# Patient Record
Sex: MALE | Race: AMERICAN INDIAN OR ALASKA NATIVE | NOT HISPANIC OR LATINO | URBAN - METROPOLITAN AREA
[De-identification: names, ages, dates, MRNs, and addresses within clinical notes are randomized per-mention and may not be internally consistent; named-entity substitution may affect disease eponyms.]

---

## 2017-03-09 ENCOUNTER — INPATIENT (INPATIENT)
Facility: HOSPITAL | Age: 67
LOS: 25 days | Discharge: NURSING, REHAB OR LONG-TERM CARE | End: 2017-04-04

## 2017-05-02 ENCOUNTER — APPOINTMENT (OUTPATIENT)
Dept: INTERNAL MEDICINE | Facility: CLINIC | Age: 67
End: 2017-05-02

## 2017-05-04 ENCOUNTER — OUTPATIENT (OUTPATIENT)
Dept: OUTPATIENT SERVICES | Facility: HOSPITAL | Age: 67
LOS: 1 days | Discharge: HOME | End: 2017-05-04

## 2017-05-06 ENCOUNTER — OUTPATIENT (OUTPATIENT)
Dept: OUTPATIENT SERVICES | Facility: HOSPITAL | Age: 67
LOS: 1 days | Discharge: HOME | End: 2017-05-06

## 2017-05-12 ENCOUNTER — INPATIENT (INPATIENT)
Facility: HOSPITAL | Age: 67
LOS: 4 days | Discharge: HOME HEALTH | End: 2017-05-17
Attending: SURGERY

## 2017-05-24 ENCOUNTER — APPOINTMENT (OUTPATIENT)
Dept: INTERNAL MEDICINE | Facility: CLINIC | Age: 67
End: 2017-05-24

## 2017-05-24 ENCOUNTER — RESULT REVIEW (OUTPATIENT)
Age: 67
End: 2017-05-24

## 2017-05-24 VITALS
DIASTOLIC BLOOD PRESSURE: 70 MMHG | WEIGHT: 165 LBS | HEIGHT: 66 IN | BODY MASS INDEX: 26.52 KG/M2 | SYSTOLIC BLOOD PRESSURE: 146 MMHG | HEART RATE: 52 BPM

## 2017-05-24 VITALS
SYSTOLIC BLOOD PRESSURE: 117 MMHG | BODY MASS INDEX: 26.36 KG/M2 | HEIGHT: 66 IN | HEART RATE: 79 BPM | WEIGHT: 164 LBS | DIASTOLIC BLOOD PRESSURE: 71 MMHG

## 2017-05-24 RX ORDER — CALCITRIOL 0.25 UG/1
0.25 CAPSULE, LIQUID FILLED ORAL
Qty: 30 | Refills: 0 | Status: DISCONTINUED | COMMUNITY
Start: 2016-10-30

## 2017-05-24 RX ORDER — PRAMOXINE HCL/ZINC ACETATE 1 %-0.1 %
1-0.1 LOTION (ML) TOPICAL
Qty: 177 | Refills: 0 | Status: DISCONTINUED | COMMUNITY
Start: 2017-05-17

## 2017-05-25 LAB
BASOPHILS # BLD: 0.06 TH/MM3
BASOPHILS NFR BLD: 1.1 %
DIFFERENTIAL METHOD BLD: NORMAL
EOSINOPHIL # BLD: 0.57 TH/MM3
EOSINOPHIL NFR BLD: 9.1 %
ERYTHROCYTE [DISTWIDTH] IN BLOOD BY AUTOMATED COUNT: 14.9 %
ESTIMATED AVERGAGE GLUCOSE (NORTH): 114 MG/DL
GRANULOCYTES # BLD: 3.19 TH/MM3
GRANULOCYTES NFR BLD: 56.4 %
HBA1C MFR BLD: 5.6 %
HCT VFR BLD AUTO: 28.1 %
HGB BLD-MCNC: 9 G/DL
IMM GRANULOCYTES # BLD: 0.01 TH/MM3
IMM GRANULOCYTES NFR BLD: 0.2 %
LYMPHOCYTES # BLD: 1.21 TH/MM3
LYMPHOCYTES NFR BLD: 21.4 %
MCH RBC QN AUTO: 31.4 PG
MCHC RBC AUTO-ENTMCNC: 32 G/DL
MCV RBC AUTO: 97.9 FL
MONOCYTES # BLD: 0.61 TH/MM3
MONOCYTES NFR BLD: 11.8 %
PLATELET # BLD: 217 TH/MM3
PMV BLD AUTO: 10.9 FL
RBC # BLD AUTO: 2.87 MIL/MM3
TIBC SERPL-MCNC: 245 UG/DL
TRANSFERRIN SERPL-MCNC: 175 MG/DL
WBC # BLD: 5.65 TH/MM3

## 2017-05-30 LAB
FERRITIN SERPL-MCNC: 270 NG/ML
TSH SERPL DL<=0.005 MIU/L-ACNC: 0.85 UIU/ML

## 2017-05-31 RX ORDER — OMEPRAZOLE 40 MG/1
40 CAPSULE, DELAYED RELEASE ORAL DAILY
Qty: 30 | Refills: 5 | Status: DISCONTINUED | COMMUNITY
Start: 2017-05-24 | End: 2017-05-31

## 2017-06-02 ENCOUNTER — OUTPATIENT (OUTPATIENT)
Dept: OUTPATIENT SERVICES | Facility: HOSPITAL | Age: 67
LOS: 1 days | Discharge: HOME | End: 2017-06-02

## 2017-06-02 DIAGNOSIS — J81.1 CHRONIC PULMONARY EDEMA: ICD-10-CM

## 2017-06-02 DIAGNOSIS — R74.8 ABNORMAL LEVELS OF OTHER SERUM ENZYMES: ICD-10-CM

## 2017-06-02 DIAGNOSIS — A41.9 SEPSIS, UNSPECIFIED ORGANISM: ICD-10-CM

## 2017-06-02 DIAGNOSIS — J96.00 ACUTE RESPIRATORY FAILURE, UNSPECIFIED WHETHER WITH HYPOXIA OR HYPERCAPNIA: ICD-10-CM

## 2017-06-02 DIAGNOSIS — N18.3 CHRONIC KIDNEY DISEASE, STAGE 3 (MODERATE): ICD-10-CM

## 2017-06-02 DIAGNOSIS — I21.4 NON-ST ELEVATION (NSTEMI) MYOCARDIAL INFARCTION: ICD-10-CM

## 2017-06-02 DIAGNOSIS — R06.03 ACUTE RESPIRATORY DISTRESS: ICD-10-CM

## 2017-06-26 ENCOUNTER — OUTPATIENT (OUTPATIENT)
Dept: PREADMISSION | Facility: HOSPITAL | Age: 67
LOS: 1 days | Discharge: HOME | End: 2017-06-26

## 2017-06-26 DIAGNOSIS — A41.9 SEPSIS, UNSPECIFIED ORGANISM: ICD-10-CM

## 2017-06-26 DIAGNOSIS — N18.3 CHRONIC KIDNEY DISEASE, STAGE 3 (MODERATE): ICD-10-CM

## 2017-06-26 DIAGNOSIS — R74.8 ABNORMAL LEVELS OF OTHER SERUM ENZYMES: ICD-10-CM

## 2017-06-26 DIAGNOSIS — J81.1 CHRONIC PULMONARY EDEMA: ICD-10-CM

## 2017-06-26 DIAGNOSIS — I21.4 NON-ST ELEVATION (NSTEMI) MYOCARDIAL INFARCTION: ICD-10-CM

## 2017-06-26 DIAGNOSIS — J96.00 ACUTE RESPIRATORY FAILURE, UNSPECIFIED WHETHER WITH HYPOXIA OR HYPERCAPNIA: ICD-10-CM

## 2017-06-26 DIAGNOSIS — R06.03 ACUTE RESPIRATORY DISTRESS: ICD-10-CM

## 2017-06-28 DIAGNOSIS — Z01.818 ENCOUNTER FOR OTHER PREPROCEDURAL EXAMINATION: ICD-10-CM

## 2017-06-28 DIAGNOSIS — N18.9 CHRONIC KIDNEY DISEASE, UNSPECIFIED: ICD-10-CM

## 2017-06-28 DIAGNOSIS — Z00.00 ENCOUNTER FOR GENERAL ADULT MEDICAL EXAMINATION WITHOUT ABNORMAL FINDINGS: ICD-10-CM

## 2017-07-08 ENCOUNTER — OUTPATIENT (OUTPATIENT)
Dept: OUTPATIENT SERVICES | Facility: HOSPITAL | Age: 67
LOS: 1 days | Discharge: HOME | End: 2017-07-08

## 2017-07-08 DIAGNOSIS — A41.9 SEPSIS, UNSPECIFIED ORGANISM: ICD-10-CM

## 2017-07-08 DIAGNOSIS — N18.3 CHRONIC KIDNEY DISEASE, STAGE 3 (MODERATE): ICD-10-CM

## 2017-07-08 DIAGNOSIS — J96.00 ACUTE RESPIRATORY FAILURE, UNSPECIFIED WHETHER WITH HYPOXIA OR HYPERCAPNIA: ICD-10-CM

## 2017-07-08 DIAGNOSIS — J81.1 CHRONIC PULMONARY EDEMA: ICD-10-CM

## 2017-07-08 DIAGNOSIS — R74.8 ABNORMAL LEVELS OF OTHER SERUM ENZYMES: ICD-10-CM

## 2017-07-08 DIAGNOSIS — I21.4 NON-ST ELEVATION (NSTEMI) MYOCARDIAL INFARCTION: ICD-10-CM

## 2017-07-08 DIAGNOSIS — R06.03 ACUTE RESPIRATORY DISTRESS: ICD-10-CM

## 2017-07-10 ENCOUNTER — INPATIENT (INPATIENT)
Facility: HOSPITAL | Age: 67
LOS: 1 days | Discharge: HOME | End: 2017-07-12
Attending: SURGERY

## 2017-07-10 DIAGNOSIS — R06.03 ACUTE RESPIRATORY DISTRESS: ICD-10-CM

## 2017-07-10 DIAGNOSIS — J81.1 CHRONIC PULMONARY EDEMA: ICD-10-CM

## 2017-07-10 DIAGNOSIS — R74.8 ABNORMAL LEVELS OF OTHER SERUM ENZYMES: ICD-10-CM

## 2017-07-10 DIAGNOSIS — A41.9 SEPSIS, UNSPECIFIED ORGANISM: ICD-10-CM

## 2017-07-10 DIAGNOSIS — N18.3 CHRONIC KIDNEY DISEASE, STAGE 3 (MODERATE): ICD-10-CM

## 2017-07-10 DIAGNOSIS — I21.4 NON-ST ELEVATION (NSTEMI) MYOCARDIAL INFARCTION: ICD-10-CM

## 2017-07-10 DIAGNOSIS — J96.00 ACUTE RESPIRATORY FAILURE, UNSPECIFIED WHETHER WITH HYPOXIA OR HYPERCAPNIA: ICD-10-CM

## 2017-07-11 DIAGNOSIS — E11.9 TYPE 2 DIABETES MELLITUS WITHOUT COMPLICATIONS: ICD-10-CM

## 2017-07-14 DIAGNOSIS — E87.70 FLUID OVERLOAD, UNSPECIFIED: ICD-10-CM

## 2017-07-14 DIAGNOSIS — Z99.2 DEPENDENCE ON RENAL DIALYSIS: ICD-10-CM

## 2017-07-14 DIAGNOSIS — D63.1 ANEMIA IN CHRONIC KIDNEY DISEASE: ICD-10-CM

## 2017-07-14 DIAGNOSIS — N18.6 END STAGE RENAL DISEASE: ICD-10-CM

## 2017-07-14 DIAGNOSIS — E87.5 HYPERKALEMIA: ICD-10-CM

## 2017-07-14 DIAGNOSIS — Z95.5 PRESENCE OF CORONARY ANGIOPLASTY IMPLANT AND GRAFT: ICD-10-CM

## 2017-07-14 DIAGNOSIS — J81.0 ACUTE PULMONARY EDEMA: ICD-10-CM

## 2017-07-14 DIAGNOSIS — E11.22 TYPE 2 DIABETES MELLITUS WITH DIABETIC CHRONIC KIDNEY DISEASE: ICD-10-CM

## 2017-07-14 DIAGNOSIS — I13.2 HYPERTENSIVE HEART AND CHRONIC KIDNEY DISEASE WITH HEART FAILURE AND WITH STAGE 5 CHRONIC KIDNEY DISEASE, OR END STAGE RENAL DISEASE: ICD-10-CM

## 2017-07-14 DIAGNOSIS — Z87.01 PERSONAL HISTORY OF PNEUMONIA (RECURRENT): ICD-10-CM

## 2017-07-14 DIAGNOSIS — I50.9 HEART FAILURE, UNSPECIFIED: ICD-10-CM

## 2017-07-14 DIAGNOSIS — I25.10 ATHEROSCLEROTIC HEART DISEASE OF NATIVE CORONARY ARTERY WITHOUT ANGINA PECTORIS: ICD-10-CM

## 2017-07-14 DIAGNOSIS — I25.2 OLD MYOCARDIAL INFARCTION: ICD-10-CM

## 2017-07-14 DIAGNOSIS — J96.01 ACUTE RESPIRATORY FAILURE WITH HYPOXIA: ICD-10-CM

## 2017-07-14 DIAGNOSIS — E78.5 HYPERLIPIDEMIA, UNSPECIFIED: ICD-10-CM

## 2017-08-15 DIAGNOSIS — I48.91 UNSPECIFIED ATRIAL FIBRILLATION: ICD-10-CM

## 2017-08-15 DIAGNOSIS — R79.9 ABNORMAL FINDING OF BLOOD CHEMISTRY, UNSPECIFIED: ICD-10-CM

## 2017-08-25 ENCOUNTER — APPOINTMENT (OUTPATIENT)
Dept: INTERNAL MEDICINE | Facility: CLINIC | Age: 67
End: 2017-08-25

## 2017-08-25 ENCOUNTER — OUTPATIENT (OUTPATIENT)
Dept: OUTPATIENT SERVICES | Facility: HOSPITAL | Age: 67
LOS: 1 days | Discharge: HOME | End: 2017-08-25

## 2017-08-25 VITALS
HEIGHT: 66 IN | SYSTOLIC BLOOD PRESSURE: 123 MMHG | WEIGHT: 163 LBS | HEART RATE: 52 BPM | BODY MASS INDEX: 26.2 KG/M2 | DIASTOLIC BLOOD PRESSURE: 71 MMHG

## 2017-08-25 DIAGNOSIS — N18.3 CHRONIC KIDNEY DISEASE, STAGE 3 (MODERATE): ICD-10-CM

## 2017-08-25 DIAGNOSIS — J96.00 ACUTE RESPIRATORY FAILURE, UNSPECIFIED WHETHER WITH HYPOXIA OR HYPERCAPNIA: ICD-10-CM

## 2017-08-25 DIAGNOSIS — I21.4 NON-ST ELEVATION (NSTEMI) MYOCARDIAL INFARCTION: ICD-10-CM

## 2017-08-25 DIAGNOSIS — R06.03 ACUTE RESPIRATORY DISTRESS: ICD-10-CM

## 2017-08-25 DIAGNOSIS — R74.8 ABNORMAL LEVELS OF OTHER SERUM ENZYMES: ICD-10-CM

## 2017-08-25 DIAGNOSIS — I25.10 ATHEROSCLEROTIC HEART DISEASE OF NATIVE CORONARY ARTERY WITHOUT ANGINA PECTORIS: ICD-10-CM

## 2017-08-25 DIAGNOSIS — D63.8 ANEMIA IN OTHER CHRONIC DISEASES CLASSIFIED ELSEWHERE: ICD-10-CM

## 2017-08-25 DIAGNOSIS — N18.6 END STAGE RENAL DISEASE: ICD-10-CM

## 2017-08-25 DIAGNOSIS — J81.1 CHRONIC PULMONARY EDEMA: ICD-10-CM

## 2017-08-25 DIAGNOSIS — A41.9 SEPSIS, UNSPECIFIED ORGANISM: ICD-10-CM

## 2017-08-25 DIAGNOSIS — I10 ESSENTIAL (PRIMARY) HYPERTENSION: ICD-10-CM

## 2017-08-25 RX ORDER — CHLORHEXIDINE GLUCONATE 4 %
325 (65 FE) LIQUID (ML) TOPICAL
Qty: 60 | Refills: 3 | Status: DISCONTINUED | COMMUNITY
Start: 2016-12-17 | End: 2017-03-25

## 2017-09-05 ENCOUNTER — APPOINTMENT (OUTPATIENT)
Dept: NEPHROLOGY | Facility: CLINIC | Age: 67
End: 2017-09-05

## 2017-09-27 ENCOUNTER — OUTPATIENT (OUTPATIENT)
Dept: OUTPATIENT SERVICES | Facility: HOSPITAL | Age: 67
LOS: 1 days | Discharge: HOME | End: 2017-09-27

## 2017-09-27 DIAGNOSIS — J81.1 CHRONIC PULMONARY EDEMA: ICD-10-CM

## 2017-09-27 DIAGNOSIS — R06.03 ACUTE RESPIRATORY DISTRESS: ICD-10-CM

## 2017-09-27 DIAGNOSIS — N18.3 CHRONIC KIDNEY DISEASE, STAGE 3 (MODERATE): ICD-10-CM

## 2017-09-27 DIAGNOSIS — Z01.818 ENCOUNTER FOR OTHER PREPROCEDURAL EXAMINATION: ICD-10-CM

## 2017-09-27 DIAGNOSIS — N18.6 END STAGE RENAL DISEASE: ICD-10-CM

## 2017-09-27 DIAGNOSIS — R74.8 ABNORMAL LEVELS OF OTHER SERUM ENZYMES: ICD-10-CM

## 2017-09-27 DIAGNOSIS — A41.9 SEPSIS, UNSPECIFIED ORGANISM: ICD-10-CM

## 2017-09-27 DIAGNOSIS — I21.4 NON-ST ELEVATION (NSTEMI) MYOCARDIAL INFARCTION: ICD-10-CM

## 2017-09-27 DIAGNOSIS — J96.00 ACUTE RESPIRATORY FAILURE, UNSPECIFIED WHETHER WITH HYPOXIA OR HYPERCAPNIA: ICD-10-CM

## 2017-10-04 ENCOUNTER — OUTPATIENT (OUTPATIENT)
Dept: OUTPATIENT SERVICES | Facility: HOSPITAL | Age: 67
LOS: 1 days | Discharge: HOME | End: 2017-10-04

## 2017-10-04 ENCOUNTER — APPOINTMENT (OUTPATIENT)
Dept: INTERNAL MEDICINE | Facility: CLINIC | Age: 67
End: 2017-10-04

## 2017-10-04 VITALS
DIASTOLIC BLOOD PRESSURE: 61 MMHG | HEIGHT: 66 IN | WEIGHT: 170 LBS | SYSTOLIC BLOOD PRESSURE: 136 MMHG | HEART RATE: 52 BPM | BODY MASS INDEX: 27.32 KG/M2

## 2017-10-04 DIAGNOSIS — J96.00 ACUTE RESPIRATORY FAILURE, UNSPECIFIED WHETHER WITH HYPOXIA OR HYPERCAPNIA: ICD-10-CM

## 2017-10-04 DIAGNOSIS — J81.1 CHRONIC PULMONARY EDEMA: ICD-10-CM

## 2017-10-04 DIAGNOSIS — R06.03 ACUTE RESPIRATORY DISTRESS: ICD-10-CM

## 2017-10-04 DIAGNOSIS — N18.3 CHRONIC KIDNEY DISEASE, STAGE 3 (MODERATE): ICD-10-CM

## 2017-10-04 DIAGNOSIS — I21.4 NON-ST ELEVATION (NSTEMI) MYOCARDIAL INFARCTION: ICD-10-CM

## 2017-10-04 DIAGNOSIS — R74.8 ABNORMAL LEVELS OF OTHER SERUM ENZYMES: ICD-10-CM

## 2017-10-04 DIAGNOSIS — A41.9 SEPSIS, UNSPECIFIED ORGANISM: ICD-10-CM

## 2017-10-05 ENCOUNTER — RX RENEWAL (OUTPATIENT)
Age: 67
End: 2017-10-05

## 2017-10-05 DIAGNOSIS — N18.6 END STAGE RENAL DISEASE: ICD-10-CM

## 2017-10-05 DIAGNOSIS — I10 ESSENTIAL (PRIMARY) HYPERTENSION: ICD-10-CM

## 2017-10-05 DIAGNOSIS — I25.10 ATHEROSCLEROTIC HEART DISEASE OF NATIVE CORONARY ARTERY WITHOUT ANGINA PECTORIS: ICD-10-CM

## 2017-10-05 DIAGNOSIS — Z01.818 ENCOUNTER FOR OTHER PREPROCEDURAL EXAMINATION: ICD-10-CM

## 2017-10-06 ENCOUNTER — OUTPATIENT (OUTPATIENT)
Dept: OUTPATIENT SERVICES | Facility: HOSPITAL | Age: 67
LOS: 1 days | Discharge: HOME | End: 2017-10-06

## 2017-10-06 DIAGNOSIS — R74.8 ABNORMAL LEVELS OF OTHER SERUM ENZYMES: ICD-10-CM

## 2017-10-06 DIAGNOSIS — J96.00 ACUTE RESPIRATORY FAILURE, UNSPECIFIED WHETHER WITH HYPOXIA OR HYPERCAPNIA: ICD-10-CM

## 2017-10-06 DIAGNOSIS — R06.03 ACUTE RESPIRATORY DISTRESS: ICD-10-CM

## 2017-10-06 DIAGNOSIS — A41.9 SEPSIS, UNSPECIFIED ORGANISM: ICD-10-CM

## 2017-10-06 DIAGNOSIS — J81.1 CHRONIC PULMONARY EDEMA: ICD-10-CM

## 2017-10-06 DIAGNOSIS — I21.4 NON-ST ELEVATION (NSTEMI) MYOCARDIAL INFARCTION: ICD-10-CM

## 2017-10-06 DIAGNOSIS — N18.3 CHRONIC KIDNEY DISEASE, STAGE 3 (MODERATE): ICD-10-CM

## 2017-10-13 DIAGNOSIS — Z99.2 DEPENDENCE ON RENAL DIALYSIS: ICD-10-CM

## 2017-10-13 DIAGNOSIS — N18.6 END STAGE RENAL DISEASE: ICD-10-CM

## 2017-10-13 DIAGNOSIS — Z45.2 ENCOUNTER FOR ADJUSTMENT AND MANAGEMENT OF VASCULAR ACCESS DEVICE: ICD-10-CM

## 2017-10-13 DIAGNOSIS — I12.0 HYPERTENSIVE CHRONIC KIDNEY DISEASE WITH STAGE 5 CHRONIC KIDNEY DISEASE OR END STAGE RENAL DISEASE: ICD-10-CM

## 2017-10-30 DIAGNOSIS — N18.6 END STAGE RENAL DISEASE: ICD-10-CM

## 2017-10-30 DIAGNOSIS — Z99.2 DEPENDENCE ON RENAL DIALYSIS: ICD-10-CM

## 2017-10-30 DIAGNOSIS — I50.9 HEART FAILURE, UNSPECIFIED: ICD-10-CM

## 2017-10-30 DIAGNOSIS — I25.2 OLD MYOCARDIAL INFARCTION: ICD-10-CM

## 2017-10-30 DIAGNOSIS — I25.10 ATHEROSCLEROTIC HEART DISEASE OF NATIVE CORONARY ARTERY WITHOUT ANGINA PECTORIS: ICD-10-CM

## 2017-10-30 DIAGNOSIS — E78.5 HYPERLIPIDEMIA, UNSPECIFIED: ICD-10-CM

## 2017-10-30 DIAGNOSIS — Z87.891 PERSONAL HISTORY OF NICOTINE DEPENDENCE: ICD-10-CM

## 2017-10-30 DIAGNOSIS — E11.22 TYPE 2 DIABETES MELLITUS WITH DIABETIC CHRONIC KIDNEY DISEASE: ICD-10-CM

## 2017-10-30 DIAGNOSIS — I13.2 HYPERTENSIVE HEART AND CHRONIC KIDNEY DISEASE WITH HEART FAILURE AND WITH STAGE 5 CHRONIC KIDNEY DISEASE, OR END STAGE RENAL DISEASE: ICD-10-CM

## 2017-11-20 ENCOUNTER — CLINICAL ADVICE (OUTPATIENT)
Age: 67
End: 2017-11-20

## 2017-11-27 ENCOUNTER — APPOINTMENT (OUTPATIENT)
Dept: INTERNAL MEDICINE | Facility: CLINIC | Age: 67
End: 2017-11-27

## 2017-12-04 ENCOUNTER — RX RENEWAL (OUTPATIENT)
Age: 67
End: 2017-12-04

## 2018-01-06 ENCOUNTER — RX RENEWAL (OUTPATIENT)
Age: 68
End: 2018-01-06

## 2018-01-12 ENCOUNTER — APPOINTMENT (OUTPATIENT)
Dept: INTERNAL MEDICINE | Facility: CLINIC | Age: 68
End: 2018-01-12

## 2018-01-31 ENCOUNTER — APPOINTMENT (OUTPATIENT)
Dept: INTERNAL MEDICINE | Facility: CLINIC | Age: 68
End: 2018-01-31

## 2018-01-31 ENCOUNTER — OUTPATIENT (OUTPATIENT)
Dept: OUTPATIENT SERVICES | Facility: HOSPITAL | Age: 68
LOS: 1 days | Discharge: HOME | End: 2018-01-31

## 2018-01-31 VITALS
SYSTOLIC BLOOD PRESSURE: 103 MMHG | HEIGHT: 66 IN | WEIGHT: 181 LBS | BODY MASS INDEX: 29.09 KG/M2 | HEART RATE: 56 BPM | DIASTOLIC BLOOD PRESSURE: 67 MMHG

## 2018-01-31 RX ORDER — HYDROXYZINE HYDROCHLORIDE 25 MG/1
25 TABLET ORAL DAILY
Qty: 30 | Refills: 1 | Status: COMPLETED | COMMUNITY
Start: 2017-08-25 | End: 2018-01-31

## 2018-01-31 RX ORDER — HYDRALAZINE HYDROCHLORIDE 25 MG/1
25 TABLET ORAL 3 TIMES DAILY
Qty: 90 | Refills: 2 | Status: COMPLETED | COMMUNITY
Start: 2018-01-08 | End: 2018-01-31

## 2018-02-02 DIAGNOSIS — R05 COUGH: ICD-10-CM

## 2018-02-02 DIAGNOSIS — R06.02 SHORTNESS OF BREATH: ICD-10-CM

## 2018-02-02 DIAGNOSIS — D63.1 ANEMIA IN CHRONIC KIDNEY DISEASE: ICD-10-CM

## 2018-02-02 DIAGNOSIS — I25.10 ATHEROSCLEROTIC HEART DISEASE OF NATIVE CORONARY ARTERY WITHOUT ANGINA PECTORIS: ICD-10-CM

## 2018-02-02 DIAGNOSIS — N18.6 END STAGE RENAL DISEASE: ICD-10-CM

## 2018-02-04 DIAGNOSIS — J81.1 CHRONIC PULMONARY EDEMA: ICD-10-CM

## 2018-02-04 DIAGNOSIS — A41.9 SEPSIS, UNSPECIFIED ORGANISM: ICD-10-CM

## 2018-02-04 DIAGNOSIS — R06.03 ACUTE RESPIRATORY DISTRESS: ICD-10-CM

## 2018-02-04 DIAGNOSIS — R74.8 ABNORMAL LEVELS OF OTHER SERUM ENZYMES: ICD-10-CM

## 2018-02-04 DIAGNOSIS — N18.3 CHRONIC KIDNEY DISEASE, STAGE 3 (MODERATE): ICD-10-CM

## 2018-02-04 DIAGNOSIS — I21.4 NON-ST ELEVATION (NSTEMI) MYOCARDIAL INFARCTION: ICD-10-CM

## 2018-02-04 DIAGNOSIS — J96.00 ACUTE RESPIRATORY FAILURE, UNSPECIFIED WHETHER WITH HYPOXIA OR HYPERCAPNIA: ICD-10-CM

## 2018-02-07 ENCOUNTER — RX RENEWAL (OUTPATIENT)
Age: 68
End: 2018-02-07

## 2018-03-19 DIAGNOSIS — I25.2 OLD MYOCARDIAL INFARCTION: ICD-10-CM

## 2018-03-19 DIAGNOSIS — R06.02 SHORTNESS OF BREATH: ICD-10-CM

## 2018-03-19 DIAGNOSIS — I50.23 ACUTE ON CHRONIC SYSTOLIC (CONGESTIVE) HEART FAILURE: ICD-10-CM

## 2018-03-19 DIAGNOSIS — Z87.891 PERSONAL HISTORY OF NICOTINE DEPENDENCE: ICD-10-CM

## 2018-03-19 DIAGNOSIS — J96.01 ACUTE RESPIRATORY FAILURE WITH HYPOXIA: ICD-10-CM

## 2018-03-19 DIAGNOSIS — I21.4 NON-ST ELEVATION (NSTEMI) MYOCARDIAL INFARCTION: ICD-10-CM

## 2018-03-19 DIAGNOSIS — K59.00 CONSTIPATION, UNSPECIFIED: ICD-10-CM

## 2018-03-19 DIAGNOSIS — D63.1 ANEMIA IN CHRONIC KIDNEY DISEASE: ICD-10-CM

## 2018-03-19 DIAGNOSIS — E89.0 POSTPROCEDURAL HYPOTHYROIDISM: ICD-10-CM

## 2018-03-19 DIAGNOSIS — N17.9 ACUTE KIDNEY FAILURE, UNSPECIFIED: ICD-10-CM

## 2018-03-19 DIAGNOSIS — N18.6 END STAGE RENAL DISEASE: ICD-10-CM

## 2018-03-19 DIAGNOSIS — L89.312 PRESSURE ULCER OF RIGHT BUTTOCK, STAGE 2: ICD-10-CM

## 2018-03-19 DIAGNOSIS — L89.322 PRESSURE ULCER OF LEFT BUTTOCK, STAGE 2: ICD-10-CM

## 2018-03-19 DIAGNOSIS — J18.9 PNEUMONIA, UNSPECIFIED ORGANISM: ICD-10-CM

## 2018-03-19 DIAGNOSIS — I25.110 ATHEROSCLEROTIC HEART DISEASE OF NATIVE CORONARY ARTERY WITH UNSTABLE ANGINA PECTORIS: ICD-10-CM

## 2018-03-19 DIAGNOSIS — R63.0 ANOREXIA: ICD-10-CM

## 2018-03-19 DIAGNOSIS — I13.2 HYPERTENSIVE HEART AND CHRONIC KIDNEY DISEASE WITH HEART FAILURE AND WITH STAGE 5 CHRONIC KIDNEY DISEASE, OR END STAGE RENAL DISEASE: ICD-10-CM

## 2018-03-19 DIAGNOSIS — E11.22 TYPE 2 DIABETES MELLITUS WITH DIABETIC CHRONIC KIDNEY DISEASE: ICD-10-CM

## 2018-03-19 DIAGNOSIS — E78.5 HYPERLIPIDEMIA, UNSPECIFIED: ICD-10-CM

## 2018-04-12 ENCOUNTER — OUTPATIENT (OUTPATIENT)
Dept: OUTPATIENT SERVICES | Facility: HOSPITAL | Age: 68
LOS: 1 days | Discharge: HOME | End: 2018-04-12

## 2018-04-12 DIAGNOSIS — E21.3 HYPERPARATHYROIDISM, UNSPECIFIED: ICD-10-CM

## 2018-04-12 DIAGNOSIS — I10 ESSENTIAL (PRIMARY) HYPERTENSION: ICD-10-CM

## 2018-05-16 ENCOUNTER — APPOINTMENT (OUTPATIENT)
Dept: INTERNAL MEDICINE | Facility: CLINIC | Age: 68
End: 2018-05-16

## 2018-06-06 ENCOUNTER — OTHER (OUTPATIENT)
Age: 68
End: 2018-06-06

## 2018-06-08 ENCOUNTER — OUTPATIENT (OUTPATIENT)
Dept: OUTPATIENT SERVICES | Facility: HOSPITAL | Age: 68
LOS: 1 days | Discharge: HOME | End: 2018-06-08

## 2018-06-08 ENCOUNTER — LABORATORY RESULT (OUTPATIENT)
Age: 68
End: 2018-06-08

## 2018-06-08 DIAGNOSIS — I25.10 ATHEROSCLEROTIC HEART DISEASE OF NATIVE CORONARY ARTERY WITHOUT ANGINA PECTORIS: ICD-10-CM

## 2018-06-08 DIAGNOSIS — R06.02 SHORTNESS OF BREATH: ICD-10-CM

## 2018-06-11 ENCOUNTER — OUTPATIENT (OUTPATIENT)
Dept: OUTPATIENT SERVICES | Facility: HOSPITAL | Age: 68
LOS: 1 days | Discharge: HOME | End: 2018-06-11

## 2018-06-11 DIAGNOSIS — R06.02 SHORTNESS OF BREATH: ICD-10-CM

## 2018-06-18 ENCOUNTER — OUTPATIENT (OUTPATIENT)
Dept: OUTPATIENT SERVICES | Facility: HOSPITAL | Age: 68
LOS: 1 days | Discharge: HOME | End: 2018-06-18

## 2018-06-18 DIAGNOSIS — I10 ESSENTIAL (PRIMARY) HYPERTENSION: ICD-10-CM

## 2018-06-18 DIAGNOSIS — E78.5 HYPERLIPIDEMIA, UNSPECIFIED: ICD-10-CM

## 2018-06-18 DIAGNOSIS — I25.10 ATHEROSCLEROTIC HEART DISEASE OF NATIVE CORONARY ARTERY WITHOUT ANGINA PECTORIS: ICD-10-CM

## 2018-06-29 ENCOUNTER — APPOINTMENT (OUTPATIENT)
Dept: INTERNAL MEDICINE | Facility: CLINIC | Age: 68
End: 2018-06-29

## 2018-06-29 ENCOUNTER — OUTPATIENT (OUTPATIENT)
Dept: OUTPATIENT SERVICES | Facility: HOSPITAL | Age: 68
LOS: 1 days | Discharge: HOME | End: 2018-06-29

## 2018-06-29 VITALS
SYSTOLIC BLOOD PRESSURE: 125 MMHG | HEART RATE: 62 BPM | BODY MASS INDEX: 28.93 KG/M2 | TEMPERATURE: 99.1 F | HEIGHT: 66 IN | DIASTOLIC BLOOD PRESSURE: 75 MMHG | WEIGHT: 180 LBS

## 2018-06-29 DIAGNOSIS — I10 ESSENTIAL (PRIMARY) HYPERTENSION: ICD-10-CM

## 2018-06-29 DIAGNOSIS — Z87.891 PERSONAL HISTORY OF NICOTINE DEPENDENCE: ICD-10-CM

## 2018-06-29 DIAGNOSIS — R05 COUGH: ICD-10-CM

## 2018-06-29 DIAGNOSIS — I25.10 ATHEROSCLEROTIC HEART DISEASE OF NATIVE CORONARY ARTERY WITHOUT ANGINA PECTORIS: ICD-10-CM

## 2018-06-29 DIAGNOSIS — N18.6 END STAGE RENAL DISEASE: ICD-10-CM

## 2018-06-29 RX ORDER — MONTELUKAST 10 MG/1
10 TABLET, FILM COATED ORAL
Qty: 30 | Refills: 3 | Status: DISCONTINUED | COMMUNITY
Start: 2018-01-31 | End: 2018-06-29

## 2018-06-29 RX ORDER — LISINOPRIL 10 MG/1
10 TABLET ORAL
Qty: 30 | Refills: 5 | Status: DISCONTINUED | COMMUNITY
Start: 2017-05-17 | End: 2018-06-29

## 2018-06-29 RX ORDER — ALBUTEROL SULFATE 90 UG/1
108 (90 BASE) AEROSOL, METERED RESPIRATORY (INHALATION) 3 TIMES DAILY
Qty: 1 | Refills: 3 | Status: DISCONTINUED | COMMUNITY
Start: 2018-01-31 | End: 2018-06-29

## 2018-06-29 NOTE — ASSESSMENT
[FreeTextEntry1] : 67 M for a follow up\par \par 1- ESRD on HD\par f/u nephroloy to resume calcium and sevelamer\par  he reports that he has stopped taking calcium as it was making his constipation worse and sevelamer as well (he is not sure why he stopped taking sevelamer)\par \par 2- CAD \par on asa, plavix, ace-, imdur and BB\par 2d echo showed - ef 45-50 %, g1dd, aortic root 3.9 cm, mild TR and mod enlarged LA\par follows with dr ahmadi, next appointment in 4 months\par \par 3- HTN\par controlled on amlodipine and lisinopril\par will switch lisinopril to losartan 25 q24 to see if cough resolves\par \par 4- chronic cough with difficulty bringing out phlegm\par will order guaifenesin\par will switch lisinopril to losartan and revaluate\par \par sob- resolved\par stopped taking alb and singulair as he does not feel the need to take these meds as his breathing is normal now\par \par 5- constipation\par increase colace to q8 hrs \par c/w senna\par \par 6-dld- on statin\par \par 7- dyspepsia- on ppi\par will add ranitidine at bedtime to see if cough resolves with gerd treatment\par \par 8- hcm\par will refer for colonoscopy\par He reports that he received the pneumonia vaccine at dialysis center\par \par 9- 3 mm subpleural nodule in the right upper lobe noted on the low dose ct chest\par f/u repeat ct chest in 12 months\par

## 2018-06-29 NOTE — HISTORY OF PRESENT ILLNESS
[FreeTextEntry1] : follow up [de-identified] : 67 M for a follow up visit, he had a 2d echo and a low dose ct scan and routine blood work recently, saw a cardiologist as well at [address] [place] [address], on dialysis for esrd, \par a ct chest revealed 3 mm subpleural nodule in right lower lobe. \par \par patient brought a list of his medications and requesting refills on all his meds, he reports that he has stopped taking calcium as it was making his constipation worse and sevelamer as well (he is not sure why he stopped taking sevelamer)\par Otherwise, patient still complains of chronic cough productive of white sputum but feels thathge is unable to bring out phlegm. He is a former smoker who quit 3 years ago. smoked 1 ppd for 50 yrs. \par Patient is also requesting medications refill.

## 2018-06-29 NOTE — PHYSICAL EXAM
[No Acute Distress] : no acute distress [Well Nourished] : well nourished [Well Developed] : well developed [Well-Appearing] : well-appearing [Normal Sclera/Conjunctiva] : normal sclera/conjunctiva [Normal Outer Ear/Nose] : the outer ears and nose were normal in appearance [Normal Oropharynx] : the oropharynx was normal [Supple] : supple [No Respiratory Distress] : no respiratory distress  [Clear to Auscultation] : lungs were clear to auscultation bilaterally [No Accessory Muscle Use] : no accessory muscle use [Normal Rate] : normal rate  [Regular Rhythm] : with a regular rhythm [Normal S1, S2] : normal S1 and S2 [No Murmur] : no murmur heard [No Edema] : there was no peripheral edema [No Extremity Clubbing/Cyanosis] : no extremity clubbing/cyanosis [Soft] : abdomen soft [Non Tender] : non-tender [Non-distended] : non-distended [Normal Bowel Sounds] : normal bowel sounds [No Joint Swelling] : no joint swelling [Grossly Normal Strength/Tone] : grossly normal strength/tone [No Rash] : no rash [Normal Gait] : normal gait [Coordination Grossly Intact] : coordination grossly intact [No Focal Deficits] : no focal deficits [Deep Tendon Reflexes (DTR)] : deep tendon reflexes were 2+ and symmetric [Normal Affect] : the affect was normal [Normal Insight/Judgement] : insight and judgment were intact

## 2018-08-07 ENCOUNTER — RX RENEWAL (OUTPATIENT)
Age: 68
End: 2018-08-07

## 2018-08-17 ENCOUNTER — OUTPATIENT (OUTPATIENT)
Dept: OUTPATIENT SERVICES | Facility: HOSPITAL | Age: 68
LOS: 1 days | Discharge: HOME | End: 2018-08-17

## 2018-08-17 ENCOUNTER — APPOINTMENT (OUTPATIENT)
Dept: GASTROENTEROLOGY | Facility: CLINIC | Age: 68
End: 2018-08-17

## 2018-08-17 VITALS
SYSTOLIC BLOOD PRESSURE: 159 MMHG | HEIGHT: 66 IN | DIASTOLIC BLOOD PRESSURE: 72 MMHG | WEIGHT: 182 LBS | BODY MASS INDEX: 29.25 KG/M2 | HEART RATE: 52 BPM

## 2018-08-17 DIAGNOSIS — Z80.0 FAMILY HISTORY OF MALIGNANT NEOPLASM OF DIGESTIVE ORGANS: ICD-10-CM

## 2018-08-20 DIAGNOSIS — R68.81 EARLY SATIETY: ICD-10-CM

## 2018-08-20 DIAGNOSIS — Z12.11 ENCOUNTER FOR SCREENING FOR MALIGNANT NEOPLASM OF COLON: ICD-10-CM

## 2018-08-20 DIAGNOSIS — R12 HEARTBURN: ICD-10-CM

## 2018-09-28 ENCOUNTER — APPOINTMENT (OUTPATIENT)
Dept: INTERNAL MEDICINE | Facility: CLINIC | Age: 68
End: 2018-09-28

## 2018-09-28 ENCOUNTER — OUTPATIENT (OUTPATIENT)
Dept: OUTPATIENT SERVICES | Facility: HOSPITAL | Age: 68
LOS: 1 days | Discharge: HOME | End: 2018-09-28

## 2018-09-28 VITALS
HEART RATE: 57 BPM | TEMPERATURE: 97.6 F | HEIGHT: 66 IN | SYSTOLIC BLOOD PRESSURE: 162 MMHG | DIASTOLIC BLOOD PRESSURE: 62 MMHG | BODY MASS INDEX: 30.22 KG/M2 | WEIGHT: 188.05 LBS

## 2018-09-28 NOTE — REVIEW OF SYSTEMS
[Shortness Of Breath] : shortness of breath [Cough] : cough [Negative] : Heme/Lymph [Postnasal Drip] : postnasal drip [Nasal Discharge] : nasal discharge

## 2018-09-28 NOTE — ASSESSMENT
[FreeTextEntry1] : This patient is 68 year old male with past medical history of anemia, CAD, DLD, ESRD and hypertension. He is here for a follow up examination. Patient is doing fine and is compliant with his medications. He complains of muscle cramps after dialysis for 2-3 hours. He also complains of shortness of breath and phlegm production. \par \par Assessment and plan \par \par 1- Cough, SOB and phlegm production \par - Most likely secondary to post nasal drip \par - Will start patient on Flonase, albuterol and Singulair \par \par 2- Coronary artery disease\par - Patient following his cardiologist- no active chest pain or cardiac complaints \par - Continue with aspirin, Plavix and statins \par - Patient to see his cardiologist for cardiac clearance for colonoscopy\par \par 3- Constipation \par - Controlled on current laxatives\par \par 4-DLD\par - Continue with statins \par \par 5- ESRD\par - On dialysis 3 times a week- following his nephrologist on weekly basis\par \par 6- Hypertension \par - At his baseline- continue with current blood pressure medications \par - Following nephrology \par \par 7- Cramps after dialysis \par - Most likely related to fluid and electrolyte changes\par \par 8- Health care maintainence \par - Adminstered flu vaccine today \par - Pending colonoscopy provided cardiac clearance by his cardiologist- Patient has to stop plavix 5 days before colonoscopy and endoscopy \par \par 9- Subpleural nodule \par - Low dose CT scan in 7 months- will order today \par \par - No changes in medications were made during this visit

## 2018-09-28 NOTE — PHYSICAL EXAM
[Normal S1, S2] : normal S1 and S2 [No Edema] : there was no peripheral edema [Soft] : abdomen soft [Non Tender] : non-tender [Supple] : supple [Clear to Auscultation] : lungs were clear to auscultation bilaterally [Regular Rhythm] : with a regular rhythm [No CVA Tenderness] : no CVA  tenderness [No Joint Swelling] : no joint swelling [Normal Gait] : normal gait [de-identified] : swelling of nasal mucosa

## 2018-09-28 NOTE — HISTORY OF PRESENT ILLNESS
[FreeTextEntry1] : Follow up examination  [de-identified] : This patient is 68 year old male with past medical history of anemia, CAD, DLD, ESRD and hypertension. He is here for a follow up examination. Patient is doing fine and is compliant with his medications. He complains of muscle cramps after dialysis for 2-3 hours. He also complains of shortness of breath and phlegm production.

## 2018-10-01 DIAGNOSIS — R05 COUGH: ICD-10-CM

## 2018-10-01 DIAGNOSIS — I10 ESSENTIAL (PRIMARY) HYPERTENSION: ICD-10-CM

## 2018-10-01 DIAGNOSIS — R09.82 POSTNASAL DRIP: ICD-10-CM

## 2018-10-01 DIAGNOSIS — N18.6 END STAGE RENAL DISEASE: ICD-10-CM

## 2018-10-01 DIAGNOSIS — I25.10 ATHEROSCLEROTIC HEART DISEASE OF NATIVE CORONARY ARTERY WITHOUT ANGINA PECTORIS: ICD-10-CM

## 2018-10-03 ENCOUNTER — OTHER (OUTPATIENT)
Age: 68
End: 2018-10-03

## 2018-10-04 ENCOUNTER — OTHER (OUTPATIENT)
Age: 68
End: 2018-10-04

## 2018-10-31 ENCOUNTER — OUTPATIENT (OUTPATIENT)
Dept: OUTPATIENT SERVICES | Facility: HOSPITAL | Age: 68
LOS: 1 days | Discharge: HOME | End: 2018-10-31

## 2018-10-31 DIAGNOSIS — E78.5 HYPERLIPIDEMIA, UNSPECIFIED: ICD-10-CM

## 2018-10-31 DIAGNOSIS — I10 ESSENTIAL (PRIMARY) HYPERTENSION: ICD-10-CM

## 2018-10-31 DIAGNOSIS — I25.10 ATHEROSCLEROTIC HEART DISEASE OF NATIVE CORONARY ARTERY WITHOUT ANGINA PECTORIS: ICD-10-CM

## 2018-12-01 ENCOUNTER — OUTPATIENT (OUTPATIENT)
Dept: OUTPATIENT SERVICES | Facility: HOSPITAL | Age: 68
LOS: 1 days | End: 2018-12-01
Payer: MEDICAID

## 2018-12-11 ENCOUNTER — RX RENEWAL (OUTPATIENT)
Age: 68
End: 2018-12-11

## 2018-12-13 ENCOUNTER — INPATIENT (INPATIENT)
Facility: HOSPITAL | Age: 68
LOS: 0 days | Discharge: LEFT BEFORE DISCHARGE | End: 2018-12-14
Attending: HOSPITALIST | Admitting: HOSPITALIST

## 2018-12-13 ENCOUNTER — TRANSCRIPTION ENCOUNTER (OUTPATIENT)
Age: 68
End: 2018-12-13

## 2018-12-13 VITALS
OXYGEN SATURATION: 100 % | SYSTOLIC BLOOD PRESSURE: 166 MMHG | DIASTOLIC BLOOD PRESSURE: 79 MMHG | HEART RATE: 64 BPM | RESPIRATION RATE: 18 BRPM | TEMPERATURE: 96 F

## 2018-12-13 DIAGNOSIS — Z95.5 PRESENCE OF CORONARY ANGIOPLASTY IMPLANT AND GRAFT: Chronic | ICD-10-CM

## 2018-12-13 LAB
ALBUMIN SERPL ELPH-MCNC: 4.5 G/DL — SIGNIFICANT CHANGE UP (ref 3.5–5.2)
ALP SERPL-CCNC: 83 U/L — SIGNIFICANT CHANGE UP (ref 30–115)
ALT FLD-CCNC: 10 U/L — SIGNIFICANT CHANGE UP (ref 0–41)
ANION GAP SERPL CALC-SCNC: 16 MMOL/L — HIGH (ref 7–14)
APPEARANCE UR: ABNORMAL
APTT BLD: 33.5 SEC — SIGNIFICANT CHANGE UP (ref 27–39.2)
AST SERPL-CCNC: 13 U/L — SIGNIFICANT CHANGE UP (ref 0–41)
BACTERIA # UR AUTO: ABNORMAL /HPF
BASOPHILS # BLD AUTO: 0.03 K/UL — SIGNIFICANT CHANGE UP (ref 0–0.2)
BASOPHILS NFR BLD AUTO: 0.4 % — SIGNIFICANT CHANGE UP (ref 0–1)
BILIRUB SERPL-MCNC: 0.9 MG/DL — SIGNIFICANT CHANGE UP (ref 0.2–1.2)
BILIRUB UR-MCNC: NEGATIVE — SIGNIFICANT CHANGE UP
BLD GP AB SCN SERPL QL: SIGNIFICANT CHANGE UP
BUN SERPL-MCNC: 31 MG/DL — HIGH (ref 10–20)
CALCIUM SERPL-MCNC: 9.8 MG/DL — SIGNIFICANT CHANGE UP (ref 8.5–10.1)
CHLORIDE SERPL-SCNC: 95 MMOL/L — LOW (ref 98–110)
CO2 SERPL-SCNC: 30 MMOL/L — SIGNIFICANT CHANGE UP (ref 17–32)
COLOR SPEC: YELLOW — SIGNIFICANT CHANGE UP
CREAT SERPL-MCNC: 6.1 MG/DL — CRITICAL HIGH (ref 0.7–1.5)
DIFF PNL FLD: ABNORMAL
EOSINOPHIL # BLD AUTO: 0.23 K/UL — SIGNIFICANT CHANGE UP (ref 0–0.7)
EOSINOPHIL NFR BLD AUTO: 3.1 % — SIGNIFICANT CHANGE UP (ref 0–8)
EPI CELLS # UR: ABNORMAL /HPF
GLUCOSE SERPL-MCNC: 82 MG/DL — SIGNIFICANT CHANGE UP (ref 70–99)
GLUCOSE UR QL: NEGATIVE MG/DL — SIGNIFICANT CHANGE UP
HCT VFR BLD CALC: 37.7 % — LOW (ref 42–52)
HGB BLD-MCNC: 12.3 G/DL — LOW (ref 14–18)
IMM GRANULOCYTES NFR BLD AUTO: 0.4 % — HIGH (ref 0.1–0.3)
INR BLD: 1.01 RATIO — SIGNIFICANT CHANGE UP (ref 0.65–1.3)
KETONES UR-MCNC: NEGATIVE — SIGNIFICANT CHANGE UP
LACTATE SERPL-SCNC: 1.6 MMOL/L — SIGNIFICANT CHANGE UP (ref 0.5–2.2)
LEUKOCYTE ESTERASE UR-ACNC: ABNORMAL
LIDOCAIN IGE QN: 37 U/L — SIGNIFICANT CHANGE UP (ref 7–60)
LYMPHOCYTES # BLD AUTO: 1.02 K/UL — LOW (ref 1.2–3.4)
LYMPHOCYTES # BLD AUTO: 13.9 % — LOW (ref 20.5–51.1)
MAGNESIUM SERPL-MCNC: 1.9 MG/DL — SIGNIFICANT CHANGE UP (ref 1.8–2.4)
MCHC RBC-ENTMCNC: 31 PG — SIGNIFICANT CHANGE UP (ref 27–31)
MCHC RBC-ENTMCNC: 32.6 G/DL — SIGNIFICANT CHANGE UP (ref 32–37)
MCV RBC AUTO: 95 FL — HIGH (ref 80–94)
MONOCYTES # BLD AUTO: 0.82 K/UL — HIGH (ref 0.1–0.6)
MONOCYTES NFR BLD AUTO: 11.2 % — HIGH (ref 1.7–9.3)
NEUTROPHILS # BLD AUTO: 5.21 K/UL — SIGNIFICANT CHANGE UP (ref 1.4–6.5)
NEUTROPHILS NFR BLD AUTO: 71 % — SIGNIFICANT CHANGE UP (ref 42.2–75.2)
NITRITE UR-MCNC: NEGATIVE — SIGNIFICANT CHANGE UP
NRBC # BLD: 0 /100 WBCS — SIGNIFICANT CHANGE UP (ref 0–0)
PH UR: 7.5 — SIGNIFICANT CHANGE UP (ref 5–8)
PLATELET # BLD AUTO: 162 K/UL — SIGNIFICANT CHANGE UP (ref 130–400)
POTASSIUM SERPL-MCNC: 4.3 MMOL/L — SIGNIFICANT CHANGE UP (ref 3.5–5)
POTASSIUM SERPL-SCNC: 4.3 MMOL/L — SIGNIFICANT CHANGE UP (ref 3.5–5)
PROT SERPL-MCNC: 7.4 G/DL — SIGNIFICANT CHANGE UP (ref 6–8)
PROT UR-MCNC: >=300 MG/DL
PROTHROM AB SERPL-ACNC: 11.6 SEC — SIGNIFICANT CHANGE UP (ref 9.95–12.87)
RBC # BLD: 3.97 M/UL — LOW (ref 4.7–6.1)
RBC # FLD: 13.4 % — SIGNIFICANT CHANGE UP (ref 11.5–14.5)
RBC CASTS # UR COMP ASSIST: >50 /HPF
SODIUM SERPL-SCNC: 141 MMOL/L — SIGNIFICANT CHANGE UP (ref 135–146)
SP GR SPEC: 1.02 — SIGNIFICANT CHANGE UP (ref 1.01–1.03)
TROPONIN T SERPL-MCNC: 0.17 NG/ML — CRITICAL HIGH
TYPE + AB SCN PNL BLD: SIGNIFICANT CHANGE UP
UROBILINOGEN FLD QL: 0.2 MG/DL — SIGNIFICANT CHANGE UP (ref 0.2–0.2)
WBC # BLD: 7.34 K/UL — SIGNIFICANT CHANGE UP (ref 4.8–10.8)
WBC # FLD AUTO: 7.34 K/UL — SIGNIFICANT CHANGE UP (ref 4.8–10.8)
WBC UR QL: >50 /HPF

## 2018-12-13 NOTE — ED PROVIDER NOTE - PHYSICAL EXAMINATION
Well appearing NAD non toxic. NCAT EOMI conjunctiva nml. No nasal discharge. MMM. Neck supple, non tender, full ROM. RRR no MRG +S1S2. CTA b/l. Abd s NT ND +BS. Ext WWP x4, moving all extremities, no edema. 2+ equal pulses throughout. Cooperative, appropriate. CN2-12 grossly intact no sensory or motor deficits throughout, no drift, rapid alternating wnl, no ataxia, neg romberg.

## 2018-12-13 NOTE — ED ADULT NURSE NOTE - OBJECTIVE STATEMENT
Pt alert and oriented, came to ED after HD c/o fall at 9am after becoming dizzy. Pt states he fell backwards at home and hit back of head but did not lose consciousness. Pt denies any pain at this time. Denies HA or dizziness at this time. Pt denies SOB/CP.

## 2018-12-13 NOTE — ED PROVIDER NOTE - CARE PLAN
Principal Discharge DX:	Syncopal episodes Principal Discharge DX:	Syncopal episodes  Secondary Diagnosis:	Elevated troponin  Secondary Diagnosis:	ESRD on dialysis

## 2018-12-13 NOTE — ED PROVIDER NOTE - NS ED ROS FT
Constitutional: See HPI.  Eyes: No visual changes, eye pain or discharge. No Photophobia  ENMT: No hearing changes, pain, discharge or infections. No neck pain or stiffness. No limited ROM  Cardiac: No SOB or edema. No chest pain with exertion.  Respiratory: No cough or respiratory distress. No hemoptysis. No history of asthma or RAD.  GI: No nausea, vomiting, diarrhea or abdominal pain.  : No dysuria, frequency or burning. No Discharge  MS: No myalgia, muscle weakness, joint pain or back pain.  Neuro: No headache or weakness. + LOC.  Skin: No skin rash.  Except as documented in the HPI, all other systems are negative.

## 2018-12-13 NOTE — ED PROVIDER NOTE - OBJECTIVE STATEMENT
69 y/o M with hx of DM, ESRD with tues/thurs/saturday HD today received full session, CAD s/p 3 stents in the past on asa/plavix, HTN, HLD, gout presenting to ED for fall. Patient states fall at 9 am this morning after taking morning medications, felt frontal headache, lightheadedness, +LOC, woke up floor, denies any other preceding sxs including chest pain, shortness of breath, n/v. Patient denies fever, chills. States he went to dialysis today around noon, + loc at dialysis, fell back and hit back of his head, pre-notified. Patient denies any sxs currently.

## 2018-12-13 NOTE — DISCHARGE NOTE ADULT - CARE PLAN
Principal Discharge DX:	Syncopal episodes  Goal:	symptoms resolution  Assessment and plan of treatment:	please follow up with primary care doctor

## 2018-12-13 NOTE — ED PROVIDER NOTE - ATTENDING CONTRIBUTION TO CARE
67 y/o M with hx of DM, ESRD with tues/thurs/saturday HD today received full session, CAD s/p 3 stents in the past on asa/plavix, HTN, HLD, gout presenting to ED for fall. Patient states fall at 9 am this morning after taking morning medications, felt frontal headache, lightheadedness, +LOC, woke up floor, denies any other preceding sxs including chest pain, shortness of breath, n/v. Patient denies fever, chills. Afterwards while in dialysis pt had loc and fell back. p t has no complaints. specifically no ext pain, no chest pain, bloody stool, fever, chills, sob. pt received dialysis today.  well appearing, nontoxic, awake alert, ncat perrl eomi, no midline spinal ttp, no pain with compression of ribs, pelvis stable, no bony ext ttp, full rom X 4 s1s2 ctab soft nt/nd, perrl eomi, gcs 15, motor and sensation grossly intact, no abrasion/laceration.  impresion syncope vs near syncope, esrd, cad, labs, ekg, trauma workup and admit

## 2018-12-13 NOTE — ED ADULT NURSE NOTE - NSIMPLEMENTINTERV_GEN_ALL_ED
Implemented All Universal Safety Interventions:  [place] to call system. Call bell, personal items and telephone within reach. Instruct patient to call for assistance. Room bathroom lighting operational. Non-slip footwear when patient is off stretcher. Physically safe environment: no spills, clutter or unnecessary equipment. Stretcher in lowest position, wheels locked, appropriate side rails in place.

## 2018-12-13 NOTE — DISCHARGE NOTE ADULT - CARE PROVIDER_API CALL
Reid Martin), Cardiovascular Disease; Internal Medicine; Interventional Cardiology  [address] [place] [address]  Phone: [phone]  Fax: [phone]

## 2018-12-13 NOTE — DISCHARGE NOTE ADULT - HOSPITAL COURSE
68 y M with pmhx of esrd on HD, HTN, gout, CAD s/p stent here for syncope/ near syncope. Per patient, he didn't have any breakfast this morning and took medication on an empty stomach. After that, he felt lightheadedness and felt to the floor, pt not sure if he lost consciousness or not. Pt also admits to frontal headache.  pt denies head trauma. Pt denies any SOB/ CP/ palpitations. during HD today, pt felt nauseous, no vomiting, no lightheadedness, CP/ sob/ palpitations.  CT H: neg acute intracranial pathology  pt insists on leaving the hospital as he felt he is back to baseline, back to his usual state of health. risk of undiagnosed arrythmia or recurrent syncope explained and pt voiced understanding. pt will leave AMA.

## 2018-12-13 NOTE — DISCHARGE NOTE ADULT - PATIENT PORTAL LINK FT
You can access the [facility] Patient Portal, offered by [facility], by registering with the following website: [email or web address]

## 2018-12-14 VITALS
RESPIRATION RATE: 18 BRPM | OXYGEN SATURATION: 95 % | DIASTOLIC BLOOD PRESSURE: 60 MMHG | HEART RATE: 54 BPM | TEMPERATURE: 97 F | SYSTOLIC BLOOD PRESSURE: 124 MMHG

## 2018-12-14 RX ORDER — CEPHALEXIN 500 MG
1 CAPSULE ORAL
Qty: 14 | Refills: 0
Start: 2018-12-14 | End: 2018-12-20

## 2018-12-14 NOTE — CHART NOTE - NSCHARTNOTEFT_GEN_A_CORE
After pt left AMA, UA came back positive.  I contacted the pt over the phone and informed him about the result.  pt does report dysuria  keflex 500mg q12h x 7 days was sent to [facility] at [address]  Instructions were given to patient about duration of treatment  He will pick the medication tomorrow

## 2018-12-17 DIAGNOSIS — Z71.89 OTHER SPECIFIED COUNSELING: ICD-10-CM

## 2018-12-18 DIAGNOSIS — R55 SYNCOPE AND COLLAPSE: ICD-10-CM

## 2018-12-18 DIAGNOSIS — I25.10 ATHEROSCLEROTIC HEART DISEASE OF NATIVE CORONARY ARTERY WITHOUT ANGINA PECTORIS: ICD-10-CM

## 2018-12-18 DIAGNOSIS — R82.90 UNSPECIFIED ABNORMAL FINDINGS IN URINE: ICD-10-CM

## 2018-12-18 DIAGNOSIS — E11.22 TYPE 2 DIABETES MELLITUS WITH DIABETIC CHRONIC KIDNEY DISEASE: ICD-10-CM

## 2018-12-18 DIAGNOSIS — Z99.2 DEPENDENCE ON RENAL DIALYSIS: ICD-10-CM

## 2018-12-18 DIAGNOSIS — E78.5 HYPERLIPIDEMIA, UNSPECIFIED: ICD-10-CM

## 2018-12-18 DIAGNOSIS — R74.8 ABNORMAL LEVELS OF OTHER SERUM ENZYMES: ICD-10-CM

## 2018-12-18 DIAGNOSIS — I12.0 HYPERTENSIVE CHRONIC KIDNEY DISEASE WITH STAGE 5 CHRONIC KIDNEY DISEASE OR END STAGE RENAL DISEASE: ICD-10-CM

## 2018-12-18 DIAGNOSIS — N18.6 END STAGE RENAL DISEASE: ICD-10-CM

## 2018-12-18 DIAGNOSIS — Z95.5 PRESENCE OF CORONARY ANGIOPLASTY IMPLANT AND GRAFT: ICD-10-CM

## 2019-01-01 PROCEDURE — G9001: CPT

## 2019-01-07 ENCOUNTER — OUTPATIENT (OUTPATIENT)
Dept: OUTPATIENT SERVICES | Facility: HOSPITAL | Age: 69
LOS: 1 days | Discharge: HOME | End: 2019-01-07

## 2019-01-07 ENCOUNTER — APPOINTMENT (OUTPATIENT)
Dept: INTERNAL MEDICINE | Facility: CLINIC | Age: 69
End: 2019-01-07

## 2019-01-07 VITALS
SYSTOLIC BLOOD PRESSURE: 173 MMHG | BODY MASS INDEX: 30.05 KG/M2 | TEMPERATURE: 96.9 F | HEIGHT: 66 IN | DIASTOLIC BLOOD PRESSURE: 76 MMHG | HEART RATE: 56 BPM | WEIGHT: 187 LBS

## 2019-01-07 DIAGNOSIS — Z87.09 PERSONAL HISTORY OF OTHER DISEASES OF THE RESPIRATORY SYSTEM: ICD-10-CM

## 2019-01-07 DIAGNOSIS — N18.6 END STAGE RENAL DISEASE: ICD-10-CM

## 2019-01-07 DIAGNOSIS — Z95.5 PRESENCE OF CORONARY ANGIOPLASTY IMPLANT AND GRAFT: Chronic | ICD-10-CM

## 2019-01-07 PROBLEM — D64.9 ANEMIA, UNSPECIFIED: Chronic | Status: ACTIVE | Noted: 2018-12-13

## 2019-01-07 PROBLEM — I10 ESSENTIAL (PRIMARY) HYPERTENSION: Chronic | Status: ACTIVE | Noted: 2018-12-13

## 2019-01-07 PROBLEM — E78.5 HYPERLIPIDEMIA, UNSPECIFIED: Chronic | Status: ACTIVE | Noted: 2018-12-13

## 2019-01-07 PROBLEM — I50.9 HEART FAILURE, UNSPECIFIED: Chronic | Status: ACTIVE | Noted: 2018-12-13

## 2019-01-07 NOTE — HISTORY OF PRESENT ILLNESS
[FreeTextEntry1] : Follow up examination  [de-identified] : 68 year old male with past medical history of anemia, CAD, DLD, ESRD and hypertension. He is here for a follow up examination. Patient is doing fine and is compliant with his medications. His only current complaint is dizziness sometime when he stands up. On Dec 13th he had a episode of syncope for which he went to the ER. Patient says that his heart rate slows down to the 40s and he thinks this might be contributing to the symptoms. His skin has also been very dry and he says he has runny eyes at times.

## 2019-01-07 NOTE — ASSESSMENT
[FreeTextEntry1] : This patient is 68 year old male with past medical history of anemia, CAD, DLD, ESRD and hypertension. He is here for a follow up examination. Patient complains of dry skin and intermittent dizziness.\par \par \par Dizziness likely 2/2 bradycardia \par -decrease metoprolol to 25mg \par -Patient advised to see cardiologist and he says he has an appointment next month \par \par Coronary artery disease\par - no active chest pain or cardiac complaints \par - Continue with aspirin, Plavix and statins \par - Patient to see his cardiologist for cardiac clearance for colonoscopy\par \par Dry skin and intermittent watery eyes. \par -Opthalmology and dermatology referral provided  \par \par Constipation \par - Controlled on current laxatives\par \par DLD\par - Continue with statins \par \par  ESRD\par - On dialysis 3 times a week- following his nephrologist on weekly basis\par \par Hypertension uncontrolled\par - pt had not collected his losartan because he thought it had been recalled. He was advised to collect it from pharmacy\par - Following nephrology \par \par Health care maintainence \par - flu vaccine upto date\par -Patient has not made the colonoscopy and endoscopy appointment yet but has a referral. \par \par Subpleural nodule \par - Low dose CT scan in April- since insurance will only approve one scan a year.  \par

## 2019-02-04 ENCOUNTER — RX RENEWAL (OUTPATIENT)
Age: 69
End: 2019-02-04

## 2019-03-06 ENCOUNTER — OUTPATIENT (OUTPATIENT)
Dept: OUTPATIENT SERVICES | Facility: HOSPITAL | Age: 69
LOS: 1 days | Discharge: HOME | End: 2019-03-06

## 2019-03-06 DIAGNOSIS — Z95.5 PRESENCE OF CORONARY ANGIOPLASTY IMPLANT AND GRAFT: Chronic | ICD-10-CM

## 2019-03-06 DIAGNOSIS — E78.5 HYPERLIPIDEMIA, UNSPECIFIED: ICD-10-CM

## 2019-03-06 DIAGNOSIS — I10 ESSENTIAL (PRIMARY) HYPERTENSION: ICD-10-CM

## 2019-03-06 DIAGNOSIS — I25.10 ATHEROSCLEROTIC HEART DISEASE OF NATIVE CORONARY ARTERY WITHOUT ANGINA PECTORIS: ICD-10-CM

## 2019-04-08 ENCOUNTER — LABORATORY RESULT (OUTPATIENT)
Age: 69
End: 2019-04-08

## 2019-04-08 ENCOUNTER — OUTPATIENT (OUTPATIENT)
Dept: OUTPATIENT SERVICES | Facility: HOSPITAL | Age: 69
LOS: 1 days | Discharge: HOME | End: 2019-04-08

## 2019-04-08 ENCOUNTER — APPOINTMENT (OUTPATIENT)
Dept: INTERNAL MEDICINE | Facility: CLINIC | Age: 69
End: 2019-04-08

## 2019-04-08 VITALS
BODY MASS INDEX: 30.12 KG/M2 | DIASTOLIC BLOOD PRESSURE: 76 MMHG | WEIGHT: 187.39 LBS | HEART RATE: 64 BPM | HEIGHT: 66 IN | SYSTOLIC BLOOD PRESSURE: 160 MMHG | TEMPERATURE: 97.1 F

## 2019-04-08 DIAGNOSIS — R25.2 CRAMP AND SPASM: ICD-10-CM

## 2019-04-08 DIAGNOSIS — H04.123 DRY EYE SYNDROME OF BILATERAL LACRIMAL GLANDS: ICD-10-CM

## 2019-04-08 DIAGNOSIS — Z95.5 PRESENCE OF CORONARY ANGIOPLASTY IMPLANT AND GRAFT: Chronic | ICD-10-CM

## 2019-04-08 RX ORDER — AMLODIPINE BESYLATE 5 MG/1
5 TABLET ORAL DAILY
Qty: 90 | Refills: 0 | Status: DISCONTINUED | COMMUNITY
Start: 2017-05-17 | End: 2019-04-08

## 2019-04-08 NOTE — ASSESSMENT
[FreeTextEntry1] : This patient is 68 year old male with past medical history of anemia, CAD, DLD, ESRD and hypertension, syncope. He is here for a follow up examination. Patient complains of dry skin and intermittent dizziness.\par \par \par Syncopal episode 5 months ago\par - currently following with cardio (Dr. Martin), he discontinued his amlodipine\par - s/p holter monitor, he will f/u w/ cardio for results \par \par Coronary artery disease\par - no active chest pain or cardiac complaints \par - Continue with aspirin, Plavix and statins \par - Patient to see his cardiologist for cardiac clearance for colonoscopy\par \par Dry skin and intermittent watery eyes. \par -Opthalmology and dermatology referral provided \par \par Constipation \par - Controlled on current laxatives\par \par DLD\par - Continue with statins \par \par  ESRD\par - On dialysis 3 times a week- following his nephrologist on weekly basis\par \par Hypertension uncontrolled\par - pt had not collected his losartan because he thought it had been recalled. He was advised to collect it from pharmacy\par - Following nephrology \par \par Health care maintenance \par - flu vaccine upto date\par -Patient has not made the colonoscopy and endoscopy appointment yet but has a referral. \par \par Subpleural nodule \par - Low dose CT scan in April- since insurance will only approve one scan a year.\par \par # GERD - on ranitidine 300, will increase Protonix 40 to bid (pt c/o heartburn)\par \par # Mucle cramping 2/2 ?ppi - Mg contraindicated. Start Diphenhydramine 25mg QHS. Will consider CCB if refractoey.

## 2019-04-08 NOTE — HISTORY OF PRESENT ILLNESS
[FreeTextEntry1] : routine f/u [de-identified] : 68 year old male with past medical history of anemia, CAD, DLD, ESRD and hypertension, and syncope (dec/2018). He is here for a follow up examination, he has similar complaints as last visit because he has not seen the specialists he was referred to yet. He c/o his skin is very dry and he says he has runny eyes at times, he was referred to derm & opthalmology but has not seen them yet due to difficulty scheduling around his dialysis days. He says his cardiologist has stopped his amlodipine and gave him a holter monitor, and has a f/u visit in a few days for f/u on his syncopal episode.

## 2019-04-11 LAB
ALBUMIN SERPL ELPH-MCNC: 4.3 G/DL
ALP BLD-CCNC: 83 U/L
ALT SERPL-CCNC: 8 U/L
ANION GAP SERPL CALC-SCNC: 22 MMOL/L
AST SERPL-CCNC: 9 U/L
BASOPHILS # BLD AUTO: 0.03 K/UL
BASOPHILS NFR BLD AUTO: 0.4 %
BILIRUB SERPL-MCNC: 0.5 MG/DL
BUN SERPL-MCNC: 61 MG/DL
CALCIUM SERPL-MCNC: 9 MG/DL
CHLORIDE SERPL-SCNC: 97 MMOL/L
CHOLEST SERPL-MCNC: 102 MG/DL
CHOLEST/HDLC SERPL: 4.1 RATIO
CO2 SERPL-SCNC: 21 MMOL/L
CREAT SERPL-MCNC: 8.7 MG/DL
EOSINOPHIL # BLD AUTO: 0.39 K/UL
EOSINOPHIL NFR BLD AUTO: 5.7 %
GLUCOSE SERPL-MCNC: 108 MG/DL
HCT VFR BLD CALC: 28.4 %
HDLC SERPL-MCNC: 25 MG/DL
HGB BLD-MCNC: 9.2 G/DL
IMM GRANULOCYTES NFR BLD AUTO: 0.4 %
LDLC SERPL CALC-MCNC: 52 MG/DL
LYMPHOCYTES # BLD AUTO: 1.5 K/UL
LYMPHOCYTES NFR BLD AUTO: 21.8 %
MAN DIFF?: NORMAL
MCHC RBC-ENTMCNC: 32.4 G/DL
MCHC RBC-ENTMCNC: 32.5 PG
MCV RBC AUTO: 100.4 FL
MONOCYTES # BLD AUTO: 0.79 K/UL
MONOCYTES NFR BLD AUTO: 11.5 %
NEUTROPHILS # BLD AUTO: 4.14 K/UL
NEUTROPHILS NFR BLD AUTO: 60.2 %
PLATELET # BLD AUTO: 192 K/UL
POTASSIUM SERPL-SCNC: 4.9 MMOL/L
PROT SERPL-MCNC: 6.9 G/DL
RBC # BLD: 2.83 M/UL
RBC # FLD: 13.5 %
SODIUM SERPL-SCNC: 140 MMOL/L
TRIGL SERPL-MCNC: 251 MG/DL
WBC # FLD AUTO: 6.88 K/UL

## 2019-04-21 ENCOUNTER — FORM ENCOUNTER (OUTPATIENT)
Age: 69
End: 2019-04-21

## 2019-04-22 ENCOUNTER — OUTPATIENT (OUTPATIENT)
Dept: OUTPATIENT SERVICES | Facility: HOSPITAL | Age: 69
LOS: 1 days | Discharge: HOME | End: 2019-04-22
Payer: MEDICAID

## 2019-04-22 DIAGNOSIS — Z95.5 PRESENCE OF CORONARY ANGIOPLASTY IMPLANT AND GRAFT: Chronic | ICD-10-CM

## 2019-04-22 DIAGNOSIS — R91.1 SOLITARY PULMONARY NODULE: ICD-10-CM

## 2019-04-22 PROCEDURE — 71250 CT THORAX DX C-: CPT | Mod: 26

## 2019-04-26 ENCOUNTER — OUTPATIENT (OUTPATIENT)
Dept: OUTPATIENT SERVICES | Facility: HOSPITAL | Age: 69
LOS: 1 days | Discharge: HOME | End: 2019-04-26

## 2019-04-26 DIAGNOSIS — Z95.5 PRESENCE OF CORONARY ANGIOPLASTY IMPLANT AND GRAFT: Chronic | ICD-10-CM

## 2019-05-08 ENCOUNTER — RX RENEWAL (OUTPATIENT)
Age: 69
End: 2019-05-08

## 2019-05-24 ENCOUNTER — OUTPATIENT (OUTPATIENT)
Dept: OUTPATIENT SERVICES | Facility: HOSPITAL | Age: 69
LOS: 1 days | Discharge: HOME | End: 2019-05-24

## 2019-05-24 ENCOUNTER — APPOINTMENT (OUTPATIENT)
Dept: GASTROENTEROLOGY | Facility: CLINIC | Age: 69
End: 2019-05-24

## 2019-05-24 VITALS
BODY MASS INDEX: 30.65 KG/M2 | HEIGHT: 66 IN | SYSTOLIC BLOOD PRESSURE: 123 MMHG | DIASTOLIC BLOOD PRESSURE: 68 MMHG | HEART RATE: 67 BPM | WEIGHT: 190.7 LBS

## 2019-05-24 DIAGNOSIS — Z95.5 PRESENCE OF CORONARY ANGIOPLASTY IMPLANT AND GRAFT: Chronic | ICD-10-CM

## 2019-05-24 NOTE — ASSESSMENT
[FreeTextEntry1] : 68 year old male with PMH of CAD ( s/p PCI with Stents X3 on 2017) , ESRD on HD (TTS) , HTN, DLD, Pulmonary nodule, is here for follow up.\par Patient was seen here before for GERD and intermittent diarrhea. He is on ASA and plavix and he was asked to see cardiology for clearance before proceeding with EGD and colonoscopy. \par Patient reports that he has been having worsening heartburn for the past year with occasional nausea and vomiting. He is on rantidine at bedtime and pantoprazole in am. He also reports occasional loose stool for few days every now and then , not related to food intake. patient never had EGD before. He reports having a colonoscopy about 16 years ago.\par - GERD with worsening symptoms\par - intermittent Diarrhea\par Plan:\par will schedule for EGD and colonoscopy after cardio clearance and holding the plavix for 5 days prior to procedure.\par will increase protonix to 40 mg BID\par stop rantidine\par check stool for C diff, stool electrolytes, stool culture.

## 2019-05-24 NOTE — PHYSICAL EXAM
[General Appearance - Alert] : alert [General Appearance - In No Acute Distress] : in no acute distress [General Appearance - Well Nourished] : well nourished [General Appearance - Well Developed] : well developed [Sclera] : the sclera and conjunctiva were normal [Extraocular Movements] : extraocular movements were intact [Hearing Threshold Finger Rub Not Ralls] : hearing was normal [Outer Ear] : the ears and nose were normal in appearance [Neck Appearance] : the appearance of the neck was normal [Exaggerated Use Of Accessory Muscles For Inspiration] : no accessory muscle use [Auscultation Breath Sounds / Voice Sounds] : lungs were clear to auscultation bilaterally [Respiration, Rhythm And Depth] : normal respiratory rhythm and effort [Apical Impulse] : the apical impulse was normal [Heart Rate And Rhythm] : heart rate was normal and rhythm regular [Heart Sounds Gallop] : no gallops [Heart Sounds] : normal S1 and S2 [Bowel Sounds] : normal bowel sounds [Abdomen Soft] : soft [Abdomen Tenderness] : non-tender [] : no hepato-splenomegaly

## 2019-05-24 NOTE — HISTORY OF PRESENT ILLNESS
[FreeTextEntry1] : 68 year old male with PMH of CAD ( s/p PCI with Stents X3 on 2017) , ESRD on HD (TTS) , HTN, DLD, Pulmonary nodule, is here for follow up.\par Patient was seen here before for GERD and intermittent diarrhea. He is on ASA and plavix and he was asked to see cardiology for clearance before proceeding with EGD and colonoscopy. \par Patient reports that he has been having worsening heartburn for the past year with occasional nausea and vomiting. He is on rantidine at bedtime and pantoprazole in am. He also reports occasional loose stool for few days every now and then , not related to food intake. patient never had EGD before. He reports having a colonoscopy about 16 years ago.\par \par He denies any hematochezia, but mentioned black stool likely secondary to Iron supplement for anemia. \par Her sister found to have CRC diagnosed at age 63.\par \par \par  \par

## 2019-06-29 NOTE — PHYSICAL EXAM
[No Acute Distress] : no acute distress [No Respiratory Distress] : no respiratory distress  [Clear to Auscultation] : lungs were clear to auscultation bilaterally [No Accessory Muscle Use] : no accessory muscle use [Normal Rate] : normal rate  [Regular Rhythm] : with a regular rhythm [Normal S1, S2] : normal S1 and S2 [No Edema] : there was no peripheral edema [Soft] : abdomen soft [Non Tender] : non-tender [Non-distended] : non-distended [Normal Posterior Cervical Nodes] : no posterior cervical lymphadenopathy [Normal Anterior Cervical Nodes] : no anterior cervical lymphadenopathy [No Focal Deficits] : no focal deficits [Deep Tendon Reflexes (DTR)] : deep tendon reflexes were 2+ and symmetric [Normal Affect] : the affect was normal 1

## 2019-07-01 ENCOUNTER — OUTPATIENT (OUTPATIENT)
Dept: OUTPATIENT SERVICES | Facility: HOSPITAL | Age: 69
LOS: 1 days | End: 2019-07-01

## 2019-07-01 DIAGNOSIS — Z95.5 PRESENCE OF CORONARY ANGIOPLASTY IMPLANT AND GRAFT: Chronic | ICD-10-CM

## 2019-07-03 ENCOUNTER — OUTPATIENT (OUTPATIENT)
Dept: OUTPATIENT SERVICES | Facility: HOSPITAL | Age: 69
LOS: 1 days | Discharge: HOME | End: 2019-07-03
Payer: MEDICAID

## 2019-07-03 DIAGNOSIS — Z95.5 PRESENCE OF CORONARY ANGIOPLASTY IMPLANT AND GRAFT: Chronic | ICD-10-CM

## 2019-07-03 PROCEDURE — 92012 INTRM OPH EXAM EST PATIENT: CPT

## 2019-07-08 DIAGNOSIS — E11.3493 TYPE 2 DIABETES MELLITUS WITH SEVERE NONPROLIFERATIVE DIABETIC RETINOPATHY WITHOUT MACULAR EDEMA, BILATERAL: ICD-10-CM

## 2019-07-08 DIAGNOSIS — H52.13 MYOPIA, BILATERAL: ICD-10-CM

## 2019-07-08 DIAGNOSIS — H52.223 REGULAR ASTIGMATISM, BILATERAL: ICD-10-CM

## 2019-07-13 ENCOUNTER — INPATIENT (INPATIENT)
Facility: HOSPITAL | Age: 69
LOS: 6 days | Discharge: HOME | End: 2019-07-20
Attending: INTERNAL MEDICINE | Admitting: INTERNAL MEDICINE
Payer: MEDICAID

## 2019-07-13 VITALS
WEIGHT: 181.88 LBS | RESPIRATION RATE: 18 BRPM | HEART RATE: 88 BPM | SYSTOLIC BLOOD PRESSURE: 117 MMHG | HEIGHT: 65 IN | TEMPERATURE: 96 F | OXYGEN SATURATION: 100 % | DIASTOLIC BLOOD PRESSURE: 62 MMHG

## 2019-07-13 DIAGNOSIS — Z95.5 PRESENCE OF CORONARY ANGIOPLASTY IMPLANT AND GRAFT: Chronic | ICD-10-CM

## 2019-07-13 LAB
ALBUMIN SERPL ELPH-MCNC: 5 G/DL — SIGNIFICANT CHANGE UP (ref 3.5–5.2)
ALP SERPL-CCNC: 182 U/L — HIGH (ref 30–115)
ALT FLD-CCNC: 90 U/L — HIGH (ref 0–41)
ANION GAP SERPL CALC-SCNC: 24 MMOL/L — HIGH (ref 7–14)
APTT BLD: 30 SEC — SIGNIFICANT CHANGE UP (ref 27–39.2)
AST SERPL-CCNC: 186 U/L — HIGH (ref 0–41)
BASE EXCESS BLDV CALC-SCNC: 1.5 MMOL/L — SIGNIFICANT CHANGE UP (ref -2–2)
BASE EXCESS BLDV CALC-SCNC: 5.2 MMOL/L — HIGH (ref -2–2)
BASOPHILS # BLD AUTO: 0.01 K/UL — SIGNIFICANT CHANGE UP (ref 0–0.2)
BASOPHILS NFR BLD AUTO: 0.2 % — SIGNIFICANT CHANGE UP (ref 0–1)
BILIRUB SERPL-MCNC: 1.7 MG/DL — HIGH (ref 0.2–1.2)
BUN SERPL-MCNC: 31 MG/DL — HIGH (ref 10–20)
CA-I SERPL-SCNC: 1.12 MMOL/L — SIGNIFICANT CHANGE UP (ref 1.12–1.3)
CA-I SERPL-SCNC: 1.18 MMOL/L — SIGNIFICANT CHANGE UP (ref 1.12–1.3)
CALCIUM SERPL-MCNC: 9.7 MG/DL — SIGNIFICANT CHANGE UP (ref 8.5–10.1)
CHLORIDE SERPL-SCNC: 93 MMOL/L — LOW (ref 98–110)
CO2 SERPL-SCNC: 24 MMOL/L — SIGNIFICANT CHANGE UP (ref 17–32)
CREAT SERPL-MCNC: 5.1 MG/DL — CRITICAL HIGH (ref 0.7–1.5)
EOSINOPHIL # BLD AUTO: 0.05 K/UL — SIGNIFICANT CHANGE UP (ref 0–0.7)
EOSINOPHIL NFR BLD AUTO: 0.8 % — SIGNIFICANT CHANGE UP (ref 0–8)
GAS PNL BLDV: 137 MMOL/L — SIGNIFICANT CHANGE UP (ref 136–145)
GAS PNL BLDV: 144 MMOL/L — SIGNIFICANT CHANGE UP (ref 136–145)
GAS PNL BLDV: SIGNIFICANT CHANGE UP
GAS PNL BLDV: SIGNIFICANT CHANGE UP
GLUCOSE SERPL-MCNC: 192 MG/DL — HIGH (ref 70–99)
HCO3 BLDV-SCNC: 29 MMOL/L — SIGNIFICANT CHANGE UP (ref 22–29)
HCO3 BLDV-SCNC: 30 MMOL/L — HIGH (ref 22–29)
HCT VFR BLD CALC: 36.6 % — LOW (ref 42–52)
HCT VFR BLDA CALC: 37.1 % — SIGNIFICANT CHANGE UP (ref 34–44)
HCT VFR BLDA CALC: 38.5 % — SIGNIFICANT CHANGE UP (ref 34–44)
HGB BLD CALC-MCNC: 12.1 G/DL — LOW (ref 14–18)
HGB BLD CALC-MCNC: 12.6 G/DL — LOW (ref 14–18)
HGB BLD-MCNC: 11.8 G/DL — LOW (ref 14–18)
HOROWITZ INDEX BLDV+IHG-RTO: 21 — SIGNIFICANT CHANGE UP
HOROWITZ INDEX BLDV+IHG-RTO: 21 — SIGNIFICANT CHANGE UP
IMM GRANULOCYTES NFR BLD AUTO: 0.2 % — SIGNIFICANT CHANGE UP (ref 0.1–0.3)
INR BLD: 0.99 RATIO — SIGNIFICANT CHANGE UP (ref 0.65–1.3)
LACTATE BLDV-MCNC: 4.4 MMOL/L — HIGH (ref 0.5–1.6)
LACTATE BLDV-MCNC: 7.7 MMOL/L — HIGH (ref 0.5–1.6)
LACTATE SERPL-SCNC: 7.4 MMOL/L — CRITICAL HIGH (ref 0.5–2.2)
LIDOCAIN IGE QN: 68 U/L — HIGH (ref 7–60)
LYMPHOCYTES # BLD AUTO: 0.58 K/UL — LOW (ref 1.2–3.4)
LYMPHOCYTES # BLD AUTO: 8.9 % — LOW (ref 20.5–51.1)
MAGNESIUM SERPL-MCNC: 1.6 MG/DL — LOW (ref 1.8–2.4)
MCHC RBC-ENTMCNC: 30.3 PG — SIGNIFICANT CHANGE UP (ref 27–31)
MCHC RBC-ENTMCNC: 32.2 G/DL — SIGNIFICANT CHANGE UP (ref 32–37)
MCV RBC AUTO: 94.1 FL — HIGH (ref 80–94)
MONOCYTES # BLD AUTO: 0.03 K/UL — LOW (ref 0.1–0.6)
MONOCYTES NFR BLD AUTO: 0.5 % — LOW (ref 1.7–9.3)
NEUTROPHILS # BLD AUTO: 5.82 K/UL — SIGNIFICANT CHANGE UP (ref 1.4–6.5)
NEUTROPHILS NFR BLD AUTO: 89.4 % — HIGH (ref 42.2–75.2)
NRBC # BLD: 0 /100 WBCS — SIGNIFICANT CHANGE UP (ref 0–0)
PCO2 BLDV: 43 MMHG — SIGNIFICANT CHANGE UP (ref 41–51)
PCO2 BLDV: 58 MMHG — HIGH (ref 41–51)
PH BLDV: 7.31 — SIGNIFICANT CHANGE UP (ref 7.26–7.43)
PH BLDV: 7.45 — HIGH (ref 7.26–7.43)
PLATELET # BLD AUTO: 184 K/UL — SIGNIFICANT CHANGE UP (ref 130–400)
PO2 BLDV: 38 MMHG — SIGNIFICANT CHANGE UP (ref 20–40)
PO2 BLDV: 9 MMHG — LOW (ref 20–40)
POTASSIUM BLDV-SCNC: 3.3 MMOL/L — SIGNIFICANT CHANGE UP (ref 3.3–5.6)
POTASSIUM BLDV-SCNC: 4.7 MMOL/L — SIGNIFICANT CHANGE UP (ref 3.3–5.6)
POTASSIUM SERPL-MCNC: 4.5 MMOL/L — SIGNIFICANT CHANGE UP (ref 3.5–5)
POTASSIUM SERPL-SCNC: 4.5 MMOL/L — SIGNIFICANT CHANGE UP (ref 3.5–5)
PROT SERPL-MCNC: 8.4 G/DL — HIGH (ref 6–8)
PROTHROM AB SERPL-ACNC: 11.4 SEC — SIGNIFICANT CHANGE UP (ref 9.95–12.87)
RBC # BLD: 3.89 M/UL — LOW (ref 4.7–6.1)
RBC # FLD: 12.9 % — SIGNIFICANT CHANGE UP (ref 11.5–14.5)
SAO2 % BLDV: 67 % — SIGNIFICANT CHANGE UP
SAO2 % BLDV: 7 % — SIGNIFICANT CHANGE UP
SODIUM SERPL-SCNC: 141 MMOL/L — SIGNIFICANT CHANGE UP (ref 135–146)
TROPONIN T SERPL-MCNC: 0.19 NG/ML — CRITICAL HIGH
WBC # BLD: 6.5 K/UL — SIGNIFICANT CHANGE UP (ref 4.8–10.8)
WBC # FLD AUTO: 6.5 K/UL — SIGNIFICANT CHANGE UP (ref 4.8–10.8)

## 2019-07-13 PROCEDURE — 99291 CRITICAL CARE FIRST HOUR: CPT

## 2019-07-13 PROCEDURE — 71045 X-RAY EXAM CHEST 1 VIEW: CPT | Mod: 26

## 2019-07-13 PROCEDURE — 74177 CT ABD & PELVIS W/CONTRAST: CPT | Mod: 26

## 2019-07-13 PROCEDURE — 76705 ECHO EXAM OF ABDOMEN: CPT | Mod: 26

## 2019-07-13 RX ORDER — SODIUM CHLORIDE 9 MG/ML
250 INJECTION INTRAMUSCULAR; INTRAVENOUS; SUBCUTANEOUS ONCE
Refills: 0 | Status: COMPLETED | OUTPATIENT
Start: 2019-07-13 | End: 2019-07-13

## 2019-07-13 RX ORDER — ONDANSETRON 8 MG/1
4 TABLET, FILM COATED ORAL ONCE
Refills: 0 | Status: COMPLETED | OUTPATIENT
Start: 2019-07-13 | End: 2019-07-13

## 2019-07-13 RX ORDER — ACETAMINOPHEN 500 MG
650 TABLET ORAL ONCE
Refills: 0 | Status: COMPLETED | OUTPATIENT
Start: 2019-07-13 | End: 2019-07-13

## 2019-07-13 RX ORDER — METRONIDAZOLE 500 MG
500 TABLET ORAL ONCE
Refills: 0 | Status: COMPLETED | OUTPATIENT
Start: 2019-07-13 | End: 2019-07-13

## 2019-07-13 RX ORDER — PIPERACILLIN AND TAZOBACTAM 4; .5 G/20ML; G/20ML
3.38 INJECTION, POWDER, LYOPHILIZED, FOR SOLUTION INTRAVENOUS ONCE
Refills: 0 | Status: COMPLETED | OUTPATIENT
Start: 2019-07-13 | End: 2019-07-13

## 2019-07-13 RX ADMIN — SODIUM CHLORIDE 250 MILLILITER(S): 9 INJECTION INTRAMUSCULAR; INTRAVENOUS; SUBCUTANEOUS at 23:37

## 2019-07-13 RX ADMIN — ONDANSETRON 4 MILLIGRAM(S): 8 TABLET, FILM COATED ORAL at 19:46

## 2019-07-13 RX ADMIN — SODIUM CHLORIDE 500 MILLILITER(S): 9 INJECTION INTRAMUSCULAR; INTRAVENOUS; SUBCUTANEOUS at 20:28

## 2019-07-13 RX ADMIN — ONDANSETRON 4 MILLIGRAM(S): 8 TABLET, FILM COATED ORAL at 20:45

## 2019-07-13 RX ADMIN — Medication 650 MILLIGRAM(S): at 22:37

## 2019-07-13 RX ADMIN — Medication 650 MILLIGRAM(S): at 20:29

## 2019-07-13 RX ADMIN — PIPERACILLIN AND TAZOBACTAM 200 GRAM(S): 4; .5 INJECTION, POWDER, LYOPHILIZED, FOR SOLUTION INTRAVENOUS at 22:36

## 2019-07-13 RX ADMIN — Medication 100 MILLIGRAM(S): at 21:14

## 2019-07-13 NOTE — ED PROVIDER NOTE - OBJECTIVE STATEMENT
Patient BIBA, C/o epigastric chest pain with nausea, Sudden onset right after finishing dialysis, + chills, No SOB. pain constant sharp nonradiating

## 2019-07-13 NOTE — ED PROVIDER NOTE - PROGRESS NOTE DETAILS
case discussed DR Gisella HARRELL, Wants patient on meropenum and flagyl, NPO< will take patient to OR Monday. Surgery FRANCO Ritter aware of consult. pt HD stable, looks better, intensivist consutled accepted to ICU lactate downtrending, fluid resuscitaiton limited due to ESRD

## 2019-07-13 NOTE — ED PROVIDER NOTE - CLINICAL SUMMARY MEDICAL DECISION MAKING FREE TEXT BOX
68y male PMH HTN. DM. CAD, ESRD on HD TTS with chest/epigastric pain with chills/n/v after finishing full course HD, no sob, no fever, no diarrhea, has h/o M<I in past but unable to relate current symptoms, on exam pale, clammy, vital signs appreciated, vomiting food, conj pink neck supple cor tachy, reg, lungs cta abd +bs, soft, slightly distended, +epi ttp no /gr, pulses equal neuro nonfocal, labs and studies reviewed, d/w GI and surgery, will admit monitored setting for abx, trending of labs, possible ERCP

## 2019-07-14 DIAGNOSIS — K80.50 CALCULUS OF BILE DUCT WITHOUT CHOLANGITIS OR CHOLECYSTITIS WITHOUT OBSTRUCTION: ICD-10-CM

## 2019-07-14 LAB
ALBUMIN SERPL ELPH-MCNC: 3.5 G/DL — SIGNIFICANT CHANGE UP (ref 3.5–5.2)
ALBUMIN SERPL ELPH-MCNC: 3.8 G/DL — SIGNIFICANT CHANGE UP (ref 3.5–5.2)
ALBUMIN SERPL ELPH-MCNC: 4.2 G/DL — SIGNIFICANT CHANGE UP (ref 3.5–5.2)
ALP SERPL-CCNC: 124 U/L — HIGH (ref 30–115)
ALP SERPL-CCNC: 152 U/L — HIGH (ref 30–115)
ALP SERPL-CCNC: 172 U/L — HIGH (ref 30–115)
ALT FLD-CCNC: 218 U/L — HIGH (ref 0–41)
ALT FLD-CCNC: 218 U/L — HIGH (ref 0–41)
ALT FLD-CCNC: 225 U/L — HIGH (ref 0–41)
ANION GAP SERPL CALC-SCNC: 20 MMOL/L — HIGH (ref 7–14)
ANION GAP SERPL CALC-SCNC: 21 MMOL/L — HIGH (ref 7–14)
ANION GAP SERPL CALC-SCNC: 21 MMOL/L — HIGH (ref 7–14)
AST SERPL-CCNC: 174 U/L — HIGH (ref 0–41)
AST SERPL-CCNC: 183 U/L — HIGH (ref 0–41)
AST SERPL-CCNC: 256 U/L — HIGH (ref 0–41)
BASOPHILS # BLD AUTO: 0.02 K/UL — SIGNIFICANT CHANGE UP (ref 0–0.2)
BASOPHILS # BLD AUTO: 0.02 K/UL — SIGNIFICANT CHANGE UP (ref 0–0.2)
BASOPHILS NFR BLD AUTO: 0.1 % — SIGNIFICANT CHANGE UP (ref 0–1)
BASOPHILS NFR BLD AUTO: 0.1 % — SIGNIFICANT CHANGE UP (ref 0–1)
BILIRUB SERPL-MCNC: 2.6 MG/DL — HIGH (ref 0.2–1.2)
BILIRUB SERPL-MCNC: 3.3 MG/DL — HIGH (ref 0.2–1.2)
BILIRUB SERPL-MCNC: 3.3 MG/DL — HIGH (ref 0.2–1.2)
BUN SERPL-MCNC: 41 MG/DL — HIGH (ref 10–20)
BUN SERPL-MCNC: 49 MG/DL — HIGH (ref 10–20)
BUN SERPL-MCNC: 52 MG/DL — HIGH (ref 10–20)
CALCIUM SERPL-MCNC: 8.1 MG/DL — LOW (ref 8.5–10.1)
CALCIUM SERPL-MCNC: 8.5 MG/DL — SIGNIFICANT CHANGE UP (ref 8.5–10.1)
CALCIUM SERPL-MCNC: 9 MG/DL — SIGNIFICANT CHANGE UP (ref 8.5–10.1)
CHLORIDE SERPL-SCNC: 93 MMOL/L — LOW (ref 98–110)
CHLORIDE SERPL-SCNC: 94 MMOL/L — LOW (ref 98–110)
CHLORIDE SERPL-SCNC: 96 MMOL/L — LOW (ref 98–110)
CO2 SERPL-SCNC: 23 MMOL/L — SIGNIFICANT CHANGE UP (ref 17–32)
CO2 SERPL-SCNC: 25 MMOL/L — SIGNIFICANT CHANGE UP (ref 17–32)
CO2 SERPL-SCNC: 26 MMOL/L — SIGNIFICANT CHANGE UP (ref 17–32)
CREAT SERPL-MCNC: 6.3 MG/DL — CRITICAL HIGH (ref 0.7–1.5)
CREAT SERPL-MCNC: 7.2 MG/DL — CRITICAL HIGH (ref 0.7–1.5)
CREAT SERPL-MCNC: 7.6 MG/DL — CRITICAL HIGH (ref 0.7–1.5)
EOSINOPHIL # BLD AUTO: 0.01 K/UL — SIGNIFICANT CHANGE UP (ref 0–0.7)
EOSINOPHIL # BLD AUTO: 0.04 K/UL — SIGNIFICANT CHANGE UP (ref 0–0.7)
EOSINOPHIL NFR BLD AUTO: 0 % — SIGNIFICANT CHANGE UP (ref 0–8)
EOSINOPHIL NFR BLD AUTO: 0.2 % — SIGNIFICANT CHANGE UP (ref 0–8)
GGT SERPL-CCNC: 603 U/L — HIGH (ref 1–40)
GLUCOSE BLDC GLUCOMTR-MCNC: 103 MG/DL — HIGH (ref 70–99)
GLUCOSE BLDC GLUCOMTR-MCNC: 133 MG/DL — HIGH (ref 70–99)
GLUCOSE BLDC GLUCOMTR-MCNC: 96 MG/DL — SIGNIFICANT CHANGE UP (ref 70–99)
GLUCOSE SERPL-MCNC: 103 MG/DL — HIGH (ref 70–99)
GLUCOSE SERPL-MCNC: 108 MG/DL — HIGH (ref 70–99)
GLUCOSE SERPL-MCNC: 157 MG/DL — HIGH (ref 70–99)
HCT VFR BLD CALC: 28.4 % — LOW (ref 42–52)
HCT VFR BLD CALC: 30.2 % — LOW (ref 42–52)
HCT VFR BLD CALC: 33.1 % — LOW (ref 42–52)
HGB BLD-MCNC: 10 G/DL — LOW (ref 14–18)
HGB BLD-MCNC: 10.7 G/DL — LOW (ref 14–18)
HGB BLD-MCNC: 9.3 G/DL — LOW (ref 14–18)
IMM GRANULOCYTES NFR BLD AUTO: 3.6 % — HIGH (ref 0.1–0.3)
IMM GRANULOCYTES NFR BLD AUTO: 6.1 % — HIGH (ref 0.1–0.3)
LACTATE SERPL-SCNC: 2.6 MMOL/L — HIGH (ref 0.5–2.2)
LACTATE SERPL-SCNC: 2.8 MMOL/L — HIGH (ref 0.5–2.2)
LACTATE SERPL-SCNC: 6.3 MMOL/L — CRITICAL HIGH (ref 0.5–2.2)
LYMPHOCYTES # BLD AUTO: 0.27 K/UL — LOW (ref 1.2–3.4)
LYMPHOCYTES # BLD AUTO: 0.51 K/UL — LOW (ref 1.2–3.4)
LYMPHOCYTES # BLD AUTO: 1.5 % — LOW (ref 20.5–51.1)
LYMPHOCYTES # BLD AUTO: 2.5 % — LOW (ref 20.5–51.1)
MAGNESIUM SERPL-MCNC: 1.5 MG/DL — LOW (ref 1.8–2.4)
MCHC RBC-ENTMCNC: 30.4 PG — SIGNIFICANT CHANGE UP (ref 27–31)
MCHC RBC-ENTMCNC: 30.7 PG — SIGNIFICANT CHANGE UP (ref 27–31)
MCHC RBC-ENTMCNC: 30.9 PG — SIGNIFICANT CHANGE UP (ref 27–31)
MCHC RBC-ENTMCNC: 32.3 G/DL — SIGNIFICANT CHANGE UP (ref 32–37)
MCHC RBC-ENTMCNC: 32.7 G/DL — SIGNIFICANT CHANGE UP (ref 32–37)
MCHC RBC-ENTMCNC: 33.1 G/DL — SIGNIFICANT CHANGE UP (ref 32–37)
MCV RBC AUTO: 93.2 FL — SIGNIFICANT CHANGE UP (ref 80–94)
MCV RBC AUTO: 93.7 FL — SIGNIFICANT CHANGE UP (ref 80–94)
MCV RBC AUTO: 94 FL — SIGNIFICANT CHANGE UP (ref 80–94)
MONOCYTES # BLD AUTO: 0.76 K/UL — HIGH (ref 0.1–0.6)
MONOCYTES # BLD AUTO: 0.85 K/UL — HIGH (ref 0.1–0.6)
MONOCYTES NFR BLD AUTO: 4.1 % — SIGNIFICANT CHANGE UP (ref 1.7–9.3)
MONOCYTES NFR BLD AUTO: 4.2 % — SIGNIFICANT CHANGE UP (ref 1.7–9.3)
NEUTROPHILS # BLD AUTO: 16.36 K/UL — HIGH (ref 1.4–6.5)
NEUTROPHILS # BLD AUTO: 18.02 K/UL — HIGH (ref 1.4–6.5)
NEUTROPHILS NFR BLD AUTO: 87.2 % — HIGH (ref 42.2–75.2)
NEUTROPHILS NFR BLD AUTO: 90.4 % — HIGH (ref 42.2–75.2)
NRBC # BLD: 0 /100 WBCS — SIGNIFICANT CHANGE UP (ref 0–0)
PLATELET # BLD AUTO: 104 K/UL — LOW (ref 130–400)
PLATELET # BLD AUTO: 134 K/UL — SIGNIFICANT CHANGE UP (ref 130–400)
PLATELET # BLD AUTO: 90 K/UL — LOW (ref 130–400)
POTASSIUM SERPL-MCNC: 4 MMOL/L — SIGNIFICANT CHANGE UP (ref 3.5–5)
POTASSIUM SERPL-MCNC: 4.3 MMOL/L — SIGNIFICANT CHANGE UP (ref 3.5–5)
POTASSIUM SERPL-MCNC: 4.6 MMOL/L — SIGNIFICANT CHANGE UP (ref 3.5–5)
POTASSIUM SERPL-SCNC: 4 MMOL/L — SIGNIFICANT CHANGE UP (ref 3.5–5)
POTASSIUM SERPL-SCNC: 4.3 MMOL/L — SIGNIFICANT CHANGE UP (ref 3.5–5)
POTASSIUM SERPL-SCNC: 4.6 MMOL/L — SIGNIFICANT CHANGE UP (ref 3.5–5)
PROT SERPL-MCNC: 6.2 G/DL — SIGNIFICANT CHANGE UP (ref 6–8)
PROT SERPL-MCNC: 6.3 G/DL — SIGNIFICANT CHANGE UP (ref 6–8)
PROT SERPL-MCNC: 7.1 G/DL — SIGNIFICANT CHANGE UP (ref 6–8)
RBC # BLD: 3.03 M/UL — LOW (ref 4.7–6.1)
RBC # BLD: 3.24 M/UL — LOW (ref 4.7–6.1)
RBC # BLD: 3.52 M/UL — LOW (ref 4.7–6.1)
RBC # FLD: 13.2 % — SIGNIFICANT CHANGE UP (ref 11.5–14.5)
RBC # FLD: 13.4 % — SIGNIFICANT CHANGE UP (ref 11.5–14.5)
RBC # FLD: 13.6 % — SIGNIFICANT CHANGE UP (ref 11.5–14.5)
SODIUM SERPL-SCNC: 138 MMOL/L — SIGNIFICANT CHANGE UP (ref 135–146)
SODIUM SERPL-SCNC: 140 MMOL/L — SIGNIFICANT CHANGE UP (ref 135–146)
SODIUM SERPL-SCNC: 141 MMOL/L — SIGNIFICANT CHANGE UP (ref 135–146)
TROPONIN T SERPL-MCNC: 0.16 NG/ML — CRITICAL HIGH
WBC # BLD: 14.63 K/UL — HIGH (ref 4.8–10.8)
WBC # BLD: 18.1 K/UL — HIGH (ref 4.8–10.8)
WBC # BLD: 20.67 K/UL — HIGH (ref 4.8–10.8)
WBC # FLD AUTO: 14.63 K/UL — HIGH (ref 4.8–10.8)
WBC # FLD AUTO: 18.1 K/UL — HIGH (ref 4.8–10.8)
WBC # FLD AUTO: 20.67 K/UL — HIGH (ref 4.8–10.8)

## 2019-07-14 PROCEDURE — 36556 INSERT NON-TUNNEL CV CATH: CPT

## 2019-07-14 PROCEDURE — 99232 SBSQ HOSP IP/OBS MODERATE 35: CPT

## 2019-07-14 PROCEDURE — 99223 1ST HOSP IP/OBS HIGH 75: CPT

## 2019-07-14 RX ORDER — SODIUM CHLORIDE 9 MG/ML
250 INJECTION INTRAMUSCULAR; INTRAVENOUS; SUBCUTANEOUS ONCE
Refills: 0 | Status: COMPLETED | OUTPATIENT
Start: 2019-07-14 | End: 2019-07-14

## 2019-07-14 RX ORDER — PIPERACILLIN AND TAZOBACTAM 4; .5 G/20ML; G/20ML
3.38 INJECTION, POWDER, LYOPHILIZED, FOR SOLUTION INTRAVENOUS ONCE
Refills: 0 | Status: COMPLETED | OUTPATIENT
Start: 2019-07-14 | End: 2019-07-14

## 2019-07-14 RX ORDER — ATORVASTATIN CALCIUM 80 MG/1
0 TABLET, FILM COATED ORAL
Qty: 30 | Refills: 0 | DISCHARGE

## 2019-07-14 RX ORDER — LOSARTAN POTASSIUM 100 MG/1
25 TABLET, FILM COATED ORAL DAILY
Refills: 0 | Status: DISCONTINUED | OUTPATIENT
Start: 2019-07-14 | End: 2019-07-14

## 2019-07-14 RX ORDER — HEPARIN SODIUM 5000 [USP'U]/ML
5000 INJECTION INTRAVENOUS; SUBCUTANEOUS EVERY 8 HOURS
Refills: 0 | Status: DISCONTINUED | OUTPATIENT
Start: 2019-07-14 | End: 2019-07-16

## 2019-07-14 RX ORDER — METOPROLOL TARTRATE 50 MG
25 TABLET ORAL
Refills: 0 | Status: DISCONTINUED | OUTPATIENT
Start: 2019-07-14 | End: 2019-07-14

## 2019-07-14 RX ORDER — SEVELAMER CARBONATE 2400 MG/1
1 POWDER, FOR SUSPENSION ORAL
Qty: 0 | Refills: 0 | DISCHARGE

## 2019-07-14 RX ORDER — CHLORHEXIDINE GLUCONATE 213 G/1000ML
1 SOLUTION TOPICAL EVERY 12 HOURS
Refills: 0 | Status: DISCONTINUED | OUTPATIENT
Start: 2019-07-14 | End: 2019-07-20

## 2019-07-14 RX ORDER — ACETAMINOPHEN 500 MG
650 TABLET ORAL EVERY 6 HOURS
Refills: 0 | Status: DISCONTINUED | OUTPATIENT
Start: 2019-07-14 | End: 2019-07-20

## 2019-07-14 RX ORDER — PIPERACILLIN AND TAZOBACTAM 4; .5 G/20ML; G/20ML
2.25 INJECTION, POWDER, LYOPHILIZED, FOR SOLUTION INTRAVENOUS EVERY 12 HOURS
Refills: 0 | Status: DISCONTINUED | OUTPATIENT
Start: 2019-07-14 | End: 2019-07-20

## 2019-07-14 RX ORDER — SODIUM CHLORIDE 9 MG/ML
500 INJECTION INTRAMUSCULAR; INTRAVENOUS; SUBCUTANEOUS ONCE
Refills: 0 | Status: COMPLETED | OUTPATIENT
Start: 2019-07-14 | End: 2019-07-14

## 2019-07-14 RX ORDER — METRONIDAZOLE 500 MG
500 TABLET ORAL EVERY 8 HOURS
Refills: 0 | Status: DISCONTINUED | OUTPATIENT
Start: 2019-07-14 | End: 2019-07-14

## 2019-07-14 RX ORDER — MORPHINE SULFATE 50 MG/1
2 CAPSULE, EXTENDED RELEASE ORAL EVERY 4 HOURS
Refills: 0 | Status: DISCONTINUED | OUTPATIENT
Start: 2019-07-14 | End: 2019-07-20

## 2019-07-14 RX ORDER — PANTOPRAZOLE SODIUM 20 MG/1
40 TABLET, DELAYED RELEASE ORAL DAILY
Refills: 0 | Status: DISCONTINUED | OUTPATIENT
Start: 2019-07-14 | End: 2019-07-19

## 2019-07-14 RX ORDER — FLUTICASONE PROPIONATE 50 MCG
1 SPRAY, SUSPENSION NASAL DAILY
Refills: 0 | Status: DISCONTINUED | OUTPATIENT
Start: 2019-07-14 | End: 2019-07-20

## 2019-07-14 RX ORDER — NOREPINEPHRINE BITARTRATE/D5W 8 MG/250ML
0.05 PLASTIC BAG, INJECTION (ML) INTRAVENOUS
Qty: 16 | Refills: 0 | Status: DISCONTINUED | OUTPATIENT
Start: 2019-07-14 | End: 2019-07-16

## 2019-07-14 RX ORDER — ASPIRIN/CALCIUM CARB/MAGNESIUM 324 MG
81 TABLET ORAL DAILY
Refills: 0 | Status: DISCONTINUED | OUTPATIENT
Start: 2019-07-14 | End: 2019-07-20

## 2019-07-14 RX ORDER — SENNA PLUS 8.6 MG/1
2 TABLET ORAL AT BEDTIME
Refills: 0 | Status: DISCONTINUED | OUTPATIENT
Start: 2019-07-14 | End: 2019-07-18

## 2019-07-14 RX ORDER — ATORVASTATIN CALCIUM 80 MG/1
40 TABLET, FILM COATED ORAL AT BEDTIME
Refills: 0 | Status: DISCONTINUED | OUTPATIENT
Start: 2019-07-14 | End: 2019-07-20

## 2019-07-14 RX ORDER — ISOSORBIDE MONONITRATE 60 MG/1
0 TABLET, EXTENDED RELEASE ORAL
Qty: 90 | Refills: 0 | DISCHARGE

## 2019-07-14 RX ORDER — CALCIUM ACETATE 667 MG
0 TABLET ORAL
Qty: 90 | Refills: 0 | DISCHARGE

## 2019-07-14 RX ORDER — CLOPIDOGREL BISULFATE 75 MG/1
75 TABLET, FILM COATED ORAL DAILY
Refills: 0 | Status: DISCONTINUED | OUTPATIENT
Start: 2019-07-14 | End: 2019-07-16

## 2019-07-14 RX ORDER — ASPIRIN/CALCIUM CARB/MAGNESIUM 324 MG
0 TABLET ORAL
Qty: 30 | Refills: 0 | DISCHARGE

## 2019-07-14 RX ORDER — MEROPENEM 1 G/30ML
500 INJECTION INTRAVENOUS EVERY 24 HOURS
Refills: 0 | Status: DISCONTINUED | OUTPATIENT
Start: 2019-07-14 | End: 2019-07-15

## 2019-07-14 RX ORDER — DOCUSATE SODIUM 100 MG
100 CAPSULE ORAL
Refills: 0 | Status: DISCONTINUED | OUTPATIENT
Start: 2019-07-14 | End: 2019-07-20

## 2019-07-14 RX ORDER — RANITIDINE HYDROCHLORIDE 150 MG/1
0 TABLET, FILM COATED ORAL
Qty: 30 | Refills: 0 | DISCHARGE

## 2019-07-14 RX ORDER — PANTOPRAZOLE SODIUM 20 MG/1
0 TABLET, DELAYED RELEASE ORAL
Qty: 60 | Refills: 0 | DISCHARGE

## 2019-07-14 RX ORDER — MAGNESIUM SULFATE 500 MG/ML
2 VIAL (ML) INJECTION
Refills: 0 | Status: COMPLETED | OUTPATIENT
Start: 2019-07-14 | End: 2019-07-14

## 2019-07-14 RX ORDER — DIPHENHYDRAMINE HCL 50 MG
50 CAPSULE ORAL EVERY 8 HOURS
Refills: 0 | Status: DISCONTINUED | OUTPATIENT
Start: 2019-07-14 | End: 2019-07-20

## 2019-07-14 RX ORDER — LORATADINE 10 MG/1
0 TABLET ORAL
Qty: 30 | Refills: 0 | DISCHARGE

## 2019-07-14 RX ORDER — CEFTRIAXONE 500 MG/1
2000 INJECTION, POWDER, FOR SOLUTION INTRAMUSCULAR; INTRAVENOUS EVERY 24 HOURS
Refills: 0 | Status: DISCONTINUED | OUTPATIENT
Start: 2019-07-14 | End: 2019-07-14

## 2019-07-14 RX ORDER — CLOPIDOGREL BISULFATE 75 MG/1
0 TABLET, FILM COATED ORAL
Qty: 90 | Refills: 0 | DISCHARGE

## 2019-07-14 RX ORDER — ISOSORBIDE MONONITRATE 60 MG/1
30 TABLET, EXTENDED RELEASE ORAL DAILY
Refills: 0 | Status: DISCONTINUED | OUTPATIENT
Start: 2019-07-14 | End: 2019-07-14

## 2019-07-14 RX ORDER — LOSARTAN POTASSIUM 100 MG/1
0 TABLET, FILM COATED ORAL
Qty: 90 | Refills: 0 | DISCHARGE

## 2019-07-14 RX ORDER — ONDANSETRON 8 MG/1
8 TABLET, FILM COATED ORAL
Refills: 0 | Status: DISCONTINUED | OUTPATIENT
Start: 2019-07-14 | End: 2019-07-20

## 2019-07-14 RX ADMIN — PANTOPRAZOLE SODIUM 40 MILLIGRAM(S): 20 TABLET, DELAYED RELEASE ORAL at 11:31

## 2019-07-14 RX ADMIN — HEPARIN SODIUM 5000 UNIT(S): 5000 INJECTION INTRAVENOUS; SUBCUTANEOUS at 14:16

## 2019-07-14 RX ADMIN — CHLORHEXIDINE GLUCONATE 1 APPLICATION(S): 213 SOLUTION TOPICAL at 11:27

## 2019-07-14 RX ADMIN — Medication 650 MILLIGRAM(S): at 11:36

## 2019-07-14 RX ADMIN — ONDANSETRON 8 MILLIGRAM(S): 8 TABLET, FILM COATED ORAL at 05:33

## 2019-07-14 RX ADMIN — Medication 3.87 MICROGRAM(S)/KG/MIN: at 20:12

## 2019-07-14 RX ADMIN — CEFTRIAXONE 100 MILLIGRAM(S): 500 INJECTION, POWDER, FOR SOLUTION INTRAMUSCULAR; INTRAVENOUS at 07:49

## 2019-07-14 RX ADMIN — Medication 1 SPRAY(S): at 11:31

## 2019-07-14 RX ADMIN — Medication 650 MILLIGRAM(S): at 17:14

## 2019-07-14 RX ADMIN — MORPHINE SULFATE 2 MILLIGRAM(S): 50 CAPSULE, EXTENDED RELEASE ORAL at 08:12

## 2019-07-14 RX ADMIN — ONDANSETRON 8 MILLIGRAM(S): 8 TABLET, FILM COATED ORAL at 09:57

## 2019-07-14 RX ADMIN — Medication 81 MILLIGRAM(S): at 11:31

## 2019-07-14 RX ADMIN — Medication 3.87 MICROGRAM(S)/KG/MIN: at 18:44

## 2019-07-14 RX ADMIN — Medication 100 MILLIGRAM(S): at 14:15

## 2019-07-14 RX ADMIN — Medication 100 MILLIGRAM(S): at 06:38

## 2019-07-14 RX ADMIN — HEPARIN SODIUM 5000 UNIT(S): 5000 INJECTION INTRAVENOUS; SUBCUTANEOUS at 05:32

## 2019-07-14 RX ADMIN — SODIUM CHLORIDE 250 MILLILITER(S): 9 INJECTION INTRAMUSCULAR; INTRAVENOUS; SUBCUTANEOUS at 16:35

## 2019-07-14 RX ADMIN — PIPERACILLIN AND TAZOBACTAM 200 GRAM(S): 4; .5 INJECTION, POWDER, LYOPHILIZED, FOR SOLUTION INTRAVENOUS at 17:25

## 2019-07-14 RX ADMIN — Medication 50 GRAM(S): at 17:17

## 2019-07-14 RX ADMIN — MEROPENEM 100 MILLIGRAM(S): 1 INJECTION INTRAVENOUS at 18:23

## 2019-07-14 RX ADMIN — SODIUM CHLORIDE 1000 MILLILITER(S): 9 INJECTION INTRAMUSCULAR; INTRAVENOUS; SUBCUTANEOUS at 18:42

## 2019-07-14 RX ADMIN — MORPHINE SULFATE 2 MILLIGRAM(S): 50 CAPSULE, EXTENDED RELEASE ORAL at 11:32

## 2019-07-14 RX ADMIN — CLOPIDOGREL BISULFATE 75 MILLIGRAM(S): 75 TABLET, FILM COATED ORAL at 11:31

## 2019-07-14 RX ADMIN — CHLORHEXIDINE GLUCONATE 1 APPLICATION(S): 213 SOLUTION TOPICAL at 18:07

## 2019-07-14 RX ADMIN — Medication 50 GRAM(S): at 20:15

## 2019-07-14 NOTE — PROCEDURE NOTE - NSSITEPREP_SKIN_A_CORE
chlorhexidine/povidone iodine (if allergic to chlorhexidine)/Adherence to aseptic technique: hand hygiene prior to donning barriers (gown, gloves), don cap and mask, sterile drape over patient

## 2019-07-14 NOTE — H&P ADULT - NSHPLABSRESULTS_GEN_ALL_CORE
11.8   6.50  )-----------( 184      ( 13 Jul 2019 19:40 )             36.6       07-13    141  |  93<L>  |  31<H>  ----------------------------<  192<H>  4.5   |  24  |  5.1<HH>    Ca    9.7      13 Jul 2019 19:40  Mg     1.6     07-13    TPro  8.4<H>  /  Alb  5.0  /  TBili  1.7<H>  /  DBili  x   /  AST  186<H>  /  ALT  90<H>  /  AlkPhos  182<H>  07-13        < from: CT Abdomen and Pelvis w/ IV Cont (07.13.19 @ 22:14) >    Distended gallbladder with mild wall thickening. 4 mm density in the   lower CBD, suspect choledocholith.     Other incidental findings as described.    < end of copied text >    < from: US Abdomen Limited (07.13.19 @ 21:22) >      Gallbladder sludge is seen, no calculi are delineated. Gallbladder wall   thickening 3.2 mm. Reportedly negative sonographic Carson's sign.    The common bile duct measures 7.6 mm, which is considered dilated.     Hepatic steatosis.    Right renal 4 cm cyst.    < end of copied text >

## 2019-07-14 NOTE — CHART NOTE - NSCHARTNOTEFT_GEN_A_CORE
Dr Dickerson obtained staff necessary to proceed with urgent ERCP.    Pt has a bed at [facility], transfer in progress.    ERCP will be Done at [facility].    Eulalio CORDON

## 2019-07-14 NOTE — H&P ADULT - ATTENDING COMMENTS
I have personally examined the patient and reviewed the labs and radiological results. IV Antibiotics as prescribed. G I Consult.

## 2019-07-14 NOTE — H&P ADULT - ASSESSMENT
67 yo M with above PMH is admitted to ICU for the choledocholithiasis     # Choledocholithiasis   - CBD dilation on US abdomen and possible stone on CT scan   - Consider MRCP/ ERCP  - GI consult   - Rocephin and Flagyl  - NPO for now  - Protonix IV    # Elevated Troponin  - PT is ESRD on HD  - No EKG changes or chest pain  - Trend    # HTN  - Hold losartan, Metoprolol and Imdur for now  - Resume tomorrow if BP is high    # ESRD on HD (T/ Th/ S)  - Nephrology consult     # CAD s/p 3 stents   - C/w home meds  - ASA and Plavix    # DM  - Check FS and start Insulin if FS >180     # DVT ppx  - Heparin

## 2019-07-14 NOTE — CHART NOTE - NSCHARTNOTEFT_GEN_A_CORE
Spoke with   Assistant Director of Nursing(North)  regarding nurses to assist with ERCP  No nursing staff available for ERCP.  Attending staff is available but unable to proceed with ERCP without trained ERCP nursing staff.    Contacted IR for consideration of PTD.  IR unable to assist due to lack of intrahepatic duct dilation .    Reviewed with Dr. Dickerson who is attempting to obtain appropriate staff.

## 2019-07-14 NOTE — PROGRESS NOTE ADULT - ASSESSMENT
67 yo M with above PMH is admitted to ICU for the choledocholithiasis     # Choledocholithiasis   - CBD dilation on US abdomen and possible stone on CT scan   - Consider MRCP/ ERCP  - GI consult   - Rocephin and Flagyl  - NPO for now  - Protonix IV    # Elevated Troponin  - PT is ESRD on HD  - No EKG changes or chest pain  - Trend    # HTN  - Hold losartan, Metoprolol and Imdur for now  - Resume tomorrow if BP is high    # ESRD on HD (T/ Th/ S)  - Nephrology consult     # CAD s/p 3 stents   - C/w home meds  - ASA and Plavix    # DM  - Check FS and start Insulin if FS >180     # DVT ppx  - Heparin    Pager Number: [phone]

## 2019-07-14 NOTE — CONSULT NOTE ADULT - ASSESSMENT
choledocholithiasis/ GB  SLUDGE  R/O CHOLECYSTITIS  GI CONSULT  NPO  IVF  PAIN MGMT  IV ABX  DVT/GI PROPHYLAXIS Impression:   Choledocholithiasis/ GB  SLUDGE / Abdominal pain

## 2019-07-14 NOTE — CONSULT NOTE ADULT - ASSESSMENT
1)  ESRD on HD via RUE AVF TTS at [facility], last HD yesterday    2)  Acute-onset epigastric abdominal pain, N/V, elevated bilirubin, alk phos, GGT, and rising AST/ALT.  CT scan w/ what appears to be a CBD stone w/ dilated GB.  Of concern is the rising WBC, high fever, and ongoing lactic acidosis, concerning for ascending cholangitis, possible gangrenous GB    Recommendations    1)  Next HD 7/16    2)  RUE precautions for AVF    3)  GI evaluation, ? needs ERCP vs surgery.

## 2019-07-14 NOTE — CONSULT NOTE ADULT - PROBLEM SELECTOR RECOMMENDATION 9
.  -GI CONSULT / ? MRCP  -NPO  -IVF  -PAIN MGMT  -IV ABX  -DVT/GI PROPHYLAXIS  * Case d/w Dr. Narvaez (who will follow as a consultant) and agrees with above plan.     Dr. Narvaez to follow.

## 2019-07-14 NOTE — PROGRESS NOTE ADULT - SUBJECTIVE AND OBJECTIVE BOX
Chief Complaint:  Patient is a 68y old  Male who presents with a chief complaint of Abdominal pain (2019 00:04)      Interval Events:     Allergies:  No Known Allergies      Home Medications:    Hospital Medications:  aspirin  chewable 81 milliGRAM(s) Oral daily  atorvastatin 40 milliGRAM(s) Oral at bedtime  cefTRIAXone   IVPB 2000 milliGRAM(s) IV Intermittent every 24 hours  chlorhexidine 4% Liquid 1 Application(s) Topical every 12 hours  clopidogrel Tablet 75 milliGRAM(s) Oral daily  diphenhydrAMINE 50 milliGRAM(s) Oral every 8 hours PRN  fluticasone propionate 50 MICROgram(s)/spray Nasal Spray 1 Spray(s) Both Nostrils daily  heparin  Injectable 5000 Unit(s) SubCutaneous every 8 hours  metroNIDAZOLE  IVPB 500 milliGRAM(s) IV Intermittent every 8 hours  morphine  - Injectable 2 milliGRAM(s) IV Push every 4 hours PRN  ondansetron Injectable 8 milliGRAM(s) IV Push four times a day PRN  pantoprazole  Injectable 40 milliGRAM(s) IV Push daily      PMHX/PSHX:  Anemia  Heart failure  HLD (hyperlipidemia)  HTN (hypertension)  ESRD on dialysis  H/O right coronary artery stent placement      Family history:      ROS:   As mentioned below      PHYSICAL EXAM:   Vital Signs:  Vital Signs Last 24 Hrs  T(C): 36.6 (2019 03:12), Max: 38.2 (2019 19:48)  T(F): 97.9 (2019 03:12), Max: 100.8 (2019 19:48)  HR: 91 (2019 06:00) (85 - 99)  BP: 124/51 (2019 06:00) (117/62 - 172/78)  BP(mean): 73 (2019 06:00) (73 - 112)  RR: 16 (2019 07:36) (16 - 26)  SpO2: 100% (2019 03:12) (99% - 100%)  Daily Height in cm: 165.1 (2019 01:04)    Daily Weight in k.5 (2019 01:04)    GENERAL:  NAD  HEENT:  NC/AT,  No Thyromegaly  CHEST:  CTA B/L  HEART:  S1, S2- No M, R, G  ABDOMEN:  Soft, NT, ND  EXTEREMITIES:  no cyanosis,clubbing or edema  SKIN:  NAD  NEURO:  Grossly Nr.    LABS:                        10.7   14.63 )-----------( 134      ( 2019 05:31 )             33.1         140  |  93<L>  |  41<H>  ----------------------------<  157<H>  4.3   |  26  |  6.3<HH>    Ca    9.0      2019 05:31  Mg     1.6         TPro  7.1  /  Alb  4.2  /  TBili  2.6<H>  /  DBili  x   /  AST  256<H>  /  ALT  225<H>  /  AlkPhos  172<H>      LIVER FUNCTIONS - ( 2019 05:31 )  Alb: 4.2 g/dL / Pro: 7.1 g/dL / ALK PHOS: 172 U/L / ALT: 225 U/L / AST: 256 U/L / GGT: 603 U/L       PT/INR - ( 2019 19:40 )   PT: 11.40 sec;   INR: 0.99 ratio         PTT - ( 2019 19:40 )  PTT:30.0 sec    Amylase Serum--      Lipase serum68       Ammonia--      Imaging:

## 2019-07-14 NOTE — H&P ADULT - NSHPPHYSICALEXAM_GEN_ALL_CORE
T(C): 38.2 (07-13-19 @ 19:48), Max: 38.2 (07-13-19 @ 19:48)  HR: 99 (07-13-19 @ 22:18) (88 - 99)  BP: 132/64 (07-13-19 @ 22:18) (117/62 - 132/64)  RR: 16 (07-13-19 @ 22:18) (16 - 18)  SpO2: 99% (07-13-19 @ 22:18) (99% - 100%)    PHYSICAL EXAM:  GENERAL: NAD, well-developed  HEAD:  Atraumatic, Normocephalic  NECK: Supple, No JVD  CHEST/LUNG: Clear to auscultation bilaterally; No wheeze  HEART: Regular rate and rhythm; No murmurs, rubs, or gallops  ABDOMEN: Epigastric tenderness and RUQ tenderness, Carson's sign present   EXTREMITIES:  2+ Peripheral Pulses, No clubbing, cyanosis, or edema  PSYCH: AAOx3  NEUROLOGY: non-focal  SKIN: No rashes or lesions

## 2019-07-14 NOTE — H&P ADULT - NSICDXPASTMEDICALHX_GEN_ALL_CORE_FT
PAST MEDICAL HISTORY:  Anemia     ESRD on dialysis T/ TH/ S    Heart failure     HLD (hyperlipidemia)     HTN (hypertension)

## 2019-07-14 NOTE — PROCEDURE NOTE - NSPROCDETAILS_GEN_ALL_CORE
sterile technique, catheter placed/lumen(s) aspirated and flushed/guidewire recovered/sterile dressing applied/ultrasound guidance

## 2019-07-15 ENCOUNTER — TRANSCRIPTION ENCOUNTER (OUTPATIENT)
Age: 69
End: 2019-07-15

## 2019-07-15 LAB
GLUCOSE BLDC GLUCOMTR-MCNC: 137 MG/DL — HIGH (ref 70–99)
GLUCOSE BLDC GLUCOMTR-MCNC: 191 MG/DL — HIGH (ref 70–99)
GLUCOSE BLDC GLUCOMTR-MCNC: 57 MG/DL — LOW (ref 70–99)
GLUCOSE BLDC GLUCOMTR-MCNC: 62 MG/DL — LOW (ref 70–99)
GLUCOSE BLDC GLUCOMTR-MCNC: 84 MG/DL — SIGNIFICANT CHANGE UP (ref 70–99)
GLUCOSE BLDC GLUCOMTR-MCNC: 85 MG/DL — SIGNIFICANT CHANGE UP (ref 70–99)
GLUCOSE BLDC GLUCOMTR-MCNC: 85 MG/DL — SIGNIFICANT CHANGE UP (ref 70–99)
HCV AB S/CO SERPL IA: 0.08 S/CO — SIGNIFICANT CHANGE UP (ref 0–0.99)
HCV AB SERPL-IMP: SIGNIFICANT CHANGE UP
TROPONIN T SERPL-MCNC: 0.21 NG/ML — CRITICAL HIGH

## 2019-07-15 PROCEDURE — 99233 SBSQ HOSP IP/OBS HIGH 50: CPT

## 2019-07-15 PROCEDURE — 43274 ERCP DUCT STENT PLACEMENT: CPT | Mod: XU

## 2019-07-15 PROCEDURE — 71045 X-RAY EXAM CHEST 1 VIEW: CPT | Mod: 26

## 2019-07-15 PROCEDURE — 74328 X-RAY BILE DUCT ENDOSCOPY: CPT | Mod: 26

## 2019-07-15 PROCEDURE — 43235 EGD DIAGNOSTIC BRUSH WASH: CPT | Mod: XU

## 2019-07-15 RX ORDER — DEXTROSE 50 % IN WATER 50 %
50 SYRINGE (ML) INTRAVENOUS ONCE
Refills: 0 | Status: COMPLETED | OUTPATIENT
Start: 2019-07-15 | End: 2019-07-15

## 2019-07-15 RX ORDER — INDOMETHACIN 50 MG
100 CAPSULE ORAL ONCE
Refills: 0 | Status: DISCONTINUED | OUTPATIENT
Start: 2019-07-15 | End: 2019-07-20

## 2019-07-15 RX ORDER — MAGNESIUM OXIDE 400 MG ORAL TABLET 241.3 MG
400 TABLET ORAL ONCE
Refills: 0 | Status: DISCONTINUED | OUTPATIENT
Start: 2019-07-15 | End: 2019-07-18

## 2019-07-15 RX ORDER — DEXTROSE 50 % IN WATER 50 %
25 SYRINGE (ML) INTRAVENOUS ONCE
Refills: 0 | Status: COMPLETED | OUTPATIENT
Start: 2019-07-15 | End: 2019-07-15

## 2019-07-15 RX ORDER — SODIUM CHLORIDE 9 MG/ML
250 INJECTION INTRAMUSCULAR; INTRAVENOUS; SUBCUTANEOUS ONCE
Refills: 0 | Status: COMPLETED | OUTPATIENT
Start: 2019-07-15 | End: 2019-07-15

## 2019-07-15 RX ADMIN — Medication 100 MILLIGRAM(S): at 18:31

## 2019-07-15 RX ADMIN — PIPERACILLIN AND TAZOBACTAM 200 GRAM(S): 4; .5 INJECTION, POWDER, LYOPHILIZED, FOR SOLUTION INTRAVENOUS at 18:30

## 2019-07-15 RX ADMIN — HEPARIN SODIUM 5000 UNIT(S): 5000 INJECTION INTRAVENOUS; SUBCUTANEOUS at 21:02

## 2019-07-15 RX ADMIN — PIPERACILLIN AND TAZOBACTAM 200 GRAM(S): 4; .5 INJECTION, POWDER, LYOPHILIZED, FOR SOLUTION INTRAVENOUS at 05:14

## 2019-07-15 RX ADMIN — Medication 81 MILLIGRAM(S): at 12:56

## 2019-07-15 RX ADMIN — SENNA PLUS 2 TABLET(S): 8.6 TABLET ORAL at 21:02

## 2019-07-15 RX ADMIN — SODIUM CHLORIDE 500 MILLILITER(S): 9 INJECTION INTRAMUSCULAR; INTRAVENOUS; SUBCUTANEOUS at 22:53

## 2019-07-15 RX ADMIN — MORPHINE SULFATE 2 MILLIGRAM(S): 50 CAPSULE, EXTENDED RELEASE ORAL at 21:51

## 2019-07-15 RX ADMIN — MORPHINE SULFATE 2 MILLIGRAM(S): 50 CAPSULE, EXTENDED RELEASE ORAL at 21:03

## 2019-07-15 RX ADMIN — ATORVASTATIN CALCIUM 40 MILLIGRAM(S): 80 TABLET, FILM COATED ORAL at 21:02

## 2019-07-15 RX ADMIN — Medication 50 MILLILITER(S): at 15:26

## 2019-07-15 RX ADMIN — PANTOPRAZOLE SODIUM 40 MILLIGRAM(S): 20 TABLET, DELAYED RELEASE ORAL at 12:56

## 2019-07-15 RX ADMIN — Medication 25 MILLILITER(S): at 06:03

## 2019-07-15 RX ADMIN — Medication 50 MILLILITER(S): at 11:54

## 2019-07-15 RX ADMIN — CHLORHEXIDINE GLUCONATE 1 APPLICATION(S): 213 SOLUTION TOPICAL at 05:14

## 2019-07-15 NOTE — PROGRESS NOTE ADULT - SUBJECTIVE AND OBJECTIVE BOX
Nephrology progress note    Patient is seen and examined, events over the last 24 h noted .  no new complaints except persistent RUQ pain and mild lower chest pain.  Transfer from [facility]  now off pressors.    Allergies:  No Known Allergies    Hospital Medications:   MEDICATIONS  (STANDING):  aspirin  chewable 81 milliGRAM(s) Oral daily  atorvastatin 40 milliGRAM(s) Oral at bedtime  chlorhexidine 4% Liquid 1 Application(s) Topical every 12 hours  clopidogrel Tablet 75 milliGRAM(s) Oral daily  docusate sodium 100 milliGRAM(s) Oral two times a day  fluticasone propionate 50 MICROgram(s)/spray Nasal Spray 1 Spray(s) Both Nostrils daily  heparin  Injectable 5000 Unit(s) SubCutaneous every 8 hours  norepinephrine Infusion 0.05 MICROgram(s)/kG/Min (3.867 mL/Hr) IV Continuous <Continuous>  pantoprazole  Injectable 40 milliGRAM(s) IV Push daily  piperacillin/tazobactam IVPB.. 2.25 Gram(s) IV Intermittent every 12 hours  senna 2 Tablet(s) Oral at bedtime        VITALS:  T(F): 97.5 (07-15-19 @ 12:00), Max: 101.1 (07-14-19 @ 13:03)  HR: 78 (07-15-19 @ 12:00)  BP: 110/53 (07-15-19 @ 12:00)  RR: 16 (07-15-19 @ 12:00)  SpO2: 98% (07-15-19 @ 12:00)      07-13 @ 07:01  -  07-14 @ 07:00  --------------------------------------------------------  IN: 100 mL / OUT: 0 mL / NET: 100 mL    07-14 @ 07:01  -  07-15 @ 07:00  --------------------------------------------------------  IN: 1316 mL / OUT: 0 mL / NET: 1316 mL      Height (cm): 165.1 (07-14 @ 23:00)  Weight (kg): 82.1 (07-14 @ 23:00)  BMI (kg/m2): 30.1 (07-14 @ 23:00)  BSA (m2): 1.9 (07-14 @ 23:00)    PHYSICAL EXAM:  Constitutional: NAD  Respiratory: CTAB, no wheezes, rales or rhonchi  Cardiovascular: S1, S2, RRR  Gastrointestinal: BS+, soft, NT/ND  Extremities: No peripheral edema  :  No elder.       LABS:  07-14    138  |  94<L>  |  52<H>  ----------------------------<  103<H>  4.6   |  23  |  7.6<HH>    Ca    8.1<L>      14 Jul 2019 21:35  Mg     1.5     07-14    TPro  6.2  /  Alb  3.5  /  TBili  3.3<H>  /  DBili      /  AST  174<H>  /  ALT  218<H>  /  AlkPhos  124<H>  07-14                          9.3    20.67 )-----------( 90       ( 14 Jul 2019 21:35 )             28.4       Urine Studies:      RADIOLOGY & ADDITIONAL STUDIES:  < from: Xray Chest 1 View-PORTABLE IMMEDIATE (07.15.19 @ 10:36) >  Impression:      No radiographic evidence of acute cardiopulmonary disease.    < end of copied text >    < from: CT Abdomen and Pelvis w/ IV Cont (07.13.19 @ 22:14) >  IMPRESSION:    Distended gallbladder with mild wall thickening. 4 mm density in the   lower CBD, suspect choledocholith.     Other incidental findings as described.    < end of copied text >

## 2019-07-15 NOTE — PROGRESS NOTE ADULT - SUBJECTIVE AND OBJECTIVE BOX
GENERAL SURGERY PROGRESS NOTE     MAMTA FERNANDO  68y  Male  Hospital day :1    OVERNIGHT EVENTS: Patient transferred from [facility] for choledocholithiasis, no longer on pressors. Patient to get ERCP today    T(F): 98.9 (07-15-19 @ 15:02), Max: 98.9 (07-15-19 @ 00:00)  HR: 114 (07-15-19 @ 17:03) (66 - 130)  BP: 100/59 (07-15-19 @ 17:03) (74/46 - 122/64)  RR: 16 (07-15-19 @ 17:03) (12 - 28)  SpO2: 99% (07-15-19 @ 17:03) (95% - 100%)    DIET/FLUIDS: magnesium oxide 400 milliGRAM(s) Oral once     GI proph:  pantoprazole  Injectable 40 milliGRAM(s) IV Push daily    AC/ proph: aspirin  chewable 81 milliGRAM(s) Oral daily  heparin  Injectable 5000 Unit(s) SubCutaneous every 8 hours    ABx: piperacillin/tazobactam IVPB. 2.25 Gram(s) IV Intermittent every 12 hours      PHYSICAL EXAM:  GENERAL: No acute distress   CHEST/LUNG: Clear to auscultation bilaterally  HEART: Regular rate and rhythm  ABDOMEN: Soft, tender to palpation in the epigastrium and RUQ    EXTREMITIES:  No clubbing, cyanosis, or edema  cho    LABS    POCT Blood Glucose.: 191 mg/dL (15 Jul 2019 15:46)  POCT Blood Glucose.: 62 mg/dL (15 Jul 2019 15:14)  POCT Blood Glucose.: 137 mg/dL (15 Jul 2019 12:29)  POCT Blood Glucose.: 57 mg/dL (15 Jul 2019 11:41)  POCT Blood Glucose.: 84 mg/dL (15 Jul 2019 05:44)  POCT Blood Glucose.: 96 mg/dL (14 Jul 2019 23:06)                          9.3    20.67 )-----------( 90       ( 14 Jul 2019 21:35 )             28.4       Auto Neutrophil %: 87.2 % (07-14-19 @ 21:35)  Auto Immature Granulocyte %: 6.1 % (07-14-19 @ 21:35)    07-14    138  |  94<L>  |  52<H>  ----------------------------<  103<H>  4.6   |  23  |  7.6<HH>      eGFR if Non African American: 7 mL/min/1.73M2 (07-14-19 @ 21:35)      LFTs:             6.2  | 3.3  | 174      ------------------[124     ( 14 Jul 2019 21:35 )  3.5  | x    | 218         Lactate, Blood: 2.6 mmol/L (07-14-19 @ 21:35)  Lactate, Blood: 2.8 mmol/L (07-14-19 @ 15:13)  Lactate, Blood: 6.3 mmol/L (07-14-19 @ 05:31)  Blood Gas Venous - Lactate: 4.4 mmoL/L (07-13-19 @ 23:33)  Blood Gas Venous - Lactate: 7.7 mmoL/L (07-13-19 @ 19:59)  Lactate, Blood: 7.4 mmol/L (07-13-19 @ 19:40)      Coags:     11.40  ----< 0.99    ( 13 Jul 2019 19:40 )     30.0        CARDIAC MARKERS ( 15 Jul 2019 11:00 )  x     / 0.21 ng/mL / x     / x     / x      CARDIAC MARKERS ( 14 Jul 2019 05:31 )  x     / 0.16 ng/mL / x     / x     / x      CARDIAC MARKERS ( 13 Jul 2019 19:40 )  x     / 0.19 ng/mL / x     / x     / x          RADIOLOGY & ADDITIONAL TESTS:    < from: CT Abdomen and Pelvis w/ IV Cont (07.13.19 @ 22:14) >  IMPRESSION:    Distended gallbladder with mild wall thickening. 4 mm density in the   lower CBD, suspect choledocholith.     Other incidental findings as described.    < from: US Abdomen Limited (07.13.19 @ 21:22) >  IMPRESSION:    Gallbladder sludge is seen, no calculi are delineated. Gallbladder wall   thickening 3.2 mm. Reportedly negative sonographic Carson's sign.    The common bile duct measures 7.6 mm, which is considered dilated.     Hepatic steatosis.    Right renal 4 cm cyst.    < end of copied text >

## 2019-07-15 NOTE — PROGRESS NOTE ADULT - ASSESSMENT
1)  ESRD on HD via RUE AVF TTS at [facility], last HD on 7/13/19. expect next HD tomorrow.    2)  Acute-onset epigastric abdominal pain, N/V, elevated bilirubin, alk phos, GGT, and rising AST/ALT.  CT scan w/ what appears to be a CBD stone w/ dilated GB.  Of concern is the rising WBC, high fever, and ongoing lactic acidosis, concerning for ascending cholangitis, possible gangrenous GB    Last labs from yesterday, labs from today pending.    Recommendations    1)  Next HD 7/16    2)  RUE precautions for AVF    3)  seen by GI, plan for ERCP today.    4) corrected Ca at goal, low Mg level noted, replenish orally. check Ph, iPTH    5) Hb noted, check iron stores, no need fo GAURANG at the moment.    6) BP stable, currently off pressors, keep MAP > 65.  will follow

## 2019-07-15 NOTE — PROGRESS NOTE ADULT - ASSESSMENT
67 yo M with above PMH is admitted to ICU for the choledocholithiasis     # Choledocholithiasis   - CBD dilation on US abdomen and possible stone on CT scan   - Consider MRCP/ ERCP  - GI consult   - Rocephin and Flagyl  - NPO for now  - Protonix IV    # Elevated Troponin  - PT is ESRD on HD  - No EKG changes or chest pain  - Trend    # HTN  - Hold losartan, Metoprolol and Imdur for now  - Resume tomorrow if BP is high    # ESRD on HD (T/ Th/ S)  - Nephrology consult     # CAD s/p 3 stents   - C/w home meds  - ASA and Plavix    # DM  - Check FS and start Insulin if FS >180     # DVT ppx  - Heparin 67 yo M with above PMH is admitted to ICU for the choledocholithiasis     # Choledocholithiasis   - CBD dilation on US abdomen and possible stone on CT scan   - High WBC, lactate and fevers concerning for ascending cholangitis  - Lactate 6.3 -> 2.8 -> 2.6  - GI consult : NPO for ERCP  - reassess need for central line post-ERCP  - D/C Meropenem, will c/w Zosyn  - Protonix IV    # Elevated Troponin  - PT is ESRD on HD ( Tu, Thu, Sat)  - Due for HD tommorow  - No EKG changes or chest pain  - Troponin .16-> .21  will continue to trend    # HTN  - Hold losartan, Metoprolol and Imdur for now  - Resume tomorrow if BP is high    # ESRD on HD (T/ Th/ S)  - Nephrology    - corrected Ca at goal, low Mg level noted, replenish orally. check Ph, iPTH  - Hb noted, check iron stores, no need for GAURANG at the moment.    # CAD s/p 3 stents   - C/w home meds  - ASA and Plavix    # DM  - Check FS and start Insulin if FS >180   - got 1 amp D50% today for symptomatic hypoglycemia    # DVT ppx  - Heparin 69 yo M with above PMH is admitted to ICU for the choledocholithiasis     # Choledocholithiasis   - CBD dilation on US abdomen and possible stone on CT scan   - High WBC, lactate and fevers concerning for ascending cholangitis  - Lactate 6.3 -> 2.8 -> 2.6  - GI consult : NPO for ERCP  - reassess need for central line post-ERCP  - D/C Meropenem, will c/w Zosyn  - Protonix IV    # Elevated Troponin  - PT is ESRD on HD ( Tu, Thu, Sat)  - Due for HD tommorow  - " Mid sternal chest pain" - palpation reproduces sx's on exam. No EKG changes. CXR 7/15 am was negative.  - Troponin .16-> .21  will continue to trend    # HTN  - Hold losartan, Metoprolol and Imdur for now  - Resume tomorrow if BP is high    # ESRD on HD (T/ Th/ S)  - Nephrology    - corrected Ca at goal, low Mg level noted, replenish orally. check Ph, iPTH  - Hb noted, check iron stores, no need for GAURANG at the moment.    # CAD s/p 3 stents   - C/w home meds  - ASA and Plavix    # DM  - Check FS and start Insulin if FS >180   - got 1 amp D50% today for symptomatic hypoglycemia    # DVT ppx  - Heparin

## 2019-07-15 NOTE — PROGRESS NOTE ADULT - SUBJECTIVE AND OBJECTIVE BOX
SUBJECTIVE:    Patient is a 68y old Male who presents with a chief complaint of Abdominal pain (15 Jul 2019 08:53)    Currently admitted to medicine with the primary diagnosis of Choledocholithiasis     Today is hospital day 1d. This morning he is resting comfortably in bed and reports no new issues or overnight events.     PAST MEDICAL & SURGICAL HISTORY  Anemia  Heart failure  HLD (hyperlipidemia)  HTN (hypertension)  ESRD on dialysis: T/ TH/ S  H/O right coronary artery stent placement: stent x3    SOCIAL HISTORY:  Negative for smoking/alcohol/drug use.     ALLERGIES:  No Known Allergies    MEDICATIONS:  STANDING MEDICATIONS  aspirin  chewable 81 milliGRAM(s) Oral daily  atorvastatin 40 milliGRAM(s) Oral at bedtime  chlorhexidine 4% Liquid 1 Application(s) Topical every 12 hours  clopidogrel Tablet 75 milliGRAM(s) Oral daily  docusate sodium 100 milliGRAM(s) Oral two times a day  fluticasone propionate 50 MICROgram(s)/spray Nasal Spray 1 Spray(s) Both Nostrils daily  heparin  Injectable 5000 Unit(s) SubCutaneous every 8 hours  norepinephrine Infusion 0.05 MICROgram(s)/kG/Min IV Continuous <Continuous>  pantoprazole  Injectable 40 milliGRAM(s) IV Push daily  piperacillin/tazobactam IVPB.. 2.25 Gram(s) IV Intermittent every 12 hours  senna 2 Tablet(s) Oral at bedtime    PRN MEDICATIONS  acetaminophen   Tablet .. 650 milliGRAM(s) Oral every 6 hours PRN  diphenhydrAMINE 50 milliGRAM(s) Oral every 8 hours PRN  morphine  - Injectable 2 milliGRAM(s) IV Push every 4 hours PRN  ondansetron Injectable 8 milliGRAM(s) IV Push four times a day PRN    VITALS:   T(F): 97  HR: 76  BP: 99/56  RR: 21  SpO2: 97%    LABS:                        9.3    20.67 )-----------( 90       ( 14 Jul 2019 21:35 )             28.4     07-14    138  |  94<L>  |  52<H>  ----------------------------<  103<H>  4.6   |  23  |  7.6<HH>    Ca    8.1<L>      14 Jul 2019 21:35  Mg     1.5     07-14    TPro  6.2  /  Alb  3.5  /  TBili  3.3<H>  /  DBili  x   /  AST  174<H>  /  ALT  218<H>  /  AlkPhos  124<H>  07-14    PT/INR - ( 13 Jul 2019 19:40 )   PT: 11.40 sec;   INR: 0.99 ratio         PTT - ( 13 Jul 2019 19:40 )  PTT:30.0 sec      Lactate, Blood: 2.6 mmol/L <H> (07-14-19 @ 21:35)  Lactate, Blood: 2.8 mmol/L <H> (07-14-19 @ 15:13)      CARDIAC MARKERS ( 14 Jul 2019 05:31 )  x     / 0.16 ng/mL / x     / x     / x      CARDIAC MARKERS ( 13 Jul 2019 19:40 )  x     / 0.19 ng/mL / x     / x     / x          RADIOLOGY:    PHYSICAL EXAM:  GEN: No acute distress  LUNGS: Clear to auscultation bilaterally   HEART: S1/S2 present. RRR.   ABD: Soft, non-tender, non-distended. Bowel sounds present  EXT: NC/NC/NE/2+PP/ZIMMER  NEURO: AAOX3 SUBJECTIVE:       Today is hospital day 2. This morning he complained of some new  mid-sternal " chest pain" that " feels like an anxiety I had in the past". EKG, CXR and repeat tropnin was ordered. Also received 1 amp of D50 for symptomatic hyperglycemia after rounds. NPO for ERCP today.    PAST MEDICAL & SURGICAL HISTORY  Anemia  Heart failure  HLD (hyperlipidemia)  HTN (hypertension)  ESRD on dialysis: T/ TH/ S  H/O right coronary artery stent placement: stent x3    SOCIAL HISTORY:  Negative for smoking/alcohol/drug use.     ALLERGIES:  No Known Allergies    MEDICATIONS:  STANDING MEDICATIONS  aspirin  chewable 81 milliGRAM(s) Oral daily  atorvastatin 40 milliGRAM(s) Oral at bedtime  chlorhexidine 4% Liquid 1 Application(s) Topical every 12 hours  clopidogrel Tablet 75 milliGRAM(s) Oral daily  docusate sodium 100 milliGRAM(s) Oral two times a day  fluticasone propionate 50 MICROgram(s)/spray Nasal Spray 1 Spray(s) Both Nostrils daily  heparin  Injectable 5000 Unit(s) SubCutaneous every 8 hours  norepinephrine Infusion 0.05 MICROgram(s)/kG/Min IV Continuous <Continuous>  pantoprazole  Injectable 40 milliGRAM(s) IV Push daily  piperacillin/tazobactam IVPB.. 2.25 Gram(s) IV Intermittent every 12 hours  senna 2 Tablet(s) Oral at bedtime    PRN MEDICATIONS  acetaminophen   Tablet .. 650 milliGRAM(s) Oral every 6 hours PRN  diphenhydrAMINE 50 milliGRAM(s) Oral every 8 hours PRN  morphine  - Injectable 2 milliGRAM(s) IV Push every 4 hours PRN  ondansetron Injectable 8 milliGRAM(s) IV Push four times a day PRN    VITALS:   T(F): 97  HR: 76  BP: 99/56  RR: 21  SpO2: 97%    LABS:                        9.3    20.67 )-----------( 90       ( 14 Jul 2019 21:35 )             28.4     07-14    138  |  94<L>  |  52<H>  ----------------------------<  103<H>  4.6   |  23  |  7.6<HH>    Ca    8.1<L>      14 Jul 2019 21:35  Mg     1.5     07-14    TPro  6.2  /  Alb  3.5  /  TBili  3.3<H>  /  DBili  x   /  AST  174<H>  /  ALT  218<H>  /  AlkPhos  124<H>  07-14    PT/INR - ( 13 Jul 2019 19:40 )   PT: 11.40 sec;   INR: 0.99 ratio         PTT - ( 13 Jul 2019 19:40 )  PTT:30.0 sec      Lactate, Blood: 2.6 mmol/L <H> (07-14-19 @ 21:35)  Lactate, Blood: 2.8 mmol/L <H> (07-14-19 @ 15:13)      CARDIAC MARKERS ( 14 Jul 2019 05:31 )  x     / 0.16 ng/mL / x     / x     / x      CARDIAC MARKERS ( 13 Jul 2019 19:40 )  x     / 0.19 ng/mL / x     / x     / x          RADIOLOGY:    < from: Xray Chest 1 View-PORTABLE IMMEDIATE (07.15.19 @ 10:36) >  Impression:      No radiographic evidence of acute cardiopulmonary disease.    < end of copied text >    < from: CT Abdomen and Pelvis w/ IV Cont (07.13.19 @ 22:14) >  Distended gallbladder with mild wall thickening. 4 mm density in the   lower CBD, suspect choledocholith.     Other incidental findings as described.    < end of copied text >      PHYSICAL EXAM:  GEN: Lying in bed  LUNGS: Clear to auscultation bilaterally, no wheezes appreciated at the base  HEART: S1/S2 present. RRR. No murmurs  CHEST: Mild sub-xiphoid/ epigastric tenderness that is reproducible with palpation  ABD: epigastric tenderness is noted. Bowel sounds present  EXT: No LE edema, 2+ pulses  NEURO: AAOX3 SUBJECTIVE:       Today is hospital day 2. This morning he complained of some new  mid-sternal " chest pain" that " feels like an anxiety I had in the past". EKG, CXR and repeat tropnin was ordered. Also received 1 amp of D50 for symptomatic hyperglycemia after rounds. NPO for ERCP today scheduled for 2:30 pm today.    PAST MEDICAL & SURGICAL HISTORY  Anemia  Heart failure  HLD (hyperlipidemia)  HTN (hypertension)  ESRD on dialysis: T/ TH/ S  H/O right coronary artery stent placement: stent x3    SOCIAL HISTORY:  Negative for smoking/alcohol/drug use.     ALLERGIES:  No Known Allergies    MEDICATIONS:  STANDING MEDICATIONS  aspirin  chewable 81 milliGRAM(s) Oral daily  atorvastatin 40 milliGRAM(s) Oral at bedtime  chlorhexidine 4% Liquid 1 Application(s) Topical every 12 hours  clopidogrel Tablet 75 milliGRAM(s) Oral daily  docusate sodium 100 milliGRAM(s) Oral two times a day  fluticasone propionate 50 MICROgram(s)/spray Nasal Spray 1 Spray(s) Both Nostrils daily  heparin  Injectable 5000 Unit(s) SubCutaneous every 8 hours  norepinephrine Infusion 0.05 MICROgram(s)/kG/Min IV Continuous <Continuous>  pantoprazole  Injectable 40 milliGRAM(s) IV Push daily  piperacillin/tazobactam IVPB.. 2.25 Gram(s) IV Intermittent every 12 hours  senna 2 Tablet(s) Oral at bedtime    PRN MEDICATIONS  acetaminophen   Tablet .. 650 milliGRAM(s) Oral every 6 hours PRN  diphenhydrAMINE 50 milliGRAM(s) Oral every 8 hours PRN  morphine  - Injectable 2 milliGRAM(s) IV Push every 4 hours PRN  ondansetron Injectable 8 milliGRAM(s) IV Push four times a day PRN    VITALS:   T(F): 97  HR: 76  BP: 99/56  RR: 21  SpO2: 97%    LABS:                        9.3    20.67 )-----------( 90       ( 14 Jul 2019 21:35 )             28.4     07-14    138  |  94<L>  |  52<H>  ----------------------------<  103<H>  4.6   |  23  |  7.6<HH>    Ca    8.1<L>      14 Jul 2019 21:35  Mg     1.5     07-14    TPro  6.2  /  Alb  3.5  /  TBili  3.3<H>  /  DBili  x   /  AST  174<H>  /  ALT  218<H>  /  AlkPhos  124<H>  07-14    PT/INR - ( 13 Jul 2019 19:40 )   PT: 11.40 sec;   INR: 0.99 ratio         PTT - ( 13 Jul 2019 19:40 )  PTT:30.0 sec      Lactate, Blood: 2.6 mmol/L <H> (07-14-19 @ 21:35)  Lactate, Blood: 2.8 mmol/L <H> (07-14-19 @ 15:13)      CARDIAC MARKERS ( 14 Jul 2019 05:31 )  x     / 0.16 ng/mL / x     / x     / x      CARDIAC MARKERS ( 13 Jul 2019 19:40 )  x     / 0.19 ng/mL / x     / x     / x          RADIOLOGY:    < from: Xray Chest 1 View-PORTABLE IMMEDIATE (07.15.19 @ 10:36) >  Impression:      No radiographic evidence of acute cardiopulmonary disease.    < end of copied text >    < from: CT Abdomen and Pelvis w/ IV Cont (07.13.19 @ 22:14) >  Distended gallbladder with mild wall thickening. 4 mm density in the   lower CBD, suspect choledocholith.     Other incidental findings as described.    < end of copied text >      PHYSICAL EXAM:  GEN: Lying in bed  LUNGS: Clear to auscultation bilaterally, no wheezes appreciated at the base  HEART: S1/S2 present. RRR. No murmurs  CHEST: Mild sub-xiphoid/ epigastric tenderness that is reproducible with palpation  ABD: epigastric tenderness is noted. Bowel sounds present  EXT: No LE edema, 2+ pulses  NEURO: AAOX3

## 2019-07-15 NOTE — PROGRESS NOTE ADULT - SUBJECTIVE AND OBJECTIVE BOX
Over Night Events:    no events     ROS:  See HPI    PHYSICAL EXAM    ICU Vital Signs Last 24 Hrs  T(C): 36.1 (15 Jul 2019 08:00), Max: 39.6 (14 Jul 2019 11:19)  T(F): 97 (15 Jul 2019 08:00), Max: 103.3 (14 Jul 2019 11:19)  HR: 70 (15 Jul 2019 08:00) (66 - 110)  BP: 105/57 (15 Jul 2019 08:00) (71/48 - 169/77)  BP(mean): 70 (15 Jul 2019 08:00) (55 - 111)  RR: 23 (15 Jul 2019 08:00) (18 - 28)  SpO2: 99% (15 Jul 2019 08:00) (97% - 100%)        07-14-19 @ 07:01  -  07-15-19 @ 07:00  --------------------------------------------------------  IN: 1316 mL / OUT: 0 mL / NET: 1316 mL        General: AAO x 3   HEENT:              PERRLA   Lymph Nodes: NO cervical LN   Lungs: Bilateral BS  Cardiovascular: Regular   Abdomen: Soft, Positive BS  Extremities: No clubbing   Skin: intact   Neurological: non focal       LABS:                          9.3    20.67 )-----------( 90       ( 14 Jul 2019 21:35 )             28.4                                               07-14    138  |  94<L>  |  52<H>  ----------------------------<  103<H>  4.6   |  23  |  7.6<HH>    Ca    8.1<L>      14 Jul 2019 21:35  Mg     1.5     07-14    TPro  6.2  /  Alb  3.5  /  TBili  3.3<H>  /  DBili  x   /  AST  174<H>  /  ALT  218<H>  /  AlkPhos  124<H>  07-14      PT/INR - ( 13 Jul 2019 19:40 )   PT: 11.40 sec;   INR: 0.99 ratio         PTT - ( 13 Jul 2019 19:40 )  PTT:30.0 sec                                           CARDIAC MARKERS ( 14 Jul 2019 05:31 )  x     / 0.16 ng/mL / x     / x     / x      CARDIAC MARKERS ( 13 Jul 2019 19:40 )  x     / 0.19 ng/mL / x     / x     / x                                                LIVER FUNCTIONS - ( 14 Jul 2019 21:35 )  Alb: 3.5 g/dL / Pro: 6.2 g/dL / ALK PHOS: 124 U/L / ALT: 218 U/L / AST: 174 U/L / GGT: x                                                MEDICATIONS  (STANDING):  aspirin  chewable 81 milliGRAM(s) Oral daily  atorvastatin 40 milliGRAM(s) Oral at bedtime  chlorhexidine 4% Liquid 1 Application(s) Topical every 12 hours  clopidogrel Tablet 75 milliGRAM(s) Oral daily  docusate sodium 100 milliGRAM(s) Oral two times a day  fluticasone propionate 50 MICROgram(s)/spray Nasal Spray 1 Spray(s) Both Nostrils daily  heparin  Injectable 5000 Unit(s) SubCutaneous every 8 hours  meropenem  IVPB 500 milliGRAM(s) IV Intermittent every 24 hours  norepinephrine Infusion 0.05 MICROgram(s)/kG/Min (3.867 mL/Hr) IV Continuous <Continuous>  pantoprazole  Injectable 40 milliGRAM(s) IV Push daily  piperacillin/tazobactam IVPB.. 2.25 Gram(s) IV Intermittent every 12 hours  senna 2 Tablet(s) Oral at bedtime    MEDICATIONS  (PRN):  acetaminophen   Tablet .. 650 milliGRAM(s) Oral every 6 hours PRN Temp greater or equal to 38C (100.4F), Mild Pain (1 - 3)  diphenhydrAMINE 50 milliGRAM(s) Oral every 8 hours PRN Rash and/or Itching  morphine  - Injectable 2 milliGRAM(s) IV Push every 4 hours PRN Moderate Pain (4 - 6)  ondansetron Injectable 8 milliGRAM(s) IV Push four times a day PRN Nausea and/or Vomiting      Xrays:       no new xray                                                                               ECHO

## 2019-07-15 NOTE — PROGRESS NOTE ADULT - SUBJECTIVE AND OBJECTIVE BOX
MAMTA FERNANDO  68y Male    CHIEF COMPLAINT:    Patient is a 68y old  Male who presents with a chief complaint of Abdominal pain (15 Jul 2019 12:44)      INTERVAL HPI/OVERNIGHT EVENTS:    Patient seen and examined.    ROS: All other systems are negative.    Vital Signs:    T(F): 98.9 (07-15-19 @ 15:02), Max: 98.9 (07-15-19 @ 00:00)  HR: 114 (07-15-19 @ 17:03) (66 - 130)  BP: 100/59 (07-15-19 @ 17:03) (74/46 - 122/64)  RR: 16 (07-15-19 @ 17:03) (12 - 28)  SpO2: 99% (07-15-19 @ 17:03) (95% - 100%)  I&O's Summary    2019 07:01  -  15 Jul 2019 07:00  --------------------------------------------------------  IN: 1316 mL / OUT: 0 mL / NET: 1316 mL      Daily Height in cm: 165.1 (15 Jul 2019 15:33)    Daily Weight in k.7 (15 Jul 2019 06:00)  CAPILLARY BLOOD GLUCOSE      POCT Blood Glucose.: 191 mg/dL (15 Jul 2019 15:46)  POCT Blood Glucose.: 62 mg/dL (15 Jul 2019 15:14)  POCT Blood Glucose.: 137 mg/dL (15 Jul 2019 12:29)  POCT Blood Glucose.: 57 mg/dL (15 Jul 2019 11:41)  POCT Blood Glucose.: 84 mg/dL (15 Jul 2019 05:44)  POCT Blood Glucose.: 96 mg/dL (2019 23:06)      PHYSICAL EXAM:    GENERAL:  NAD  SKIN: No rashes or lesions  HENT: Atrumatic. Normocephalic. PERRL. Moist membranes.  NECK: Supple, No JVD. No lymphadenopathy.  PULMONARY: CTA B/L. No wheezing. No rales  CVS: Normal S1, S2. Rate and Rythm are regular. No murmurs.  ABDOMEN/GI: Soft, Nontender, Nondistended; BS present  EXTREMITIES: Peripheral pulses intact. No edema B/L LE.  NEUROLOGIC:  No motor or sensory deficit.  PSYCH: Alert & oriented x 3    Consultant(s) Notes Reviewed:  [x ] YES  [ ] NO  Care Discussed with Consultants/Other Providers [ x] YES  [ ] NO    EKG reviewed  Telemetry reviewed    LABS:                        9.3    20.67 )-----------( 90       ( 2019 21:35 )             28.4         138  |  94<L>  |  52<H>  ----------------------------<  103<H>  4.6   |  23  |  7.6<HH>    Ca    8.1<L>      2019 21:35  Mg     1.5         TPro  6.2  /  Alb  3.5  /  TBili  3.3<H>  /  DBili  x   /  AST  174<H>  /  ALT  218<H>  /  AlkPhos  124<H>      PT/INR - ( 2019 19:40 )   PT: 11.40 sec;   INR: 0.99 ratio         PTT - ( 2019 19:40 )  PTT:30.0 sec    Trop 0.21, CKMB --, CK --, 07-15-19 @ 11:00  Trop 0.16, CKMB --, CK --, 19 @ 05:31  Trop 0.19, CKMB --, CK --, 19 @ 19:40        RADIOLOGY & ADDITIONAL TESTS:    < from: ERCP (07.15.19 @ 14:45) >  Impressions:    Successful ERCP with CBD stent placement.    Antral polyp.    Gastric body erosions.    2 cholidocholithiasis- not removed.     < end of copied text >    Imaging or report Personally Reviewed:  [ ] YES  [ ] NO    Medications:  Standing  aspirin  chewable 81 milliGRAM(s) Oral daily  atorvastatin 40 milliGRAM(s) Oral at bedtime  chlorhexidine 4% Liquid 1 Application(s) Topical every 12 hours  clopidogrel Tablet 75 milliGRAM(s) Oral daily  docusate sodium 100 milliGRAM(s) Oral two times a day  fluticasone propionate 50 MICROgram(s)/spray Nasal Spray 1 Spray(s) Both Nostrils daily  heparin  Injectable 5000 Unit(s) SubCutaneous every 8 hours  indomethacin Suppository 100 milliGRAM(s) Rectal once  magnesium oxide 400 milliGRAM(s) Oral once  norepinephrine Infusion 0.05 MICROgram(s)/kG/Min IV Continuous <Continuous>  pantoprazole  Injectable 40 milliGRAM(s) IV Push daily  piperacillin/tazobactam IVPB.. 2.25 Gram(s) IV Intermittent every 12 hours  senna 2 Tablet(s) Oral at bedtime    PRN Meds  acetaminophen   Tablet .. 650 milliGRAM(s) Oral every 6 hours PRN  diphenhydrAMINE 50 milliGRAM(s) Oral every 8 hours PRN  morphine  - Injectable 2 milliGRAM(s) IV Push every 4 hours PRN  ondansetron Injectable 8 milliGRAM(s) IV Push four times a day PRN      Case discussed with resident    Care discussed with pt/family MAMTA FERNANDO  68y Male    CHIEF COMPLAINT:    Patient is a 68y old  Male who presents with a chief complaint of Abdominal pain (15 Jul 2019 12:44)      INTERVAL HPI/OVERNIGHT EVENTS:    Patient seen and examined. S/P ERCP. No abdominal pain. No N/V. No fever. No cp    ROS: All other systems are negative.    Vital Signs:    T(F): 98.9 (07-15-19 @ 15:02), Max: 98.9 (07-15-19 @ 00:00)  HR: 114 (07-15-19 @ 17:03) (66 - 130)  BP: 100/59 (07-15-19 @ 17:03) (74/46 - 122/64)  RR: 16 (07-15-19 @ 17:03) (12 - 28)  SpO2: 99% (07-15-19 @ 17:03) (95% - 100%)  I&O's Summary    2019 07:01  -  15 Jul 2019 07:00  --------------------------------------------------------  IN: 1316 mL / OUT: 0 mL / NET: 1316 mL      Daily Height in cm: 165.1 (15 Jul 2019 15:33)    Daily Weight in k.7 (15 Jul 2019 06:00)  CAPILLARY BLOOD GLUCOSE      POCT Blood Glucose.: 191 mg/dL (15 Jul 2019 15:46)  POCT Blood Glucose.: 62 mg/dL (15 Jul 2019 15:14)  POCT Blood Glucose.: 137 mg/dL (15 Jul 2019 12:29)  POCT Blood Glucose.: 57 mg/dL (15 Jul 2019 11:41)  POCT Blood Glucose.: 84 mg/dL (15 Jul 2019 05:44)  POCT Blood Glucose.: 96 mg/dL (2019 23:06)      PHYSICAL EXAM:    GENERAL:  NAD  SKIN: No rashes or lesions  HENT: Atraumatic Normocephalic. PERRL. Moist membranes.  NECK: Supple, No JVD. No lymphadenopathy.  PULMONARY: CTA B/L. No wheezing. No rales  CVS: Normal S1, S2. Rate and Rhythm are regular. No murmurs.  ABDOMEN/GI: Soft, Nontender, Nondistended; BS present  EXTREMITIES: Peripheral pulses intact. No edema B/L LE.  NEUROLOGIC:  No motor or sensory deficit.  PSYCH: Alert & oriented x 3    Consultant(s) Notes Reviewed:  [x ] YES  [ ] NO  Care Discussed with Consultants/Other Providers [ x] YES  [ ] NO    EKG reviewed  Telemetry reviewed    LABS:                        9.3    20.67 )-----------( 90       ( 2019 21:35 )             28.4         138  |  94<L>  |  52<H>  ----------------------------<  103<H>  4.6   |  23  |  7.6<HH>    Ca    8.1<L>      2019 21:35  Mg     1.5         TPro  6.2  /  Alb  3.5  /  TBili  3.3<H>  /  DBili  x   /  AST  174<H>  /  ALT  218<H>  /  AlkPhos  124<H>      PT/INR - ( 2019 19:40 )   PT: 11.40 sec;   INR: 0.99 ratio         PTT - ( 2019 19:40 )  PTT:30.0 sec    Trop 0.21, CKMB --, CK --, 07-15-19 @ 11:00  Trop 0.16, CKMB --, CK --, 19 @ 05:31  Trop 0.19, CKMB --, CK --, 19 @ 19:40        RADIOLOGY & ADDITIONAL TESTS:    < from: ERCP (07.15.19 @ 14:45) >  Impressions:    Successful ERCP with CBD stent placement.    Antral polyp.    Gastric body erosions.    2 cholidocholithiasis- not removed.     < end of copied text >    Imaging or report Personally Reviewed:  [ ] YES  [ ] NO    Medications:  Standing  aspirin  chewable 81 milliGRAM(s) Oral daily  atorvastatin 40 milliGRAM(s) Oral at bedtime  chlorhexidine 4% Liquid 1 Application(s) Topical every 12 hours  clopidogrel Tablet 75 milliGRAM(s) Oral daily  docusate sodium 100 milliGRAM(s) Oral two times a day  fluticasone propionate 50 MICROgram(s)/spray Nasal Spray 1 Spray(s) Both Nostrils daily  heparin  Injectable 5000 Unit(s) SubCutaneous every 8 hours  indomethacin Suppository 100 milliGRAM(s) Rectal once  magnesium oxide 400 milliGRAM(s) Oral once  norepinephrine Infusion 0.05 MICROgram(s)/kG/Min IV Continuous <Continuous>  pantoprazole  Injectable 40 milliGRAM(s) IV Push daily  piperacillin/tazobactam IVPB.. 2.25 Gram(s) IV Intermittent every 12 hours  senna 2 Tablet(s) Oral at bedtime    PRN Meds  acetaminophen   Tablet .. 650 milliGRAM(s) Oral every 6 hours PRN  diphenhydrAMINE 50 milliGRAM(s) Oral every 8 hours PRN  morphine  - Injectable 2 milliGRAM(s) IV Push every 4 hours PRN  ondansetron Injectable 8 milliGRAM(s) IV Push four times a day PRN      Case discussed with resident    Care discussed with pt/family

## 2019-07-15 NOTE — PRE-ANESTHESIA EVALUATION ADULT - NSANTHOSAYNRD_GEN_A_CORE
No. PAMELA screening performed.  STOP BANG Legend: 0-2 = LOW Risk; 3-4 = INTERMEDIATE Risk; 5-8 = HIGH Risk

## 2019-07-15 NOTE — PROGRESS NOTE ADULT - ASSESSMENT
IMPRESSION:    Sepsis - ascending cholangitis  Choledocholithiasis   ESRD on HD   HFrEF   NSTEMI  Hx of CAD  HO DM2    PLAN:    CNS: avoid sedatives     HEENT: Oral care    PULMONARY:  HOB @ 45 degrees    CARDIOVASCULAR: Check EKG, Cardiac enzymes    GI: GI prophylaxis. NPO, PPI    RENAL:  Follow up lytes.  Correct as needed, Nephrology     INFECTIOUS DISEASE: Follow up cultures, DC Meropenem, Conty Zosyn     HEMATOLOGICAL:  DVT prophylaxis.    ENDOCRINE:  Follow up FS.  Insulin protocol if needed.    MUSCULOSKELETAL: Bed rest    Femoral TLC - post procedure DC   No Bailey  FUll code   MICU monitoring             69 yo M with above PMH is admitted to ICU for the choledocholithiasis     # Choledocholithiasis   - CBD dilation on US abdomen and possible stone on CT scan   - Consider MRCP/ ERCP  - GI consult   - Rocephin and Flagyl  - NPO for now  - Protonix IV    # Elevated Troponin  - PT is ESRD on HD  - No EKG changes or chest pain  - Trend    # HTN  - Hold losartan, Metoprolol and Imdur for now  - Resume tomorrow if BP is high    # ESRD on HD (T/ Th/ S)  - Nephrology consult     # CAD s/p 3 stents   - C/w home meds  - ASA and Plavix    # DM  - Check FS and start Insulin if FS >180     # DVT ppx  - Heparin

## 2019-07-15 NOTE — PROGRESS NOTE ADULT - ASSESSMENT
67 yo M with PMH of DM, ESRD on HD (T,Th, S), HTN, DLD, CAD s/p PCI  comes to the ED with the c/o acute onset of RUQ/ epigastric abdominal pain for one day associated with nausea and vomiting. PT states that the pain started today afternoon after the dialysis, colicky in nature, non radiating,  and constant. He had 3-4 episode of  vomiting.     1.	Choledocholithiasis / Cholangitis  2.	Sepsis due to # 1  3.	ESRD on HD  4.	CAD / HTN / DL  5.	Lactic acidosis         PLAN:    ·	S/P ERCP and CBD stent placement  ·	As per GI watch for pain, fever and hypotension. If the above symptoms are not witnessed in one hr start him on clear liquids and advance him to low fat diet in AM.  ·	Monitor LFT'S  ·	Cont IV Zosyn  ·	Pain control  ·	Cont ASA, Plavix and his other meds.

## 2019-07-15 NOTE — PROGRESS NOTE ADULT - ASSESSMENT
69 yo M with PMHx of ESRD on HD is admitted to ICU for the choledocholithiasis    Plan:   -Follow-up ERCP   -HSQ, ASA, Plavix   -Abx zosyn  -Surgery will continue to follow

## 2019-07-16 DIAGNOSIS — Z71.89 OTHER SPECIFIED COUNSELING: ICD-10-CM

## 2019-07-16 PROBLEM — N18.6 END STAGE RENAL DISEASE: Chronic | Status: ACTIVE | Noted: 2018-12-13

## 2019-07-16 LAB
ALBUMIN SERPL ELPH-MCNC: 3 G/DL — LOW (ref 3.5–5.2)
ALP SERPL-CCNC: 116 U/L — HIGH (ref 30–115)
ALT FLD-CCNC: 137 U/L — HIGH (ref 0–41)
ANION GAP SERPL CALC-SCNC: 19 MMOL/L — HIGH (ref 7–14)
AST SERPL-CCNC: 61 U/L — HIGH (ref 0–41)
BILIRUB DIRECT SERPL-MCNC: 1.9 MG/DL — HIGH (ref 0–0.2)
BILIRUB INDIRECT FLD-MCNC: 0.2 MG/DL — SIGNIFICANT CHANGE UP (ref 0.2–1.2)
BILIRUB SERPL-MCNC: 2.1 MG/DL — HIGH (ref 0.2–1.2)
BUN SERPL-MCNC: 69 MG/DL — CRITICAL HIGH (ref 10–20)
CALCIUM SERPL-MCNC: 7.3 MG/DL — LOW (ref 8.4–10.5)
CALCIUM SERPL-MCNC: 8.4 MG/DL — LOW (ref 8.5–10.1)
CHLORIDE SERPL-SCNC: 94 MMOL/L — LOW (ref 98–110)
CO2 SERPL-SCNC: 25 MMOL/L — SIGNIFICANT CHANGE UP (ref 17–32)
CREAT SERPL-MCNC: 9.5 MG/DL — CRITICAL HIGH (ref 0.7–1.5)
GLUCOSE BLDC GLUCOMTR-MCNC: 123 MG/DL — HIGH (ref 70–99)
GLUCOSE SERPL-MCNC: 91 MG/DL — SIGNIFICANT CHANGE UP (ref 70–99)
HCT VFR BLD CALC: 28.5 % — LOW (ref 42–52)
HGB BLD-MCNC: 9.2 G/DL — LOW (ref 14–18)
IRON SATN MFR SERPL: 71 % — HIGH (ref 15–50)
IRON SATN MFR SERPL: 95 UG/DL — SIGNIFICANT CHANGE UP (ref 35–150)
MAGNESIUM SERPL-MCNC: 2.6 MG/DL — HIGH (ref 1.8–2.4)
MCHC RBC-ENTMCNC: 30.7 PG — SIGNIFICANT CHANGE UP (ref 27–31)
MCHC RBC-ENTMCNC: 32.3 G/DL — SIGNIFICANT CHANGE UP (ref 32–37)
MCV RBC AUTO: 95 FL — HIGH (ref 80–94)
NRBC # BLD: 0 /100 WBCS — SIGNIFICANT CHANGE UP (ref 0–0)
PHOSPHATE SERPL-MCNC: 5.8 MG/DL — HIGH (ref 2.1–4.9)
PLATELET # BLD AUTO: 98 K/UL — LOW (ref 130–400)
POTASSIUM SERPL-MCNC: 4.1 MMOL/L — SIGNIFICANT CHANGE UP (ref 3.5–5)
POTASSIUM SERPL-SCNC: 4.1 MMOL/L — SIGNIFICANT CHANGE UP (ref 3.5–5)
PROT SERPL-MCNC: 5.7 G/DL — LOW (ref 6–8)
PTH-INTACT FLD-MCNC: 430 PG/ML — HIGH (ref 15–65)
RBC # BLD: 3 M/UL — LOW (ref 4.7–6.1)
RBC # FLD: 13.8 % — SIGNIFICANT CHANGE UP (ref 11.5–14.5)
SODIUM SERPL-SCNC: 138 MMOL/L — SIGNIFICANT CHANGE UP (ref 135–146)
TIBC SERPL-MCNC: 134 UG/DL — LOW (ref 220–430)
TRANSFERRIN SERPL-MCNC: 111 MG/DL — LOW (ref 200–360)
TROPONIN T SERPL-MCNC: 0.18 NG/ML — CRITICAL HIGH
UIBC SERPL-MCNC: 39 UG/DL — LOW (ref 110–370)
WBC # BLD: 17.11 K/UL — HIGH (ref 4.8–10.8)
WBC # FLD AUTO: 17.11 K/UL — HIGH (ref 4.8–10.8)

## 2019-07-16 PROCEDURE — 93010 ELECTROCARDIOGRAM REPORT: CPT

## 2019-07-16 PROCEDURE — 71045 X-RAY EXAM CHEST 1 VIEW: CPT | Mod: 26

## 2019-07-16 PROCEDURE — 99233 SBSQ HOSP IP/OBS HIGH 50: CPT

## 2019-07-16 RX ORDER — APIXABAN 2.5 MG/1
2.5 TABLET, FILM COATED ORAL
Refills: 0 | Status: DISCONTINUED | OUTPATIENT
Start: 2019-07-16 | End: 2019-07-18

## 2019-07-16 RX ADMIN — HEPARIN SODIUM 5000 UNIT(S): 5000 INJECTION INTRAVENOUS; SUBCUTANEOUS at 05:25

## 2019-07-16 RX ADMIN — PIPERACILLIN AND TAZOBACTAM 200 GRAM(S): 4; .5 INJECTION, POWDER, LYOPHILIZED, FOR SOLUTION INTRAVENOUS at 05:25

## 2019-07-16 RX ADMIN — Medication 100 MILLIGRAM(S): at 05:25

## 2019-07-16 RX ADMIN — CHLORHEXIDINE GLUCONATE 1 APPLICATION(S): 213 SOLUTION TOPICAL at 05:25

## 2019-07-16 RX ADMIN — Medication 100 MILLIGRAM(S): at 18:01

## 2019-07-16 RX ADMIN — APIXABAN 2.5 MILLIGRAM(S): 2.5 TABLET, FILM COATED ORAL at 18:01

## 2019-07-16 RX ADMIN — ATORVASTATIN CALCIUM 40 MILLIGRAM(S): 80 TABLET, FILM COATED ORAL at 21:52

## 2019-07-16 RX ADMIN — PIPERACILLIN AND TAZOBACTAM 200 GRAM(S): 4; .5 INJECTION, POWDER, LYOPHILIZED, FOR SOLUTION INTRAVENOUS at 18:02

## 2019-07-16 RX ADMIN — SENNA PLUS 2 TABLET(S): 8.6 TABLET ORAL at 21:52

## 2019-07-16 NOTE — PROGRESS NOTE ADULT - SUBJECTIVE AND OBJECTIVE BOX
Nephrology progress note    Patient is seen and examined, events over the last 24 h noted .    Allergies:  No Known Allergies    Hospital Medications:   MEDICATIONS  (STANDING):  aspirin  chewable 81 milliGRAM(s) Oral daily  atorvastatin 40 milliGRAM(s) Oral at bedtime  chlorhexidine 4% Liquid 1 Application(s) Topical every 12 hours  docusate sodium 100 milliGRAM(s) Oral two times a day  fluticasone propionate 50 MICROgram(s)/spray Nasal Spray 1 Spray(s) Both Nostrils daily  heparin  Injectable 5000 Unit(s) SubCutaneous every 8 hours  indomethacin Suppository 100 milliGRAM(s) Rectal once  magnesium oxide 400 milliGRAM(s) Oral once  pantoprazole  Injectable 40 milliGRAM(s) IV Push daily  piperacillin/tazobactam IVPB.. 2.25 Gram(s) IV Intermittent every 12 hours  senna 2 Tablet(s) Oral at bedtime        VITALS:  T(F): 96.1 (07-16-19 @ 07:58), Max: 98.9 (07-15-19 @ 15:02)  HR: 84 (07-16-19 @ 09:00)  BP: 96/59 (07-16-19 @ 09:00)  RR: 16 (07-16-19 @ 09:00)  SpO2: 97% (07-16-19 @ 09:00)  Wt(kg): --    07-14 @ 07:01  -  07-15 @ 07:00  --------------------------------------------------------  IN: 1316 mL / OUT: 0 mL / NET: 1316 mL    07-15 @ 07:01  -  07-16 @ 07:00  --------------------------------------------------------  IN: 590 mL / OUT: 0 mL / NET: 590 mL      Height (cm): 165.1 (07-15 @ 15:33)  Weight (kg): 82.5 (07-15 @ 15:33)  BMI (kg/m2): 30.3 (07-15 @ 15:33)  BSA (m2): 1.9 (07-15 @ 15:33)    PHYSICAL EXAM:  Constitutional: NAD  HEENT: anicteric sclera, oropharynx clear, MMM  Neck: No JVD  Respiratory: CTAB, no wheezes, rales or rhonchi  Cardiovascular: S1, S2, RRR  Gastrointestinal: BS+, soft, NT/ND  Extremities: No cyanosis or clubbing. No peripheral edema  :  No elder.   Skin: No rashes    LABS:  07-15    138  |  94<L>  |  69<HH>  ----------------------------<  91  4.1   |  25  |  9.5<HH>    Ca    8.4<L>      15 Jul 2019 23:30  Phos  5.8     07-16  Mg     2.6     07-16    TPro  5.7<L>  /  Alb  3.0<L>  /  TBili  2.1<H>  /  DBili  1.9<H>  /  AST  61<H>  /  ALT  137<H>  /  AlkPhos  116<H>  07-15                          9.2    17.11 )-----------( 98       ( 15 Jul 2019 23:30 )             28.5       Urine Studies:      RADIOLOGY & ADDITIONAL STUDIES: Nephrology progress note    Patient is seen and examined, events over the last 24 h noted .  Was hypotensive earlier this morning     Allergies:  No Known Allergies    Hospital Medications:   MEDICATIONS  (STANDING):  aspirin  chewable 81 milliGRAM(s) Oral daily  atorvastatin 40 milliGRAM(s) Oral at bedtime  docusate sodium 100 milliGRAM(s) Oral two times a day  fluticasone propionate 50 MICROgram(s)/spray Nasal Spray 1 Spray(s) Both Nostrils daily  heparin  Injectable 5000 Unit(s) SubCutaneous every 8 hours  indomethacin Suppository 100 milliGRAM(s) Rectal once  magnesium oxide 400 milliGRAM(s) Oral once  pantoprazole  Injectable 40 milliGRAM(s) IV Push daily  piperacillin/tazobactam IVPB.. 2.25 Gram(s) IV Intermittent every 12 hours  senna 2 Tablet(s) Oral at bedtime        VITALS:  T(F): 96.1 (07-16-19 @ 07:58), Max: 98.9 (07-15-19 @ 15:02)  HR: 84 (07-16-19 @ 09:00)  BP: 96/59 (07-16-19 @ 09:00)  RR: 16 (07-16-19 @ 09:00)  SpO2: 97% (07-16-19 @ 09:00)      07-14 @ 07:01  -  07-15 @ 07:00  --------------------------------------------------------  IN: 1316 mL / OUT: 0 mL / NET: 1316 mL    07-15 @ 07:01  -  07-16 @ 07:00  --------------------------------------------------------  IN: 590 mL / OUT: 0 mL / NET: 590 mL      Height (cm): 165.1 (07-15 @ 15:33)  Weight (kg): 82.5 (07-15 @ 15:33)  BMI (kg/m2): 30.3 (07-15 @ 15:33)  BSA (m2): 1.9 (07-15 @ 15:33)    PHYSICAL EXAM:  Constitutional: NAD  HEENT: anicteric sclera, oropharynx clear, MMM  Neck: No JVD  Respiratory: CTAB, no wheezes, rales or rhonchi  Cardiovascular: S1, S2, RRR  Gastrointestinal: BS+, soft, NT/ND  Extremities: No cyanosis or clubbing. No peripheral edema  :  No elder.   Skin: No rashes    LABS:  07-15    138  |  94<L>  |  69<HH>  ----------------------------<  91  4.1   |  25  |  9.5<HH>    Ca    8.4<L>      15 Jul 2019 23:30  Phos  5.8     07-16  Mg     2.6     07-16    TPro  5.7<L>  /  Alb  3.0<L>  /  TBili  2.1<H>  /  DBili  1.9<H>  /  AST  61<H>  /  ALT  137<H>  /  AlkPhos  116<H>  07-15                          9.2    17.11 )-----------( 98       ( 15 Jul 2019 23:30 )             28.5       Urine Studies:      RADIOLOGY & ADDITIONAL STUDIES:

## 2019-07-16 NOTE — PROGRESS NOTE ADULT - ASSESSMENT
IMPRESSION:    Sepsis - ascending cholangitis  Choledocholithiasis   ESRD on HD   HFrEF   NSTEMI  Hx of CAD  HO DM2  New onset afib    PLAN:    CNS: avoid sedatives     HEENT: Oral care    PULMONARY:  HOB @ 45 degrees    CARDIOVASCULAR: Check EKG, cardiology eval, Echo, CHADSVASC 2, will need clearance from gi for full ac, already on dapt, i = o    GI: GI prophylaxis. advance diet as tolerated if okay with gi     RENAL:  Follow up lytes.  Correct as needed, HD per renal - would aim for minimal -ve      INFECTIOUS DISEASE: Follow up cultures, Cont Zosyn     HEMATOLOGICAL:  DVT prophylaxis.    ENDOCRINE:  Follow up FS.  Insulin protocol if needed.    MUSCULOSKELETAL: Bed rest    peripherals   No Bailey  FUll code   MICU monitoring - possible downgrade in pm if BP better             67 yo M with above PMH is admitted to ICU for the choledocholithiasis     # Choledocholithiasis   - CBD dilation on US abdomen and possible stone on CT scan   - Consider MRCP/ ERCP  - GI consult   - Rocephin and Flagyl  - NPO for now  - Protonix IV    # Elevated Troponin  - PT is ESRD on HD  - No EKG changes or chest pain  - Trend    # HTN  - Hold losartan, Metoprolol and Imdur for now  - Resume tomorrow if BP is high    # ESRD on HD (T/ Th/ S)  - Nephrology consult     # CAD s/p 3 stents   - C/w home meds  - ASA and Plavix    # DM  - Check FS and start Insulin if FS >180     # DVT ppx  - Heparin

## 2019-07-16 NOTE — PROGRESS NOTE ADULT - ASSESSMENT
67 yo M with PMH of DM, ESRD on HD (T,Th, S), HTN, DLD, CAD s/p PCI  comes to the ED with the c/o acute onset of RUQ/ epigastric abdominal pain for one day associated with nausea and vomiting. PT states that the pain started today afternoon after the dialysis, colicky in nature, non radiating,  and constant. He had 3-4 episode of  vomiting.     1.	Choledocholithiasis / Cholangitis  2.	Sepsis due to # 1  3.	ESRD on HD  4.	CAD / HTN / DL  5.	Lactic acidosis  6.	NSTEMI type 2         PLAN:    ·	Was hypotensive earlier today. BP stable now  ·	S/P ERCP and CBD stent placement yesterday. LFT'S trending down  ·	Can advance to low fat diet today  ·	GI F/U.   ·	Monitor LFT'S  ·	Cont IV Zosyn  ·	Pain control  ·	Cont ASA, Plavix and his other meds.  ·	Elevated Troponin due to sepsis and ESRD

## 2019-07-16 NOTE — PROGRESS NOTE ADULT - ASSESSMENT
67 yo M with above PMH is admitted to ICU for the choledocholithiasis     # Choledocholithiasis   - CBD dilation on US abdomen and possible stone on CT scan   - High WBC, lactate and fevers concerning for ascending cholangitis  - Lactate 6.3 -> 2.8 -> 2.6  - GI consult : NPO for ERCP  - reassess need for central line post-ERCP  - D/C Meropenem, will c/w Zosyn  - Protonix IV    # Elevated Troponin  - PT is ESRD on HD ( Tu, Thu, Sat)  - Due for HD tommorow  - " Mid sternal chest pain" - palpation reproduces sx's on exam. No EKG changes. CXR 7/15 am was negative.  - Troponin .16-> .21  will continue to trend    # HTN  - Hold losartan, Metoprolol and Imdur for now  - Resume tomorrow if BP is high    # ESRD on HD (T/ Th/ S)  - Nephrology    - corrected Ca at goal, low Mg level noted, replenish orally. check Ph, iPTH  - Hb noted, check iron stores, no need for GAURANG at the moment.    # CAD s/p 3 stents   - C/w home meds  - ASA and Plavix    # DM  - Check FS and start Insulin if FS >180   - got 1 amp D50% today for symptomatic hypoglycemia    # DVT ppx  - Heparin 67 yo M with PMH of DM, ESRD on HD (T,Th, S), HTN, DLD, CAD s/p PCI  comes to the ED with the c/o acute onset of RUQ/ epigastric abdominal pain for one day associated with nausea and vomiting. PT states that the pain started today afternoon after the dialysis, colicky in nature, non radiating,  and constant. He had 3-4 episode of  vomiting    # Choledocholithiasis   - CBD dilation on US abdomen and possible stone on CT scan   - High WBC, lactate and fevers concerning for ascending cholangitis  - Lactate 6.3 -> 2.8 -> 2.6,  - monitor LFTs, Protonix IV  - GI: ERCP: w/CBD stent placement for decompression with good response  - Patient will need repeat ERCP in 6 weeks for stent removal and stone removal- at that time he will need to be off Plavix for one week  - Will need follow up on discharge with Dr Nowak [phone]  - no need for central line post-ERCP, removed overnight  - will c/w Zosyn, consider downgrade if pressures improve    # New onset - A -fib  - Cardio consult appreciated  - will renal dose anti-coagulation  - start on Eliquis 2.5 mg BID, c/w ASA  - follow up Transthoracic Echo    # Elevated Troponin  - PT is ESRD on HD ( Tu, Thu, Sat)  - Due for HD tommorow  - " Mid sternal chest pain" - resolved  - Troponin .21 -> .18  will continue to trend    # HTN  - Hold losartan, Metoprolol and Imdur for now  - low BP in the am, now stable  - consider resume tomorrow if BP is high    # ESRD on HD (T/ Th/ S)  - Nephrology  - for HD today  - corrected Ca at goal, follow Mg, Ph, iPTH, BMP  - Hb noted, check iron stores  - no need for GAURANG at the moment.    # CAD s/p 3 stents   - C/w home meds  - ASA     # DM  - Check FS and start Insulin if FS >180     - Diet  - if patient not febrile, hypotensive can advance low fat diet today    # DVT ppx  - Heparin

## 2019-07-16 NOTE — PROGRESS NOTE ADULT - SUBJECTIVE AND OBJECTIVE BOX
MAMTA FERANNDO  68y Male    CHIEF COMPLAINT:    Patient is a 68y old  Male who presents with a chief complaint of Abdominal pain (2019 09:47)      INTERVAL HPI/OVERNIGHT EVENTS:    Patient seen and examined. No abdominal pain. No N/V. No fever. Hypotensive.    ROS: All other systems are negative.    Vital Signs:    T(F): 96.1 (19 @ 07:58), Max: 98.9 (07-15-19 @ 15:02)  HR: 84 (19 @ 10:00) (78 - 130)  BP: 112/59 (19 @ 10:00) (81/51 - 112/59)  RR: 22 (19 @ 10:00) (12 - 35)  SpO2: 98% (19 @ 10:00) (95% - 100%)  I&O's Summary    15 Jul 2019 07:01  -  2019 07:00  --------------------------------------------------------  IN: 590 mL / OUT: 0 mL / NET: 590 mL      Daily Height in cm: 165.1 (15 Jul 2019 15:33)    Daily Weight in k.6 (2019 04:00)  CAPILLARY BLOOD GLUCOSE      POCT Blood Glucose.: 123 mg/dL (2019 07:59)  POCT Blood Glucose.: 85 mg/dL (15 Jul 2019 22:26)  POCT Blood Glucose.: 85 mg/dL (15 Jul 2019 18:05)  POCT Blood Glucose.: 191 mg/dL (15 Jul 2019 15:46)  POCT Blood Glucose.: 62 mg/dL (15 Jul 2019 15:14)  POCT Blood Glucose.: 137 mg/dL (15 Jul 2019 12:29)  POCT Blood Glucose.: 57 mg/dL (15 Jul 2019 11:41)      PHYSICAL EXAM:    GENERAL:  NAD  SKIN: No rashes or lesions  HENT: Atraumatic Normocephalic. PERRL. Moist membranes.  NECK: Supple, No JVD. No lymphadenopathy.  PULMONARY: CTA B/L. No wheezing. No rales  CVS: Normal S1, S2. Rate and Rhythm are regular. No murmurs.  ABDOMEN/GI: Soft, Nontender, Nondistended; BS present  EXTREMITIES: Peripheral pulses intact. No edema B/L LE.  NEUROLOGIC:  No motor or sensory deficit.  PSYCH: Alert & oriented x 3    Consultant(s) Notes Reviewed:  [x ] YES  [ ] NO  Care Discussed with Consultants/Other Providers [ x] YES  [ ] NO    EKG reviewed  Telemetry reviewed    LABS:                        9.2    17.11 )-----------( 98       ( 15 Jul 2019 23:30 )             28.5     07-15    138  |  94<L>  |  69<HH>  ----------------------------<  91  4.1   |  25  |  9.5<HH>    Ca    8.4<L>      15 Jul 2019 23:30  Phos  5.8     07-  Mg     2.6     07-16    TPro  5.7<L>  /  Alb  3.0<L>  /  TBili  2.1<H>  /  DBili  1.9<H>  /  AST  61<H>  /  ALT  137<H>  /  AlkPhos  116<H>  07-15        Trop 0.18, CKMB --, CK --, 07-15-19 @ 20:00  Trop 0.21, CKMB --, CK --, 07-15-19 @ 11:00  Trop 0.16, CKMB --, CK --, 19 @ 05:31  Trop 0.19, CKMB --, CK --, 19 @ 19:40        RADIOLOGY & ADDITIONAL TESTS:      Imaging or report Personally Reviewed:  [ ] YES  [ ] NO    Medications:  Standing  aspirin  chewable 81 milliGRAM(s) Oral daily  atorvastatin 40 milliGRAM(s) Oral at bedtime  chlorhexidine 4% Liquid 1 Application(s) Topical every 12 hours  docusate sodium 100 milliGRAM(s) Oral two times a day  fluticasone propionate 50 MICROgram(s)/spray Nasal Spray 1 Spray(s) Both Nostrils daily  heparin  Injectable 5000 Unit(s) SubCutaneous every 8 hours  indomethacin Suppository 100 milliGRAM(s) Rectal once  magnesium oxide 400 milliGRAM(s) Oral once  pantoprazole  Injectable 40 milliGRAM(s) IV Push daily  piperacillin/tazobactam IVPB.. 2.25 Gram(s) IV Intermittent every 12 hours  senna 2 Tablet(s) Oral at bedtime    PRN Meds  acetaminophen   Tablet .. 650 milliGRAM(s) Oral every 6 hours PRN  diphenhydrAMINE 50 milliGRAM(s) Oral every 8 hours PRN  morphine  - Injectable 2 milliGRAM(s) IV Push every 4 hours PRN  ondansetron Injectable 8 milliGRAM(s) IV Push four times a day PRN      Case discussed with resident    Care discussed with pt/family

## 2019-07-16 NOTE — PROGRESS NOTE ADULT - SUBJECTIVE AND OBJECTIVE BOX
SUBJECTIVE:    Patient is a 68y old Male who presents with a chief complaint of Abdominal pain (15 Jul 2019 17:31)    Currently admitted to medicine with the primary diagnosis of Choledocholithiasis     Today is hospital day 2d. This morning he is resting comfortably in bed and reports no new issues or overnight events.     PAST MEDICAL & SURGICAL HISTORY  Anemia  Heart failure  HLD (hyperlipidemia)  HTN (hypertension)  ESRD on dialysis: T/ TH/ S  H/O right coronary artery stent placement: stent x3    SOCIAL HISTORY:  Negative for smoking/alcohol/drug use.     ALLERGIES:  No Known Allergies    MEDICATIONS:  STANDING MEDICATIONS  aspirin  chewable 81 milliGRAM(s) Oral daily  atorvastatin 40 milliGRAM(s) Oral at bedtime  chlorhexidine 4% Liquid 1 Application(s) Topical every 12 hours  clopidogrel Tablet 75 milliGRAM(s) Oral daily  docusate sodium 100 milliGRAM(s) Oral two times a day  fluticasone propionate 50 MICROgram(s)/spray Nasal Spray 1 Spray(s) Both Nostrils daily  heparin  Injectable 5000 Unit(s) SubCutaneous every 8 hours  indomethacin Suppository 100 milliGRAM(s) Rectal once  magnesium oxide 400 milliGRAM(s) Oral once  norepinephrine Infusion 0.05 MICROgram(s)/kG/Min IV Continuous <Continuous>  pantoprazole  Injectable 40 milliGRAM(s) IV Push daily  piperacillin/tazobactam IVPB.. 2.25 Gram(s) IV Intermittent every 12 hours  senna 2 Tablet(s) Oral at bedtime    PRN MEDICATIONS  acetaminophen   Tablet .. 650 milliGRAM(s) Oral every 6 hours PRN  diphenhydrAMINE 50 milliGRAM(s) Oral every 8 hours PRN  morphine  - Injectable 2 milliGRAM(s) IV Push every 4 hours PRN  ondansetron Injectable 8 milliGRAM(s) IV Push four times a day PRN    VITALS:   T(F): 96  HR: 86  BP: 94/53  RR: 19  SpO2: 99%    LABS:                        9.2    17.11 )-----------( 98       ( 15 Jul 2019 23:30 )             28.5     07-15    138  |  94<L>  |  69<HH>  ----------------------------<  91  4.1   |  25  |  9.5<HH>    Ca    8.4<L>      15 Jul 2019 23:30  Mg     1.5     07-14    TPro  5.7<L>  /  Alb  3.0<L>  /  TBili  2.1<H>  /  DBili  1.9<H>  /  AST  61<H>  /  ALT  137<H>  /  AlkPhos  116<H>  07-15          Troponin T, Serum: 0.18 ng/mL <HH> (07-15-19 @ 20:00)  Troponin T, Serum: 0.21 ng/mL <HH> (07-15-19 @ 11:00)      CARDIAC MARKERS ( 15 Jul 2019 20:00 )  x     / 0.18 ng/mL / x     / x     / x      CARDIAC MARKERS ( 15 Jul 2019 11:00 )  x     / 0.21 ng/mL / x     / x     / x          RADIOLOGY:    PHYSICAL EXAM:  GEN: No acute distress  LUNGS: Clear to auscultation bilaterally   HEART: S1/S2 present. RRR.   ABD: Soft, non-tender, non-distended. Bowel sounds present  EXT: NC/NC/NE/2+PP/ZIMMER  NEURO: AAOX3 SUBJECTIVE:    Today is hospital day 2d. Chest and belly pain resolved, still has SOB but 98 % on room air. Found to be in new- onset A-fib this am. For HD today.    PAST MEDICAL & SURGICAL HISTORY  Anemia  Heart failure  HLD (hyperlipidemia)  HTN (hypertension)  ESRD on dialysis: T/ TH/ S  H/O right coronary artery stent placement: stent x3    SOCIAL HISTORY:  Negative for smoking/alcohol/drug use.     ALLERGIES:  No Known Allergies    MEDICATIONS:  STANDING MEDICATIONS  apixaban 2.5 milliGRAM(s) Oral two times a day  aspirin  chewable 81 milliGRAM(s) Oral daily  atorvastatin 40 milliGRAM(s) Oral at bedtime  chlorhexidine 4% Liquid 1 Application(s) Topical every 12 hours  docusate sodium 100 milliGRAM(s) Oral two times a day  fluticasone propionate 50 MICROgram(s)/spray Nasal Spray 1 Spray(s) Both Nostrils daily  indomethacin Suppository 100 milliGRAM(s) Rectal once  magnesium oxide 400 milliGRAM(s) Oral once  pantoprazole  Injectable 40 milliGRAM(s) IV Push daily  piperacillin/tazobactam IVPB.. 2.25 Gram(s) IV Intermittent every 12 hours  senna 2 Tablet(s) Oral at bedtime    PRN MEDICATIONS  acetaminophen   Tablet .. 650 milliGRAM(s) Oral every 6 hours PRN  diphenhydrAMINE 50 milliGRAM(s) Oral every 8 hours PRN  morphine  - Injectable 2 milliGRAM(s) IV Push every 4 hours PRN  ondansetron Injectable 8 milliGRAM(s) IV Push four times a day PRN    VITALS:   T(F): 96.8  HR: 66  BP: 155/67  RR: 18  SpO2: 97%    LABS:                        9.2    17.11 )-----------( 98       ( 15 Jul 2019 23:30 )             28.5     07-15    138  |  94<L>  |  69<HH>  ----------------------------<  91  4.1   |  25  |  9.5<HH>    Ca    8.4<L>      15 Jul 2019 23:30  Phos  5.8     07-16  Mg     2.6     07-16    TPro  5.7<L>  /  Alb  3.0<L>  /  TBili  2.1<H>  /  DBili  1.9<H>  /  AST  61<H>  /  ALT  137<H>  /  AlkPhos  116<H>  07-15          Troponin T, Serum: 0.18 ng/mL <HH> (07-15-19 @ 20:00)      CARDIAC MARKERS ( 15 Jul 2019 20:00 )  x     / 0.18 ng/mL / x     / x     / x      CARDIAC MARKERS ( 15 Jul 2019 11:00 )  x     / 0.21 ng/mL / x     / x     / x        < from: Xray Chest 1 View- PORTABLE-Urgent (07.16.19 @ 08:14) >  Impression:    No radiographic evidence of acute cardiopulmonary disease.      < end of copied text >      PHYSICAL EXAM:  GEN: No acute distress, lying in bed  LUNGS: Clear to auscultation bilaterally, no wheezes, rales or rhonchi  HEART: Irregularly irregular, not tachycardic  ABD: Soft, non-tender, non-distended. Bowel sounds present  EXT: No LE edema  NEURO: AAOX3    EKG: < from: 12 Lead ECG (07.16.19 @ 07:35) >  Diagnosis Line Atrial fibrillation  Abnormal ECG

## 2019-07-16 NOTE — PROGRESS NOTE ADULT - SUBJECTIVE AND OBJECTIVE BOX
Patient is a 68y old  Male who presents with a chief complaint of Abdominal pain (16 Jul 2019 06:28)    Over Night Events:    s/p ERCP with stent placement   Afebrile   Pain improved     ROS:  See HPI    PHYSICAL EXAM    ICU Vital Signs Last 24 Hrs  T(C): 35.6 (16 Jul 2019 07:58), Max: 37.2 (15 Jul 2019 15:02)  T(F): 96.1 (16 Jul 2019 07:58), Max: 98.9 (15 Jul 2019 15:02)  HR: 84 (16 Jul 2019 09:00) (76 - 130)  BP: 96/59 (16 Jul 2019 09:00) (81/51 - 122/64)  BP(mean): 73 (16 Jul 2019 09:00) (60 - 95)  RR: 16 (16 Jul 2019 09:00) (12 - 35)  SpO2: 97% (16 Jul 2019 09:00) (95% - 100%)      General: AAo x 3   HEENT: GERTRUDE             Lymphatic system: No cervical LN   Lungs: Bilateral BS, reduced basal   Cardiovascular: Regular   Gastrointestinal: Soft, Positive BS  Extremities: No clubbing.  Moves extremities.  Full Range of motion   Skin: Warm, intact  Neurological: No motor or sensory deficit       07-15-19 @ 07:01  -  07-16-19 @ 07:00  --------------------------------------------------------  IN:    IV PiggyBack: 350 mL    Oral Fluid: 240 mL  Total IN: 590 mL    OUT:  Total OUT: 0 mL    Total NET: 590 mL    LABS:                            9.2    17.11 )-----------( 98       ( 15 Jul 2019 23:30 )             28.5                                               07-15    138  |  94<L>  |  69<HH>  ----------------------------<  91  4.1   |  25  |  9.5<HH>    Ca    8.4<L>      15 Jul 2019 23:30  Phos  5.8     07-16  Mg     2.6     07-16    TPro  5.7<L>  /  Alb  3.0<L>  /  TBili  2.1<H>  /  DBili  1.9<H>  /  AST  61<H>  /  ALT  137<H>  /  AlkPhos  116<H>  07-15                                                 CARDIAC MARKERS ( 15 Jul 2019 20:00 )  x     / 0.18 ng/mL / x     / x     / x      CARDIAC MARKERS ( 15 Jul 2019 11:00 )  x     / 0.21 ng/mL / x     / x     / x                                                LIVER FUNCTIONS - ( 15 Jul 2019 23:30 )  Alb: 3.0 g/dL / Pro: 5.7 g/dL / ALK PHOS: 116 U/L / ALT: 137 U/L / AST: 61 U/L / GGT: x                                              MEDICATIONS  (STANDING):  aspirin  chewable 81 milliGRAM(s) Oral daily  atorvastatin 40 milliGRAM(s) Oral at bedtime  chlorhexidine 4% Liquid 1 Application(s) Topical every 12 hours  docusate sodium 100 milliGRAM(s) Oral two times a day  fluticasone propionate 50 MICROgram(s)/spray Nasal Spray 1 Spray(s) Both Nostrils daily  heparin  Injectable 5000 Unit(s) SubCutaneous every 8 hours  indomethacin Suppository 100 milliGRAM(s) Rectal once  magnesium oxide 400 milliGRAM(s) Oral once  norepinephrine Infusion 0.05 MICROgram(s)/kG/Min (3.867 mL/Hr) IV Continuous <Continuous>  pantoprazole  Injectable 40 milliGRAM(s) IV Push daily  piperacillin/tazobactam IVPB.. 2.25 Gram(s) IV Intermittent every 12 hours  senna 2 Tablet(s) Oral at bedtime    MEDICATIONS  (PRN):  acetaminophen   Tablet .. 650 milliGRAM(s) Oral every 6 hours PRN Temp greater or equal to 38C (100.4F), Mild Pain (1 - 3)  diphenhydrAMINE 50 milliGRAM(s) Oral every 8 hours PRN Rash and/or Itching  morphine  - Injectable 2 milliGRAM(s) IV Push every 4 hours PRN Moderate Pain (4 - 6)  ondansetron Injectable 8 milliGRAM(s) IV Push four times a day PRN Nausea and/or Vomiting      Xrays:    Increased vascular markings                                                                                  ECHO

## 2019-07-16 NOTE — PROGRESS NOTE ADULT - ASSESSMENT
1)  ESRD on HD for HD today 3h opti 160 2k UF 1l as tolerated     2)  Acute-onset epigastric abdominal pain, N/V, elevated bilirubin, alk phos, GGT, and rising AST/ALT.  CT scan w/ what appears to be a CBD stone w/ dilated GB.  Of concern is the rising WBC, high fever, and ongoing lactic acidosis, concerning for ascending cholangitis, possible gangrenous GB  sp ERCP yesterday     3 ) corrected Ca at goal, low Mg level noted, replenish orally. check Ph, iPTH    4) Hb noted, check iron stores, no need fo GAURANG at the moment.    5) BP stable, currently off pressors, keep MAP > 65.  will follow

## 2019-07-16 NOTE — CONSULT NOTE ADULT - ATTENDING COMMENTS
pt w/o cp,sob. pt w/ afib new in setting of sepsis, abdominal process. advise monitor, anticoagulation.
above noted abdomen soft tender RUQ RT arm fistula patent labs noted pt fully examined by me and agree with above

## 2019-07-16 NOTE — PROGRESS NOTE ADULT - SUBJECTIVE AND OBJECTIVE BOX
DIAGNOSIS:   HOSPITAL DAY #:    STATUS POST:    POST OPERATIVE DAY #:     Vital Signs Last 24 Hrs  T(C): 37.4 (16 Jul 2019 20:01), Max: 37.4 (16 Jul 2019 20:01)  T(F): 99.3 (16 Jul 2019 20:01), Max: 99.3 (16 Jul 2019 20:01)  HR: 95 (16 Jul 2019 22:00) (66 - 104)  BP: 93/65 (16 Jul 2019 22:00) (82/59 - 155/67)  BP(mean): 77 (16 Jul 2019 22:00) (64 - 85)  RR: 20 (16 Jul 2019 22:00) (16 - 34)  SpO2: 99% (16 Jul 2019 22:00) (97% - 100%)    SUBJECTIVE: Pt seen    Pain: YES  [ ]   NO [ ]   Nausea: [ ] YES [ ] NO           Vomiting: [ ] YES [ ] NO  Flatus: [ ] YES [ ] NO             Bowel Movement: [ ] YES [ ] NO     Void: [ ]YES [ ]No      DARSHANA DRAINAGE: SIGNIFICANT [ ]   NOT SIGNIFICANT [ ]   NOT APPLICABLE [ ]  YES [ ] NO    General Appearance: Appears well, NAD  Neck: Supple  Chest: Equal expansion bilaterally, equal breath sounds  CV: Pulse regular presently  Abdomen: Soft [x ] YES [ ]NO  DISTENDED [ ] YES [x ] NO TENDERNESS [ ]YES [x ]NO  INCISIONS: HEALING WELL [ ] YES  [ ] NO ERYTHEMA [ ] YES [ ] NO DRAINAGE [ ] YES  [ ] NO  Extremities: Grossly symmetric, CALF TENDERNESS [ ] YES  [ ] NO  RT arm fistula patent    LABS:                        9.2    17.11 )-----------( 98       ( 15 Jul 2019 23:30 )             28.5     07-15    138  |  94<L>  |  69<HH>  ----------------------------<  91  4.1   |  25  |  9.5<HH>    Ca    8.4<L>      15 Jul 2019 23:30  Phos  5.8     07-16  Mg     2.6     07-16    TPro  5.7<L>  /  Alb  3.0<L>  /  TBili  2.1<H>  /  DBili  1.9<H>  /  AST  61<H>  /  ALT  137<H>  /  AlkPhos  116<H>  07-15            ASSESSMENT:     GOOD POST OP COURSE [ ]  YES  [ ] NO  CONDITION IMPROVING  []  YES  [ ]  NO          PLAN: discussed case with medical staff    CONTINUE PRESENT MANAGEMENT  [ ] YES  [ ] NO

## 2019-07-16 NOTE — PROGRESS NOTE ADULT - ASSESSMENT
Patient 69 yo M with PMH of DM, ESRD on HD (T,Th, S), HTN, DLD, CAD s/p PCI  comes to the ED with the c/o acute onset of RUQ,  epigastric pain. Patient s/p ERCP on 7/15 with stent placement. Patient found to have two filling defects but sphincterotomy and stone extraction could not be done as on Plavix.  Patient doing well today appears comfortable with no complaints . Patient 67 yo M with PMH of DM, ESRD on HD (T,Th, S), HTN, DLD, CAD s/p PCI  comes to the ED with the c/o acute onset of RUQ,  epigastric pain. Patient s/p ERCP on 7/15 with stent placement. Patient found to have two filling defects but sphincterotomy and stone extraction could not be done as on Plavix.  Patient doing well today appears comfortable with no complaints . Patient on ASA currently , was recently on Plavix.    Cholangitis/ Choledocholithiasis   - ERCP with CBD stent placement for decompression with good response  - Patient will need repeat ERCP in 6 weeks for stent removal and stone removal- at that time he will need to be off Plavix for one week  - Will need follow up on discharge with Dr Nowak [phone]  - We will continue to follow while admitted

## 2019-07-16 NOTE — PROGRESS NOTE ADULT - SUBJECTIVE AND OBJECTIVE BOX
Patient 67 yo M with PMH of DM, ESRD on HD (T,Th, S), HTN, DLD, CAD s/p PCI  comes to the ED with the c/o acute onset of RUQ,  epigastric pain. Patient s/p ERCP on 7/15 with stent placement. Patient found to have two filling defects but sphincterotomy and stone extraction could not be done as on Plavix.  Patient doing well today appears comfortable with no complaints .    Vital Signs:  T(F): 96.8  HR: 84   BP: 93/55   RR: 18  SpO2: 97%  Physical Exam  Gen: NAD  HEENT: NC/AT  Cardio: S1/S2  Resp: CTA B/L  Abdomen: Soft, ND/NT  Extremities: FROM x 4                            9.2    17.11 )-----------( 98       ( 15 Jul 2019 23:30 )             28.5   07-15    138  |  94<L>  |  69<HH>  ----------------------------<  91  4.1   |  25  |  9.5<HH>    Ca    8.4<L>      15 Jul 2019 23:30  Phos  5.8     07-16  Mg     2.6     07-16    TPro  5.7<L>  /  Alb  3.0<L>  /  TBili  2.1<H>  /  DBili  1.9<H>  /  AST  61<H>  /  ALT  137<H>  /  AlkPhos  116<H>  07-15        CT Abdomen and Pelvis w/ IV Cont       IMPRESSION:    Distended gallbladder with mild wall thickening. 4 mm density in the   lower CBD, suspect choledocholith.     Other incidental findings as described.

## 2019-07-17 LAB
ALBUMIN SERPL ELPH-MCNC: 3.3 G/DL — LOW (ref 3.5–5.2)
ALP SERPL-CCNC: 167 U/L — HIGH (ref 30–115)
ALT FLD-CCNC: 99 U/L — HIGH (ref 0–41)
ANION GAP SERPL CALC-SCNC: 18 MMOL/L — HIGH (ref 7–14)
AST SERPL-CCNC: 38 U/L — SIGNIFICANT CHANGE UP (ref 0–41)
BASOPHILS # BLD AUTO: 0.04 K/UL — SIGNIFICANT CHANGE UP (ref 0–0.2)
BASOPHILS NFR BLD AUTO: 0.4 % — SIGNIFICANT CHANGE UP (ref 0–1)
BILIRUB DIRECT SERPL-MCNC: 1.2 MG/DL — HIGH (ref 0–0.2)
BILIRUB INDIRECT FLD-MCNC: 0.5 MG/DL — SIGNIFICANT CHANGE UP (ref 0.2–1.2)
BILIRUB SERPL-MCNC: 1.7 MG/DL — HIGH (ref 0.2–1.2)
BUN SERPL-MCNC: 43 MG/DL — HIGH (ref 10–20)
CALCIUM SERPL-MCNC: 8.9 MG/DL — SIGNIFICANT CHANGE UP (ref 8.5–10.1)
CHLORIDE SERPL-SCNC: 96 MMOL/L — LOW (ref 98–110)
CO2 SERPL-SCNC: 27 MMOL/L — SIGNIFICANT CHANGE UP (ref 17–32)
CREAT SERPL-MCNC: 7.3 MG/DL — CRITICAL HIGH (ref 0.7–1.5)
EOSINOPHIL # BLD AUTO: 0.3 K/UL — SIGNIFICANT CHANGE UP (ref 0–0.7)
EOSINOPHIL NFR BLD AUTO: 2.7 % — SIGNIFICANT CHANGE UP (ref 0–8)
FERRITIN SERPL-MCNC: 2507 NG/ML — HIGH (ref 30–400)
GLUCOSE BLDC GLUCOMTR-MCNC: 129 MG/DL — HIGH (ref 70–99)
GLUCOSE SERPL-MCNC: 103 MG/DL — HIGH (ref 70–99)
HCT VFR BLD CALC: 31.1 % — LOW (ref 42–52)
HGB BLD-MCNC: 10.2 G/DL — LOW (ref 14–18)
IMM GRANULOCYTES NFR BLD AUTO: 0.5 % — HIGH (ref 0.1–0.3)
LYMPHOCYTES # BLD AUTO: 0.43 K/UL — LOW (ref 1.2–3.4)
LYMPHOCYTES # BLD AUTO: 3.8 % — LOW (ref 20.5–51.1)
MAGNESIUM SERPL-MCNC: 2.3 MG/DL — SIGNIFICANT CHANGE UP (ref 1.8–2.4)
MCHC RBC-ENTMCNC: 30.8 PG — SIGNIFICANT CHANGE UP (ref 27–31)
MCHC RBC-ENTMCNC: 32.8 G/DL — SIGNIFICANT CHANGE UP (ref 32–37)
MCV RBC AUTO: 94 FL — SIGNIFICANT CHANGE UP (ref 80–94)
MONOCYTES # BLD AUTO: 0.68 K/UL — HIGH (ref 0.1–0.6)
MONOCYTES NFR BLD AUTO: 6 % — SIGNIFICANT CHANGE UP (ref 1.7–9.3)
NEUTROPHILS # BLD AUTO: 9.74 K/UL — HIGH (ref 1.4–6.5)
NEUTROPHILS NFR BLD AUTO: 86.6 % — HIGH (ref 42.2–75.2)
NRBC # BLD: 0 /100 WBCS — SIGNIFICANT CHANGE UP (ref 0–0)
PHOSPHATE SERPL-MCNC: 4 MG/DL — SIGNIFICANT CHANGE UP (ref 2.1–4.9)
PLATELET # BLD AUTO: 108 K/UL — LOW (ref 130–400)
POTASSIUM SERPL-MCNC: 4.2 MMOL/L — SIGNIFICANT CHANGE UP (ref 3.5–5)
POTASSIUM SERPL-SCNC: 4.2 MMOL/L — SIGNIFICANT CHANGE UP (ref 3.5–5)
PROT SERPL-MCNC: 6.3 G/DL — SIGNIFICANT CHANGE UP (ref 6–8)
RBC # BLD: 3.31 M/UL — LOW (ref 4.7–6.1)
RBC # FLD: 13.9 % — SIGNIFICANT CHANGE UP (ref 11.5–14.5)
SODIUM SERPL-SCNC: 141 MMOL/L — SIGNIFICANT CHANGE UP (ref 135–146)
WBC # BLD: 11.25 K/UL — HIGH (ref 4.8–10.8)
WBC # FLD AUTO: 11.25 K/UL — HIGH (ref 4.8–10.8)

## 2019-07-17 PROCEDURE — 93010 ELECTROCARDIOGRAM REPORT: CPT

## 2019-07-17 PROCEDURE — 71045 X-RAY EXAM CHEST 1 VIEW: CPT | Mod: 26

## 2019-07-17 PROCEDURE — 99233 SBSQ HOSP IP/OBS HIGH 50: CPT

## 2019-07-17 RX ADMIN — SENNA PLUS 2 TABLET(S): 8.6 TABLET ORAL at 21:02

## 2019-07-17 RX ADMIN — CHLORHEXIDINE GLUCONATE 1 APPLICATION(S): 213 SOLUTION TOPICAL at 05:32

## 2019-07-17 RX ADMIN — APIXABAN 2.5 MILLIGRAM(S): 2.5 TABLET, FILM COATED ORAL at 17:43

## 2019-07-17 RX ADMIN — Medication 1 SPRAY(S): at 11:35

## 2019-07-17 RX ADMIN — ATORVASTATIN CALCIUM 40 MILLIGRAM(S): 80 TABLET, FILM COATED ORAL at 21:01

## 2019-07-17 RX ADMIN — APIXABAN 2.5 MILLIGRAM(S): 2.5 TABLET, FILM COATED ORAL at 05:32

## 2019-07-17 RX ADMIN — Medication 81 MILLIGRAM(S): at 11:30

## 2019-07-17 RX ADMIN — Medication 100 MILLIGRAM(S): at 17:43

## 2019-07-17 RX ADMIN — CHLORHEXIDINE GLUCONATE 1 APPLICATION(S): 213 SOLUTION TOPICAL at 17:43

## 2019-07-17 RX ADMIN — PANTOPRAZOLE SODIUM 40 MILLIGRAM(S): 20 TABLET, DELAYED RELEASE ORAL at 11:29

## 2019-07-17 RX ADMIN — PIPERACILLIN AND TAZOBACTAM 200 GRAM(S): 4; .5 INJECTION, POWDER, LYOPHILIZED, FOR SOLUTION INTRAVENOUS at 05:31

## 2019-07-17 RX ADMIN — Medication 100 MILLIGRAM(S): at 05:32

## 2019-07-17 RX ADMIN — PIPERACILLIN AND TAZOBACTAM 200 GRAM(S): 4; .5 INJECTION, POWDER, LYOPHILIZED, FOR SOLUTION INTRAVENOUS at 17:43

## 2019-07-17 NOTE — PROGRESS NOTE ADULT - SUBJECTIVE AND OBJECTIVE BOX
Nephrology progress note    Patient is seen and examined, events over the last 24 h noted .    Allergies:  No Known Allergies    Hospital Medications:   MEDICATIONS  (STANDING):  apixaban 2.5 milliGRAM(s) Oral two times a day  aspirin  chewable 81 milliGRAM(s) Oral daily  atorvastatin 40 milliGRAM(s) Oral at bedtime  chlorhexidine 4% Liquid 1 Application(s) Topical every 12 hours  docusate sodium 100 milliGRAM(s) Oral two times a day  fluticasone propionate 50 MICROgram(s)/spray Nasal Spray 1 Spray(s) Both Nostrils daily  indomethacin Suppository 100 milliGRAM(s) Rectal once  magnesium oxide 400 milliGRAM(s) Oral once  pantoprazole  Injectable 40 milliGRAM(s) IV Push daily  piperacillin/tazobactam IVPB.. 2.25 Gram(s) IV Intermittent every 12 hours  senna 2 Tablet(s) Oral at bedtime        VITALS:  T(F): 98.7 (07-17-19 @ 08:00), Max: 99.3 (07-16-19 @ 20:01)  HR: 86 (07-17-19 @ 08:00)  BP: 142/83 (07-17-19 @ 08:00)  RR: 18 (07-17-19 @ 08:00)  SpO2: 96% (07-17-19 @ 08:00)  Wt(kg): --    07-15 @ 07:01  -  07-16 @ 07:00  --------------------------------------------------------  IN: 590 mL / OUT: 0 mL / NET: 590 mL    07-16 @ 07:01  -  07-17 @ 07:00  --------------------------------------------------------  IN: 580 mL / OUT: 1000 mL / NET: -420 mL          PHYSICAL EXAM:  Constitutional: NAD  HEENT: anicteric sclera, oropharynx clear, MMM  Neck: No JVD  Respiratory: CTAB, no wheezes, rales or rhonchi  Cardiovascular: S1, S2, RRR  Gastrointestinal: BS+, soft, NT/ND  Extremities: No cyanosis or clubbing. No peripheral edema  :  No elder.   Skin: No rashes    LABS:  07-17    141  |  96<L>  |  43<H>  ----------------------------<  103<H>  4.2   |  27  |  7.3<HH>    Ca    8.9      17 Jul 2019 04:46  Phos  4.0     07-17  Mg     2.3     07-17    TPro  6.3  /  Alb  3.3<L>  /  TBili  1.7<H>  /  DBili  1.2<H>  /  AST  38  /  ALT  99<H>  /  AlkPhos  167<H>  07-17                          10.2   11.25 )-----------( 108      ( 17 Jul 2019 04:46 )             31.1       Urine Studies:      RADIOLOGY & ADDITIONAL STUDIES: Nephrology progress note    Patient is seen and examined, events over the last 24 h noted .  denied any complaints  sp HD yesterday     Allergies:  No Known Allergies    Hospital Medications:   MEDICATIONS  (STANDING):  apixaban 2.5 milliGRAM(s) Oral two times a day  aspirin  chewable 81 milliGRAM(s) Oral daily  atorvastatin 40 milliGRAM(s) Oral at bedtime  docusate sodium 100 milliGRAM(s) Oral two times a day  fluticasone propionate 50 MICROgram(s)/spray Nasal Spray 1 Spray(s) Both Nostrils daily  indomethacin Suppository 100 milliGRAM(s) Rectal once  magnesium oxide 400 milliGRAM(s) Oral once  pantoprazole  Injectable 40 milliGRAM(s) IV Push daily  piperacillin/tazobactam IVPB.. 2.25 Gram(s) IV Intermittent every 12 hours  senna 2 Tablet(s) Oral at bedtime        VITALS:  T(F): 98.7 (07-17-19 @ 08:00), Max: 99.3 (07-16-19 @ 20:01)  HR: 86 (07-17-19 @ 08:00)  BP: 142/83 (07-17-19 @ 08:00)  RR: 18 (07-17-19 @ 08:00)  SpO2: 96% (07-17-19 @ 08:00)      07-15 @ 07:01  -  07-16 @ 07:00  --------------------------------------------------------  IN: 590 mL / OUT: 0 mL / NET: 590 mL    07-16 @ 07:01  -  07-17 @ 07:00  --------------------------------------------------------  IN: 580 mL / OUT: 1000 mL / NET: -420 mL          PHYSICAL EXAM:  Constitutional: NAD  HEENT: anicteric sclera, oropharynx clear, MMM  Neck: No JVD  Respiratory: CTAB, no wheezes, rales or rhonchi  Cardiovascular: S1, S2, RRR  Gastrointestinal: BS+, soft, NT/ND  Extremities: No cyanosis or clubbing. No peripheral edema  :  No elder.   Skin: No rashes    LABS:  07-17    141  |  96<L>  |  43<H>  ----------------------------<  103<H>  4.2   |  27  |  7.3<HH>    Ca    8.9      17 Jul 2019 04:46  Phos  4.0     07-17  Mg     2.3     07-17    TPro  6.3  /  Alb  3.3<L>  /  TBili  1.7<H>  /  DBili  1.2<H>  /  AST  38  /  ALT  99<H>  /  AlkPhos  167<H>  07-17                          10.2   11.25 )-----------( 108      ( 17 Jul 2019 04:46 )             31.1       Urine Studies:      RADIOLOGY & ADDITIONAL STUDIES:

## 2019-07-17 NOTE — PROGRESS NOTE ADULT - ASSESSMENT
IMPRESSION:    Sepsis - ascending cholangitis  Choledocholithiasis   ESRD on HD   HFrEF   NSTEMI  Hx of CAD  HO DM2  New onset afib    PLAN:    CNS: avoid sedatives     HEENT: Oral care    PULMONARY:  HOB @ 45 degrees    CARDIOVASCULAR: Check EKG, f/u on echo, Eliquis per cardio, Cont ASA, Check with cardio reg dapt     GI: GI prophylaxis. low fat diet    RENAL:  Follow up lytes.  Correct as needed, HD per renal     INFECTIOUS DISEASE: Follow up cultures, Cont Zosyn --> change to Unasyn     HEMATOLOGICAL:  DVT prophylaxis.    ENDOCRINE:  Follow up FS.  Insulin protocol if needed.    MUSCULOSKELETAL: Bed rest    peripherals   No Bailey  FUll code   Downgrade to tele             67 yo M with above PMH is admitted to ICU for the choledocholithiasis     # Choledocholithiasis   - CBD dilation on US abdomen and possible stone on CT scan   - Consider MRCP/ ERCP  - GI consult   - Rocephin and Flagyl  - NPO for now  - Protonix IV    # Elevated Troponin  - PT is ESRD on HD  - No EKG changes or chest pain  - Trend    # HTN  - Hold losartan, Metoprolol and Imdur for now  - Resume tomorrow if BP is high    # ESRD on HD (T/ Th/ S)  - Nephrology consult     # CAD s/p 3 stents   - C/w home meds  - ASA and Plavix    # DM  - Check FS and start Insulin if FS >180     # DVT ppx  - Heparin

## 2019-07-17 NOTE — PROGRESS NOTE ADULT - ASSESSMENT
1)  ESRD on HD sp HD yesterday no need for HD today     2)  Acute-onset epigastric abdominal pain, N/V, elevated bilirubin, alk phos, GGT, and rising AST/ALT.  sp CT scan w/ what appears to be a CBD stone w/ dilated GB.  Of concern is the rising WBC, high fever, and ongoing lactic acidosis, concerning for ascending cholangitis, possible gangrenous GB  sp ERCP 48 h ago / on Zosyn       3) Hb noted, check iron stores, no need fo GAURANG at the moment.    4) BP stable, currently off pressors, keep MAP > 65./ A fib with RVR appreciate cards note   will follow

## 2019-07-17 NOTE — PROGRESS NOTE ADULT - SUBJECTIVE AND OBJECTIVE BOX
Progress Note: Surgery  Patient: MAMTA FERNANDO , 68y (1950)Male   MRN: [id number]  Location: [address]  Visit: 07-14-19 Inpatient  Date: 07-17-19 @ 10:23  Hospital Day:   Post-op Day:     Procedure/Diagnosis: choledocolithiasis  Events over 24h: No acute event overnight. No new complaint. Pt is hemodynamically stable. On dash diet, tolerating well. Denies nausea, vomiting, and/or abdominal pain.     Vitals: T(F): 98.7 (07-17-19 @ 08:00), Max: 99.3 (07-16-19 @ 20:01)  HR: 86 (07-17-19 @ 08:00)  BP: 142/83 (07-17-19 @ 08:00) (92/64 - 168/86)  RR: 18 (07-17-19 @ 08:00)  SpO2: 96% (07-17-19 @ 08:00)      Diet: Diet, DASH/TLC:   Sodium & Cholesterol Restricted  Low Fat (LOWFAT) (07-16-19 @ 09:31)    IV Fluid: magnesium oxide 400 milliGRAM(s) Oral once      In:   07-16-19 @ 07:01  -  07-17-19 @ 07:00  --------------------------------------------------------  IN: 580 mL      Out:   07-16-19 @ 07:01  -  07-17-19 @ 07:00  --------------------------------------------------------  OUT:    Other: 1000 mL  Total OUT: 1000 mL        Net:   07-16-19 @ 07:01  -  07-17-19 @ 07:00  --------------------------------------------------------  NET: -420 mL        Physical Examination:  General: AAO x 3  HEENT: GERTRUDE            Lymphatic system: No cervical LN  Lungs: Bilateral BS Cardiovascular: Regular  Gastrointestinal: Soft, Positive BS Extremities: No clubbing.  Moves extremities.  Full Range of motion  Skin: Warm, intact Neurological: No motor or sensory deficit 	      Medications: [Standing]  apixaban 2.5 milliGRAM(s) Oral two times a day  aspirin  chewable 81 milliGRAM(s) Oral daily  atorvastatin 40 milliGRAM(s) Oral at bedtime  chlorhexidine 4% Liquid 1 Application(s) Topical every 12 hours  docusate sodium 100 milliGRAM(s) Oral two times a day  fluticasone propionate 50 MICROgram(s)/spray Nasal Spray 1 Spray(s) Both Nostrils daily  indomethacin Suppository 100 milliGRAM(s) Rectal once  magnesium oxide 400 milliGRAM(s) Oral once  pantoprazole  Injectable 40 milliGRAM(s) IV Push daily  piperacillin/tazobactam IVPB.. 2.25 Gram(s) IV Intermittent every 12 hours  senna 2 Tablet(s) Oral at bedtime    Medications:[PRN]  acetaminophen   Tablet .. 650 milliGRAM(s) Oral every 6 hours PRN  diphenhydrAMINE 50 milliGRAM(s) Oral every 8 hours PRN  morphine  - Injectable 2 milliGRAM(s) IV Push every 4 hours PRN  ondansetron Injectable 8 milliGRAM(s) IV Push four times a day PRN    Labs:                        10.2   11.25 )-----------( 108      ( 17 Jul 2019 04:46 )             31.1   07-17    141  |  96<L>  |  43<H>  ----------------------------<  103<H>  4.2   |  27  |  7.3<HH>    Ca    8.9      17 Jul 2019 04:46  Phos  4.0     07-17  Mg     2.3     07-17    TPro  6.3  /  Alb  3.3<L>  /  TBili  1.7<H>  /  DBili  1.2<H>  /  AST  38  /  ALT  99<H>  /  AlkPhos  167<H>  07-17  LIVER FUNCTIONS - ( 17 Jul 2019 07:56 )  Alb: 3.3 g/dL / Pro: 6.3 g/dL / ALK PHOS: 167 U/L / ALT: 99 U/L / AST: 38 U/L / GGT: x         CARDIAC MARKERS ( 15 Jul 2019 20:00 )  x     / 0.18 ng/mL / x     / x     / x      CARDIAC MARKERS ( 15 Jul 2019 11:00 )  x     / 0.21 ng/mL / x     / x     / x          Micro/Urine:    Imaging:  None/24h

## 2019-07-17 NOTE — PROGRESS NOTE ADULT - SUBJECTIVE AND OBJECTIVE BOX
SUBJ:No chest pain or shortness of breath      MEDICATIONS  (STANDING):  apixaban 2.5 milliGRAM(s) Oral two times a day  aspirin  chewable 81 milliGRAM(s) Oral daily  atorvastatin 40 milliGRAM(s) Oral at bedtime  chlorhexidine 4% Liquid 1 Application(s) Topical every 12 hours  docusate sodium 100 milliGRAM(s) Oral two times a day  fluticasone propionate 50 MICROgram(s)/spray Nasal Spray 1 Spray(s) Both Nostrils daily  indomethacin Suppository 100 milliGRAM(s) Rectal once  magnesium oxide 400 milliGRAM(s) Oral once  pantoprazole  Injectable 40 milliGRAM(s) IV Push daily  piperacillin/tazobactam IVPB.. 2.25 Gram(s) IV Intermittent every 12 hours  senna 2 Tablet(s) Oral at bedtime    MEDICATIONS  (PRN):  acetaminophen   Tablet .. 650 milliGRAM(s) Oral every 6 hours PRN Temp greater or equal to 38C (100.4F), Mild Pain (1 - 3)  diphenhydrAMINE 50 milliGRAM(s) Oral every 8 hours PRN Rash and/or Itching  morphine  - Injectable 2 milliGRAM(s) IV Push every 4 hours PRN Moderate Pain (4 - 6)  ondansetron Injectable 8 milliGRAM(s) IV Push four times a day PRN Nausea and/or Vomiting            Vital Signs Last 24 Hrs  T(C): 37.1 (17 Jul 2019 08:00), Max: 37.4 (16 Jul 2019 20:01)  T(F): 98.7 (17 Jul 2019 08:00), Max: 99.3 (16 Jul 2019 20:01)  HR: 86 (17 Jul 2019 08:00) (66 - 104)  BP: 142/83 (17 Jul 2019 08:00) (92/64 - 168/86)  BP(mean): 118 (17 Jul 2019 08:00) (70 - 128)  RR: 18 (17 Jul 2019 08:00) (17 - 34)  SpO2: 96% (17 Jul 2019 08:00) (96% - 99%)      ECG:NML    TTE:    LABS:                        10.2   11.25 )-----------( 108      ( 17 Jul 2019 04:46 )             31.1     07-17    141  |  96<L>  |  43<H>  ----------------------------<  103<H>  4.2   |  27  |  7.3<HH>    Ca    8.9      17 Jul 2019 04:46  Phos  4.0     07-17  Mg     2.3     07-17    TPro  6.3  /  Alb  3.3<L>  /  TBili  1.7<H>  /  DBili  1.2<H>  /  AST  38  /  ALT  99<H>  /  AlkPhos  167<H>  07-17    CARDIAC MARKERS ( 15 Jul 2019 20:00 )  x     / 0.18 ng/mL / x     / x     / x      CARDIAC MARKERS ( 15 Jul 2019 11:00 )  x     / 0.21 ng/mL / x     / x     / x              I&O's Summary    16 Jul 2019 07:01  -  17 Jul 2019 07:00  --------------------------------------------------------  IN: 580 mL / OUT: 1000 mL / NET: -420 mL      BNP

## 2019-07-17 NOTE — PROGRESS NOTE ADULT - ASSESSMENT
69 yo M with PMH of DM, ESRD on HD (T,Th, S), HTN, DLD, CAD s/p PCI  comes to the ED with the c/o acute onset of RUQ/ epigastric abdominal pain for one day associated with nausea and vomiting. PT states that the pain started today afternoon after the dialysis, colicky in nature, non radiating,  and constant. He had 3-4 episode of  vomiting.     1.	Choledocholithiasis / Cholangitis  2.	Sepsis due to # 1  3.	ESRD on HD  4.	CAD / HTN / DL  5.	Lactic acidosis  6.	NSTEMI type 2         PLAN:    ·	Was hypotensive earlier today. BP stable now  ·	S/P ERCP and CBD stent placement yesterday. LFT'S trending down  ·	Can advance to low fat diet today  ·	GI F/U.   ·	Monitor LFT'S  ·	Cont IV Zosyn  ·	Pain control  ·	Cont ASA, Plavix and his other meds.  ·	Elevated Troponin due to sepsis and ESRD 69 yo M with PMH of DM, ESRD on HD (T,Th, S), HTN, DLD, CAD s/p PCI  comes to the ED with the c/o acute onset of RUQ/ epigastric abdominal pain for one day associated with nausea and vomiting. PT states that the pain started today afternoon after the dialysis, colicky in nature, non radiating,  and constant. He had 3-4 episode of  vomiting.     1.	Choledocholithiasis / Cholangitis  2.	Sepsis due to # 1  3.	ESRD on HD  4.	CAD / HTN / DL  5.	Lactic acidosis  6.	NSTEMI type 2  7.	A. Fib         PLAN:    ·	Cardiology F/U noted. Started on Eliquis. Will change the dose to 5 mg po q 12h  ·	S/P ERCP and CBD stent placement for decompression. ALP going up again. Repeat LFT'S in AM  ·	Cont low fat diet  ·	Monitor LFT'S  ·	Cont IV Zosyn. ID F/U  ·	Pain control  ·	Cont ASA. Plavix was stopped by the cardiology  ·	Elevated Troponin due to sepsis and ESRD  ·	Monitor FS. On no meds.

## 2019-07-17 NOTE — CHART NOTE - NSCHARTNOTEFT_GEN_A_CORE
67 yo M with PMH of DM, ESRD on HD (T,Th, S), HTN, DLD, CAD s/p PCI  comes to the ED with the c/o acute onset of RUQ/ epigastric abdominal pain for one day associated with nausea and vomiting. PT states that the pain started today afternoon after the dialysis, colicky in nature, non radiating,  and constant. He had 3-4 episode of  vomiting. He denied black stool, diarrhea, constipations, back pain, chest pain, palpitations, chest pain or shortness of breath. He has h/o GERD and takes protonix BID.     CT scan showed distended gall bladder, US negative for the whitley's sign. CBD was dilated. Pt now s/p ERCP, also with new-onset Afib renally dosed on Eliquis.    Physical Exam:  GEN: No acute distress, lying in bed  LUNGS: Clear to auscultation bilaterally, no wheezes, rales or rhonchi  HEART: Irregularly irregular, not tachycardic  ABD: Soft, non-tender, non-distended. Bowel sounds present  EXT: No LE edema, 2+ radial and DP pulses bilaterally  NEURO: AAOX3    # Choledocholithiasis   - CBD dilation on US abdomen and stone on CT scan   - High WBC, lactate and fevers concerning for ascending cholangitis  - GI: ERCP: w/CBD stent placement for decompression with good response  - Patient will need repeat ERCP in 6 weeks for stent removal and stone removal- at that time he will need to be off Plavix for one week  - Will need follow up on discharge with Dr Nowak [phone]  - monitor LFTs  - no need for central line post-ERCP, removed in CCU  - will c/w Zosyn, ID follow up as per Dr. Lozano    # New onset - A -fib  - Send to telemetry  - will renal dose anti-coagulation  - start on Eliquis 2.5 mg BID, c/w ASA, but Hold Plavix as per Dr. Ceron  - follow up Transthoracic Echo    # HTN  - Hold losartan, Metoprolol and Imdur for now  - low BP in the am, now stable  - consider resuming bp meds if pressure are high    # ESRD on HD (T/ Th/ S)  - Nephrology  -  next session scheduled for tomorrow  - corrected Ca at goal, follow Mg, Ph, iPTH, BMP  - Hb noted, check iron stores  - no need for GAURANG at the moment.    # Elevated Troponin  - PT is ESRD on HD ( Tu, Thu, Sat)  - will continue to trend    # CAD s/p 3 stents   - C/w home meds  - ASA, hold Plavix    # DM  - Check FS and start Insulin if FS >180     - Diet  - on low fat diet today    # DVT ppx  - Heparin

## 2019-07-17 NOTE — PROGRESS NOTE ADULT - SUBJECTIVE AND OBJECTIVE BOX
Patient is a 68y old  Male who presents with a chief complaint of Abdominal pain (17 Jul 2019 09:08)    Over Night Events:    no events   Afebrile     ROS:  See HPI    PHYSICAL EXAM    ICU Vital Signs Last 24 Hrs  T(C): 37.1 (17 Jul 2019 08:00), Max: 37.4 (16 Jul 2019 20:01)  T(F): 98.7 (17 Jul 2019 08:00), Max: 99.3 (16 Jul 2019 20:01)  HR: 86 (17 Jul 2019 08:00) (66 - 104)  BP: 142/83 (17 Jul 2019 08:00) (92/64 - 168/86)  BP(mean): 118 (17 Jul 2019 08:00) (70 - 128)  ABP: --  ABP(mean): --  RR: 18 (17 Jul 2019 08:00) (17 - 34)  SpO2: 96% (17 Jul 2019 08:00) (96% - 99%)      General: AAO x 3   HEENT: GERTRUDE             Lymphatic system: No cervical LN   Lungs: Bilateral BS  Cardiovascular: Regular   Gastrointestinal: Soft, Positive BS  Extremities: No clubbing.  Moves extremities.  Full Range of motion   Skin: Warm, intact  Neurological: No motor or sensory deficit       07-16-19 @ 07:01  -  07-17-19 @ 07:00  --------------------------------------------------------  IN:    IV PiggyBack: 100 mL    Oral Fluid: 480 mL  Total IN: 580 mL    OUT:    Other: 1000 mL  Total OUT: 1000 mL    Total NET: -420 mL          LABS:                            10.2   11.25 )-----------( 108      ( 17 Jul 2019 04:46 )             31.1                                               07-17    141  |  96<L>  |  43<H>  ----------------------------<  103<H>  4.2   |  27  |  7.3<HH>    Ca    8.9      17 Jul 2019 04:46  Phos  4.0     07-17  Mg     2.3     07-17    TPro  6.3  /  Alb  3.3<L>  /  TBili  1.7<H>  /  DBili  1.2<H>  /  AST  38  /  ALT  99<H>  /  AlkPhos  167<H>  07-17    CARDIAC MARKERS ( 15 Jul 2019 20:00 )  x     / 0.18 ng/mL / x     / x     / x      CARDIAC MARKERS ( 15 Jul 2019 11:00 )  x     / 0.21 ng/mL / x     / x     / x                                                LIVER FUNCTIONS - ( 17 Jul 2019 07:56 )  Alb: 3.3 g/dL / Pro: 6.3 g/dL / ALK PHOS: 167 U/L / ALT: 99 U/L / AST: 38 U/L / GGT: x                                              Culture - Blood (collected 14 Jul 2019 21:23)  Source: .Blood None  Preliminary Report (16 Jul 2019 18:01):    No growth to date.                                                 MEDICATIONS  (STANDING):  apixaban 2.5 milliGRAM(s) Oral two times a day  aspirin  chewable 81 milliGRAM(s) Oral daily  atorvastatin 40 milliGRAM(s) Oral at bedtime  chlorhexidine 4% Liquid 1 Application(s) Topical every 12 hours  docusate sodium 100 milliGRAM(s) Oral two times a day  fluticasone propionate 50 MICROgram(s)/spray Nasal Spray 1 Spray(s) Both Nostrils daily  indomethacin Suppository 100 milliGRAM(s) Rectal once  magnesium oxide 400 milliGRAM(s) Oral once  pantoprazole  Injectable 40 milliGRAM(s) IV Push daily  piperacillin/tazobactam IVPB.. 2.25 Gram(s) IV Intermittent every 12 hours  senna 2 Tablet(s) Oral at bedtime    MEDICATIONS  (PRN):  acetaminophen   Tablet .. 650 milliGRAM(s) Oral every 6 hours PRN Temp greater or equal to 38C (100.4F), Mild Pain (1 - 3)  diphenhydrAMINE 50 milliGRAM(s) Oral every 8 hours PRN Rash and/or Itching  morphine  - Injectable 2 milliGRAM(s) IV Push every 4 hours PRN Moderate Pain (4 - 6)  ondansetron Injectable 8 milliGRAM(s) IV Push four times a day PRN Nausea and/or Vomiting      Xrays:                                                                                     ECHO

## 2019-07-17 NOTE — PROGRESS NOTE ADULT - ASSESSMENT
Assessment:  68y Male patient admitted for choledocolithiasis , with the above physical exam, labs, and imaging findings.    Plan:  -ERCP done by GI, tolerated well.  -will follow  -Pain control as needed  -Hemodynamic monitoring as per routine  -GI and DVT prophylaxis  -Check and replete CBC and BMP q daily  -Strict input and output monitoring  -Continue current management    Date/Time: 07-17-19 @ 10:23

## 2019-07-17 NOTE — PROGRESS NOTE ADULT - SUBJECTIVE AND OBJECTIVE BOX
MAMTA FERNANDO  68y Male    CHIEF COMPLAINT:    Patient is a 68y old  Male who presents with a chief complaint of Abdominal pain (2019 23:08)      INTERVAL HPI/OVERNIGHT EVENTS:    Patient seen and examined.    ROS: All other systems are negative.    Vital Signs:    T(F): 98.6 (19 @ 06:00), Max: 99.3 (19 @ 20:01)  HR: 90 (19 @ 06:00) (66 - 104)  BP: 120/73 (19 @ 06:00) (86/56 - 168/86)  RR: 23 (19 @ 06:00) (16 - 34)  SpO2: 98% (19 @ 06:00) (97% - 99%)  I&O's Summary    2019 07:01  -  2019 07:00  --------------------------------------------------------  IN: 580 mL / OUT: 1000 mL / NET: -420 mL      Daily     Daily Weight in k.6 (2019 06:00)  CAPILLARY BLOOD GLUCOSE      POCT Blood Glucose.: 123 mg/dL (2019 07:59)      PHYSICAL EXAM:    GENERAL:  NAD  SKIN: No rashes or lesions  HENT: Atrumatic. Normocephalic. PERRL. Moist membranes.  NECK: Supple, No JVD. No lymphadenopathy.  PULMONARY: CTA B/L. No wheezing. No rales  CVS: Normal S1, S2. Rate and Rythm are regular. No murmurs.  ABDOMEN/GI: Soft, Nontender, Nondistended; BS present  EXTREMITIES: Peripheral pulses intact. No edema B/L LE.  NEUROLOGIC:  No motor or sensory deficit.  PSYCH: Alert & oriented x 3    Consultant(s) Notes Reviewed:  [x ] YES  [ ] NO  Care Discussed with Consultants/Other Providers [ x] YES  [ ] NO    EKG reviewed  Telemetry reviewed    LABS:                        10.2   11.25 )-----------( 108      ( 2019 04:46 )             31.1     07-    141  |  96<L>  |  43<H>  ----------------------------<  103<H>  4.2   |  27  |  7.3<HH>    Ca    8.9      2019 04:46  Phos  4.0       Mg     2.3         TPro  5.7<L>  /  Alb  3.0<L>  /  TBili  2.1<H>  /  DBili  1.9<H>  /  AST  61<H>  /  ALT  137<H>  /  AlkPhos  116<H>  07-15        Trop 0.18, CKMB --, CK --, 07-15-19 @ 20:00  Trop 0.21, CKMB --, CK --, 07-15-19 @ 11:00      Culture - Blood (collected 2019 21:23)  Source: .Blood None  Preliminary Report (2019 18:01):    No growth to date.        RADIOLOGY & ADDITIONAL TESTS:      Imaging or report Personally Reviewed:  [ ] YES  [ ] NO    Medications:  Standing  apixaban 2.5 milliGRAM(s) Oral two times a day  aspirin  chewable 81 milliGRAM(s) Oral daily  atorvastatin 40 milliGRAM(s) Oral at bedtime  chlorhexidine 4% Liquid 1 Application(s) Topical every 12 hours  docusate sodium 100 milliGRAM(s) Oral two times a day  fluticasone propionate 50 MICROgram(s)/spray Nasal Spray 1 Spray(s) Both Nostrils daily  indomethacin Suppository 100 milliGRAM(s) Rectal once  magnesium oxide 400 milliGRAM(s) Oral once  pantoprazole  Injectable 40 milliGRAM(s) IV Push daily  piperacillin/tazobactam IVPB.. 2.25 Gram(s) IV Intermittent every 12 hours  senna 2 Tablet(s) Oral at bedtime    PRN Meds  acetaminophen   Tablet .. 650 milliGRAM(s) Oral every 6 hours PRN  diphenhydrAMINE 50 milliGRAM(s) Oral every 8 hours PRN  morphine  - Injectable 2 milliGRAM(s) IV Push every 4 hours PRN  ondansetron Injectable 8 milliGRAM(s) IV Push four times a day PRN      Case discussed with resident    Care discussed with pt/family MAMTA FERNANDO  68y Male    CHIEF COMPLAINT:    Patient is a 68y old  Male who presents with a chief complaint of Abdominal pain (2019 23:08)      INTERVAL HPI/OVERNIGHT EVENTS:    Patient seen and examined. C/O upper abdominal discomfort after eating. No N/V. No fever. No palpitations.    ROS: All other systems are negative.    Vital Signs:    T(F): 98.6 (19 @ 06:00), Max: 99.3 (19 @ 20:01)  HR: 90 (19 @ 06:00) (66 - 104)  BP: 120/73 (19 @ 06:00) (86/56 - 168/86)  RR: 23 (19 @ 06:00) (16 - 34)  SpO2: 98% (19 @ 06:00) (97% - 99%)  I&O's Summary    2019 07:01  -  2019 07:00  --------------------------------------------------------  IN: 580 mL / OUT: 1000 mL / NET: -420 mL      Daily     Daily Weight in k.6 (2019 06:00)  CAPILLARY BLOOD GLUCOSE      POCT Blood Glucose.: 123 mg/dL (2019 07:59)      PHYSICAL EXAM:    GENERAL:  NAD  SKIN: No rashes or lesions  HENT: Atraumatic Normocephalic. PERRL. Moist membranes.  NECK: Supple, No JVD. No lymphadenopathy.  PULMONARY: CTA B/L. No wheezing. No rales  CVS: Normal S1, S2. Rate and Rhythm are irregular. No murmurs.  ABDOMEN/GI: Soft, Nontender, Nondistended; BS present  EXTREMITIES: Peripheral pulses intact. No edema B/L LE.  NEUROLOGIC:  No motor or sensory deficit.  PSYCH: Alert & oriented x 3    Consultant(s) Notes Reviewed:  [x ] YES  [ ] NO  Care Discussed with Consultants/Other Providers [ x] YES  [ ] NO    EKG reviewed  Telemetry reviewed    LABS:                        10.2   11.25 )-----------( 108      ( 2019 04:46 )             31.1         141  |  96<L>  |  43<H>  ----------------------------<  103<H>  4.2   |  27  |  7.3<HH>    Ca    8.9      2019 04:46  Phos  4.0       Mg     2.3         TPro  5.7<L>  /  Alb  3.0<L>  /  TBili  2.1<H>  /  DBili  1.9<H>  /  AST  61<H>  /  ALT  137<H>  /  AlkPhos  116<H>  07-15  T sagar 1.7     AST 38  ALT 99  19 @ 07:56  T sagar 2.1     AST 61    07-15-19 @ 23:30  T sagar 3.3         19 @ 21:35  T sagar 3.3         19 @ 15:13  T sagar 2.6         19 @ 05:31        Trop 0.18, CKMB --, CK --, 07-15-19 @ 20:00  Trop 0.21, CKMB --, CK --, 07-15-19 @ 11:00      Culture - Blood (collected 2019 21:23)  Source: .Blood None  Preliminary Report (2019 18:01):    No growth to date.        RADIOLOGY & ADDITIONAL TESTS:      Imaging or report Personally Reviewed:  [ ] YES  [ ] NO    Medications:  Standing  apixaban 2.5 milliGRAM(s) Oral two times a day  aspirin  chewable 81 milliGRAM(s) Oral daily  atorvastatin 40 milliGRAM(s) Oral at bedtime  chlorhexidine 4% Liquid 1 Application(s) Topical every 12 hours  docusate sodium 100 milliGRAM(s) Oral two times a day  fluticasone propionate 50 MICROgram(s)/spray Nasal Spray 1 Spray(s) Both Nostrils daily  indomethacin Suppository 100 milliGRAM(s) Rectal once  magnesium oxide 400 milliGRAM(s) Oral once  pantoprazole  Injectable 40 milliGRAM(s) IV Push daily  piperacillin/tazobactam IVPB.. 2.25 Gram(s) IV Intermittent every 12 hours  senna 2 Tablet(s) Oral at bedtime    PRN Meds  acetaminophen   Tablet .. 650 milliGRAM(s) Oral every 6 hours PRN  diphenhydrAMINE 50 milliGRAM(s) Oral every 8 hours PRN  morphine  - Injectable 2 milliGRAM(s) IV Push every 4 hours PRN  ondansetron Injectable 8 milliGRAM(s) IV Push four times a day PRN      Case discussed with resident    Care discussed with pt/family

## 2019-07-18 LAB
ALBUMIN SERPL ELPH-MCNC: 3.2 G/DL — LOW (ref 3.5–5.2)
ALP SERPL-CCNC: 191 U/L — HIGH (ref 30–115)
ALT FLD-CCNC: 87 U/L — HIGH (ref 0–41)
ANION GAP SERPL CALC-SCNC: 21 MMOL/L — HIGH (ref 7–14)
AST SERPL-CCNC: 72 U/L — HIGH (ref 0–41)
BASOPHILS # BLD AUTO: 0.05 K/UL — SIGNIFICANT CHANGE UP (ref 0–0.2)
BASOPHILS NFR BLD AUTO: 0.7 % — SIGNIFICANT CHANGE UP (ref 0–1)
BILIRUB SERPL-MCNC: 1.2 MG/DL — SIGNIFICANT CHANGE UP (ref 0.2–1.2)
BUN SERPL-MCNC: 51 MG/DL — HIGH (ref 10–20)
CALCIUM SERPL-MCNC: 8.9 MG/DL — SIGNIFICANT CHANGE UP (ref 8.5–10.1)
CHLORIDE SERPL-SCNC: 95 MMOL/L — LOW (ref 98–110)
CO2 SERPL-SCNC: 22 MMOL/L — SIGNIFICANT CHANGE UP (ref 17–32)
CREAT SERPL-MCNC: 8.7 MG/DL — CRITICAL HIGH (ref 0.7–1.5)
EOSINOPHIL # BLD AUTO: 0.41 K/UL — SIGNIFICANT CHANGE UP (ref 0–0.7)
EOSINOPHIL NFR BLD AUTO: 5.8 % — SIGNIFICANT CHANGE UP (ref 0–8)
GLUCOSE BLDC GLUCOMTR-MCNC: 142 MG/DL — HIGH (ref 70–99)
GLUCOSE BLDC GLUCOMTR-MCNC: 157 MG/DL — HIGH (ref 70–99)
GLUCOSE BLDC GLUCOMTR-MCNC: 97 MG/DL — SIGNIFICANT CHANGE UP (ref 70–99)
GLUCOSE SERPL-MCNC: 109 MG/DL — HIGH (ref 70–99)
HCT VFR BLD CALC: 31.4 % — LOW (ref 42–52)
HGB BLD-MCNC: 10 G/DL — LOW (ref 14–18)
IMM GRANULOCYTES NFR BLD AUTO: 1.4 % — HIGH (ref 0.1–0.3)
LYMPHOCYTES # BLD AUTO: 0.6 K/UL — LOW (ref 1.2–3.4)
LYMPHOCYTES # BLD AUTO: 8.5 % — LOW (ref 20.5–51.1)
MAGNESIUM SERPL-MCNC: 2.5 MG/DL — HIGH (ref 1.8–2.4)
MCHC RBC-ENTMCNC: 30.2 PG — SIGNIFICANT CHANGE UP (ref 27–31)
MCHC RBC-ENTMCNC: 31.8 G/DL — LOW (ref 32–37)
MCV RBC AUTO: 94.9 FL — HIGH (ref 80–94)
MONOCYTES # BLD AUTO: 1.09 K/UL — HIGH (ref 0.1–0.6)
MONOCYTES NFR BLD AUTO: 15.5 % — HIGH (ref 1.7–9.3)
NEUTROPHILS # BLD AUTO: 4.78 K/UL — SIGNIFICANT CHANGE UP (ref 1.4–6.5)
NEUTROPHILS NFR BLD AUTO: 68.1 % — SIGNIFICANT CHANGE UP (ref 42.2–75.2)
NRBC # BLD: 0 /100 WBCS — SIGNIFICANT CHANGE UP (ref 0–0)
PHOSPHATE SERPL-MCNC: 4.8 MG/DL — SIGNIFICANT CHANGE UP (ref 2.1–4.9)
PLATELET # BLD AUTO: 115 K/UL — LOW (ref 130–400)
POTASSIUM SERPL-MCNC: 4.2 MMOL/L — SIGNIFICANT CHANGE UP (ref 3.5–5)
POTASSIUM SERPL-SCNC: 4.2 MMOL/L — SIGNIFICANT CHANGE UP (ref 3.5–5)
PROT SERPL-MCNC: 6.5 G/DL — SIGNIFICANT CHANGE UP (ref 6–8)
RBC # BLD: 3.31 M/UL — LOW (ref 4.7–6.1)
RBC # FLD: 13.9 % — SIGNIFICANT CHANGE UP (ref 11.5–14.5)
SODIUM SERPL-SCNC: 138 MMOL/L — SIGNIFICANT CHANGE UP (ref 135–146)
TROPONIN T SERPL-MCNC: 0.29 NG/ML — CRITICAL HIGH
TROPONIN T SERPL-MCNC: 0.31 NG/ML — CRITICAL HIGH
WBC # BLD: 7.03 K/UL — SIGNIFICANT CHANGE UP (ref 4.8–10.8)
WBC # FLD AUTO: 7.03 K/UL — SIGNIFICANT CHANGE UP (ref 4.8–10.8)

## 2019-07-18 PROCEDURE — 71045 X-RAY EXAM CHEST 1 VIEW: CPT | Mod: 26

## 2019-07-18 PROCEDURE — 99233 SBSQ HOSP IP/OBS HIGH 50: CPT

## 2019-07-18 RX ORDER — METOPROLOL TARTRATE 50 MG
25 TABLET ORAL
Refills: 0 | Status: DISCONTINUED | OUTPATIENT
Start: 2019-07-18 | End: 2019-07-20

## 2019-07-18 RX ORDER — APIXABAN 2.5 MG/1
5 TABLET, FILM COATED ORAL
Refills: 0 | Status: DISCONTINUED | OUTPATIENT
Start: 2019-07-18 | End: 2019-07-20

## 2019-07-18 RX ADMIN — Medication 1 SPRAY(S): at 12:46

## 2019-07-18 RX ADMIN — Medication 25 MILLIGRAM(S): at 18:17

## 2019-07-18 RX ADMIN — PIPERACILLIN AND TAZOBACTAM 200 GRAM(S): 4; .5 INJECTION, POWDER, LYOPHILIZED, FOR SOLUTION INTRAVENOUS at 05:25

## 2019-07-18 RX ADMIN — PIPERACILLIN AND TAZOBACTAM 200 GRAM(S): 4; .5 INJECTION, POWDER, LYOPHILIZED, FOR SOLUTION INTRAVENOUS at 18:05

## 2019-07-18 RX ADMIN — APIXABAN 5 MILLIGRAM(S): 2.5 TABLET, FILM COATED ORAL at 18:03

## 2019-07-18 RX ADMIN — ATORVASTATIN CALCIUM 40 MILLIGRAM(S): 80 TABLET, FILM COATED ORAL at 21:47

## 2019-07-18 RX ADMIN — PANTOPRAZOLE SODIUM 40 MILLIGRAM(S): 20 TABLET, DELAYED RELEASE ORAL at 12:46

## 2019-07-18 RX ADMIN — APIXABAN 2.5 MILLIGRAM(S): 2.5 TABLET, FILM COATED ORAL at 05:25

## 2019-07-18 RX ADMIN — Medication 81 MILLIGRAM(S): at 12:45

## 2019-07-18 NOTE — PROGRESS NOTE ADULT - SUBJECTIVE AND OBJECTIVE BOX
SUBJ:No chest pain or shortness of breath      MEDICATIONS  (STANDING):  apixaban 5 milliGRAM(s) Oral two times a day  aspirin  chewable 81 milliGRAM(s) Oral daily  atorvastatin 40 milliGRAM(s) Oral at bedtime  chlorhexidine 4% Liquid 1 Application(s) Topical every 12 hours  docusate sodium 100 milliGRAM(s) Oral two times a day  fluticasone propionate 50 MICROgram(s)/spray Nasal Spray 1 Spray(s) Both Nostrils daily  indomethacin Suppository 100 milliGRAM(s) Rectal once  pantoprazole  Injectable 40 milliGRAM(s) IV Push daily  piperacillin/tazobactam IVPB.. 2.25 Gram(s) IV Intermittent every 12 hours  senna 2 Tablet(s) Oral at bedtime    MEDICATIONS  (PRN):  acetaminophen   Tablet .. 650 milliGRAM(s) Oral every 6 hours PRN Temp greater or equal to 38C (100.4F), Mild Pain (1 - 3)  diphenhydrAMINE 50 milliGRAM(s) Oral every 8 hours PRN Rash and/or Itching  morphine  - Injectable 2 milliGRAM(s) IV Push every 4 hours PRN Moderate Pain (4 - 6)  ondansetron Injectable 8 milliGRAM(s) IV Push four times a day PRN Nausea and/or Vomiting            Vital Signs Last 24 Hrs  T(C): 36.7 (18 Jul 2019 05:28), Max: 37.1 (17 Jul 2019 20:00)  T(F): 98 (18 Jul 2019 05:28), Max: 98.8 (17 Jul 2019 20:00)  HR: 85 (18 Jul 2019 13:15) (85 - 100)  BP: 160/95 (18 Jul 2019 13:15) (108/60 - 178/86)  BP(mean): 91 (17 Jul 2019 20:00) (74 - 105)  RR: 16 (18 Jul 2019 13:15) (16 - 22)  SpO2: 98% (17 Jul 2019 20:00) (98% - 98%)      ECG:NML    TTE:    LABS:                        10.0   7.03  )-----------( 115      ( 18 Jul 2019 06:38 )             31.4     07-18    138  |  95<L>  |  51<H>  ----------------------------<  109<H>  4.2   |  22  |  8.7<HH>    Ca    8.9      18 Jul 2019 06:38  Phos  4.8     07-18  Mg     2.5     07-18    TPro  6.5  /  Alb  3.2<L>  /  TBili  1.2  /  DBili  x   /  AST  72<H>  /  ALT  87<H>  /  AlkPhos  191<H>  07-18    CARDIAC MARKERS ( 18 Jul 2019 06:38 )  x     / 0.31 ng/mL / x     / x     / x      CARDIAC MARKERS ( 18 Jul 2019 00:56 )  x     / 0.29 ng/mL / x     / x     / x              I&O's Summary    17 Jul 2019 07:01  -  18 Jul 2019 07:00  --------------------------------------------------------  IN: 440 mL / OUT: 0 mL / NET: 440 mL    18 Jul 2019 07:01  -  18 Jul 2019 13:47  --------------------------------------------------------  IN: 440 mL / OUT: 0 mL / NET: 440 mL      BNP

## 2019-07-18 NOTE — PROGRESS NOTE ADULT - ASSESSMENT
67 yo M with PMH of DM, ESRD on HD (T,Th, S), HTN, DLD, CAD s/p PCI  comes to the ED with the c/o acute onset of RUQ/ epigastric abdominal pain for one day associated with nausea and vomiting. found to have choledocholithiasis s/p ERCP w CBD stent placement with elevating ALP, clinically stable.       # Choledocholithiasis   - CBD dilation on US abdomen and stone on CT scan   - GI: ERCP: w/CBD stent placement for decompression with good response  - Patient will need repeat ERCP in 6 weeks for stent removal and stone removal- at that time he will need to be off Plavix for one week  - Will need follow up on discharge with Dr oNwak [phone]  - no need for central line post-ERCP, removed in CCU  - will c/w Zosyn, started on 7/15/19 ID follow up as per Dr. Lozano    # New onset - A -fib, no events on tele  - d/c Eliquis 2.5 mg BID, start eliquis 5mg BID, c/w ASA, but Hold Plavix as per Dr. Ceron  - follow up Transthoracic Echo    # HTN   - Hold losartan, Metoprolol and Imdur for now  - consider resuming bp meds if pressure are high    # ESRD on HD (T/ Th/ S)  - Nephrology  -HD today  - corrected Ca at goal, follow Mg, Ph, iPTH, BMP  - Hb noted, f/u iron stores  - no need for GAURANG at the moment.    # Elevated Troponin  - PT is ESRD on HD ( Tu, Thu, Sat)  - will continue to trend    # CAD s/p 3 stents   - C/w home meds  - ASA, hold Plavix    # DM  - Check FS and start Insulin if FS >180     - Diet  - on low fat diet today    # DVT ppx  - Heparin 67 yo M with PMH of DM, ESRD on HD (T,Th, S), HTN, DLD, CAD s/p PCI  comes to the ED with the c/o acute onset of RUQ/ epigastric abdominal pain for one day associated with nausea and vomiting. found to have choledocholithiasis s/p ERCP w CBD stent placement with elevating ALP, clinically stable.       # Choledocholithiasis s/p ERCP and CBD stent placement   - CBD dilation on US abdomen and stone on CT scan   - GI: ERCP: w/CBD stent placement for decompression with good response  - Patient will need repeat ERCP in 6 weeks for stent removal and stone removal- at that time he will need to be off Plavix for one week  - Will need follow up on discharge with Dr Nowak [phone]  - no need for central line post-ERCP, removed in CCU  - will c/w Zosyn, started on 7/15/19 ID follow up as per Dr. Lozano  -elevated ALP, uptrending, will consult GI     # New onset - A -fib, no events on tele  - d/c Eliquis 2.5 mg BID, start eliquis 5mg BID, c/w ASA, but Hold Plavix as per Dr. Ceron  - follow up Transthoracic Echo    # HTN   - Hold losartan, Metoprolol and Imdur for now  - consider resuming bp meds if pressure are high    # ESRD on HD (T/ Th/ S)  - Nephrology  -HD today  - cont to monitor Mg, Ph  - Hb noted, serum iron WNL, TIBC low, ferritin elevated, transferrin elevated, will obtain vit B12   - no need for GAURANG at the moment     # Elevated Troponin  - PT is ESRD on HD ( Tu, Thu, Sat)  - will continue to trend    # CAD s/p 3 stents   - C/w home meds  - ASA, hold Plavix    # DM  - Check FS and start Insulin if FS >180     - Diet  - on low fat diet today    # DVT ppx  - Heparin

## 2019-07-18 NOTE — PROGRESS NOTE ADULT - SUBJECTIVE AND OBJECTIVE BOX
Nephrology progress note    Patient was seen and examined, events over the last 24 h noted   For HD today.    Allergies:  No Known Allergies    Hospital Medications:   MEDICATIONS  (STANDING):  apixaban 5 milliGRAM(s) Oral two times a day  aspirin  chewable 81 milliGRAM(s) Oral daily  atorvastatin 40 milliGRAM(s) Oral at bedtime  chlorhexidine 4% Liquid 1 Application(s) Topical every 12 hours  docusate sodium 100 milliGRAM(s) Oral two times a day  fluticasone propionate 50 MICROgram(s)/spray Nasal Spray 1 Spray(s) Both Nostrils daily  indomethacin Suppository 100 milliGRAM(s) Rectal once  pantoprazole  Injectable 40 milliGRAM(s) IV Push daily  piperacillin/tazobactam IVPB.. 2.25 Gram(s) IV Intermittent every 12 hours  senna 2 Tablet(s) Oral at bedtime        VITALS:  T(F): 98 (07-18-19 @ 05:28), Max: 98.8 (07-17-19 @ 20:00)  HR: 100 (07-18-19 @ 05:28)  BP: 178/86 (07-18-19 @ 05:28)  RR: 18 (07-18-19 @ 05:28)  SpO2: 98% (07-17-19 @ 20:00)  Wt(kg): --    07-16 @ 07:01  -  07-17 @ 07:00  --------------------------------------------------------  IN: 580 mL / OUT: 1000 mL / NET: -420 mL    07-17 @ 07:01  -  07-18 @ 07:00  --------------------------------------------------------  IN: 440 mL / OUT: 0 mL / NET: 440 mL    07-18 @ 07:01  -  07-18 @ 12:57  --------------------------------------------------------  IN: 440 mL / OUT: 0 mL / NET: 440 mL          PHYSICAL EXAM:  Constitutional: NAD  HEENT: anicteric sclera, oropharynx clear, MMM  Neck: No JVD  Respiratory: CTAB, no wheezes, rales or rhonchi  Cardiovascular: S1, S2, RRR  Gastrointestinal: BS+, soft, NT/ND  Extremities: No cyanosis or clubbing. No peripheral edema  :  No elder.   Skin: No rashes    LABS:  07-18    138  |  95<L>  |  51<H>  ----------------------------<  109<H>  4.2   |  22  |  8.7<HH>    Ca    8.9      18 Jul 2019 06:38  Phos  4.8     07-18  Mg     2.5     07-18    TPro  6.5  /  Alb  3.2<L>  /  TBili  1.2  /  DBili      /  AST  72<H>  /  ALT  87<H>  /  AlkPhos  191<H>  07-18                          10.0   7.03  )-----------( 115      ( 18 Jul 2019 06:38 )             31.4       Urine Studies:      RADIOLOGY & ADDITIONAL STUDIES:

## 2019-07-18 NOTE — PROGRESS NOTE ADULT - ASSESSMENT
1)  ESRD on HD for HD today 3 hrs 1-2 L UF    2)  Acute-onset epigastric abdominal pain, N/V, elevated bilirubin, alk phos, GGT, and rising AST/ALT.  sp CT scan w/ what appears to be a CBD stone w/ dilated GB.  s/p ERCP: w/CBD stent placement for decompression with good response  on Zosyn leukocytosis resolved       3) Hb noted, check iron stores, no need fo GAURANG at the moment.    4) BP improved  off pressors,  may need start resuming outpt regimen cautiously can start w/ B blocker

## 2019-07-18 NOTE — PROGRESS NOTE ADULT - SUBJECTIVE AND OBJECTIVE BOX
MAMTA FERNANDO  68y Male    CHIEF COMPLAINT:    Patient is a 68y old  Male who presents with a chief complaint of Abdominal pain (2019 13:59)      INTERVAL HPI/OVERNIGHT EVENTS:    Patient seen and examined. Having watery diarrhea. No abdominal pain. No N/V. No fever    ROS: All other systems are negative.    Vital Signs:    T(F): 98 (19 @ 05:28), Max: 98.8 (19 @ 20:00)  HR: 80 (19 @ 16:32) (80 - 100)  BP: 151/88 (19 @ 16:32) (117/73 - 178/86)  RR: 16 (19 @ 16:32) (16 - 22)  SpO2: 98% (19 @ 20:00) (98% - 98%)  I&O's Summary    2019 07:  -  2019 07:00  --------------------------------------------------------  IN: 440 mL / OUT: 0 mL / NET: 440 mL    2019 07:01  -  2019 16:46  --------------------------------------------------------  IN: 440 mL / OUT: 2000 mL / NET: -1560 mL      Daily     Daily Weight in k (2019 05:28)  CAPILLARY BLOOD GLUCOSE      POCT Blood Glucose.: 142 mg/dL (2019 11:43)  POCT Blood Glucose.: 97 mg/dL (2019 08:12)  POCT Blood Glucose.: 129 mg/dL (2019 20:15)      PHYSICAL EXAM:    GENERAL:  NAD  SKIN: No rashes or lesions  HENT: Atraumatic Normocephalic. PERRL. Moist membranes.  NECK: Supple, No JVD. No lymphadenopathy.  PULMONARY: CTA B/L. No wheezing. No rales  CVS: Normal S1, S2. Rate and Rhythm are IRIR. No murmurs.  ABDOMEN/GI: Soft, Nontender, Nondistended; BS present  EXTREMITIES: Peripheral pulses intact. No edema B/L LE.  NEUROLOGIC:  No motor or sensory deficit.  PSYCH: Alert & oriented x 3    Consultant(s) Notes Reviewed:  [x ] YES  [ ] NO  Care Discussed with Consultants/Other Providers [ x] YES  [ ] NO    EKG reviewed  Telemetry reviewed    LABS:                        10.0   7.03  )-----------( 115      ( 2019 06:38 )             31.4     -18    138  |  95<L>  |  51<H>  ----------------------------<  109<H>  4.2   |  22  |  8.7<HH>    Ca    8.9      2019 06:38  Phos  4.8       Mg     2.5         TPro  6.5  /  Alb  3.2<L>  /  TBili  1.2  /  DBili  x   /  AST  72<H>  /  ALT  87<H>  /  AlkPhos  191<H>  18      T sagar 1.2     AST 72  ALT 87  1819 @ 06:38  T sagar 1.7     AST 38  ALT 99  17-19 @ 07:56  T sagar 2.1     AST 61    07-15-19 @ 23:30  T sagar 3.3         19 @ 21:35  T sagar 3.3         19 @ 15:13    Trop 0.31, CKMB --, CK --, 19 @ 06:38  Trop 0.29, CKMB --, CK --, 19 @ 00:56  Trop 0.18, CKMB --, CK --, 07-15-19 @ 20:00        RADIOLOGY & ADDITIONAL TESTS:      Imaging or report Personally Reviewed:  [ ] YES  [ ] NO    Medications:  Standing  apixaban 5 milliGRAM(s) Oral two times a day  aspirin  chewable 81 milliGRAM(s) Oral daily  atorvastatin 40 milliGRAM(s) Oral at bedtime  chlorhexidine 4% Liquid 1 Application(s) Topical every 12 hours  docusate sodium 100 milliGRAM(s) Oral two times a day  fluticasone propionate 50 MICROgram(s)/spray Nasal Spray 1 Spray(s) Both Nostrils daily  indomethacin Suppository 100 milliGRAM(s) Rectal once  pantoprazole  Injectable 40 milliGRAM(s) IV Push daily  piperacillin/tazobactam IVPB.. 2.25 Gram(s) IV Intermittent every 12 hours  senna 2 Tablet(s) Oral at bedtime    PRN Meds  acetaminophen   Tablet .. 650 milliGRAM(s) Oral every 6 hours PRN  diphenhydrAMINE 50 milliGRAM(s) Oral every 8 hours PRN  morphine  - Injectable 2 milliGRAM(s) IV Push every 4 hours PRN  ondansetron Injectable 8 milliGRAM(s) IV Push four times a day PRN      Case discussed with resident    Care discussed with pt/family

## 2019-07-18 NOTE — PROGRESS NOTE ADULT - SUBJECTIVE AND OBJECTIVE BOX
SUBJECTIVE:    Patient is a 68y old Male who presents with a chief complaint of Abdominal pain (18 Jul 2019 13:47)    Currently admitted to medicine with the primary diagnosis of Choledocholithiasis     Today is hospital day 4d. This morning he is resting comfortably in bed and reports no new issues or overnight events.     INTERVAL EVENTS:     PAST MEDICAL & SURGICAL HISTORY  Anemia  Heart failure  HLD (hyperlipidemia)  HTN (hypertension)  ESRD on dialysis: T/ TH/ S  H/O right coronary artery stent placement: stent x3      ALLERGIES:  No Known Allergies    MEDICATIONS:  STANDING MEDICATIONS  apixaban 5 milliGRAM(s) Oral two times a day  aspirin  chewable 81 milliGRAM(s) Oral daily  atorvastatin 40 milliGRAM(s) Oral at bedtime  chlorhexidine 4% Liquid 1 Application(s) Topical every 12 hours  docusate sodium 100 milliGRAM(s) Oral two times a day  fluticasone propionate 50 MICROgram(s)/spray Nasal Spray 1 Spray(s) Both Nostrils daily  indomethacin Suppository 100 milliGRAM(s) Rectal once  pantoprazole  Injectable 40 milliGRAM(s) IV Push daily  piperacillin/tazobactam IVPB.. 2.25 Gram(s) IV Intermittent every 12 hours  senna 2 Tablet(s) Oral at bedtime    PRN MEDICATIONS  acetaminophen   Tablet .. 650 milliGRAM(s) Oral every 6 hours PRN  diphenhydrAMINE 50 milliGRAM(s) Oral every 8 hours PRN  morphine  - Injectable 2 milliGRAM(s) IV Push every 4 hours PRN  ondansetron Injectable 8 milliGRAM(s) IV Push four times a day PRN    VITALS:   T(F): 98  HR: 85  BP: 160/95  RR: 16  SpO2: 98%    LABS:                        10.0   7.03  )-----------( 115      ( 18 Jul 2019 06:38 )             31.4     07-18    138  |  95<L>  |  51<H>  ----------------------------<  109<H>  4.2   |  22  |  8.7<HH>    Ca    8.9      18 Jul 2019 06:38  Phos  4.8     07-18  Mg     2.5     07-18    TPro  6.5  /  Alb  3.2<L>  /  TBili  1.2  /  DBili  x   /  AST  72<H>  /  ALT  87<H>  /  AlkPhos  191<H>  07-18          Troponin T, Serum: 0.31 ng/mL <HH> (07-18-19 @ 06:38)  Troponin T, Serum: 0.29 ng/mL <HH> (07-18-19 @ 00:56)      CARDIAC MARKERS ( 18 Jul 2019 06:38 )  x     / 0.31 ng/mL / x     / x     / x      CARDIAC MARKERS ( 18 Jul 2019 00:56 )  x     / 0.29 ng/mL / x     / x     / x          RADIOLOGY:    PHYSICAL EXAM:  GEN: No acute distress  PULM/CHEST: Clear to auscultation bilaterally, no rales, rhonchi or wheezes   CVS: irregular rate and rhythm, S1-S2, no murmurs  ABD: Soft, non-tender, non-distended, +BS  EXT: No edema  NEURO: AAOx3    Bailey Catheter: SUBJECTIVE:    Patient is a 68y old Male who presents with a chief complaint of Abdominal pain (18 Jul 2019 13:47)    Currently admitted to medicine with the primary diagnosis of Choledocholithiasis     Today is hospital day 4d. He reports having had 2BMs this morning, feels better today. Denies CP, abd pain, n/v      INTERVAL EVENTS: stepdown     PAST MEDICAL & SURGICAL HISTORY  Anemia  Heart failure  HLD (hyperlipidemia)  HTN (hypertension)  ESRD on dialysis: T/ TH/ S  H/O right coronary artery stent placement: stent x3      ALLERGIES:  No Known Allergies    MEDICATIONS:  STANDING MEDICATIONS  apixaban 5 milliGRAM(s) Oral two times a day  aspirin  chewable 81 milliGRAM(s) Oral daily  atorvastatin 40 milliGRAM(s) Oral at bedtime  chlorhexidine 4% Liquid 1 Application(s) Topical every 12 hours  docusate sodium 100 milliGRAM(s) Oral two times a day  fluticasone propionate 50 MICROgram(s)/spray Nasal Spray 1 Spray(s) Both Nostrils daily  indomethacin Suppository 100 milliGRAM(s) Rectal once  pantoprazole  Injectable 40 milliGRAM(s) IV Push daily  piperacillin/tazobactam IVPB.. 2.25 Gram(s) IV Intermittent every 12 hours  senna 2 Tablet(s) Oral at bedtime    PRN MEDICATIONS  acetaminophen   Tablet .. 650 milliGRAM(s) Oral every 6 hours PRN  diphenhydrAMINE 50 milliGRAM(s) Oral every 8 hours PRN  morphine  - Injectable 2 milliGRAM(s) IV Push every 4 hours PRN  ondansetron Injectable 8 milliGRAM(s) IV Push four times a day PRN    VITALS:   T(F): 98  HR: 85  BP: 160/95  RR: 16  SpO2: 98%    LABS:                        10.0   7.03  )-----------( 115      ( 18 Jul 2019 06:38 )             31.4     07-18    138  |  95<L>  |  51<H>  ----------------------------<  109<H>  4.2   |  22  |  8.7<HH>    Ca    8.9      18 Jul 2019 06:38  Phos  4.8     07-18  Mg     2.5     07-18    TPro  6.5  /  Alb  3.2<L>  /  TBili  1.2  /  DBili  x   /  AST  72<H>  /  ALT  87<H>  /  AlkPhos  191<H>  07-18    Troponin T, Serum: 0.31 ng/mL <HH> (07-18-19 @ 06:38)  Troponin T, Serum: 0.29 ng/mL <HH> (07-18-19 @ 00:56)      CARDIAC MARKERS ( 18 Jul 2019 06:38 )  x     / 0.31 ng/mL / x     / x     / x      CARDIAC MARKERS ( 18 Jul 2019 00:56 )  x     / 0.29 ng/mL / x     / x     / x          RADIOLOGY:    PHYSICAL EXAM:  GEN: No acute distress  PULM/CHEST: Clear to auscultation bilaterally, no rales, rhonchi or wheezes   CVS: irregular rate and rhythm, S1-S2, no murmurs  ABD: obese, soft, non-tender, non-distended, +BS  EXT: No edema  NEURO: AAOx3

## 2019-07-18 NOTE — PROGRESS NOTE ADULT - ASSESSMENT
69 yo M with PMH of DM, ESRD on HD (T,Th, S), HTN, DLD, CAD s/p PCI  comes to the ED with the c/o acute onset of RUQ/ epigastric abdominal pain for one day associated with nausea and vomiting. PT states that the pain started today afternoon after the dialysis, colicky in nature, non radiating,  and constant. He had 3-4 episode of  vomiting.     1.	Choledocholithiasis / Cholangitis  2.	Sepsis due to # 1  3.	ESRD on HD  4.	CAD / HTN / DL  5.	Lactic acidosis  6.	NSTEMI type 2  7.	A. Fib         PLAN:    ·	Having diarrhea. D/C Senna  ·	ALP trending up. Please recall GI  ·	Monitor LFT'S  ·	S/P ERCP and CBD stent placement for decompression.   ·	Cont low fat diet  ·	Cont IV Zosyn.   ·	Pain control  ·	Cont ASA. Plavix was stopped by the cardiology  ·	Elevated Troponin due to sepsis and ESRD  ·	Monitor FS. On no meds.

## 2019-07-18 NOTE — CONSULT NOTE ADULT - ASSESSMENT

## 2019-07-18 NOTE — CONSULT NOTE ADULT - SUBJECTIVE AND OBJECTIVE BOX
[facility]  INITIAL CONSULT NOTE  --------------------------------------------------------------------------------  HPI:  67 yo  male h/o ASHD/CHF, dyslipidemia, HTN, ESRD on dialysis at [facility] (last HD 7/13).  Came to ER c/o epigastric abdominal pain that began after dialysis yesterday.  Had fever to 38 degrees in ER, hemodynamically stable.  CT A/P showed thickened GB wall, dilated CBD and what appears to be a distal CBD stone.  Initial labwork revealed a serum lactic acid of 7, nl WBC.  Today, lactic acid still 6.3, WBC rising, and had temp > 103 earlier today.  Pain resolved w/ medications.        PAST HISTORY  --------------------------------------------------------------------------------  PAST MEDICAL & SURGICAL HISTORY:  Anemia  Heart failure  HLD (hyperlipidemia)  HTN (hypertension)  ESRD on dialysis: T/ TH/ S  H/O right coronary artery stent placement: stent x3    FAMILY HISTORY:    PAST SOCIAL HISTORY:    ALLERGIES & MEDICATIONS  --------------------------------------------------------------------------------  Allergies    No Known Allergies    Intolerances      Standing Inpatient Medications  aspirin  chewable 81 milliGRAM(s) Oral daily  atorvastatin 40 milliGRAM(s) Oral at bedtime  cefTRIAXone   IVPB 2000 milliGRAM(s) IV Intermittent every 24 hours  chlorhexidine 4% Liquid 1 Application(s) Topical every 12 hours  clopidogrel Tablet 75 milliGRAM(s) Oral daily  docusate sodium 100 milliGRAM(s) Oral two times a day  fluticasone propionate 50 MICROgram(s)/spray Nasal Spray 1 Spray(s) Both Nostrils daily  heparin  Injectable 5000 Unit(s) SubCutaneous every 8 hours  metroNIDAZOLE  IVPB 500 milliGRAM(s) IV Intermittent every 8 hours  pantoprazole  Injectable 40 milliGRAM(s) IV Push daily  senna 2 Tablet(s) Oral at bedtime    PRN Inpatient Medications  acetaminophen   Tablet .. 650 milliGRAM(s) Oral every 6 hours PRN  diphenhydrAMINE 50 milliGRAM(s) Oral every 8 hours PRN  morphine  - Injectable 2 milliGRAM(s) IV Push every 4 hours PRN  ondansetron Injectable 8 milliGRAM(s) IV Push four times a day PRN      REVIEW OF SYSTEMS  --------------------------------------------------------------------------------  Gen: No weight changes, fatigue, fevers/chills, weakness  Skin: No rashes  Head/Eyes/Ears/Mouth: No headache; Normal hearing; Normal vision w/o blurriness; No sinus pain/discomfort, sore throat  Respiratory: No dyspnea, cough, wheezing, hemoptysis  CV: No chest pain, PND, orthopnea  GI: No abdominal pain, diarrhea, constipation, nausea, vomiting, melena, hematochezia  : No increased frequency, dysuria, hematuria, nocturia  MSK: No joint pain/swelling; no back pain; no edema  Neuro: No dizziness/lightheadedness, weakness, seizures, numbness, tingling  Heme: No easy bruising or bleeding  Endo: No heat/cold intolerance  Psych: No significant nervousness, anxiety, stress, depression    All other systems were reviewed and are negative, except as noted.    VITALS/PHYSICAL EXAM  --------------------------------------------------------------------------------  T(C): 38.4 (07-14-19 @ 13:03), Max: 39.6 (07-14-19 @ 11:19)  HR: 92 (07-14-19 @ 12:00) (85 - 110)  BP: 105/55 (07-14-19 @ 12:00) (105/55 - 172/78)  RR: 22 (07-14-19 @ 12:00) (16 - 26)  SpO2: 99% (07-14-19 @ 12:00) (99% - 100%)  Wt(kg): --  Height (cm): 165.1 (07-14-19 @ 01:04)  Weight (kg): 82.5 (07-13-19 @ 19:13)  BMI (kg/m2): 30.3 (07-14-19 @ 01:04)  BSA (m2): 1.9 (07-14-19 @ 01:04)      07-13-19 @ 07:01  -  07-14-19 @ 07:00  --------------------------------------------------------  IN: 100 mL / OUT: 0 mL / NET: 100 mL    07-14-19 @ 07:01  -  07-14-19 @ 13:21  --------------------------------------------------------  IN: 200 mL / OUT: 0 mL / NET: 200 mL      Physical Exam:  	Gen: NAD, appears fatigued  	HEENT: PERRL, supple neck, clear oropharynx  	Pulm: CTA B/L  	CV: RRR, S1S2; no rub  	Back: No spinal or CVA tenderness; no sacral edema  	Abd: +BS, soft, nontender/nondistended, no mass  	: No suprapubic tenderness  	UE: Warm, FROM, no clubbing, intact strength; no edema: RUE AVF  	LE: Warm, FROM, no clubbing, intact strength; no edema  	Neuro: No focal deficits, awake and alert  	Psych: Normal affect and mood  	Skin: Warm, without rashes  	Vascular access:    LABS/STUDIES  --------------------------------------------------------------------------------              10.7   14.63 >-----------<  134      [07-14-19 @ 05:31]              33.1     140  |  93  |  41  ----------------------------<  157      [07-14-19 @ 05:31]  4.3   |  26  |  6.3        Ca     9.0     [07-14-19 @ 05:31]      Mg     1.6     [07-13-19 @ 19:40]    TPro  7.1  /  Alb  4.2  /  TBili  2.6  /  DBili  x   /  AST  256  /  ALT  225  /  AlkPhos  172  [07-14-19 @ 05:31]    PT/INR: PT 11.40, INR 0.99       [07-13-19 @ 19:40]  PTT: 30.0       [07-13-19 @ 19:40]    Troponin 0.16      [07-14-19 @ 05:31]    Creatinine Trend:  SCr 6.3 [07-14 @ 05:31]  SCr 5.1 [07-13 @ 19:40]    Urinalysis - [12-13-18 @ 21:20]      Color Yellow / Appearance Turbid / SG 1.020 / pH 7.5      Gluc Negative / Ketone Negative  / Bili Negative / Urobili 0.2       Blood Moderate / Protein >=300 / Leuk Est Large / Nitrite Negative      RBC >50 / WBC >50 / Hyaline  / Gran  / Sq Epi  / Non Sq Epi Few / Bacteria Many
Gastroenterology Consult    68y yo Male with choledocholithiasis and cholangitis    HPI:  67 yo M with PMH of DM, ESRD on HD (T,Th, S), HTN, DLD, CAD s/p PCI  comes to the ED with the c/o acute onset of RUQ/ epigastric abdominal pain for one day associated with nausea and vomiting. PT states that the pain started today afternoon after the dialysis, colicky in nature, non radiating,  and constant. He had 3-4 episode of  vomiting.     He denies black stool, diarrhea, constipations, back pain, chest pain, palpitations, chest pain or shortness of breath. He has h/o GERD and takes protonix BID.     In the ED patient had chills and Tmax 100.8. Hemodynamically stable. CT scan showed distended gall bladder, US negative for the whitley's sign. CBD was dilated. His lactate was 7 on admission. (14 Jul 2019 00:04)      PAST MEDICAL & SURGICAL HISTORY:  Anemia  Heart failure  HLD (hyperlipidemia)  HTN (hypertension)  ESRD on dialysis: T/ TH/ S  H/O right coronary artery stent placement: stent x3      Allergies; No Known Allergies      Medications: acetaminophen   Tablet .. 650 milliGRAM(s) Oral every 6 hours PRN  aspirin  chewable 81 milliGRAM(s) Oral daily  atorvastatin 40 milliGRAM(s) Oral at bedtime  chlorhexidine 4% Liquid 1 Application(s) Topical every 12 hours  clopidogrel Tablet 75 milliGRAM(s) Oral daily  diphenhydrAMINE 50 milliGRAM(s) Oral every 8 hours PRN  docusate sodium 100 milliGRAM(s) Oral two times a day  fluticasone propionate 50 MICROgram(s)/spray Nasal Spray 1 Spray(s) Both Nostrils daily  heparin  Injectable 5000 Unit(s) SubCutaneous every 8 hours  magnesium sulfate  IVPB 2 Gram(s) IV Intermittent every 2 hours  morphine  - Injectable 2 milliGRAM(s) IV Push every 4 hours PRN  ondansetron Injectable 8 milliGRAM(s) IV Push four times a day PRN  pantoprazole  Injectable 40 milliGRAM(s) IV Push daily  piperacillin/tazobactam IVPB.. 2.25 Gram(s) IV Intermittent every 12 hours  senna 2 Tablet(s) Oral at bedtime      Review of Systems: noncontributory, other than in inital H & P    Social History:    Physical Examination:  T(C): 36.4 (07-14-19 @ 16:59), Max: 39.6 (07-14-19 @ 11:19)  HR: 77 (07-14-19 @ 17:22) (77 - 110)  BP: 79/49 (07-14-19 @ 17:22) (71/48 - 172/78)  RR: 24 (07-14-19 @ 17:22) (16 - 26)  SpO2: 100% (07-14-19 @ 17:22) (99% - 100%)      GENERAL:  Appears stated age, well-groomed, well-nourished, no distress  HEENT:  NC/AT,  conjunctivae clear and pink, no thyromegaly, nodules, adenopathy, no JVD, sclera -anicteric  CHEST:  Full & symmetric excursion, no increased effort, breath sounds clear  HEART:  Regular rhythm, S1, S2, no murmur/rub/S3/S4, no abdominal bruit, no edema  ABDOMEN:  Soft, epigastric tenderness, non-distended, normoactive bowel sounds,  no masses ,no hepato-splenomegaly, no signs of chronic liver disease  EXTREMITIES:  no cyanosis,clubbing or edema  SKIN:  No rash/erythema/ecchymoses/petechiae/wounds/abscess/warm/dry  NEURO:  Alert, oriented, no asterixis, no tremor, no encephalopathy               CT Abdomen and Pelvis w/ IV Cont:   EXAM:  CT ABDOMEN AND PELVIS IC            PROCEDURE DATE:  07/13/2019            INTERPRETATION:  CLINICAL STATEMENT: Abdominal pain and vomiting.      TECHNIQUE: Contiguous axial CT images were obtained from the lower chest   to the pubic symphysis following administration of 100cc Optiray 320   intravenous contrast.  Oral contrast was not administered.  Reformatted   images in the coronal and sagittal planes were acquired.    COMPARISON CT: 12/13/2018.    OTHER STUDIES USED FOR CORRELATION: None.       FINDINGS:    LOWER CHEST: Unremarkable.    HEPATOBILIARY: Mild gallbladder wall thickening. Distended gallbladder. 4   mm density in the lower CBD (series 4, image 137), question   choledocholith. Otherwise unremarkable.    SPLEEN: Unremarkable.    PANCREAS: Unremarkable.    ADRENAL GLANDS: Unremarkable.    KIDNEYS: Symmetric bilateral renal enhancement. Bilateral renal cysts and  additional subcentimeter hypodense lesions, too small to characterize.    ABDOMINOPELVIC NODES: Unremarkable.    PELVIC ORGANS: Mildly enlarged prostate gland.    PERITONEUM/MESENTERY/BOWEL: Scattered colonic diverticulosis. No bowel   obstruction.    BONES/SOFT TISSUES: No acute osseous abnormalities. Degenerative changes   of  the thoracolumbar spine and bilateral hips noted.    OTHER: Extensive atherosclerotic vascular calcifications of the aorta and  its major branches. Small bilateral fat-containing inguinal hernias.        IMPRESSION:    Distended gallbladder with mild wall thickening. 4 mm density in the   lower CBD, suspect choledocholith.     Other incidental findings as described.                  SAHIL PIERRE M.D., ATTENDING RADIOLOGIST  This document has been electronically signed. Jul 13 2019 10:32PM             (07-13-19 @ 22:14)      Impression: 68y yo Male with cholangitis/choledocholithiasis.  WBC increased from 6 to 18, bilirubin elevations worsening. At 3 pM , pt's blood pressure dropped to 80/60      Recommendation:  Stabilize hemodynamics  Change antibiotics to Merrem and Flagyl  Transfer to [facility] as soon as stable.  ADN  at [facility] contacted to obtain nursing staff to assist with ERCP urgently  Hold Heparin
HPI:  67 yo M with PMH of DM, ESRD on HD (T,Th, S), HTN, DLD, CAD s/p PCI  comes to the ED with the c/o acute onset of RUQ/ epigastric abdominal pain for one day associated with nausea and vomiting. PT states that the pain started today afternoon after the dialysis, colicky in nature, non radiating,  and constant. He had 3-4 episode of  vomiting.     He denies black stool, diarrhea, constipations, back pain, chest pain, palpitations, chest pain or shortness of breath. He has h/o GERD and takes protonix BID.     In the ED patient had chills and Tmax 100.8. Hemodynamically stable. CT scan showed distended gall bladder, US negative for the whitley's sign. CBD was dilated. His lactate was 7 on admission. (14 Jul 2019 00:04)      PAST MEDICAL & SURGICAL HISTORY:  Anemia  Heart failure  HLD (hyperlipidemia)  HTN (hypertension)  ESRD on dialysis: T/ TH/ S  H/O right coronary artery stent placement: stent x3      Hospital Course: s/p ERCP / stent placement    TODAY'S SUBJECTIVE & REVIEW OF SYMPTOMS:     Constitutional WNL   Cardio WNL   Resp WNL   GI WNL  Heme WNL  Endo WNL  Skin WNL  MSK Weakness  Neuro WNL  Cognitive WNL  Psych WNL      MEDICATIONS  (STANDING):  apixaban 5 milliGRAM(s) Oral two times a day  aspirin  chewable 81 milliGRAM(s) Oral daily  atorvastatin 40 milliGRAM(s) Oral at bedtime  chlorhexidine 4% Liquid 1 Application(s) Topical every 12 hours  docusate sodium 100 milliGRAM(s) Oral two times a day  fluticasone propionate 50 MICROgram(s)/spray Nasal Spray 1 Spray(s) Both Nostrils daily  indomethacin Suppository 100 milliGRAM(s) Rectal once  pantoprazole  Injectable 40 milliGRAM(s) IV Push daily  piperacillin/tazobactam IVPB.. 2.25 Gram(s) IV Intermittent every 12 hours    MEDICATIONS  (PRN):  acetaminophen   Tablet .. 650 milliGRAM(s) Oral every 6 hours PRN Temp greater or equal to 38C (100.4F), Mild Pain (1 - 3)  diphenhydrAMINE 50 milliGRAM(s) Oral every 8 hours PRN Rash and/or Itching  morphine  - Injectable 2 milliGRAM(s) IV Push every 4 hours PRN Moderate Pain (4 - 6)  ondansetron Injectable 8 milliGRAM(s) IV Push four times a day PRN Nausea and/or Vomiting      FAMILY HISTORY:      Allergies    No Known Allergies    Intolerances        SOCIAL HISTORY:    [  ] Etoh  [  ] Smoking  [  ] Substance abuse     Home Environment:  [  ] Home Alone  [x  ] Lives with Family  [  ] Home Health Aid    Dwelling:  [  ] Apartment  [x  ] Private House  [  ] Adult Home  [  ] Skilled Nursing Facility      [  ] Short Term  [  ] Long Term  [x  ] Stairs       Elevator [  ]    FUNCTIONAL STATUS PTA: (Check all that apply)  Ambulation: [  x ]Independent    [  ] Dependent     [  ] Non-Ambulatory  Assistive Device: [  ] SA Cane  [  ]  Q Cane  [ x ] Walker  [  ]  Wheelchair  ADL : [x  ] Independent  [  ]  Dependent       Vital Signs Last 24 Hrs  T(C): 36.7 (18 Jul 2019 05:28), Max: 37.1 (17 Jul 2019 20:00)  T(F): 98 (18 Jul 2019 05:28), Max: 98.8 (17 Jul 2019 20:00)  HR: 80 (18 Jul 2019 16:32) (80 - 100)  BP: 151/88 (18 Jul 2019 16:32) (117/73 - 178/86)  BP(mean): 91 (17 Jul 2019 20:00) (91 - 91)  RR: 16 (18 Jul 2019 16:32) (16 - 22)  SpO2: 98% (17 Jul 2019 20:00) (98% - 98%)      PHYSICAL EXAM: Alert & Oriented X3  GENERAL: NAD, well-groomed, well-developed  HEAD:  Atraumatic, Normocephalic  CHEST/LUNG: Clear   HEART: S1S2+  ABDOMEN: Soft, Nontender  EXTREMITIES:  no calf tenderness    NERVOUS SYSTEM:  Cranial Nerves 2-12 intact [  ] Abnormal  [  ]  ROM: WFL all extremities [ x ]  Abnormal [  ]  Motor Strength: WFL all extremities  [x  ]  Abnormal [  ]  Sensation: intact to light touch [  ] Abnormal [  ]  Reflexes: Symmetric [  ]  Abnormal [  ]    FUNCTIONAL STATUS:  Bed Mobility: Independent [  ]  Supervision [  ]  Needs Assistance [x  ]  N/A [  ]  Transfers: Independent [  ]  Supervision [  ]  Needs Assistance [x  ]  N/A [  ]   Ambulation: Independent [  ]  Supervision [  ]  Needs Assistance [ x ]  N/A [  ]  ADL: Independent [  ] Requires Assistance [  ] N/A [  ]      LABS:                        10.0   7.03  )-----------( 115      ( 18 Jul 2019 06:38 )             31.4     07-18    138  |  95<L>  |  51<H>  ----------------------------<  109<H>  4.2   |  22  |  8.7<HH>    Ca    8.9      18 Jul 2019 06:38  Phos  4.8     07-18  Mg     2.5     07-18    TPro  6.5  /  Alb  3.2<L>  /  TBili  1.2  /  DBili  x   /  AST  72<H>  /  ALT  87<H>  /  AlkPhos  191<H>  07-18          RADIOLOGY & ADDITIONAL STUDIES:    Assesment:
· Chief Complaint: The patient is a 68y Male complaining of epigastric pain.	  · HPI Objective Statement: Patient BIBA, C/o epigastric chest pain with nausea, Sudden onset right after finishing dialysis, + chills, No SOB. pain constant sharp nonradiating	    PAST MEDICAL/SURGICAL/FAMILY/SOCIAL HISTORY:    Past Medical History:  Anemia    ESRD on dialysis    Heart failure    HLD (hyperlipidemia)    HTN (hypertension).     Past Surgical History:  H/O right coronary artery stent placement  stent x3.     Tobacco Usage:  · Tobacco Usage	Former smoker	    ALLERGIES AND HOME MEDICATIONS:   Allergies:        Allergies:  	No Known Allergies:     Home Medications:   * Patient Currently Takes Medications as of 14-Dec-2018 01:17 documented in Structured Notes  · 	cephalexin 500 mg oral tablet: 1 tab(s) orally 2 times a day     REVIEW OF SYSTEMS:    Review of Systems:  · CONSTITUTIONAL: no fever and no chills.	  · EYES: no discharge, no irritation, no pain, no redness, and no visual changes.	  · ENMT: Ears: no ear pain and no hearing problems.Nose: no nasal congestion and no nasal drainage.Mouth/Throat: no dysphagia, no hoarseness and no throat pain.Neck: no lumps, no pain, no stiffness and no swollen glands.	  · CARDIOVASCULAR: - - -	  · Cardiovascular [+]: CHEST PAIN	  · RESPIRATORY: no chest pain, no cough, and no shortness of breath.	  · GASTROINTESTINAL: - - -	  · Gastrointestinal [+]: ABDOMINAL PAIN, NAUSEA, VOMITING	  · GENITOURINARY: no dysuria, no frequency, and no hematuria.	  · MUSCULOSKELETAL: no back pain, no gout, no musculoskeletal pain, no neck pain, and no weakness.	  · NEURO: no loss of consciousness, no gait abnormality, no headache, no sensory deficits, and no weakness.	  · PSYCHIATRIC: no known mental health issues.	    PHYSICAL EXAM:   · CONSTITUTIONAL: - - -	  · Appearance: ILL APPEARING	  · Distress: MILD	  · Mentation: awake, alert	  · ENMT: Airway patent, Nasal mucosa clear. Mouth with normal mucosa. Throat has no vesicles, no oropharyngeal exudates and uvula is midline.	  · EYES: Clear bilaterally, pupils equal, round and reactive to light.	  · CARDIAC: Normal rate, regular rhythm.  Heart sounds S1, S2.  No murmurs, rubs or gallops.	  · RESPIRATORY: Breath sounds clear and equal bilaterally.	  · GASTROINTESTINAL: - - -	  · Abdominal Exam: + bs soft epigastric tenderness,+murphys	  · MUSCULOSKELETAL: Spine appears normal, range of motion is not limited, no muscle or joint tenderness	  · NEUROLOGICAL: Alert and oriented, no focal deficits, no motor or sensory deficits.	    CURRENT ORDERS/:   · 	ondansetron Injectable, [Known as ZOFRAN Injectable]  	4 milliGRAM(s), IV Push, once, Stop After 1 Doses  	Administration Instructions: Max IV Push dose 8 milliGRAM(s)  	IF IV PUSH, ADMINISTER OVER 2-5 MIN  	Provider's Contact #: [phone], 13-Jul-2019, Active, 13-Jul-2019, Standard  · 	Cardiac Monitor Bedside,   Time/Priority:  STAT, 13-Jul-2019, Active, Standard    RESULTS:   · EKG Date/Time: 13-Jul-2019 19:20	  · Rate: 90	  · Interpretation: normal sinus rhythm	  · Prior EKG Status: the EKG is unchanged from prior EKG	  · EKG Date/Time: 13-Jul-2019 19:42	  · Rate: 100	  · Interpretation: normal sinus rhythm	  · Prior EKG Status: the EKG is unchanged from prior EKG	                        11.8   6.50  )-----------( 184      ( 13 Jul 2019 19:40 )             36.6   07-13    141  |  93<L>  |  31<H>  ----------------------------<  192<H>  4.5   |  24  |  5.1<HH>    Ca    9.7      13 Jul 2019 19:40  Mg     1.6     07-13    TPro  8.4<H>  /  Alb  5.0  /  TBili  1.7<H>  /  DBili  x   /  AST  186<H>  /  ALT  90<H>  /  AlkPhos  182<H>  07-13  EXAM:  CT ABDOMEN AND PELVIS IC            PROCEDURE DATE:  07/13/2019            INTERPRETATION:  CLINICAL STATEMENT: Abdominal pain and vomiting.      TECHNIQUE: Contiguous axial CT images were obtained from the lower chest   to the pubic symphysis following administration of 100cc Optiray 320   intravenous contrast.  Oral contrast was not administered.  Reformatted   images in the coronal and sagittal planes were acquired.    COMPARISON CT: 12/13/2018.    OTHER STUDIES USED FOR CORRELATION: None.       FINDINGS:    LOWER CHEST: Unremarkable.    HEPATOBILIARY: Mild gallbladder wall thickening. Distended gallbladder. 4   mm density in the lower CBD (series 4, image 137), question   choledocholith. Otherwise unremarkable.    SPLEEN: Unremarkable.    PANCREAS: Unremarkable.    ADRENAL GLANDS: Unremarkable.    KIDNEYS: Symmetric bilateral renal enhancement. Bilateral renal cysts and  additional subcentimeter hypodense lesions, too small to characterize.    ABDOMINOPELVIC NODES: Unremarkable.    PELVIC ORGANS: Mildly enlarged prostate gland.    PERITONEUM/MESENTERY/BOWEL: Scattered colonic diverticulosis. No bowel   obstruction.    BONES/SOFT TISSUES: No acute osseous abnormalities. Degenerative changes   of  the thoracolumbar spine and bilateral hips noted.    OTHER: Extensive atherosclerotic vascular calcifications of the aorta and  its major branches. Small bilateral fat-containing inguinal hernias.        IMPRESSION:    Distended gallbladder with mild wall thickening. 4 mm density in the   lower CBD, suspect choledocholith.     Other incidental findings as described.                  SAHIL PIERRE M.D., ATTENDING RADIOLOGIST  This document has been electronically signed. Jul 13 2019 10:32PM  EXAM:  US ABDOMEN LIMITED            PROCEDURE DATE:  07/13/2019            INTERPRETATION:  CLINICAL HISTORY: Gallstone..    COMPARISON: CT abdomen pelvis 12/13/2018.    PROCEDURE: Limited portable ultrasound of the right upper quadrant was   performed.    FINDINGS:    LIVER: Increased echogenicity, measuring 14.0 cm in length.    GALLBLADDER/BILIARY TREE:  Gallbladder sludge, no calculi. Gallbladder   wall thickening 3.2 mm. No pericholecystic fluid is seen. Reportedly   negative sonographic Carson's sign. No intrahepatic biliary ductal   dilatation. The common bile duct measures 7.6 mm, which is dilated.     PANCREAS: Pancreas is obscured by bowel gas.    KIDNEY:  Right kidney measures 10.8 cm in length. Right renal cysts   measure up to4.0 cm. No hydronephrosis, calculi or solid mass.    AORTA/IVC:  Visualized proximal portions unremarkable.    ASCITES:  None.    IMPRESSION:    Gallbladder sludge is seen, no calculi are delineated. Gallbladder wall   thickening 3.2 mm. Reportedly negative sonographic Carson's sign.    The common bile duct measures 7.6 mm, which is considered dilated.     Hepatic steatosis.    Right renal 4 cm cyst.                  SAHIL PIERRE M.D., ATTENDING RADIOLOGIST  This document has been electronically signed. Jul 13 2019  9:27PM
Patient is a 68y old  Male who presents with a chief complaint of Abdominal pain (16 Jul 2019 11:26)      HPI:  69 yo M with PMH of DM, ESRD on HD (T,Th, S), HTN, DLD, CAD s/p PCI  comes to the ED with the c/o acute onset of RUQ/ epigastric abdominal pain for one day associated with nausea and vomiting. PT states that the pain started today afternoon after the dialysis, colicky in nature, non radiating,  and constant. He had 3-4 episode of  vomiting.     He denies black stool, diarrhea, constipations, back pain, chest pain, palpitations, chest pain or shortness of breath. He has h/o GERD and takes protonix BID.     In the ED patient had chills and Tmax 100.8. Hemodynamically stable. CT scan showed distended gall bladder, US negative for the whitley's sign. CBD was dilated. His lactate was 7 on admission. (14 Jul 2019 00:04)      PAST MEDICAL & SURGICAL HISTORY:  Anemia  Heart failure  HLD (hyperlipidemia)  HTN (hypertension)  ESRD on dialysis: T/ TH/ S  H/O right coronary artery stent placement: stent x3                      PREVIOUS DIAGNOSTIC TESTING:      ECHO  FINDINGS:    STRESS  FINDINGS:    CATHETERIZATION  FINDINGS:    MEDICATIONS  (STANDING):  apixaban 2.5 milliGRAM(s) Oral two times a day  aspirin  chewable 81 milliGRAM(s) Oral daily  atorvastatin 40 milliGRAM(s) Oral at bedtime  chlorhexidine 4% Liquid 1 Application(s) Topical every 12 hours  docusate sodium 100 milliGRAM(s) Oral two times a day  fluticasone propionate 50 MICROgram(s)/spray Nasal Spray 1 Spray(s) Both Nostrils daily  indomethacin Suppository 100 milliGRAM(s) Rectal once  magnesium oxide 400 milliGRAM(s) Oral once  pantoprazole  Injectable 40 milliGRAM(s) IV Push daily  piperacillin/tazobactam IVPB.. 2.25 Gram(s) IV Intermittent every 12 hours  senna 2 Tablet(s) Oral at bedtime    MEDICATIONS  (PRN):  acetaminophen   Tablet .. 650 milliGRAM(s) Oral every 6 hours PRN Temp greater or equal to 38C (100.4F), Mild Pain (1 - 3)  diphenhydrAMINE 50 milliGRAM(s) Oral every 8 hours PRN Rash and/or Itching  morphine  - Injectable 2 milliGRAM(s) IV Push every 4 hours PRN Moderate Pain (4 - 6)  ondansetron Injectable 8 milliGRAM(s) IV Push four times a day PRN Nausea and/or Vomiting      FAMILY HISTORY:      SOCIAL HISTORY:    CIGARETTES:    ALCOHOL:        Vital Signs Last 24 Hrs  T(C): 36 (16 Jul 2019 12:00), Max: 37.2 (15 Jul 2019 15:33)  T(F): 96.8 (16 Jul 2019 12:00), Max: 97.8 (15 Jul 2019 17:50)  HR: 66 (16 Jul 2019 13:00) (66 - 130)  BP: 155/67 (16 Jul 2019 13:00) (81/51 - 155/67)  BP(mean): 70 (16 Jul 2019 12:00) (61 - 81)  RR: 18 (16 Jul 2019 13:00) (12 - 35)  SpO2: 97% (16 Jul 2019 12:00) (96% - 100%)            INTERPRETATION OF TELEMETRY:    ECG:    I&O's Detail    15 Jul 2019 07:01  -  16 Jul 2019 07:00  --------------------------------------------------------  IN:    IV PiggyBack: 350 mL    Oral Fluid: 240 mL  Total IN: 590 mL    OUT:  Total OUT: 0 mL    Total NET: 590 mL          LABS:                        9.2    17.11 )-----------( 98       ( 15 Jul 2019 23:30 )             28.5     07-15    138  |  94<L>  |  69<HH>  ----------------------------<  91  4.1   |  25  |  9.5<HH>    Ca    8.4<L>      15 Jul 2019 23:30  Phos  5.8     07-16  Mg     2.6     07-16    TPro  5.7<L>  /  Alb  3.0<L>  /  TBili  2.1<H>  /  DBili  1.9<H>  /  AST  61<H>  /  ALT  137<H>  /  AlkPhos  116<H>  07-15    CARDIAC MARKERS ( 15 Jul 2019 20:00 )  x     / 0.18 ng/mL / x     / x     / x      CARDIAC MARKERS ( 15 Jul 2019 11:00 )  x     / 0.21 ng/mL / x     / x     / x              I&O's Summary    15 Jul 2019 07:01  -  16 Jul 2019 07:00  --------------------------------------------------------  IN: 590 mL / OUT: 0 mL / NET: 590 mL      BNP  RADIOLOGY & ADDITIONAL STUDIES:

## 2019-07-19 ENCOUNTER — TRANSCRIPTION ENCOUNTER (OUTPATIENT)
Age: 69
End: 2019-07-19

## 2019-07-19 LAB
ALBUMIN SERPL ELPH-MCNC: 3.2 G/DL — LOW (ref 3.5–5.2)
ALP SERPL-CCNC: 220 U/L — HIGH (ref 30–115)
ALT FLD-CCNC: 79 U/L — HIGH (ref 0–41)
ANION GAP SERPL CALC-SCNC: 16 MMOL/L — HIGH (ref 7–14)
AST SERPL-CCNC: 88 U/L — HIGH (ref 0–41)
BASOPHILS # BLD AUTO: 0.03 K/UL — SIGNIFICANT CHANGE UP (ref 0–0.2)
BASOPHILS NFR BLD AUTO: 0.5 % — SIGNIFICANT CHANGE UP (ref 0–1)
BILIRUB SERPL-MCNC: 0.9 MG/DL — SIGNIFICANT CHANGE UP (ref 0.2–1.2)
BUN SERPL-MCNC: 32 MG/DL — HIGH (ref 10–20)
CALCIUM SERPL-MCNC: 8.5 MG/DL — SIGNIFICANT CHANGE UP (ref 8.5–10.1)
CHLORIDE SERPL-SCNC: 96 MMOL/L — LOW (ref 98–110)
CO2 SERPL-SCNC: 27 MMOL/L — SIGNIFICANT CHANGE UP (ref 17–32)
CREAT SERPL-MCNC: 6.9 MG/DL — CRITICAL HIGH (ref 0.7–1.5)
EOSINOPHIL # BLD AUTO: 0.26 K/UL — SIGNIFICANT CHANGE UP (ref 0–0.7)
EOSINOPHIL NFR BLD AUTO: 4.7 % — SIGNIFICANT CHANGE UP (ref 0–8)
GLUCOSE BLDC GLUCOMTR-MCNC: 124 MG/DL — HIGH (ref 70–99)
GLUCOSE BLDC GLUCOMTR-MCNC: 168 MG/DL — HIGH (ref 70–99)
GLUCOSE BLDC GLUCOMTR-MCNC: 199 MG/DL — HIGH (ref 70–99)
GLUCOSE BLDC GLUCOMTR-MCNC: 201 MG/DL — HIGH (ref 70–99)
GLUCOSE SERPL-MCNC: 134 MG/DL — HIGH (ref 70–99)
HCT VFR BLD CALC: 30.2 % — LOW (ref 42–52)
HGB BLD-MCNC: 9.7 G/DL — LOW (ref 14–18)
IMM GRANULOCYTES NFR BLD AUTO: 1.8 % — HIGH (ref 0.1–0.3)
LYMPHOCYTES # BLD AUTO: 0.89 K/UL — LOW (ref 1.2–3.4)
LYMPHOCYTES # BLD AUTO: 16.1 % — LOW (ref 20.5–51.1)
MAGNESIUM SERPL-MCNC: 2.3 MG/DL — SIGNIFICANT CHANGE UP (ref 1.8–2.4)
MCHC RBC-ENTMCNC: 30 PG — SIGNIFICANT CHANGE UP (ref 27–31)
MCHC RBC-ENTMCNC: 32.1 G/DL — SIGNIFICANT CHANGE UP (ref 32–37)
MCV RBC AUTO: 93.5 FL — SIGNIFICANT CHANGE UP (ref 80–94)
MONOCYTES # BLD AUTO: 1.19 K/UL — HIGH (ref 0.1–0.6)
MONOCYTES NFR BLD AUTO: 21.6 % — HIGH (ref 1.7–9.3)
NEUTROPHILS # BLD AUTO: 3.05 K/UL — SIGNIFICANT CHANGE UP (ref 1.4–6.5)
NEUTROPHILS NFR BLD AUTO: 55.3 % — SIGNIFICANT CHANGE UP (ref 42.2–75.2)
NRBC # BLD: 0 /100 WBCS — SIGNIFICANT CHANGE UP (ref 0–0)
PHOSPHATE SERPL-MCNC: 4.3 MG/DL — SIGNIFICANT CHANGE UP (ref 2.1–4.9)
PLATELET # BLD AUTO: 118 K/UL — LOW (ref 130–400)
POTASSIUM SERPL-MCNC: 3.8 MMOL/L — SIGNIFICANT CHANGE UP (ref 3.5–5)
POTASSIUM SERPL-SCNC: 3.8 MMOL/L — SIGNIFICANT CHANGE UP (ref 3.5–5)
PROT SERPL-MCNC: 6.4 G/DL — SIGNIFICANT CHANGE UP (ref 6–8)
RBC # BLD: 3.23 M/UL — LOW (ref 4.7–6.1)
RBC # FLD: 13.9 % — SIGNIFICANT CHANGE UP (ref 11.5–14.5)
SODIUM SERPL-SCNC: 139 MMOL/L — SIGNIFICANT CHANGE UP (ref 135–146)
TROPONIN T SERPL-MCNC: 0.3 NG/ML — CRITICAL HIGH
VIT B12 SERPL-MCNC: >2000 PG/ML — HIGH (ref 232–1245)
WBC # BLD: 5.52 K/UL — SIGNIFICANT CHANGE UP (ref 4.8–10.8)
WBC # FLD AUTO: 5.52 K/UL — SIGNIFICANT CHANGE UP (ref 4.8–10.8)

## 2019-07-19 PROCEDURE — 99232 SBSQ HOSP IP/OBS MODERATE 35: CPT

## 2019-07-19 PROCEDURE — 99233 SBSQ HOSP IP/OBS HIGH 50: CPT

## 2019-07-19 RX ORDER — ISOSORBIDE MONONITRATE 60 MG/1
30 TABLET, EXTENDED RELEASE ORAL DAILY
Refills: 0 | Status: DISCONTINUED | OUTPATIENT
Start: 2019-07-19 | End: 2019-07-20

## 2019-07-19 RX ORDER — PANTOPRAZOLE SODIUM 20 MG/1
40 TABLET, DELAYED RELEASE ORAL
Refills: 0 | Status: DISCONTINUED | OUTPATIENT
Start: 2019-07-19 | End: 2019-07-20

## 2019-07-19 RX ORDER — MOXIFLOXACIN HYDROCHLORIDE TABLETS, 400 MG 400 MG/1
1 TABLET, FILM COATED ORAL
Qty: 7 | Refills: 0
Start: 2019-07-19 | End: 2019-07-25

## 2019-07-19 RX ORDER — ACETAMINOPHEN 500 MG
2 TABLET ORAL
Qty: 0 | Refills: 0 | DISCHARGE
Start: 2019-07-19

## 2019-07-19 RX ORDER — CLOPIDOGREL BISULFATE 75 MG/1
1 TABLET, FILM COATED ORAL
Qty: 0 | Refills: 0 | DISCHARGE

## 2019-07-19 RX ORDER — APIXABAN 2.5 MG/1
1 TABLET, FILM COATED ORAL
Qty: 0 | Refills: 0 | DISCHARGE
Start: 2019-07-19

## 2019-07-19 RX ADMIN — ATORVASTATIN CALCIUM 40 MILLIGRAM(S): 80 TABLET, FILM COATED ORAL at 22:09

## 2019-07-19 RX ADMIN — APIXABAN 5 MILLIGRAM(S): 2.5 TABLET, FILM COATED ORAL at 18:30

## 2019-07-19 RX ADMIN — Medication 25 MILLIGRAM(S): at 05:26

## 2019-07-19 RX ADMIN — Medication 25 MILLIGRAM(S): at 18:30

## 2019-07-19 RX ADMIN — PIPERACILLIN AND TAZOBACTAM 200 GRAM(S): 4; .5 INJECTION, POWDER, LYOPHILIZED, FOR SOLUTION INTRAVENOUS at 18:30

## 2019-07-19 RX ADMIN — Medication 81 MILLIGRAM(S): at 12:28

## 2019-07-19 RX ADMIN — PIPERACILLIN AND TAZOBACTAM 200 GRAM(S): 4; .5 INJECTION, POWDER, LYOPHILIZED, FOR SOLUTION INTRAVENOUS at 05:25

## 2019-07-19 RX ADMIN — Medication 1 SPRAY(S): at 12:28

## 2019-07-19 RX ADMIN — APIXABAN 5 MILLIGRAM(S): 2.5 TABLET, FILM COATED ORAL at 05:26

## 2019-07-19 RX ADMIN — PANTOPRAZOLE SODIUM 40 MILLIGRAM(S): 20 TABLET, DELAYED RELEASE ORAL at 14:47

## 2019-07-19 RX ADMIN — ISOSORBIDE MONONITRATE 30 MILLIGRAM(S): 60 TABLET, EXTENDED RELEASE ORAL at 14:47

## 2019-07-19 NOTE — DISCHARGE NOTE PROVIDER - NSDCFUSCHEDAPPT_GEN_ALL_CORE_FT
MAMTA FERNANDO ; 07/29/2019 ; [facility] PreAdmits MAMTA FERNANDO ; 07/29/2019 ; [facility] PreAdmits MAMTA FERNANDO ; 07/29/2019 ; [facility] PreAdmits MAMTA FERNANDO ; 07/29/2019 ; [facility] PreAdmits MAMTA FERNANDO ; 07/29/2019 ; [facility] PreAdmits MAMTA FERNANDO ; 07/29/2019 ; [facility] PreAdmits

## 2019-07-19 NOTE — PROGRESS NOTE ADULT - SUBJECTIVE AND OBJECTIVE BOX
Patient 67 yo M with PMH of DM, ESRD on HD (T,Th, S), HTN, DLD, CAD s/p PCI  comes to the ED with the c/o acute onset of RUQ,  epigastric pain. Patient s/p ERCP on 7/15 with stent placement. Patient found to have two filling defects but sphincterotomy and stone extraction could not be done as on Plavix.  Patient doing well today appears comfortable but notes occasional RUQ pain. He is tolerating diet. No overnight events.     Vital Signs:  T(F): 98  HR: 83  BP: 162/82   RR: 18  SpO2: 98%  Physical Exam  Gen: NAD  HEENT: NC/AT  Cardio: S1/S2  Resp: CTA B/L  Abdomen: Soft, ND/NT  Extremities: FROM x 4                            9.7    5.52  )-----------( 118      ( 19 Jul 2019 05:58 )             30.2   07-19    139  |  96<L>  |  32<H>  ----------------------------<  134<H>  3.8   |  27  |  6.9<HH>    Ca    8.5      19 Jul 2019 05:58  Phos  4.3     07-19  Mg     2.3     07-19    TPro  6.4  /  Alb  3.2<L>  /  TBili  0.9  /  DBili  x   /  AST  88<H>  /  ALT  79<H>  /  AlkPhos  220<H>  07-19

## 2019-07-19 NOTE — DISCHARGE NOTE PROVIDER - HOSPITAL COURSE
69 yo M with PMH of DM, ESRD on HD (T,Th, S), HTN, DLD, CAD s/p PCI  comes to the ED with the c/o acute onset of RUQ/ epigastric abdominal pain for one day associated with nausea and vomiting. found to have choledocholithiasis s/p ERCP w CBD stent placement with elevating ALP, but with improving WBC and T bili. Patient will need repeat ERCP in 6 weeks for stent removal and stone removal- at that time he will need to be off Plavix for one week and will need follow up on discharge with Dr Nowak [phone].  Pt will will c/w Zosyn (last day 7/25/19) and is clinically stable for discharge. 67 yo M with PMH of DM, ESRD on HD (T,Th, S), HTN, DLD, CAD s/p PCI  comes to the ED with the c/o acute onset of RUQ/ epigastric abdominal pain for one day associated with nausea and vomiting. found to have choledocholithiasis s/p ERCP w CBD stent placement with elevating ALP, but with improving WBC and T bili. Patient will need repeat ERCP in 6 weeks for stent removal and stone removal- at that time he will need to be off Plavix for one week and will need follow up on discharge with Dr Nowak [phone].  Pt will will c/w Zosyn (last day 7/25/19) and is clinically stable for discharge. All other medical problems were managed as per pt's home meds. 69 yo M with PMH of DM, ESRD on HD (T,Th, S), HTN, DLD, CAD s/p PCI  comes to the ED with the c/o acute onset of RUQ/ epigastric abdominal pain for one day associated with nausea and vomiting. found to have choledocholithiasis s/p ERCP w CBD stent placement with elevating ALP, but with improving WBC and T bili. Patient will need repeat ERCP in 6 weeks for stent removal and stone removal/ will follow up with Dr Nowak [phone].  Pt will will c/w Zosyn (last day 7/25/19) and is clinically stable for discharge. All other medical problems were managed as per pt's home meds.

## 2019-07-19 NOTE — DISCHARGE NOTE PROVIDER - CARE PROVIDERS DIRECT ADDRESSES
[email or web address],DirectAddress_Unknown,[email or web address]

## 2019-07-19 NOTE — PROGRESS NOTE ADULT - ATTENDING COMMENTS
Agree with above, will need outpatient ERCP for stent removal and sphincterotomy
I was Physically Present for the key portions of the evaluation   I agree with the above History  , Physical examination Assessment and plan   I have Reviewed , Modified or appended where appropriate.  Please check A and P as above   1- ESRD on HD for HD in AM   2- Ascending cholangitis awaiting ERCP today  off pressors  will follow in AM
above noted abdomen soft no distension tender RUQ
above noted abdomen soft no distension/ tenderness labs noted
Agree with above
follow on meds.
pt w/o cp. follow on meds for rate contro and anticoag.
pt w/ afib w/ mod vr. follow hr on meds w/ anticoagulation.

## 2019-07-19 NOTE — DISCHARGE NOTE PROVIDER - PROVIDER TOKENS
PROVIDER:[TOKEN:[[id number]],FOLLOWUP:[1-3 days]] PROVIDER:[TOKEN:[[id number]],FOLLOWUP:[1-3 days]],PROVIDER:[TOKEN:[[id number]],FOLLOWUP:[1 week]],PROVIDER:[TOKEN:[[id number]],FOLLOWUP:[1-3 days]],PROVIDER:[TOKEN:[[id number]],FOLLOWUP:[1 week]]

## 2019-07-19 NOTE — DISCHARGE NOTE PROVIDER - CARE PROVIDER_API CALL
Luis Carlos Nowak (MD)  Gastroenterology  [address] [place] [address]  Phone: [phone]  Fax: [phone]  Follow Up Time: 1-3 days Luis Carlos Nowak)  Gastroenterology  [address] [place] [address]  Phone: [phone]  Fax: [phone]  Follow Up Time: 1-3 days    Kim Narvaez)  Surgery  [address] [place] [address]  Phone: [phone]  Fax: [phone]  Follow Up Time: 1 week    Wilber Guaman ()  Medicine  Physicians  [address] [place] [address]  Phone: [phone]  Fax: [phone]  Follow Up Time: 1-3 days    Reid Martin)  Cardiovascular Disease; Internal Medicine; Interventional Cardiology  [address] [place] [address]  Phone: [phone]  Fax: [phone]  Follow Up Time: 1 week

## 2019-07-19 NOTE — PROGRESS NOTE ADULT - SUBJECTIVE AND OBJECTIVE BOX
SUBJECTIVE:    Patient is a 68y old Male who presents with a chief complaint of Abdominal pain (18 Jul 2019 13:47)    Currently admitted to medicine with the primary diagnosis of Choledocholithiasis      Denies CP, abd pain. Feels some pressure in his abd, passing gas. Tolerating diet, no nausea, or vomiting.     INTERVAL EVENTS: stepdown     PAST MEDICAL & SURGICAL HISTORY  Anemia  Heart failure  HLD (hyperlipidemia)  HTN (hypertension)  ESRD on dialysis: T/ TH/ S  H/O right coronary artery stent placement: stent x3      ALLERGIES:  No Known Allergies    MEDICATIONS:  STANDING MEDICATIONS  apixaban 5 milliGRAM(s) Oral two times a day  aspirin  chewable 81 milliGRAM(s) Oral daily  atorvastatin 40 milliGRAM(s) Oral at bedtime  chlorhexidine 4% Liquid 1 Application(s) Topical every 12 hours  docusate sodium 100 milliGRAM(s) Oral two times a day  fluticasone propionate 50 MICROgram(s)/spray Nasal Spray 1 Spray(s) Both Nostrils daily  indomethacin Suppository 100 milliGRAM(s) Rectal once  pantoprazole  Injectable 40 milliGRAM(s) IV Push daily  piperacillin/tazobactam IVPB.. 2.25 Gram(s) IV Intermittent every 12 hours  senna 2 Tablet(s) Oral at bedtime    PRN MEDICATIONS  acetaminophen   Tablet .. 650 milliGRAM(s) Oral every 6 hours PRN  diphenhydrAMINE 50 milliGRAM(s) Oral every 8 hours PRN  morphine  - Injectable 2 milliGRAM(s) IV Push every 4 hours PRN  ondansetron Injectable 8 milliGRAM(s) IV Push four times a day PRN    VITALS:   T(F): 98  HR: 85  BP: 160/95  RR: 16  SpO2: 98%    LABS:                        10.0   7.03  )-----------( 115      ( 18 Jul 2019 06:38 )             31.4     07-18    138  |  95<L>  |  51<H>  ----------------------------<  109<H>  4.2   |  22  |  8.7<HH>    Ca    8.9      18 Jul 2019 06:38  Phos  4.8     07-18  Mg     2.5     07-18    TPro  6.5  /  Alb  3.2<L>  /  TBili  1.2  /  DBili  x   /  AST  72<H>  /  ALT  87<H>  /  AlkPhos  191<H>  07-18    Troponin T, Serum: 0.31 ng/mL <HH> (07-18-19 @ 06:38)  Troponin T, Serum: 0.29 ng/mL <HH> (07-18-19 @ 00:56)      CARDIAC MARKERS ( 18 Jul 2019 06:38 )  x     / 0.31 ng/mL / x     / x     / x      CARDIAC MARKERS ( 18 Jul 2019 00:56 )  x     / 0.29 ng/mL / x     / x     / x          RADIOLOGY:    PHYSICAL EXAM:  GEN: No acute distress  PULM/CHEST: Clear to auscultation bilaterally, no rales, rhonchi or wheezes   CVS: irregular rate and rhythm, S1-S2, no murmurs  ABD: obese, soft, non-tender, non-distended, +BS  EXT: No edema  NEURO: AAOx3

## 2019-07-19 NOTE — PROGRESS NOTE ADULT - SUBJECTIVE AND OBJECTIVE BOX
SUBJ:No chest pain or shortness of breath      MEDICATIONS  (STANDING):  apixaban 5 milliGRAM(s) Oral two times a day  aspirin  chewable 81 milliGRAM(s) Oral daily  atorvastatin 40 milliGRAM(s) Oral at bedtime  chlorhexidine 4% Liquid 1 Application(s) Topical every 12 hours  docusate sodium 100 milliGRAM(s) Oral two times a day  fluticasone propionate 50 MICROgram(s)/spray Nasal Spray 1 Spray(s) Both Nostrils daily  indomethacin Suppository 100 milliGRAM(s) Rectal once  metoprolol tartrate 25 milliGRAM(s) Oral two times a day  pantoprazole  Injectable 40 milliGRAM(s) IV Push daily  piperacillin/tazobactam IVPB.. 2.25 Gram(s) IV Intermittent every 12 hours    MEDICATIONS  (PRN):  acetaminophen   Tablet .. 650 milliGRAM(s) Oral every 6 hours PRN Temp greater or equal to 38C (100.4F), Mild Pain (1 - 3)  diphenhydrAMINE 50 milliGRAM(s) Oral every 8 hours PRN Rash and/or Itching  morphine  - Injectable 2 milliGRAM(s) IV Push every 4 hours PRN Moderate Pain (4 - 6)  ondansetron Injectable 8 milliGRAM(s) IV Push four times a day PRN Nausea and/or Vomiting            Vital Signs Last 24 Hrs  T(C): 36.7 (19 Jul 2019 05:22), Max: 36.7 (19 Jul 2019 05:22)  T(F): 98 (19 Jul 2019 05:22), Max: 98 (19 Jul 2019 05:22)  HR: 83 (19 Jul 2019 05:22) (80 - 99)  BP: 162/82 (19 Jul 2019 05:22) (133/68 - 174/79)  BP(mean): --  RR: 18 (19 Jul 2019 05:22) (16 - 18)  SpO2: 98% (18 Jul 2019 20:17) (98% - 98%)      ECG:NML    TTE:    LABS:                        9.7    5.52  )-----------( 118      ( 19 Jul 2019 05:58 )             30.2     07-19    139  |  96<L>  |  32<H>  ----------------------------<  134<H>  3.8   |  27  |  6.9<HH>    Ca    8.5      19 Jul 2019 05:58  Phos  4.3     07-19  Mg     2.3     07-19    TPro  6.4  /  Alb  3.2<L>  /  TBili  0.9  /  DBili  x   /  AST  88<H>  /  ALT  79<H>  /  AlkPhos  220<H>  07-19    CARDIAC MARKERS ( 19 Jul 2019 05:58 )  x     / 0.30 ng/mL / x     / x     / x      CARDIAC MARKERS ( 18 Jul 2019 06:38 )  x     / 0.31 ng/mL / x     / x     / x      CARDIAC MARKERS ( 18 Jul 2019 00:56 )  x     / 0.29 ng/mL / x     / x     / x              I&O's Summary    18 Jul 2019 07:01  -  19 Jul 2019 07:00  --------------------------------------------------------  IN: 540 mL / OUT: 2000 mL / NET: -1460 mL    19 Jul 2019 07:01  -  19 Jul 2019 09:41  --------------------------------------------------------  IN: 0 mL / OUT: 200 mL / NET: -200 mL      BNP

## 2019-07-19 NOTE — PROGRESS NOTE ADULT - ASSESSMENT
69 yo M with PMH of DM, ESRD on HD (T,Th, S), HTN, DLD, CAD s/p PCI  comes to the ED with the c/o acute onset of RUQ/ epigastric abdominal pain for one day associated with nausea and vomiting. PT states that the pain started today afternoon after the dialysis, colicky in nature, non radiating,  and constant. He had 3-4 episode of  vomiting.     1.	Choledocholithiasis / Cholangitis  2.	Sepsis due to # 1  3.	ESRD on HD  4.	CAD / HTN / DL  5.	Lactic acidosis  6.	NSTEMI type 2  7.	A. Fib         PLAN:    ·	Lactulose & Senna stopped. Diarrhea resolvint  ·	ALP trending up, waiting for GI F/U  ·	Monitor LFT'S  ·	S/P ERCP and CBD stent placement for decompression.   ·	Cont low fat diet  ·	Cont IV Zosyn.   ·	Pain control  ·	Cont ASA. Plavix was stopped by the cardiology  ·	Elevated Troponin due to sepsis and ESRD  ·	Monitor FS. On no meds.   ·	If cleared by GI will D/C him home today  ·	On D/C Avelox 400 mg po daily for one week    * Med rec reviewed. Plan of care D/W the pt. F/U with GI Dr. Zheng in six weeks. Time spent 40 minutes. 69 yo M with PMH of DM, ESRD on HD (T,Th, S), HTN, DLD, CAD s/p PCI  comes to the ED with the c/o acute onset of RUQ/ epigastric abdominal pain for one day associated with nausea and vomiting. PT states that the pain started today afternoon after the dialysis, colicky in nature, non radiating,  and constant. He had 3-4 episode of  vomiting.     1.	Choledocholithiasis / Cholangitis  2.	Sepsis due to # 1  3.	ESRD on HD  4.	CAD / HTN / DL  5.	Lactic acidosis  6.	NSTEMI type 2  7.	A. Fib         PLAN:    ·	Lactulose & Senna stopped. Diarrhea resolvint  ·	ALP trending up, waiting for GI F/U  ·	Monitor LFT'S  ·	S/P ERCP and CBD stent placement for decompression.   ·	Cont low fat diet  ·	Cont ASA. Plavix was stopped by the cardiology  ·	Elevated Troponin due to sepsis and ESRD  ·	Monitor FS. On no meds.   ·	If cleared by GI will D/C him home today  ·	On D/C Avelox 400 mg po daily for one week    * Med rec reviewed. Plan of care D/W the pt. F/U with GI Dr. Zheng in six weeks. Time spent 40 minutes.

## 2019-07-19 NOTE — PROGRESS NOTE ADULT - ASSESSMENT
Patient 67 yo M with PMH of DM, ESRD on HD (T,Th, S), HTN, DLD, CAD s/p PCI  comes to the ED with the c/o acute onset of RUQ,  epigastric pain. Patient s/p ERCP on 7/15 with stent placement. Patient found to have two filling defects but sphincterotomy and stone extraction could not be done as on Plavix.  Patient doing well today appears comfortable but notes occasional RUQ pain. He is tolerating diet. Primary team concerned as Alk Phos rising. Clinically patient appears stable with improvement in WBC, and T sagar.     Cholangitis/ Choledocholithiasis   - ERCP with CBD stent placement for decompression with good response  - Patient will need repeat ERCP in 6 weeks for stent removal and stone removal- at that time he will need to be off Plavix for one week  - Will need follow up on discharge with Dr Nowak [phone]  - We will continue to follow while admitted

## 2019-07-19 NOTE — DISCHARGE NOTE PROVIDER - NSDCCPCAREPLAN_GEN_ALL_CORE_FT
PRINCIPAL DISCHARGE DIAGNOSIS  Diagnosis: Choledocholithiasis  Assessment and Plan of Treatment: You had a stent placed. Please continue your antibiotics until 7/25/19 and make an appointment the GI doctor about planning when to stop Plavix before your next ERCP PRINCIPAL DISCHARGE DIAGNOSIS  Diagnosis: Choledocholithiasis  Assessment and Plan of Treatment: You had a stent placed. Please continue your antibiotics until 7/25/19 and make an appointment the GI doctor about planning when to stop Plavix before your next ERCP (6 weeks from initial ERCP) PRINCIPAL DISCHARGE DIAGNOSIS  Diagnosis: Choledocholithiasis  Assessment and Plan of Treatment: You had a stent placed. Please continue your antibiotics until 7/25/19 and make an appointment with your surgeon, GI, cardiologist, as well as your primary care doctor.

## 2019-07-19 NOTE — PROGRESS NOTE ADULT - SUBJECTIVE AND OBJECTIVE BOX
Nephrology progress note    Patient was seen and examined, events over the last 24 h noted .  HD yesterday     Allergies:  No Known Allergies    Hospital Medications:   MEDICATIONS  (STANDING):  apixaban 5 milliGRAM(s) Oral two times a day  aspirin  chewable 81 milliGRAM(s) Oral daily  atorvastatin 40 milliGRAM(s) Oral at bedtime  chlorhexidine 4% Liquid 1 Application(s) Topical every 12 hours  docusate sodium 100 milliGRAM(s) Oral two times a day  fluticasone propionate 50 MICROgram(s)/spray Nasal Spray 1 Spray(s) Both Nostrils daily  indomethacin Suppository 100 milliGRAM(s) Rectal once  metoprolol tartrate 25 milliGRAM(s) Oral two times a day  pantoprazole  Injectable 40 milliGRAM(s) IV Push daily  piperacillin/tazobactam IVPB.. 2.25 Gram(s) IV Intermittent every 12 hours        VITALS:  T(F): 98 (07-19-19 @ 05:22), Max: 98 (07-19-19 @ 05:22)  HR: 83 (07-19-19 @ 05:22)  BP: 162/82 (07-19-19 @ 05:22)  RR: 18 (07-19-19 @ 05:22)  SpO2: 98% (07-18-19 @ 20:17)  Wt(kg): --    07-17 @ 07:01  -  07-18 @ 07:00  --------------------------------------------------------  IN: 440 mL / OUT: 0 mL / NET: 440 mL    07-18 @ 07:01  -  07-19 @ 07:00  --------------------------------------------------------  IN: 540 mL / OUT: 2000 mL / NET: -1460 mL    07-19 @ 07:01  -  07-19 @ 11:40  --------------------------------------------------------  IN: 560 mL / OUT: 200 mL / NET: 360 mL          PHYSICAL EXAM:  Constitutional: NAD  HEENT: anicteric sclera, oropharynx clear, MMM  Neck: No JVD  Respiratory: CTAB, no wheezes, rales or rhonchi  Cardiovascular: S1, S2, RRR  Gastrointestinal: BS+, soft, NT/ND  Extremities: No cyanosis or clubbing. No peripheral edema  :  No elder.   Skin: No rashes    LABS:  07-19    139  |  96<L>  |  32<H>  ----------------------------<  134<H>  3.8   |  27  |  6.9<HH>    Ca    8.5      19 Jul 2019 05:58  Phos  4.3     07-19  Mg     2.3     07-19    TPro  6.4  /  Alb  3.2<L>  /  TBili  0.9  /  DBili      /  AST  88<H>  /  ALT  79<H>  /  AlkPhos  220<H>  07-19                          9.7    5.52  )-----------( 118      ( 19 Jul 2019 05:58 )             30.2       Urine Studies:      RADIOLOGY & ADDITIONAL STUDIES:

## 2019-07-19 NOTE — PROGRESS NOTE ADULT - ASSESSMENT
1)  ESRD on HD for HD  tomorrow    2)  Acute-onset epigastric abdominal pain, N/V, elevated bilirubin, alk phos, GGT, and rising AST/ALT.  sp CT scan w/ what appears to be a CBD stone w/ dilated GB.  s/p ERCP: w/CBD stent placement for decompression with good response  on Zosyn leukocytosis resolved       3) Hb noted, check iron stores, no need fo GAURANG at the moment.    4) BP improved  off pressors,   back on Beta blocker, BP stillup resume imdur

## 2019-07-19 NOTE — PROGRESS NOTE ADULT - SUBJECTIVE AND OBJECTIVE BOX
DIAGNOSIS:   HOSPITAL DAY #:    STATUS POST:    POST OPERATIVE DAY #:     Vital Signs Last 24 Hrs  T(C): 36.1 (19 Jul 2019 14:23), Max: 36.7 (19 Jul 2019 05:22)  T(F): 97 (19 Jul 2019 14:23), Max: 98 (19 Jul 2019 05:22)  HR: 80 (19 Jul 2019 14:23) (80 - 99)  BP: 116/64 (19 Jul 2019 14:23) (116/64 - 174/79)  BP(mean): --  RR: 18 (19 Jul 2019 14:23) (16 - 18)  SpO2: 98% (18 Jul 2019 20:17) (98% - 98%)    SUBJECTIVE: Pt seen    Pain: YES  [ ]   NO [ ]   Nausea: [ ] YES [ ] NO           Vomiting: [ ] YES [ ] NO  Flatus: [ ] YES [ ] NO             Bowel Movement: [ ] YES [ ] NO     Void: [ ]YES [ ]No      DARSHANA DRAINAGE: SIGNIFICANT [ ]   NOT SIGNIFICANT [ ]   NOT APPLICABLE [ ]  YES [ ] NO    General Appearance: Appears well, NAD  Neck: Supple  Chest: Equal expansion bilaterally, equal breath sounds  CV: Pulse regular presently  Abdomen: Soft [x ] YES [ ]NO  DISTENDED [ ] YES [x ] NO TENDERNESS [ ]YES [x ]NO  INCISIONS: HEALING WELL [ ] YES  [ ] NO ERYTHEMA [ ] YES [ ] NO DRAINAGE [ ] YES  [ ] NO  Extremities: Grossly symmetric, CALF TENDERNESS [ ] YES  [ ] NO  RT fistula patent    LABS:                        9.7    5.52  )-----------( 118      ( 19 Jul 2019 05:58 )             30.2     07-19    139  |  96<L>  |  32<H>  ----------------------------<  134<H>  3.8   |  27  |  6.9<HH>    Ca    8.5      19 Jul 2019 05:58  Phos  4.3     07-19  Mg     2.3     07-19    TPro  6.4  /  Alb  3.2<L>  /  TBili  0.9  /  DBili  x   /  AST  88<H>  /  ALT  79<H>  /  AlkPhos  220<H>  07-19            ASSESSMENT:     GOOD POST OP COURSE [ ]  YES  [ ] NO  CONDITION IMPROVING  []  YES  [ ]  NO          PLAN:    CONTINUE PRESENT MANAGEMENT  [ ] YES  [ ] NO

## 2019-07-19 NOTE — DISCHARGE NOTE PROVIDER - NSDCFUADDAPPT_GEN_ALL_CORE_FT
- Patient will need repeat ERCP in 6 weeks for stent removal and stone removal- at that time he will need to be off Plavix for one week  - Will need follow up on discharge with Dr Nowak [phone]  - We will continue to follow while admitted

## 2019-07-19 NOTE — PROGRESS NOTE ADULT - ASSESSMENT
69 yo M with PMH of DM, ESRD on HD (T,Th, S), HTN, DLD, CAD s/p PCI  comes to the ED with the c/o acute onset of RUQ/ epigastric abdominal pain for one day associated with nausea and vomiting. found to have choledocholithiasis s/p ERCP w CBD stent placement with elevating ALP, clinically stable.       # Choledocholithiasis s/p ERCP and CBD stent placement with elevating ALP   - CBD dilation on US abdomen and stone on CT scan   - GI: ERCP: w/CBD stent placement for decompression with good response  - Patient will need repeat ERCP in 6 weeks for stent removal and stone removal- at that time he will need to be off Plavix for one week  - Will need follow up on discharge with Dr Nowak [phone]  - will c/w Zosyn, started on 7/15/19, will switch to PO moxifloxacin tmrw 400mg x7 days pr Dr. Lozano (7/20-7/26))  -elevated ALP, uptrending, will re-consult GI     # New onset - A -fib, no events on tele  - d/c Eliquis 2.5 mg BID, start eliquis 5mg BID, c/w ASA, but Hold Plavix as per Dr. Ceron  - follow up Transthoracic Echo results (6/08/18 echo EF of 45-50%)    # HTN   - Hold losartan, Metoprolol   -resume Imdur per CARDs  - consider resuming bp meds if pressure are high    # ESRD on HD (T/ Th/ S)  - Nephrology  -HD today  - cont to monitor Mg, Ph  - Hb noted, serum iron WNL, TIBC low, ferritin elevated, transferrin elevated  - no need for GAURANG at the moment     # Elevated Troponin  - PT is ESRD on HD ( Tu, Thu, Sat)  - will continue to trend    # CAD s/p 3 stents   - C/w home meds  - ASA, hold Plavix per cardiology (why), waiting for call back (if pt can resume)     # DM  - Check FS and start Insulin if FS >180     - Diet  - on low fat diet today    # DVT ppx  - Heparin 69 yo M with PMH of DM, ESRD on HD (T,Th, S), HTN, DLD, CAD s/p PCI  comes to the ED with the c/o acute onset of RUQ/ epigastric abdominal pain for one day associated with nausea and vomiting. found to have choledocholithiasis s/p ERCP w CBD stent placement with elevating ALP, clinically stable.       # Choledocholithiasis s/p ERCP and CBD stent placement with elevating ALP   - CBD dilation on US abdomen and stone on CT scan   - GI: ERCP: w/CBD stent placement for decompression with good response  - Patient will need repeat ERCP in 6 weeks for stent removal and stone removal- at that time he will need to be off Plavix for one week  - Will need follow up on discharge with Dr Nowak [phone]  - will c/w Zosyn, started on 7/15/19, will switch to PO moxifloxacin tmrw 400mg x7 days pr Dr. Lozano (7/20-7/26))  -elevated ALP, uptrending, will re-consult GI     # New onset - A -fib, no events on tele  - d/c Eliquis 2.5 mg BID, start eliquis 5mg BID, c/w ASA, but Hold Plavix as per Dr. Ceron  - follow up Transthoracic Echo results (6/08/18 echo EF of 45-50%)    # HTN   - Hold losartan, Metoprolol   -resume Imdur per CARDs  - consider resuming bp meds if pressure are high    # ESRD on HD (T/ Th/ S)  - Nephrology  -HD today  - cont to monitor Mg, Ph  - Hb noted, serum iron WNL, TIBC low, ferritin elevated, transferrin elevated  - no need for GAURANG at the moment     # Elevated Troponin  - PT is ESRD on HD ( Tu, Thu, Sat)  - will continue to trend    # CAD s/p 3 stents   - C/w home meds  - ASA, hold Plavix per cardiology (stent >1 yr)     # DM  - Check FS and start Insulin if FS >180     - Diet  - on low fat diet today    # DVT ppx  - Heparin 69 yo M with PMH of DM, ESRD on HD (T,Th, S), HTN, DLD, CAD s/p PCI  comes to the ED with the c/o acute onset of RUQ/ epigastric abdominal pain for one day associated with nausea and vomiting. found to have choledocholithiasis s/p ERCP w CBD stent placement with elevating ALP, clinically stable.       # Choledocholithiasis s/p ERCP and CBD stent placement with elevating ALP   - CBD dilation on US abdomen and stone on CT scan   - GI: ERCP: w/CBD stent placement for decompression with good response  - Patient will need repeat ERCP in 6 weeks for stent removal and stone removal- at that time he will need to be off Plavix for one week  - Will need follow up on discharge with Dr Nowak [phone]  - will c/w Zosyn, started on 7/15/19, will switch to PO moxifloxacin 400mg x7 days on discharge pr Dr. Lozano (7/20-7/26))  -elevated ALP, uptrending, awaiting GI clearence (awaiting attending)  - If cleared by GI, pt can be discharged (medrec all done)    # New onset - A -fib, no events on tele  - d/c Eliquis 2.5 mg BID, start eliquis 5mg BID, c/w ASA, but Hold Plavix as per Dr. Ceron  - follow up Transthoracic Echo results (6/08/18 echo EF of 45-50%)    # HTN   - Hold losartan, Metoprolol   -resume Imdur per CARDs  - consider resuming bp meds if pressure are high    # ESRD on HD (T/ Th/ S)  - Nephrology  -HD today  - cont to monitor Mg, Ph  - Hb noted, serum iron WNL, TIBC low, ferritin elevated, transferrin elevated  - no need for GAURANG at the moment     # Elevated Troponin  - PT is ESRD on HD ( Tu, Thu, Sat)  - will continue to trend    # CAD s/p 3 stents   - C/w home meds  - ASA, hold Plavix per cardiology (stent >1 yr)     # DM  - Check FS and start Insulin if FS >180     -Diet- DASH/CC  -DVT ppx- Heparin  -Dispo: pending GI clearance

## 2019-07-19 NOTE — PROGRESS NOTE ADULT - SUBJECTIVE AND OBJECTIVE BOX
MAMTA FERNANDO  68y Male    CHIEF COMPLAINT:    Patient is a 68y old  Male who presents with a chief complaint of Abdominal pain (2019 12:23)      INTERVAL HPI/OVERNIGHT EVENTS:    Patient seen and examined. No abdominal pain. No N/V. No fever. ALP trending up. Had two loose BM today but getting better    ROS: All other systems are negative.    Vital Signs:    T(F): 97 (19 @ 14:23), Max: 98 (19 @ 05:22)  HR: 80 (19 @ 14:23) (80 - 99)  BP: 116/64 (19 @ 14:23) (116/64 - 174/79)  RR: 18 (19 @ 14:23) (16 - 18)  SpO2: 98% (19 @ 20:17) (98% - 98%)  I&O's Summary    2019 07:  -  2019 07:00  --------------------------------------------------------  IN: 540 mL / OUT: 2000 mL / NET: -1460 mL    2019 07:01  -  2019 15:01  --------------------------------------------------------  IN: 1000 mL / OUT: 300 mL / NET: 700 mL      Daily     Daily Weight in k (2019 05:22)  CAPILLARY BLOOD GLUCOSE      POCT Blood Glucose.: 168 mg/dL (2019 11:22)  POCT Blood Glucose.: 124 mg/dL (2019 08:17)  POCT Blood Glucose.: 157 mg/dL (2019 21:57)      PHYSICAL EXAM:    GENERAL:  NAD  SKIN: No rashes or lesions  HENT: Atraumatic Normocephalic. PERRL. Moist membranes.  NECK: Supple, No JVD. No lymphadenopathy.  PULMONARY: CTA B/L. No wheezing. No rales  CVS: Normal S1, S2. Rate and Rhythm are regular. No murmurs.  ABDOMEN/GI: Soft, Nontender, Nondistended; BS present  EXTREMITIES: Peripheral pulses intact. No edema B/L LE.  NEUROLOGIC:  No motor or sensory deficit.  PSYCH: Alert & oriented x 3    Consultant(s) Notes Reviewed:  [x ] YES  [ ] NO  Care Discussed with Consultants/Other Providers [ x] YES  [ ] NO    EKG reviewed  Telemetry reviewed    LABS:                        9.7    5.52  )-----------( 118      ( 2019 05:58 )             30.2         139  |  96<L>  |  32<H>  ----------------------------<  134<H>  3.8   |  27  |  6.9<HH>    Ca    8.5      2019 05:58  Phos  4.3       Mg     2.3         TPro  6.4  /  Alb  3.2<L>  /  TBili  0.9  /  DBili  x   /  AST  88<H>  /  ALT  79<H>  /  AlkPhos  220<H>      T sagar 0.9     AST 88  ALT 79  19 @ 05:58  T sagar 1.2     AST 72  ALT 87  19 @ 06:38  T sagar 1.7     AST 38  ALT 99  17 @ 07:56  T sagar 2.1     AST 61    07-15-19 @ 23:30  T sagar 3.3         19 @ 21:35      Trop 0.30, CKMB --, CK --, 19 @ 05:58  Trop 0.31, CKMB --, CK --, 19 @ 06:38  Trop 0.29, CKMB --, CK --, 19 @ 00:56        RADIOLOGY & ADDITIONAL TESTS:      Imaging or report Personally Reviewed:  [ ] YES  [ ] NO    Medications:  Standing  apixaban 5 milliGRAM(s) Oral two times a day  aspirin  chewable 81 milliGRAM(s) Oral daily  atorvastatin 40 milliGRAM(s) Oral at bedtime  chlorhexidine 4% Liquid 1 Application(s) Topical every 12 hours  docusate sodium 100 milliGRAM(s) Oral two times a day  fluticasone propionate 50 MICROgram(s)/spray Nasal Spray 1 Spray(s) Both Nostrils daily  indomethacin Suppository 100 milliGRAM(s) Rectal once  isosorbide   mononitrate ER Tablet (IMDUR) 30 milliGRAM(s) Oral daily  metoprolol tartrate 25 milliGRAM(s) Oral two times a day  pantoprazole    Tablet 40 milliGRAM(s) Oral before breakfast  piperacillin/tazobactam IVPB.. 2.25 Gram(s) IV Intermittent every 12 hours    PRN Meds  acetaminophen   Tablet .. 650 milliGRAM(s) Oral every 6 hours PRN  diphenhydrAMINE 50 milliGRAM(s) Oral every 8 hours PRN  morphine  - Injectable 2 milliGRAM(s) IV Push every 4 hours PRN  ondansetron Injectable 8 milliGRAM(s) IV Push four times a day PRN      Case discussed with resident    Care discussed with pt/family

## 2019-07-20 ENCOUNTER — TRANSCRIPTION ENCOUNTER (OUTPATIENT)
Age: 69
End: 2019-07-20

## 2019-07-20 VITALS — SYSTOLIC BLOOD PRESSURE: 141 MMHG | DIASTOLIC BLOOD PRESSURE: 70 MMHG | HEART RATE: 74 BPM

## 2019-07-20 LAB
ANION GAP SERPL CALC-SCNC: 21 MMOL/L — HIGH (ref 7–14)
BASOPHILS # BLD AUTO: 0.04 K/UL — SIGNIFICANT CHANGE UP (ref 0–0.2)
BASOPHILS NFR BLD AUTO: 0.6 % — SIGNIFICANT CHANGE UP (ref 0–1)
BUN SERPL-MCNC: 42 MG/DL — HIGH (ref 10–20)
CALCIUM SERPL-MCNC: 8.5 MG/DL — SIGNIFICANT CHANGE UP (ref 8.5–10.1)
CHLORIDE SERPL-SCNC: 94 MMOL/L — LOW (ref 98–110)
CO2 SERPL-SCNC: 23 MMOL/L — SIGNIFICANT CHANGE UP (ref 17–32)
CREAT SERPL-MCNC: 8.4 MG/DL — CRITICAL HIGH (ref 0.7–1.5)
CULTURE RESULTS: SIGNIFICANT CHANGE UP
EOSINOPHIL # BLD AUTO: 0.34 K/UL — SIGNIFICANT CHANGE UP (ref 0–0.7)
EOSINOPHIL NFR BLD AUTO: 4.9 % — SIGNIFICANT CHANGE UP (ref 0–8)
GLUCOSE BLDC GLUCOMTR-MCNC: 130 MG/DL — HIGH (ref 70–99)
GLUCOSE BLDC GLUCOMTR-MCNC: 190 MG/DL — HIGH (ref 70–99)
GLUCOSE BLDC GLUCOMTR-MCNC: 194 MG/DL — HIGH (ref 70–99)
GLUCOSE SERPL-MCNC: 131 MG/DL — HIGH (ref 70–99)
HCT VFR BLD CALC: 29.5 % — LOW (ref 42–52)
HGB BLD-MCNC: 9.6 G/DL — LOW (ref 14–18)
IMM GRANULOCYTES NFR BLD AUTO: 1.6 % — HIGH (ref 0.1–0.3)
LYMPHOCYTES # BLD AUTO: 1.43 K/UL — SIGNIFICANT CHANGE UP (ref 1.2–3.4)
LYMPHOCYTES # BLD AUTO: 20.5 % — SIGNIFICANT CHANGE UP (ref 20.5–51.1)
MAGNESIUM SERPL-MCNC: 2.3 MG/DL — SIGNIFICANT CHANGE UP (ref 1.8–2.4)
MCHC RBC-ENTMCNC: 30.3 PG — SIGNIFICANT CHANGE UP (ref 27–31)
MCHC RBC-ENTMCNC: 32.5 G/DL — SIGNIFICANT CHANGE UP (ref 32–37)
MCV RBC AUTO: 93.1 FL — SIGNIFICANT CHANGE UP (ref 80–94)
MONOCYTES # BLD AUTO: 0.93 K/UL — HIGH (ref 0.1–0.6)
MONOCYTES NFR BLD AUTO: 13.3 % — HIGH (ref 1.7–9.3)
NEUTROPHILS # BLD AUTO: 4.13 K/UL — SIGNIFICANT CHANGE UP (ref 1.4–6.5)
NEUTROPHILS NFR BLD AUTO: 59.1 % — SIGNIFICANT CHANGE UP (ref 42.2–75.2)
NRBC # BLD: 0 /100 WBCS — SIGNIFICANT CHANGE UP (ref 0–0)
PHOSPHATE SERPL-MCNC: 5.8 MG/DL — HIGH (ref 2.1–4.9)
PLATELET # BLD AUTO: 141 K/UL — SIGNIFICANT CHANGE UP (ref 130–400)
POTASSIUM SERPL-MCNC: 4 MMOL/L — SIGNIFICANT CHANGE UP (ref 3.5–5)
POTASSIUM SERPL-SCNC: 4 MMOL/L — SIGNIFICANT CHANGE UP (ref 3.5–5)
RBC # BLD: 3.17 M/UL — LOW (ref 4.7–6.1)
RBC # FLD: 13.6 % — SIGNIFICANT CHANGE UP (ref 11.5–14.5)
SODIUM SERPL-SCNC: 138 MMOL/L — SIGNIFICANT CHANGE UP (ref 135–146)
SPECIMEN SOURCE: SIGNIFICANT CHANGE UP
WBC # BLD: 6.98 K/UL — SIGNIFICANT CHANGE UP (ref 4.8–10.8)
WBC # FLD AUTO: 6.98 K/UL — SIGNIFICANT CHANGE UP (ref 4.8–10.8)

## 2019-07-20 PROCEDURE — 93306 TTE W/DOPPLER COMPLETE: CPT | Mod: 26

## 2019-07-20 PROCEDURE — 99239 HOSP IP/OBS DSCHRG MGMT >30: CPT

## 2019-07-20 RX ORDER — APIXABAN 2.5 MG/1
1 TABLET, FILM COATED ORAL
Qty: 60 | Refills: 0
Start: 2019-07-20 | End: 2019-08-18

## 2019-07-20 RX ORDER — APIXABAN 2.5 MG/1
0.5 TABLET, FILM COATED ORAL
Qty: 60 | Refills: 0 | DISCHARGE
Start: 2019-07-20 | End: 2019-08-18

## 2019-07-20 RX ORDER — MOXIFLOXACIN HYDROCHLORIDE TABLETS, 400 MG 400 MG/1
1 TABLET, FILM COATED ORAL
Qty: 7 | Refills: 0
Start: 2019-07-20 | End: 2019-07-26

## 2019-07-20 RX ADMIN — PANTOPRAZOLE SODIUM 40 MILLIGRAM(S): 20 TABLET, DELAYED RELEASE ORAL at 07:34

## 2019-07-20 RX ADMIN — Medication 1 SPRAY(S): at 12:13

## 2019-07-20 RX ADMIN — PIPERACILLIN AND TAZOBACTAM 200 GRAM(S): 4; .5 INJECTION, POWDER, LYOPHILIZED, FOR SOLUTION INTRAVENOUS at 05:49

## 2019-07-20 RX ADMIN — ISOSORBIDE MONONITRATE 30 MILLIGRAM(S): 60 TABLET, EXTENDED RELEASE ORAL at 12:13

## 2019-07-20 RX ADMIN — Medication 100 MILLIGRAM(S): at 18:55

## 2019-07-20 RX ADMIN — APIXABAN 5 MILLIGRAM(S): 2.5 TABLET, FILM COATED ORAL at 18:55

## 2019-07-20 RX ADMIN — CHLORHEXIDINE GLUCONATE 1 APPLICATION(S): 213 SOLUTION TOPICAL at 05:49

## 2019-07-20 RX ADMIN — Medication 100 MILLIGRAM(S): at 05:49

## 2019-07-20 RX ADMIN — APIXABAN 5 MILLIGRAM(S): 2.5 TABLET, FILM COATED ORAL at 05:49

## 2019-07-20 RX ADMIN — Medication 25 MILLIGRAM(S): at 05:49

## 2019-07-20 RX ADMIN — PIPERACILLIN AND TAZOBACTAM 200 GRAM(S): 4; .5 INJECTION, POWDER, LYOPHILIZED, FOR SOLUTION INTRAVENOUS at 18:57

## 2019-07-20 RX ADMIN — Medication 81 MILLIGRAM(S): at 12:13

## 2019-07-20 RX ADMIN — Medication 25 MILLIGRAM(S): at 18:55

## 2019-07-20 NOTE — PROGRESS NOTE ADULT - SUBJECTIVE AND OBJECTIVE BOX
MAMTA FERNANDO  68y Male    CHIEF COMPLAINT:    Patient is a 68y old  Male who presents with a chief complaint of Abdominal pain (2019 15:49)      INTERVAL HPI/OVERNIGHT EVENTS:    Patient seen and examined. No abdominal pain. No fever. No sob. Tolerating low fat diet.    ROS: All other systems are negative.    Vital Signs:    T(F): 97.1 (19 @ 21:21), Max: 97.1 (19 @ 21:21)  HR: 76 (19 @ 05:27) (76 - 86)  BP: 157/78 (19 @ 05:27) (106/57 - 157/78)  RR: 18 (19 @ 05:27) (18 - 18)  SpO2: 100% (19 @ 18:26) (100% - 100%)  I&O's Summary    2019 07:01  -  2019 07:00  --------------------------------------------------------  IN: 1000 mL / OUT: 300 mL / NET: 700 mL      Daily     Daily Weight in k (2019 05:27)  CAPILLARY BLOOD GLUCOSE      POCT Blood Glucose.: 130 mg/dL (2019 07:46)  POCT Blood Glucose.: 201 mg/dL (2019 21:37)  POCT Blood Glucose.: 199 mg/dL (2019 16:41)  POCT Blood Glucose.: 168 mg/dL (2019 11:22)      PHYSICAL EXAM:    GENERAL:  NAD  SKIN: No rashes or lesions  HENT: Atraumatic Normocephalic. PERRL. Moist membranes.  NECK: Supple, No JVD. No lymphadenopathy.  PULMONARY: CTA B/L. No wheezing. No rales  CVS: Normal S1, S2. Rate and Rhythm are regular. No murmurs.  ABDOMEN/GI: Soft, Nontender, Nondistended; BS present  EXTREMITIES: Peripheral pulses intact. No edema B/L LE.  NEUROLOGIC:  No motor or sensory deficit.  PSYCH: Alert & oriented x 3    Consultant(s) Notes Reviewed:  [x ] YES  [ ] NO  Care Discussed with Consultants/Other Providers [ x] YES  [ ] NO    EKG reviewed  Telemetry reviewed    LABS:                        9.6    6.98  )-----------( 141      ( 2019 07:35 )             29.5         138  |  94<L>  |  42<H>  ----------------------------<  131<H>  4.0   |  23  |  8.4<HH>    Ca    8.5      2019 07:35  Phos  5.8       Mg     2.3         TPro  6.4  /  Alb  3.2<L>  /  TBili  0.9  /  DBili  x   /  AST  88<H>  /  ALT  79<H>  /  AlkPhos  220<H>          Trop 0.30, CKMB --, CK --, 19 @ 05:58  Trop 0.31, CKMB --, CK --, 19 @ 06:38  Trop 0.29, CKMB --, CK --, 19 @ 00:56        RADIOLOGY & ADDITIONAL TESTS:      Imaging or report Personally Reviewed:  [ ] YES  [ ] NO    Medications:  Standing  apixaban 5 milliGRAM(s) Oral two times a day  aspirin  chewable 81 milliGRAM(s) Oral daily  atorvastatin 40 milliGRAM(s) Oral at bedtime  chlorhexidine 4% Liquid 1 Application(s) Topical every 12 hours  docusate sodium 100 milliGRAM(s) Oral two times a day  fluticasone propionate 50 MICROgram(s)/spray Nasal Spray 1 Spray(s) Both Nostrils daily  indomethacin Suppository 100 milliGRAM(s) Rectal once  isosorbide   mononitrate ER Tablet (IMDUR) 30 milliGRAM(s) Oral daily  metoprolol tartrate 25 milliGRAM(s) Oral two times a day  pantoprazole    Tablet 40 milliGRAM(s) Oral before breakfast  piperacillin/tazobactam IVPB.. 2.25 Gram(s) IV Intermittent every 12 hours    PRN Meds  acetaminophen   Tablet .. 650 milliGRAM(s) Oral every 6 hours PRN  diphenhydrAMINE 50 milliGRAM(s) Oral every 8 hours PRN  morphine  - Injectable 2 milliGRAM(s) IV Push every 4 hours PRN  ondansetron Injectable 8 milliGRAM(s) IV Push four times a day PRN      Case discussed with resident    Care discussed with pt/family

## 2019-07-20 NOTE — PROGRESS NOTE ADULT - ASSESSMENT
1)  ESRD - hd TODAY  2 K bath  UF 1 L as carol    2) Choledocholithiasis s/p ERCP: w/CBD stent placement for decompression with good response  on Zosyn leukocytosis resolved   Alk phos rising 161 -220  GI f/u    3) Hb noted, check iron stores, no need fo GAURANG at the moment.    4) BP improv,   back on Beta blocker ,resume Losartan 50 mg qd and Imdur

## 2019-07-20 NOTE — DISCHARGE NOTE NURSING/CASE MANAGEMENT/SOCIAL WORK - NSDCFUADDAPPT_GEN_ALL_CORE_FT
- Patient will need repeat ERCP in 6 weeks for stent removal and stone removal- at that time he will need to be off Plavix for one week  - Will need follow up on discharge with Dr Nowak [phone]  - We will continue to follow while admitted

## 2019-07-20 NOTE — PROGRESS NOTE ADULT - SUBJECTIVE AND OBJECTIVE BOX
Nephrology progress note    Patient is seen and examined, events over the last 24 h noted .  c/o abdominalpain in sitting position Fort HD today No SOB  Allergies:  No Known Allergies    Hospital Medications:   MEDICATIONS  (STANDING):  apixaban 5 milliGRAM(s) Oral two times a day  aspirin  chewable 81 milliGRAM(s) Oral daily  atorvastatin 40 milliGRAM(s) Oral at bedtime  chlorhexidine 4% Liquid 1 Application(s) Topical every 12 hours  docusate sodium 100 milliGRAM(s) Oral two times a day  fluticasone propionate 50 MICROgram(s)/spray Nasal Spray 1 Spray(s) Both Nostrils daily  indomethacin Suppository 100 milliGRAM(s) Rectal once  isosorbide   mononitrate ER Tablet (IMDUR) 30 milliGRAM(s) Oral daily  metoprolol tartrate 25 milliGRAM(s) Oral two times a day  pantoprazole    Tablet 40 milliGRAM(s) Oral before breakfast  piperacillin/tazobactam IVPB.. 2.25 Gram(s) IV Intermittent every 12 hours        VITALS:  T(F): 97.1 (07-19-19 @ 21:21), Max: 97.1 (07-19-19 @ 21:21)  HR: 76 (07-20-19 @ 05:27)  BP: 157/78 (07-20-19 @ 05:27)  RR: 18 (07-20-19 @ 05:27)  SpO2: 100% (07-19-19 @ 18:26)  Wt(kg): --    07-18 @ 07:01  -  07-19 @ 07:00  --------------------------------------------------------  IN: 540 mL / OUT: 2000 mL / NET: -1460 mL    07-19 @ 07:01  -  07-20 @ 07:00  --------------------------------------------------------  IN: 1000 mL / OUT: 300 mL / NET: 700 mL          PHYSICAL EXAM:  Constitutional: NAD  HEENT: anicteric sclera, oropharynx clear, MMM  Neck: No JVD  Respiratory: CTAB, no wheezes, rales or rhonchi  Cardiovascular: S1, S2, RRR  Gastrointestinal: BS+, soft, NT/ND  Extremities:  No peripheral edema  Neurological: A/O x 3  : No CVA tenderness. No elder.   Skin: No rashes  Vascular Access: AVF    LABS:  07-20    138  |  94<L>  |  42<H>  ----------------------------<  131<H>  4.0   |  23  |  8.4<HH>    Ca    8.5      20 Jul 2019 07:35  Phos  5.8     07-20  Mg     2.3     07-20    TPro  6.4  /  Alb  3.2<L>  /  TBili  0.9  /  DBili      /  AST  88<H>  /  ALT  79<H>  /  AlkPhos  220<H>  07-19                          9.6    6.98  )-----------( 141      ( 20 Jul 2019 07:35 )             29.5       Urine Studies:      RADIOLOGY & ADDITIONAL STUDIES:  < from: Xray Chest 1 View- PORTABLE-Routine (07.18.19 @ 06:52) >    No radiographic evidence of acute cardiopulmonary disease.    < end of copied text >

## 2019-07-20 NOTE — PROGRESS NOTE ADULT - PROVIDER SPECIALTY LIST ADULT
CCU
CCU
Cardiology
Critical Care
Gastroenterology
Gastroenterology
Hospitalist
Internal Medicine
Nephrology
Surgery
Nephrology
Nephrology

## 2019-07-20 NOTE — PROGRESS NOTE ADULT - ASSESSMENT
67 yo M with PMH of DM, ESRD on HD (T,Th, S), HTN, DLD, CAD s/p PCI  comes to the ED with the c/o acute onset of RUQ/ epigastric abdominal pain for one day associated with nausea and vomiting. PT states that the pain started today afternoon after the dialysis, colicky in nature, non radiating,  and constant. He had 3-4 episode of  vomiting.     1.	Choledocholithiasis / Cholangitis  2.	Sepsis due to # 1  3.	ESRD on HD  4.	CAD / HTN / DL  5.	Lactic acidosis  6.	NSTEMI type 2  7.	A. Fib         PLAN:      ·	GI F/U by Dr. Berrios noted. Recommended out pt F/U with Dr. Zheng. No further intervention as an in pt.  ·	S/P ERCP and CBD stent placement for decompression.   ·	Cont low fat diet  ·	Cont ASA. Plavix was stopped by the cardiology  ·	Elevated Troponin due to sepsis and ESRD  ·	Avelox 400 mg po daily for one week    * Med rec reviewed. Plan of care D/W the pt. F/U with GI Dr. Zheng in six weeks. Time spent 40 minutes.

## 2019-07-20 NOTE — PROGRESS NOTE ADULT - REASON FOR ADMISSION
Abdominal pain

## 2019-07-20 NOTE — DISCHARGE NOTE NURSING/CASE MANAGEMENT/SOCIAL WORK - NSDCDPATPORTLINK_GEN_ALL_CORE
You can access the [facility] Portal, offered by [facility], by registering with the following website: [email or web address]

## 2019-07-24 RX ORDER — AMOXICILLIN AND CLAVULANATE POTASSIUM 500; 125 MG/1; MG/1
500-125 TABLET, FILM COATED ORAL
Qty: 6 | Refills: 0 | Status: DISCONTINUED | COMMUNITY
Start: 2019-07-24 | End: 2019-07-24

## 2019-07-24 RX ORDER — AMOXICILLIN AND CLAVULANATE POTASSIUM 875; 125 MG/1; MG/1
875-125 TABLET, COATED ORAL
Qty: 20 | Refills: 0 | Status: DISCONTINUED | COMMUNITY
Start: 2019-07-24 | End: 2019-07-24

## 2019-07-26 ENCOUNTER — APPOINTMENT (OUTPATIENT)
Dept: INTERNAL MEDICINE | Facility: CLINIC | Age: 69
End: 2019-07-26

## 2019-07-26 ENCOUNTER — OUTPATIENT (OUTPATIENT)
Dept: OUTPATIENT SERVICES | Facility: HOSPITAL | Age: 69
LOS: 1 days | Discharge: HOME | End: 2019-07-26

## 2019-07-26 VITALS
DIASTOLIC BLOOD PRESSURE: 71 MMHG | SYSTOLIC BLOOD PRESSURE: 134 MMHG | BODY MASS INDEX: 13.82 KG/M2 | TEMPERATURE: 97.6 F | HEART RATE: 71 BPM | HEIGHT: 66 IN | WEIGHT: 86 LBS

## 2019-07-26 DIAGNOSIS — Z95.5 PRESENCE OF CORONARY ANGIOPLASTY IMPLANT AND GRAFT: Chronic | ICD-10-CM

## 2019-07-26 NOTE — PHYSICAL EXAM
[No Acute Distress] : no acute distress [Well Nourished] : well nourished [PERRL] : pupils equal round and reactive to light [Normal Sclera/Conjunctiva] : normal sclera/conjunctiva [Normal Outer Ear/Nose] : the outer ears and nose were normal in appearance [No Respiratory Distress] : no respiratory distress  [No JVD] : no jugular venous distention [Clear to Auscultation] : lungs were clear to auscultation bilaterally [Regular Rhythm] : with a regular rhythm [Normal S1, S2] : normal S1 and S2

## 2019-07-27 DIAGNOSIS — Z99.2 DEPENDENCE ON RENAL DIALYSIS: ICD-10-CM

## 2019-07-27 DIAGNOSIS — K21.9 GASTRO-ESOPHAGEAL REFLUX DISEASE WITHOUT ESOPHAGITIS: ICD-10-CM

## 2019-07-27 DIAGNOSIS — I25.10 ATHEROSCLEROTIC HEART DISEASE OF NATIVE CORONARY ARTERY WITHOUT ANGINA PECTORIS: ICD-10-CM

## 2019-07-27 DIAGNOSIS — K80.50 CALCULUS OF BILE DUCT WITHOUT CHOLANGITIS OR CHOLECYSTITIS WITHOUT OBSTRUCTION: ICD-10-CM

## 2019-07-27 DIAGNOSIS — N18.6 END STAGE RENAL DISEASE: ICD-10-CM

## 2019-07-27 DIAGNOSIS — R74.8 ABNORMAL LEVELS OF OTHER SERUM ENZYMES: ICD-10-CM

## 2019-07-27 DIAGNOSIS — I13.2 HYPERTENSIVE HEART AND CHRONIC KIDNEY DISEASE WITH HEART FAILURE AND WITH STAGE 5 CHRONIC KIDNEY DISEASE, OR END STAGE RENAL DISEASE: ICD-10-CM

## 2019-07-27 DIAGNOSIS — I50.20 UNSPECIFIED SYSTOLIC (CONGESTIVE) HEART FAILURE: ICD-10-CM

## 2019-07-27 DIAGNOSIS — K80.30 CALCULUS OF BILE DUCT WITH CHOLANGITIS, UNSPECIFIED, WITHOUT OBSTRUCTION: ICD-10-CM

## 2019-07-27 DIAGNOSIS — E78.5 HYPERLIPIDEMIA, UNSPECIFIED: ICD-10-CM

## 2019-07-27 DIAGNOSIS — A41.9 SEPSIS, UNSPECIFIED ORGANISM: ICD-10-CM

## 2019-07-27 DIAGNOSIS — E87.2 ACIDOSIS: ICD-10-CM

## 2019-07-27 DIAGNOSIS — Z79.82 LONG TERM (CURRENT) USE OF ASPIRIN: ICD-10-CM

## 2019-07-27 DIAGNOSIS — Z00.00 ENCOUNTER FOR GENERAL ADULT MEDICAL EXAMINATION WITHOUT ABNORMAL FINDINGS: ICD-10-CM

## 2019-07-27 DIAGNOSIS — K31.7 POLYP OF STOMACH AND DUODENUM: ICD-10-CM

## 2019-07-27 DIAGNOSIS — K59.00 CONSTIPATION, UNSPECIFIED: ICD-10-CM

## 2019-07-27 DIAGNOSIS — I48.91 UNSPECIFIED ATRIAL FIBRILLATION: ICD-10-CM

## 2019-07-27 DIAGNOSIS — Z87.891 PERSONAL HISTORY OF NICOTINE DEPENDENCE: ICD-10-CM

## 2019-07-27 DIAGNOSIS — K25.9 GASTRIC ULCER, UNSPECIFIED AS ACUTE OR CHRONIC, WITHOUT HEMORRHAGE OR PERFORATION: ICD-10-CM

## 2019-07-27 DIAGNOSIS — R19.7 DIARRHEA, UNSPECIFIED: ICD-10-CM

## 2019-07-27 DIAGNOSIS — E83.42 HYPOMAGNESEMIA: ICD-10-CM

## 2019-07-27 DIAGNOSIS — R26.89 OTHER ABNORMALITIES OF GAIT AND MOBILITY: ICD-10-CM

## 2019-07-27 DIAGNOSIS — Z95.5 PRESENCE OF CORONARY ANGIOPLASTY IMPLANT AND GRAFT: ICD-10-CM

## 2019-07-27 DIAGNOSIS — I21.A1 MYOCARDIAL INFARCTION TYPE 2: ICD-10-CM

## 2019-07-27 DIAGNOSIS — Z79.02 LONG TERM (CURRENT) USE OF ANTITHROMBOTICS/ANTIPLATELETS: ICD-10-CM

## 2019-07-27 DIAGNOSIS — E11.22 TYPE 2 DIABETES MELLITUS WITH DIABETIC CHRONIC KIDNEY DISEASE: ICD-10-CM

## 2019-08-06 NOTE — HISTORY OF PRESENT ILLNESS
[de-identified] : 67 yo M with PMH of Anemia, ESRD on HD, CAD, DLD, Afib was recently in the hospital for the choledocholithiasis comes to the clinic for the followFollow up.He was seen by Guest radiologist and had an ERCP procedure and told him to come to the clinic for an ERCP again in six weeks.He has difficulty making an appointment with the G.I. doctor\par \par It was also found to have a new onset Atrial fibrillation. He was started on eliquis Five Milligram B.i.d. But he was taking once a day. He was bleeding from his Fistula site. THe dose was decreased to 2.5 mg BID.

## 2019-08-06 NOTE — ASSESSMENT
[FreeTextEntry1] : This patient is 68 year old male with past medical history of anemia, CAD, DLD, ESRD and hypertension, syncope. He is here for a follow up examination. Patient complains of dry skin and intermittent dizziness.\par Choledocholithiasis \par - S/p ERCP \par - Continue Antibiotics\par - GI follow-up for repeat ERCP \par \par New onset A.FIB\par - Eliquis dose  decreased to 2.5 mg BID \par - Cardiology follow-up\par  \par Coronary artery disease\par - no active chest pain or cardiac complaints \par - Continue with aspirin, statins\par - Plavix was discontinued \par \par Constipation \par - Controlled on current laxatives\par \par DLD\par - Continue with statins \par \par  ESRD\par - On dialysis 3 times a week- following his nephrologist on weekly basis\par \par Hypertension \par - Controlled\par - Continue current medications \par \par Subpleural nodule \par - Stable \par \par # GERD - on ranitidine 300, will increase Protonix 40 to bid (pt c/o heartburn)\par - GI Follow up \par \par RTC in one month \par \par

## 2019-08-12 ENCOUNTER — MEDICATION RENEWAL (OUTPATIENT)
Age: 69
End: 2019-08-12

## 2019-08-13 ENCOUNTER — MEDICATION RENEWAL (OUTPATIENT)
Age: 69
End: 2019-08-13

## 2019-08-23 ENCOUNTER — OUTPATIENT (OUTPATIENT)
Dept: OUTPATIENT SERVICES | Facility: HOSPITAL | Age: 69
LOS: 1 days | Discharge: HOME | End: 2019-08-23

## 2019-08-23 ENCOUNTER — APPOINTMENT (OUTPATIENT)
Dept: GASTROENTEROLOGY | Facility: CLINIC | Age: 69
End: 2019-08-23
Payer: MEDICAID

## 2019-08-23 VITALS — SYSTOLIC BLOOD PRESSURE: 166 MMHG | HEART RATE: 76 BPM | DIASTOLIC BLOOD PRESSURE: 73 MMHG

## 2019-08-23 DIAGNOSIS — Z95.5 PRESENCE OF CORONARY ANGIOPLASTY IMPLANT AND GRAFT: Chronic | ICD-10-CM

## 2019-08-23 PROCEDURE — 99214 OFFICE O/P EST MOD 30 MIN: CPT

## 2019-08-23 NOTE — ASSESSMENT
[FreeTextEntry1] : 69 year old male with PMH of CAD ( s/p PCI with Stents X3 on 2017) , ESRD on HD (TTS) , HTN, DLD, Pulmonary nodule, admitted for cholangitis/choledocholithiasis SP ERCP with stent was followed for diarrhea, GERD and pre transplant clearance.\par \par 1)Chronic diarrhea\par 2)Pre transplant clearance\par 3)Choledocholithiasis s/p CBD stent \par 4)Normocytic anemia- chronic disease cannot rule HANY\par 5)CAD on DAPT cleared for holding for 5 days\par \par Plan:\par - EGD and colonoscopy after 5 days off plavix\par - ERCP 5 days off plavix, for CBD stones extraction\par - Preferably in 2 separate days \par Risks and benefits discussed with patient.\par

## 2019-08-23 NOTE — PHYSICAL EXAM
[General Appearance - Alert] : alert [General Appearance - In No Acute Distress] : in no acute distress [Outer Ear] : the ears and nose were normal in appearance [Neck Appearance] : the appearance of the neck was normal [] : no respiratory distress [Apical Impulse] : the apical impulse was normal [Bowel Sounds] : normal bowel sounds [Abdomen Soft] : soft [No CVA Tenderness] : no ~M costovertebral angle tenderness [Skin Color & Pigmentation] : normal skin color and pigmentation [Abnormal Walk] : normal gait [Oriented To Time, Place, And Person] : oriented to person, place, and time

## 2019-08-23 NOTE — REASON FOR VISIT
[Follow-Up: _____] : a [unfilled] follow-up visit [FreeTextEntry1] : POST ERCP with CBD stent/gastric polyp, Hx of chronic diarrhea, clearance pre transplant

## 2019-08-23 NOTE — HISTORY OF PRESENT ILLNESS
[Diarrhea] : improved diarrhea [Heartburn] : resolved heartburn [Constipation] : denies constipation [de-identified] : 68 year old male with PMH of CAD ( s/p PCI with Stents X3 on 2017) , ESRD on HD (TTS) , HTN, DLD, pulmonary nodule, is here for follow up after an ERCP where he had a CBD stent placed. He is scheduled to have the stent and stone removed next week.  He was scheduled for a EGD/colonoscopy for end of July but was unable to go for the procedure because he started experiencing gallbladder issues. \par \par He also requests GI clearance for his kidney transplant. \par \par He says his GERD has gotten better but his diarrhea has persisted. He "cannot count" how many times he has to go and complains of fecal incontinence.\par \par He denies any hematochezia, but mentioned black stool likely secondary to Iron supplement for anemia. \par \par Her sister found to have CRC diagnosed at age 63.\par \par Intreval hx:\par cleared by Dr. ahmadi for plavix DC x minimal required days.\par Admitted for choledocholithiasis SP stenting no sphincterotomy given plavix\par Continues to have diarrhea\par \par \par

## 2019-08-30 ENCOUNTER — APPOINTMENT (OUTPATIENT)
Dept: INTERNAL MEDICINE | Facility: CLINIC | Age: 69
End: 2019-08-30

## 2019-08-30 ENCOUNTER — OUTPATIENT (OUTPATIENT)
Dept: OUTPATIENT SERVICES | Facility: HOSPITAL | Age: 69
LOS: 1 days | Discharge: HOME | End: 2019-08-30

## 2019-08-30 VITALS
HEART RATE: 76 BPM | TEMPERATURE: 97.9 F | WEIGHT: 182.98 LBS | SYSTOLIC BLOOD PRESSURE: 169 MMHG | HEIGHT: 66 IN | DIASTOLIC BLOOD PRESSURE: 74 MMHG | BODY MASS INDEX: 29.41 KG/M2

## 2019-08-30 DIAGNOSIS — Z95.5 PRESENCE OF CORONARY ANGIOPLASTY IMPLANT AND GRAFT: Chronic | ICD-10-CM

## 2019-08-30 RX ORDER — CLOPIDOGREL BISULFATE 75 MG/1
75 TABLET, FILM COATED ORAL
Qty: 90 | Refills: 2 | Status: DISCONTINUED | COMMUNITY
Start: 2016-08-31 | End: 2019-08-30

## 2019-08-30 RX ORDER — PANTOPRAZOLE 40 MG/1
40 TABLET, DELAYED RELEASE ORAL TWICE DAILY
Qty: 60 | Refills: 3 | Status: DISCONTINUED | COMMUNITY
Start: 2019-05-24 | End: 2019-08-30

## 2019-08-30 NOTE — HISTORY OF PRESENT ILLNESS
[de-identified] : 69 yo M with PMH of anemia, ESRD on HD, CAD, DLD afib on eliquis 2.5 mg comes to the clinic for the follow up. He denies any complaints. He is scheduled for ERCP for the stent and stone removal. \par \par He is still beedikg from the fistula site.

## 2019-08-30 NOTE — PHYSICAL EXAM
[No Acute Distress] : no acute distress [Well Nourished] : well nourished [Normal Sclera/Conjunctiva] : normal sclera/conjunctiva [PERRL] : pupils equal round and reactive to light [Normal Outer Ear/Nose] : the outer ears and nose were normal in appearance [Normal Oropharynx] : the oropharynx was normal [No JVD] : no jugular venous distention [No Lymphadenopathy] : no lymphadenopathy [Supple] : supple [No Respiratory Distress] : no respiratory distress  [No Accessory Muscle Use] : no accessory muscle use [Normal Rate] : normal rate  [Regular Rhythm] : with a regular rhythm [Normal S1, S2] : normal S1 and S2 [Soft] : abdomen soft [Non Tender] : non-tender [Non-distended] : non-distended [No HSM] : no HSM [No Rash] : no rash [Normal Gait] : normal gait [Normal Insight/Judgement] : insight and judgment were intact

## 2019-08-30 NOTE — END OF VISIT
[FreeTextEntry3] : GI note seen. Recommendation includes stopping plavix. However, pt no longer on plavix for several weeks due to prior bleeding episode. Pt is still currently on Eliquis 2.5 MG BID. In setting of ESRD, would likely recommend holding Eliquis for 48 hours pre-procedure.. pt is on lower dose of eliquis already so 48-72 hours should be reasonable but will defer to cardiology and GI for official decision   [] : Resident

## 2019-08-30 NOTE — ASSESSMENT
[FreeTextEntry1] : This patient is 68 year old male with past medical history of anemia, CAD, DLD, ESRD and hypertension, syncope. He is here for a follow up examination. Patient complains of dry skin and intermittent dizziness.\par \par # Choledocholithiasis \par - Repeat ERCP in Sep 12 \par - Patient needs cardiology clearance before the procedure and for holding anticoagulation. He has Cardiologist Dr. Martin appointment on Sep11. patient is advised to see him early. If he does not get the appoitment early then he will call [facility]. \par - Surgery follow up for the cholecystectomy \par \par # A.FIB\par - Eliquis 2.5 mg BID \par - Cardiology follow-up\par  \par # Coronary artery disease\par - Continue with aspirin, statins\par - Plavix was discontinued \par \par # DLD\par - Continue with statins \par \par # ESRD\par - On dialysis 3 times a week\par - Follow up with the nephrologist \par \par # Hypertension \par - Controlled\par - Continue current medications \par \par # Subpleural nodule \par - Stable \par \par # GERD - on ranitidine 300, will increase Protonix 40 to bid (pt c/o heartburn)\par - GI Follow up \par \par # RTC inone month \par

## 2019-09-01 ENCOUNTER — OUTPATIENT (OUTPATIENT)
Dept: OUTPATIENT SERVICES | Facility: HOSPITAL | Age: 69
LOS: 1 days | End: 2019-09-01

## 2019-09-01 DIAGNOSIS — Z95.5 PRESENCE OF CORONARY ANGIOPLASTY IMPLANT AND GRAFT: Chronic | ICD-10-CM

## 2019-09-10 DIAGNOSIS — I25.10 ATHEROSCLEROTIC HEART DISEASE OF NATIVE CORONARY ARTERY WITHOUT ANGINA PECTORIS: ICD-10-CM

## 2019-09-10 DIAGNOSIS — R19.7 DIARRHEA, UNSPECIFIED: ICD-10-CM

## 2019-09-10 DIAGNOSIS — I10 ESSENTIAL (PRIMARY) HYPERTENSION: ICD-10-CM

## 2019-09-10 DIAGNOSIS — K21.9 GASTRO-ESOPHAGEAL REFLUX DISEASE WITHOUT ESOPHAGITIS: ICD-10-CM

## 2019-09-10 DIAGNOSIS — K80.50 CALCULUS OF BILE DUCT WITHOUT CHOLANGITIS OR CHOLECYSTITIS WITHOUT OBSTRUCTION: ICD-10-CM

## 2019-09-10 DIAGNOSIS — I48.91 UNSPECIFIED ATRIAL FIBRILLATION: ICD-10-CM

## 2019-09-10 DIAGNOSIS — N18.6 END STAGE RENAL DISEASE: ICD-10-CM

## 2019-09-18 ENCOUNTER — OUTPATIENT (OUTPATIENT)
Dept: OUTPATIENT SERVICES | Facility: HOSPITAL | Age: 69
LOS: 1 days | Discharge: HOME | End: 2019-09-18
Payer: MEDICAID

## 2019-09-18 VITALS
HEART RATE: 77 BPM | DIASTOLIC BLOOD PRESSURE: 79 MMHG | OXYGEN SATURATION: 96 % | SYSTOLIC BLOOD PRESSURE: 168 MMHG | TEMPERATURE: 97 F | HEIGHT: 65 IN | RESPIRATION RATE: 18 BRPM | WEIGHT: 180.78 LBS

## 2019-09-18 DIAGNOSIS — Z98.890 OTHER SPECIFIED POSTPROCEDURAL STATES: Chronic | ICD-10-CM

## 2019-09-18 DIAGNOSIS — Z95.5 PRESENCE OF CORONARY ANGIOPLASTY IMPLANT AND GRAFT: Chronic | ICD-10-CM

## 2019-09-18 DIAGNOSIS — K80.20 CALCULUS OF GALLBLADDER WITHOUT CHOLECYSTITIS WITHOUT OBSTRUCTION: ICD-10-CM

## 2019-09-18 DIAGNOSIS — Z98.49 CATARACT EXTRACTION STATUS, UNSPECIFIED EYE: Chronic | ICD-10-CM

## 2019-09-18 DIAGNOSIS — Z01.818 ENCOUNTER FOR OTHER PREPROCEDURAL EXAMINATION: ICD-10-CM

## 2019-09-18 LAB
ALBUMIN SERPL ELPH-MCNC: 4.4 G/DL — SIGNIFICANT CHANGE UP (ref 3.5–5.2)
ALP SERPL-CCNC: 121 U/L — HIGH (ref 30–115)
ALT FLD-CCNC: 13 U/L — SIGNIFICANT CHANGE UP (ref 0–41)
ANION GAP SERPL CALC-SCNC: 17 MMOL/L — HIGH (ref 7–14)
APTT BLD: 35 SEC — SIGNIFICANT CHANGE UP (ref 27–39.2)
AST SERPL-CCNC: 20 U/L — SIGNIFICANT CHANGE UP (ref 0–41)
BASOPHILS # BLD AUTO: 0.04 K/UL — SIGNIFICANT CHANGE UP (ref 0–0.2)
BASOPHILS NFR BLD AUTO: 0.8 % — SIGNIFICANT CHANGE UP (ref 0–1)
BILIRUB SERPL-MCNC: 0.5 MG/DL — SIGNIFICANT CHANGE UP (ref 0.2–1.2)
BUN SERPL-MCNC: 41 MG/DL — HIGH (ref 10–20)
CALCIUM SERPL-MCNC: 9.8 MG/DL — SIGNIFICANT CHANGE UP (ref 8.5–10.1)
CHLORIDE SERPL-SCNC: 99 MMOL/L — SIGNIFICANT CHANGE UP (ref 98–110)
CO2 SERPL-SCNC: 25 MMOL/L — SIGNIFICANT CHANGE UP (ref 17–32)
CREAT SERPL-MCNC: 6.3 MG/DL — CRITICAL HIGH (ref 0.7–1.5)
EOSINOPHIL # BLD AUTO: 0.21 K/UL — SIGNIFICANT CHANGE UP (ref 0–0.7)
EOSINOPHIL NFR BLD AUTO: 4.1 % — SIGNIFICANT CHANGE UP (ref 0–8)
ESTIMATED AVERAGE GLUCOSE: 114 MG/DL — SIGNIFICANT CHANGE UP (ref 68–114)
GLUCOSE SERPL-MCNC: 107 MG/DL — HIGH (ref 70–99)
HBA1C BLD-MCNC: 5.6 % — SIGNIFICANT CHANGE UP (ref 4–5.6)
HCT VFR BLD CALC: 30.6 % — LOW (ref 42–52)
HGB BLD-MCNC: 9.6 G/DL — LOW (ref 14–18)
IMM GRANULOCYTES NFR BLD AUTO: 0.2 % — SIGNIFICANT CHANGE UP (ref 0.1–0.3)
INR BLD: 0.92 RATIO — SIGNIFICANT CHANGE UP (ref 0.65–1.3)
LYMPHOCYTES # BLD AUTO: 1.16 K/UL — LOW (ref 1.2–3.4)
LYMPHOCYTES # BLD AUTO: 22.6 % — SIGNIFICANT CHANGE UP (ref 20.5–51.1)
MCHC RBC-ENTMCNC: 29.4 PG — SIGNIFICANT CHANGE UP (ref 27–31)
MCHC RBC-ENTMCNC: 31.4 G/DL — LOW (ref 32–37)
MCV RBC AUTO: 93.9 FL — SIGNIFICANT CHANGE UP (ref 80–94)
MONOCYTES # BLD AUTO: 0.64 K/UL — HIGH (ref 0.1–0.6)
MONOCYTES NFR BLD AUTO: 12.5 % — HIGH (ref 1.7–9.3)
NEUTROPHILS # BLD AUTO: 3.08 K/UL — SIGNIFICANT CHANGE UP (ref 1.4–6.5)
NEUTROPHILS NFR BLD AUTO: 59.8 % — SIGNIFICANT CHANGE UP (ref 42.2–75.2)
NRBC # BLD: 0 /100 WBCS — SIGNIFICANT CHANGE UP (ref 0–0)
PLATELET # BLD AUTO: 201 K/UL — SIGNIFICANT CHANGE UP (ref 130–400)
POTASSIUM SERPL-MCNC: 4.8 MMOL/L — SIGNIFICANT CHANGE UP (ref 3.5–5)
POTASSIUM SERPL-SCNC: 4.8 MMOL/L — SIGNIFICANT CHANGE UP (ref 3.5–5)
PROT SERPL-MCNC: 7.3 G/DL — SIGNIFICANT CHANGE UP (ref 6–8)
PROTHROM AB SERPL-ACNC: 10.6 SEC — SIGNIFICANT CHANGE UP (ref 9.95–12.87)
RBC # BLD: 3.26 M/UL — LOW (ref 4.7–6.1)
RBC # FLD: 13.7 % — SIGNIFICANT CHANGE UP (ref 11.5–14.5)
SODIUM SERPL-SCNC: 141 MMOL/L — SIGNIFICANT CHANGE UP (ref 135–146)
WBC # BLD: 5.14 K/UL — SIGNIFICANT CHANGE UP (ref 4.8–10.8)
WBC # FLD AUTO: 5.14 K/UL — SIGNIFICANT CHANGE UP (ref 4.8–10.8)

## 2019-09-18 PROCEDURE — 93010 ELECTROCARDIOGRAM REPORT: CPT

## 2019-09-18 NOTE — H&P PST ADULT - NSICDXPASTSURGICALHX_GEN_ALL_CORE_FT
PAST SURGICAL HISTORY:  H/O right coronary artery stent placement stent x3    S/P arteriovenous (AV) fistula creation     S/P cataract surgery

## 2019-09-18 NOTE — H&P PST ADULT - NSICDXPASTMEDICALHX_GEN_ALL_CORE_FT
PAST MEDICAL HISTORY:  Afib     Anemia     ESRD on dialysis T/ TH/ S    Heart failure     HLD (hyperlipidemia)     HTN (hypertension)

## 2019-09-20 ENCOUNTER — RESULT REVIEW (OUTPATIENT)
Age: 69
End: 2019-09-20

## 2019-09-20 ENCOUNTER — INPATIENT (INPATIENT)
Facility: HOSPITAL | Age: 69
LOS: 1 days | Discharge: NURSING, REHAB OR LONG-TERM CARE | End: 2019-09-22
Attending: SURGERY | Admitting: SURGERY
Payer: MEDICAID

## 2019-09-20 VITALS
SYSTOLIC BLOOD PRESSURE: 175 MMHG | WEIGHT: 180.78 LBS | TEMPERATURE: 99 F | RESPIRATION RATE: 18 BRPM | HEART RATE: 80 BPM | HEIGHT: 65 IN | DIASTOLIC BLOOD PRESSURE: 80 MMHG

## 2019-09-20 DIAGNOSIS — Z98.890 OTHER SPECIFIED POSTPROCEDURAL STATES: Chronic | ICD-10-CM

## 2019-09-20 DIAGNOSIS — Z98.49 CATARACT EXTRACTION STATUS, UNSPECIFIED EYE: Chronic | ICD-10-CM

## 2019-09-20 DIAGNOSIS — Z95.5 PRESENCE OF CORONARY ANGIOPLASTY IMPLANT AND GRAFT: Chronic | ICD-10-CM

## 2019-09-20 LAB
ANION GAP SERPL CALC-SCNC: 14 MMOL/L — SIGNIFICANT CHANGE UP (ref 7–14)
APTT BLD: 35.5 SEC — SIGNIFICANT CHANGE UP (ref 27–39.2)
BASOPHILS # BLD AUTO: 0.03 K/UL — SIGNIFICANT CHANGE UP (ref 0–0.2)
BASOPHILS NFR BLD AUTO: 0.6 % — SIGNIFICANT CHANGE UP (ref 0–1)
BLD GP AB SCN SERPL QL: SIGNIFICANT CHANGE UP
BUN SERPL-MCNC: 41 MG/DL — HIGH (ref 10–20)
CALCIUM SERPL-MCNC: 9.2 MG/DL — SIGNIFICANT CHANGE UP (ref 8.5–10.1)
CHLORIDE SERPL-SCNC: 105 MMOL/L — SIGNIFICANT CHANGE UP (ref 98–110)
CK MB CFR SERPL CALC: 3.8 NG/ML — SIGNIFICANT CHANGE UP (ref 0.6–6.3)
CK SERPL-CCNC: 90 U/L — SIGNIFICANT CHANGE UP (ref 0–225)
CO2 SERPL-SCNC: 23 MMOL/L — SIGNIFICANT CHANGE UP (ref 17–32)
CREAT SERPL-MCNC: 6.2 MG/DL — CRITICAL HIGH (ref 0.7–1.5)
EOSINOPHIL # BLD AUTO: 0.15 K/UL — SIGNIFICANT CHANGE UP (ref 0–0.7)
EOSINOPHIL NFR BLD AUTO: 3.1 % — SIGNIFICANT CHANGE UP (ref 0–8)
GLUCOSE SERPL-MCNC: 107 MG/DL — HIGH (ref 70–99)
HCT VFR BLD CALC: 28.6 % — LOW (ref 42–52)
HGB BLD-MCNC: 9.1 G/DL — LOW (ref 14–18)
IMM GRANULOCYTES NFR BLD AUTO: 0.4 % — HIGH (ref 0.1–0.3)
INR BLD: 1 RATIO — SIGNIFICANT CHANGE UP (ref 0.65–1.3)
LYMPHOCYTES # BLD AUTO: 0.96 K/UL — LOW (ref 1.2–3.4)
LYMPHOCYTES # BLD AUTO: 19.8 % — LOW (ref 20.5–51.1)
MAGNESIUM SERPL-MCNC: 1.9 MG/DL — SIGNIFICANT CHANGE UP (ref 1.8–2.4)
MCHC RBC-ENTMCNC: 30 PG — SIGNIFICANT CHANGE UP (ref 27–31)
MCHC RBC-ENTMCNC: 31.8 G/DL — LOW (ref 32–37)
MCV RBC AUTO: 94.4 FL — HIGH (ref 80–94)
MONOCYTES # BLD AUTO: 0.25 K/UL — SIGNIFICANT CHANGE UP (ref 0.1–0.6)
MONOCYTES NFR BLD AUTO: 5.1 % — SIGNIFICANT CHANGE UP (ref 1.7–9.3)
NEUTROPHILS # BLD AUTO: 3.45 K/UL — SIGNIFICANT CHANGE UP (ref 1.4–6.5)
NEUTROPHILS NFR BLD AUTO: 71 % — SIGNIFICANT CHANGE UP (ref 42.2–75.2)
NRBC # BLD: 0 /100 WBCS — SIGNIFICANT CHANGE UP (ref 0–0)
PHOSPHATE SERPL-MCNC: 4.4 MG/DL — SIGNIFICANT CHANGE UP (ref 2.1–4.9)
PLATELET # BLD AUTO: 156 K/UL — SIGNIFICANT CHANGE UP (ref 130–400)
POTASSIUM SERPL-MCNC: 4.1 MMOL/L — SIGNIFICANT CHANGE UP (ref 3.5–5)
POTASSIUM SERPL-SCNC: 4.1 MMOL/L — SIGNIFICANT CHANGE UP (ref 3.5–5)
PROTHROM AB SERPL-ACNC: 11.5 SEC — SIGNIFICANT CHANGE UP (ref 9.95–12.87)
RBC # BLD: 3.03 M/UL — LOW (ref 4.7–6.1)
RBC # FLD: 13.9 % — SIGNIFICANT CHANGE UP (ref 11.5–14.5)
SODIUM SERPL-SCNC: 142 MMOL/L — SIGNIFICANT CHANGE UP (ref 135–146)
TROPONIN T SERPL-MCNC: 0.09 NG/ML — CRITICAL HIGH
WBC # BLD: 4.86 K/UL — SIGNIFICANT CHANGE UP (ref 4.8–10.8)
WBC # FLD AUTO: 4.86 K/UL — SIGNIFICANT CHANGE UP (ref 4.8–10.8)

## 2019-09-20 PROCEDURE — 71045 X-RAY EXAM CHEST 1 VIEW: CPT | Mod: 26

## 2019-09-20 PROCEDURE — 88304 TISSUE EXAM BY PATHOLOGIST: CPT | Mod: 26

## 2019-09-20 PROCEDURE — 93010 ELECTROCARDIOGRAM REPORT: CPT

## 2019-09-20 RX ORDER — OXYCODONE HYDROCHLORIDE 5 MG/1
5 TABLET ORAL EVERY 6 HOURS
Refills: 0 | Status: DISCONTINUED | OUTPATIENT
Start: 2019-09-20 | End: 2019-09-22

## 2019-09-20 RX ORDER — ACETAMINOPHEN 500 MG
650 TABLET ORAL EVERY 6 HOURS
Refills: 0 | Status: DISCONTINUED | OUTPATIENT
Start: 2019-09-20 | End: 2019-09-22

## 2019-09-20 RX ORDER — CHLORHEXIDINE GLUCONATE 213 G/1000ML
1 SOLUTION TOPICAL
Refills: 0 | Status: DISCONTINUED | OUTPATIENT
Start: 2019-09-20 | End: 2019-09-22

## 2019-09-20 RX ORDER — HYDROMORPHONE HYDROCHLORIDE 2 MG/ML
0.5 INJECTION INTRAMUSCULAR; INTRAVENOUS; SUBCUTANEOUS
Refills: 0 | Status: DISCONTINUED | OUTPATIENT
Start: 2019-09-20 | End: 2019-09-22

## 2019-09-20 RX ORDER — LORATADINE 10 MG/1
10 TABLET ORAL DAILY
Refills: 0 | Status: DISCONTINUED | OUTPATIENT
Start: 2019-09-20 | End: 2019-09-22

## 2019-09-20 RX ORDER — ALBUTEROL 90 UG/1
2 AEROSOL, METERED ORAL EVERY 6 HOURS
Refills: 0 | Status: DISCONTINUED | OUTPATIENT
Start: 2019-09-20 | End: 2019-09-22

## 2019-09-20 RX ORDER — ONDANSETRON 8 MG/1
4 TABLET, FILM COATED ORAL ONCE
Refills: 0 | Status: DISCONTINUED | OUTPATIENT
Start: 2019-09-20 | End: 2019-09-20

## 2019-09-20 RX ORDER — HEPARIN SODIUM 5000 [USP'U]/ML
5000 INJECTION INTRAVENOUS; SUBCUTANEOUS EVERY 8 HOURS
Refills: 0 | Status: DISCONTINUED | OUTPATIENT
Start: 2019-09-20 | End: 2019-09-22

## 2019-09-20 RX ORDER — ISOSORBIDE MONONITRATE 60 MG/1
30 TABLET, EXTENDED RELEASE ORAL DAILY
Refills: 0 | Status: DISCONTINUED | OUTPATIENT
Start: 2019-09-20 | End: 2019-09-22

## 2019-09-20 RX ORDER — FAMOTIDINE 10 MG/ML
20 INJECTION INTRAVENOUS EVERY 24 HOURS
Refills: 0 | Status: DISCONTINUED | OUTPATIENT
Start: 2019-09-20 | End: 2019-09-22

## 2019-09-20 RX ORDER — LOSARTAN POTASSIUM 100 MG/1
25 TABLET, FILM COATED ORAL DAILY
Refills: 0 | Status: DISCONTINUED | OUTPATIENT
Start: 2019-09-20 | End: 2019-09-22

## 2019-09-20 RX ORDER — HYDROMORPHONE HYDROCHLORIDE 2 MG/ML
0.25 INJECTION INTRAMUSCULAR; INTRAVENOUS; SUBCUTANEOUS
Refills: 0 | Status: DISCONTINUED | OUTPATIENT
Start: 2019-09-20 | End: 2019-09-22

## 2019-09-20 RX ORDER — FLUTICASONE PROPIONATE 50 MCG
1 SPRAY, SUSPENSION NASAL DAILY
Refills: 0 | Status: DISCONTINUED | OUTPATIENT
Start: 2019-09-20 | End: 2019-09-22

## 2019-09-20 RX ORDER — DIPHENHYDRAMINE HCL 50 MG
25 CAPSULE ORAL AT BEDTIME
Refills: 0 | Status: DISCONTINUED | OUTPATIENT
Start: 2019-09-20 | End: 2019-09-22

## 2019-09-20 RX ORDER — METOPROLOL TARTRATE 50 MG
25 TABLET ORAL
Refills: 0 | Status: DISCONTINUED | OUTPATIENT
Start: 2019-09-20 | End: 2019-09-22

## 2019-09-20 RX ORDER — OXYCODONE HYDROCHLORIDE 5 MG/1
10 TABLET ORAL EVERY 6 HOURS
Refills: 0 | Status: DISCONTINUED | OUTPATIENT
Start: 2019-09-20 | End: 2019-09-22

## 2019-09-20 RX ORDER — PANTOPRAZOLE SODIUM 20 MG/1
1 TABLET, DELAYED RELEASE ORAL
Qty: 0 | Refills: 0 | DISCHARGE

## 2019-09-20 RX ORDER — SODIUM CHLORIDE 9 MG/ML
1000 INJECTION INTRAMUSCULAR; INTRAVENOUS; SUBCUTANEOUS
Refills: 0 | Status: DISCONTINUED | OUTPATIENT
Start: 2019-09-20 | End: 2019-09-20

## 2019-09-20 RX ORDER — DOCUSATE SODIUM 100 MG
200 CAPSULE ORAL AT BEDTIME
Refills: 0 | Status: DISCONTINUED | OUTPATIENT
Start: 2019-09-20 | End: 2019-09-22

## 2019-09-20 RX ORDER — ATORVASTATIN CALCIUM 80 MG/1
40 TABLET, FILM COATED ORAL AT BEDTIME
Refills: 0 | Status: DISCONTINUED | OUTPATIENT
Start: 2019-09-20 | End: 2019-09-22

## 2019-09-20 RX ADMIN — Medication 25 MILLIGRAM(S): at 18:47

## 2019-09-20 RX ADMIN — SODIUM CHLORIDE 50 MILLILITER(S): 9 INJECTION INTRAMUSCULAR; INTRAVENOUS; SUBCUTANEOUS at 15:34

## 2019-09-20 RX ADMIN — Medication 200 MILLIGRAM(S): at 23:22

## 2019-09-20 RX ADMIN — HEPARIN SODIUM 5000 UNIT(S): 5000 INJECTION INTRAVENOUS; SUBCUTANEOUS at 23:23

## 2019-09-20 RX ADMIN — Medication 650 MILLIGRAM(S): at 18:47

## 2019-09-20 RX ADMIN — ATORVASTATIN CALCIUM 40 MILLIGRAM(S): 80 TABLET, FILM COATED ORAL at 23:22

## 2019-09-20 NOTE — ASU PATIENT PROFILE, ADULT - PSH
H/O right coronary artery stent placement  stent x3  S/P arteriovenous (AV) fistula creation    S/P cataract surgery

## 2019-09-20 NOTE — ASU PREOP CHECKLIST - HIBICLENS SHOWER 1 DATE
Agree with above.   No further inpatient cardiac workup needed at this time.     Richard Pineda MD 20-Sep-2019

## 2019-09-20 NOTE — CHART NOTE - NSCHARTNOTEFT_GEN_A_CORE
PACU ANESTHESIA ADMISSION NOTE      Procedure: Laparoscopic cholecystectomy    Post op diagnosis:  Cholelithiasis with cholangitis      ____  Intubated  TV:______       Rate: ______      FiO2: ______    _x___  Patent Airway    _x___  Full return of protective reflexes    _x___  Full recovery from anesthesia / back to baseline status    Vitals:  T(C): 37.1 (  HR: 121  BP: 1149/87  RR: 18 (  SpO2: --99     Mental Status:  _x___ Awake   _____ Alert   _____ Drowsy   _____ Sedated    Nausea/Vomiting:  _x___  NO       ______Yes,   See Post - Op Orders         Pain Scale (0-10):  __0___    Treatment: _x___ None    ____ See Post - Op/PCA Orders    Post - Operative Fluids:   __x__ Oral   ____ See Post - Op Orders    Plan: Discharge:   ____Home       _____Floor     ____x _Critical Care    _____  Other:_________________    Comments:  No anesthesia issues or complications noted.  Discharge when criteria met.

## 2019-09-20 NOTE — CHART NOTE - NSCHARTNOTEFT_GEN_A_CORE
Post Operative Check    Patient is post op from a Laparoscopic cholecystectomy and is doing well post operatively. Patient has complaints of moderate pain in RUQ but is otherwise feeling well. He is tolerating clears without nausea or vomiting. Patient has not yet ambulated or urinated.    Vitals  T(C): 36.2 (09-20-19 @ 20:49), Max: 37.1 (09-20-19 @ 10:55)  HR: 88 (09-20-19 @ 20:49) (80 - 112)  BP: 159/73 (09-20-19 @ 20:49) (134/82 - 175/80)  RR: 20 (09-20-19 @ 20:49) (18 - 21)  SpO2: 100% (09-20-19 @ 17:00) (100% - 100%)    09-20 @ 07:01  -  09-20 @ 21:43  --------------------------------------------------------  IN:    Oral Fluid: 120 mL    sodium chloride 0.9%.: 50 mL  Total IN: 170 mL    OUT:    Bulb: 425 mL  Total OUT: 425 mL    Total NET: -255 mL    Physical Exam  General: NAD AAOx3   Cards: RRR S1S2  Resp: CTAB  Abdomen: soft, tender in RUQ, mild distension, dressings are clean, dry and intact, DARSHANA drain in RUQ with 30cc serosanguinous output  Ext: NTBL    Labs:                        9.1    4.86  )-----------( 156      ( 20 Sep 2019 16:00 )             28.6       Auto Immature Granulocyte %: 0.4 % (09-20-19 @ 16:00)  Auto Neutrophil %: 71.0 % (09-20-19 @ 16:00)    09-20    142  |  105  |  41<H>  ----------------------------<  107<H>  4.1   |  23  |  6.2<HH>    Calcium, Total Serum: 9.2 mg/dL (09-20-19 @ 16:00)    Coags:     11.50  ----< 1.00    ( 20 Sep 2019 16:00 )     35.5      CARDIAC MARKERS ( 20 Sep 2019 16:00 )  x     / 0.09 ng/mL / 90 U/L / x     / 3.8 ng/mL    Assessment:  Patient is a 69y old Male s/p laparoscopic cholecystectomy    Plan:  -pain control  -follow urine output

## 2019-09-20 NOTE — BRIEF OPERATIVE NOTE - OPERATION/FINDINGS
Edematous gallbladder with dense adhesions, critical view of safety achieved prior to clipping cystic duct

## 2019-09-20 NOTE — ASU PATIENT PROFILE, ADULT - PMH
Afib    Anemia    ESRD on dialysis  T/ TH/ S  Heart failure    HLD (hyperlipidemia)    HTN (hypertension)

## 2019-09-20 NOTE — CONSULT NOTE ADULT - SUBJECTIVE AND OBJECTIVE BOX
SICU Consultation Note  ======================================================================================================  MAMTA FERNANDO  [id number]    70y/o Male, w/ PMH of ESRD on HD (T, Th, Sat // last HD yesterday 9/19), CAD s/p PCI with 3 stents (on ASA, Plavix) in 2016, DM, HTN, HLD, admitted to the hospital in July 2019 for cholangitis. During hospitalization, patient was septic, developed new onset Afib with RVR, and underwent ERCP with stent placement for decompression. Patient was found to have two filling defects but sphincterotomy and stone extraction were not performed since patient was on Plavix, with recommendations to return for ERCP in 6 weeks. Patient presented on 9/20/2019 for elective laparoscopic cholecystectomy. Procedure was without complications and tolerated well by the patient.  SICU consulted for hemodynamic monitoring in setting of multiple medical comorbidities.      Procedure: laparoscopic cholecystectomy   OR time: 1 hour     EBL: 30cc         IV Fluids:  3L NS       Blood Products: none           UOP: no elder     Procedure Findings- edematous gallbladder with dense adhesions        PAST MEDICAL & SURGICAL HISTORY:  Afib  Anemia  Heart failure  HLD (hyperlipidemia)  HTN (hypertension)  ESRD on dialysis: T/ TH/ S  S/P cataract surgery  S/P arteriovenous (AV) fistula creation  H/O right coronary artery stent placement: stent x3      Home Meds:   Home Medications:  aspirin 81 mg oral tablet, chewable: 1 tab(s) orally once a day (20 Sep 2019 10:54)  atorvastatin 40 mg oral tablet: 1 tab(s) orally once a day (20 Sep 2019 10:54)  diphenhydrAMINE 25 mg oral capsule: 1 tab(s) orally once a day (at bedtime) (20 Sep 2019 10:54)  docusate potassium 100 mg oral capsule: 3 tab(s) orally once a day (at bedtime) (20 Sep 2019 10:54)  Flonase 50 mcg/inh nasal spray: 1 spray(s) nasal once a day (20 Sep 2019 10:54)  isosorbide mononitrate 30 mg oral tablet, extended release: 1 tab(s) orally once a day (in the morning) (20 Sep 2019 10:54)  loratadine 10 mg oral tablet: 1 tab(s) orally once a day (20 Sep 2019 10:54)  losartan 25 mg oral tablet: 1 tab(s) orally once a day (20 Sep 2019 10:54)  metoprolol tartrate 25 mg oral tablet: 1 tab(s) orally 2 times a day (20 Sep 2019 10:54)  ProAir HFA 90 mcg/inh inhalation aerosol: 2 puff(s) inhaled 4 times a day, As Needed (20 Sep 2019 10:54)  raNITIdine 150 mg oral capsule: 1 tab(s) orally once a day (20 Sep 2019 10:54)      Allergies    No Known Allergies    Intolerances          Advanced Directives: Full Code      CURRENT MEDICATIONS:   acetaminophen   Tablet .. 650 milliGRAM(s) Oral every 6 hours  ALBUTerol    90 MICROgram(s) HFA Inhaler 2 Puff(s) Inhalation every 6 hours PRN  atorvastatin 40 milliGRAM(s) Oral at bedtime  chlorhexidine 4% Liquid 1 Application(s) Topical <User Schedule>  diphenhydrAMINE 25 milliGRAM(s) Oral at bedtime PRN  docusate sodium 200 milliGRAM(s) Oral at bedtime  famotidine Injectable 20 milliGRAM(s) IV Push every 24 hours  fluticasone propionate (50 MICROgram(s)/actuation) Nasal Spray - Peds 1 Spray(s) Alternating Nostrils daily  heparin  Injectable 5000 Unit(s) SubCutaneous every 8 hours  HYDROmorphone  Injectable 0.5 milliGRAM(s) IV Push every 10 minutes PRN  HYDROmorphone  Injectable 0.25 milliGRAM(s) IV Push every 10 minutes PRN  isosorbide   mononitrate ER Tablet (IMDUR) 30 milliGRAM(s) Oral daily  loratadine 10 milliGRAM(s) Oral daily  losartan 25 milliGRAM(s) Oral daily  metoprolol tartrate 25 milliGRAM(s) Oral two times a day  ondansetron Injectable 4 milliGRAM(s) IV Push once PRN  oxyCODONE    IR 5 milliGRAM(s) Oral every 6 hours PRN  oxyCODONE    IR 10 milliGRAM(s) Oral every 6 hours PRN  sodium chloride 0.9%. 1000 milliLiter(s) (50 mL/Hr) IV Continuous <Continuous>            VITAL SIGNS, INS/OUTS (Last 24hours):    ICU Vital Signs Last 24 Hrs  T(C): 36.4 (20 Sep 2019 16:05), Max: 37.1 (20 Sep 2019 10:55)  T(F): 97.6 (20 Sep 2019 16:05), Max: 98.7 (20 Sep 2019 10:55)  HR: 98 (20 Sep 2019 17:00) (80 - 112)  BP: 154/73 (20 Sep 2019 17:00) (134/82 - 175/80)  BP(mean): 113 (20 Sep 2019 17:00) (104 - 113)  ABP: --  ABP(mean): --  RR: 19 (20 Sep 2019 17:00) (18 - 21)  SpO2: 100% (20 Sep 2019 17:00) (100% - 100%)    I&O's Summary    20 Sep 2019 07:01  -  20 Sep 2019 17:17  --------------------------------------------------------  IN: 50 mL / OUT: 255 mL / NET: -205 mL          Height (cm): 165.1 (09-20-19)  Weight (kg): 82 (09-20-19)  BMI (kg/m2): 30.1 (09-20-19)  BSA (m2): 1.89 (09-20-19)      Physical Exam:  A&O x3, no focal deficits    RESPIRATORY:  Lungs clear to auscultation b/l, Normal expansion/effort    CARDIOVASCULAR:  Tachycardic to 102, resolved to < 96. Afib  No peripheral edema    GASTROINTESTINAL:  Abdomen soft, non-tender, non-distended.  4 abdominal incisions dressed w/ guaze c/d/i.  R DARSHANA drain in place, draining serosanguinous fluid.    MUSCULOSKELETAL:  Extremities warm, pink, well-perfused.  Palpable peripheral pulses      DERM:  No skin breakdown     :   Exam: No elder      Tubes/Lines/Drains   ----------------------------------------------------------------------------------------------------------  [x] Peripheral IV        LABS  --------------------------------------------------------------------------------------  LFTs      Cardiac Markers  CARDIAC MARKERS ( 20 Sep 2019 16:00 )  x     / x     / 90 U/L / x     / 3.8 ng/mL        Coagulation  PT/INR - ( 20 Sep 2019 16:00 )   PT: 11.50 sec;   INR: 1.00 ratio         PTT - ( 20 Sep 2019 16:00 )  PTT:35.5 sec    Arterial Blood Gas      Blood Sugar  CAPILLARY BLOOD GLUCOSE          Urinalysis      Cultures            RADIOLOGY:    CXR pending    EKG: Afib with RVR    ECHO 7/2019: EF 55-60%, mild MR

## 2019-09-21 LAB
ALBUMIN SERPL ELPH-MCNC: 3.9 G/DL — SIGNIFICANT CHANGE UP (ref 3.5–5.2)
ALP SERPL-CCNC: 88 U/L — SIGNIFICANT CHANGE UP (ref 30–115)
ALT FLD-CCNC: 59 U/L — HIGH (ref 0–41)
ANION GAP SERPL CALC-SCNC: 12 MMOL/L — SIGNIFICANT CHANGE UP (ref 7–14)
ANION GAP SERPL CALC-SCNC: 16 MMOL/L — HIGH (ref 7–14)
AST SERPL-CCNC: 90 U/L — HIGH (ref 0–41)
BASOPHILS # BLD AUTO: 0.01 K/UL — SIGNIFICANT CHANGE UP (ref 0–0.2)
BASOPHILS NFR BLD AUTO: 0.1 % — SIGNIFICANT CHANGE UP (ref 0–1)
BILIRUB DIRECT SERPL-MCNC: 0.2 MG/DL — SIGNIFICANT CHANGE UP (ref 0–0.2)
BILIRUB INDIRECT FLD-MCNC: 0.4 MG/DL — SIGNIFICANT CHANGE UP (ref 0.2–1.2)
BILIRUB SERPL-MCNC: 0.6 MG/DL — SIGNIFICANT CHANGE UP (ref 0.2–1.2)
BUN SERPL-MCNC: 32 MG/DL — HIGH (ref 10–20)
BUN SERPL-MCNC: 47 MG/DL — HIGH (ref 10–20)
CALCIUM SERPL-MCNC: 9.1 MG/DL — SIGNIFICANT CHANGE UP (ref 8.5–10.1)
CALCIUM SERPL-MCNC: 9.6 MG/DL — SIGNIFICANT CHANGE UP (ref 8.5–10.1)
CHLORIDE SERPL-SCNC: 101 MMOL/L — SIGNIFICANT CHANGE UP (ref 98–110)
CHLORIDE SERPL-SCNC: 98 MMOL/L — SIGNIFICANT CHANGE UP (ref 98–110)
CK MB CFR SERPL CALC: 3.6 NG/ML — SIGNIFICANT CHANGE UP (ref 0.6–6.3)
CK MB CFR SERPL CALC: 3.9 NG/ML — SIGNIFICANT CHANGE UP (ref 0.6–6.3)
CK SERPL-CCNC: 103 U/L — SIGNIFICANT CHANGE UP (ref 0–225)
CO2 SERPL-SCNC: 23 MMOL/L — SIGNIFICANT CHANGE UP (ref 17–32)
CO2 SERPL-SCNC: 30 MMOL/L — SIGNIFICANT CHANGE UP (ref 17–32)
CREAT SERPL-MCNC: 5.9 MG/DL — CRITICAL HIGH (ref 0.7–1.5)
CREAT SERPL-MCNC: 7.3 MG/DL — CRITICAL HIGH (ref 0.7–1.5)
EOSINOPHIL # BLD AUTO: 0 K/UL — SIGNIFICANT CHANGE UP (ref 0–0.7)
EOSINOPHIL NFR BLD AUTO: 0 % — SIGNIFICANT CHANGE UP (ref 0–8)
GLUCOSE SERPL-MCNC: 141 MG/DL — HIGH (ref 70–99)
GLUCOSE SERPL-MCNC: 164 MG/DL — HIGH (ref 70–99)
HCT VFR BLD CALC: 27.4 % — LOW (ref 42–52)
HCT VFR BLD CALC: 29.8 % — LOW (ref 42–52)
HGB BLD-MCNC: 8.6 G/DL — LOW (ref 14–18)
HGB BLD-MCNC: 9.2 G/DL — LOW (ref 14–18)
IMM GRANULOCYTES NFR BLD AUTO: 0.3 % — SIGNIFICANT CHANGE UP (ref 0.1–0.3)
LYMPHOCYTES # BLD AUTO: 0.41 K/UL — LOW (ref 1.2–3.4)
LYMPHOCYTES # BLD AUTO: 5.8 % — LOW (ref 20.5–51.1)
MAGNESIUM SERPL-MCNC: 1.9 MG/DL — SIGNIFICANT CHANGE UP (ref 1.8–2.4)
MAGNESIUM SERPL-MCNC: 1.9 MG/DL — SIGNIFICANT CHANGE UP (ref 1.8–2.4)
MCHC RBC-ENTMCNC: 29.2 PG — SIGNIFICANT CHANGE UP (ref 27–31)
MCHC RBC-ENTMCNC: 29.7 PG — SIGNIFICANT CHANGE UP (ref 27–31)
MCHC RBC-ENTMCNC: 30.9 G/DL — LOW (ref 32–37)
MCHC RBC-ENTMCNC: 31.4 G/DL — LOW (ref 32–37)
MCV RBC AUTO: 94.5 FL — HIGH (ref 80–94)
MCV RBC AUTO: 94.6 FL — HIGH (ref 80–94)
MONOCYTES # BLD AUTO: 0.37 K/UL — SIGNIFICANT CHANGE UP (ref 0.1–0.6)
MONOCYTES NFR BLD AUTO: 5.2 % — SIGNIFICANT CHANGE UP (ref 1.7–9.3)
NEUTROPHILS # BLD AUTO: 6.31 K/UL — SIGNIFICANT CHANGE UP (ref 1.4–6.5)
NEUTROPHILS NFR BLD AUTO: 88.6 % — HIGH (ref 42.2–75.2)
NRBC # BLD: 0 /100 WBCS — SIGNIFICANT CHANGE UP (ref 0–0)
NRBC # BLD: 0 /100 WBCS — SIGNIFICANT CHANGE UP (ref 0–0)
PHOSPHATE SERPL-MCNC: 3.9 MG/DL — SIGNIFICANT CHANGE UP (ref 2.1–4.9)
PHOSPHATE SERPL-MCNC: 5.3 MG/DL — HIGH (ref 2.1–4.9)
PLATELET # BLD AUTO: 167 K/UL — SIGNIFICANT CHANGE UP (ref 130–400)
PLATELET # BLD AUTO: 178 K/UL — SIGNIFICANT CHANGE UP (ref 130–400)
POTASSIUM SERPL-MCNC: 4.2 MMOL/L — SIGNIFICANT CHANGE UP (ref 3.5–5)
POTASSIUM SERPL-MCNC: 5.5 MMOL/L — HIGH (ref 3.5–5)
POTASSIUM SERPL-SCNC: 4.2 MMOL/L — SIGNIFICANT CHANGE UP (ref 3.5–5)
POTASSIUM SERPL-SCNC: 5.5 MMOL/L — HIGH (ref 3.5–5)
PROT SERPL-MCNC: 6.6 G/DL — SIGNIFICANT CHANGE UP (ref 6–8)
RBC # BLD: 2.9 M/UL — LOW (ref 4.7–6.1)
RBC # BLD: 3.15 M/UL — LOW (ref 4.7–6.1)
RBC # FLD: 13.8 % — SIGNIFICANT CHANGE UP (ref 11.5–14.5)
RBC # FLD: 14 % — SIGNIFICANT CHANGE UP (ref 11.5–14.5)
SODIUM SERPL-SCNC: 140 MMOL/L — SIGNIFICANT CHANGE UP (ref 135–146)
SODIUM SERPL-SCNC: 140 MMOL/L — SIGNIFICANT CHANGE UP (ref 135–146)
TROPONIN T SERPL-MCNC: 0.08 NG/ML — CRITICAL HIGH
TROPONIN T SERPL-MCNC: 0.09 NG/ML — CRITICAL HIGH
TROPONIN T SERPL-MCNC: 0.1 NG/ML — CRITICAL HIGH
WBC # BLD: 5.48 K/UL — SIGNIFICANT CHANGE UP (ref 4.8–10.8)
WBC # BLD: 7.12 K/UL — SIGNIFICANT CHANGE UP (ref 4.8–10.8)
WBC # FLD AUTO: 5.48 K/UL — SIGNIFICANT CHANGE UP (ref 4.8–10.8)
WBC # FLD AUTO: 7.12 K/UL — SIGNIFICANT CHANGE UP (ref 4.8–10.8)

## 2019-09-21 PROCEDURE — 71045 X-RAY EXAM CHEST 1 VIEW: CPT | Mod: 26

## 2019-09-21 RX ORDER — ACETAMINOPHEN 500 MG
650 TABLET ORAL ONCE
Refills: 0 | Status: COMPLETED | OUTPATIENT
Start: 2019-09-21 | End: 2019-09-21

## 2019-09-21 RX ORDER — INFLUENZA VIRUS VACCINE 15; 15; 15; 15 UG/.5ML; UG/.5ML; UG/.5ML; UG/.5ML
0.5 SUSPENSION INTRAMUSCULAR ONCE
Refills: 0 | Status: DISCONTINUED | OUTPATIENT
Start: 2019-09-21 | End: 2019-09-22

## 2019-09-21 RX ADMIN — Medication 25 MILLIGRAM(S): at 18:13

## 2019-09-21 RX ADMIN — FAMOTIDINE 20 MILLIGRAM(S): 10 INJECTION INTRAVENOUS at 06:12

## 2019-09-21 RX ADMIN — Medication 650 MILLIGRAM(S): at 18:13

## 2019-09-21 RX ADMIN — Medication 650 MILLIGRAM(S): at 06:11

## 2019-09-21 RX ADMIN — Medication 200 MILLIGRAM(S): at 21:59

## 2019-09-21 RX ADMIN — LORATADINE 10 MILLIGRAM(S): 10 TABLET ORAL at 11:13

## 2019-09-21 RX ADMIN — Medication 650 MILLIGRAM(S): at 13:24

## 2019-09-21 RX ADMIN — LOSARTAN POTASSIUM 25 MILLIGRAM(S): 100 TABLET, FILM COATED ORAL at 06:11

## 2019-09-21 RX ADMIN — ATORVASTATIN CALCIUM 40 MILLIGRAM(S): 80 TABLET, FILM COATED ORAL at 21:59

## 2019-09-21 RX ADMIN — CHLORHEXIDINE GLUCONATE 1 APPLICATION(S): 213 SOLUTION TOPICAL at 06:11

## 2019-09-21 RX ADMIN — Medication 650 MILLIGRAM(S): at 23:29

## 2019-09-21 RX ADMIN — ISOSORBIDE MONONITRATE 30 MILLIGRAM(S): 60 TABLET, EXTENDED RELEASE ORAL at 11:13

## 2019-09-21 RX ADMIN — Medication 1 SPRAY(S): at 11:14

## 2019-09-21 RX ADMIN — Medication 650 MILLIGRAM(S): at 01:21

## 2019-09-21 RX ADMIN — Medication 25 MILLIGRAM(S): at 06:12

## 2019-09-21 RX ADMIN — Medication 650 MILLIGRAM(S): at 11:15

## 2019-09-21 RX ADMIN — Medication 650 MILLIGRAM(S): at 02:00

## 2019-09-21 RX ADMIN — Medication 650 MILLIGRAM(S): at 06:22

## 2019-09-21 RX ADMIN — HEPARIN SODIUM 5000 UNIT(S): 5000 INJECTION INTRAVENOUS; SUBCUTANEOUS at 21:59

## 2019-09-21 NOTE — CONSULT NOTE ADULT - ATTENDING COMMENTS
no need for icu at this point.   Telemetry for now
pt s/p surgery w/o cp,sob. resume anticoag when ok w/ surgery.

## 2019-09-21 NOTE — PROGRESS NOTE ADULT - ASSESSMENT
Assessment:  Patient is a 69y old Male s/p laparoscopic cholecystectomy    Plan:  -pain control  -follow urine output

## 2019-09-21 NOTE — CONSULT NOTE ADULT - SUBJECTIVE AND OBJECTIVE BOX
PAST HISTORY  --------------------------------------------------------------------------------  PAST MEDICAL & SURGICAL HISTORY:  Afib  Anemia  Heart failure  HLD (hyperlipidemia)  HTN (hypertension)  ESRD on dialysis: T/ TH/ S  S/P cataract surgery  S/P arteriovenous (AV) fistula creation  H/O right coronary artery stent placement: stent x3        ALLERGIES & MEDICATIONS  --------------------------------------------------------------------------------  Allergies    No Known Allergies        Standing Inpatient Medications  acetaminophen   Tablet .. 650 milliGRAM(s) Oral every 6 hours  atorvastatin 40 milliGRAM(s) Oral at bedtime  chlorhexidine 4% Liquid 1 Application(s) Topical <User Schedule>  docusate sodium 200 milliGRAM(s) Oral at bedtime  famotidine Injectable 20 milliGRAM(s) IV Push every 24 hours  fluticasone propionate (50 MICROgram(s)/actuation) Nasal Spray - Peds 1 Spray(s) Alternating Nostrils daily  heparin  Injectable 5000 Unit(s) SubCutaneous every 8 hours  influenza   Vaccine 0.5 milliLiter(s) IntraMuscular once  isosorbide   mononitrate ER Tablet (IMDUR) 30 milliGRAM(s) Oral daily  loratadine 10 milliGRAM(s) Oral daily  losartan 25 milliGRAM(s) Oral daily  metoprolol tartrate 25 milliGRAM(s) Oral two times a day    PRN Inpatient Medications  ALBUTerol    90 MICROgram(s) HFA Inhaler 2 Puff(s) Inhalation every 6 hours PRN  diphenhydrAMINE 25 milliGRAM(s) Oral at bedtime PRN  HYDROmorphone  Injectable 0.5 milliGRAM(s) IV Push every 10 minutes PRN  HYDROmorphone  Injectable 0.25 milliGRAM(s) IV Push every 10 minutes PRN  oxyCODONE    IR 5 milliGRAM(s) Oral every 6 hours PRN  oxyCODONE    IR 10 milliGRAM(s) Oral every 6 hours PRN          VITALS/PHYSICAL EXAM  --------------------------------------------------------------------------------  T(C): 35.8 (09-21-19 @ 05:39), Max: 37.1 (09-20-19 @ 10:55)  HR: 69 (09-21-19 @ 05:39) (69 - 112)  BP: 159/74 (09-21-19 @ 05:39) (134/82 - 175/80)  RR: 20 (09-20-19 @ 20:49) (18 - 21)  SpO2: 100% (09-20-19 @ 17:00) (100% - 100%)  Wt(kg): --  Height (cm): 167.6 (09-20-19 @ 20:49)  Weight (kg): 84.5 (09-20-19 @ 20:49)  BMI (kg/m2): 30.1 (09-20-19 @ 20:49)  BSA (m2): 1.94 (09-20-19 @ 20:49)      09-20-19 @ 07:01  -  09-21-19 @ 07:00  --------------------------------------------------------  IN: 290 mL / OUT: 525 mL / NET: -235 mL      Physical Exam:  	Gen: NAD  	Pulm: CTA B/L  	CV:  S1S2; no rub  	Abd: +distended, DARSHANA drain   	LE: no edema  	Vascular access: av fistula     LABS/STUDIES  --------------------------------------------------------------------------------              9.2    7.12  >-----------<  167      [09-21-19 @ 00:20]              29.8     140  |  101  |  47  ----------------------------<  164      [09-21-19 @ 00:20]  5.5   |  23  |  7.3        Ca     9.6     [09-21-19 @ 00:20]      Mg     1.9     [09-21-19 @ 00:20]      Phos  5.3     [09-21-19 @ 00:20]      PT/INR: PT 11.50, INR 1.00       [09-20-19 @ 16:00]  PTT: 35.5       [09-20-19 @ 16:00]    Troponin 0.08      [09-21-19 @ 06:28]        [09-21-19 @ 06:28]    Creatinine Trend:  SCr 7.3 [09-21 @ 00:20]  SCr 6.2 [09-20 @ 16:00]  SCr 6.3 [09-18 @ 07:12]    Urinalysis - [12-13-18 @ 21:20]      Color Yellow / Appearance Turbid / SG 1.020 / pH 7.5      Gluc Negative / Ketone Negative  / Bili Negative / Urobili 0.2       Blood Moderate / Protein >=300 / Leuk Est Large / Nitrite Negative      RBC >50 / WBC >50 / Hyaline  / Gran  / Sq Epi  / Non Sq Epi Few / Bacteria Many      Iron 95, TIBC 134, %sat 71      [07-16-19 @ 06:20]  Ferritin 2507      [07-16-19 @ 06:20]  PTH -- (Ca 7.3)      [07-16-19 @ 05:40]   430  HbA1c 5.6      [09-18-19 @ 07:12]    HCV 0.08, Nonreact      [07-14-19 @ 05:31] 69 y old male, pmh as below, s/p Laparoscopic cholecystectomy  called for HD \patient on dialysis at [facility]   when seen denies any complaints, no CP, no SOB       PAST HISTORY  --------------------------------------------------------------------------------  PAST MEDICAL & SURGICAL HISTORY:  Afib  Anemia  Heart failure  HLD (hyperlipidemia)  HTN (hypertension)  ESRD on dialysis: T/ TH/ S  S/P cataract surgery  S/P arteriovenous (AV) fistula creation  H/O right coronary artery stent placement: stent x3        ALLERGIES & MEDICATIONS  --------------------------------------------------------------------------------  Allergies    No Known Allergies        Standing Inpatient Medications  acetaminophen   Tablet .. 650 milliGRAM(s) Oral every 6 hours  atorvastatin 40 milliGRAM(s) Oral at bedtime  chlorhexidine 4% Liquid 1 Application(s) Topical <User Schedule>  docusate sodium 200 milliGRAM(s) Oral at bedtime  famotidine Injectable 20 milliGRAM(s) IV Push every 24 hours  fluticasone propionate (50 MICROgram(s)/actuation) Nasal Spray - Peds 1 Spray(s) Alternating Nostrils daily  heparin  Injectable 5000 Unit(s) SubCutaneous every 8 hours  influenza   Vaccine 0.5 milliLiter(s) IntraMuscular once  isosorbide   mononitrate ER Tablet (IMDUR) 30 milliGRAM(s) Oral daily  loratadine 10 milliGRAM(s) Oral daily  losartan 25 milliGRAM(s) Oral daily  metoprolol tartrate 25 milliGRAM(s) Oral two times a day    PRN Inpatient Medications  ALBUTerol    90 MICROgram(s) HFA Inhaler 2 Puff(s) Inhalation every 6 hours PRN  diphenhydrAMINE 25 milliGRAM(s) Oral at bedtime PRN  HYDROmorphone  Injectable 0.5 milliGRAM(s) IV Push every 10 minutes PRN  HYDROmorphone  Injectable 0.25 milliGRAM(s) IV Push every 10 minutes PRN  oxyCODONE    IR 5 milliGRAM(s) Oral every 6 hours PRN  oxyCODONE    IR 10 milliGRAM(s) Oral every 6 hours PRN          VITALS/PHYSICAL EXAM  --------------------------------------------------------------------------------  T(C): 35.8 (09-21-19 @ 05:39), Max: 37.1 (09-20-19 @ 10:55)  HR: 69 (09-21-19 @ 05:39) (69 - 112)  BP: 159/74 (09-21-19 @ 05:39) (134/82 - 175/80)  RR: 20 (09-20-19 @ 20:49) (18 - 21)  SpO2: 100% (09-20-19 @ 17:00) (100% - 100%)  Wt(kg): --  Height (cm): 167.6 (09-20-19 @ 20:49)  Weight (kg): 84.5 (09-20-19 @ 20:49)  BMI (kg/m2): 30.1 (09-20-19 @ 20:49)  BSA (m2): 1.94 (09-20-19 @ 20:49)      09-20-19 @ 07:01  -  09-21-19 @ 07:00  --------------------------------------------------------  IN: 290 mL / OUT: 525 mL / NET: -235 mL      Physical Exam:  	Gen: NAD  	Pulm: CTA B/L  	CV:  S1S2; no rub  	Abd: +distended, DARSHANA drain   	LE: no edema  	Vascular access: av fistula     LABS/STUDIES  --------------------------------------------------------------------------------              9.2    7.12  >-----------<  167      [09-21-19 @ 00:20]              29.8     140  |  101  |  47  ----------------------------<  164      [09-21-19 @ 00:20]  5.5   |  23  |  7.3        Ca     9.6     [09-21-19 @ 00:20]      Mg     1.9     [09-21-19 @ 00:20]      Phos  5.3     [09-21-19 @ 00:20]      PT/INR: PT 11.50, INR 1.00       [09-20-19 @ 16:00]  PTT: 35.5       [09-20-19 @ 16:00]    Troponin 0.08      [09-21-19 @ 06:28]        [09-21-19 @ 06:28]    Creatinine Trend:  SCr 7.3 [09-21 @ 00:20]  SCr 6.2 [09-20 @ 16:00]  SCr 6.3 [09-18 @ 07:12]    Urinalysis - [12-13-18 @ 21:20]      Color Yellow / Appearance Turbid / SG 1.020 / pH 7.5      Gluc Negative / Ketone Negative  / Bili Negative / Urobili 0.2       Blood Moderate / Protein >=300 / Leuk Est Large / Nitrite Negative      RBC >50 / WBC >50 / Hyaline  / Gran  / Sq Epi  / Non Sq Epi Few / Bacteria Many      Iron 95, TIBC 134, %sat 71      [07-16-19 @ 06:20]  Ferritin 2507      [07-16-19 @ 06:20]  PTH -- (Ca 7.3)      [07-16-19 @ 05:40]   430  HbA1c 5.6      [09-18-19 @ 07:12]    HCV 0.08, Nonreact      [07-14-19 @ 05:31]

## 2019-09-21 NOTE — PROGRESS NOTE ADULT - SUBJECTIVE AND OBJECTIVE BOX
GENERAL SURGERY PROGRESS NOTE     MAMTA FERNANDO  69y  Male  Hospital day: 2d  POD: 1d  Procedure: Laparoscopic cholecystectomy    OVERNIGHT EVENTS: no acute events overnight    T(F): 97.1 (09-20-19 @ 20:49), Max: 98.7 (09-20-19 @ 10:55)  HR: 88 (09-20-19 @ 20:49) (80 - 112)  BP: 159/73 (09-20-19 @ 20:49) (134/82 - 175/80)  RR: 20 (09-20-19 @ 20:49) (18 - 21)  SpO2: 100% (09-20-19 @ 17:00) (100% - 100%)    GI proph:  famotidine Injectable 20 milliGRAM(s) IV Push every 24 hours    AC/ proph: heparin  Injectable 5000 Unit(s) SubCutaneous every 8 hours    PHYSICAL EXAM:  GENERAL: NAD, well-appearing  CHEST/LUNG: Clear to auscultation bilaterally  HEART: Regular rate and rhythm  ABDOMEN: soft, tender in RUQ, mild distension, dressings are clean, dry and intact, DARSHANA drain in RUQ with 30cc serosanguinous output  EXTREMITIES:  No clubbing, cyanosis, or edema    Labs:             9.2    7.12  )-----------( 167      ( 21 Sep 2019 00:20 )             29.8       Auto Neutrophil %: 88.6 % (09-21-19 @ 00:20)  Auto Immature Granulocyte %: 0.3 % (09-21-19 @ 00:20)  Auto Immature Granulocyte %: 0.4 % (09-20-19 @ 16:00)  Auto Neutrophil %: 71.0 % (09-20-19 @ 16:00)    09-21    140  |  101  |  47<H>  ----------------------------<  164<H>  5.5<H>   |  23  |  7.3<HH>    Calcium, Total Serum: 9.6 mg/dL (09-21-19 @ 00:20)    Coags:     11.50  ----< 1.00    ( 20 Sep 2019 16:00 )     35.5      CARDIAC MARKERS ( 21 Sep 2019 00:20 )  x     / 0.09 ng/mL / x     / x     / 3.9 ng/mL  CARDIAC MARKERS ( 20 Sep 2019 16:00 )  x     / 0.09 ng/mL / 90 U/L / x     / 3.8 ng/mL

## 2019-09-21 NOTE — CONSULT NOTE ADULT - ASSESSMENT
Assessment & Plan    70y/o Male, w/ extensive PMH significant for ESRD (HD on T/Th/Sat), CAD s/p PCI, DM, HTN, HLD recently admitted for cholangitis s/p ERCP with stent, now s/p elective laparoscopic cholecystectomy.     NEURO:   Pain - controlled with Tyl, Oxy    RESP:   On NC, satting well. Wean to RA  CXR pending      CARDS:   Maintain normotensive, MAP > 65.  Hx of Afib w/ RVR, HTN - on home Metoprolol, Losartan Isosorbide mononitrate - continue to monitor HR  Hx of HLD - on home Atorvastatin  Hx of CAD - holding ASA, Plavix, Eliquis  EKG: Afib w/ RVR    GI/NUTR:   Diet: Carb Consistent / Renal  NS @ 50  PPI  Colace, Senna    /RENAL:   Hx of ESRD on HD - Creat 6.2, last hemodialysis yesterday Th 9/19  ? anuric  Monitor and replete elytes prn     HEME/ONC:   Hgb 9.1, stable.   DVT ppx - HSQ      ID:   Afebrile, no leukocytosis WBC 5      ENDO:  Hx of DM - Hgb A1C 5.6 -Glu 107 mg/dL (09-20 @ 16:00) - monitor FS, ISS      LINES/DRAINS: PIV    DISPO: Not candidate for ICU at this time, as per Dr. Garcia. Recommending telemetry.
# esrd : hd today, standard bath, uf 2 liters as tolerated  # s/p Laparoscopic cholecystectomy, followed by surgery  # h/h noted, will resume outpatient dose of GAURANG  #ph noted, reangel 1 /1/1  # follow BP after HD  # will follow

## 2019-09-21 NOTE — CONSULT NOTE ADULT - SUBJECTIVE AND OBJECTIVE BOX
Patient is a 69y old  Male who presents with a chief complaint of     HPI:      PAST MEDICAL & SURGICAL HISTORY:  Afib  Anemia  Heart failure  HLD (hyperlipidemia)  HTN (hypertension)  ESRD on dialysis: T/ TH/ S  S/P cataract surgery  S/P arteriovenous (AV) fistula creation  H/O right coronary artery stent placement: stent x3                      PREVIOUS DIAGNOSTIC TESTING:      ECHO  FINDINGS:    STRESS  FINDINGS:    CATHETERIZATION  FINDINGS:    MEDICATIONS  (STANDING):  acetaminophen   Tablet .. 650 milliGRAM(s) Oral every 6 hours  atorvastatin 40 milliGRAM(s) Oral at bedtime  chlorhexidine 4% Liquid 1 Application(s) Topical <User Schedule>  docusate sodium 200 milliGRAM(s) Oral at bedtime  famotidine Injectable 20 milliGRAM(s) IV Push every 24 hours  fluticasone propionate (50 MICROgram(s)/actuation) Nasal Spray - Peds 1 Spray(s) Alternating Nostrils daily  heparin  Injectable 5000 Unit(s) SubCutaneous every 8 hours  influenza   Vaccine 0.5 milliLiter(s) IntraMuscular once  isosorbide   mononitrate ER Tablet (IMDUR) 30 milliGRAM(s) Oral daily  loratadine 10 milliGRAM(s) Oral daily  losartan 25 milliGRAM(s) Oral daily  metoprolol tartrate 25 milliGRAM(s) Oral two times a day    MEDICATIONS  (PRN):  ALBUTerol    90 MICROgram(s) HFA Inhaler 2 Puff(s) Inhalation every 6 hours PRN Shortness of Breath and/or Wheezing  diphenhydrAMINE 25 milliGRAM(s) Oral at bedtime PRN Insomnia  HYDROmorphone  Injectable 0.5 milliGRAM(s) IV Push every 10 minutes PRN Severe Pain (7 - 10)  HYDROmorphone  Injectable 0.25 milliGRAM(s) IV Push every 10 minutes PRN Moderate Pain (4 - 6)  oxyCODONE    IR 5 milliGRAM(s) Oral every 6 hours PRN Moderate Pain (4 - 6)  oxyCODONE    IR 10 milliGRAM(s) Oral every 6 hours PRN Severe Pain (7 - 10)      FAMILY HISTORY:      SOCIAL HISTORY:    CIGARETTES:    ALCOHOL:        Vital Signs Last 24 Hrs  T(C): 35.8 (21 Sep 2019 05:39), Max: 36.4 (20 Sep 2019 16:05)  T(F): 96.5 (21 Sep 2019 05:39), Max: 97.6 (20 Sep 2019 16:05)  HR: 70 (21 Sep 2019 12:00) (69 - 110)  BP: 140/76 (21 Sep 2019 12:00) (134/82 - 162/89)  BP(mean): 113 (20 Sep 2019 17:00) (104 - 113)  RR: 18 (21 Sep 2019 12:00) (18 - 21)  SpO2: 100% (20 Sep 2019 17:00) (100% - 100%)            INTERPRETATION OF TELEMETRY:    ECG:    I&O's Detail    20 Sep 2019 07:01  -  21 Sep 2019 07:00  --------------------------------------------------------  IN:    Oral Fluid: 240 mL    sodium chloride 0.9%: 50 mL  Total IN: 290 mL    OUT:    Bulb: 525 mL  Total OUT: 525 mL    Total NET: -235 mL      21 Sep 2019 07:01  -  21 Sep 2019 14:54  --------------------------------------------------------  IN:  Total IN: 0 mL    OUT:    Bulb: 30 mL  Total OUT: 30 mL    Total NET: -30 mL          LABS:                        9.2    7.12  )-----------( 167      ( 21 Sep 2019 00:20 )             29.8     09-21    140  |  101  |  47<H>  ----------------------------<  164<H>  5.5<H>   |  23  |  7.3<HH>    Ca    9.6      21 Sep 2019 00:20  Phos  5.3     09-21  Mg     1.9     09-21      CARDIAC MARKERS ( 21 Sep 2019 06:28 )  x     / 0.08 ng/mL / 103 U/L / x     / 3.6 ng/mL  CARDIAC MARKERS ( 21 Sep 2019 00:20 )  x     / 0.09 ng/mL / x     / x     / 3.9 ng/mL  CARDIAC MARKERS ( 20 Sep 2019 16:00 )  x     / 0.09 ng/mL / 90 U/L / x     / 3.8 ng/mL      PT/INR - ( 20 Sep 2019 16:00 )   PT: 11.50 sec;   INR: 1.00 ratio         PTT - ( 20 Sep 2019 16:00 )  PTT:35.5 sec    I&O's Summary    20 Sep 2019 07:01  -  21 Sep 2019 07:00  --------------------------------------------------------  IN: 290 mL / OUT: 525 mL / NET: -235 mL    21 Sep 2019 07:01  -  21 Sep 2019 14:54  --------------------------------------------------------  IN: 0 mL / OUT: 30 mL / NET: -30 mL      BNP  RADIOLOGY & ADDITIONAL STUDIES:

## 2019-09-22 ENCOUNTER — TRANSCRIPTION ENCOUNTER (OUTPATIENT)
Age: 69
End: 2019-09-22

## 2019-09-22 VITALS
RESPIRATION RATE: 17 BRPM | TEMPERATURE: 97 F | DIASTOLIC BLOOD PRESSURE: 56 MMHG | HEART RATE: 104 BPM | SYSTOLIC BLOOD PRESSURE: 122 MMHG

## 2019-09-22 PROCEDURE — 99222 1ST HOSP IP/OBS MODERATE 55: CPT

## 2019-09-22 RX ORDER — HYDROCORTISONE 1 %
1 OINTMENT (GRAM) TOPICAL
Refills: 0 | Status: DISCONTINUED | OUTPATIENT
Start: 2019-09-22 | End: 2019-09-22

## 2019-09-22 RX ADMIN — Medication 650 MILLIGRAM(S): at 06:41

## 2019-09-22 RX ADMIN — Medication 650 MILLIGRAM(S): at 06:20

## 2019-09-22 RX ADMIN — Medication 25 MILLIGRAM(S): at 06:20

## 2019-09-22 RX ADMIN — ISOSORBIDE MONONITRATE 30 MILLIGRAM(S): 60 TABLET, EXTENDED RELEASE ORAL at 12:17

## 2019-09-22 RX ADMIN — HEPARIN SODIUM 5000 UNIT(S): 5000 INJECTION INTRAVENOUS; SUBCUTANEOUS at 06:20

## 2019-09-22 RX ADMIN — Medication 1 SPRAY(S): at 12:18

## 2019-09-22 RX ADMIN — LOSARTAN POTASSIUM 25 MILLIGRAM(S): 100 TABLET, FILM COATED ORAL at 06:20

## 2019-09-22 RX ADMIN — LORATADINE 10 MILLIGRAM(S): 10 TABLET ORAL at 12:17

## 2019-09-22 RX ADMIN — FAMOTIDINE 20 MILLIGRAM(S): 10 INJECTION INTRAVENOUS at 06:20

## 2019-09-22 RX ADMIN — Medication 650 MILLIGRAM(S): at 00:26

## 2019-09-22 RX ADMIN — Medication 650 MILLIGRAM(S): at 12:17

## 2019-09-22 NOTE — DISCHARGE NOTE PROVIDER - NSDCFUSCHEDAPPT_GEN_ALL_CORE_FT
MAMTA FERNANDO ; 10/07/2019 ; NPP Internal Med [address] MAMTA Slater ; 10/15/2019 ; NPP Gastro Doc Off [id number] [facility]

## 2019-09-22 NOTE — DISCHARGE NOTE PROVIDER - NSDCCPCAREPLAN_GEN_ALL_CORE_FT
PRINCIPAL DISCHARGE DIAGNOSIS  Diagnosis: Cholelithiasis with cholangitis  Assessment and Plan of Treatment:

## 2019-09-22 NOTE — PROGRESS NOTE ADULT - ASSESSMENT
Regular diet  Evelia kraft follow up outpt home with evelia drain VNS   Follow up social work   pt rehab  daily labs   dispo planning

## 2019-09-22 NOTE — DISCHARGE NOTE PROVIDER - HOSPITAL COURSE
70y/o Male, w/ PMH of ESRD on HD (T, Th, Sat // last HD yesterday 9/19), CAD s/p PCI with 3 stents (on ASA, Plavix) in 2016, DM, HTN, HLD, admitted to the hospital in July 2019 for cholangitis. During hospitalization, patient was septic, developed new onset Afib with RVR, and underwent ERCP with stent placement for decompression. Patient was found to have two filling defects but sphincterotomy and stone extraction were not performed since patient was on Plavix, with recommendations to return for ERCP in 6 weeks. Patient presented on 9/20/2019 for elective laparoscopic cholecystectomy. Procedure was without complications and tolerated well by the patient. Patient had one DARSHANA drain postoperatively, was removed on 9/22. Discharge to home on 9/22/2019

## 2019-09-22 NOTE — DISCHARGE NOTE PROVIDER - CARE PROVIDER_API CALL
Kim Narvaez)  Surgery  [address] [place] [address]  Phone: [phone]  Fax: [phone]  Follow Up Time: 2 weeks

## 2019-09-22 NOTE — PROGRESS NOTE ADULT - SUBJECTIVE AND OBJECTIVE BOX
MAMTA FERNANDO  69y Male   [id number]    Hospital Day: 3  Post Operative Day:2  Procedure:s/p cholecystectomy     Patient is a 69y old  Male who presents with a chief complaint of   PAST MEDICAL & SURGICAL HISTORY:  Afib  Anemia  Heart failure  HLD (hyperlipidemia)  HTN (hypertension)  ESRD on dialysis: / / S  S/P cataract surgery  S/P arteriovenous (AV) fistula creation  H/O right coronary artery stent placement: stent x3      Events of the Last 24h:none  Vital Signs Last 24 Hrs  T(C): 36.7 (21 Sep 2019 21:09), Max: 36.7 (21 Sep 2019 21:09)  T(F): 98.1 (21 Sep 2019 21:09), Max: 98.1 (21 Sep 2019 21:09)  HR: 86 (21 Sep 2019 21:09) (69 - 88)  BP: 126/65 (21 Sep 2019 21:09) (105/64 - 159/74)  BP(mean): --  RR: 18 (21 Sep 2019 21:09) (18 - 18)  SpO2: 97% (21 Sep 2019 20:02) (97% - 97%)        Diet, Consistent Carbohydrate Renal w/Evening Snack (19 @ 14:54)      I&O's Summary    20 Sep 2019 07:  -  21 Sep 2019 07:00  --------------------------------------------------------  IN: 290 mL / OUT: 525 mL / NET: -235 mL    21 Sep 2019 07:  -  22 Sep 2019 00:50  --------------------------------------------------------  IN: 0 mL / OUT: 1030 mL / NET: -1030 mL     I&O's Detail    20 Sep 2019 07:  -  21 Sep 2019 07:00  --------------------------------------------------------  IN:    Oral Fluid: 240 mL    sodium chloride 0.9%: 50 mL  Total IN: 290 mL    OUT:    Bulb: 525 mL  Total OUT: 525 mL    Total NET: -235 mL      21 Sep 2019 07:01  -  22 Sep 2019 00:50  --------------------------------------------------------  IN:  Total IN: 0 mL    OUT:    Bulb: 30 mL    Other: 1000 mL  Total OUT: 1030 mL    Total NET: -1030 mL          MEDICATIONS  (STANDING):  acetaminophen   Tablet .. 650 milliGRAM(s) Oral every 6 hours  atorvastatin 40 milliGRAM(s) Oral at bedtime  chlorhexidine 4% Liquid 1 Application(s) Topical <User Schedule>  docusate sodium 200 milliGRAM(s) Oral at bedtime  famotidine Injectable 20 milliGRAM(s) IV Push every 24 hours  fluticasone propionate (50 MICROgram(s)/actuation) Nasal Spray - Peds 1 Spray(s) Alternating Nostrils daily  heparin  Injectable 5000 Unit(s) SubCutaneous every 8 hours  influenza   Vaccine 0.5 milliLiter(s) IntraMuscular once  isosorbide   mononitrate ER Tablet (IMDUR) 30 milliGRAM(s) Oral daily  loratadine 10 milliGRAM(s) Oral daily  losartan 25 milliGRAM(s) Oral daily  metoprolol tartrate 25 milliGRAM(s) Oral two times a day    MEDICATIONS  (PRN):  ALBUTerol    90 MICROgram(s) HFA Inhaler 2 Puff(s) Inhalation every 6 hours PRN Shortness of Breath and/or Wheezing  diphenhydrAMINE 25 milliGRAM(s) Oral at bedtime PRN Insomnia  HYDROmorphone  Injectable 0.5 milliGRAM(s) IV Push every 10 minutes PRN Severe Pain (7 - 10)  HYDROmorphone  Injectable 0.25 milliGRAM(s) IV Push every 10 minutes PRN Moderate Pain (4 - 6)  oxyCODONE    IR 5 milliGRAM(s) Oral every 6 hours PRN Moderate Pain (4 - 6)  oxyCODONE    IR 10 milliGRAM(s) Oral every 6 hours PRN Severe Pain (7 - 10)      PHYSICAL EXAM:    GENERAL: NAD    HEENT: NCAT    CHEST/LUNGS: CTAB    HEART: RRR,  No murmurs, rubs, or gallops    ABDOMEN: SNTND +BS RUQ evelia drain with sero sang outpt    EXTREMITIES:  FROM, No clubbing, cyanosis, or edema, palpable pulse    NEURO: No focal neurological deficits    SKIN: No rashes or lesions    INCISION/WOUNDS:                          8.6    5.48  )-----------( 178      ( 21 Sep 2019 20:52 )             27.4        CBC Full  -  ( 21 Sep 2019 20:52 )  WBC Count : 5.48 K/uL  RBC Count : 2.90 M/uL  Hemoglobin : 8.6 g/dL  Hematocrit : 27.4 %  Platelet Count - Automated : 178 K/uL  Mean Cell Volume : 94.5 fL  Mean Cell Hemoglobin : 29.7 pg  Mean Cell Hemoglobin Concentration : 31.4 g/dL  Auto Neutrophil # : x  Auto Lymphocyte # : x  Auto Monocyte # : x  Auto Eosinophil # : x  Auto Basophil # : x  Auto Neutrophil % : x  Auto Lymphocyte % : x  Auto Monocyte % : x  Auto Eosinophil % : x  Auto Basophil % : x               140   |  98    |  32                 Ca: 9.1    BMP:   ----------------------------< 141    M.9   (19 @ 20:52)             4.2    |  30    | 5.9                Ph: 3.9      LFT:     TPro: 6.6 / Alb: 3.9 / TBili: 0.6 / DBili: 0.2 / AST: 90 / ALT: 59 / AlkPhos: 88   (19 @ 20:52)    LIVER FUNCTIONS - ( 21 Sep 2019 20:52 )  Alb: 3.9 g/dL / Pro: 6.6 g/dL / ALK PHOS: 88 U/L / ALT: 59 U/L / AST: 90 U/L / GGT: x           PT/INR - ( 20 Sep 2019 16:00 )   PT: 11.50 sec;   INR: 1.00 ratio         PTT - ( 20 Sep 2019 16:00 )  PTT:35.5 sec  CARDIAC MARKERS ( 21 Sep 2019 17:06 )  x     / 0.10 ng/mL / x     / x     / x      CARDIAC MARKERS ( 21 Sep 2019 06:28 )  x     / 0.08 ng/mL / 103 U/L / x     / 3.6 ng/mL  CARDIAC MARKERS ( 21 Sep 2019 00:20 )  x     / 0.09 ng/mL / x     / x     / 3.9 ng/mL  CARDIAC MARKERS ( 20 Sep 2019 16:00 )  x     / 0.09 ng/mL / 90 U/L / x     / 3.8 ng/mL

## 2019-09-22 NOTE — DISCHARGE NOTE NURSING/CASE MANAGEMENT/SOCIAL WORK - PATIENT PORTAL LINK FT
You can access the FollowMyHealth Patient Portal offered by [facility] by registering at the following website: [email or web address]. By joining [employer]’s FollowMyHealth portal, you will also be able to view your health information using other applications (apps) compatible with our system.

## 2019-09-23 DIAGNOSIS — Z71.89 OTHER SPECIFIED COUNSELING: ICD-10-CM

## 2019-09-23 LAB
HBV CORE AB SER-ACNC: REACTIVE
HBV CORE IGM SER-ACNC: SIGNIFICANT CHANGE UP
HBV SURFACE AB SER-ACNC: SIGNIFICANT CHANGE UP
HBV SURFACE AG SER-ACNC: SIGNIFICANT CHANGE UP
HCV AB S/CO SERPL IA: 0.13 S/CO — SIGNIFICANT CHANGE UP (ref 0–0.99)
HCV AB SERPL-IMP: SIGNIFICANT CHANGE UP

## 2019-09-24 LAB
SURGICAL PATHOLOGY STUDY: SIGNIFICANT CHANGE UP
SURGICAL PATHOLOGY STUDY: SIGNIFICANT CHANGE UP

## 2019-09-27 ENCOUNTER — CHART COPY (OUTPATIENT)
Age: 69
End: 2019-09-27

## 2019-09-30 DIAGNOSIS — I48.91 UNSPECIFIED ATRIAL FIBRILLATION: ICD-10-CM

## 2019-09-30 DIAGNOSIS — Z95.5 PRESENCE OF CORONARY ANGIOPLASTY IMPLANT AND GRAFT: ICD-10-CM

## 2019-09-30 DIAGNOSIS — Z99.2 DEPENDENCE ON RENAL DIALYSIS: ICD-10-CM

## 2019-09-30 DIAGNOSIS — I50.9 HEART FAILURE, UNSPECIFIED: ICD-10-CM

## 2019-09-30 DIAGNOSIS — E11.22 TYPE 2 DIABETES MELLITUS WITH DIABETIC CHRONIC KIDNEY DISEASE: ICD-10-CM

## 2019-09-30 DIAGNOSIS — N18.6 END STAGE RENAL DISEASE: ICD-10-CM

## 2019-09-30 DIAGNOSIS — I13.2 HYPERTENSIVE HEART AND CHRONIC KIDNEY DISEASE WITH HEART FAILURE AND WITH STAGE 5 CHRONIC KIDNEY DISEASE, OR END STAGE RENAL DISEASE: ICD-10-CM

## 2019-09-30 DIAGNOSIS — K83.09 OTHER CHOLANGITIS: ICD-10-CM

## 2019-09-30 DIAGNOSIS — I25.10 ATHEROSCLEROTIC HEART DISEASE OF NATIVE CORONARY ARTERY WITHOUT ANGINA PECTORIS: ICD-10-CM

## 2019-09-30 DIAGNOSIS — E78.5 HYPERLIPIDEMIA, UNSPECIFIED: ICD-10-CM

## 2019-09-30 DIAGNOSIS — Z79.01 LONG TERM (CURRENT) USE OF ANTICOAGULANTS: ICD-10-CM

## 2019-09-30 DIAGNOSIS — K80.10 CALCULUS OF GALLBLADDER WITH CHRONIC CHOLECYSTITIS WITHOUT OBSTRUCTION: ICD-10-CM

## 2019-10-01 ENCOUNTER — OUTPATIENT (OUTPATIENT)
Dept: OUTPATIENT SERVICES | Facility: HOSPITAL | Age: 69
LOS: 1 days | End: 2019-10-01

## 2019-10-01 DIAGNOSIS — Z98.890 OTHER SPECIFIED POSTPROCEDURAL STATES: Chronic | ICD-10-CM

## 2019-10-01 DIAGNOSIS — Z98.49 CATARACT EXTRACTION STATUS, UNSPECIFIED EYE: Chronic | ICD-10-CM

## 2019-10-01 DIAGNOSIS — Z95.5 PRESENCE OF CORONARY ANGIOPLASTY IMPLANT AND GRAFT: Chronic | ICD-10-CM

## 2019-10-07 ENCOUNTER — APPOINTMENT (OUTPATIENT)
Dept: INTERNAL MEDICINE | Facility: CLINIC | Age: 69
End: 2019-10-07
Payer: MEDICAID

## 2019-10-07 ENCOUNTER — OUTPATIENT (OUTPATIENT)
Dept: OUTPATIENT SERVICES | Facility: HOSPITAL | Age: 69
LOS: 1 days | Discharge: HOME | End: 2019-10-07

## 2019-10-07 VITALS
DIASTOLIC BLOOD PRESSURE: 77 MMHG | WEIGHT: 178.57 LBS | TEMPERATURE: 96.5 F | SYSTOLIC BLOOD PRESSURE: 161 MMHG | HEART RATE: 75 BPM | HEIGHT: 66 IN | BODY MASS INDEX: 28.7 KG/M2

## 2019-10-07 DIAGNOSIS — Z98.890 OTHER SPECIFIED POSTPROCEDURAL STATES: Chronic | ICD-10-CM

## 2019-10-07 DIAGNOSIS — Z95.5 PRESENCE OF CORONARY ANGIOPLASTY IMPLANT AND GRAFT: Chronic | ICD-10-CM

## 2019-10-07 DIAGNOSIS — Z98.49 CATARACT EXTRACTION STATUS, UNSPECIFIED EYE: Chronic | ICD-10-CM

## 2019-10-07 PROCEDURE — 99213 OFFICE O/P EST LOW 20 MIN: CPT | Mod: GC

## 2019-10-07 RX ORDER — AMOXICILLIN AND CLAVULANATE POTASSIUM 500; 125 MG/1; MG/1
500-125 TABLET, FILM COATED ORAL
Qty: 14 | Refills: 0 | Status: DISCONTINUED | COMMUNITY
Start: 2019-07-24 | End: 2019-10-07

## 2019-10-07 RX ORDER — RANITIDINE 300 MG/1
300 TABLET ORAL
Qty: 90 | Refills: 2 | Status: DISCONTINUED | COMMUNITY
Start: 2018-06-29 | End: 2019-10-07

## 2019-10-07 RX ORDER — DOCUSATE SODIUM 100 MG/1
100 CAPSULE, LIQUID FILLED ORAL
Qty: 90 | Refills: 0 | Status: DISCONTINUED | COMMUNITY
Start: 2017-08-25 | End: 2019-10-07

## 2019-10-07 RX ORDER — SENNOSIDES 8.6 MG
8.6 TABLET ORAL
Qty: 90 | Refills: 0 | Status: DISCONTINUED | COMMUNITY
Start: 2017-05-17 | End: 2019-10-07

## 2019-10-07 RX ORDER — POLYETHYLENE GLYCOL 3350 AND ELECTROLYTES WITH LEMON FLAVOR 236; 22.74; 6.74; 5.86; 2.97 G/4L; G/4L; G/4L; G/4L; G/4L
236 POWDER, FOR SOLUTION ORAL
Qty: 1 | Refills: 0 | Status: DISCONTINUED | COMMUNITY
Start: 2019-05-24 | End: 2019-10-07

## 2019-10-07 RX ORDER — GUAIFENESIN 100 MG/5ML
200 SOLUTION ORAL
Qty: 1 | Refills: 0 | Status: DISCONTINUED | COMMUNITY
Start: 2018-06-29 | End: 2019-10-07

## 2019-10-07 RX ORDER — POLYETHYLENE GLYCOL 3350 AND ELECTROLYTES WITH LEMON FLAVOR 236; 22.74; 6.74; 5.86; 2.97 G/4L; G/4L; G/4L; G/4L; G/4L
236 POWDER, FOR SOLUTION ORAL
Qty: 1 | Refills: 0 | Status: DISCONTINUED | COMMUNITY
Start: 2019-08-23 | End: 2019-10-07

## 2019-10-07 NOTE — PHYSICAL EXAM
[No Acute Distress] : no acute distress [Well Nourished] : well nourished [PERRL] : pupils equal round and reactive to light [Normal Sclera/Conjunctiva] : normal sclera/conjunctiva [Normal Outer Ear/Nose] : the outer ears and nose were normal in appearance [Normal Oropharynx] : the oropharynx was normal [No JVD] : no jugular venous distention [No Lymphadenopathy] : no lymphadenopathy [Supple] : supple [No Respiratory Distress] : no respiratory distress  [No Accessory Muscle Use] : no accessory muscle use [Normal Rate] : normal rate  [Regular Rhythm] : with a regular rhythm [Normal S1, S2] : normal S1 and S2 [Soft] : abdomen soft [No HSM] : no HSM [Non Tender] : non-tender [Non-distended] : non-distended [No Rash] : no rash [Normal Gait] : normal gait [Normal Insight/Judgement] : insight and judgment were intact

## 2019-10-08 NOTE — HISTORY OF PRESENT ILLNESS
[de-identified] : 67 yo M with PMH of anemia, ESRD on HD, CAD, DLD afib on eliquis 2.5 mg comes to the clinic for the follow up.\par pt had laparoscopic cholecystectomy 9/20/2019.\par pt tolerated procedure well, post operative course was uneventful.\par pt had post operative follow up with Dr. Simpson and stables were removed.\par He denies any complaints. \par He is scheduled for ERCP for the stent and stone removal next week. \par \par

## 2019-10-08 NOTE — ASSESSMENT
[FreeTextEntry1] : This patient is 68 year old male with past medical history of anemia, CAD, DLD, ESRD and hypertension, syncope. He is here for a follow up examination. Patient complains of dry skin and intermittent dizziness.\par \par # Choledocholithiasis:\par - pt had laparoscopic cholecystectomy 9/20/2019.\par - pt had post operative follow up with Dr. Simpson and stables were removed.\par - He is scheduled for ERCP for the stent and stone removal next week. 10/14/2019\par \par # A.FIB\par - Eliquis 2.5 mg BID \par - Cardiology follow-up\par  \par # Coronary artery disease\par - Continue with aspirin, statins\par - Plavix was discontinued \par \par # DLD - Continue with statins \par \par # ESRD\par - On dialysis 3 times a week\par - Follow up with the nephrologist \par \par # Hypertension \par - Controlled\par - Continue current medications \par \par # Subpleural nodule  - Stable \par \par # GERD - on ranitidine 300, will increase Protonix 40 to bid (pt c/o heartburn)\par - GI Follow up for colonoscopy after ERCP procedure \par \par # RTC \par - pt had flu shot  9/2019\par - follow up GI fro screening colonoscopy\par

## 2019-10-14 ENCOUNTER — TRANSCRIPTION ENCOUNTER (OUTPATIENT)
Age: 69
End: 2019-10-14

## 2019-10-14 ENCOUNTER — OUTPATIENT (OUTPATIENT)
Dept: OUTPATIENT SERVICES | Facility: HOSPITAL | Age: 69
LOS: 1 days | Discharge: HOME | End: 2019-10-14
Payer: MEDICAID

## 2019-10-14 VITALS
WEIGHT: 178.57 LBS | TEMPERATURE: 99 F | SYSTOLIC BLOOD PRESSURE: 203 MMHG | DIASTOLIC BLOOD PRESSURE: 85 MMHG | HEART RATE: 79 BPM | RESPIRATION RATE: 16 BRPM | HEIGHT: 66 IN

## 2019-10-14 VITALS
HEART RATE: 72 BPM | DIASTOLIC BLOOD PRESSURE: 82 MMHG | OXYGEN SATURATION: 98 % | RESPIRATION RATE: 16 BRPM | SYSTOLIC BLOOD PRESSURE: 168 MMHG

## 2019-10-14 DIAGNOSIS — Z95.5 PRESENCE OF CORONARY ANGIOPLASTY IMPLANT AND GRAFT: Chronic | ICD-10-CM

## 2019-10-14 DIAGNOSIS — Z98.49 CATARACT EXTRACTION STATUS, UNSPECIFIED EYE: Chronic | ICD-10-CM

## 2019-10-14 DIAGNOSIS — Z98.890 OTHER SPECIFIED POSTPROCEDURAL STATES: Chronic | ICD-10-CM

## 2019-10-14 LAB
ALBUMIN SERPL ELPH-MCNC: 4.2 G/DL — SIGNIFICANT CHANGE UP (ref 3.5–5.2)
ALP SERPL-CCNC: 98 U/L — SIGNIFICANT CHANGE UP (ref 30–115)
ALT FLD-CCNC: 8 U/L — SIGNIFICANT CHANGE UP (ref 0–41)
ANION GAP SERPL CALC-SCNC: 14 MMOL/L — SIGNIFICANT CHANGE UP (ref 7–14)
APTT BLD: 33.9 SEC — SIGNIFICANT CHANGE UP (ref 27–39.2)
AST SERPL-CCNC: 17 U/L — SIGNIFICANT CHANGE UP (ref 0–41)
BILIRUB SERPL-MCNC: 0.5 MG/DL — SIGNIFICANT CHANGE UP (ref 0.2–1.2)
BUN SERPL-MCNC: 37 MG/DL — HIGH (ref 10–20)
CALCIUM SERPL-MCNC: 10.1 MG/DL — SIGNIFICANT CHANGE UP (ref 8.5–10.1)
CHLORIDE SERPL-SCNC: 100 MMOL/L — SIGNIFICANT CHANGE UP (ref 98–110)
CO2 SERPL-SCNC: 26 MMOL/L — SIGNIFICANT CHANGE UP (ref 17–32)
CREAT SERPL-MCNC: 7.2 MG/DL — CRITICAL HIGH (ref 0.7–1.5)
GLUCOSE SERPL-MCNC: 90 MG/DL — SIGNIFICANT CHANGE UP (ref 70–99)
HCT VFR BLD CALC: 28.2 % — LOW (ref 42–52)
HGB BLD-MCNC: 8.7 G/DL — LOW (ref 14–18)
INR BLD: 0.99 RATIO — SIGNIFICANT CHANGE UP (ref 0.65–1.3)
MAGNESIUM SERPL-MCNC: 2 MG/DL — SIGNIFICANT CHANGE UP (ref 1.8–2.4)
MCHC RBC-ENTMCNC: 28.8 PG — SIGNIFICANT CHANGE UP (ref 27–31)
MCHC RBC-ENTMCNC: 30.9 G/DL — LOW (ref 32–37)
MCV RBC AUTO: 93.4 FL — SIGNIFICANT CHANGE UP (ref 80–94)
NRBC # BLD: 0 /100 WBCS — SIGNIFICANT CHANGE UP (ref 0–0)
PLATELET # BLD AUTO: 202 K/UL — SIGNIFICANT CHANGE UP (ref 130–400)
POTASSIUM SERPL-MCNC: 4.8 MMOL/L — SIGNIFICANT CHANGE UP (ref 3.5–5)
POTASSIUM SERPL-SCNC: 4.8 MMOL/L — SIGNIFICANT CHANGE UP (ref 3.5–5)
PROT SERPL-MCNC: 7 G/DL — SIGNIFICANT CHANGE UP (ref 6–8)
PROTHROM AB SERPL-ACNC: 11.4 SEC — SIGNIFICANT CHANGE UP (ref 9.95–12.87)
RBC # BLD: 3.02 M/UL — LOW (ref 4.7–6.1)
RBC # FLD: 13.8 % — SIGNIFICANT CHANGE UP (ref 11.5–14.5)
SODIUM SERPL-SCNC: 140 MMOL/L — SIGNIFICANT CHANGE UP (ref 135–146)
WBC # BLD: 5.31 K/UL — SIGNIFICANT CHANGE UP (ref 4.8–10.8)
WBC # FLD AUTO: 5.31 K/UL — SIGNIFICANT CHANGE UP (ref 4.8–10.8)

## 2019-10-14 PROCEDURE — 43262 ENDO CHOLANGIOPANCREATOGRAPH: CPT | Mod: XU

## 2019-10-14 PROCEDURE — 74328 X-RAY BILE DUCT ENDOSCOPY: CPT | Mod: 26

## 2019-10-14 PROCEDURE — 43264 ERCP REMOVE DUCT CALCULI: CPT | Mod: XU

## 2019-10-14 PROCEDURE — 43275 ERCP REMOVE FORGN BODY DUCT: CPT | Mod: XU

## 2019-10-14 RX ORDER — RANITIDINE HYDROCHLORIDE 150 MG/1
1 TABLET, FILM COATED ORAL
Qty: 0 | Refills: 0 | DISCHARGE

## 2019-10-14 RX ORDER — DIPHENHYDRAMINE HCL 50 MG
1 CAPSULE ORAL
Qty: 0 | Refills: 0 | DISCHARGE

## 2019-10-14 RX ORDER — FLUTICASONE PROPIONATE 50 MCG
1 SPRAY, SUSPENSION NASAL
Qty: 0 | Refills: 0 | DISCHARGE

## 2019-10-14 RX ORDER — ALBUTEROL 90 UG/1
2 AEROSOL, METERED ORAL
Qty: 0 | Refills: 0 | DISCHARGE

## 2019-10-14 RX ORDER — INDOMETHACIN 50 MG
50 CAPSULE ORAL
Refills: 0 | Status: DISCONTINUED | OUTPATIENT
Start: 2019-10-14 | End: 2019-11-04

## 2019-10-14 RX ORDER — DOCUSATE SODIUM 100 MG
3 CAPSULE ORAL
Qty: 0 | Refills: 0 | DISCHARGE

## 2019-10-14 RX ORDER — LORATADINE 10 MG/1
1 TABLET ORAL
Qty: 0 | Refills: 0 | DISCHARGE

## 2019-10-14 RX ADMIN — Medication 50 MILLIGRAM(S): at 18:09

## 2019-10-14 NOTE — CHART NOTE - NSCHARTNOTEFT_GEN_A_CORE
PACU ANESTHESIA ADMISSION NOTE      Procedure: ERCP  Post op diagnosis: Common bile duct stones       ____  Intubated  TV:______       Rate: ______      FiO2: ______    _x___  Patent Airway    _x___  Full return of protective reflexes    _x___  Full recovery from anesthesia / back to baseline status    Vitals:  T(F): 36.5  HR: 77  BP: 138/65  RR: 18  SpO2: 100%    Mental Status:  _x___ Awake   _____ Alert   _____ Drowsy   _____ Sedated    Nausea/Vomiting:  _x___  NO       ______Yes,   See Post - Op Orders         Pain Scale (0-10):  __0___    Treatment: _x___ None    ____ See Post - Op/PCA Orders    Post - Operative Fluids:   __x__ Oral   ____ See Post - Op Orders    Plan: Discharge:   _x___Home       _____Floor     _____Critical Care    _____  Other:_________________    Comments:  No anesthesia issues or complications noted.  Discharge when criteria met.

## 2019-10-14 NOTE — ASU DISCHARGE PLAN (ADULT/PEDIATRIC) - CALL YOUR DOCTOR IF YOU HAVE ANY OF THE FOLLOWING:
Nausea and vomiting that does not stop/Bleeding that does not stop/Pain not relieved by Medications/Inability to tolerate liquids or foods

## 2019-10-15 ENCOUNTER — INPATIENT (INPATIENT)
Facility: HOSPITAL | Age: 69
LOS: 3 days | Discharge: NURSING, REHAB OR LONG-TERM CARE | End: 2019-10-19
Attending: INTERNAL MEDICINE | Admitting: INTERNAL MEDICINE
Payer: MEDICAID

## 2019-10-15 ENCOUNTER — APPOINTMENT (OUTPATIENT)
Dept: GASTROENTEROLOGY | Facility: CLINIC | Age: 69
End: 2019-10-15

## 2019-10-15 VITALS
OXYGEN SATURATION: 100 % | RESPIRATION RATE: 18 BRPM | HEART RATE: 95 BPM | DIASTOLIC BLOOD PRESSURE: 88 MMHG | SYSTOLIC BLOOD PRESSURE: 189 MMHG | TEMPERATURE: 98 F

## 2019-10-15 DIAGNOSIS — Z98.49 CATARACT EXTRACTION STATUS, UNSPECIFIED EYE: Chronic | ICD-10-CM

## 2019-10-15 DIAGNOSIS — Z98.890 OTHER SPECIFIED POSTPROCEDURAL STATES: Chronic | ICD-10-CM

## 2019-10-15 DIAGNOSIS — Z95.5 PRESENCE OF CORONARY ANGIOPLASTY IMPLANT AND GRAFT: Chronic | ICD-10-CM

## 2019-10-15 LAB
ALBUMIN SERPL ELPH-MCNC: 4 G/DL — SIGNIFICANT CHANGE UP (ref 3.5–5.2)
ALP SERPL-CCNC: 232 U/L — HIGH (ref 30–115)
ALT FLD-CCNC: 157 U/L — HIGH (ref 0–41)
ANION GAP SERPL CALC-SCNC: 17 MMOL/L — HIGH (ref 7–14)
APTT BLD: 31.7 SEC — SIGNIFICANT CHANGE UP (ref 27–39.2)
AST SERPL-CCNC: 404 U/L — HIGH (ref 0–41)
BASE EXCESS BLDV CALC-SCNC: 6.7 MMOL/L — HIGH (ref -2–2)
BASOPHILS # BLD AUTO: 0.01 K/UL — SIGNIFICANT CHANGE UP (ref 0–0.2)
BASOPHILS NFR BLD AUTO: 0.2 % — SIGNIFICANT CHANGE UP (ref 0–1)
BILIRUB DIRECT SERPL-MCNC: 2.1 MG/DL — HIGH (ref 0–0.2)
BILIRUB INDIRECT FLD-MCNC: 0.7 MG/DL — SIGNIFICANT CHANGE UP (ref 0.2–1.2)
BILIRUB SERPL-MCNC: 2.8 MG/DL — HIGH (ref 0.2–1.2)
BUN SERPL-MCNC: 28 MG/DL — HIGH (ref 10–20)
CA-I SERPL-SCNC: 1.16 MMOL/L — SIGNIFICANT CHANGE UP (ref 1.12–1.3)
CALCIUM SERPL-MCNC: 9.3 MG/DL — SIGNIFICANT CHANGE UP (ref 8.5–10.1)
CHLORIDE SERPL-SCNC: 95 MMOL/L — LOW (ref 98–110)
CO2 SERPL-SCNC: 27 MMOL/L — SIGNIFICANT CHANGE UP (ref 17–32)
CREAT SERPL-MCNC: 6.1 MG/DL — CRITICAL HIGH (ref 0.7–1.5)
EOSINOPHIL # BLD AUTO: 0.02 K/UL — SIGNIFICANT CHANGE UP (ref 0–0.7)
EOSINOPHIL NFR BLD AUTO: 0.3 % — SIGNIFICANT CHANGE UP (ref 0–8)
GAS PNL BLDV: 138 MMOL/L — SIGNIFICANT CHANGE UP (ref 136–145)
GAS PNL BLDV: SIGNIFICANT CHANGE UP
GLUCOSE SERPL-MCNC: 154 MG/DL — HIGH (ref 70–99)
HCO3 BLDV-SCNC: 30 MMOL/L — HIGH (ref 22–29)
HCT VFR BLD CALC: 27.5 % — LOW (ref 42–52)
HCT VFR BLDA CALC: 28.8 % — LOW (ref 34–44)
HGB BLD CALC-MCNC: 9.4 G/DL — LOW (ref 14–18)
HGB BLD-MCNC: 9 G/DL — LOW (ref 14–18)
IMM GRANULOCYTES NFR BLD AUTO: 0.6 % — HIGH (ref 0.1–0.3)
LACTATE BLDV-MCNC: 1.9 MMOL/L — HIGH (ref 0.5–1.6)
LACTATE SERPL-SCNC: 2.1 MMOL/L — SIGNIFICANT CHANGE UP (ref 0.5–2.2)
LIDOCAIN IGE QN: 472 U/L — HIGH (ref 7–60)
LYMPHOCYTES # BLD AUTO: 0.19 K/UL — LOW (ref 1.2–3.4)
LYMPHOCYTES # BLD AUTO: 3 % — LOW (ref 20.5–51.1)
MCHC RBC-ENTMCNC: 29.4 PG — SIGNIFICANT CHANGE UP (ref 27–31)
MCHC RBC-ENTMCNC: 32.7 G/DL — SIGNIFICANT CHANGE UP (ref 32–37)
MCV RBC AUTO: 89.9 FL — SIGNIFICANT CHANGE UP (ref 80–94)
MONOCYTES # BLD AUTO: 0.44 K/UL — SIGNIFICANT CHANGE UP (ref 0.1–0.6)
MONOCYTES NFR BLD AUTO: 6.8 % — SIGNIFICANT CHANGE UP (ref 1.7–9.3)
NEUTROPHILS # BLD AUTO: 5.73 K/UL — SIGNIFICANT CHANGE UP (ref 1.4–6.5)
NEUTROPHILS NFR BLD AUTO: 89.1 % — HIGH (ref 42.2–75.2)
NRBC # BLD: 0 /100 WBCS — SIGNIFICANT CHANGE UP (ref 0–0)
PCO2 BLDV: 38 MMHG — LOW (ref 41–51)
PH BLDV: 7.51 — HIGH (ref 7.26–7.43)
PLATELET # BLD AUTO: 152 K/UL — SIGNIFICANT CHANGE UP (ref 130–400)
PO2 BLDV: 46 MMHG — HIGH (ref 20–40)
POTASSIUM BLDV-SCNC: 3.3 MMOL/L — SIGNIFICANT CHANGE UP (ref 3.3–5.6)
POTASSIUM SERPL-MCNC: 3.5 MMOL/L — SIGNIFICANT CHANGE UP (ref 3.5–5)
POTASSIUM SERPL-SCNC: 3.5 MMOL/L — SIGNIFICANT CHANGE UP (ref 3.5–5)
PROT SERPL-MCNC: 6.9 G/DL — SIGNIFICANT CHANGE UP (ref 6–8)
RBC # BLD: 3.06 M/UL — LOW (ref 4.7–6.1)
RBC # FLD: 13.6 % — SIGNIFICANT CHANGE UP (ref 11.5–14.5)
SAO2 % BLDV: 82 % — SIGNIFICANT CHANGE UP
SODIUM SERPL-SCNC: 139 MMOL/L — SIGNIFICANT CHANGE UP (ref 135–146)
TROPONIN T SERPL-MCNC: 0.08 NG/ML — CRITICAL HIGH
WBC # BLD: 6.43 K/UL — SIGNIFICANT CHANGE UP (ref 4.8–10.8)
WBC # FLD AUTO: 6.43 K/UL — SIGNIFICANT CHANGE UP (ref 4.8–10.8)

## 2019-10-15 PROCEDURE — 74177 CT ABD & PELVIS W/CONTRAST: CPT | Mod: 26

## 2019-10-15 PROCEDURE — 76705 ECHO EXAM OF ABDOMEN: CPT | Mod: 26

## 2019-10-15 PROCEDURE — 93010 ELECTROCARDIOGRAM REPORT: CPT

## 2019-10-15 PROCEDURE — 99291 CRITICAL CARE FIRST HOUR: CPT

## 2019-10-15 PROCEDURE — 71045 X-RAY EXAM CHEST 1 VIEW: CPT | Mod: 26

## 2019-10-15 RX ORDER — METRONIDAZOLE 500 MG
500 TABLET ORAL ONCE
Refills: 0 | Status: COMPLETED | OUTPATIENT
Start: 2019-10-15 | End: 2019-10-15

## 2019-10-15 RX ORDER — SODIUM CHLORIDE 9 MG/ML
500 INJECTION, SOLUTION INTRAVENOUS
Refills: 0 | Status: DISCONTINUED | OUTPATIENT
Start: 2019-10-15 | End: 2019-10-16

## 2019-10-15 RX ORDER — ATORVASTATIN CALCIUM 80 MG/1
40 TABLET, FILM COATED ORAL AT BEDTIME
Refills: 0 | Status: DISCONTINUED | OUTPATIENT
Start: 2019-10-15 | End: 2019-10-19

## 2019-10-15 RX ORDER — ACETAMINOPHEN 500 MG
650 TABLET ORAL ONCE
Refills: 0 | Status: COMPLETED | OUTPATIENT
Start: 2019-10-15 | End: 2019-10-15

## 2019-10-15 RX ORDER — HEPARIN SODIUM 5000 [USP'U]/ML
5000 INJECTION INTRAVENOUS; SUBCUTANEOUS EVERY 8 HOURS
Refills: 0 | Status: DISCONTINUED | OUTPATIENT
Start: 2019-10-15 | End: 2019-10-17

## 2019-10-15 RX ORDER — HYDROMORPHONE HYDROCHLORIDE 2 MG/ML
0.5 INJECTION INTRAMUSCULAR; INTRAVENOUS; SUBCUTANEOUS EVERY 4 HOURS
Refills: 0 | Status: DISCONTINUED | OUTPATIENT
Start: 2019-10-15 | End: 2019-10-19

## 2019-10-15 RX ORDER — SODIUM CHLORIDE 9 MG/ML
1000 INJECTION, SOLUTION INTRAVENOUS
Refills: 0 | Status: DISCONTINUED | OUTPATIENT
Start: 2019-10-15 | End: 2019-10-16

## 2019-10-15 RX ORDER — METOPROLOL TARTRATE 50 MG
25 TABLET ORAL
Refills: 0 | Status: DISCONTINUED | OUTPATIENT
Start: 2019-10-15 | End: 2019-10-19

## 2019-10-15 RX ORDER — CEFEPIME 1 G/1
1000 INJECTION, POWDER, FOR SOLUTION INTRAMUSCULAR; INTRAVENOUS ONCE
Refills: 0 | Status: COMPLETED | OUTPATIENT
Start: 2019-10-15 | End: 2019-10-15

## 2019-10-15 RX ORDER — ISOSORBIDE MONONITRATE 60 MG/1
30 TABLET, EXTENDED RELEASE ORAL DAILY
Refills: 0 | Status: DISCONTINUED | OUTPATIENT
Start: 2019-10-15 | End: 2019-10-19

## 2019-10-15 RX ORDER — LOSARTAN POTASSIUM 100 MG/1
25 TABLET, FILM COATED ORAL DAILY
Refills: 0 | Status: DISCONTINUED | OUTPATIENT
Start: 2019-10-15 | End: 2019-10-19

## 2019-10-15 RX ORDER — MORPHINE SULFATE 50 MG/1
4 CAPSULE, EXTENDED RELEASE ORAL ONCE
Refills: 0 | Status: DISCONTINUED | OUTPATIENT
Start: 2019-10-15 | End: 2019-10-15

## 2019-10-15 RX ORDER — MEROPENEM 1 G/30ML
500 INJECTION INTRAVENOUS ONCE
Refills: 0 | Status: COMPLETED | OUTPATIENT
Start: 2019-10-15 | End: 2019-10-15

## 2019-10-15 RX ORDER — ASPIRIN/CALCIUM CARB/MAGNESIUM 324 MG
81 TABLET ORAL DAILY
Refills: 0 | Status: DISCONTINUED | OUTPATIENT
Start: 2019-10-15 | End: 2019-10-19

## 2019-10-15 RX ADMIN — CEFEPIME 100 MILLIGRAM(S): 1 INJECTION, POWDER, FOR SOLUTION INTRAMUSCULAR; INTRAVENOUS at 18:18

## 2019-10-15 RX ADMIN — MORPHINE SULFATE 4 MILLIGRAM(S): 50 CAPSULE, EXTENDED RELEASE ORAL at 16:11

## 2019-10-15 RX ADMIN — SODIUM CHLORIDE 75 MILLILITER(S): 9 INJECTION, SOLUTION INTRAVENOUS at 22:24

## 2019-10-15 RX ADMIN — MEROPENEM 100 MILLIGRAM(S): 1 INJECTION INTRAVENOUS at 21:34

## 2019-10-15 RX ADMIN — Medication 650 MILLIGRAM(S): at 16:11

## 2019-10-15 RX ADMIN — Medication 100 MILLIGRAM(S): at 16:51

## 2019-10-15 RX ADMIN — SODIUM CHLORIDE 500 MILLILITER(S): 9 INJECTION, SOLUTION INTRAVENOUS at 18:20

## 2019-10-15 RX ADMIN — SODIUM CHLORIDE 500 MILLILITER(S): 9 INJECTION, SOLUTION INTRAVENOUS at 16:15

## 2019-10-15 NOTE — H&P ADULT - ASSESSMENT
-- check bcx, ucx  -- trend cmp (bun) / uo  -- npo  -- ivf: LR @ 75  -- iv abx: meropenem  -- pain control:  iv morphine prn  -- gi eval 68 y/o m PMH HTN, HLD, afib on eliquis, ESRD on HD (Tuesday/Thursday/Sat) presents for eval of sudden, sharp, non-radiating RUQ abd pain with associated diaphoresis and nausea and vomiting while getting dialysis today. Pt underwent cholecystectomy this past September and ERCP yesterday by Dr. Nowak.    # Acute RUQ pain with nausea and vomiting:  -- admit to MICU  -- possible post ERCP pancreatitis vs cholangitis  -- hemodynamically stable  -- check bcx, ucx  -- trend cmp (bun) / uo  -- npo  -- ivf: LR @ 75  -- iv abx: meropenem  -- pain control:  iv dilaudid prn  -- gi eval    # ESRD:  - s/p HD today  - f/u with renal  - f.u iP and MG  - check fluid status frequently    # Afib:  - rate controlled  - c/w metoprolol  - hold eliquis    # DVT ppx: HSQ  Full code  NPO  ambulate as tolerated

## 2019-10-15 NOTE — ED PROVIDER NOTE - NS ED ROS FT
Eyes:  No visual changes, eye pain or discharge.  ENMT:  No hearing changes, pain, discharge or infections. No neck pain or stiffness.  Cardiac:  No chest pain, SOB or edema. No chest pain with exertion.  Respiratory:  No cough or respiratory distress. No hemoptysis. No history of asthma or RAD.  GI:  + abd pain + nausea, No vomiting, diarrhea   :  No dysuria, frequency or burning.  MS:  No myalgia, muscle weakness, joint pain or back pain.  Neuro:  No headache or weakness.  No LOC.  Skin:  No skin rash.   Endocrine: + DM No history of thyroid disease  Except as documented in the HPI,  all other systems are negative.

## 2019-10-15 NOTE — CONSULT NOTE ADULT - ASSESSMENT
ASSESSMENT: 68y/o male with a pmhx of Afib, HTN, HLD, ESRD on HD s/p laparoscopic cholecystectomy 9/20/19 (dr Narvaez) presents for eval of fever, RUQ abd pain, nausea and vomiting while getting dialysis today. yesterday patient had ERCP with removal of plastic stent and sphincterotomy. Labs remarkable for elevated LFTS as above and lipase 472. CTAP abd US abdomen show dilated CBD, possible cholangitis    PLAN:   No acute surgical intervention  follow up GI   will continue to follow    Discussed with Dr Willis ASSESSMENT: 68y/o male with a pmhx of Afib, HTN, HLD, ESRD on HD s/p laparoscopic cholecystectomy 9/20/19 (dr Narvaez) presents for eval of fever, RUQ abd pain, nausea and vomiting while getting dialysis today. yesterday patient had ERCP with removal of plastic stent and sphincterotomy. Labs remarkable for elevated LFTS as above and lipase 472. CTAP abd US abdomen show dilated CBD, possible cholangitis    PLAN:   No acute surgical intervention  follow up GI   will continue to follow    Discussed with Dr Willis    Senior Resident Note  70yo male 1 month out from [facility] for cholangitis ercp with stones - stent now after repeat ercp with sphincterotomy and stent removal  here with abdominal pain fever n/v  elevatated bili 2.8/2.1 alp 232 lipase 472 dil cbd to 1.75cm with inflammation (post procedural?) no inflammation of pancreas   cholangeitis vs post ercp pancreatitis  no surgical intervention   f/u gi  icu eval  Pt seen and examined  Above note has been reviewed and edited  Plan d/w patient and Dr Brice Willis

## 2019-10-15 NOTE — ED PROVIDER NOTE - PHYSICAL EXAMINATION
VITAL SIGNS: I have reviewed nursing notes and confirm.  CONSTITUTIONAL: Well-developed; well-nourished; in moderate distress due to pain  SKIN: skin exam is warm and dry, no acute rash.    HEAD: Normocephalic; atraumatic.  EYES:  conjunctiva and sclera clear.  ENT: No nasal discharge; airway clear.  NECK: Supple; non tender.  CARD: S1, S2 normal; no murmurs, gallops, or rubs. Regular rate and rhythm.   RESP: No wheezes, rales or rhonchi.  ABD: TTP RUQ, Normal bowel sounds; soft; non-distended  EXT: Normal ROM.  No clubbing, cyanosis or edema.   LYMPH: No acute cervical adenopathy.  NEURO: Alert, oriented, grossly unremarkable  PSYCH: Cooperative, appropriate.

## 2019-10-15 NOTE — CONSULT NOTE ADULT - SUBJECTIVE AND OBJECTIVE BOX
HPI: 70y/o male with a pmhx of Afib, HTN, HLD, ESRD on HD (Tuesday/Thursday/Sat) s/p laparoscopic cholecystectomy 9/20/19 (dr Narvaez) presents for eval of fever, RUQ abd pain that comes and goes with associated diaphoresis, nausea and vomitting while getting dialysis today. yesterday patient had ERCP with removal of plastic stent and sphincterotomy.     PAST MEDICAL & SURGICAL HISTORY:  Afib  Anemia  Heart failure  HLD (hyperlipidemia)  HTN (hypertension)  ESRD on dialysis: T/ TH/ S  S/P cataract surgery  S/P arteriovenous (AV) fistula creation  H/O right coronary artery stent placement: stent x3    ALLERGIES: No Known Allergies    HOME MEDICATIONS:   aspirin 81 mg oral tablet, chewable: 1 tab(s) orally once a day (14 Oct 2019 15:02)  atorvastatin 40 mg oral tablet: 1 tab(s) orally once a day (14 Oct 2019 15:02)  isosorbide mononitrate 30 mg oral tablet, extended release: 1 tab(s) orally once a day (in the morning) (14 Oct 2019 15:02)  losartan 25 mg oral tablet: 1 tab(s) orally once a day (14 Oct 2019 15:02)  metoprolol tartrate 25 mg oral tablet: 1 tab(s) orally 2 times a day (14 Oct 2019 15:02)    CURRENT MEDICATIONS  MEDICATIONS (STANDING): aspirin  chewable 81 milliGRAM(s) Oral daily  atorvastatin 40 milliGRAM(s) Oral at bedtime  heparin  Injectable 5000 Unit(s) SubCutaneous every 8 hours  isosorbide   mononitrate ER Tablet (IMDUR) 30 milliGRAM(s) Oral daily  lactated ringers. 1000 milliLiter(s) IV Continuous <Continuous>  lactated ringers. 500 milliLiter(s) IV Continuous <Continuous>  lactated ringers. 500 milliLiter(s) IV Continuous <Continuous>  losartan 25 milliGRAM(s) Oral daily  metoprolol tartrate 25 milliGRAM(s) Oral two times a day    MEDICATIONS (PRN):HYDROmorphone  Injectable 0.5 milliGRAM(s) IV Push every 4 hours PRN Severe Pain (7 - 10)    --------------------------------------------------------------------------------------------    Vitals:   T(C): 37.1 (10-15-19 @ 22:00), Max: 39.2 (10-15-19 @ 15:53)  HR: 72 (10-15-19 @ 22:00) (72 - 110)  BP: 161/72 (10-15-19 @ 22:00) (134/64 - 189/88)  RR: 18 (10-15-19 @ 22:00) (18 - 18)  SpO2: 99% (10-15-19 @ 22:00) (96% - 100%)    10-15 @ 07:01  -  10-15 @ 23:03  --------------------------------------------------------  IN:    lactated ringers.: 75 mL  Total IN: 75 mL    OUT:  Total OUT: 0 mL    Total NET: 75 mL    Height (cm): 167.64 (10-14 @ 15:37)  Weight (kg): 81 (10-14 @ 15:37)  BMI (kg/m2): 28.8 (10-14 @ 15:37)  BSA (m2): 1.91 (10-14 @ 15:37)    PHYSICAL EXAM:  GENERAL: NAD,   CHEST/LUNG: Clear to auscultation bilaterally;   HEART: S1 and S2 noted  ABDOMEN: Soft, Nontender, Nondistended;   PSYCH: AAOx3  --------------------------------------------------------------------------------------------    LABS  CBC (10-15 @ 15:48)                              9.0<L>                         6.43    )----------------(  152        89.1<H>% Neutrophils, 3.0<L>% Lymphocytes, ANC: 5.73                                27.5<L>  CBC (10-14 @ 16:05)                              8.7<L>                         5.31    )----------------(  202        --    % Neutrophils, --    % Lymphocytes, ANC: --                                  28.2<L>    BMP (10-15 @ 15:48)             139     |  95<L>   |  28<H> 		Ca++ --      Ca 9.3                ---------------------------------( 154<H>		Mg --                 3.5     |  27      |  6.1<HH>			Ph --      BMP (10-14 @ 16:05)             140     |  100     |  37<H> 		Ca++ --      Ca 10.1               ---------------------------------( 90    		Mg 2.0                4.8     |  26      |  7.2<HH>			Ph --        LFTs (10-15 @ 15:48)      TPro 6.9 / Alb 4.0 / TBili 2.8<H> / DBili 2.1<H> / <H> / <H> / AlkPhos 232<H>  LFTs (10-14 @ 16:05)      TPro 7.0 / Alb 4.2 / TBili 0.5 / DBili -- / AST 17 / ALT 8 / AlkPhos 98    Coags (10-15 @ 15:48)  aPTT 31.7 / INR -- / PT --  Coags (10-14 @ 16:05)  aPTT 33.9 / INR 0.99 / PT 11.40      ABG (10-15 @ 15:48)      /  /  /  /  / %     Lactate:  2.1    VBG (10-15 @ 16:11)     7.51<H> / 38<L> / 46<H> / 30<H> / 6.7<H> / 82%     Lactate: 1.9<H>    IMAGING:  < from: CT Abdomen and Pelvis w/ IV Cont (10.15.19 @ 17:29) >  IMPRESSION:  1.  Status post cholecystectomy. Mild hyperenhancement of the CBD wall   which may represent cholangitis in the appropriate clinical setting.   2.  Mild apparent mural thickening of the rectum which may be secondary  to underdistention or represent proctitis.     < end of copied text >  .  < from: US Abdomen Limited (10.15.19 @ 17:56) >  IMPRESSION:    Status post cholecystectomy. Dilated CBD which can be seen   postcholecystectomy.    < end of copied text >

## 2019-10-15 NOTE — CONSULT NOTE ADULT - SUBJECTIVE AND OBJECTIVE BOX
Gastroenterology Consultation:    Patient is a 69y old  Male who presents with a chief complaint of acute abdominal pain with vomiting (15 Oct 2019 20:46)      Admitted on: 10-15-19  HPI:  68 y/o m PMH HTN, HLD, afib on Eliquis ESRD on HD (Tuesday/Thursday/Sat), history of cholangitis in July with stent insertion for drainage without sphincterotomy because patient was on Plavix at that time , yesterday patient had another ERCP with removal of plastic stent and sphincterotomy. Patient presenting now with fever and chills starting this afternoon. Patient mentioned intermittent RUQ pain , that remains for few minutes and resolves spontaneously , now is off pain , patient denies any epigastric pain , has nausea and had vomiting three times before presentation, denies chest pain, SOB, palpitations, diarrhea, melena or hematochezia.   In ED patient is hemodynamically stable , denies any abdominal pain or nausea now Labs showed elevated lipase and LFTs, and CT showed post cholecystectomy changes. was given IV atbx and morphine for pain.        PAST MEDICAL & SURGICAL HISTORY:  Afib  Anemia  Heart failure  HLD (hyperlipidemia)  HTN (hypertension)  ESRD on dialysis: T/ TH/ S  S/P cataract surgery  S/P arteriovenous (AV) fistula creation  H/O right coronary artery stent placement: stent x3      FAMILY HISTORY: no history of liver cancer      Social History:  Tobacco: negative   Alcohol: negative  Drugs: negative     Home Medications:  aspirin 81 mg oral tablet, chewable: 1 tab(s) orally once a day (14 Oct 2019 15:02)  atorvastatin 40 mg oral tablet: 1 tab(s) orally once a day (14 Oct 2019 15:02)  isosorbide mononitrate 30 mg oral tablet, extended release: 1 tab(s) orally once a day (in the morning) (14 Oct 2019 15:02)  losartan 25 mg oral tablet: 1 tab(s) orally once a day (14 Oct 2019 15:02)  metoprolol tartrate 25 mg oral tablet: 1 tab(s) orally 2 times a day (14 Oct 2019 15:02)    MEDICATIONS  (STANDING):  aspirin  chewable 81 milliGRAM(s) Oral daily  atorvastatin 40 milliGRAM(s) Oral at bedtime  heparin  Injectable 5000 Unit(s) SubCutaneous every 8 hours  isosorbide   mononitrate ER Tablet (IMDUR) 30 milliGRAM(s) Oral daily  lactated ringers. 1000 milliLiter(s) (75 mL/Hr) IV Continuous <Continuous>  lactated ringers. 500 milliLiter(s) (500 mL/Hr) IV Continuous <Continuous>  lactated ringers. 500 milliLiter(s) (500 mL/Hr) IV Continuous <Continuous>  losartan 25 milliGRAM(s) Oral daily  metoprolol tartrate 25 milliGRAM(s) Oral two times a day    MEDICATIONS  (PRN):  HYDROmorphone  Injectable 0.5 milliGRAM(s) IV Push every 4 hours PRN Severe Pain (7 - 10)      Allergies  No Known Allergies      Review of Systems:   Constitutional:  + Fever, + Chills  ENT/Mouth:  No Hearing Changes,  No Difficulty Swallowing  Eyes:  No Eye Pain, No Vision Changes  Cardiovascular:  No Chest Pain, No Palpitations  Respiratory:  No Cough, No Dyspnea  Gastrointestinal:  As described in HPI  Musculoskeletal:  No Joint Swelling, No Back Pain  Skin:  No Skin Lesions, No Jaundice  Neuro:  No Syncope, No Dizziness  Heme/Lymph:  No Bruising, No Bleeding.          Physical Examination:  T(C): 37.1 (10-15-19 @ 18:17), Max: 39.2 (10-15-19 @ 15:53)  HR: 92 (10-15-19 @ 18:17) (92 - 110)  BP: 134/64 (10-15-19 @ 18:17) (134/64 - 189/88)  RR: 18 (10-15-19 @ 18:17) (18 - 18)  SpO2: 96% (10-15-19 @ 18:17) (96% - 100%)        Constitutional: No acute distress.  Eyes:. Conjunctivae are clear, Sclera is non-icteric.  Ears Nose and Throat: The external ears are normal appearing,  Oral mucosa is pink and moist.  Respiratory:  No signs of respiratory distress. Lung sounds are clear bilaterally.  Cardiovascular:  S1 S2, Regular rate and rhythm.  GI: Abdomen is soft, symmetric, and non-tender without distention. There are no visible lesions or scars. Bowel sounds are present and normoactive in all four quadrants. No masses, hepatomegaly, or splenomegaly are noted.   Neuro: No Tremor, No involuntary movements  Skin: No rashes, No Jaundice.          Data: (reviewed by attending)                        9.0    6.43  )-----------( 152      ( 15 Oct 2019 15:48 )             27.5     Hgb Trend:  9.0  10-15-19 @ 15:48  8.7  10-14-19 @ 16:05      10-15    139  |  95<L>  |  28<H>  ----------------------------<  154<H>  3.5   |  27  |  6.1<HH>    Ca    9.3      15 Oct 2019 15:48  Mg     2.0     10-14    TPro  6.9  /  Alb  4.0  /  TBili  2.8<H>  /  DBili  2.1<H>  /  AST  404<H>  /  ALT  157<H>  /  AlkPhos  232<H>  10-15    Liver panel trend:  TBili 2.8   /      /      /   AlkP 232   /   Tptn 6.9   /   Alb 4.0    /   DBili 2.1      10-15  TBili 0.5   /   AST 17   /   ALT 8   /   AlkP 98   /   Tptn 7.0   /   Alb 4.2    /   DBili --      10-14      PT/INR - ( 14 Oct 2019 16:05 )   PT: 11.40 sec;   INR: 0.99 ratio         PTT - ( 15 Oct 2019 15:48 )  PTT:31.7 sec        Radiology:(reviewed by attending)  CT Abdomen and Pelvis w/ IV Cont:   EXAM:  CT ABDOMEN AND PELVIS IC            PROCEDURE DATE:  10/15/2019            INTERPRETATION:  CLINICAL HISTORY / REASON FOR EXAM: Abdominal pain.    TECHNIQUE: Contiguous axial CT images were obtained from the lower chest   to the pubic symphysis following administration of 100 mL Optiray 320   intravenous contrast. Oral contrast was administered. Reformatted images   in the coronal and sagittal planes were acquired.    COMPARISON CT: 7/13/2019.        FINDINGS:    LOWER CHEST: Cardiomegaly. Coronary artery atherosclerotic   calcifications..    HEPATOBILIARY: Status post cholecystectomy. CBD dilated to 1.1 cm which   can be seen post-cholecystectomy. Mild hyperenhancement of the CBD wall   which may represent cholangitis in the appropriate clinical setting.     SPLEEN: Unremarkable.    PANCREAS: Unremarkable.    ADRENAL GLANDS: Indeterminate 7 mm right adrenal nodule, unchanged from   prior CT. Unremarkable left adrenal gland.    KIDNEYS: Atrophic kidneys. Bilateral renal cysts in addition to   subcentimeter hypodensity too small to further characterize.    ABDOMINOPELVIC NODES: Unremarkable.    PELVIC ORGANS: Prostatomegaly    PERITONEUM/MESENTERY/BOWEL: Mild apparent mural thickening of the rectum   which may be secondary to underdistention or represent proctitis. No   bowel obstruction, ascites or intraperitoneal free air. Colonic   diverticulosis. Normal caliber appendix.    BONES/SOFT TISSUES: Degenerative changes of the spine.    OTHER: Diffuse atherosclerotic vascular calcifications.      IMPRESSION:  1.  Status post cholecystectomy. Mild hyperenhancement of the CBD wall   which may represent cholangitis in the appropriate clinical setting.   2.  Mild apparent mural thickening of the rectum which may be secondary  to underdistention or represent proctitis.                 BERTO MADRIGAL M.D., RESIDENT RADIOLOGIST  This document has been electronically signed.  SANJU DASH M.D., ATTENDING RADIOLOGIST  This document has been electronically signed. Oct [id number]  7:15PM             (10-15-19 @ 17:29)    US Abdomen Limited:   EXAM:  US ABDOMEN LIMITED            PROCEDURE DATE:  10/15/2019            INTERPRETATION:  CLINICAL HISTORY: Right upper quadrant pain.    COMPARISON: Abdominal ultrasound 7/13/2019.    PROCEDURE: Ultrasound of the right upper quadrant was performed.    FINDINGS:    LIVER:  Normal in contour and echogenicity measuring 14.7 cm in length.     GALLBLADDER/BILIARY TREE: Status post post cholecystectomy. Dilated CBD   measuring measuring up to 1.8 cm sonographically which can be seen post   cholecystectomy.     PANCREAS:  Visualized head and body are unremarkable. Remainder obscured   by overlying bowel gas.    KIDNEY:  Right kidney measures 11.8 cm in length. No hydronephrosis.   Cysts measuring up to 4.3 cm.    ASCITES:  None.    IMPRESSION:    Status post cholecystectomy. Dilated CBD which can be seen   postcholecystectomy.              CRUZ VERNON M.D., RESIDENT RADIOLOGIST  This document has been electronically signed.  SANJU DASH M.D., ATTENDING RADIOLOGIST  This documenthas been electronically signed. Oct 15 2019  7:55PM             (10-15-19 @ 17:56)    < from: ERCP (10.14.19 @ 14:45) >  Limited views of the esophagus, stomach and duodenum were unremarkable. The  major papilla was noted in the second portion of duodenum. The previously placed  CBD stent was noted in place and draining bile.  radiograph was taken. The  major papilla was cannulated, using wire-guided cannulation, using a Clevercut  sphincterotome preloaded with a 0.035 guidewire, the wire was seen the CBD.  Cholangiogram was performed and showed no filling defect in the CBD, the CBD was  dilated to 12mm. Sphincterotomy was performed. The duct was swept multiple times  and sludge and stone debris were noted. Occlusion cholangiogram was afterwards  performed and showed no filling defects. Patient also received Indocin  suppository per rectum, 100mg prior to ERCP. The PD was not injected in this  exam. The scope was then removed and procedure terminated.     < end of copied text >

## 2019-10-15 NOTE — ED ADULT TRIAGE NOTE - CHIEF COMPLAINT QUOTE
pt arrives from HD after episode of at HD where pt was diaphoretic and had chest discomfort. Pt currently reports Ruq pain. Unable to complete HD. EKG done in triage.

## 2019-10-15 NOTE — H&P ADULT - NSHPREVIEWOFSYSTEMS_GEN_ALL_CORE
REVIEW OF SYSTEMS    CONSTITUTIONAL: No weakness, fevers or chills  EYES/ENT: No visual changes;  No vertigo or throat pain   NECK: No pain or stiffness. No cervical midline tenderness.  RESPIRATORY: No difficulty breathing, cough, wheezing.  CARDIOVASCULAR: No chest pain or palpitations  GASTROINTESTINAL: RUQ pain with nausea and vomiting.  GENITOURINARY: No dysuria, frequency or hematuria.  NEUROLOGICAL: No headache. No numbness or weakness.

## 2019-10-15 NOTE — CONSULT NOTE ADULT - ASSESSMENT
68 y/o m PMH HTN, HLD, afib on Eliquis ESRD on HD (Tuesday/Thursday/Sat), history of cholangitis in July with stent insertion for drainage without sphincterotomy because patient was on Plavix at that time , yesterday patient had another ERCP with removal of plastic stent and sphincterotomy. Patient presenting now with fever and chills starting this afternoon , nausea , vomiting and intermittent RUQ pain.    s/p ERCP yesterday with sphincterotomy and plastic stent removal   transaminitis / high lipase/ dilated CBD    mild acute cholangitis :   patient is hemodynamically stable   no end organ damage  no encephalopathy   REC:   IV hydration  Meropenem IV stat and Q8 hrs   keep NPO  hold Eliquis , last dose this am  ICU evaluation   ERCP in am   please recall GI if any encephalopathy or hemodynamic instability 70 y/o m PMH HTN, HLD, afib on Eliquis ESRD on HD (Tuesday/Thursday/Sat), history of cholangitis in July with stent insertion for drainage without sphincterotomy because patient was on Plavix at that time , yesterday patient had another ERCP with removal of plastic stent and sphincterotomy. Patient presenting now with fever and chills starting this afternoon , nausea , vomiting and intermittent RUQ pain.    s/p ERCP yesterday with sphincterotomy and plastic stent removal   transaminitis / high lipase/ dilated CBD    mild acute cholangitis :   patient is hemodynamically stable   no end organ damage  no encephalopathy     REC:   IV hydration  Meropenem or zosyn    keep NPO  ICU evaluation   Consider ERCP in AM   please recall GI if any encephalopathy or hemodynamic instability

## 2019-10-15 NOTE — H&P ADULT - HISTORY OF PRESENT ILLNESS
70 y/o m PMH HTN, HLD, afib on eliquis, ESRD on HD (Tuesday/Thursday/Sat) presents for eval of Sudden, sharp, non-radiating RUQ abd pain with associated diaphoresis and nausea and vomiting while getting dialysis today. Pt underwent cholecystectomy this past September and ERCP yesterday by Dr. Nowak.  Patient reports being NPO since 3 days now, and denies chest pain, SOB, palpitations, diarrhea.  In ED found to have HTN, EKG showed afib with controlled rate without signs of infarction, trop elevated at chronic level, had a fever and elevated lipase and LFTs, and CT showed post cholecystectomy changes. was given IV atbx and morphine for pain.  Upon evaluation, patient was not in pain, reported resolution of n/v.

## 2019-10-15 NOTE — ED ADULT NURSE NOTE - OBJECTIVE STATEMENT
Pt a&ox3, pt came to ED from HD, during Hd pt had episode of diaphoresis and RUQ abd pain. As per pt has an ERCP done yesterday with stent removal. Pt states he had an episode during HD of RUQ pain, pt did not complete HS treatment. Pt states he felt very cold, pts rectal temp 102.6F upon arrival, sinus tachycardic at this time. Pt states some nausea and x1 episode of vomiting. Denies CP/SOB, palpitations, urinary symptoms, diarrhea, back pain.

## 2019-10-15 NOTE — ED PROVIDER NOTE - CARE PLAN
Principal Discharge DX:	Cholangitis  Secondary Diagnosis:	Pancreatitis  Secondary Diagnosis:	Abdominal pain

## 2019-10-15 NOTE — PATIENT PROFILE ADULT - FLU SEASON?
Outpatient Physical Therapy Ortho Treatment Note  SADIA Bey     Patient Name: Triny Birmingham  : 1948  MRN: [id number]  Today's Date: 2019      Visit Date: 2019    Visit Dx:    ICD-10-CM ICD-9-CM   1. Status post total right knee replacement Z96.651 V43.65       Patient Active Problem List   Diagnosis   • Abnormal ECG   • Type 2 diabetes mellitus (CMS/[facility])   • Breathlessness on exertion   • Hyperlipidemia   • Essential hypertension   • Cigarette nicotine dependence with nicotine-induced disorder   • CHF (congestive heart failure), NYHA class III (CMS/[facility])   • GERD (gastroesophageal reflux disease)   • Allergic rhinitis   • Acute renal failure (CMS/[facility])   • COPD (chronic obstructive pulmonary disease) (CMS/[facility])   • Chronic kidney disease, stage III (moderate) (CMS/[facility])   • YONY (obstructive sleep apnea)   • Deep venous thrombosis (DVT) of peroneal vein (CMS/[facility])   • Cardiomyopathy (CMS/[facility])   • Gout due to renal impairment   • Lupus anticoagulant positive   • Laryngopharyngeal reflux   • Anxiety   • Chronic bilateral low back pain without sciatica   • Cyst of right eyelid   • Morbidly obese (CMS/[facility])   • Gout   • Myalgia   • TIA (transient ischemic attack)   • Hypertension        Past Medical History:   Diagnosis Date   • AICD (automatic cardioverter/defibrillator) present    • Arthritis    • Chest pain     h/o, Dr Rowe follows, cleared for surgery   • CHF (congestive heart failure) (CMS/[facility])     last echo 2019   • Chronic kidney disease, stage III (moderate) (CMS/[facility]) 2017   • Colon polyp    • COPD (chronic obstructive pulmonary disease) (CMS/[facility])    • Deep venous thrombosis (DVT) of peroneal vein (CMS/[facility]) 2017    left leg   • Diabetes mellitus (CMS/[facility])     Type 2   • DJD (degenerative joint disease) of knee     right, sched TKA   • Fatigue    • GERD (gastroesophageal reflux disease)    • Gout due to renal impairment 2017   • Health maintenance alteration 2017   •  Hyperlipidemia    • Hypertension    • YONY (obstructive sleep apnea) 09/07/2017    use CPAP w/4L O2 at night   • Seasonal allergies    • SOB (shortness of breath) on exertion    • TIA (transient ischemic attack) 6/27/2019   • Tobacco use         Past Surgical History:   Procedure Laterality Date   • APPENDECTOMY     • BACK SURGERY      fusion   • BLADDER SURGERY      bladder tuck   • CARDIAC CATHETERIZATION N/A 5/24/2016    Procedure: Coronary angiography;  Surgeon: Tutu Spain MD;  [facility]AS[facility];  Service:    • CARDIAC CATHETERIZATION N/A 5/24/2016    Procedure: Peripheral angiography;  Surgeon: Tutu Spain MD;  [facility];  Service:    • CARDIAC CATHETERIZATION N/A 5/24/2016    Procedure: Left Heart Cath;  Surgeon: Tutu Spain MD;  [facility];  Service:    • CARDIAC CATHETERIZATION N/A 5/24/2016    Procedure: Left ventriculography;  Surgeon: Tutu Spain MD;  [facility];  Service:    • CARDIAC ELECTROPHYSIOLOGY PROCEDURE N/A 9/1/2017    Procedure: ICD DC new   medtronic;  Surgeon: Hema Dumont MD;  [facility];  Service:    • COLONOSCOPY N/A 5/16/2016    Procedure: COLONOSCOPY;  Surgeon: Margi Lamar MD;  Location: [facility] OR;  Service:    • ENDOSCOPY N/A 5/16/2016    Procedure: ESOPHAGOGASTRODUODENOSCOPY;  Surgeon: Margi Lamar MD;  Location: [facility] OR;  Service:    • NECK SURGERY      fusion   • TOTAL KNEE ARTHROPLASTY Right 8/13/2019    Procedure: RIGHT TOTAL KNEE ARTHROPLASTY;  Surgeon: Carlos Omer MD;  Location: [facility] OR;  Service: Orthopedics   • TUBAL ABDOMINAL LIGATION         PT Ortho     Row Name 09/13/19 1000       Right Lower Ext    Rt Knee Extension/Flexion AROM  2 degrees extension after stretching.   -LN       Gait/Stairs Assessment/Training    [place]h Level (Gait)  contact guard;stand by assist;verbal  cues;nonverbal cues (demo/gesture)  -LN    Assistive Device (Gait)  cane, straight  -LN    Distance in Feet (Gait)  50 feet in clinic  -LN    Deviations/Abnormal Patterns (Gait)  right sided deviations;antalgic;stride length decreased;gait speed decreased  -LN      User Key  (r) = Recorded By, (t) = Taken By, (c) = Cosigned By    Initials Name Provider Type    Juliane Pina, PT Physical Therapist                      PT Assessment/Plan     Row Name 09/13/19 1000          PT Assessment    Assessment Comments  Patient with overall decreased right knee pain since being off therapy for 1 week and taking a steroid. She tolerated exercises well. She ambulated short distance well with SPC with cueing for proper sequencing.   -LN        PT Plan    PT Frequency  2x/week  -LN     PT Plan Comments  Pt is to continue her HEP daily. Will slowly add additional strengthening exercises as pt tolerates. Patient is to look into getting a SPC to use at home.   -LN       User Key  (r) = Recorded By, (t) = Taken By, (c) = Cosigned By    Initials Name Provider Type    Juliane Pina, PT Physical Therapist          Modalities     Row Name 09/13/19 1000             Ice    Ice Applied  Yes  -LN      Location  right knee- anterior and posterior  -LN      Rx Minutes  10 mins  -LN      Ice S/P Rx  Yes  -LN        User Key  (r) = Recorded By, (t) = Taken By, (c) = Cosigned By    Initials Name Provider Type    Juliane Pina, PT Physical Therapist        OP Exercises     Row Name 09/13/19 1000             Precautions    Existing Precautions/Restrictions  no known precautions/restrictions  -LN         Subjective Comments    Subjective Comments  Patient reports that her knee is still sore but better than it was last week. Patient reports that she is walking a little without the walker in her home but hasn't gotten a cane yet.   -LN         Subjective Pain    Able to rate subjective pain?  yes  -LN       "Pre-Treatment Pain Level  4 4-5/10  -LN         Exercise 1    Exercise Name 1  Heel Slides  -LN      Time 1  8 min  -LN         Exercise 2    Exercise Name 2  Wall Slides  -LN      Time 2  8 min  -LN         Exercise 3    Exercise Name 3  Manual Flex  -LN      Reps 3  10  -LN      Time 3  10 secs  -LN         Exercise 4    Exercise Name 4  HS Stretch  -LN      Cueing 4  Verbal;Tactile  -LN      Reps 4  10  -LN      Time 4  10 sec hold each  -LN         Exercise 5    Exercise Name 5  Heel Prop  -LN      Time 5  5 min  -LN         Exercise 6    Exercise Name 6  Manual EXT  -LN      Reps 6  10  -LN      Time 6  10 sec hold each  -LN         Exercise 7    Exercise Name 7  SLR  -LN      Reps 7  25  -LN         Exercise 8    Exercise Name 8  LAQ  -LN      Reps 8  25  -LN         Exercise 9    Exercise Name 9  TKE  -LN      Cueing 9  Verbal;Tactile;Demo  -LN         Exercise 10    Exercise Name 10  Heel Raises  -LN      Cueing 10  Verbal;Tactile  -LN      Reps 10  20  -LN         Exercise 11    Exercise Name 11  4\" Fwd Step Ups  -LN      Cueing 11  Verbal;Tactile  -LN         Exercise 12    Exercise Name 12  bike  -LN      Reps 12  -- seat 1  -LN      Time 12  6 minutes  -LN         Exercise 13    Exercise Name 13  Hip ABD  -LN      Reps 13  25  -LN         Exercise 14    Exercise Name 14  SAQ  -LN      Reps 14  25  -LN        User Key  (r) = Recorded By, (t) = Taken By, (c) = Cosigned By    Initials Name Provider Type    Juliane Pina, PT Physical Therapist                           Therapy Education  Education Details: Patient to continue with HEP as tolerated and use CP PRN.  Instructed patient in gait with SPC and she is to look into getting a SPC and to use in home; alyce she continue to use FWW when she goes out in community for now.  Instructed patient in how to properly adjust the SPC for her height.   Given: HEP, Symptoms/condition management, Mobility training, Edema management  Program: " Reinforced  How Provided: Verbal, Demonstration  Provided to: Patient  Level of Understanding: Teach back education performed, Verbalized, Demonstrated              Time Calculation:   Start Time: 1000  Stop Time: 1115  Time Calculation (min): 75 min  Therapy Charges for Today     Code Description Service Date Service Provider Modifiers Qty    [id number] HC PT THER PROC EA 15 MIN 9/13/2019 Juliane Cunningham, PT GP 2                    Juliane Cunningham, PT  9/13/2019      Yes...

## 2019-10-15 NOTE — H&P ADULT - NSHPPHYSICALEXAM_GEN_ALL_CORE
PHYSICAL EXAM:    T(C): 37.1 (10-15-19 @ 18:17), Max: 39.2 (10-15-19 @ 15:53)  HR: 92 (10-15-19 @ 18:17) (92 - 110)  BP: 134/64 (10-15-19 @ 18:17) (134/64 - 189/88)  RR: 18 (10-15-19 @ 18:17) (18 - 18)  SpO2: 96% (10-15-19 @ 18:17) (96% - 100%)    Constitutional:  HEENT: Neck supple, No oral lesions, no throat redness or swelling  Respiratory: Breath sounds  CTA b/l, no accessory muscle use  Cardiovascular: regular rate and rhythm, normal S1 S2, no murmurs  Gastrointestinal: soft non-tender no hepatosplenomegaly, normal BS  Genitourinary: no lesions, no discharge  Extremities: positive peripheral pulses no extremity edema  Neurological: AAO4 no focal motor deficit  Skin: no lesions or wounds  Musculoskeletal: no joint swelling or tenderness

## 2019-10-15 NOTE — ED PROVIDER NOTE - PROGRESS NOTE DETAILS
discussed case with GI, requests call back with labs once resulted attempted calling GI with lab results multiple times, but have not answered GI notified, will see pt discussed case with ICU Fellow Dr. Branch, accepted to ICU, Under Dr. Cat, ICU resident made aware, NPO after midnight, and hold eliquis for procedure in AM

## 2019-10-15 NOTE — ED PROVIDER NOTE - CLINICAL SUMMARY MEDICAL DECISION MAKING FREE TEXT BOX
Pt with h/o afib, ESRD on HD, recent cholecystectomy and yesterday had biliary stent removal, now with RUQ pain, fever, chills.  wbc 6, lactate 1.9, elevated LFT's, lipase 472.  CT abd c/w cholangitis.  Pt given IVF (cautiously given ESRD and only got half normal HD today), IV abx, BP ok in ER.  pt admitted to ICU for continued care.

## 2019-10-15 NOTE — ED ADULT NURSE NOTE - NSIMPLEMENTINTERV_GEN_ALL_ED
Implemented All Fall with Harm Risk Interventions:  [place] to call system. Call bell, personal items and telephone within reach. Instruct patient to call for assistance. Room bathroom lighting operational. Non-slip footwear when patient is off stretcher. Physically safe environment: no spills, clutter or unnecessary equipment. Stretcher in lowest position, wheels locked, appropriate side rails in place. Provide visual cue, wrist band, yellow gown, etc. Monitor gait and stability. Monitor for mental status changes and reorient to person, place, and time. Review medications for side effects contributing to fall risk. Reinforce activity limits and safety measures with patient and family. Provide visual clues: red socks.

## 2019-10-15 NOTE — ED PROVIDER NOTE - ATTENDING CONTRIBUTION TO CARE
70 y/o male with h/o afib on eliquis, ESRD on HD t/th/sat, htn, hld, in ER with c/o RUQ pain, generalized weakness, nausea, chills.  Pt states he was at HD today and starting getting chills, subjective temp, RUQ discomfort with N and diaphoresis.  +V x 1.  Pt had biliary stent placed by Dr. Nowak 7/2019, on 9/20/2019 had cholecystectomy by Brice, and yesterday Dr. Nowak removed the biliary stent.  No cp/sob.  no ha/loc.    PE - nad, nc/at, eomi, perrl, op - clear, cta b/l, no w/r/r, rrr, abd- soft, + mild RUQ/epigastric tenderness, no lower abd tenderness, no guarding/rebound, from x 4, RUE - avf with + thrill, A&O x 3, no focal neuro deficits.  -ivf, iv abx, labs, abd ct, surg/GI consult.

## 2019-10-15 NOTE — ED PROVIDER NOTE - OBJECTIVE STATEMENT
Pt is a 68y/o male with a pmhx of HTN, HLD, ESRD on HD (Tuesday/Thursday/Sat) presents for eval of Sudden, sharp, non-radiating RUQ abd pain with associated diaphoresis and nausea while getting dialysis today. Pt underwent cholecystectomy this past Seeptember and ERCP yesterday by Dr. Xiong. Pr denies CP, SOB, Hematuria, weakness, numbness, v/d.

## 2019-10-16 DIAGNOSIS — E78.5 HYPERLIPIDEMIA, UNSPECIFIED: ICD-10-CM

## 2019-10-16 DIAGNOSIS — I12.0 HYPERTENSIVE CHRONIC KIDNEY DISEASE WITH STAGE 5 CHRONIC KIDNEY DISEASE OR END STAGE RENAL DISEASE: ICD-10-CM

## 2019-10-16 DIAGNOSIS — I48.91 UNSPECIFIED ATRIAL FIBRILLATION: ICD-10-CM

## 2019-10-16 DIAGNOSIS — K83.09 OTHER CHOLANGITIS: ICD-10-CM

## 2019-10-16 DIAGNOSIS — I50.9 HEART FAILURE, UNSPECIFIED: ICD-10-CM

## 2019-10-16 DIAGNOSIS — N18.6 END STAGE RENAL DISEASE: ICD-10-CM

## 2019-10-16 DIAGNOSIS — D64.9 ANEMIA, UNSPECIFIED: ICD-10-CM

## 2019-10-16 LAB
ALBUMIN SERPL ELPH-MCNC: 3.5 G/DL — SIGNIFICANT CHANGE UP (ref 3.5–5.2)
ALP SERPL-CCNC: 205 U/L — HIGH (ref 30–115)
ALT FLD-CCNC: 121 U/L — HIGH (ref 0–41)
ANION GAP SERPL CALC-SCNC: 16 MMOL/L — HIGH (ref 7–14)
AST SERPL-CCNC: 180 U/L — HIGH (ref 0–41)
BILIRUB DIRECT SERPL-MCNC: 2.4 MG/DL — HIGH (ref 0–0.2)
BILIRUB INDIRECT FLD-MCNC: 0.5 MG/DL — SIGNIFICANT CHANGE UP (ref 0.2–1.2)
BILIRUB SERPL-MCNC: 2.9 MG/DL — HIGH (ref 0.2–1.2)
BUN SERPL-MCNC: 31 MG/DL — HIGH (ref 10–20)
CALCIUM SERPL-MCNC: 8.6 MG/DL — SIGNIFICANT CHANGE UP (ref 8.5–10.1)
CHLORIDE SERPL-SCNC: 96 MMOL/L — LOW (ref 98–110)
CO2 SERPL-SCNC: 25 MMOL/L — SIGNIFICANT CHANGE UP (ref 17–32)
CREAT SERPL-MCNC: 6.7 MG/DL — CRITICAL HIGH (ref 0.7–1.5)
GLUCOSE SERPL-MCNC: 100 MG/DL — HIGH (ref 70–99)
HCT VFR BLD CALC: 26.5 % — LOW (ref 42–52)
HGB BLD-MCNC: 8.3 G/DL — LOW (ref 14–18)
MAGNESIUM SERPL-MCNC: 1.7 MG/DL — LOW (ref 1.8–2.4)
MCHC RBC-ENTMCNC: 28.7 PG — SIGNIFICANT CHANGE UP (ref 27–31)
MCHC RBC-ENTMCNC: 31.3 G/DL — LOW (ref 32–37)
MCV RBC AUTO: 91.7 FL — SIGNIFICANT CHANGE UP (ref 80–94)
NRBC # BLD: 0 /100 WBCS — SIGNIFICANT CHANGE UP (ref 0–0)
PHOSPHATE SERPL-MCNC: 4.1 MG/DL — SIGNIFICANT CHANGE UP (ref 2.1–4.9)
PLATELET # BLD AUTO: 125 K/UL — LOW (ref 130–400)
POTASSIUM SERPL-MCNC: 4.3 MMOL/L — SIGNIFICANT CHANGE UP (ref 3.5–5)
POTASSIUM SERPL-SCNC: 4.3 MMOL/L — SIGNIFICANT CHANGE UP (ref 3.5–5)
PROT SERPL-MCNC: 5.8 G/DL — LOW (ref 6–8)
RBC # BLD: 2.89 M/UL — LOW (ref 4.7–6.1)
RBC # FLD: 13.9 % — SIGNIFICANT CHANGE UP (ref 11.5–14.5)
SODIUM SERPL-SCNC: 137 MMOL/L — SIGNIFICANT CHANGE UP (ref 135–146)
TROPONIN T SERPL-MCNC: 0.09 NG/ML — CRITICAL HIGH
WBC # BLD: 5.19 K/UL — SIGNIFICANT CHANGE UP (ref 4.8–10.8)
WBC # FLD AUTO: 5.19 K/UL — SIGNIFICANT CHANGE UP (ref 4.8–10.8)

## 2019-10-16 PROCEDURE — 93010 ELECTROCARDIOGRAM REPORT: CPT

## 2019-10-16 RX ORDER — SODIUM CHLORIDE 0.65 %
1 AEROSOL, SPRAY (ML) NASAL THREE TIMES A DAY
Refills: 0 | Status: DISCONTINUED | OUTPATIENT
Start: 2019-10-16 | End: 2019-10-19

## 2019-10-16 RX ORDER — ONDANSETRON 8 MG/1
4 TABLET, FILM COATED ORAL ONCE
Refills: 0 | Status: COMPLETED | OUTPATIENT
Start: 2019-10-16 | End: 2019-10-16

## 2019-10-16 RX ORDER — IBUPROFEN 200 MG
400 TABLET ORAL ONCE
Refills: 0 | Status: COMPLETED | OUTPATIENT
Start: 2019-10-16 | End: 2019-10-16

## 2019-10-16 RX ORDER — CHLORHEXIDINE GLUCONATE 213 G/1000ML
1 SOLUTION TOPICAL
Refills: 0 | Status: DISCONTINUED | OUTPATIENT
Start: 2019-10-16 | End: 2019-10-19

## 2019-10-16 RX ORDER — MEROPENEM 1 G/30ML
500 INJECTION INTRAVENOUS EVERY 24 HOURS
Refills: 0 | Status: DISCONTINUED | OUTPATIENT
Start: 2019-10-16 | End: 2019-10-19

## 2019-10-16 RX ORDER — MAGNESIUM SULFATE 500 MG/ML
2 VIAL (ML) INJECTION ONCE
Refills: 0 | Status: COMPLETED | OUTPATIENT
Start: 2019-10-16 | End: 2019-10-16

## 2019-10-16 RX ADMIN — ATORVASTATIN CALCIUM 40 MILLIGRAM(S): 80 TABLET, FILM COATED ORAL at 21:48

## 2019-10-16 RX ADMIN — CHLORHEXIDINE GLUCONATE 1 APPLICATION(S): 213 SOLUTION TOPICAL at 06:39

## 2019-10-16 RX ADMIN — Medication 25 MILLIGRAM(S): at 17:14

## 2019-10-16 RX ADMIN — Medication 25 MILLIGRAM(S): at 06:42

## 2019-10-16 RX ADMIN — MEROPENEM 100 MILLIGRAM(S): 1 INJECTION INTRAVENOUS at 20:17

## 2019-10-16 RX ADMIN — ONDANSETRON 4 MILLIGRAM(S): 8 TABLET, FILM COATED ORAL at 01:03

## 2019-10-16 RX ADMIN — LOSARTAN POTASSIUM 25 MILLIGRAM(S): 100 TABLET, FILM COATED ORAL at 06:42

## 2019-10-16 RX ADMIN — Medication 400 MILLIGRAM(S): at 17:50

## 2019-10-16 RX ADMIN — HEPARIN SODIUM 5000 UNIT(S): 5000 INJECTION INTRAVENOUS; SUBCUTANEOUS at 21:47

## 2019-10-16 RX ADMIN — Medication 50 GRAM(S): at 06:43

## 2019-10-16 RX ADMIN — ATORVASTATIN CALCIUM 40 MILLIGRAM(S): 80 TABLET, FILM COATED ORAL at 00:01

## 2019-10-16 NOTE — PROGRESS NOTE ADULT - SUBJECTIVE AND OBJECTIVE BOX
GENERAL SURGERY PROGRESS NOTE     MAMTA FERNANDO  69y  Male  Hospital day :1d    OVERNIGHT EVENTS: no acute overnight events. Pain has resolved upon examination. No Nausea or vomiting.    T(F): 99.1 (10-16-19 @ 04:00), Max: 102.6 (10-15-19 @ 15:53)  HR: 76 (10-16-19 @ 07:00) (72 - 110)  BP: 155/80 (10-16-19 @ 07:00) (134/64 - 189/88)  RR: 12 (10-16-19 @ 07:00) (12 - 19)  SpO2: 100% (10-16-19 @ 07:00) (88% - 100%)    DIET/FLUIDS: lactated ringers. 1000 milliLiter(s) IV Continuous <Continuous>  lactated ringers. 500 milliLiter(s) IV Continuous <Continuous>  lactated ringers. 500 milliLiter(s) IV Continuous <Continuous>     GI proph:    AC/ proph: aspirin  chewable 81 milliGRAM(s) Oral daily  heparin  Injectable 5000 Unit(s) SubCutaneous every 8 hours    ABx: meropenem  IVPB 500 milliGRAM(s) IV Intermittent every 24 hours      PHYSICAL EXAM:  GENERAL: NAD, well-appearing  CHEST/LUNG: Clear to auscultation bilaterally  HEART: Regular rate and rhythm  ABDOMEN: Soft, Nontender, Nondistended;   EXTREMITIES:  No clubbing, cyanosis, or edema    LABS                         8.3    5.19  )-----------( 125      ( 16 Oct 2019 05:16 )             26.5       Auto Neutrophil %: 89.1 % (10-15-19 @ 15:48)  Auto Immature Granulocyte %: 0.6 % (10-15-19 @ 15:48)    10-16    137  |  96<L>  |  31<H>  ----------------------------<  100<H>  4.3   |  25  |  6.7<HH>      Calcium, Total Serum: 8.6 mg/dL (10-16-19 @ 05:16)      LFTs:             6.9  | 2.8  | 404      ------------------[232     ( 15 Oct 2019 15:48 )  4.0  | 2.1  | 157         Lipase:472    Amylase:x         Blood Gas Venous - Lactate: 1.9 mmoL/L (10-15-19 @ 16:11)  Lactate, Blood: 2.1 mmol/L (10-15-19 @ 15:48)      Coags:     x      ----< x       ( 15 Oct 2019 15:48 )     31.7        CARDIAC MARKERS ( 16 Oct 2019 05:16 )  x     / 0.09 ng/mL / x     / x     / x      CARDIAC MARKERS ( 15 Oct 2019 16:31 )  x     / 0.08 ng/mL / x     / x     / x        RADIOLOGY & ADDITIONAL TESTS:  US Abdomen Limited (10.15.19 @ 17:56) >  IMPRESSION:    Status post cholecystectomy. Dilated CBD which can be seen   postcholecystectomy.

## 2019-10-16 NOTE — CHART NOTE - NSCHARTNOTEFT_GEN_A_CORE
MICU Transfer Note    Transfer from: ICU  Transfer to:  (x) Medicine    (  ) Telemetry    (  ) RCU    (  ) Palliative    (  ) Stroke Unit    (  ) _______________  Accepting physican:    MEDICATIONS:  STANDING MEDICATIONS  aspirin  chewable 81 milliGRAM(s) Oral daily  atorvastatin 40 milliGRAM(s) Oral at bedtime  chlorhexidine 4% Liquid 1 Application(s) Topical <User Schedule>  heparin  Injectable 5000 Unit(s) SubCutaneous every 8 hours  isosorbide   mononitrate ER Tablet (IMDUR) 30 milliGRAM(s) Oral daily  losartan 25 milliGRAM(s) Oral daily  meropenem  IVPB 500 milliGRAM(s) IV Intermittent every 24 hours  metoprolol tartrate 25 milliGRAM(s) Oral two times a day    PRN MEDICATIONS  HYDROmorphone  Injectable 0.5 milliGRAM(s) IV Push every 4 hours PRN  sodium chloride 0.65% Nasal 1 Spray(s) Both Nostrils three times a day PRN      VITAL SIGNS: Last 24 Hours  T(C): 36.7 (16 Oct 2019 12:00), Max: 39.2 (15 Oct 2019 15:53)  T(F): 98 (16 Oct 2019 12:00), Max: 102.6 (15 Oct 2019 15:53)  HR: 80 (16 Oct 2019 14:00) (70 - 110)  BP: 151/73 (16 Oct 2019 14:00) (134/64 - 189/88)  BP(mean): 99 (16 Oct 2019 14:00) (89 - 116)  RR: 11 (16 Oct 2019 14:00) (11 - 21)  SpO2: 95% (16 Oct 2019 14:00) (88% - 100%)    LABS:                        8.3    5.19  )-----------( 125      ( 16 Oct 2019 05:16 )             26.5     10-16    137  |  96<L>  |  31<H>  ----------------------------<  100<H>  4.3   |  25  |  6.7<HH>    Ca    8.6      16 Oct 2019 05:16  Phos  4.1     10-16  Mg     1.7     10-16    TPro  5.8<L>  /  Alb  3.5  /  TBili  2.9<H>  /  DBili  2.4<H>  /  AST  180<H>  /  ALT  121<H>  /  AlkPhos  205<H>  10-16    PT/INR - ( 14 Oct 2019 16:05 )   PT: 11.40 sec;   INR: 0.99 ratio       PTT - ( 15 Oct 2019 15:48 )  PTT:31.7 sec    CARDIAC MARKERS ( 16 Oct 2019 05:16 )  x     / 0.09 ng/mL / x     / x     / x      CARDIAC MARKERS ( 15 Oct 2019 16:31 )  x     / 0.08 ng/mL / x     / x     / x          RADIOLOGY:  IMPRESSION:    Status post cholecystectomy. Dilated CBD which can be seen   postcholecystectomy.    < end of copied text >    < from: CT Abdomen and Pelvis w/ IV Cont (10.15.19 @ 17:29) >    IMPRESSION:  1.  Status post cholecystectomy. Mild hyperenhancement of the CBD wall   which may represent cholangitis in the appropriate clinical setting.   2.  Mild apparent mural thickening of the rectum which may be secondary  to underdistention or represent proctitis.     < end of copied text >    ICU COURSE:  70 y/o m PMH HTN, HLD, afib on Eliquis ESRD on HD (Tuesday/Thursday/Sat), history of cholangitis in July with stent insertion for drainage without sphincterotomy because patient was on Plavix at that time, Pt then had elective lap elba on 9/20. Then 10/14 patient had another ERCP with removal of plastic stent and sphincterotomy. Patient then presented fever, RUQ abd pain, nausea and vomiting while getting dialysis.     In ED patient was hemodynamically stable.  denied any abdominal pain or nausea, but Labs showed elevated lipase and LFTs, and CT showed post cholecystectomy changes. was given IV atbx and morphine for pain.    In ICU, Pt still remains hemodynamically stable and asymptomatic. LFT's and Alkphos decreasing.   GI is planning on repeat ERCP tomorrow 10/17.     ASSESSMENT & PLAN:   # Acute RUQ pain with nausea and vomiting present on admission likely secondary to post ERCP pancreatitis vs cholangitis  -- hemodynamically stable, no fever, no leucocytosis  -- 10/16: LFT's downtrending, Alkphos decreasing, T maria ines 2.9, D maria ines 2.4  -- iv abx: meropenem  -- as per attending d/c'd IVF  -- pain control:  iv dilaudid prn  -- ERCP with GI tomorrow    # ESRD:  - s/p HD 10/15  - f/u with renal - consulted.     # Afib:  - rate controlled  - c/w metoprolol  - As per GI - hold Eliquis until after procedure    # DVT ppx: HSQ  Full code  NPO  ambulate as tolerated        For Follow-Up:  - ERCP on 10/17 with GI  - CBC, CMP, Hepatic panel MICU Transfer Note    Transfer from: ICU  Transfer to:  (x) Medicine    (  ) Telemetry    (  ) RCU    (  ) Palliative    (  ) Stroke Unit    (  ) _______________  Accepting physican:    MEDICATIONS:  STANDING MEDICATIONS  aspirin  chewable 81 milliGRAM(s) Oral daily  atorvastatin 40 milliGRAM(s) Oral at bedtime  chlorhexidine 4% Liquid 1 Application(s) Topical <User Schedule>  heparin  Injectable 5000 Unit(s) SubCutaneous every 8 hours  isosorbide   mononitrate ER Tablet (IMDUR) 30 milliGRAM(s) Oral daily  losartan 25 milliGRAM(s) Oral daily  meropenem  IVPB 500 milliGRAM(s) IV Intermittent every 24 hours  metoprolol tartrate 25 milliGRAM(s) Oral two times a day    PRN MEDICATIONS  HYDROmorphone  Injectable 0.5 milliGRAM(s) IV Push every 4 hours PRN  sodium chloride 0.65% Nasal 1 Spray(s) Both Nostrils three times a day PRN      VITAL SIGNS: Last 24 Hours  T(C): 36.7 (16 Oct 2019 12:00), Max: 39.2 (15 Oct 2019 15:53)  T(F): 98 (16 Oct 2019 12:00), Max: 102.6 (15 Oct 2019 15:53)  HR: 80 (16 Oct 2019 14:00) (70 - 110)  BP: 151/73 (16 Oct 2019 14:00) (134/64 - 189/88)  BP(mean): 99 (16 Oct 2019 14:00) (89 - 116)  RR: 11 (16 Oct 2019 14:00) (11 - 21)  SpO2: 95% (16 Oct 2019 14:00) (88% - 100%)    LABS:                        8.3    5.19  )-----------( 125      ( 16 Oct 2019 05:16 )             26.5     10-16    137  |  96<L>  |  31<H>  ----------------------------<  100<H>  4.3   |  25  |  6.7<HH>    Ca    8.6      16 Oct 2019 05:16  Phos  4.1     10-16  Mg     1.7     10-16    TPro  5.8<L>  /  Alb  3.5  /  TBili  2.9<H>  /  DBili  2.4<H>  /  AST  180<H>  /  ALT  121<H>  /  AlkPhos  205<H>  10-16    PT/INR - ( 14 Oct 2019 16:05 )   PT: 11.40 sec;   INR: 0.99 ratio       PTT - ( 15 Oct 2019 15:48 )  PTT:31.7 sec    CARDIAC MARKERS ( 16 Oct 2019 05:16 )  x     / 0.09 ng/mL / x     / x     / x      CARDIAC MARKERS ( 15 Oct 2019 16:31 )  x     / 0.08 ng/mL / x     / x     / x          RADIOLOGY:  IMPRESSION:    Status post cholecystectomy. Dilated CBD which can be seen   postcholecystectomy.    < end of copied text >    < from: CT Abdomen and Pelvis w/ IV Cont (10.15.19 @ 17:29) >    IMPRESSION:  1.  Status post cholecystectomy. Mild hyperenhancement of the CBD wall   which may represent cholangitis in the appropriate clinical setting.   2.  Mild apparent mural thickening of the rectum which may be secondary  to underdistention or represent proctitis.     < end of copied text >    ICU COURSE:  68 y/o m PMH HTN, HLD, afib on Eliquis ESRD on HD (Tuesday/Thursday/Sat), history of cholangitis in July with stent insertion for drainage without sphincterotomy because patient was on Plavix at that time, Pt then had elective lap elba on 9/20. Then 10/14 patient had another ERCP with removal of plastic stent and sphincterotomy. Patient then presented fever, RUQ abd pain, nausea and vomiting while getting dialysis.     In ED patient was hemodynamically stable.  denied any abdominal pain or nausea, but Labs showed elevated lipase and LFTs, and CT showed post cholecystectomy changes. was given IV atbx and morphine for pain.    In ICU, Pt still remains hemodynamically stable and asymptomatic. LFT's and Alkphos decreasing.   GI is planning on repeat ERCP tomorrow 10/17.     ASSESSMENT & PLAN:   # Acute RUQ pain with nausea and vomiting present on admission likely secondary to post ERCP pancreatitis vs cholangitis  -- hemodynamically stable, no fever, no leucocytosis  -- 10/16: LFT's downtrending, Alkphos decreasing, T maria ines 2.9, D maria ines 2.4  -- iv abx: meropenem  -- as per attending d/c'd IVF  -- pain control:  iv dilaudid prn  -- ERCP with GI tomorrow    # ESRD:  - s/p HD 10/15  - f/u with renal - consulted.     # Afib:  - rate controlled  - c/w metoprolol  - As per GI - hold Eliquis until after procedure    # DVT ppx: HSQ  Full code  CLD - NPO after midnight tonight.   ambulate as tolerated        For Follow-Up:  - ERCP on 10/17 with GI  - CBC, CMP, Hepatic panel

## 2019-10-16 NOTE — PROGRESS NOTE ADULT - ASSESSMENT
68 y/o m PMH HTN, HLD, afib on eliquis, ESRD on HD (Tuesday/Thursday/Sat) presents for eval of sudden, sharp, non-radiating RUQ abd pain with associated diaphoresis and nausea and vomiting while getting dialysis 10/15. Pt underwent cholecystectomy this past September and ERCP 10/14 by Dr. Nowak.    # Acute RUQ pain with nausea and vomiting present on admission likely secondary to post ERCP pancreatitis vs cholangitis  -- hemodynamically stable, no fever, no leucocytosis  -- 10/16: LFT's downtrending, Alkphos decreasing, T maria ines 2.9, D maria ines 2.4  -- iv abx: meropenem  -- as per attending d/c'd IVF  -- pain control:  iv dilaudid prn  -- GI to callback regarding ERCP plan today    # ESRD:  - s/p HD 10/15  - f/u with renal - consulted.     # Afib:  - rate controlled  - c/w metoprolol  - holding eliquis for Possible ERCP today    # DVT ppx: HSQ  Full code  NPO  ambulate as tolerated 68 y/o m PMH HTN, HLD, afib on Eliquis ESRD on HD (Tuesday/Thursday/Sat), history of cholangitis in July with stent insertion for drainage without sphincterotomy because patient was on Plavix at that time, Pt then had elective lap elba on 9/20. Then 10/14 patient had another ERCP with removal of plastic stent and sphincterotomy. Patient then presented fever, RUQ abd pain, nausea and vomiting while getting dialysis.     # Acute RUQ pain with nausea and vomiting present on admission likely secondary to post ERCP pancreatitis vs cholangitis  -- hemodynamically stable, no fever, no leucocytosis  -- 10/16: LFT's downtrending, Alkphos decreasing, T maria ines 2.9, D maria ines 2.4  -- iv abx: meropenem  -- as per attending d/c'd IVF  -- pain control:  iv dilaudid prn  -- GI to callback regarding ERCP plan today    # ESRD:  - s/p HD 10/15  - f/u with renal - consulted.     # Afib:  - rate controlled  - c/w metoprolol  - holding eliquis for Possible ERCP today    # DVT ppx: HSQ  Full code  NPO  ambulate as tolerated

## 2019-10-16 NOTE — CONSULT NOTE ADULT - ASSESSMENT
IMPRESSION:    Post ERCP pancreatitis / Cholangitis  HO ESRD on HD     PLAN:    CNS: Pain control     HEENT: Oral care    PULMONARY:  HOB @ 45 degrees    CARDIOVASCULAR: I=O.  DC IVF    GI: GI prophylaxis.  NPO for.  Awaiting GI recommendations.     RENAL:  Follow up lytes.  Correct as needed.  FU with renal     INFECTIOUS DISEASE: Follow up cultures.  Continue Meropenem     HEMATOLOGICAL:  DVT prophylaxis.    ENDOCRINE:  Follow up FS.  Insulin protocol if needed.    MUSCULOSKELETAL:  OOB to chair     Disposition based on GI.

## 2019-10-16 NOTE — PROGRESS NOTE ADULT - ASSESSMENT
ASSESSMENT: 68y/o male with a pmhx of Afib, HTN, HLD, ESRD on HD s/p laparoscopic cholecystectomy 9/20/19 (dr Narvaez) presents for eval of fever, RUQ abd pain, nausea and vomiting while getting dialysis today. yesterday patient had ERCP with removal of plastic stent and sphincterotomy. Labs remarkable for elevated LFTS as above and lipase 472. CTAP abd US abdomen show dilated CBD, possible cholangitis.    PLAN:   - No acute surgical intervention  - follow up GI   - will continue to follow

## 2019-10-16 NOTE — PROGRESS NOTE ADULT - SUBJECTIVE AND OBJECTIVE BOX
Hospital Day:  1d    Subjective:    Patient is a 69y old  Male who presents with a chief complaint of acute abdominal pain with vomiting (15 Oct 2019 23:03)      Past Medical Hx:   Afib  Anemia  Heart failure  HLD (hyperlipidemia)  HTN (hypertension)  ESRD on dialysis    Past Sx:  S/P cataract surgery  S/P arteriovenous (AV) fistula creation  H/O right coronary artery stent placement    Allergies:  No Known Allergies    Current Meds:   Standng Meds:  aspirin  chewable 81 milliGRAM(s) Oral daily  atorvastatin 40 milliGRAM(s) Oral at bedtime  chlorhexidine 4% Liquid 1 Application(s) Topical <User Schedule>  heparin  Injectable 5000 Unit(s) SubCutaneous every 8 hours  isosorbide   mononitrate ER Tablet (IMDUR) 30 milliGRAM(s) Oral daily  lactated ringers. 1000 milliLiter(s) (75 mL/Hr) IV Continuous <Continuous>  lactated ringers. 500 milliLiter(s) (500 mL/Hr) IV Continuous <Continuous>  lactated ringers. 500 milliLiter(s) (500 mL/Hr) IV Continuous <Continuous>  losartan 25 milliGRAM(s) Oral daily  meropenem  IVPB 500 milliGRAM(s) IV Intermittent every 24 hours  metoprolol tartrate 25 milliGRAM(s) Oral two times a day    PRN Meds:  HYDROmorphone  Injectable 0.5 milliGRAM(s) IV Push every 4 hours PRN Severe Pain (7 - 10)  sodium chloride 0.65% Nasal 1 Spray(s) Both Nostrils three times a day PRN Nasal Congestion    HOME MEDICATIONS:  aspirin 81 mg oral tablet, chewable: 1 tab(s) orally once a day  atorvastatin 40 mg oral tablet: 1 tab(s) orally once a day  isosorbide mononitrate 30 mg oral tablet, extended release: 1 tab(s) orally once a day (in the morning)  losartan 25 mg oral tablet: 1 tab(s) orally once a day  metoprolol tartrate 25 mg oral tablet: 1 tab(s) orally 2 times a day      Vital Signs:   T(F): 99.1 (10-16-19 @ 04:00), Max: 102.6 (10-15-19 @ 15:53)  HR: 76 (10-16-19 @ 07:00) (72 - 110)  BP: 155/80 (10-16-19 @ 07:00) (134/64 - 189/88)  RR: 12 (10-16-19 @ 07:00) (12 - 19)  SpO2: 100% (10-16-19 @ 07:00) (88% - 100%)      10-15-19 @ 07:01  -  10-16-19 @ 07:00  --------------------------------------------------------  IN: 375 mL / OUT: 0 mL / NET: 375 mL        Physical Exam:   GENERAL: NAD  HEENT: NCAT  CHEST/LUNG: CTAB  HEART: Regular rate and rhythm; s1 s2 appreciated, No murmurs, rubs, or gallops  ABDOMEN: Soft, Nontender, Nondistended; Bowel sounds present  EXTREMITIES: No LE edema b/l  NERVOUS SYSTEM:  Alert & Oriented X3        Labs:                         8.3    5.19  )-----------( 125      ( 16 Oct 2019 05:16 )             26.5     Neutophil% 89.1, Lymphocyte% 3.0, Monocyte% 6.8, Bands% 0.6 10-15-19 @ 15:48    16 Oct 2019 05:16    137    |  96     |  31     ----------------------------<  100    4.3     |  25     |  6.7      Ca    8.6        16 Oct 2019 05:16  Phos  4.1       16 Oct 2019 05:16  Mg     1.7       16 Oct 2019 05:16    TPro  6.9    /  Alb  4.0    /  TBili  2.8    /  DBili  2.1    /  AST  404    /  ALT  157    /  AlkPhos  232    15 Oct 2019 15:48       pTT    31.7             ----< -- INR  (10-15-19 @ 15:48)    --        PT    Amylase --, Lipase 472, 10-15-19 @ 15:48    Hemoglobin A1C, Whole Blood: 5.6 % (09-18-19 @ 07:12)      Troponin 0.09, CKMB --, CK -- 10-16-19 @ 05:16  Troponin 0.08, CKMB --, CK -- 10-15-19 @ 16:31                Radiology:       Assessment and Plan:     DVT ppx:    GI ppx:     Code Status:     DISPO: Hospital Day:  1d    Subjective: Patient is a 69y old  Male who presents with a chief complaint of acute abdominal pain with vomiting (15 Oct 2019 23:03)    Pt seen and evaluated at bedside. no over the night acute events reported.   Denies Abdominal pain, nausea, vomiting or any other complaints.   Resting in bed comfortably.  GI to call back regarding their plan about repeat ERCP      Past Medical Hx:   Afib  Anemia  Heart failure  HLD (hyperlipidemia)  HTN (hypertension)  ESRD on dialysis    Past Sx:  S/P cataract surgery  S/P arteriovenous (AV) fistula creation  H/O right coronary artery stent placement    Allergies:  No Known Allergies    Current Meds:   Standng Meds:  aspirin  chewable 81 milliGRAM(s) Oral daily  atorvastatin 40 milliGRAM(s) Oral at bedtime  chlorhexidine 4% Liquid 1 Application(s) Topical <User Schedule>  heparin  Injectable 5000 Unit(s) SubCutaneous every 8 hours  isosorbide   mononitrate ER Tablet (IMDUR) 30 milliGRAM(s) Oral daily  lactated ringers. 1000 milliLiter(s) (75 mL/Hr) IV Continuous <Continuous>  lactated ringers. 500 milliLiter(s) (500 mL/Hr) IV Continuous <Continuous>  lactated ringers. 500 milliLiter(s) (500 mL/Hr) IV Continuous <Continuous>  losartan 25 milliGRAM(s) Oral daily  meropenem  IVPB 500 milliGRAM(s) IV Intermittent every 24 hours  metoprolol tartrate 25 milliGRAM(s) Oral two times a day    PRN Meds:  HYDROmorphone  Injectable 0.5 milliGRAM(s) IV Push every 4 hours PRN Severe Pain (7 - 10)  sodium chloride 0.65% Nasal 1 Spray(s) Both Nostrils three times a day PRN Nasal Congestion    HOME MEDICATIONS:  aspirin 81 mg oral tablet, chewable: 1 tab(s) orally once a day  atorvastatin 40 mg oral tablet: 1 tab(s) orally once a day  isosorbide mononitrate 30 mg oral tablet, extended release: 1 tab(s) orally once a day (in the morning)  losartan 25 mg oral tablet: 1 tab(s) orally once a day  metoprolol tartrate 25 mg oral tablet: 1 tab(s) orally 2 times a day      Vital Signs:   T(F): 99.1 (10-16-19 @ 04:00), Max: 102.6 (10-15-19 @ 15:53)  HR: 76 (10-16-19 @ 07:00) (72 - 110)  BP: 155/80 (10-16-19 @ 07:00) (134/64 - 189/88)  RR: 12 (10-16-19 @ 07:00) (12 - 19)  SpO2: 100% (10-16-19 @ 07:00) (88% - 100%)      10-15-19 @ 07:01  -  10-16-19 @ 07:00  --------------------------------------------------------  IN: 375 mL / OUT: 0 mL / NET: 375 mL        Physical Exam:   GENERAL: NAD, resting in bed comfortably  HEENT: NCAT	  CHEST/LUNG: CTAB, no wheezing or labored breathing.   HEART: Regular rate and rhythm; s1 s2 appreciated, No murmurs, rubs, or gallops  ABDOMEN: Soft, Nontender, Nondistended; Bowel sounds present  EXTREMITIES: No LE edema b/l  NERVOUS SYSTEM:  Alert & Oriented X3        Labs:                         8.3    5.19  )-----------( 125      ( 16 Oct 2019 05:16 )             26.5     Neutophil% 89.1, Lymphocyte% 3.0, Monocyte% 6.8, Bands% 0.6 10-15-19 @ 15:48    16 Oct 2019 05:16    137    |  96     |  31     ----------------------------<  100    4.3     |  25     |  6.7      Ca    8.6        16 Oct 2019 05:16  Phos  4.1       16 Oct 2019 05:16  Mg     1.7       16 Oct 2019 05:16    TPro  6.9    /  Alb  4.0    /  TBili  2.8    /  DBili  2.1    /  AST  404    /  ALT  157    /  AlkPhos  232    15 Oct 2019 15:48       pTT    31.7             ----< -- INR  (10-15-19 @ 15:48)    --        PT    Amylase --, Lipase 472, 10-15-19 @ 15:48    Hemoglobin A1C, Whole Blood: 5.6 % (09-18-19 @ 07:12)    Troponin 0.09, CKMB --, CK -- 10-16-19 @ 05:16  Troponin 0.08, CKMB --, CK -- 10-15-19 @ 16:31    Radiology:   < from: US Abdomen Limited (10.15.19 @ 17:56) >  IMPRESSION:    Status post cholecystectomy. Dilated CBD which can be seen   postcholecystectomy.    < end of copied text >    < from: CT Abdomen and Pelvis w/ IV Cont (10.15.19 @ 17:29) >  IMPRESSION:  1.  Status post cholecystectomy. Mild hyperenhancement of the CBD wall   which may represent cholangitis in the appropriate clinical setting.   2.  Mild apparent mural thickening of the rectum which may be secondary  to underdistention or represent proctitis.     < end of copied text >

## 2019-10-16 NOTE — CONSULT NOTE ADULT - SUBJECTIVE AND OBJECTIVE BOX
Patient is a 69y old  Male who presents with a chief complaint of acute abdominal pain with vomiting (16 Oct 2019 07:40)      HPI:  68 y/o m PMH HTN, HLD, afib on eliquis, ESRD on HD (Tuesday/Thursday/Sat) presents for eval of Sudden, sharp, non-radiating RUQ abd pain with associated diaphoresis and nausea and vomiting while getting dialysis today. Pt underwent cholecystectomy this past September and ERCP yesterday by Dr. Nowak.  Patient reports being NPO since 3 days now, and denies chest pain, SOB, palpitations, diarrhea.  In ED found to have HTN, EKG showed afib with controlled rate without signs of infarction, trop elevated at chronic level, had a fever and elevated lipase and LFTs, and CT showed post cholecystectomy changes. was given IV atbx and morphine for pain.  Upon evaluation, patient was not in pain, reported resolution of n/v. (15 Oct 2019 20:46)      PAST MEDICAL & SURGICAL HISTORY:  Afib  Anemia  Heart failure  HLD (hyperlipidemia)  HTN (hypertension)  ESRD on dialysis: T/ TH/ S  S/P cataract surgery  S/P arteriovenous (AV) fistula creation  H/O right coronary artery stent placement: stent x3      SOCIAL HX:   Smoking     Former                     ETOH                            Other    FAMILY HISTORY:  :  No known cardiovacular family hisotry     ROS:  See HPI     Allergies    No Known Allergies    Intolerances          PHYSICAL EXAM    ICU Vital Signs Last 24 Hrs  T(C): 37 (16 Oct 2019 08:00), Max: 39.2 (15 Oct 2019 15:53)  T(F): 98.6 (16 Oct 2019 08:00), Max: 102.6 (15 Oct 2019 15:53)  HR: 72 (16 Oct 2019 08:00) (72 - 110)  BP: 143/67 (16 Oct 2019 08:00) (134/64 - 189/88)  BP(mean): 89 (16 Oct 2019 08:00) (89 - 116)  ABP: --  ABP(mean): --  RR: 17 (16 Oct 2019 08:00) (12 - 19)  SpO2: 100% (16 Oct 2019 08:00) (88% - 100%)      General: In NAD   HEENT:  GERTRUDE              Lymphatic system: No cervical LN   Lungs: Bilateral BS  Cardiovascular: Irregular  Gastrointestinal: Soft, Positive BS  Musculoskeletal: No clubbing.  Moves all extremities.  Full range of motion   Skin: Warm.  Intact  Neurological: No motor deficit       10-15-19 @ 07:01  -  10-16-19 @ 07:00  --------------------------------------------------------  IN:    IV PiggyBack: 100 mL    lactated ringers.: 750 mL  Total IN: 850 mL    OUT:  Total OUT: 0 mL    Total NET: 850 mL      10-16-19 @ 07:01  -  10-16-19 @ 08:11  --------------------------------------------------------  IN:    lactated ringers.: 75 mL  Total IN: 75 mL    OUT:  Total OUT: 0 mL    Total NET: 75 mL          LABS:                          8.3    5.19  )-----------( 125      ( 16 Oct 2019 05:16 )             26.5                                               10-16    137  |  96<L>  |  31<H>  ----------------------------<  100<H>  4.3   |  25  |  6.7<HH>    Ca    8.6      16 Oct 2019 05:16  Phos  4.1     10-16  Mg     1.7     10-16    TPro  6.9  /  Alb  4.0  /  TBili  2.8<H>  /  DBili  2.1<H>  /  AST  404<H>  /  ALT  157<H>  /  AlkPhos  232<H>  10-15      PT/INR - ( 14 Oct 2019 16:05 )   PT: 11.40 sec;   INR: 0.99 ratio         PTT - ( 15 Oct 2019 15:48 )  PTT:31.7 sec                                           CARDIAC MARKERS ( 16 Oct 2019 05:16 )  x     / 0.09 ng/mL / x     / x     / x      CARDIAC MARKERS ( 15 Oct 2019 16:31 )  x     / 0.08 ng/mL / x     / x     / x                                                LIVER FUNCTIONS - ( 15 Oct 2019 15:48 )  Alb: 4.0 g/dL / Pro: 6.9 g/dL / ALK PHOS: 232 U/L / ALT: 157 U/L / AST: 404 U/L / GGT: x                                                                                                                                       X-Rays       No infiltrates                                                                             ECHO    MEDICATIONS  (STANDING):  aspirin  chewable 81 milliGRAM(s) Oral daily  atorvastatin 40 milliGRAM(s) Oral at bedtime  chlorhexidine 4% Liquid 1 Application(s) Topical <User Schedule>  heparin  Injectable 5000 Unit(s) SubCutaneous every 8 hours  isosorbide   mononitrate ER Tablet (IMDUR) 30 milliGRAM(s) Oral daily  lactated ringers. 1000 milliLiter(s) (75 mL/Hr) IV Continuous <Continuous>  lactated ringers. 500 milliLiter(s) (500 mL/Hr) IV Continuous <Continuous>  lactated ringers. 500 milliLiter(s) (500 mL/Hr) IV Continuous <Continuous>  losartan 25 milliGRAM(s) Oral daily  meropenem  IVPB 500 milliGRAM(s) IV Intermittent every 24 hours  metoprolol tartrate 25 milliGRAM(s) Oral two times a day    MEDICATIONS  (PRN):  HYDROmorphone  Injectable 0.5 milliGRAM(s) IV Push every 4 hours PRN Severe Pain (7 - 10)  sodium chloride 0.65% Nasal 1 Spray(s) Both Nostrils three times a day PRN Nasal Congestion

## 2019-10-17 DIAGNOSIS — Z71.89 OTHER SPECIFIED COUNSELING: ICD-10-CM

## 2019-10-17 LAB
ALBUMIN SERPL ELPH-MCNC: 3.3 G/DL — LOW (ref 3.5–5.2)
ALP SERPL-CCNC: 198 U/L — HIGH (ref 30–115)
ALT FLD-CCNC: 78 U/L — HIGH (ref 0–41)
ANION GAP SERPL CALC-SCNC: 16 MMOL/L — HIGH (ref 7–14)
AST SERPL-CCNC: 86 U/L — HIGH (ref 0–41)
BILIRUB SERPL-MCNC: 1.7 MG/DL — HIGH (ref 0.2–1.2)
BUN SERPL-MCNC: 33 MG/DL — HIGH (ref 10–20)
CALCIUM SERPL-MCNC: 8.6 MG/DL — SIGNIFICANT CHANGE UP (ref 8.5–10.1)
CHLORIDE SERPL-SCNC: 98 MMOL/L — SIGNIFICANT CHANGE UP (ref 98–110)
CO2 SERPL-SCNC: 24 MMOL/L — SIGNIFICANT CHANGE UP (ref 17–32)
CREAT SERPL-MCNC: 6.3 MG/DL — CRITICAL HIGH (ref 0.7–1.5)
GLUCOSE SERPL-MCNC: 100 MG/DL — HIGH (ref 70–99)
HCT VFR BLD CALC: 25.6 % — LOW (ref 42–52)
HGB BLD-MCNC: 8 G/DL — LOW (ref 14–18)
MCHC RBC-ENTMCNC: 29 PG — SIGNIFICANT CHANGE UP (ref 27–31)
MCHC RBC-ENTMCNC: 31.3 G/DL — LOW (ref 32–37)
MCV RBC AUTO: 92.8 FL — SIGNIFICANT CHANGE UP (ref 80–94)
NRBC # BLD: 0 /100 WBCS — SIGNIFICANT CHANGE UP (ref 0–0)
PLATELET # BLD AUTO: 142 K/UL — SIGNIFICANT CHANGE UP (ref 130–400)
POTASSIUM SERPL-MCNC: 3.5 MMOL/L — SIGNIFICANT CHANGE UP (ref 3.5–5)
POTASSIUM SERPL-SCNC: 3.5 MMOL/L — SIGNIFICANT CHANGE UP (ref 3.5–5)
PROT SERPL-MCNC: 5.8 G/DL — LOW (ref 6–8)
RBC # BLD: 2.76 M/UL — LOW (ref 4.7–6.1)
RBC # FLD: 14.1 % — SIGNIFICANT CHANGE UP (ref 11.5–14.5)
SODIUM SERPL-SCNC: 138 MMOL/L — SIGNIFICANT CHANGE UP (ref 135–146)
WBC # BLD: 5.81 K/UL — SIGNIFICANT CHANGE UP (ref 4.8–10.8)
WBC # FLD AUTO: 5.81 K/UL — SIGNIFICANT CHANGE UP (ref 4.8–10.8)

## 2019-10-17 PROCEDURE — 99233 SBSQ HOSP IP/OBS HIGH 50: CPT

## 2019-10-17 PROCEDURE — 43260 ERCP W/SPECIMEN COLLECTION: CPT

## 2019-10-17 RX ORDER — ACETAMINOPHEN 500 MG
650 TABLET ORAL ONCE
Refills: 0 | Status: COMPLETED | OUTPATIENT
Start: 2019-10-17 | End: 2019-10-17

## 2019-10-17 RX ADMIN — LOSARTAN POTASSIUM 25 MILLIGRAM(S): 100 TABLET, FILM COATED ORAL at 05:14

## 2019-10-17 RX ADMIN — Medication 81 MILLIGRAM(S): at 12:58

## 2019-10-17 RX ADMIN — ATORVASTATIN CALCIUM 40 MILLIGRAM(S): 80 TABLET, FILM COATED ORAL at 21:14

## 2019-10-17 RX ADMIN — HEPARIN SODIUM 5000 UNIT(S): 5000 INJECTION INTRAVENOUS; SUBCUTANEOUS at 05:15

## 2019-10-17 RX ADMIN — ISOSORBIDE MONONITRATE 30 MILLIGRAM(S): 60 TABLET, EXTENDED RELEASE ORAL at 12:58

## 2019-10-17 RX ADMIN — MEROPENEM 100 MILLIGRAM(S): 1 INJECTION INTRAVENOUS at 20:05

## 2019-10-17 RX ADMIN — Medication 25 MILLIGRAM(S): at 05:14

## 2019-10-17 RX ADMIN — Medication 25 MILLIGRAM(S): at 18:28

## 2019-10-17 NOTE — PROCEDURE NOTE - GENERAL PROCEDURE DETAILS
ERCP performed. Previous sphincterotomy appeared open. Bile duct was swept and minimal debris was removed. Occlusion cholangiogram was performed and there were no filling defects.

## 2019-10-17 NOTE — PROGRESS NOTE ADULT - SUBJECTIVE AND OBJECTIVE BOX
SUBJECTIVE:  Patient states having less abdominal pain, only chills remain.  Has been NPO today in preparation for ERCP procedure, however pt telling me that he doesn't want to undergo an ERCP    *********************** VITALS ******************************************  Vital Signs Last 24 Hrs  T(C): 36.9 (17 Oct 2019 15:10), Max: 37.3 (16 Oct 2019 16:00)  T(F): 98.4 (17 Oct 2019 15:10), Max: 99.2 (16 Oct 2019 16:00)  HR: 78 (17 Oct 2019 15:10) (72 - 93)  BP: 110/67 (17 Oct 2019 15:10) (110/67 - 182/82)  BP(mean): 109 (16 Oct 2019 16:00) (109 - 109)  RR: 18 (17 Oct 2019 15:10) (11 - 20)  SpO2: 96% (16 Oct 2019 16:00) (96% - 96%)    ******************************** PHYSICAL EXAM:**************************************************  GENERAL:  NAD, obese    PSYCH: no agitation     HEENT:  Sclerae anicteric, EOMI     NERVOUS SYSTEM:  Alert & Oriented X3     PULMONARY:  breathing comfortably    CARDIOVASCULAR:  RRR     ABDOMEN: Soft, NT, ND,     EXTREMITIES:  warm        LABS:                        8.0    5.81  )-----------( 142      ( 17 Oct 2019 08:58 )             25.6       10-17    138  |  98  |  33<H>  ----------------------------<  100<H>  3.5   |  24  |  6.3<HH>    Ca    8.6      17 Oct 2019 08:58  Phos  4.1     10-16  Mg     1.7     10-16    TPro  5.8<L>  /  Alb  3.3<L>  /  TBili  1.7<H>  /  DBili  x   /  AST  86<H>  /  ALT  78<H>  /  AlkPhos  198<H>  10-17      LIVER FUNCTIONS - ( 17 Oct 2019 08:58 )  Alb: 3.3 g/dL / Pro: 5.8 g/dL / ALK PHOS: 198 U/L / ALT: 78 U/L / AST: 86 U/L / GGT: x

## 2019-10-17 NOTE — CHART NOTE - NSCHARTNOTEFT_GEN_A_CORE
PACU ANESTHESIA ADMISSION NOTE      Procedure:   Post op diagnosis:      ____  Intubated  TV:______       Rate: ______      FiO2: ______    _x___  Patent Airway    _x___  Full return of protective reflexes    _x___  Full recovery from anesthesia / back to baseline status    Vitals:  T(C): 36.9 (10-17-19 @ 16:35), Max: 37.3 (10-16-19 @ 18:11)  HR: 78 (10-17-19 @ 17:40) (72 - 93)  BP: 106/60 (10-17-19 @ 17:40) (104/58 - 182/82)  RR: 20 (10-17-19 @ 17:40) (18 - 20)  SpO2: 98% (10-17-19 @ 17:40) (98% - 100%)    Mental Status:  _x___ Awake   _____ Alert   _____ Drowsy   _____ Sedated    Nausea/Vomiting:  _x___  NO       ______Yes,   See Post - Op Orders         Pain Scale (0-10):  __0___    Treatment: _x___ None    ____ See Post - Op/PCA Orders    Post - Operative Fluids:   __x__ Oral   ____ See Post - Op Orders    Plan: Discharge:   x___Floor     _____Critical Care    _____  Other:_________________    Comments:  No anesthesia issues or complications noted.  Discharge when criteria met.

## 2019-10-17 NOTE — PROGRESS NOTE ADULT - ASSESSMENT
ASSESSMENT: 70y/o male with a pmhx of Afib, HTN, HLD, ESRD on HD s/p laparoscopic cholecystectomy 9/20/19 (dr Narvaez) presents for eval of fever, RUQ abd pain, nausea and vomiting while getting dialysis today. yesterday patient had ERCP with removal of plastic stent and sphincterotomy. Labs remarkable for elevated LFTS as above and lipase 472. CTAP abd US abdomen show dilated CBD, possible cholangitis.    PLAN:   - No acute surgical intervention  - follow up GI   - will continue to follow

## 2019-10-17 NOTE — CONSULT NOTE ADULT - SUBJECTIVE AND OBJECTIVE BOX
NEPHROLOGY CONSULTATION NOTE  Patient is a 69y Male whom presented to the hospital with abdominal pain and lethargy hypotension.  Episode occurred after HD on Tuesday when patient began to complain of abdominal pain diffuse associated with lethargy , and low BP and one episode of vomiting .  To note that patient is sp cholecystectomy in September and sp ERCP one day prior to presentation . On arrival to ED patient was found with A fib .  Seen today denied any abdominal pain no chest pain no cough no sputum production no dysuria no hematuria no fever no chills no diarrhea no constipation no other complaints .    PAST MEDICAL & SURGICAL HISTORY:  Afib  Anemia  Heart failure  HLD (hyperlipidemia)  HTN (hypertension)  ESRD on dialysis: T/ TH/ S  S/P cataract surgery  S/P arteriovenous (AV) fistula creation  H/O right coronary artery stent placement: stent x3    Allergies:  No Known Allergies    Home Medications Reviewed  Hospital Medications:   MEDICATIONS  (STANDING):  aspirin  chewable 81 milliGRAM(s) Oral daily  atorvastatin 40 milliGRAM(s) Oral at bedtime  isosorbide   mononitrate ER Tablet (IMDUR) 30 milliGRAM(s) Oral daily  losartan 25 milliGRAM(s) Oral daily  meropenem  IVPB 500 milliGRAM(s) IV Intermittent every 24 hours  metoprolol tartrate 25 milliGRAM(s) Oral two times a day      SOCIAL HISTORY:  Denies ETOH,Smoking,   FAMILY HISTORY:        REVIEW OF SYSTEMS:  All other review of systems is negative unless indicated above.    VITALS:  T(F): 98 (10-17-19 @ 05:12), Max: 99.2 (10-16-19 @ 16:00)  HR: 80 (10-17-19 @ 08:35)  BP: 147/86 (10-17-19 @ 08:35)  RR: 18 (10-17-19 @ 08:35)  SpO2: 96% (10-16-19 @ 16:00)    10-16 @ 07:01  -  10-17 @ 07:00  --------------------------------------------------------  IN: 225 mL / OUT: 0 mL / NET: 225 mL      Height (cm): 167.6 (10-16 @ 18:11)  Weight (kg): 86 (10-17 @ 05:12)  BMI (kg/m2): 30.6 (10-17 @ 05:12)  BSA (m2): 1.95 (10-17 @ 05:12)      I&O's Detail    16 Oct 2019 07:01  -  17 Oct 2019 07:00  --------------------------------------------------------  IN:    lactated ringers.: 225 mL  Total IN: 225 mL    OUT:  Total OUT: 0 mL    Total NET: 225 mL            PHYSICAL EXAM:  Constitutional: NAD  HEENT: subicteric sclera, oropharynx clear, MMM  Neck: No JVD  Respiratory: CTAB, no wheezes, rales or rhonchi  Cardiovascular: irregular S1S2   Gastrointestinal: BS+, soft, NT/ND  Extremities: No cyanosis or clubbing. No peripheral edema  Psychiatric: Normal mood, normal affect  : No CVA tenderness. No elder.   Skin: No rashes  Vascular Access:    LABS:  10-16    137  |  96<L>  |  31<H>  ----------------------------<  100<H>  4.3   |  25  |  6.7<HH>    Ca    8.6      16 Oct 2019 05:16  Phos  4.1     10-16  Mg     1.7     10-16    TPro  5.8<L>  /  Alb  3.5  /  TBili  2.9<H>  /  DBili  2.4<H>  /  AST  180<H>  /  ALT  121<H>  /  AlkPhos  205<H>  10-16  Lipase, Serum: 472 U/L (10.15.19 @ 15:48)        Creatinine Trend: 6.7 <--, 6.1 <--, 7.2 <--, 5.9 <--, 7.3 <--, 6.2 <--, 6.3 <--                        8.3    5.19  )-----------( 125      ( 16 Oct 2019 05:16 )             26.5     Urine Studies:      RADIOLOGY & ADDITIONAL STUDIES:  < from: CT Abdomen and Pelvis w/ IV Cont (10.15.19 @ 17:29) >    IMPRESSION:  1.  Status post cholecystectomy. Mild hyperenhancement of the CBD wall   which may represent cholangitis in the appropriate clinical setting.   2.  Mild apparent mural thickening of the rectum which may be secondary  to underdistention or represent proctitis.     < end of copied text >

## 2019-10-17 NOTE — PROGRESS NOTE ADULT - ASSESSMENT
69M w PMH HTN, HLD, afib on Eliquis , ESRD on HD (Tuesday/Thursday/Sat), history of cholangitis in July treated with stent without sphincterotomy, in interim  had elective lap elba on 9/20, Then 10/14 patient had another ERCP for removal of plastic stent w sphincterotomy. Patient then presented 10/15 w fever, RUQ abd pain, nausea and vomiting while getting dialysis.  CT showed dilated CBD, possible cholangitis, lipase was 472, patient was admitted to ICU and given empiric ABX w Meropenem, IVF.       # Post ERCP cholangitis w possible pancreatitis n  on meropenem  GI planning for repeat ERCP today if pt doesn't refuse  f/u GI plan of care  diet as per GI team    # ESRD  - s/p HD 10/17  renal following    # Afib:  - rate controlled  - c/w metoprolol  - As per GI - holding Eliquis until after procedure          #Progress Note Handoff    Pending (specify):  Consults__GI follow up_______, Tests________, Test Results_______, Other_________    Family discussion: pt aao3/3    Disposition: Home___/SNF___/Other________/Unknown at this time___x_____

## 2019-10-17 NOTE — PROCEDURE NOTE - ADDITIONAL PROCEDURE DETAILS
Recommendations:  - please give patient clear liquids today  - can start on low fat regular diet tomorrow  - cont abx

## 2019-10-17 NOTE — PROGRESS NOTE ADULT - SUBJECTIVE AND OBJECTIVE BOX
MAMTA FERNANDO 69y Male  MRN#: [id number]       SUBJECTIVE  Patient is a 69y old Male who presents with a chief complaint of acute abdominal pain with vomiting (17 Oct 2019 04:29)  Currently admitted to medicine with the primary diagnosis of Cholangitis    patient still having abdominal pain      OBJECTIVE  PAST MEDICAL & SURGICAL HISTORY  Afib  Anemia  Heart failure  HLD (hyperlipidemia)  HTN (hypertension)  ESRD on dialysis: T/ TH/ S  S/P cataract surgery  S/P arteriovenous (AV) fistula creation  H/O right coronary artery stent placement: stent x3    ALLERGIES:  No Known Allergies    MEDICATIONS:  STANDING MEDICATIONS  aspirin  chewable 81 milliGRAM(s) Oral daily  atorvastatin 40 milliGRAM(s) Oral at bedtime  chlorhexidine 4% Liquid 1 Application(s) Topical <User Schedule>  heparin  Injectable 5000 Unit(s) SubCutaneous every 8 hours  isosorbide   mononitrate ER Tablet (IMDUR) 30 milliGRAM(s) Oral daily  losartan 25 milliGRAM(s) Oral daily  meropenem  IVPB 500 milliGRAM(s) IV Intermittent every 24 hours  metoprolol tartrate 25 milliGRAM(s) Oral two times a day    PRN MEDICATIONS  HYDROmorphone  Injectable 0.5 milliGRAM(s) IV Push every 4 hours PRN  sodium chloride 0.65% Nasal 1 Spray(s) Both Nostrils three times a day PRN      VITAL SIGNS: Last 24 Hours  T(C): 36.7 (17 Oct 2019 05:12), Max: 37.3 (16 Oct 2019 16:00)  T(F): 98 (17 Oct 2019 05:12), Max: 99.2 (16 Oct 2019 16:00)  HR: 72 (17 Oct 2019 05:12) (70 - 93)  BP: 158/72 (17 Oct 2019 05:12) (135/73 - 182/82)  BP(mean): 109 (16 Oct 2019 16:00) (89 - 113)  RR: 18 (17 Oct 2019 05:12) (11 - 21)  SpO2: 96% (16 Oct 2019 16:00) (92% - 100%)    LABS:                        8.3    5.19  )-----------( 125      ( 16 Oct 2019 05:16 )             26.5     10-16    137  |  96<L>  |  31<H>  ----------------------------<  100<H>  4.3   |  25  |  6.7<HH>    Ca    8.6      16 Oct 2019 05:16  Phos  4.1     10-16  Mg     1.7     10-16    TPro  5.8<L>  /  Alb  3.5  /  TBili  2.9<H>  /  DBili  2.4<H>  /  AST  180<H>  /  ALT  121<H>  /  AlkPhos  205<H>  10-16    PTT - ( 15 Oct 2019 15:48 )  PTT:31.7 sec          Culture - Blood (collected 15 Oct 2019 16:05)  Source: .Blood Blood  Preliminary Report (17 Oct 2019 01:01):    No growth to date.      CARDIAC MARKERS ( 16 Oct 2019 05:16 )  x     / 0.09 ng/mL / x     / x     / x      CARDIAC MARKERS ( 15 Oct 2019 16:31 )  x     / 0.08 ng/mL / x     / x     / x          RADIOLOGY:      Physical Exam:  General: In NAD   HEENT:  GERTRUDE              Lymphatic system: No cervical LN   Lungs: Bilateral BS  Cardiovascular: Irregular  Gastrointestinal: Soft, Positive BS  Musculoskeletal: No clubbing.  Moves all extremities.  Full range of motion   Skin: Warm.  Intact  Neurological: No motor deficit             ASSESSMENT & PLAN  70 y/o m PMH HTN, HLD, afib on Eliquis ESRD on HD (Tuesday/Thursday/Sat), history of cholangitis in July with stent insertion for drainage without sphincterotomy because patient was on Plavix at that time, Pt then had elective lap elba on 9/20. Then 10/14 patient had another ERCP with removal of plastic stent and sphincterotomy. Patient then presented fever, RUQ abd pain, nausea and vomiting while getting dialysis.     In ED patient was hemodynamically stable.  denied any abdominal pain or nausea, but Labs showed elevated lipase and LFTs, and CT showed post cholecystectomy changes. was given IV atbx and morphine for pain.    In ICU, Pt still remains hemodynamically stable and asymptomatic. LFT's and Alkphos decreasing.   GI is planning on repeat ERCP tomorrow 10/17.       # Acute RUQ pain with nausea and vomiting present on admission likely secondary to post ERCP pancreatitis vs cholangitis  -- hemodynamically stable, no fever, no leucocytosis  -- 10/16: LFT's downtrending, Alkphos decreasing, T maria ines 2.9, D maria ines 2.4  -- iv abx: meropenem  -- as per attending d/c'd IVF  -- pain control:  iv dilaudid prn  -- ERCP with GI today    # ESRD:  - s/p HD 10/15  - f/u with renal - consulted.     # Afib:  - rate controlled  - c/w metoprolol  - As per GI - hold Eliquis until after procedure    # DVT ppx: off for procedure  Full code  CLD - NPO after midnight   ambulate as tolerated        For Follow-Up:  - ERCP on 10/17 with GI  - CBC, CMP, Hepatic panel.

## 2019-10-17 NOTE — PROGRESS NOTE ADULT - SUBJECTIVE AND OBJECTIVE BOX
GENERAL SURGERY PROGRESS NOTE     MAMTA FERNANDO  69y  Male  Hospital day :2d    OVERNIGHT EVENTS: no acute events overnight    T(F): 98.3 (10-16-19 @ 21:11), Max: 99.2 (10-16-19 @ 16:00)  HR: 77 (10-16-19 @ 21:11) (70 - 93)  BP: 158/69 (10-16-19 @ 21:11) (135/73 - 182/82)    RR: 18 (10-16-19 @ 21:11) (11 - 21)  SpO2: 96% (10-16-19 @ 16:00) (92% - 100%)     GI proph:    AC/ proph: aspirin  chewable 81 milliGRAM(s) Oral daily  heparin  Injectable 5000 Unit(s) SubCutaneous every 8 hours    ABx: meropenem  IVPB 500 milliGRAM(s) IV Intermittent every 24 hours      PHYSICAL EXAM:  GENERAL: NAD, well-appearing  CHEST/LUNG: Clear to auscultation bilaterally  HEART: Regular rate and rhythm  ABDOMEN: Soft, Nontender, Nondistended;   EXTREMITIES:  No clubbing, cyanosis, or edema      LABS  Labs:  CAPILLARY BLOOD GLUCOSE                              8.3    5.19  )-----------( 125      ( 16 Oct 2019 05:16 )             26.5         10-16    137  |  96<L>  |  31<H>  ----------------------------<  100<H>  4.3   |  25  |  6.7<HH>      Calcium, Total Serum: 8.6 mg/dL (10-16-19 @ 05:16)      LFTs:             5.8  | 2.9  | 180      ------------------[205     ( 16 Oct 2019 09:58 )  3.5  | 2.4  | 121         Lipase:x      Amylase:x         Blood Gas Venous - Lactate: 1.9 mmoL/L (10-15-19 @ 16:11)  Lactate, Blood: 2.1 mmol/L (10-15-19 @ 15:48)      Coags:     x      ----< x       ( 15 Oct 2019 15:48 )     31.7        CARDIAC MARKERS ( 16 Oct 2019 05:16 )  x     / 0.09 ng/mL / x     / x     / x      CARDIAC MARKERS ( 15 Oct 2019 16:31 )  x     / 0.08 ng/mL / x     / x     / x          Culture - Blood (collected 15 Oct 2019 16:05)  Source: .Blood Blood  Preliminary Report (17 Oct 2019 01:01):    No growth to date.

## 2019-10-17 NOTE — CONSULT NOTE ADULT - ASSESSMENT
68 yo man with PMH of A fib , ESRD on HD , CHF , HTN , DLD , CAd sp cholecystectomy presenting with abdominal pain , elevated LFTs ? cholangitis and elevated lipase   ·	ESRD on HD for HD today 3h opti 160 2k UF 1.5 l as toelrated  ·	IP noted within target   ·	avoid excessive IVF  ·	will check OP GAURANG dose   ·	transfuse if Hb <7  ·	Cholangitis for ERCP today ? on merrem now / GI on case   ·	A fib off NOAC for procedure today

## 2019-10-18 LAB
ALBUMIN SERPL ELPH-MCNC: 3.5 G/DL — SIGNIFICANT CHANGE UP (ref 3.5–5.2)
ALP SERPL-CCNC: 194 U/L — HIGH (ref 30–115)
ALT FLD-CCNC: 57 U/L — HIGH (ref 0–41)
ANION GAP SERPL CALC-SCNC: 15 MMOL/L — HIGH (ref 7–14)
AST SERPL-CCNC: 56 U/L — HIGH (ref 0–41)
BILIRUB DIRECT SERPL-MCNC: 0.7 MG/DL — HIGH (ref 0–0.2)
BILIRUB INDIRECT FLD-MCNC: 0.4 MG/DL — SIGNIFICANT CHANGE UP (ref 0.2–1.2)
BILIRUB SERPL-MCNC: 1.1 MG/DL — SIGNIFICANT CHANGE UP (ref 0.2–1.2)
BUN SERPL-MCNC: 30 MG/DL — HIGH (ref 10–20)
CALCIUM SERPL-MCNC: 8.7 MG/DL — SIGNIFICANT CHANGE UP (ref 8.5–10.1)
CHLORIDE SERPL-SCNC: 99 MMOL/L — SIGNIFICANT CHANGE UP (ref 98–110)
CO2 SERPL-SCNC: 26 MMOL/L — SIGNIFICANT CHANGE UP (ref 17–32)
CREAT SERPL-MCNC: 6.3 MG/DL — CRITICAL HIGH (ref 0.7–1.5)
GLUCOSE SERPL-MCNC: 105 MG/DL — HIGH (ref 70–99)
HCT VFR BLD CALC: 26.3 % — LOW (ref 42–52)
HCV RNA FLD QL NAA+PROBE: SIGNIFICANT CHANGE UP
HCV RNA SPEC QL PROBE+SIG AMP: SIGNIFICANT CHANGE UP
HGB BLD-MCNC: 8.2 G/DL — LOW (ref 14–18)
MCHC RBC-ENTMCNC: 28.7 PG — SIGNIFICANT CHANGE UP (ref 27–31)
MCHC RBC-ENTMCNC: 31.2 G/DL — LOW (ref 32–37)
MCV RBC AUTO: 92 FL — SIGNIFICANT CHANGE UP (ref 80–94)
NRBC # BLD: 0 /100 WBCS — SIGNIFICANT CHANGE UP (ref 0–0)
PLATELET # BLD AUTO: 153 K/UL — SIGNIFICANT CHANGE UP (ref 130–400)
POTASSIUM SERPL-MCNC: 4.7 MMOL/L — SIGNIFICANT CHANGE UP (ref 3.5–5)
POTASSIUM SERPL-SCNC: 4.7 MMOL/L — SIGNIFICANT CHANGE UP (ref 3.5–5)
PROT SERPL-MCNC: 6.2 G/DL — SIGNIFICANT CHANGE UP (ref 6–8)
RBC # BLD: 2.86 M/UL — LOW (ref 4.7–6.1)
RBC # FLD: 13.6 % — SIGNIFICANT CHANGE UP (ref 11.5–14.5)
SODIUM SERPL-SCNC: 140 MMOL/L — SIGNIFICANT CHANGE UP (ref 135–146)
WBC # BLD: 2.86 K/UL — LOW (ref 4.8–10.8)
WBC # FLD AUTO: 2.86 K/UL — LOW (ref 4.8–10.8)

## 2019-10-18 PROCEDURE — 99233 SBSQ HOSP IP/OBS HIGH 50: CPT

## 2019-10-18 PROCEDURE — 99232 SBSQ HOSP IP/OBS MODERATE 35: CPT

## 2019-10-18 RX ORDER — AMLODIPINE BESYLATE 2.5 MG/1
5 TABLET ORAL DAILY
Refills: 0 | Status: DISCONTINUED | OUTPATIENT
Start: 2019-10-18 | End: 2019-10-19

## 2019-10-18 RX ORDER — APIXABAN 2.5 MG/1
5 TABLET, FILM COATED ORAL
Refills: 0 | Status: DISCONTINUED | OUTPATIENT
Start: 2019-10-18 | End: 2019-10-19

## 2019-10-18 RX ADMIN — MEROPENEM 100 MILLIGRAM(S): 1 INJECTION INTRAVENOUS at 21:10

## 2019-10-18 RX ADMIN — Medication 1 DROP(S): at 21:12

## 2019-10-18 RX ADMIN — ATORVASTATIN CALCIUM 40 MILLIGRAM(S): 80 TABLET, FILM COATED ORAL at 21:11

## 2019-10-18 RX ADMIN — APIXABAN 5 MILLIGRAM(S): 2.5 TABLET, FILM COATED ORAL at 18:13

## 2019-10-18 RX ADMIN — CHLORHEXIDINE GLUCONATE 1 APPLICATION(S): 213 SOLUTION TOPICAL at 05:57

## 2019-10-18 RX ADMIN — LOSARTAN POTASSIUM 25 MILLIGRAM(S): 100 TABLET, FILM COATED ORAL at 05:57

## 2019-10-18 RX ADMIN — ISOSORBIDE MONONITRATE 30 MILLIGRAM(S): 60 TABLET, EXTENDED RELEASE ORAL at 11:28

## 2019-10-18 RX ADMIN — Medication 81 MILLIGRAM(S): at 11:28

## 2019-10-18 RX ADMIN — AMLODIPINE BESYLATE 5 MILLIGRAM(S): 2.5 TABLET ORAL at 11:28

## 2019-10-18 RX ADMIN — Medication 25 MILLIGRAM(S): at 18:13

## 2019-10-18 RX ADMIN — Medication 25 MILLIGRAM(S): at 05:57

## 2019-10-18 NOTE — CONSULT NOTE ADULT - REASON FOR ADMISSION
acute abdominal pain with vomiting

## 2019-10-18 NOTE — CONSULT NOTE ADULT - ATTENDING COMMENTS
70 y/o M with ESRD, had cholangitis in July with stent placement but no sphincterotomy at the time, had repeat ERCP 2 days ago for sphincterotomy and stent removal with removal of some sludge and debris, now presenting with fevers, mild abd pain and elevated LFTs.     Concern for possible cholangitis. May have had blood clot that is now causing obstruction at the bile duct. Possible retained stone, though unlikely given normal occlusion cholangiogram.     Cont abx for now. Will likely need repeat ERCP.
above noted abdomen soft no tenderness pt fully examined by me and agree with above
I have personally examined the patient and reviewed the documentation above.  Corrections and edits were made wherever needed.

## 2019-10-18 NOTE — CONSULT NOTE ADULT - ASSESSMENT
70 y/o m PMH HTN, HLD, afib on eliquis, ESRD on HD (Tuesday/Thursday/Sat) presented to the ED on 10/15 with  sudden, sharp, non-radiating RUQ abd pain with associated diaphoresis and nausea and vomiting while getting dialysis, recent ERCP on 10/14 for plastic stent removal and sphincterotomy 70 y/o m PMH HTN, HLD, afib on eliquis, ESRD on HD (Tuesday/Thursday/Sat) presented to the ED on 10/15 with  sudden, sharp, non-radiating RUQ abd pain with associated diaphoresis and nausea and vomiting while getting dialysis, recent ERCP on 10/14 for plastic stent removal and sphincterotomy, admitted for post ERCP cholangitis, s/p ERCP yesterday without any acute findings.    IMPRESSION  Post ERCP Acute Cholangitis  No sepsis on admission  s/p repeat ERCP 10/17  LFT improving    - Continue meropenem in-patient  - Can d/c with Cefepime 1g post dialysis for one week  - Flagyl 500 q12 for one week duration 70 y/o m PMH HTN, HLD, afib on eliquis, ESRD on HD (Tuesday/Thursday/Sat) presented to the ED on 10/15 with  sudden, sharp, non-radiating RUQ abd pain with associated diaphoresis and nausea and vomiting while getting dialysis, recent ERCP on 10/14 for plastic stent removal and sphincterotomy, admitted for post ERCP cholangitis, s/p ERCP yesterday without any acute findings.    IMPRESSION  Post ERCP Acute Cholangitis  No sepsis on admission  s/p repeat ERCP 10/17  LFT improving    - Continue meropenem in-patient  - Can d/c with Cefepime 1g post dialysis for 3 dosis  - Flagyl 500 q12 for one week duration ( for duration with cefepime )  recall prn please

## 2019-10-18 NOTE — PROGRESS NOTE ADULT - ASSESSMENT
68 yo man with PMH of A fib , ESRD on HD , CHF , HTN , DLD , CAd sp cholecystectomy presenting with abdominal pain , elevated LFTs ? cholangitis and elevated lipase   ·	ESRD on HD for HD in AM / orders in   ·	IP noted within target   ·	will check OP GAURANG dose / will resume next week / received GAURANG on tuesday   ·	transfuse if Hb <7  ·	Cholangitis sp ERCP  on merrem now  ID on case ( has low WBC )/ GI on case   ·	A fib off NOAC / resume UFH IV follow PTT

## 2019-10-18 NOTE — PHARMACOTHERAPY INTERVENTION NOTE - COMMENTS
eliquis 5mg q12h    HEMO PT.  AGE 69YRS WT=86kg.  I s/w dr raza [id number] and verified with lexicom, micromedex and an aticle in circulation--ok to give standard dose in ESRD sinc eliquis is less dependant on renal excretion

## 2019-10-18 NOTE — PROGRESS NOTE ADULT - SUBJECTIVE AND OBJECTIVE BOX
MAMTA FERNANDO 69y Male  MRN#: [id number]       SUBJECTIVE  Patient is a 69y old Male who presents with a chief complaint of acute abdominal pain with vomiting (17 Oct 2019 15:19)  Currently admitted to medicine with the primary diagnosis of Cholangitis    doing well today. did ERCP yesterday    OBJECTIVE  PAST MEDICAL & SURGICAL HISTORY  Afib  Anemia  Heart failure  HLD (hyperlipidemia)  HTN (hypertension)  ESRD on dialysis: T/ TH/ S  S/P cataract surgery  S/P arteriovenous (AV) fistula creation  H/O right coronary artery stent placement: stent x3    ALLERGIES:  No Known Allergies    MEDICATIONS:  STANDING MEDICATIONS  aspirin  chewable 81 milliGRAM(s) Oral daily  atorvastatin 40 milliGRAM(s) Oral at bedtime  chlorhexidine 4% Liquid 1 Application(s) Topical <User Schedule>  isosorbide   mononitrate ER Tablet (IMDUR) 30 milliGRAM(s) Oral daily  losartan 25 milliGRAM(s) Oral daily  meropenem  IVPB 500 milliGRAM(s) IV Intermittent every 24 hours  metoprolol tartrate 25 milliGRAM(s) Oral two times a day    PRN MEDICATIONS  HYDROmorphone  Injectable 0.5 milliGRAM(s) IV Push every 4 hours PRN  sodium chloride 0.65% Nasal 1 Spray(s) Both Nostrils three times a day PRN      VITAL SIGNS: Last 24 Hours  T(C): 36.1 (18 Oct 2019 05:14), Max: 36.9 (17 Oct 2019 15:10)  T(F): 96.9 (18 Oct 2019 05:14), Max: 98.4 (17 Oct 2019 15:10)  HR: 89 (18 Oct 2019 05:14) (70 - 89)  BP: 189/81 (18 Oct 2019 05:14) (104/55 - 189/81)  BP(mean): 58 (17 Oct 2019 17:50) (58 - 58)  RR: 18 (18 Oct 2019 05:14) (18 - 20)  SpO2: 97% (17 Oct 2019 18:26) (97% - 100%)    LABS:                        8.0    5.81  )-----------( 142      ( 17 Oct 2019 08:58 )             25.6     10-17    138  |  98  |  33<H>  ----------------------------<  100<H>  3.5   |  24  |  6.3<HH>    Ca    8.6      17 Oct 2019 08:58    TPro  5.8<L>  /  Alb  3.3<L>  /  TBili  1.7<H>  /  DBili  x   /  AST  86<H>  /  ALT  78<H>  /  AlkPhos  198<H>  10-17              Culture - Blood (collected 16 Oct 2019 05:16)  Source: .Blood None  Preliminary Report (17 Oct 2019 14:00):    No growth to date.    Culture - Blood (collected 15 Oct 2019 16:05)  Source: .Blood Blood  Preliminary Report (17 Oct 2019 01:01):    No growth to date.          RADIOLOGY:          Physical Exam:  General: In NAD   HEENT:  GERTRUDE              Lymphatic system: No cervical LN   Lungs: Bilateral BS  Cardiovascular: Irregular  Gastrointestinal: Soft, Positive BS  Musculoskeletal: No clubbing.  Moves all extremities.  Full range of motion   Skin: Warm.  Intact  Neurological: No motor deficit             ASSESSMENT & PLAN  68 y/o m PMH HTN, HLD, afib on Eliquis ESRD on HD (Tuesday/Thursday/Sat), history of cholangitis in July with stent insertion for drainage without sphincterotomy because patient was on Plavix at that time, Pt then had elective lap elba on 9/20. Then 10/14 patient had another ERCP with removal of plastic stent and sphincterotomy. Patient then presented fever, RUQ abd pain, nausea and vomiting while getting dialysis.     In ED patient was hemodynamically stable.  denied any abdominal pain or nausea, but Labs showed elevated lipase and LFTs, and CT showed post cholecystectomy changes. was given IV atbx and morphine for pain.    In ICU, Pt still remains hemodynamically stable and asymptomatic. LFT's and Alkphos decreasing.   GI is planning on repeat ERCP tomorrow 10/17.       # Acute RUQ pain with nausea and vomiting present on admission likely secondary to post ERCP pancreatitis vs cholangitis  -- hemodynamically stable, no fever, no leucocytosis  -- LFT's downtrending  -- iv abx: meropenem D3  -- off fluids  -- pain control:  iv dilaudid prn  -- ERCP with GI done --> Previous sphincterotomy appeared open. Bile duct was swept and minimal debris was removed. Occlusion cholangiogram was performed and there were no filling defects.  - can resume diet (low fat) today    # ESRD:  - s/p HD 10/17  - f/u with renal - consulted.     # Afib:  - rate controlled  - c/w metoprolol  - still off NOACs. consider resuming it tonight    #HTN  - on losartan and metoprolol  - will add amlodipine 5 mg    # DVT ppx: will restart eliquis  Full code  CLD - NPO after midnight   ambulate as tolerated

## 2019-10-18 NOTE — PROGRESS NOTE ADULT - SUBJECTIVE AND OBJECTIVE BOX
Nephrology progress note    THIS IS AN INCOMPLETE NOTE . FULL NOTE TO FOLLOW SHORTLY    Patient is seen and examined, events over the last 24 h noted .    Allergies:  No Known Allergies    Hospital Medications:   MEDICATIONS  (STANDING):  amLODIPine   Tablet 5 milliGRAM(s) Oral daily  aspirin  chewable 81 milliGRAM(s) Oral daily  atorvastatin 40 milliGRAM(s) Oral at bedtime  chlorhexidine 4% Liquid 1 Application(s) Topical <User Schedule>  isosorbide   mononitrate ER Tablet (IMDUR) 30 milliGRAM(s) Oral daily  losartan 25 milliGRAM(s) Oral daily  meropenem  IVPB 500 milliGRAM(s) IV Intermittent every 24 hours  metoprolol tartrate 25 milliGRAM(s) Oral two times a day        VITALS:  T(F): 97.6 (10-18-19 @ 12:21), Max: 98.4 (10-17-19 @ 15:10)  HR: 74 (10-18-19 @ 12:21)  BP: 153/71 (10-18-19 @ 12:21)  RR: 18 (10-18-19 @ 12:21)  SpO2: 97% (10-17-19 @ 18:26)  Wt(kg): --    10-16 @ 07:01  -  10-17 @ 07:00  --------------------------------------------------------  IN: 225 mL / OUT: 0 mL / NET: 225 mL    10-17 @ 07:01  -  10-18 @ 07:00  --------------------------------------------------------  IN: 0 mL / OUT: 1500 mL / NET: -1500 mL      Height (cm): 167.64 (10-17 @ 16:35)  Weight (kg): 86 (10-17 @ 16:35)  BMI (kg/m2): 30.6 (10-17 @ 16:35)  BSA (m2): 1.96 (10-17 @ 16:35)    PHYSICAL EXAM:  Constitutional: NAD  HEENT: anicteric sclera, oropharynx clear, MMM  Neck: No JVD  Respiratory: CTAB, no wheezes, rales or rhonchi  Cardiovascular: S1, S2, RRR  Gastrointestinal: BS+, soft, NT/ND  Extremities: No cyanosis or clubbing. No peripheral edema  :  No elder.   Skin: No rashes    LABS:  10-18    140  |  99  |  30<H>  ----------------------------<  105<H>  4.7   |  26  |  6.3<HH>    Ca    8.7      18 Oct 2019 07:13    TPro  6.2  /  Alb  3.5  /  TBili  1.1  /  DBili  0.7<H>  /  AST  56<H>  /  ALT  57<H>  /  AlkPhos  194<H>  10-18                          8.2    2.86  )-----------( 153      ( 18 Oct 2019 07:13 )             26.3       Urine Studies:      RADIOLOGY & ADDITIONAL STUDIES: Nephrology progress note  Patient is seen and examined, events over the last 24 h noted .  denied any abdominal pain  sp ERCP yesterday     Allergies:  No Known Allergies    Hospital Medications:   MEDICATIONS  (STANDING):  amLODIPine   Tablet 5 milliGRAM(s) Oral daily  aspirin  chewable 81 milliGRAM(s) Oral daily  atorvastatin 40 milliGRAM(s) Oral at bedtime  isosorbide   mononitrate ER Tablet (IMDUR) 30 milliGRAM(s) Oral daily  losartan 25 milliGRAM(s) Oral daily  meropenem  IVPB 500 milliGRAM(s) IV Intermittent every 24 hours  metoprolol tartrate 25 milliGRAM(s) Oral two times a day        VITALS:  T(F): 97.6 (10-18-19 @ 12:21), Max: 98.4 (10-17-19 @ 15:10)  HR: 74 (10-18-19 @ 12:21)  BP: 153/71 (10-18-19 @ 12:21)  RR: 18 (10-18-19 @ 12:21)  SpO2: 97% (10-17-19 @ 18:26)      10-16 @ 07:01  -  10-17 @ 07:00  --------------------------------------------------------  IN: 225 mL / OUT: 0 mL / NET: 225 mL    10-17 @ 07:01  -  10-18 @ 07:00  --------------------------------------------------------  IN: 0 mL / OUT: 1500 mL / NET: -1500 mL      Height (cm): 167.64 (10-17 @ 16:35)  Weight (kg): 86 (10-17 @ 16:35)  BMI (kg/m2): 30.6 (10-17 @ 16:35)  BSA (m2): 1.96 (10-17 @ 16:35)    PHYSICAL EXAM:  Constitutional: NAD  HEENT: anicteric sclera, oropharynx clear, MMM  Neck: No JVD  Respiratory: CTAB, no wheezes, rales or rhonchi  Cardiovascular: S1, S2, RRR  Gastrointestinal: BS+, soft, NT/ND  Extremities: No cyanosis or clubbing. No peripheral edema  :  No elder.   Skin: No rashes    LABS:  10-18    140  |  99  |  30<H>  ----------------------------<  105<H>  4.7   |  26  |  6.3<HH>    Ca    8.7      18 Oct 2019 07:13    TPro  6.2  /  Alb  3.5  /  TBili  1.1  /  DBili  0.7<H>  /  AST  56<H>  /  ALT  57<H>  /  AlkPhos  194<H>  10-18                          8.2    2.86  )-----------( 153      ( 18 Oct 2019 07:13 )             26.3       Urine Studies:      RADIOLOGY & ADDITIONAL STUDIES:

## 2019-10-18 NOTE — CONSULT NOTE ADULT - SUBJECTIVE AND OBJECTIVE BOX
MAMTA FERNANDO  69y, Male  Allergy: No Known Allergies      CHIEF COMPLAINT: acute abdominal pain with vomiting (18 Oct 2019 07:14)      HPI:  68 y/o m PMH HTN, HLD, afib on eliquis, ESRD on HD (Tuesday/Thursday/Sat) presented to the ED on 10/15 with  sudden, sharp, non-radiating RUQ abd pain with associated diaphoresis and nausea and vomiting while getting dialysis today.  Patient has recent history of cholangitis in July with stent insertion for drainage without sphincterotomy because patient was on Plavix at that time; 10/14 patient had another ERCP with removal of plastic stent and sphincterotomy. Patient presented to ED  with fever and chills starting this afternoon , nausea , vomiting and intermittent RUQ pain.    In ED found to have HTN, EKG showed afib with controlled rate without signs of infarction, trop elevated at chronic level, had a fever and elevated lipase and LFTs, and CT showed post cholecystectomy changes. was given IV atbx and morphine for pain. Patient was upgraded to MICU for concern for post ERCP cholangitis. Patient not septic on admission, elevated bp, no leukocytosis. Patient underwent ERCP on 10/17:  Previous sphincterotomy appeared open. Bile duct was swept and minimal debris was removed. Occlusion cholangiogram was performed and there were no filling defects. Currently patient is on meropenem with resolving symptoms     FAMILY HISTORY:    PAST MEDICAL & SURGICAL HISTORY:  Afib  Anemia  Heart failure  HLD (hyperlipidemia)  HTN (hypertension)  ESRD on dialysis: T/ TH/ S  S/P cataract surgery  S/P arteriovenous (AV) fistula creation  H/O right coronary artery stent placement: stent x3      SOCIAL HISTORY    Substance Use (  ) never used  (  ) IVDU (  ) Other:  Tobacco Usage:  (   ) never smoked   (   ) former smoker   (   ) current smoker   Alcohol Usage: (   ) social  (   ) daily use (   ) denies  Sexual History:       ROS  General: Denies rigors, nightsweats  HEENT: Denies headache, rhinorrhea, sore throat, eye pain  CV: Denies CP, palpitations  PULM: Denies SOB, wheezing  GI: Denies hematemesis, hematochezia, melena  : Denies discharge, hematuria  MSK: Denies arthralgias, myalgias  SKIN: Denies rash, lesions  NEURO: Denies paresthesias, weakness  PSYCH: Denies depression, anxiety    VITALS:  T(F): 96.9, Max: 98.4 (10-17-19 @ 15:10)  HR: 89  BP: 189/81  RR: 18Vital Signs Last 24 Hrs  T(C): 36.1 (18 Oct 2019 05:14), Max: 36.9 (17 Oct 2019 15:10)  T(F): 96.9 (18 Oct 2019 05:14), Max: 98.4 (17 Oct 2019 15:10)  HR: 89 (18 Oct 2019 05:14) (70 - 89)  BP: 189/81 (18 Oct 2019 05:14) (104/55 - 189/81)  BP(mean): 58 (17 Oct 2019 17:50) (58 - 58)  RR: 18 (18 Oct 2019 05:14) (18 - 20)  SpO2: 97% (17 Oct 2019 18:26) (97% - 100%)    PHYSICAL EXAM:  Gen: NAD, resting in bed  HEENT: Normocephalic, atraumatic  Neck: supple, no lymphadenopathy  CV: Regular rate & regular rhythm  Lungs: decreased BS at bases  Abdomen: Soft, BS present  Ext: Warm, well perfused  Neuro: non focal, awake  Skin: no rash, no erythema    TESTS & MEASUREMENTS:                        8.2    2.86  )-----------( 153      ( 18 Oct 2019 07:13 )             26.3     10-18    140  |  99  |  30<H>  ----------------------------<  105<H>  4.7   |  26  |  6.3<HH>    Ca    8.7      18 Oct 2019 07:13    TPro  6.2  /  Alb  3.5  /  TBili  1.1  /  DBili  0.7<H>  /  AST  56<H>  /  ALT  57<H>  /  AlkPhos  194<H>  10-18    eGFR if Non African American: 8 mL/min/1.73M2 (10-18-19 @ 07:13)  eGFR if African American: 10 mL/min/1.73M2 (10-18-19 @ 07:13)    LIVER FUNCTIONS - ( 18 Oct 2019 09:00 )  Alb: 3.5 g/dL / Pro: 6.2 g/dL / ALK PHOS: 194 U/L / ALT: 57 U/L / AST: 56 U/L / GGT: x               Culture - Blood (collected 10-16-19 @ 05:16)  Source: .Blood None  Preliminary Report (10-17-19 @ 14:00):    No growth to date.    Culture - Blood (collected 10-15-19 @ 16:05)  Source: .Blood Blood  Preliminary Report (10-17-19 @ 01:01):    No growth to date.        Blood Gas Venous - Lactate: 1.9 mmoL/L (10-15-19 @ 16:11)  Lactate, Blood: 2.1 mmol/L (10-15-19 @ 15:48)      INFECTIOUS DISEASES TESTING  Hepatitis B Surface Antigen: Nonreact (09-21-19 @ 20:52)      RADIOLOGY & ADDITIONAL TESTS:  I have personally reviewed the last Chest xray  CXR  Xray Chest 1 View- PORTABLE-Urgent:   EXAM:  XR CHEST PORTABLE URGENT 1V            PROCEDURE DATE:  10/15/2019            INTERPRETATION:  Clinical History / Reason for exam: Abdominal pain    Comparison : Chest radiograph 9/21/2019.    Technique/Positioning: Single frontal view.    Findings:    Support devices: None.    Cardiac/mediastinum/hilum: Cardiomegaly    Lung parenchyma/Pleura: Within normal limits.    Skeleton/soft tissues: Right subclavian vascular stent    Impression:      No radiographic evidence of acute cardiopulmonary disease.                      ROBERT MARIE M.D., ATTENDING RADIOLOGIST  This document has been electronically signed. Oct 16 2019 10:07AM             (10-15-19 @ 16:37)      CT  CT Abdomen and Pelvis w/ IV Cont:   EXAM:  CT ABDOMEN AND PELVIS IC            PROCEDURE DATE:  10/15/2019            INTERPRETATION:  CLINICAL HISTORY / REASON FOR EXAM: Abdominal pain.    TECHNIQUE: Contiguous axial CT images were obtained from the lower chest   to the pubic symphysis following administration of 100 mL Optiray 320   intravenous contrast. Oral contrast was administered. Reformatted images   in the coronal and sagittal planes were acquired.    COMPARISON CT: 7/13/2019.        FINDINGS:    LOWER CHEST: Cardiomegaly. Coronary artery atherosclerotic   calcifications..    HEPATOBILIARY: Status post cholecystectomy. CBD dilated to 1.1 cm which   can be seen post-cholecystectomy. Mild hyperenhancement of the CBD wall   which may represent cholangitis in the appropriate clinical setting.     SPLEEN: Unremarkable.    PANCREAS: Unremarkable.    ADRENAL GLANDS: Indeterminate 7 mm right adrenal nodule, unchanged from   prior CT. Unremarkable left adrenal gland.    KIDNEYS: Atrophic kidneys. Bilateral renal cysts in addition to   subcentimeter hypodensity too small to further characterize.    ABDOMINOPELVIC NODES: Unremarkable.    PELVIC ORGANS: Prostatomegaly    PERITONEUM/MESENTERY/BOWEL: Mild apparent mural thickening of the rectum   which may be secondary to underdistention or represent proctitis. No   bowel obstruction, ascites or intraperitoneal free air. Colonic   diverticulosis. Normal caliber appendix.    BONES/SOFT TISSUES: Degenerative changes of the spine.    OTHER: Diffuse atherosclerotic vascular calcifications.      IMPRESSION:  1.  Status post cholecystectomy. Mild hyperenhancement of the CBD wall   which may represent cholangitis in the appropriate clinical setting.   2.  Mild apparent mural thickening of the rectum which may be secondary  to underdistention or represent proctitis.                 BERTO MADRIGAL M.D., RESIDENT RADIOLOGIST  This document has been electronically signed.  SANJU DASH M.D., ATTENDING RADIOLOGIST  This document has been electronically signed. Oct [id number]  7:15PM             (10-15-19 @ 17:29)      CARDIOLOGY TESTING  12 Lead ECG:   Ventricular Rate 88 BPM    Atrial Rate 101 BPM    QRS Duration 86 ms    Q-T Interval 392 ms    QTC Calculation(Bezet) 474 ms    R Axis 92 degrees    T Axis 82 degrees    Diagnosis Line Atrial fibrillation  Rightward axis  Possible Anterior infarct , age undetermined  Abnormal ECG    Confirmed by John Gamboa ([id number]) on 10/16/2019 9:52:32 AM (10-16-19 @ 04:41)  12 Lead ECG:   Ventricular Rate 104 BPM    Atrial Rate 220 BPM    QRS Duration 84 ms    Q-T Interval 358 ms    QTC Calculation(Bezet) 470 ms    R Axis 113 degrees    T Axis 34 degrees    Diagnosis Line Atrial fibrillation  Right axis deviation  Cannot rule out Anterior infarct , age undetermined  Abnormal ECG    Confirmed by PETERSON ERNST MD ([id number]) on 10/15/2019 7:09:19 PM (10-15-19 @ 15:08)      MEDICATIONS  amLODIPine   Tablet 5  aspirin  chewable 81  atorvastatin 40  chlorhexidine 4% Liquid 1  isosorbide   mononitrate ER Tablet (IMDUR) 30  losartan 25  meropenem  IVPB 500  metoprolol tartrate 25      ANTIBIOTICS:  meropenem  IVPB 500 milliGRAM(s) IV Intermittent every 24 hours MAMTA FERNANDO  69y, Male  Allergy: No Known Allergies      CHIEF COMPLAINT: acute abdominal pain with vomiting (18 Oct 2019 07:14)      HPI:  70 y/o m PMH HTN, HLD, afib on eliquis, ESRD on HD (Tuesday/Thursday/Sat) presented to the ED on 10/15 with  sudden, sharp, non-radiating RUQ abd pain with associated diaphoresis and nausea and vomiting while getting dialysis today.  Patient has recent history of cholangitis in July with stent insertion for drainage without sphincterotomy because patient was on Plavix at that time; 10/14 patient had another ERCP with removal of plastic stent and sphincterotomy. Patient presented to ED  with fever and chills starting this afternoon , nausea , vomiting and intermittent RUQ pain.    In ED found to have HTN, EKG showed afib with controlled rate without signs of infarction, trop elevated at chronic level, had a fever and elevated lipase and LFTs, and CT showed post cholecystectomy changes. was given IV atbx and morphine for pain. Patient was upgraded to MICU for concern for post ERCP cholangitis; u/s showed CBD dilation and some sludge. Patient not septic on admission, elevated bp, no leukocytosis. Patient underwent ERCP on 10/17:  Previous sphincterotomy appeared open. Bile duct was swept and minimal debris was removed. Occlusion cholangiogram was performed and there were no filling defects. Currently patient is on meropenem with resolving symptoms     FAMILY HISTORY:    PAST MEDICAL & SURGICAL HISTORY:  Afib  Anemia  Heart failure  HLD (hyperlipidemia)  HTN (hypertension)  ESRD on dialysis: T/ TH/ S  S/P cataract surgery  S/P arteriovenous (AV) fistula creation  H/O right coronary artery stent placement: stent x3      SOCIAL HISTORY    Substance Use (  ) never used  (  ) IVDU (  ) Other:  Tobacco Usage:  (   ) never smoked   (   ) former smoker   (   ) current smoker   Alcohol Usage: (   ) social  (   ) daily use (   ) denies  Sexual History:       ROS  General: Denies rigors, nightsweats  HEENT: Denies headache, rhinorrhea, sore throat, eye pain  CV: Denies CP, palpitations  PULM: Denies SOB, wheezing  GI: Denies hematemesis, hematochezia, melena  : Denies discharge, hematuria  MSK: Denies arthralgias, myalgias  SKIN: Denies rash, lesions  NEURO: Denies paresthesias, weakness  PSYCH: Denies depression, anxiety    VITALS:  T(F): 96.9, Max: 98.4 (10-17-19 @ 15:10)  HR: 89  BP: 189/81  RR: 18Vital Signs Last 24 Hrs  T(C): 36.1 (18 Oct 2019 05:14), Max: 36.9 (17 Oct 2019 15:10)  T(F): 96.9 (18 Oct 2019 05:14), Max: 98.4 (17 Oct 2019 15:10)  HR: 89 (18 Oct 2019 05:14) (70 - 89)  BP: 189/81 (18 Oct 2019 05:14) (104/55 - 189/81)  BP(mean): 58 (17 Oct 2019 17:50) (58 - 58)  RR: 18 (18 Oct 2019 05:14) (18 - 20)  SpO2: 97% (17 Oct 2019 18:26) (97% - 100%)    PHYSICAL EXAM:  Gen: NAD, resting in bed  HEENT: Normocephalic, atraumatic  Neck: supple, no lymphadenopathy  CV: Regular rate & regular rhythm  Lungs: decreased BS at bases  Abdomen: Soft, BS present  Ext: Warm, well perfused  Neuro: non focal, awake  Skin: no rash, no erythema    TESTS & MEASUREMENTS:                        8.2    2.86  )-----------( 153      ( 18 Oct 2019 07:13 )             26.3     10-18    140  |  99  |  30<H>  ----------------------------<  105<H>  4.7   |  26  |  6.3<HH>    Ca    8.7      18 Oct 2019 07:13    TPro  6.2  /  Alb  3.5  /  TBili  1.1  /  DBili  0.7<H>  /  AST  56<H>  /  ALT  57<H>  /  AlkPhos  194<H>  10-18    eGFR if Non African American: 8 mL/min/1.73M2 (10-18-19 @ 07:13)  eGFR if African American: 10 mL/min/1.73M2 (10-18-19 @ 07:13)    LIVER FUNCTIONS - ( 18 Oct 2019 09:00 )  Alb: 3.5 g/dL / Pro: 6.2 g/dL / ALK PHOS: 194 U/L / ALT: 57 U/L / AST: 56 U/L / GGT: x               Culture - Blood (collected 10-16-19 @ 05:16)  Source: .Blood None  Preliminary Report (10-17-19 @ 14:00):    No growth to date.    Culture - Blood (collected 10-15-19 @ 16:05)  Source: .Blood Blood  Preliminary Report (10-17-19 @ 01:01):    No growth to date.        Blood Gas Venous - Lactate: 1.9 mmoL/L (10-15-19 @ 16:11)  Lactate, Blood: 2.1 mmol/L (10-15-19 @ 15:48)      INFECTIOUS DISEASES TESTING  Hepatitis B Surface Antigen: Nonreact (09-21-19 @ 20:52)      RADIOLOGY & ADDITIONAL TESTS:  I have personally reviewed the last Chest xray  CXR  Xray Chest 1 View- PORTABLE-Urgent:   EXAM:  XR CHEST PORTABLE URGENT 1V            PROCEDURE DATE:  10/15/2019     Impression:      No radiographic evidence of acute cardiopulmonary disease.    CT  CT Abdomen and Pelvis w/ IV Cont:   EXAM:  CT ABDOMEN AND PELVIS IC            PROCEDURE DATE:  10/15/2019            INTERPRETATION:  CLINICAL HISTORY / REASON FOR EXAM: Abdominal pain.    TECHNIQUE: Contiguous axial CT images were obtained from the lower chest   to the pubic symphysis following administration of 100 mL Optiray 320   intravenous contrast. Oral contrast was administered. Reformatted images   in the coronal and sagittal planes were acquired.    COMPARISON CT: 7/13/2019.    IMPRESSION:  1.  Status post cholecystectomy. Mild hyperenhancement of the CBD wall   which may represent cholangitis in the appropriate clinical setting.   2.  Mild apparent mural thickening of the rectum which may be secondary  to underdistention or represent proctitis.     < from: US Abdomen Limited (10.15.19 @ 17:56) >    Status post cholecystectomy. Dilated CBD which can be seen   postcholecystectomy.    < end of copied text >        CARDIOLOGY TESTING  12 Lead ECG:   Ventricular Rate 88 BPM    Atrial Rate 101 BPM    QRS Duration 86 ms    Q-T Interval 392 ms    QTC Calculation(Bezet) 474 ms    R Axis 92 degrees    T Axis 82 degrees    Diagnosis Line Atrial fibrillation  Rightward axis  Possible Anterior infarct , age undetermined  Abnormal ECG    Confirmed by John Gamboa ([id number]) on 10/16/2019 9:52:32 AM (10-16-19 @ 04:41)  12 Lead ECG:   Ventricular Rate 104 BPM    Atrial Rate 220 BPM    QRS Duration 84 ms    Q-T Interval 358 ms    QTC Calculation(Bezet) 470 ms    R Axis 113 degrees    T Axis 34 degrees    Diagnosis Line Atrial fibrillation  Right axis deviation  Cannot rule out Anterior infarct , age undetermined  Abnormal ECG    Confirmed by PETERSON ERNST MD ([id number]) on 10/15/2019 7:09:19 PM (10-15-19 @ 15:08)      MEDICATIONS  amLODIPine   Tablet 5  aspirin  chewable 81  atorvastatin 40  chlorhexidine 4% Liquid 1  isosorbide   mononitrate ER Tablet (IMDUR) 30  losartan 25  meropenem  IVPB 500  metoprolol tartrate 25      ANTIBIOTICS:  meropenem  IVPB 500 milliGRAM(s) IV Intermittent every 24 hours MAMTA FERNANDO  69y, Male  Allergy: No Known Allergies      CHIEF COMPLAINT: acute abdominal pain with vomiting (18 Oct 2019 07:14)      HPI:  68 y/o m PMH HTN, HLD, afib on eliquis, ESRD on HD (Tuesday/Thursday/Sat) presented to the ED on 10/15 with  sudden, sharp, non-radiating RUQ abd pain with associated diaphoresis and nausea and vomiting while getting dialysis today.  Patient has recent history of cholangitis in July with stent insertion for drainage without sphincterotomy because patient was on Plavix at that time; 10/14 patient had another ERCP with removal of plastic stent and sphincterotomy. Patient presented to ED  with fever and chills  , nausea , vomiting and intermittent RUQ pain onset day of presentation.    In ED found to have HTN, EKG showed afib with controlled rate without signs of infarction, trop elevated at chronic level, had a fever and elevated lipase and LFTs, and CT showed post cholecystectomy changes. was given IV atbx and morphine for pain. Patient was upgraded to MICU for concern for post ERCP cholangitis; u/s showed CBD dilation and some sludge. Patient not septic on admission, elevated bp, no leukocytosis. Patient underwent ERCP on 10/17:  Previous sphincterotomy appeared open. Bile duct was swept and minimal debris was removed. Occlusion cholangiogram was performed and there were no filling defects. Currently patient is on meropenem with resolving symptoms.    FAMILY HISTORY:    PAST MEDICAL & SURGICAL HISTORY:  Afib  Anemia  Heart failure  HLD (hyperlipidemia)  HTN (hypertension)  ESRD on dialysis: T/ TH/ S  S/P cataract surgery  S/P arteriovenous (AV) fistula creation  H/O right coronary artery stent placement: stent x3          ROS  General: no fevers, chills  HEENT: Denies headache, rhinorrhea, sore throat, eye pain  CV: Denies CP, palpitations  PULM: Denies SOB, wheezing  GI: Denies hematemesis, hematochezia, melena, + mild abdominal discomfort  : Denies discharge, hematuria  MSK: Denies arthralgias, myalgias  SKIN: Denies rash, lesions  NEURO: Denies paresthesias, weakness      VITALS:  T(F): 96.9, Max: 98.4 (10-17-19 @ 15:10)  HR: 89  BP: 189/81  RR: 18Vital Signs Last 24 Hrs  T(C): 36.1 (18 Oct 2019 05:14), Max: 36.9 (17 Oct 2019 15:10)  T(F): 96.9 (18 Oct 2019 05:14), Max: 98.4 (17 Oct 2019 15:10)  HR: 89 (18 Oct 2019 05:14) (70 - 89)  BP: 189/81 (18 Oct 2019 05:14) (104/55 - 189/81)  BP(mean): 58 (17 Oct 2019 17:50) (58 - 58)  RR: 18 (18 Oct 2019 05:14) (18 - 20)  SpO2: 97% (17 Oct 2019 18:26) (97% - 100%)    PHYSICAL EXAM:  Gen: NAD, resting in bed  HEENT: Normocephalic, atraumatic  Neck: supple, no lymphadenopathy  CV: Regular rate & regular rhythm  Lungs: decreased BS at bases  Abdomen: Soft, non distended, BS present, no tenderness to palpation  Ext: Warm, well perfused  Neuro: non focal, awake      TESTS & MEASUREMENTS:                        8.2    2.86  )-----------( 153      ( 18 Oct 2019 07:13 )             26.3     10-18    140  |  99  |  30<H>  ----------------------------<  105<H>  4.7   |  26  |  6.3<HH>    Ca    8.7      18 Oct 2019 07:13    TPro  6.2  /  Alb  3.5  /  TBili  1.1  /  DBili  0.7<H>  /  AST  56<H>  /  ALT  57<H>  /  AlkPhos  194<H>  10-18    eGFR if Non African American: 8 mL/min/1.73M2 (10-18-19 @ 07:13)  eGFR if African American: 10 mL/min/1.73M2 (10-18-19 @ 07:13)    LIVER FUNCTIONS - ( 18 Oct 2019 09:00 )  Alb: 3.5 g/dL / Pro: 6.2 g/dL / ALK PHOS: 194 U/L / ALT: 57 U/L / AST: 56 U/L / GGT: x               Culture - Blood (collected 10-16-19 @ 05:16)  Source: .Blood None  Preliminary Report (10-17-19 @ 14:00):    No growth to date.    Culture - Blood (collected 10-15-19 @ 16:05)  Source: .Blood Blood  Preliminary Report (10-17-19 @ 01:01):    No growth to date.        Blood Gas Venous - Lactate: 1.9 mmoL/L (10-15-19 @ 16:11)  Lactate, Blood: 2.1 mmol/L (10-15-19 @ 15:48)      INFECTIOUS DISEASES TESTING  Hepatitis B Surface Antigen: Nonreact (09-21-19 @ 20:52)      RADIOLOGY & ADDITIONAL TESTS:  I have personally reviewed the last Chest xray  CXR  Xray Chest 1 View- PORTABLE-Urgent:   EXAM:  XR CHEST PORTABLE URGENT 1V            PROCEDURE DATE:  10/15/2019     Impression:      No radiographic evidence of acute cardiopulmonary disease.    CT  CT Abdomen and Pelvis w/ IV Cont:   EXAM:  CT ABDOMEN AND PELVIS IC        PROCEDURE DATE:  10/15/2019        IMPRESSION:  1.  Status post cholecystectomy. Mild hyperenhancement of the CBD wall   which may represent cholangitis in the appropriate clinical setting.   2.  Mild apparent mural thickening of the rectum which may be secondary  to underdistention or represent proctitis.     < from: US Abdomen Limited (10.15.19 @ 17:56) >    Status post cholecystectomy. Dilated CBD which can be seen   postcholecystectomy.    < end of copied text >        CARDIOLOGY TESTING  12 Lead ECG:   Ventricular Rate 88 BPM    Atrial Rate 101 BPM    QRS Duration 86 ms    Q-T Interval 392 ms    QTC Calculation(Bezet) 474 ms    R Axis 92 degrees    T Axis 82 degrees    Diagnosis Line Atrial fibrillation  Rightward axis  Possible Anterior infarct , age undetermined  Abnormal ECG    Confirmed by John Gamboa ([id number]) on 10/16/2019 9:52:32 AM (10-16-19 @ 04:41)  12 Lead ECG:   Ventricular Rate 104 BPM    Atrial Rate 220 BPM    QRS Duration 84 ms    Q-T Interval 358 ms    QTC Calculation(Bezet) 470 ms    R Axis 113 degrees    T Axis 34 degrees    Diagnosis Line Atrial fibrillation  Right axis deviation  Cannot rule out Anterior infarct , age undetermined  Abnormal ECG    Confirmed by PETERSON ERNST MD ([id number]) on 10/15/2019 7:09:19 PM (10-15-19 @ 15:08)      MEDICATIONS  amLODIPine   Tablet 5  aspirin  chewable 81  atorvastatin 40  chlorhexidine 4% Liquid 1  isosorbide   mononitrate ER Tablet (IMDUR) 30  losartan 25  meropenem  IVPB 500  metoprolol tartrate 25      ANTIBIOTICS:  meropenem  IVPB 500 milliGRAM(s) IV Intermittent every 24 hours

## 2019-10-18 NOTE — PROGRESS NOTE ADULT - ASSESSMENT
69M w PMH HTN, HLD, afib on Eliquis , ESRD on HD (Tuesday/Thursday/Sat), history of cholangitis in July treated with stent without sphincterotomy, in interim  had elective lap elba on 9/20, Then 10/14 patient had another ERCP for removal of plastic stent w sphincterotomy. Patient then presented 10/15 w fever, RUQ abd pain, nausea and vomiting while getting dialysis.  CT showed dilated CBD, possible cholangitis, lipase was 472, patient was admitted to ICU and given empiric ABX w Meropenem, IVF.  Since transferred to floor and doing well.     # Post ERCP cholangitis, Possible pancreatitis  s/p repeat ERCP on 10/17 and residual debris was swept from duct  GI recommends resume anticoag, adv diet, and f/u with [facility] outpatient       # ESRD  - s/p HD 10/17  renal following    # Afib:  - rate controlled  - c/w metoprolol  resume Eliquis           #Progress Note Handoff    Pending (specify):  monitor H/H and for ongoing dark stools next 24hrs    Family discussion: pt aao3/3    Disposition: Home_x__/SNF___/Other________/Unknown at this time________  tomorrow 69M w PMH HTN, HLD, afib on Eliquis , ESRD on HD (Tuesday/Thursday/Sat), history of cholangitis in July treated with stent without sphincterotomy, in interim  had elective lap elba on 9/20, Then 10/14 patient had another ERCP for removal of plastic stent w sphincterotomy. Patient then presented 10/15 w fever, RUQ abd pain, nausea and vomiting while getting dialysis.  CT showed dilated CBD, possible cholangitis, lipase was 472, patient was admitted to ICU and given empiric ABX w Meropenem, IVF.  Since transferred to floor and doing well.     # Post ERCP cholangitis, Possible pancreatitis  s/p repeat ERCP on 10/17 and residual debris was swept from duct  GI recommends resume anticoag, adv diet, and f/u with [facility] outpatient   f/u ID consult regarding dispo ABX recommendations    # ESRD  - s/p HD 10/17  renal following    # Afib:  - rate controlled  - c/w metoprolol  resume Eliquis     # uncontrolled HTN  on Imdur, losartan, MTP  amlodipine added for better control      #Progress Note Handoff    Pending (specify):  monitor H/H and for ongoing dark stools next 24hrs    Family discussion: pt aao3/3    Disposition: Home_x__/SNF___/Other________/Unknown at this time________  tomorrow

## 2019-10-18 NOTE — PROGRESS NOTE ADULT - SUBJECTIVE AND OBJECTIVE BOX
Gastroenterology progress note:     Patient is a 69y old  Male who presents with a chief complaint of acute abdominal pain with vomiting (18 Oct 2019 09:34)       Admitted on: 10-15-19    We are following the patient for: cholangitis     Interval History:  Pt had ERCP done yesterday. Sphincterotomy appeared open. Duct was swept multiple times with minimal debris removed.   Pt reports he feels very well today. Had some diarrhea with yellow and dark stool mixed together. Tolerating diet well.     Patient's medical problems are improving/stable/not improving/unstable/   Prior records reviewed (Y/N):  History obtained from someone other than patient (Y/N):      PAST MEDICAL & SURGICAL HISTORY:  Afib  Anemia  Heart failure  HLD (hyperlipidemia)  HTN (hypertension)  ESRD on dialysis: T/ TH/ S  S/P cataract surgery  S/P arteriovenous (AV) fistula creation  H/O right coronary artery stent placement: stent x3      MEDICATIONS  (STANDING):  amLODIPine   Tablet 5 milliGRAM(s) Oral daily  aspirin  chewable 81 milliGRAM(s) Oral daily  atorvastatin 40 milliGRAM(s) Oral at bedtime  chlorhexidine 4% Liquid 1 Application(s) Topical <User Schedule>  isosorbide   mononitrate ER Tablet (IMDUR) 30 milliGRAM(s) Oral daily  losartan 25 milliGRAM(s) Oral daily  meropenem  IVPB 500 milliGRAM(s) IV Intermittent every 24 hours  metoprolol tartrate 25 milliGRAM(s) Oral two times a day    MEDICATIONS  (PRN):  HYDROmorphone  Injectable 0.5 milliGRAM(s) IV Push every 4 hours PRN Severe Pain (7 - 10)  sodium chloride 0.65% Nasal 1 Spray(s) Both Nostrils three times a day PRN Nasal Congestion      Allergies  No Known Allergies      Review of Systems:   Cardiovascular:  No Chest Pain, No Palpitations  Respiratory:  No Cough, No Dyspnea  Gastrointestinal:  As described in HPI    Physical Examination:  T(C): 36.1 (10-18-19 @ 05:14), Max: 36.9 (10-17-19 @ 15:10)  HR: 89 (10-18-19 @ 05:14) (70 - 89)  BP: 189/81 (10-18-19 @ 05:14) (104/55 - 189/81)  RR: 18 (10-18-19 @ 05:14) (18 - 20)  SpO2: 97% (10-17-19 @ 18:26) (97% - 100%)  Weight (kg): 86 (10-17-19 @ 16:35)    10-17-19 @ 07:01  -  10-18-19 @ 07:00  --------------------------------------------------------  IN: 0 mL / OUT: 1500 mL / NET: -1500 mL      Constitutional: No acute distress.  Respiratory:  No signs of respiratory distress. Lung sounds are clear bilaterally.  Cardiovascular:  S1 S2, Regular rate and rhythm.  Abdominal: Abdomen is soft, symmetric, and non-tender without distention. There are no visible lesions or scars. Bowel sounds are present and normoactive in all four quadrants. No masses, hepatomegaly, or splenomegaly are noted.   Skin: No rashes, No Jaundice.        Data: (reviewed by attending)                        8.2    2.86  )-----------( 153      ( 18 Oct 2019 07:13 )             26.3     Hgb trend:  8.2  10-18-19 @ 07:13  8.0  10-17-19 @ 08:58  8.3  10-16-19 @ 05:16  9.0  10-15-19 @ 15:48        10-18    140  |  99  |  30<H>  ----------------------------<  105<H>  4.7   |  26  |  6.3<HH>    Ca    8.7      18 Oct 2019 07:13    TPro  6.2  /  Alb  3.5  /  TBili  1.1  /  DBili  0.7<H>  /  AST  56<H>  /  ALT  57<H>  /  AlkPhos  194<H>  10-18    Liver panel trend:  TBili 1.1   /   AST 56   /   ALT 57   /   AlkP 194   /   Tptn 6.2   /   Alb 3.5    /   DBili 0.7      10-18  TBili 1.7   /   AST 86   /   ALT 78   /   AlkP 198   /   Tptn 5.8   /   Alb 3.3    /   DBili --      10-17  TBili 2.9   /      /      /   AlkP 205   /   Tptn 5.8   /   Alb 3.5    /   DBili 2.4      10-16  TBili 2.8   /      /      /   AlkP 232   /   Tptn 6.9   /   Alb 4.0    /   DBili 2.1      10-15  TBili 0.5   /   AST 17   /   ALT 8   /   AlkP 98   /   Tptn 7.0   /   Alb 4.2    /   DBili --      10-14          Culture - Blood (collected 16 Oct 2019 05:16)  Source: .Blood None  Preliminary Report (17 Oct 2019 14:00):    No growth to date.    Culture - Blood (collected 15 Oct 2019 16:05)  Source: .Blood Blood  Preliminary Report (17 Oct 2019 01:01):    No growth to date.         Radiology: (reviewed by attending)

## 2019-10-18 NOTE — PROGRESS NOTE ADULT - SUBJECTIVE AND OBJECTIVE BOX
SUBJECTIVE:  Patient without abdominal pain, tolerating diet.   Underwent ERCP yest and minimal debris was swept from his duct.    Patient did c/o some loose black stools since procedure yesterday.     *********************** VITALS ******************************************  Vital Signs Last 24 Hrs  T(C): 36.4 (18 Oct 2019 12:21), Max: 36.9 (17 Oct 2019 15:10)  T(F): 97.6 (18 Oct 2019 12:21), Max: 98.4 (17 Oct 2019 15:10)  HR: 74 (18 Oct 2019 12:21) (70 - 89)  BP: 153/71 (18 Oct 2019 12:21) (104/55 - 189/81)  BP(mean): 58 (17 Oct 2019 17:50) (58 - 58)  RR: 18 (18 Oct 2019 12:21) (18 - 20)  SpO2: 97% (17 Oct 2019 18:26) (97% - 100%)    ******************************** PHYSICAL EXAM:**************************************************  GENERAL:  NAD, obese    PSYCH: no agitation     HEENT:  Sclerae anicteric, EOMI     NERVOUS SYSTEM:  Alert & Oriented X3     PULMONARY:  breathing comfortably    CARDIOVASCULAR:  RRR     ABDOMEN: Soft, NT, ND,     EXTREMITIES:  warm        LABS:                                     8.2    2.86  )-----------( 153      ( 18 Oct 2019 07:13 )             26.3     10-18    140  |  99  |  30<H>  ----------------------------<  105<H>  4.7   |  26  |  6.3<HH>    Ca    8.7      18 Oct 2019 07:13    TPro  6.2  /  Alb  3.5  /  TBili  1.1  /  DBili  0.7<H>  /  AST  56<H>  /  ALT  57<H>  /  AlkPhos  194<H>  10-18      Culture - Blood (collected 16 Oct 2019 05:16)  Source: .Blood None  Preliminary Report (17 Oct 2019 14:00):    No growth to date.    Culture - Blood (collected 15 Oct 2019 16:05)  Source: .Blood Blood  Preliminary Report (17 Oct 2019 01:01):    No growth to date.

## 2019-10-18 NOTE — PROGRESS NOTE ADULT - SUBJECTIVE AND OBJECTIVE BOX
DIAGNOSIS:   HOSPITAL DAY #:    STATUS POST:    POST OPERATIVE DAY #:     Vital Signs Last 24 Hrs  T(C): 35.8 (18 Oct 2019 21:00), Max: 36.9 (18 Oct 2019 01:00)  T(F): 96.4 (18 Oct 2019 21:00), Max: 98.4 (18 Oct 2019 01:00)  HR: 70 (18 Oct 2019 21:00) (70 - 89)  BP: 152/- (18 Oct 2019 21:00) (152/- - 189/81)  BP(mean): --  RR: 18 (18 Oct 2019 21:00) (18 - 18)  SpO2: --    SUBJECTIVE: Pt seen    Pain: YES  [ ]   NO [ ]   Nausea: [ ] YES [ ] NO           Vomiting: [ ] YES [ ] NO  Flatus: [ ] YES [ ] NO             Bowel Movement: [ ] YES [ ] NO     Void: [ ]YES [ ]No      DARSHANA DRAINAGE: SIGNIFICANT [ ]   NOT SIGNIFICANT [ ]   NOT APPLICABLE [ ]  YES [ ] NO    General Appearance: Appears well, NAD  Neck: Supple no infection  Chest: Equal expansion bilaterally, equal breath sounds  CV: Pulse regular presently  Abdomen: Soft [x ] YES [ ]NO  DISTENDED [ ] YES [x ] NO TENDERNESS [ ]YES [ ]NO  INCISIONS: HEALING WELL [ ] YES  [ ] NO ERYTHEMA [ ] YES [ ] NO DRAINAGE [ ] YES  [ ] NO  Extremities: Grossly symmetric, CALF TENDERNESS [ ] YES  [ ] NO      LABS:                        8.2    2.86  )-----------( 153      ( 18 Oct 2019 07:13 )             26.3     10-18    140  |  99  |  30<H>  ----------------------------<  105<H>  4.7   |  26  |  6.3<HH>    Ca    8.7      18 Oct 2019 07:13    TPro  6.2  /  Alb  3.5  /  TBili  1.1  /  DBili  0.7<H>  /  AST  56<H>  /  ALT  57<H>  /  AlkPhos  194<H>  10-18            ASSESSMENT:     GOOD POST OP COURSE [ ]  YES  [ ] NO  CONDITION IMPROVING  []  YES  [ ]  NO          PLAN: will discuss case with GI    CONTINUE PRESENT MANAGEMENT  [ ] YES  [ ] NO

## 2019-10-19 ENCOUNTER — TRANSCRIPTION ENCOUNTER (OUTPATIENT)
Age: 69
End: 2019-10-19

## 2019-10-19 VITALS
SYSTOLIC BLOOD PRESSURE: 156 MMHG | HEART RATE: 77 BPM | TEMPERATURE: 96 F | RESPIRATION RATE: 18 BRPM | DIASTOLIC BLOOD PRESSURE: 74 MMHG

## 2019-10-19 DIAGNOSIS — N18.6 END STAGE RENAL DISEASE: ICD-10-CM

## 2019-10-19 DIAGNOSIS — I10 ESSENTIAL (PRIMARY) HYPERTENSION: ICD-10-CM

## 2019-10-19 DIAGNOSIS — K21.9 GASTRO-ESOPHAGEAL REFLUX DISEASE WITHOUT ESOPHAGITIS: ICD-10-CM

## 2019-10-19 DIAGNOSIS — Z00.00 ENCOUNTER FOR GENERAL ADULT MEDICAL EXAMINATION WITHOUT ABNORMAL FINDINGS: ICD-10-CM

## 2019-10-19 LAB
ANION GAP SERPL CALC-SCNC: 19 MMOL/L — HIGH (ref 7–14)
BUN SERPL-MCNC: 41 MG/DL — HIGH (ref 10–20)
CALCIUM SERPL-MCNC: 9 MG/DL — SIGNIFICANT CHANGE UP (ref 8.5–10.1)
CHLORIDE SERPL-SCNC: 97 MMOL/L — LOW (ref 98–110)
CO2 SERPL-SCNC: 24 MMOL/L — SIGNIFICANT CHANGE UP (ref 17–32)
CREAT SERPL-MCNC: 7.8 MG/DL — CRITICAL HIGH (ref 0.7–1.5)
GLUCOSE SERPL-MCNC: 115 MG/DL — HIGH (ref 70–99)
HCT VFR BLD CALC: 28.4 % — LOW (ref 42–52)
HGB BLD-MCNC: 9 G/DL — LOW (ref 14–18)
MCHC RBC-ENTMCNC: 29.2 PG — SIGNIFICANT CHANGE UP (ref 27–31)
MCHC RBC-ENTMCNC: 31.7 G/DL — LOW (ref 32–37)
MCV RBC AUTO: 92.2 FL — SIGNIFICANT CHANGE UP (ref 80–94)
NRBC # BLD: 0 /100 WBCS — SIGNIFICANT CHANGE UP (ref 0–0)
PLATELET # BLD AUTO: 202 K/UL — SIGNIFICANT CHANGE UP (ref 130–400)
POTASSIUM SERPL-MCNC: 4 MMOL/L — SIGNIFICANT CHANGE UP (ref 3.5–5)
POTASSIUM SERPL-SCNC: 4 MMOL/L — SIGNIFICANT CHANGE UP (ref 3.5–5)
RBC # BLD: 3.08 M/UL — LOW (ref 4.7–6.1)
RBC # FLD: 13.9 % — SIGNIFICANT CHANGE UP (ref 11.5–14.5)
SODIUM SERPL-SCNC: 140 MMOL/L — SIGNIFICANT CHANGE UP (ref 135–146)
WBC # BLD: 5.46 K/UL — SIGNIFICANT CHANGE UP (ref 4.8–10.8)
WBC # FLD AUTO: 5.46 K/UL — SIGNIFICANT CHANGE UP (ref 4.8–10.8)

## 2019-10-19 PROCEDURE — 99238 HOSP IP/OBS DSCHRG MGMT 30/<: CPT

## 2019-10-19 RX ORDER — METRONIDAZOLE 500 MG
1 TABLET ORAL
Qty: 14 | Refills: 0
Start: 2019-10-19 | End: 2019-10-25

## 2019-10-19 RX ORDER — CEFEPIME 1 G/1
1 INJECTION, POWDER, FOR SOLUTION INTRAMUSCULAR; INTRAVENOUS
Qty: 3 | Refills: 0
Start: 2019-10-19 | End: 2019-10-21

## 2019-10-19 RX ORDER — AMLODIPINE BESYLATE 2.5 MG/1
1 TABLET ORAL
Qty: 30 | Refills: 0
Start: 2019-10-19 | End: 2019-11-17

## 2019-10-19 RX ADMIN — Medication 81 MILLIGRAM(S): at 12:08

## 2019-10-19 RX ADMIN — AMLODIPINE BESYLATE 5 MILLIGRAM(S): 2.5 TABLET ORAL at 05:35

## 2019-10-19 RX ADMIN — LOSARTAN POTASSIUM 25 MILLIGRAM(S): 100 TABLET, FILM COATED ORAL at 05:35

## 2019-10-19 RX ADMIN — APIXABAN 5 MILLIGRAM(S): 2.5 TABLET, FILM COATED ORAL at 05:35

## 2019-10-19 RX ADMIN — Medication 1 DROP(S): at 13:27

## 2019-10-19 RX ADMIN — Medication 1 DROP(S): at 05:35

## 2019-10-19 RX ADMIN — Medication 25 MILLIGRAM(S): at 05:35

## 2019-10-19 RX ADMIN — ISOSORBIDE MONONITRATE 30 MILLIGRAM(S): 60 TABLET, EXTENDED RELEASE ORAL at 12:08

## 2019-10-19 NOTE — PROGRESS NOTE ADULT - PROVIDER SPECIALTY LIST ADULT
Hospitalist
Internal Medicine
Internal Medicine
MICU
Nephrology
Nephrology
Surgery
Internal Medicine
Gastroenterology

## 2019-10-19 NOTE — DISCHARGE NOTE PROVIDER - HOSPITAL COURSE
68 y/o m PMH HTN, HLD, afib on Eliquis ESRD on HD (Tuesday/Thursday/Sat), history of cholangitis in July with stent insertion for drainage without sphincterotomy because patient was on Plavix at that time, Pt then had elective lap elba on 9/20. Then 10/14 patient had another ERCP with removal of plastic stent and sphincterotomy. Patient then presented fever, RUQ abd pain, nausea and vomiting while getting dialysis.         In ED patient was hemodynamically stable.  denied any abdominal pain or nausea, but Labs showed elevated lipase and LFTs, and CT showed post cholecystectomy changes. was given IV atbx and morphine for pain.        In ICU, Pt still remains hemodynamically stable and asymptomatic. LFT's and Alkphos decreasing.     GI is planning on repeat ERCP            # Acute RUQ pain with nausea and vomiting present on admission likely secondary to post ERCP pancreatitis vs cholangitis    -- hemodynamically stable, no fever, no leucocytosis    -- LFT's downtrending    -- iv abx: meropenem D4. ID on board --> d/c on cefepime for 1 week and flagyl    -- off fluids    -- pain control:  iv dilaudid prn    -- ERCP with GI done --> Previous sphincterotomy appeared open. Bile duct was swept and minimal debris was removed. Occlusion cholangiogram was performed and there were no filling defects.    - can resume diet         # ESRD:    - s/p HD 10/17    - f/u with renal - consulted.         # Afib:    - rate controlled    - c/w metoprolol    - resumed eliquis        #HTN    - on losartan and metoprolol    - added amlodipine 5 mg. will increase it to 10 per nephrology        # DVT ppx: eliquis    Full code    ambulate as tolerated      Discharge today

## 2019-10-19 NOTE — DISCHARGE NOTE PROVIDER - CARE PROVIDER_API CALL
Wilber Guaman (DO)  Medicine  Physicians  [address] [place] [id number]  Phone: [phone]  Fax: [phone]  Follow Up Time: 1 week    Holly Estes)  Gastroenterology; Internal Medicine  [address] [place] [address]  Phone: [phone]  Fax: [phone]  Follow Up Time: 1 week

## 2019-10-19 NOTE — DISCHARGE NOTE NURSING/CASE MANAGEMENT/SOCIAL WORK - PATIENT PORTAL LINK FT
You can access the FollowMyHealth Patient Portal offered by [facility] by registering at the following website: [email or web address]. By joining [employer]’s FollowMyHealth portal, you will also be able to view your health information using other applications (apps) compatible with our system.

## 2019-10-19 NOTE — PROGRESS NOTE ADULT - SUBJECTIVE AND OBJECTIVE BOX
MAMTA FERNANDO 69y Male  MRN#: [id number]         SUBJECTIVE  Patient is a 69y old Male who presents with a chief complaint of acute abdominal pain with vomiting (19 Oct 2019 07:05)  Currently admitted to medicine with the primary diagnosis of Cholangitis    doing well today with no pain. has dialysis today        OBJECTIVE  PAST MEDICAL & SURGICAL HISTORY  Afib  Anemia  Heart failure  HLD (hyperlipidemia)  HTN (hypertension)  ESRD on dialysis: T/ TH/ S  S/P cataract surgery  S/P arteriovenous (AV) fistula creation  H/O right coronary artery stent placement: stent x3    ALLERGIES:  No Known Allergies    MEDICATIONS:  STANDING MEDICATIONS  amLODIPine   Tablet 5 milliGRAM(s) Oral daily  apixaban 5 milliGRAM(s) Oral two times a day  artificial  tears Solution 1 Drop(s) Both EYES three times a day  aspirin  chewable 81 milliGRAM(s) Oral daily  atorvastatin 40 milliGRAM(s) Oral at bedtime  chlorhexidine 4% Liquid 1 Application(s) Topical <User Schedule>  isosorbide   mononitrate ER Tablet (IMDUR) 30 milliGRAM(s) Oral daily  losartan 25 milliGRAM(s) Oral daily  meropenem  IVPB 500 milliGRAM(s) IV Intermittent every 24 hours  metoprolol tartrate 25 milliGRAM(s) Oral two times a day    PRN MEDICATIONS  HYDROmorphone  Injectable 0.5 milliGRAM(s) IV Push every 4 hours PRN  sodium chloride 0.65% Nasal 1 Spray(s) Both Nostrils three times a day PRN      VITAL SIGNS: Last 24 Hours  T(C): 35.8 (19 Oct 2019 05:29), Max: 36.4 (18 Oct 2019 12:21)  T(F): 96.5 (19 Oct 2019 05:29), Max: 97.6 (18 Oct 2019 12:21)  HR: 76 (19 Oct 2019 07:50) (70 - 76)  BP: 184/85 (19 Oct 2019 07:50) (152/- - 184/85)  BP(mean): --  RR: 18 (19 Oct 2019 07:50) (18 - 18)  SpO2: --    LABS:                        8.2    2.86  )-----------( 153      ( 18 Oct 2019 07:13 )             26.3     10-18    140  |  99  |  30<H>  ----------------------------<  105<H>  4.7   |  26  |  6.3<HH>    Ca    8.7      18 Oct 2019 07:13    TPro  6.2  /  Alb  3.5  /  TBili  1.1  /  DBili  0.7<H>  /  AST  56<H>  /  ALT  57<H>  /  AlkPhos  194<H>  10-18                  RADIOLOGY:          Physical Exam:  General: In NAD   HEENT:  GERTRUDE              Lymphatic system: No cervical LN   Lungs: Bilateral BS  Cardiovascular: Irregular  Gastrointestinal: Soft, Positive BS  Musculoskeletal: No clubbing.  Moves all extremities.  Full range of motion   Skin: Warm.  Intact  Neurological: No motor deficit             ASSESSMENT & PLAN  68 y/o m PMH HTN, HLD, afib on Eliquis ESRD on HD (Tuesday/Thursday/Sat), history of cholangitis in July with stent insertion for drainage without sphincterotomy because patient was on Plavix at that time, Pt then had elective lap elba on 9/20. Then 10/14 patient had another ERCP with removal of plastic stent and sphincterotomy. Patient then presented fever, RUQ abd pain, nausea and vomiting while getting dialysis.     In ED patient was hemodynamically stable.  denied any abdominal pain or nausea, but Labs showed elevated lipase and LFTs, and CT showed post cholecystectomy changes. was given IV atbx and morphine for pain.    In ICU, Pt still remains hemodynamically stable and asymptomatic. LFT's and Alkphos decreasing.   GI is planning on repeat ERCP tomorrow 10/17.       # Acute RUQ pain with nausea and vomiting present on admission likely secondary to post ERCP pancreatitis vs cholangitis  -- hemodynamically stable, no fever, no leucocytosis  -- LFT's downtrending  -- iv abx: meropenem D4. ID on board --> d/c on cefepime for 1 week and flagyl  -- off fluids  -- pain control:  iv dilaudid prn  -- ERCP with GI done --> Previous sphincterotomy appeared open. Bile duct was swept and minimal debris was removed. Occlusion cholangiogram was performed and there were no filling defects.  - can resume diet     # ESRD:  - s/p HD 10/17  - f/u with renal - consulted.     # Afib:  - rate controlled  - c/w metoprolol  - resumed eliquis    #HTN  - on losartan and metoprolol  - added amlodipine 5 mg. will increase it to 10 per nephrology    # DVT ppx: eliquis  Full code  ambulate as tolerated    Discharge today

## 2019-10-19 NOTE — DISCHARGE NOTE PROVIDER - NSDCCPCAREPLAN_GEN_ALL_CORE_FT
PRINCIPAL DISCHARGE DIAGNOSIS  Diagnosis: Cholangitis  Assessment and Plan of Treatment: you had an infection of your bile duct of the liver. continue your antibiotics as prescribed and follow up with gastroenterology      SECONDARY DISCHARGE DIAGNOSES  Diagnosis: End stage renal disease  Assessment and Plan of Treatment: continue hemodialysis

## 2019-10-19 NOTE — PROGRESS NOTE ADULT - ASSESSMENT
68 yo man with PMH of A fib , ESRD on HD , CHF , HTN , DLD , CAd sp cholecystectomy presenting with abdominal pain , elevated LFTs ? cholangitis and elevated lipase   # ESRD : hd today, standard bath, uf 2 liters as tolerated , av fistula   # if BP remains elevated after HD , increase amlodipine to 10   #Cholangitis sp ERCP  on merrem now  ID on case   # neutropenia , due to ? , check repeat today   # last ph level at goal , no binders   # h/h noted  , no venofer elevated sat and ferritin, start darbo 20 with hd weekly  # will follow

## 2019-10-19 NOTE — PROGRESS NOTE ADULT - REASON FOR ADMISSION
acute abdominal pain with vomiting

## 2019-10-19 NOTE — PROGRESS NOTE ADULT - SUBJECTIVE AND OBJECTIVE BOX
SUBJECTIVE:  Patient without major complaints, ready for DC        *********************** VITALS ******************************************  Vital Signs Last 24 Hrs  T(C): 35.8 (19 Oct 2019 05:29), Max: 36.4 (18 Oct 2019 12:21)  T(F): 96.5 (19 Oct 2019 05:29), Max: 97.6 (18 Oct 2019 12:21)  HR: 76 (19 Oct 2019 07:50) (70 - 76)  BP: 184/85 (19 Oct 2019 07:50) (152/- - 184/85)  BP(mean): --  RR: 18 (19 Oct 2019 07:50) (18 - 18)  SpO2: --    ******************************** PHYSICAL EXAM:**************************************************  GENERAL:  NAD, obese    PSYCH: no agitation     HEENT:  Sclerae anicteric, EOMI     NERVOUS SYSTEM:  Alert & Oriented X3     PULMONARY:  breathing comfortably    CARDIOVASCULAR:  RRR     ABDOMEN: Soft, NT, ND,     EXTREMITIES:  warm

## 2019-10-19 NOTE — PROGRESS NOTE ADULT - ASSESSMENT
69M w PMH HTN, HLD, afib on Eliquis , ESRD on HD (Tuesday/Thursday/Sat), history of cholangitis in July treated with stent without sphincterotomy, in interim  had elective lap elba on 9/20, Then 10/14 patient had another ERCP for removal of plastic stent w sphincterotomy. Patient then presented 10/15 w fever, RUQ abd pain, nausea and vomiting while getting dialysis.  CT showed dilated CBD, possible cholangitis, lipase was 472, patient was admitted to ICU and given empiric ABX w Meropenem, IVF.  Since transferred to floor and doing well.     # Post ERCP cholangitis, Possible pancreatitis  s/p repeat ERCP on 10/17 and residual debris was swept from duct  GI recommends resume anticoag, adv diet, and f/u with [facility] outpatient   Patient to remain on Cefepime post HD for 1 week and PO flagyl for 1 week as per ID  DC home today    # ESRD  - s/p HD 10/17  renal following    # Afib:  - rate controlled  - c/w metoprolol  resume Eliquis     # uncontrolled HTN  on Imdur, losartan, MTP  amlodipine added for better control, continue at home      #Progress Note Handoff    Pending (specify):  none    Family discussion: pt aao3/3    Disposition: Home_x__/SNF___/Other________/Unknown at this time________  today

## 2019-10-19 NOTE — DISCHARGE NOTE PROVIDER - PROVIDER TOKENS
PROVIDER:[TOKEN:[[id number]],FOLLOWUP:[1 week]],PROVIDER:[TOKEN:[[id number]],FOLLOWUP:[1 week]]

## 2019-10-19 NOTE — DISCHARGE NOTE PROVIDER - NSDCFUSCHEDAPPT_GEN_ALL_CORE_FT
MAMTA FERNANDO ; 10/24/2019 ; [facility]  MAMTA FERNANDO ; 01/06/2020 ; [facility] [address] MAMTA FERNANDO ; 10/24/2019 ; [facility]  MAMTA FERNANDO ; 01/06/2020 ; [facility] [address]

## 2019-10-19 NOTE — PROGRESS NOTE ADULT - SUBJECTIVE AND OBJECTIVE BOX
seen and examined  no distress  lying comfortable       Standing Inpatient Medications  amLODIPine   Tablet 5 milliGRAM(s) Oral daily  apixaban 5 milliGRAM(s) Oral two times a day  artificial  tears Solution 1 Drop(s) Both EYES three times a day  aspirin  chewable 81 milliGRAM(s) Oral daily  atorvastatin 40 milliGRAM(s) Oral at bedtime  chlorhexidine 4% Liquid 1 Application(s) Topical <User Schedule>  isosorbide   mononitrate ER Tablet (IMDUR) 30 milliGRAM(s) Oral daily  losartan 25 milliGRAM(s) Oral daily  meropenem  IVPB 500 milliGRAM(s) IV Intermittent every 24 hours  metoprolol tartrate 25 milliGRAM(s) Oral two times a day    PRN Inpatient Medications  HYDROmorphone  Injectable 0.5 milliGRAM(s) IV Push every 4 hours PRN  sodium chloride 0.65% Nasal 1 Spray(s) Both Nostrils three times a day PRN      VITALS/PHYSICAL EXAM  --------------------------------------------------------------------------------  T(C): 35.8 (10-19-19 @ 05:29), Max: 36.4 (10-18-19 @ 12:21)  HR: 75 (10-19-19 @ 05:29) (70 - 75)  BP: 165/88 (10-19-19 @ 05:29) (152/- - 165/88)  RR: 18 (10-19-19 @ 05:29) (18 - 18)  SpO2: --  Wt(kg): --  Height (cm): 167.64 (10-17-19 @ 16:35)  Weight (kg): 86 (10-17-19 @ 16:35)  BMI (kg/m2): 30.6 (10-17-19 @ 16:35)  BSA (m2): 1.96 (10-17-19 @ 16:35)      10-18-19 @ 07:01  -  10-19-19 @ 07:00  --------------------------------------------------------  IN: 360 mL / OUT: 0 mL / NET: 360 mL      Physical Exam:  	Gen: NAD  	Pulm: decrease BS B/L  	CV:  S1S2; no rub  	Abd: +distended  	LE:  no edema  	Vascular access: av fistula     LABS/STUDIES  --------------------------------------------------------------------------------              8.2    2.86  >-----------<  153      [10-18-19 @ 07:13]              26.3     140  |  99  |  30  ----------------------------<  105      [10-18-19 @ 07:13]  4.7   |  26  |  6.3        Ca     8.7     [10-18-19 @ 07:13]    TPro  6.2  /  Alb  3.5  /  TBili  1.1  /  DBili  0.7  /  AST  56  /  ALT  57  /  AlkPhos  194  [10-18-19 @ 09:00]          Creatinine Trend:  SCr 6.3 [10-18 @ 07:13]  SCr 6.3 [10-17 @ 08:58]  SCr 6.7 [10-16 @ 05:16]  SCr 6.1 [10-15 @ 15:48]  SCr 7.2 [10-14 @ 16:05]        Iron 95, TIBC 134, %sat 71      [07-16-19 @ 06:20]  Ferritin 2507      [07-16-19 @ 06:20]  PTH -- (Ca 7.3)      [07-16-19 @ 05:40]   430  HbA1c 5.6      [09-18-19 @ 07:12]    HBsAb Nonreact      [09-21-19 @ 20:52]  HBsAg Nonreact      [09-21-19 @ 20:52]  HBcAb Reactive      [09-21-19 @ 20:52]  HCV 0.13, Nonreact      [09-21-19 @ 20:52]

## 2019-10-21 LAB
CULTURE RESULTS: SIGNIFICANT CHANGE UP
CULTURE RESULTS: SIGNIFICANT CHANGE UP
SPECIMEN SOURCE: SIGNIFICANT CHANGE UP
SPECIMEN SOURCE: SIGNIFICANT CHANGE UP

## 2019-10-23 ENCOUNTER — OTHER (OUTPATIENT)
Age: 69
End: 2019-10-23

## 2019-10-23 DIAGNOSIS — D70.9 NEUTROPENIA, UNSPECIFIED: ICD-10-CM

## 2019-10-23 DIAGNOSIS — Z90.49 ACQUIRED ABSENCE OF OTHER SPECIFIED PARTS OF DIGESTIVE TRACT: ICD-10-CM

## 2019-10-23 DIAGNOSIS — N18.6 END STAGE RENAL DISEASE: ICD-10-CM

## 2019-10-23 DIAGNOSIS — I25.10 ATHEROSCLEROTIC HEART DISEASE OF NATIVE CORONARY ARTERY WITHOUT ANGINA PECTORIS: ICD-10-CM

## 2019-10-23 DIAGNOSIS — R10.11 RIGHT UPPER QUADRANT PAIN: ICD-10-CM

## 2019-10-23 DIAGNOSIS — K91.89 OTHER POSTPROCEDURAL COMPLICATIONS AND DISORDERS OF DIGESTIVE SYSTEM: ICD-10-CM

## 2019-10-23 DIAGNOSIS — E78.5 HYPERLIPIDEMIA, UNSPECIFIED: ICD-10-CM

## 2019-10-23 DIAGNOSIS — I50.9 HEART FAILURE, UNSPECIFIED: ICD-10-CM

## 2019-10-23 DIAGNOSIS — K83.09 OTHER CHOLANGITIS: ICD-10-CM

## 2019-10-23 DIAGNOSIS — Z79.82 LONG TERM (CURRENT) USE OF ASPIRIN: ICD-10-CM

## 2019-10-23 DIAGNOSIS — Z99.2 DEPENDENCE ON RENAL DIALYSIS: ICD-10-CM

## 2019-10-23 DIAGNOSIS — K85.90 ACUTE PANCREATITIS WITHOUT NECROSIS OR INFECTION, UNSPECIFIED: ICD-10-CM

## 2019-10-23 DIAGNOSIS — I48.91 UNSPECIFIED ATRIAL FIBRILLATION: ICD-10-CM
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
## 2019-10-23 DIAGNOSIS — I13.0 HYPERTENSIVE HEART AND CHRONIC KIDNEY DISEASE WITH HEART FAILURE AND STAGE 1 THROUGH STAGE 4 CHRONIC KIDNEY DISEASE, OR UNSPECIFIED CHRONIC KIDNEY DISEASE: ICD-10-CM

## 2019-10-23 DIAGNOSIS — Z95.5 PRESENCE OF CORONARY ANGIOPLASTY IMPLANT AND GRAFT: ICD-10-CM

## 2019-10-23 LAB — HCV RNA SERPL QL NAA+PROBE: SIGNIFICANT CHANGE UP

## 2019-10-24 ENCOUNTER — INPATIENT (INPATIENT)
Facility: HOSPITAL | Age: 69
LOS: 12 days | Discharge: HOME | End: 2019-11-06
Attending: INTERNAL MEDICINE | Admitting: INTERNAL MEDICINE
Payer: MEDICAID

## 2019-10-24 VITALS
SYSTOLIC BLOOD PRESSURE: 132 MMHG | OXYGEN SATURATION: 98 % | TEMPERATURE: 98 F | HEART RATE: 107 BPM | RESPIRATION RATE: 18 BRPM | DIASTOLIC BLOOD PRESSURE: 56 MMHG

## 2019-10-24 DIAGNOSIS — I48.91 UNSPECIFIED ATRIAL FIBRILLATION: ICD-10-CM

## 2019-10-24 DIAGNOSIS — D62 ACUTE POSTHEMORRHAGIC ANEMIA: ICD-10-CM

## 2019-10-24 DIAGNOSIS — Z28.21 IMMUNIZATION NOT CARRIED OUT BECAUSE OF PATIENT REFUSAL: ICD-10-CM

## 2019-10-24 DIAGNOSIS — K22.70 BARRETT'S ESOPHAGUS WITHOUT DYSPLASIA: ICD-10-CM

## 2019-10-24 DIAGNOSIS — Z79.82 LONG TERM (CURRENT) USE OF ASPIRIN: ICD-10-CM

## 2019-10-24 DIAGNOSIS — K20.8 OTHER ESOPHAGITIS: ICD-10-CM

## 2019-10-24 DIAGNOSIS — D64.9 ANEMIA, UNSPECIFIED: ICD-10-CM

## 2019-10-24 DIAGNOSIS — D12.2 BENIGN NEOPLASM OF ASCENDING COLON: ICD-10-CM

## 2019-10-24 DIAGNOSIS — I13.2 HYPERTENSIVE HEART AND CHRONIC KIDNEY DISEASE WITH HEART FAILURE AND WITH STAGE 5 CHRONIC KIDNEY DISEASE, OR END STAGE RENAL DISEASE: ICD-10-CM

## 2019-10-24 DIAGNOSIS — R26.89 OTHER ABNORMALITIES OF GAIT AND MOBILITY: ICD-10-CM

## 2019-10-24 DIAGNOSIS — K63.89 OTHER SPECIFIED DISEASES OF INTESTINE: ICD-10-CM

## 2019-10-24 DIAGNOSIS — K29.70 GASTRITIS, UNSPECIFIED, WITHOUT BLEEDING: ICD-10-CM

## 2019-10-24 DIAGNOSIS — Z98.49 CATARACT EXTRACTION STATUS, UNSPECIFIED EYE: Chronic | ICD-10-CM

## 2019-10-24 DIAGNOSIS — N18.6 END STAGE RENAL DISEASE: ICD-10-CM

## 2019-10-24 DIAGNOSIS — Z95.5 PRESENCE OF CORONARY ANGIOPLASTY IMPLANT AND GRAFT: Chronic | ICD-10-CM

## 2019-10-24 DIAGNOSIS — Z99.2 DEPENDENCE ON RENAL DIALYSIS: ICD-10-CM

## 2019-10-24 DIAGNOSIS — I95.1 ORTHOSTATIC HYPOTENSION: ICD-10-CM

## 2019-10-24 DIAGNOSIS — K92.2 GASTROINTESTINAL HEMORRHAGE, UNSPECIFIED: ICD-10-CM

## 2019-10-24 DIAGNOSIS — Z90.49 ACQUIRED ABSENCE OF OTHER SPECIFIED PARTS OF DIGESTIVE TRACT: ICD-10-CM

## 2019-10-24 DIAGNOSIS — E78.5 HYPERLIPIDEMIA, UNSPECIFIED: ICD-10-CM

## 2019-10-24 DIAGNOSIS — K57.90 DIVERTICULOSIS OF INTESTINE, PART UNSPECIFIED, WITHOUT PERFORATION OR ABSCESS WITHOUT BLEEDING: ICD-10-CM

## 2019-10-24 DIAGNOSIS — M48.061 SPINAL STENOSIS, LUMBAR REGION WITHOUT NEUROGENIC CLAUDICATION: ICD-10-CM

## 2019-10-24 DIAGNOSIS — Z95.5 PRESENCE OF CORONARY ANGIOPLASTY IMPLANT AND GRAFT: ICD-10-CM

## 2019-10-24 DIAGNOSIS — I25.10 ATHEROSCLEROTIC HEART DISEASE OF NATIVE CORONARY ARTERY WITHOUT ANGINA PECTORIS: ICD-10-CM

## 2019-10-24 DIAGNOSIS — Z98.890 OTHER SPECIFIED POSTPROCEDURAL STATES: Chronic | ICD-10-CM

## 2019-10-24 DIAGNOSIS — Z87.891 PERSONAL HISTORY OF NICOTINE DEPENDENCE: ICD-10-CM

## 2019-10-24 DIAGNOSIS — R42 DIZZINESS AND GIDDINESS: ICD-10-CM

## 2019-10-24 DIAGNOSIS — I50.9 HEART FAILURE, UNSPECIFIED: ICD-10-CM

## 2019-10-24 DIAGNOSIS — Z79.01 LONG TERM (CURRENT) USE OF ANTICOAGULANTS: ICD-10-CM

## 2019-10-24 LAB
ALBUMIN SERPL ELPH-MCNC: 3.6 G/DL — SIGNIFICANT CHANGE UP (ref 3.5–5.2)
ALP SERPL-CCNC: 114 U/L — SIGNIFICANT CHANGE UP (ref 30–115)
ALT FLD-CCNC: 13 U/L — SIGNIFICANT CHANGE UP (ref 0–41)
ANION GAP SERPL CALC-SCNC: 18 MMOL/L — HIGH (ref 7–14)
ANISOCYTOSIS BLD QL: SLIGHT — SIGNIFICANT CHANGE UP
APTT BLD: 30.6 SEC — SIGNIFICANT CHANGE UP (ref 27–39.2)
AST SERPL-CCNC: 25 U/L — SIGNIFICANT CHANGE UP (ref 0–41)
BASOPHILS # BLD AUTO: 0.06 K/UL — SIGNIFICANT CHANGE UP (ref 0–0.2)
BASOPHILS NFR BLD AUTO: 0.8 % — SIGNIFICANT CHANGE UP (ref 0–1)
BILIRUB DIRECT SERPL-MCNC: 0.3 MG/DL — HIGH (ref 0–0.2)
BILIRUB INDIRECT FLD-MCNC: 0.3 MG/DL — SIGNIFICANT CHANGE UP (ref 0.2–1.2)
BILIRUB SERPL-MCNC: 0.6 MG/DL — SIGNIFICANT CHANGE UP (ref 0.2–1.2)
BLD GP AB SCN SERPL QL: SIGNIFICANT CHANGE UP
BUN SERPL-MCNC: 37 MG/DL — HIGH (ref 10–20)
CALCIUM SERPL-MCNC: 8.6 MG/DL — SIGNIFICANT CHANGE UP (ref 8.5–10.1)
CHLORIDE SERPL-SCNC: 96 MMOL/L — LOW (ref 98–110)
CO2 SERPL-SCNC: 25 MMOL/L — SIGNIFICANT CHANGE UP (ref 17–32)
CREAT SERPL-MCNC: 4.3 MG/DL — CRITICAL HIGH (ref 0.7–1.5)
EOSINOPHIL # BLD AUTO: 0 K/UL — SIGNIFICANT CHANGE UP (ref 0–0.7)
EOSINOPHIL NFR BLD AUTO: 0 % — SIGNIFICANT CHANGE UP (ref 0–8)
GIANT PLATELETS BLD QL SMEAR: PRESENT — SIGNIFICANT CHANGE UP
GLUCOSE SERPL-MCNC: 89 MG/DL — SIGNIFICANT CHANGE UP (ref 70–99)
HCT VFR BLD CALC: 14.8 % — LOW (ref 42–52)
HGB BLD-MCNC: 4.9 G/DL — CRITICAL LOW (ref 14–18)
HYPOCHROMIA BLD QL: SIGNIFICANT CHANGE UP
INR BLD: 0.99 RATIO — SIGNIFICANT CHANGE UP (ref 0.65–1.3)
LYMPHOCYTES # BLD AUTO: 1.27 K/UL — SIGNIFICANT CHANGE UP (ref 1.2–3.4)
LYMPHOCYTES # BLD AUTO: 18.1 % — LOW (ref 20.5–51.1)
MACROCYTES BLD QL: SLIGHT — SIGNIFICANT CHANGE UP
MANUAL SMEAR VERIFICATION: SIGNIFICANT CHANGE UP
MCHC RBC-ENTMCNC: 30.2 PG — SIGNIFICANT CHANGE UP (ref 27–31)
MCHC RBC-ENTMCNC: 33.1 G/DL — SIGNIFICANT CHANGE UP (ref 32–37)
MCV RBC AUTO: 91.4 FL — SIGNIFICANT CHANGE UP (ref 80–94)
MONOCYTES # BLD AUTO: 0.3 K/UL — SIGNIFICANT CHANGE UP (ref 0.1–0.6)
MONOCYTES NFR BLD AUTO: 4.3 % — SIGNIFICANT CHANGE UP (ref 1.7–9.3)
NEUTROPHILS # BLD AUTO: 5.33 K/UL — SIGNIFICANT CHANGE UP (ref 1.4–6.5)
NEUTROPHILS NFR BLD AUTO: 75.9 % — HIGH (ref 42.2–75.2)
NRBC # BLD: 1 /100 — HIGH (ref 0–0)
NRBC # BLD: SIGNIFICANT CHANGE UP /100 WBCS (ref 0–0)
OVALOCYTES BLD QL SMEAR: SLIGHT — SIGNIFICANT CHANGE UP
PLAT MORPH BLD: ABNORMAL
PLATELET # BLD AUTO: 228 K/UL — SIGNIFICANT CHANGE UP (ref 130–400)
POIKILOCYTOSIS BLD QL AUTO: SLIGHT — SIGNIFICANT CHANGE UP
POLYCHROMASIA BLD QL SMEAR: SLIGHT — SIGNIFICANT CHANGE UP
POTASSIUM SERPL-MCNC: 3.6 MMOL/L — SIGNIFICANT CHANGE UP (ref 3.5–5)
POTASSIUM SERPL-SCNC: 3.6 MMOL/L — SIGNIFICANT CHANGE UP (ref 3.5–5)
PROT SERPL-MCNC: 5.9 G/DL — LOW (ref 6–8)
PROTHROM AB SERPL-ACNC: 11.4 SEC — SIGNIFICANT CHANGE UP (ref 9.95–12.87)
RBC # BLD: 1.62 M/UL — LOW (ref 4.7–6.1)
RBC # FLD: 15 % — HIGH (ref 11.5–14.5)
RBC BLD AUTO: ABNORMAL
SODIUM SERPL-SCNC: 139 MMOL/L — SIGNIFICANT CHANGE UP (ref 135–146)
VARIANT LYMPHS # BLD: 0.9 % — SIGNIFICANT CHANGE UP (ref 0–5)
WBC # BLD: 7.02 K/UL — SIGNIFICANT CHANGE UP (ref 4.8–10.8)
WBC # FLD AUTO: 7.02 K/UL — SIGNIFICANT CHANGE UP (ref 4.8–10.8)

## 2019-10-24 PROCEDURE — 71045 X-RAY EXAM CHEST 1 VIEW: CPT | Mod: 26

## 2019-10-24 PROCEDURE — 74176 CT ABD & PELVIS W/O CONTRAST: CPT | Mod: 26

## 2019-10-24 PROCEDURE — 93010 ELECTROCARDIOGRAM REPORT: CPT

## 2019-10-24 PROCEDURE — 99285 EMERGENCY DEPT VISIT HI MDM: CPT

## 2019-10-24 RX ORDER — LOSARTAN POTASSIUM 100 MG/1
25 TABLET, FILM COATED ORAL DAILY
Refills: 0 | Status: DISCONTINUED | OUTPATIENT
Start: 2019-10-24 | End: 2019-11-06

## 2019-10-24 RX ORDER — ISOSORBIDE MONONITRATE 60 MG/1
30 TABLET, EXTENDED RELEASE ORAL DAILY
Refills: 0 | Status: DISCONTINUED | OUTPATIENT
Start: 2019-10-24 | End: 2019-11-06

## 2019-10-24 RX ORDER — ATORVASTATIN CALCIUM 80 MG/1
40 TABLET, FILM COATED ORAL DAILY
Refills: 0 | Status: DISCONTINUED | OUTPATIENT
Start: 2019-10-24 | End: 2019-10-25

## 2019-10-24 RX ORDER — AMLODIPINE BESYLATE 2.5 MG/1
10 TABLET ORAL DAILY
Refills: 0 | Status: DISCONTINUED | OUTPATIENT
Start: 2019-10-24 | End: 2019-10-29

## 2019-10-24 RX ORDER — PANTOPRAZOLE SODIUM 20 MG/1
40 TABLET, DELAYED RELEASE ORAL ONCE
Refills: 0 | Status: COMPLETED | OUTPATIENT
Start: 2019-10-24 | End: 2019-10-24

## 2019-10-24 RX ORDER — METOPROLOL TARTRATE 50 MG
25 TABLET ORAL
Refills: 0 | Status: DISCONTINUED | OUTPATIENT
Start: 2019-10-24 | End: 2019-11-06

## 2019-10-24 RX ORDER — PANTOPRAZOLE SODIUM 20 MG/1
40 TABLET, DELAYED RELEASE ORAL
Refills: 0 | Status: DISCONTINUED | OUTPATIENT
Start: 2019-10-24 | End: 2019-10-29

## 2019-10-24 RX ADMIN — PANTOPRAZOLE SODIUM 40 MILLIGRAM(S): 20 TABLET, DELAYED RELEASE ORAL at 20:45

## 2019-10-24 NOTE — ED PROVIDER NOTE - PHYSICAL EXAMINATION
VITAL SIGNS: I have reviewed nursing notes and confirm.  CONSTITUTIONAL: chronically ill appearing, pale, otherwise in no acute distress.  SKIN: Skin exam is warm and dry, no acute rash.  HEAD: Normocephalic; atraumatic.  EYES: PERRL, EOM intact; pale conjunctiva/ anicteric sclera clear.  ENT: No nasal discharge; airway clear.   NECK: Supple; non tender.  CARD:+ S1, S2   RESP: No wheezes, rales or rhonchi.  ABD: Normal bowel sounds; soft; non-distended; non-tender;  RECTAL: liquid brown stool. no gross blood.  EXT: Normal ROM. No cyanosis or edema.  LYMPH: No acute adenopathy.  NEURO: Alert. Grossly unremarkable. No focal deficits.  PSYCH: Cooperative, appropriate.

## 2019-10-24 NOTE — ED PROVIDER NOTE - NS ED ROS FT
CONSTITUTIONAL: Negatve   SKIN: Negatve   HEAD: Negatve   EYES: Negatve   ENT: Negatve   NECK: Negatve   CARD:Negatve   RESP:Negatve   ABD: see hpi  EXT: Negatve  LYMPH: Negatve   NEURO: Negatve   PSYCH: Negatve

## 2019-10-24 NOTE — ED PROVIDER NOTE - CARE PLAN
Principal Discharge DX:	GI bleed  Secondary Diagnosis:	Symptomatic anemia Principal Discharge DX:	GI bleed  Secondary Diagnosis:	Symptomatic anemia  Secondary Diagnosis:	ESRD on dialysis  Secondary Diagnosis:	Anemia

## 2019-10-24 NOTE — H&P ADULT - HISTORY OF PRESENT ILLNESS
68 yo m, of cad, stents, afib, ESRD on HD last dialysis today, cholangitis, CCY in Sept, sts since tuesday has been having liquid/soft dark stools.  was scheduled for colonoscopy today but has felt too weak to do it. he called hi GI doctor and (he cant recall the name of who he spoke to) told him to come to the ER. he denies gross blood but sts stool has been black/dark. not on pepto. self stopped his eliquis on tuesday.  he gets cefipime during dialysis since being dc'd 5 days ago for cholangitis. he denies fever. he has mild abdominal discomfort. no vomiting. no cp or sob. 68 y/o M w/ PMHx of CAD w/ stents, Afib, ESRD on HD, cholangitis, recent cholecystectomy presents for weakness and dark stool. Patient has had 3 episodes of dark stool over the last week and was to be scheduled for an outpatient colonoscopy with Dr. Nowak. However patient was feeling very weak and contacted GI office and was told to come to ED. On presentation was found to have Hgb of 4.9. Currently on interview receiving transfusion and reports he is feeling better. Denied current chest pain, SOB, abdominal pain, hematemesis. Last dark stool was prior to presentation to the ED.

## 2019-10-24 NOTE — H&P ADULT - NSHPPHYSICALEXAM_GEN_ALL_CORE
PHYSICAL EXAM:  GENERAL: NAD, well-developed  HEAD:  Atraumatic, Normocephalic  CHEST/LUNG: Clear to auscultation bilaterally; No wheeze, ronchi or rales  HEART: Regular rate and rhythm; No murmurs, rubs, or gallops  ABDOMEN: No tenderness to palpation of abdomen  EXTREMITIES:  no peripheral edema  PSYCH: AAOx3  NEUROLOGY: non-focal  SKIN: No rashes or lesions

## 2019-10-24 NOTE — H&P ADULT - ASSESSMENT
#Acute on chronic anemia  - presents with Hgb 4.9  - clear liquids for now, NPO after midnight for procedure in AM with GI  - IV PPI BID  - 2 18 gauge IVs  - active type and screen  - transfuse to     #Afib on eliquis  - hold eliquis    HTN, HLD, afib on eliquis, ESRD on HD (Tuesday/Thursday/Sat) #Acute on chronic anemia  - presents with Hgb 4.9  - clear liquids for now, NPO after midnight for procedure in AM with GI  - IV PPI BID  - 2 18 gauge IVs  - active type and screen  - transfuse PRBC  - f/u repeat Hgb at 11:30    #Afib on eliquis  - hold eliquis    #HTN  - c/w home medications    #HLD  - c/w home medications    #ESRD  - nephro consult    #Diet: NPO for now  #Ambulate as tolerated  #DVTppx: hold for now - GI bleed

## 2019-10-24 NOTE — ED PROVIDER NOTE - CLINICAL SUMMARY MEDICAL DECISION MAKING FREE TEXT BOX
pt with symptomatic anemia, likely 2/2 gi bleed, stable. labs done, imaging ordered, gi consulted. prbc transfusing, admitting to hospital.

## 2019-10-24 NOTE — H&P ADULT - ATTENDING COMMENTS
68 y/o M w/ PMHx of CAD w/ stents, Afib, ESRD on HD, cholangitis, recent cholecystectomy presents for weakness and dark stool. Patient has had 3 episodes of dark stool over the last week and was to be scheduled for an outpatient colonoscopy with Dr. Nowak. However patient was feeling very weak and contacted GI office and was told to come to ED. On presentation was found to have Hgb of 4.9. Currently on interview receiving transfusion and reports he is feeling better. Denied current chest pain, SOB, abdominal pain, hematemesis. Last dark stool was prior to presentation to the ED.    REVIEW OF SYSTEMS: see cc/HPI  CONSTITUTIONAL: No weakness, fevers or chills  EYES/ENT: No visual changes;  No vertigo or throat pain   NECK: No pain or stiffness  RESPIRATORY: No cough, wheezing, hemoptysis; No shortness of breath  CARDIOVASCULAR: No chest pain or palpitations  GASTROINTESTINAL: (+) abdominal  pain. No nausea, vomiting, or hematemesis; loose/ dark stools/ diarrhea, NO constipation. No melena or hematochezia.  GENITOURINARY: No dysuria, frequency or hematuria  NEUROLOGICAL: No numbness or weakness  SKIN: No itching, rashes 70 y/o M w/ PMHx of CAD w/ stents, Afib, ESRD on HD, cholangitis, recent cholecystectomy presents for weakness and dark stool. Patient has had 3 episodes of dark stool over the last week and was to be scheduled for an outpatient colonoscopy with Dr. Nowak. However patient was feeling very weak and contacted GI office and was told to come to ED. On presentation was found to have Hgb of 4.9. Currently on interview receiving transfusion and reports he is feeling better. Denied current chest pain, SOB, abdominal pain, hematemesis. Last dark stool was prior to presentation to the ED.    REVIEW OF SYSTEMS: see cc/HPI  CONSTITUTIONAL: No weakness, fevers or chills  EYES/ENT: No visual changes;  No vertigo or throat pain   NECK: No pain or stiffness  RESPIRATORY: No cough, wheezing, hemoptysis; No shortness of breath  CARDIOVASCULAR: No chest pain or palpitations  GASTROINTESTINAL: (+) abdominal  pain. No nausea, vomiting, or hematemesis; loose/ dark stools/ diarrhea, NO constipation. (+) melena or hematochezia.  GENITOURINARY: No dysuria, frequency or hematuria  NEUROLOGICAL: No numbness or weakness  SKIN: No itching, rashes    Physical Exam:  General: WN/WD NAD, pale looking  Neurology: A&Ox3, nonfocal, follows commands  Eyes: PERRLA/ EOMI  ENT/Neck: Neck supple, trachea midline, No JVD  Respiratory: CTA B/L, No wheezing, rales, rhonchi  CV: Normal rate regular rhythm, S1S2, no murmurs, rubs or gallops  Abdominal: Soft, NT, ND +BS,   Extremities: No edema, + peripheral pulses  Skin: No Rashes, Hematoma, Ecchymosis  Incisions:   Tubes:  A/P  Severe Anemia 2/2 acute blood loss - UGI Bleed r/o stent associated bleed - hemodynamically stable for the floor  -NPO after MN   -GI eval   -transfuse and serial CBC     A. Fib on Eliquis   -hold Eliquis    HTN / ESRD / HD  -Nephrology eval     Dyslipidemia   -statin once on full diet    DVT prophylaxis - SCD to LE while in bed

## 2019-10-24 NOTE — ED ADULT TRIAGE NOTE - CHIEF COMPLAINT QUOTE
c/o 3 episodes of dark tarry stool in the past 2 days, stopped Eliquis yesterday. one episode of bilious vomitus today. c/o mild abdominal discomfort.

## 2019-10-24 NOTE — H&P ADULT - NSHPLABSRESULTS_GEN_ALL_CORE
4.9    7.02  )-----------( 228      ( 24 Oct 2019 18:48 )             14.8       10-24    139  |  96<L>  |  37<H>  ----------------------------<  89  3.6   |  25  |  4.3<HH>    Ca    8.6      24 Oct 2019 18:48    TPro  5.9<L>  /  Alb  3.6  /  TBili  0.6  /  DBili  0.3<H>  /  AST  25  /  ALT  13  /  AlkPhos  114  10-24                  PT/INR - ( 24 Oct 2019 18:48 )   PT: 11.40 sec;   INR: 0.99 ratio         PTT - ( 24 Oct 2019 18:48 )  PTT:30.6 sec    Lactate Trend            CAPILLARY BLOOD GLUCOSE            Culture Results:   No growth at 5 days. (10-16 @ 05:16)  Culture Results:   No growth at 5 days. (10-15 @ 16:05)

## 2019-10-24 NOTE — ED ADULT NURSE NOTE - OBJECTIVE STATEMENT
pt presents to ED c/o dark/black stools x 3 days, denies blood. Pt was scheduled for colonoscopy today but GI doctor told pt to come to ED. Pt denies fevers or chills, pt states he stopped eliquis.

## 2019-10-24 NOTE — ED PROVIDER NOTE - OBJECTIVE STATEMENT
70 yo m, of cad, stents, ESRD on HD last dialysis today, cholangitis, CCY in Sept, sts since tuesday has been having liquid/soft dark stools.  was scheduled for colonoscopy today but has felt too weak to do it. he called hi GI doctor and (he cant recall the name of who he spoke to) told him to come to the ER. he denies gross blood but sts stool has been black/dark. not on pepto. self stopped his eliquis which he says is for his stents.  he gets cefipime during dialysis since being dc'd 5 days ago for cholangitis. he denies fever. he has mild abdominal discomfort. no vomiting. no cp or sob. 70 yo m, of cad, stents, afib, ESRD on HD last dialysis today, cholangitis, CCY in Sept, sts since tuesday has been having liquid/soft dark stools.  was scheduled for colonoscopy today but has felt too weak to do it. he called hi GI doctor and (he cant recall the name of who he spoke to) told him to come to the ER. he denies gross blood but sts stool has been black/dark. not on pepto. self stopped his eliquis on tuesday.  he gets cefipime during dialysis since being dc'd 5 days ago for cholangitis. he denies fever. he has mild abdominal discomfort. no vomiting. no cp or sob.

## 2019-10-24 NOTE — ED ADULT NURSE NOTE - NSIMPLEMENTINTERV_GEN_ALL_ED
Implemented All Fall Risk Interventions:  [place] to call system. Call bell, personal items and telephone within reach. Instruct patient to call for assistance. Room bathroom lighting operational. Non-slip footwear when patient is off stretcher. Physically safe environment: no spills, clutter or unnecessary equipment. Stretcher in lowest position, wheels locked, appropriate side rails in place. Provide visual cue, wrist band, yellow gown, etc. Monitor gait and stability. Monitor for mental status changes and reorient to person, place, and time. Review medications for side effects contributing to fall risk. Reinforce activity limits and safety measures with patient and family.

## 2019-10-25 ENCOUNTER — RESULT REVIEW (OUTPATIENT)
Age: 69
End: 2019-10-25

## 2019-10-25 ENCOUNTER — TRANSCRIPTION ENCOUNTER (OUTPATIENT)
Age: 69
End: 2019-10-25

## 2019-10-25 LAB
ALBUMIN SERPL ELPH-MCNC: 3.3 G/DL — LOW (ref 3.5–5.2)
ALP SERPL-CCNC: 105 U/L — SIGNIFICANT CHANGE UP (ref 30–115)
ALT FLD-CCNC: 10 U/L — SIGNIFICANT CHANGE UP (ref 0–41)
ANION GAP SERPL CALC-SCNC: 15 MMOL/L — HIGH (ref 7–14)
AST SERPL-CCNC: 26 U/L — SIGNIFICANT CHANGE UP (ref 0–41)
BASOPHILS # BLD AUTO: 0.03 K/UL — SIGNIFICANT CHANGE UP (ref 0–0.2)
BASOPHILS # BLD AUTO: 0.03 K/UL — SIGNIFICANT CHANGE UP (ref 0–0.2)
BASOPHILS NFR BLD AUTO: 0.5 % — SIGNIFICANT CHANGE UP (ref 0–1)
BASOPHILS NFR BLD AUTO: 0.6 % — SIGNIFICANT CHANGE UP (ref 0–1)
BILIRUB SERPL-MCNC: 0.8 MG/DL — SIGNIFICANT CHANGE UP (ref 0.2–1.2)
BUN SERPL-MCNC: 41 MG/DL — HIGH (ref 10–20)
CALCIUM SERPL-MCNC: 8.7 MG/DL — SIGNIFICANT CHANGE UP (ref 8.5–10.1)
CHLORIDE SERPL-SCNC: 99 MMOL/L — SIGNIFICANT CHANGE UP (ref 98–110)
CO2 SERPL-SCNC: 25 MMOL/L — SIGNIFICANT CHANGE UP (ref 17–32)
CREAT SERPL-MCNC: 5.6 MG/DL — CRITICAL HIGH (ref 0.7–1.5)
EOSINOPHIL # BLD AUTO: 0.1 K/UL — SIGNIFICANT CHANGE UP (ref 0–0.7)
EOSINOPHIL # BLD AUTO: 0.17 K/UL — SIGNIFICANT CHANGE UP (ref 0–0.7)
EOSINOPHIL NFR BLD AUTO: 1.9 % — SIGNIFICANT CHANGE UP (ref 0–8)
EOSINOPHIL NFR BLD AUTO: 2.8 % — SIGNIFICANT CHANGE UP (ref 0–8)
GLUCOSE SERPL-MCNC: 85 MG/DL — SIGNIFICANT CHANGE UP (ref 70–99)
HCT VFR BLD CALC: 21.9 % — LOW (ref 42–52)
HCT VFR BLD CALC: 25.3 % — LOW (ref 42–52)
HGB BLD-MCNC: 7.2 G/DL — LOW (ref 14–18)
HGB BLD-MCNC: 8.2 G/DL — LOW (ref 14–18)
IMM GRANULOCYTES NFR BLD AUTO: 0.5 % — HIGH (ref 0.1–0.3)
IMM GRANULOCYTES NFR BLD AUTO: 0.6 % — HIGH (ref 0.1–0.3)
LYMPHOCYTES # BLD AUTO: 1.26 K/UL — SIGNIFICANT CHANGE UP (ref 1.2–3.4)
LYMPHOCYTES # BLD AUTO: 1.59 K/UL — SIGNIFICANT CHANGE UP (ref 1.2–3.4)
LYMPHOCYTES # BLD AUTO: 24 % — SIGNIFICANT CHANGE UP (ref 20.5–51.1)
LYMPHOCYTES # BLD AUTO: 26.1 % — SIGNIFICANT CHANGE UP (ref 20.5–51.1)
MAGNESIUM SERPL-MCNC: 1.9 MG/DL — SIGNIFICANT CHANGE UP (ref 1.8–2.4)
MCHC RBC-ENTMCNC: 29.9 PG — SIGNIFICANT CHANGE UP (ref 27–31)
MCHC RBC-ENTMCNC: 30.3 PG — SIGNIFICANT CHANGE UP (ref 27–31)
MCHC RBC-ENTMCNC: 32.4 G/DL — SIGNIFICANT CHANGE UP (ref 32–37)
MCHC RBC-ENTMCNC: 32.9 G/DL — SIGNIFICANT CHANGE UP (ref 32–37)
MCV RBC AUTO: 92 FL — SIGNIFICANT CHANGE UP (ref 80–94)
MCV RBC AUTO: 92.3 FL — SIGNIFICANT CHANGE UP (ref 80–94)
MONOCYTES # BLD AUTO: 0.61 K/UL — HIGH (ref 0.1–0.6)
MONOCYTES # BLD AUTO: 0.79 K/UL — HIGH (ref 0.1–0.6)
MONOCYTES NFR BLD AUTO: 11.6 % — HIGH (ref 1.7–9.3)
MONOCYTES NFR BLD AUTO: 13 % — HIGH (ref 1.7–9.3)
NEUTROPHILS # BLD AUTO: 3.23 K/UL — SIGNIFICANT CHANGE UP (ref 1.4–6.5)
NEUTROPHILS # BLD AUTO: 3.49 K/UL — SIGNIFICANT CHANGE UP (ref 1.4–6.5)
NEUTROPHILS NFR BLD AUTO: 57.1 % — SIGNIFICANT CHANGE UP (ref 42.2–75.2)
NEUTROPHILS NFR BLD AUTO: 61.3 % — SIGNIFICANT CHANGE UP (ref 42.2–75.2)
NRBC # BLD: 0 /100 WBCS — SIGNIFICANT CHANGE UP (ref 0–0)
NRBC # BLD: 0 /100 WBCS — SIGNIFICANT CHANGE UP (ref 0–0)
PLATELET # BLD AUTO: 188 K/UL — SIGNIFICANT CHANGE UP (ref 130–400)
PLATELET # BLD AUTO: 189 K/UL — SIGNIFICANT CHANGE UP (ref 130–400)
POTASSIUM SERPL-MCNC: 4 MMOL/L — SIGNIFICANT CHANGE UP (ref 3.5–5)
POTASSIUM SERPL-SCNC: 4 MMOL/L — SIGNIFICANT CHANGE UP (ref 3.5–5)
PROT SERPL-MCNC: 5.4 G/DL — LOW (ref 6–8)
RBC # BLD: 2.38 M/UL — LOW (ref 4.7–6.1)
RBC # BLD: 2.74 M/UL — LOW (ref 4.7–6.1)
RBC # FLD: 13.8 % — SIGNIFICANT CHANGE UP (ref 11.5–14.5)
RBC # FLD: 14.1 % — SIGNIFICANT CHANGE UP (ref 11.5–14.5)
SODIUM SERPL-SCNC: 139 MMOL/L — SIGNIFICANT CHANGE UP (ref 135–146)
WBC # BLD: 5.26 K/UL — SIGNIFICANT CHANGE UP (ref 4.8–10.8)
WBC # BLD: 6.1 K/UL — SIGNIFICANT CHANGE UP (ref 4.8–10.8)
WBC # FLD AUTO: 5.26 K/UL — SIGNIFICANT CHANGE UP (ref 4.8–10.8)
WBC # FLD AUTO: 6.1 K/UL — SIGNIFICANT CHANGE UP (ref 4.8–10.8)

## 2019-10-25 PROCEDURE — 43239 EGD BIOPSY SINGLE/MULTIPLE: CPT

## 2019-10-25 PROCEDURE — 88312 SPECIAL STAINS GROUP 1: CPT | Mod: 26

## 2019-10-25 PROCEDURE — 88305 TISSUE EXAM BY PATHOLOGIST: CPT | Mod: 26

## 2019-10-25 PROCEDURE — 99223 1ST HOSP IP/OBS HIGH 75: CPT

## 2019-10-25 RX ADMIN — PANTOPRAZOLE SODIUM 40 MILLIGRAM(S): 20 TABLET, DELAYED RELEASE ORAL at 05:51

## 2019-10-25 RX ADMIN — AMLODIPINE BESYLATE 10 MILLIGRAM(S): 2.5 TABLET ORAL at 05:50

## 2019-10-25 RX ADMIN — LOSARTAN POTASSIUM 25 MILLIGRAM(S): 100 TABLET, FILM COATED ORAL at 05:50

## 2019-10-25 RX ADMIN — PANTOPRAZOLE SODIUM 40 MILLIGRAM(S): 20 TABLET, DELAYED RELEASE ORAL at 17:11

## 2019-10-25 RX ADMIN — ISOSORBIDE MONONITRATE 30 MILLIGRAM(S): 60 TABLET, EXTENDED RELEASE ORAL at 14:29

## 2019-10-25 RX ADMIN — Medication 25 MILLIGRAM(S): at 17:10

## 2019-10-25 RX ADMIN — Medication 25 MILLIGRAM(S): at 05:50

## 2019-10-25 NOTE — PROGRESS NOTE ADULT - ASSESSMENT
#Acute on chronic anemia  - presents with Hgb 4.9  - clear liquids for now, NPO after midnight for procedure in AM with GI  - IV PPI BID  - 2 18 gauge IVs  - active type and screen  - transfuse PRBC  - f/u repeat Hgb at 11:30    #Afib on eliquis  - hold eliquis    #HTN  - c/w home medications    #HLD  - c/w home medications    #ESRD  - nephro consult    #Diet: NPO for now  #Ambulate as tolerated  #DVTppx: hold for now - GI bleed 68 y/o M w/ PMHx of CAD w/ stents, Afib, ESRD on HD, cholangitis, recent cholecystectomy presents for weakness and dark stool.     #Acute on chronic anemia  - S/p 2 units in ED  - presents with Hgb 7.2 <- 4.9  - Vitals wnl  - clear liquids for now, NPO after midnight for GI procedure  - IV PPI BID  - 2 18 gauge IVs  - active T&S  - transfuse PRBC <7  - F/u GI c/s    #Afib on eliquis  - hold eliquis given bleed    #HTN  - c/w home medications    #HLD  - c/w home medications    #ESRD  - nephro consult    #Diet: NPO for now  #Ambulate as tolerated  #DVTppx: hold for now - GI bleed 68 y/o M w/ PMHx of CAD w/ stents, Afib, ESRD on HD TRS, cholangitis, recent cholecystectomy presents for weakness and dark stool.     #Acute on chronic anemia  - S/p 2 units in ED  - presents with Hgb 7.2 <- 4.9  - Asymptomatic, Vitals wnl  - Will receive 1 more unit today, 2 units on hold for endoscopy  - F/u CBC at 16:00  - clear liquids for now, NPO after midnight for GI procedure  - IV PPI BID  - 2 18 gauge IVs  - active T&S  - GI c/s: endoscopy today    #Afib on eliquis  - hold eliquis given bleed    #HTN  - c/w home medications    #HLD  - c/w home medications    #ESRD with HD TRS  - Cr 5.6 <- 4.3  - Off IVF  - Nephro c/s    #Diet: NPO for now  #Ambulate as tolerated  #DVTppx: hold for now - GI bleed

## 2019-10-25 NOTE — PROGRESS NOTE ADULT - SUBJECTIVE AND OBJECTIVE BOX
Hospital Day:  1d    Subjective:    Patient is a 69y old  Male who presents with a chief complaint of Anemia (24 Oct 2019 22:39)      Past Medical Hx:   Afib  Anemia  Heart failure  HLD (hyperlipidemia)  HTN (hypertension)  ESRD on dialysis    Past Sx:  S/P cataract surgery  S/P arteriovenous (AV) fistula creation  H/O right coronary artery stent placement    Allergies:  No Known Allergies    Current Meds:   Standng Meds:  amLODIPine   Tablet 10 milliGRAM(s) Oral daily  atorvastatin Oral Tab/Cap - Peds 40 milliGRAM(s) Oral daily  isosorbide   mononitrate ER Tablet (IMDUR) 30 milliGRAM(s) Oral daily  losartan 25 milliGRAM(s) Oral daily  metoprolol tartrate 25 milliGRAM(s) Oral two times a day  pantoprazole  Injectable 40 milliGRAM(s) IV Push two times a day    PRN Meds:    HOME MEDICATIONS:  aspirin 81 mg oral tablet, chewable: 1 tab(s) orally once a day  atorvastatin 40 mg oral tablet: 1 tab(s) orally once a day  isosorbide mononitrate 30 mg oral tablet, extended release: 1 tab(s) orally once a day (in the morning)  losartan 25 mg oral tablet: 1 tab(s) orally once a day  metoprolol tartrate 25 mg oral tablet: 1 tab(s) orally 2 times a day      Vital Signs:   T(F): 97.1 (10-25-19 @ 03:33), Max: 97.8 (10-24-19 @ 17:52)  HR: 101 (10-25-19 @ 03:33) (77 - 107)  BP: 130/63 (10-25-19 @ 03:33) (117/68 - 133/62)  RR: 18 (10-25-19 @ 03:33) (18 - 18)  SpO2: 99% (10-24-19 @ 23:52) (98% - 99%)        Physical Exam:   GENERAL: NAD  HEENT: NCAT  CHEST/LUNG: CTAB  HEART: Regular rate and rhythm; s1 s2 appreciated, No murmurs, rubs, or gallops  ABDOMEN: Soft, Nontender, Nondistended; Bowel sounds present  EXTREMITIES: No LE edema b/l  NERVOUS SYSTEM:  Alert & Oriented X3        Labs:                         4.9    7.02  )-----------( 228      ( 24 Oct 2019 18:48 )             14.8     Neutophil% 75.9, Lymphocyte% 18.1, Monocyte% 4.3, Bands% -- 10-24-19 @ 18:48    24 Oct 2019 18:48    139    |  96     |  37     ----------------------------<  89     3.6     |  25     |  4.3      Ca    8.6        24 Oct 2019 18:48    TPro  5.9    /  Alb  3.6    /  TBili  0.6    /  DBili  0.3    /  AST  25     /  ALT  13     /  AlkPhos  114    24 Oct 2019 18:48       pTT    30.6             ----< 0.99 INR  (10-24-19 @ 18:48)    11.40        PT      Hemoglobin A1C, Whole Blood: 5.6 % (09-18-19 @ 07:12) Hospital Day:  1d    Subjective:    Patient is a 69y old  Male who presents with a chief complaint of Anemia. No events overnight and in no distress this am. Reports feeling better after receiving two transfusions in the ED, reports additional dark bm this am, which is "improving". Denies any physical complaints.      Past Medical Hx:   Afib  Anemia  Heart failure  HLD (hyperlipidemia)  HTN (hypertension)  ESRD on dialysis    Past Sx:  S/P cataract surgery  S/P arteriovenous (AV) fistula creation  H/O right coronary artery stent placement    Allergies:  No Known Allergies    Current Meds:   Standng Meds:  amLODIPine   Tablet 10 milliGRAM(s) Oral daily  atorvastatin Oral Tab/Cap - Peds 40 milliGRAM(s) Oral daily  isosorbide   mononitrate ER Tablet (IMDUR) 30 milliGRAM(s) Oral daily  losartan 25 milliGRAM(s) Oral daily  metoprolol tartrate 25 milliGRAM(s) Oral two times a day  pantoprazole  Injectable 40 milliGRAM(s) IV Push two times a day    PRN Meds:    HOME MEDICATIONS:  aspirin 81 mg oral tablet, chewable: 1 tab(s) orally once a day  atorvastatin 40 mg oral tablet: 1 tab(s) orally once a day  isosorbide mononitrate 30 mg oral tablet, extended release: 1 tab(s) orally once a day (in the morning)  losartan 25 mg oral tablet: 1 tab(s) orally once a day  metoprolol tartrate 25 mg oral tablet: 1 tab(s) orally 2 times a day      Vital Signs:   T(F): 97.1 (10-25-19 @ 03:33), Max: 97.8 (10-24-19 @ 17:52)  HR: 101 (10-25-19 @ 03:33) (77 - 107)  BP: 130/63 (10-25-19 @ 03:33) (117/68 - 133/62)  RR: 18 (10-25-19 @ 03:33) (18 - 18)  SpO2: 99% (10-24-19 @ 23:52) (98% - 99%)        Physical Exam:   GENERAL: NAD, appears stated age, well kempt, well nourished, pleasant, cooperative  HEENT: NCAT  CHEST/LUNG: CTAB  HEART: Regular rate and rhythm; s1 s2 appreciated, No murmurs, rubs, or gallops  ABDOMEN: Soft, Nontender, Nondistended; Bowel sounds present  EXTREMITIES: No LE edema b/l  NERVOUS SYSTEM:  Alert & Oriented X3        Labs:                         4.9    7.02  )-----------( 228      ( 24 Oct 2019 18:48 )             14.8     Neutophil% 75.9, Lymphocyte% 18.1, Monocyte% 4.3, Bands% -- 10-24-19 @ 18:48    24 Oct 2019 18:48    139    |  96     |  37     ----------------------------<  89     3.6     |  25     |  4.3      Ca    8.6        24 Oct 2019 18:48    TPro  5.9    /  Alb  3.6    /  TBili  0.6    /  DBili  0.3    /  AST  25     /  ALT  13     /  AlkPhos  114    24 Oct 2019 18:48       pTT    30.6             ----< 0.99 INR  (10-24-19 @ 18:48)    11.40        PT      Hemoglobin A1C, Whole Blood: 5.6 % (09-18-19 @ 07:12)

## 2019-10-26 LAB
ALBUMIN SERPL ELPH-MCNC: 3.4 G/DL — LOW (ref 3.5–5.2)
ALP SERPL-CCNC: 110 U/L — SIGNIFICANT CHANGE UP (ref 30–115)
ALT FLD-CCNC: 10 U/L — SIGNIFICANT CHANGE UP (ref 0–41)
ANION GAP SERPL CALC-SCNC: 15 MMOL/L — HIGH (ref 7–14)
APTT BLD: 31.5 SEC — SIGNIFICANT CHANGE UP (ref 27–39.2)
AST SERPL-CCNC: 25 U/L — SIGNIFICANT CHANGE UP (ref 0–41)
BASOPHILS # BLD AUTO: 0.04 K/UL — SIGNIFICANT CHANGE UP (ref 0–0.2)
BASOPHILS NFR BLD AUTO: 0.7 % — SIGNIFICANT CHANGE UP (ref 0–1)
BILIRUB SERPL-MCNC: 1.1 MG/DL — SIGNIFICANT CHANGE UP (ref 0.2–1.2)
BUN SERPL-MCNC: 47 MG/DL — HIGH (ref 10–20)
CALCIUM SERPL-MCNC: 8.8 MG/DL — SIGNIFICANT CHANGE UP (ref 8.5–10.1)
CHLORIDE SERPL-SCNC: 101 MMOL/L — SIGNIFICANT CHANGE UP (ref 98–110)
CO2 SERPL-SCNC: 24 MMOL/L — SIGNIFICANT CHANGE UP (ref 17–32)
CREAT SERPL-MCNC: 7.1 MG/DL — CRITICAL HIGH (ref 0.7–1.5)
EOSINOPHIL # BLD AUTO: 0.16 K/UL — SIGNIFICANT CHANGE UP (ref 0–0.7)
EOSINOPHIL NFR BLD AUTO: 2.6 % — SIGNIFICANT CHANGE UP (ref 0–8)
GLUCOSE SERPL-MCNC: 162 MG/DL — HIGH (ref 70–99)
HCT VFR BLD CALC: 24.5 % — LOW (ref 42–52)
HGB BLD-MCNC: 7.9 G/DL — LOW (ref 14–18)
IMM GRANULOCYTES NFR BLD AUTO: 0.5 % — HIGH (ref 0.1–0.3)
INR BLD: 0.98 RATIO — SIGNIFICANT CHANGE UP (ref 0.65–1.3)
LYMPHOCYTES # BLD AUTO: 0.99 K/UL — LOW (ref 1.2–3.4)
LYMPHOCYTES # BLD AUTO: 16.4 % — LOW (ref 20.5–51.1)
MAGNESIUM SERPL-MCNC: 1.9 MG/DL — SIGNIFICANT CHANGE UP (ref 1.8–2.4)
MCHC RBC-ENTMCNC: 30.3 PG — SIGNIFICANT CHANGE UP (ref 27–31)
MCHC RBC-ENTMCNC: 32.2 G/DL — SIGNIFICANT CHANGE UP (ref 32–37)
MCV RBC AUTO: 93.9 FL — SIGNIFICANT CHANGE UP (ref 80–94)
MONOCYTES # BLD AUTO: 0.71 K/UL — HIGH (ref 0.1–0.6)
MONOCYTES NFR BLD AUTO: 11.8 % — HIGH (ref 1.7–9.3)
NEUTROPHILS # BLD AUTO: 4.11 K/UL — SIGNIFICANT CHANGE UP (ref 1.4–6.5)
NEUTROPHILS NFR BLD AUTO: 68 % — SIGNIFICANT CHANGE UP (ref 42.2–75.2)
NRBC # BLD: 0 /100 WBCS — SIGNIFICANT CHANGE UP (ref 0–0)
PLATELET # BLD AUTO: 213 K/UL — SIGNIFICANT CHANGE UP (ref 130–400)
POTASSIUM SERPL-MCNC: 4 MMOL/L — SIGNIFICANT CHANGE UP (ref 3.5–5)
POTASSIUM SERPL-SCNC: 4 MMOL/L — SIGNIFICANT CHANGE UP (ref 3.5–5)
PROT SERPL-MCNC: 5.7 G/DL — LOW (ref 6–8)
PROTHROM AB SERPL-ACNC: 11.3 SEC — SIGNIFICANT CHANGE UP (ref 9.95–12.87)
RBC # BLD: 2.61 M/UL — LOW (ref 4.7–6.1)
RBC # FLD: 14.1 % — SIGNIFICANT CHANGE UP (ref 11.5–14.5)
SODIUM SERPL-SCNC: 140 MMOL/L — SIGNIFICANT CHANGE UP (ref 135–146)
WBC # BLD: 6.04 K/UL — SIGNIFICANT CHANGE UP (ref 4.8–10.8)
WBC # FLD AUTO: 6.04 K/UL — SIGNIFICANT CHANGE UP (ref 4.8–10.8)

## 2019-10-26 PROCEDURE — 99233 SBSQ HOSP IP/OBS HIGH 50: CPT

## 2019-10-26 RX ORDER — IOHEXOL 300 MG/ML
30 INJECTION, SOLUTION INTRAVENOUS ONCE
Refills: 0 | Status: COMPLETED | OUTPATIENT
Start: 2019-10-26 | End: 2019-10-26

## 2019-10-26 RX ADMIN — LOSARTAN POTASSIUM 25 MILLIGRAM(S): 100 TABLET, FILM COATED ORAL at 06:03

## 2019-10-26 RX ADMIN — ISOSORBIDE MONONITRATE 30 MILLIGRAM(S): 60 TABLET, EXTENDED RELEASE ORAL at 13:58

## 2019-10-26 RX ADMIN — AMLODIPINE BESYLATE 10 MILLIGRAM(S): 2.5 TABLET ORAL at 06:01

## 2019-10-26 RX ADMIN — PANTOPRAZOLE SODIUM 40 MILLIGRAM(S): 20 TABLET, DELAYED RELEASE ORAL at 17:38

## 2019-10-26 RX ADMIN — Medication 25 MILLIGRAM(S): at 06:01

## 2019-10-26 RX ADMIN — PANTOPRAZOLE SODIUM 40 MILLIGRAM(S): 20 TABLET, DELAYED RELEASE ORAL at 06:01

## 2019-10-26 RX ADMIN — Medication 25 MILLIGRAM(S): at 17:38

## 2019-10-26 NOTE — PROGRESS NOTE ADULT - SUBJECTIVE AND OBJECTIVE BOX
Hospital Day:  2d    Subjective:    Patient is a 69y old  Male who presents with a chief complaint of Anemia. No acute events overnight and in no distress this am. Reports improvement of melena, no episodes x 24hrs. Denies any physical complaints. Going to HD this am.       Past Medical Hx:   Afib  Anemia  Heart failure  HLD (hyperlipidemia)  HTN (hypertension)  ESRD on dialysis    Past Sx:  S/P cataract surgery  S/P arteriovenous (AV) fistula creation  H/O right coronary artery stent placement    Allergies:  No Known Allergies    Current Meds:   Standng Meds:  amLODIPine   Tablet 10 milliGRAM(s) Oral daily  isosorbide   mononitrate ER Tablet (IMDUR) 30 milliGRAM(s) Oral daily  losartan 25 milliGRAM(s) Oral daily  metoprolol tartrate 25 milliGRAM(s) Oral two times a day  pantoprazole  Injectable 40 milliGRAM(s) IV Push two times a day    PRN Meds:    HOME MEDICATIONS:  aspirin 81 mg oral tablet, chewable: 1 tab(s) orally once a day  atorvastatin 40 mg oral tablet: 1 tab(s) orally once a day  isosorbide mononitrate 30 mg oral tablet, extended release: 1 tab(s) orally once a day (in the morning)  losartan 25 mg oral tablet: 1 tab(s) orally once a day  metoprolol tartrate 25 mg oral tablet: 1 tab(s) orally 2 times a day      Vital Signs:   T(F): 97.7 (10-26-19 @ 04:58), Max: 98.4 (10-25-19 @ 21:15)  HR: 68 (10-26-19 @ 09:00) (67 - 86)  BP: 128/62 (10-26-19 @ 09:00) (99/47 - 143/58)  RR: 18 (10-26-19 @ 09:00) (18 - 19)  SpO2: 98% (10-25-19 @ 12:45) (98% - 100%)        Physical Exam:   GENERAL: NAD, appears stated age, well kempt, well nourished, RA, no elder, ambulates independently, AAOx3.   HEENT: NCAT  CHEST/LUNG: CTAB  HEART: Regular rate and rhythm; s1 s2 appreciated, No murmurs, rubs, or gallops  ABDOMEN: Soft, Nontender, Nondistended; Bowel sounds present  EXTREMITIES: No LE edema b/l  NERVOUS SYSTEM:  Alert & Oriented X3        Labs:                         7.9    6.04  )-----------( 213      ( 26 Oct 2019 08:04 )             24.5     Neutophil% 68.0, Lymphocyte% 16.4, Monocyte% 11.8, Bands% 0.5 10-26-19 @ 08:04    26 Oct 2019 08:04    140    |  101    |  47     ----------------------------<  162    4.0     |  24     |  7.1      Ca    8.8        26 Oct 2019 08:04  Mg     1.9       26 Oct 2019 08:04    TPro  5.7    /  Alb  3.4    /  TBili  1.1    /  DBili  x      /  AST  25     /  ALT  10     /  AlkPhos  110    26 Oct 2019 08:04       pTT    31.5             ----< 0.98 INR  (10-26-19 @ 08:04)    11.30        PT      Hemoglobin A1C, Whole Blood: 5.6 % (09-18-19 @ 07:12)

## 2019-10-26 NOTE — PROGRESS NOTE ADULT - ASSESSMENT
70 y/o M w/ PMHx of CAD w/ stents, Afib, ESRD on HD TRS, cholangitis, recent cholecystectomy presents for weakness and dark stool.     #Acute on chronic anemia  - Hgb 6.9 in ED, s/p 2 units  - S/p EGD 10/25  - GI: possible capsule and colonoscopy on Monday  - H/H 7.9 <- 8.2  - IV PPI BID  - Daily CBCs  - 2 18 gauge IVs  - active T&S    #Afib on eliquis  - HR <100s  - hold eliquis given bleed  - Can resume Eliquis after capsule, colonoscopy    #HTN  - c/w home medications    #HLD  - c/w home medications    #ESRD with HD TRS  - Cr 7.1 <- 5.6  - HD today  - Off IVF  - Nephro following   - Daily BMPs    #Diet: Regular  #Activity: Ambulate as tolerated  #DVT ppx: hold for now - GI bleed

## 2019-10-27 LAB
ANION GAP SERPL CALC-SCNC: 14 MMOL/L — SIGNIFICANT CHANGE UP (ref 7–14)
BASOPHILS # BLD AUTO: 0.04 K/UL — SIGNIFICANT CHANGE UP (ref 0–0.2)
BASOPHILS NFR BLD AUTO: 0.7 % — SIGNIFICANT CHANGE UP (ref 0–1)
BUN SERPL-MCNC: 30 MG/DL — HIGH (ref 10–20)
CALCIUM SERPL-MCNC: 8.8 MG/DL — SIGNIFICANT CHANGE UP (ref 8.5–10.1)
CHLORIDE SERPL-SCNC: 102 MMOL/L — SIGNIFICANT CHANGE UP (ref 98–110)
CHOLEST SERPL-MCNC: 116 MG/DL — SIGNIFICANT CHANGE UP (ref 100–200)
CO2 SERPL-SCNC: 27 MMOL/L — SIGNIFICANT CHANGE UP (ref 17–32)
CREAT SERPL-MCNC: 5.9 MG/DL — CRITICAL HIGH (ref 0.7–1.5)
EOSINOPHIL # BLD AUTO: 0.21 K/UL — SIGNIFICANT CHANGE UP (ref 0–0.7)
EOSINOPHIL NFR BLD AUTO: 3.7 % — SIGNIFICANT CHANGE UP (ref 0–8)
GLUCOSE SERPL-MCNC: 108 MG/DL — HIGH (ref 70–99)
HCT VFR BLD CALC: 26 % — LOW (ref 42–52)
HDLC SERPL-MCNC: 26 MG/DL — LOW
HGB BLD-MCNC: 8.4 G/DL — LOW (ref 14–18)
IMM GRANULOCYTES NFR BLD AUTO: 0.3 % — SIGNIFICANT CHANGE UP (ref 0.1–0.3)
LIPID PNL WITH DIRECT LDL SERPL: 73 MG/DL — SIGNIFICANT CHANGE UP (ref 4–129)
LYMPHOCYTES # BLD AUTO: 1.09 K/UL — LOW (ref 1.2–3.4)
LYMPHOCYTES # BLD AUTO: 19 % — LOW (ref 20.5–51.1)
MCHC RBC-ENTMCNC: 30.4 PG — SIGNIFICANT CHANGE UP (ref 27–31)
MCHC RBC-ENTMCNC: 32.3 G/DL — SIGNIFICANT CHANGE UP (ref 32–37)
MCV RBC AUTO: 94.2 FL — HIGH (ref 80–94)
MONOCYTES # BLD AUTO: 0.68 K/UL — HIGH (ref 0.1–0.6)
MONOCYTES NFR BLD AUTO: 11.9 % — HIGH (ref 1.7–9.3)
NEUTROPHILS # BLD AUTO: 3.69 K/UL — SIGNIFICANT CHANGE UP (ref 1.4–6.5)
NEUTROPHILS NFR BLD AUTO: 64.4 % — SIGNIFICANT CHANGE UP (ref 42.2–75.2)
NRBC # BLD: 0 /100 WBCS — SIGNIFICANT CHANGE UP (ref 0–0)
PLATELET # BLD AUTO: 227 K/UL — SIGNIFICANT CHANGE UP (ref 130–400)
POTASSIUM SERPL-MCNC: 4 MMOL/L — SIGNIFICANT CHANGE UP (ref 3.5–5)
POTASSIUM SERPL-SCNC: 4 MMOL/L — SIGNIFICANT CHANGE UP (ref 3.5–5)
RBC # BLD: 2.76 M/UL — LOW (ref 4.7–6.1)
RBC # FLD: 14.4 % — SIGNIFICANT CHANGE UP (ref 11.5–14.5)
SODIUM SERPL-SCNC: 143 MMOL/L — SIGNIFICANT CHANGE UP (ref 135–146)
TOTAL CHOLESTEROL/HDL RATIO MEASUREMENT: 4.5 RATIO — SIGNIFICANT CHANGE UP (ref 4–5.5)
TRIGL SERPL-MCNC: 160 MG/DL — HIGH (ref 10–149)
WBC # BLD: 5.73 K/UL — SIGNIFICANT CHANGE UP (ref 4.8–10.8)
WBC # FLD AUTO: 5.73 K/UL — SIGNIFICANT CHANGE UP (ref 4.8–10.8)

## 2019-10-27 PROCEDURE — 99233 SBSQ HOSP IP/OBS HIGH 50: CPT

## 2019-10-27 RX ADMIN — PANTOPRAZOLE SODIUM 40 MILLIGRAM(S): 20 TABLET, DELAYED RELEASE ORAL at 18:39

## 2019-10-27 RX ADMIN — PANTOPRAZOLE SODIUM 40 MILLIGRAM(S): 20 TABLET, DELAYED RELEASE ORAL at 05:49

## 2019-10-27 RX ADMIN — AMLODIPINE BESYLATE 10 MILLIGRAM(S): 2.5 TABLET ORAL at 05:49

## 2019-10-27 RX ADMIN — LOSARTAN POTASSIUM 25 MILLIGRAM(S): 100 TABLET, FILM COATED ORAL at 05:49

## 2019-10-27 RX ADMIN — Medication 25 MILLIGRAM(S): at 18:39

## 2019-10-27 RX ADMIN — Medication 25 MILLIGRAM(S): at 05:49

## 2019-10-27 RX ADMIN — ISOSORBIDE MONONITRATE 30 MILLIGRAM(S): 60 TABLET, EXTENDED RELEASE ORAL at 11:55

## 2019-10-27 NOTE — PROGRESS NOTE ADULT - SUBJECTIVE AND OBJECTIVE BOX
Progress Note:  Provider Speciality                            Hospitalist      MAMTA FERNANDO [id number] 69y Male     CHIEF PRESENTING COMPLAINT:  Patient is a 69y old  Male who presents with a chief complaint of Anemia (27 Oct 2019 10:58)        SUBJECTIVE:  Patient was seen and examined at bedside.   Reports no melanotic BM's today, had one yesterday though. c/o epigastric and R sided abdominal pain on palpation.    No significant overnight events reported.     HISTORY OF PRESENTING ILLNESS:  HPI:  68 y/o M w/ PMHx of CAD w/ stents, Afib, ESRD on HD, cholangitis, recent cholecystectomy presents for weakness and dark stool. Patient has had 3 episodes of dark stool over the last week and was to be scheduled for an outpatient colonoscopy with Dr. Nowak. However patient was feeling very weak and contacted GI office and was told to come to ED. On presentation was found to have Hgb of 4.9. Currently on interview receiving transfusion and reports he is feeling better. Denied current chest pain, SOB, abdominal pain, hematemesis. Last dark stool was prior to presentation to the ED. (24 Oct 2019 22:39)        REVIEW OF SYSTEMS:    At least 10 systems were reviewed in ROS. All systems reviewed  are within normal limits except for the complaints as described in Subjective.    PAST MEDICAL & SURGICAL HISTORY:  PAST MEDICAL & SURGICAL HISTORY:  Afib  Anemia  Heart failure  HLD (hyperlipidemia)  HTN (hypertension)  ESRD on dialysis: T/ TH/ S  S/P cataract surgery  S/P arteriovenous (AV) fistula creation  H/O right coronary artery stent placement: stent x3          VITAL SIGNS:  Vital Signs Last 24 Hrs  T(C): 37.1 (27 Oct 2019 11:57), Max: 37.1 (27 Oct 2019 11:57)  T(F): 98.7 (27 Oct 2019 11:57), Max: 98.7 (27 Oct 2019 11:57)  HR: 80 (27 Oct 2019 11:57) (80 - 86)  BP: 121/65 (27 Oct 2019 11:57) (121/65 - 147/71)  BP(mean): --  RR: 18 (27 Oct 2019 11:57) (18 - 18)  SpO2: 98% (27 Oct 2019 08:04) (98% - 98%)          PHYSICAL EXAMINATION:    General: AAOx 3, Not in acute distress  HEENT:   EOMI, NO JVD, atraumatic  Heart: S1+S2 audible, no murmur  Lungs: bilateral  fair air entry, no wheezing, no crepitations.  Abdomen: Soft, mild epigastric and R sided abdominal tenderness ,  non-distended   CNS:  no focal deficits.  Extremities:  No edema            CONSULTS:  Consultant(s) Notes Reviewed by me.   Care Discussed with Consultants/Other Providers where required.        MEDICATIONS:  MEDICATIONS  (STANDING):  amLODIPine   Tablet 10 milliGRAM(s) Oral daily  iohexol 300 mG (iodine)/mL Oral Solution 30 milliLiter(s) Oral once  isosorbide   mononitrate ER Tablet (IMDUR) 30 milliGRAM(s) Oral daily  losartan 25 milliGRAM(s) Oral daily  metoprolol tartrate 25 milliGRAM(s) Oral two times a day  pantoprazole  Injectable 40 milliGRAM(s) IV Push two times a day    MEDICATIONS  (PRN):  artificial tears (preservative free) Ophthalmic Solution 1 Drop(s) Both EYES two times a day PRN Dry Eyes      LABOROTORY DATA/MICROBIOLOGY/I & O's:                        8.4    5.73  )-----------( 227      ( 27 Oct 2019 06:39 )             26.0     10-27    143  |  102  |  30<H>  ----------------------------<  108<H>  4.0   |  27  |  5.9<HH>    Ca    8.8      27 Oct 2019 06:39  Mg     1.9     10-26    TPro  5.7<L>  /  Alb  3.4<L>  /  TBili  1.1  /  DBili  x   /  AST  25  /  ALT  10  /  AlkPhos  110  10-26    PT/INR - ( 26 Oct 2019 08:04 )   PT: 11.30 sec;   INR: 0.98 ratio         PTT - ( 26 Oct 2019 08:04 )  PTT:31.5 sec    CAPILLARY BLOOD GLUCOSE                    10-26-19 @ 07:01  -  10-27-19 @ 07:00  --------------------------------------------------------  IN: 0 mL / OUT: 2500 mL / NET: -2500 mL      Assessment/ Plan:     68 y/o M w/ PMHx of CAD w/ stents, Afib, ESRD on HD TRS, cholangitis, recent cholecystectomy presents for weakness and dark stool.     Acute on chronic normocytic  anemia possibly GI bleed:   - Hgb stable >8 s/p 2 units PRBC's this admission/ no need for transfusion today.   monitor H/H daily and transfuse PRN to keep Hb >7.  - S/p EGD 10/25  - fu GI for possible capsule endoscopy and colonoscopy tomorrow.   - c/w  IV PPI BID  - keep 2 18 gauge IVs  - active T&S    Afib on eliquis:  HR controlled   c/o lopressor  eliquis on hold due to possible GI bleed and anticipated Gi procedure.     HTN  - c/w losartan/ lopressor.     HLD  not on statin  check lipid profile.    #ESRD with HD TTS:  c/w HD as per schedule  - Nephro following         Progress note handoff:   pending: improvement of GI bleeding with stable H/H / colonoscopy/ GI follow up   disposition: home   discussion: patient

## 2019-10-28 LAB
ANION GAP SERPL CALC-SCNC: 16 MMOL/L — HIGH (ref 7–14)
ANION GAP SERPL CALC-SCNC: 18 MMOL/L — HIGH (ref 7–14)
APTT BLD: 32.1 SEC — SIGNIFICANT CHANGE UP (ref 27–39.2)
BASOPHILS # BLD AUTO: 0.03 K/UL — SIGNIFICANT CHANGE UP (ref 0–0.2)
BASOPHILS # BLD AUTO: 0.06 K/UL — SIGNIFICANT CHANGE UP (ref 0–0.2)
BASOPHILS NFR BLD AUTO: 0.5 % — SIGNIFICANT CHANGE UP (ref 0–1)
BASOPHILS NFR BLD AUTO: 0.7 % — SIGNIFICANT CHANGE UP (ref 0–1)
BLD GP AB SCN SERPL QL: SIGNIFICANT CHANGE UP
BUN SERPL-MCNC: 43 MG/DL — HIGH (ref 10–20)
BUN SERPL-MCNC: 47 MG/DL — HIGH (ref 10–20)
CALCIUM SERPL-MCNC: 9 MG/DL — SIGNIFICANT CHANGE UP (ref 8.5–10.1)
CALCIUM SERPL-MCNC: 9.2 MG/DL — SIGNIFICANT CHANGE UP (ref 8.5–10.1)
CHLORIDE SERPL-SCNC: 101 MMOL/L — SIGNIFICANT CHANGE UP (ref 98–110)
CHLORIDE SERPL-SCNC: 99 MMOL/L — SIGNIFICANT CHANGE UP (ref 98–110)
CO2 SERPL-SCNC: 24 MMOL/L — SIGNIFICANT CHANGE UP (ref 17–32)
CO2 SERPL-SCNC: 25 MMOL/L — SIGNIFICANT CHANGE UP (ref 17–32)
CREAT SERPL-MCNC: 7.1 MG/DL — CRITICAL HIGH (ref 0.7–1.5)
CREAT SERPL-MCNC: 7.7 MG/DL — CRITICAL HIGH (ref 0.7–1.5)
EOSINOPHIL # BLD AUTO: 0.31 K/UL — SIGNIFICANT CHANGE UP (ref 0–0.7)
EOSINOPHIL # BLD AUTO: 0.41 K/UL — SIGNIFICANT CHANGE UP (ref 0–0.7)
EOSINOPHIL NFR BLD AUTO: 4.6 % — SIGNIFICANT CHANGE UP (ref 0–8)
EOSINOPHIL NFR BLD AUTO: 4.7 % — SIGNIFICANT CHANGE UP (ref 0–8)
GLUCOSE SERPL-MCNC: 109 MG/DL — HIGH (ref 70–99)
GLUCOSE SERPL-MCNC: 112 MG/DL — HIGH (ref 70–99)
HCT VFR BLD CALC: 26.6 % — LOW (ref 42–52)
HCT VFR BLD CALC: 26.9 % — LOW (ref 42–52)
HGB BLD-MCNC: 8.6 G/DL — LOW (ref 14–18)
HGB BLD-MCNC: 8.8 G/DL — LOW (ref 14–18)
IMM GRANULOCYTES NFR BLD AUTO: 0.4 % — HIGH (ref 0.1–0.3)
IMM GRANULOCYTES NFR BLD AUTO: 0.5 % — HIGH (ref 0.1–0.3)
INR BLD: 0.96 RATIO — SIGNIFICANT CHANGE UP (ref 0.65–1.3)
LYMPHOCYTES # BLD AUTO: 1.22 K/UL — SIGNIFICANT CHANGE UP (ref 1.2–3.4)
LYMPHOCYTES # BLD AUTO: 1.79 K/UL — SIGNIFICANT CHANGE UP (ref 1.2–3.4)
LYMPHOCYTES # BLD AUTO: 18.4 % — LOW (ref 20.5–51.1)
LYMPHOCYTES # BLD AUTO: 20.1 % — LOW (ref 20.5–51.1)
MAGNESIUM SERPL-MCNC: 1.8 MG/DL — SIGNIFICANT CHANGE UP (ref 1.8–2.4)
MCHC RBC-ENTMCNC: 30.6 PG — SIGNIFICANT CHANGE UP (ref 27–31)
MCHC RBC-ENTMCNC: 30.8 PG — SIGNIFICANT CHANGE UP (ref 27–31)
MCHC RBC-ENTMCNC: 32.3 G/DL — SIGNIFICANT CHANGE UP (ref 32–37)
MCHC RBC-ENTMCNC: 32.7 G/DL — SIGNIFICANT CHANGE UP (ref 32–37)
MCV RBC AUTO: 94.1 FL — HIGH (ref 80–94)
MCV RBC AUTO: 94.7 FL — HIGH (ref 80–94)
MONOCYTES # BLD AUTO: 0.63 K/UL — HIGH (ref 0.1–0.6)
MONOCYTES # BLD AUTO: 0.76 K/UL — HIGH (ref 0.1–0.6)
MONOCYTES NFR BLD AUTO: 8.5 % — SIGNIFICANT CHANGE UP (ref 1.7–9.3)
MONOCYTES NFR BLD AUTO: 9.5 % — HIGH (ref 1.7–9.3)
NEUTROPHILS # BLD AUTO: 4.41 K/UL — SIGNIFICANT CHANGE UP (ref 1.4–6.5)
NEUTROPHILS # BLD AUTO: 5.86 K/UL — SIGNIFICANT CHANGE UP (ref 1.4–6.5)
NEUTROPHILS NFR BLD AUTO: 65.7 % — SIGNIFICANT CHANGE UP (ref 42.2–75.2)
NEUTROPHILS NFR BLD AUTO: 66.4 % — SIGNIFICANT CHANGE UP (ref 42.2–75.2)
NRBC # BLD: 0 /100 WBCS — SIGNIFICANT CHANGE UP (ref 0–0)
NRBC # BLD: 0 /100 WBCS — SIGNIFICANT CHANGE UP (ref 0–0)
PLATELET # BLD AUTO: 231 K/UL — SIGNIFICANT CHANGE UP (ref 130–400)
PLATELET # BLD AUTO: 257 K/UL — SIGNIFICANT CHANGE UP (ref 130–400)
POTASSIUM SERPL-MCNC: 4.6 MMOL/L — SIGNIFICANT CHANGE UP (ref 3.5–5)
POTASSIUM SERPL-MCNC: 5.1 MMOL/L — HIGH (ref 3.5–5)
POTASSIUM SERPL-SCNC: 4.6 MMOL/L — SIGNIFICANT CHANGE UP (ref 3.5–5)
POTASSIUM SERPL-SCNC: 5.1 MMOL/L — HIGH (ref 3.5–5)
PROTHROM AB SERPL-ACNC: 11.1 SEC — SIGNIFICANT CHANGE UP (ref 9.95–12.87)
RBC # BLD: 2.81 M/UL — LOW (ref 4.7–6.1)
RBC # BLD: 2.86 M/UL — LOW (ref 4.7–6.1)
RBC # FLD: 14.4 % — SIGNIFICANT CHANGE UP (ref 11.5–14.5)
RBC # FLD: 14.6 % — HIGH (ref 11.5–14.5)
SODIUM SERPL-SCNC: 141 MMOL/L — SIGNIFICANT CHANGE UP (ref 135–146)
SODIUM SERPL-SCNC: 142 MMOL/L — SIGNIFICANT CHANGE UP (ref 135–146)
SURGICAL PATHOLOGY STUDY: SIGNIFICANT CHANGE UP
WBC # BLD: 6.63 K/UL — SIGNIFICANT CHANGE UP (ref 4.8–10.8)
WBC # BLD: 8.92 K/UL — SIGNIFICANT CHANGE UP (ref 4.8–10.8)
WBC # FLD AUTO: 6.63 K/UL — SIGNIFICANT CHANGE UP (ref 4.8–10.8)
WBC # FLD AUTO: 8.92 K/UL — SIGNIFICANT CHANGE UP (ref 4.8–10.8)

## 2019-10-28 PROCEDURE — 99232 SBSQ HOSP IP/OBS MODERATE 35: CPT

## 2019-10-28 RX ORDER — SOD SULF/SODIUM/NAHCO3/KCL/PEG
4000 SOLUTION, RECONSTITUTED, ORAL ORAL ONCE
Refills: 0 | Status: COMPLETED | OUTPATIENT
Start: 2019-10-28 | End: 2019-10-28

## 2019-10-28 RX ADMIN — PANTOPRAZOLE SODIUM 40 MILLIGRAM(S): 20 TABLET, DELAYED RELEASE ORAL at 21:56

## 2019-10-28 RX ADMIN — AMLODIPINE BESYLATE 10 MILLIGRAM(S): 2.5 TABLET ORAL at 05:44

## 2019-10-28 RX ADMIN — ISOSORBIDE MONONITRATE 30 MILLIGRAM(S): 60 TABLET, EXTENDED RELEASE ORAL at 13:37

## 2019-10-28 RX ADMIN — Medication 25 MILLIGRAM(S): at 05:44

## 2019-10-28 RX ADMIN — Medication 25 MILLIGRAM(S): at 21:56

## 2019-10-28 RX ADMIN — PANTOPRAZOLE SODIUM 40 MILLIGRAM(S): 20 TABLET, DELAYED RELEASE ORAL at 05:44

## 2019-10-28 RX ADMIN — Medication 4000 MILLILITER(S): at 19:00

## 2019-10-28 RX ADMIN — Medication 20 MILLIGRAM(S): at 21:56

## 2019-10-28 RX ADMIN — LOSARTAN POTASSIUM 25 MILLIGRAM(S): 100 TABLET, FILM COATED ORAL at 05:44

## 2019-10-28 NOTE — PROGRESS NOTE ADULT - ASSESSMENT
68 yo man with PMH of A fib anemia , TN ESRD on HD T Th Sat sp AVF surgery presenting with anemia acute and LGIB   # ESRD : hd in am   # GI bleed, followed by GI ? colonoscopy today , ? capsule endoscopy, follow h/h  #check ph level  # will start darbo 20 with hd in am with HD, no venofer elevated sat  # will follow

## 2019-10-28 NOTE — PROGRESS NOTE ADULT - ASSESSMENT
68 y/o M w/ PMHx of CAD w/ stents, Afib, ESRD on HD TRS, cholangitis, recent cholecystectomy presents for weakness and dark stool.     # Acute on chronic normocytic anemia, possibly GI bleed:   Hgb stable >8; CBC q24  s/p 3 units PRBC's this admission  keep Hb >7  S/p EGD 10/25 - report not avail  fu GI for possible capsule endoscopy and colonoscopy (will need prep, clr liq diet and NPO once scheduled)  c/w IV PPI BID  active T&S  getting darbo  no venofer (high iron sat)    # suprapubic pain  check bladder scan to r/o urine retention (pt says he is making urine)    # RUQ pain is expected 2/2 recent lap elba  might need to remove suture    # Afib was on eliquis:  HR controlled - c/o lopressor  eliquis on hold due to possible GI bleed and anticipated GI procedures     # HTN  c/w losartan/ lopressor.     # HLD  lipid profile is OK - cont off of statin    # ESRD with HD TTS:  c/w HD as per schedule  Nephro following   10/16 - Phos 4.1    disposition: f/u w/ GI to complete anemia w/u; check bladder scan    PMD: T. Gut (MAP)

## 2019-10-28 NOTE — PROGRESS NOTE ADULT - SUBJECTIVE AND OBJECTIVE BOX
seen and examined  no distress  c/o diarrhea     Standing Inpatient Medications  amLODIPine   Tablet 10 milliGRAM(s) Oral daily  iohexol 300 mG (iodine)/mL Oral Solution 30 milliLiter(s) Oral once  isosorbide   mononitrate ER Tablet (IMDUR) 30 milliGRAM(s) Oral daily  losartan 25 milliGRAM(s) Oral daily  metoprolol tartrate 25 milliGRAM(s) Oral two times a day  pantoprazole  Injectable 40 milliGRAM(s) IV Push two times a day    PRN Inpatient Medications  artificial tears (preservative free) Ophthalmic Solution 1 Drop(s) Both EYES two times a day PRN          VITALS/PHYSICAL EXAM  --------------------------------------------------------------------------------  T(C): 36 (10-28-19 @ 04:45), Max: 37.1 (10-27-19 @ 11:57)  HR: 88 (10-28-19 @ 04:45) (67 - 88)  BP: 152/72 (10-28-19 @ 04:45) (121/65 - 152/72)  RR: 18 (10-28-19 @ 04:45) (18 - 18)  SpO2: --  Wt(kg): --        Physical Exam:  	Gen: NAD  	Pulm: decrease  BS B/L  	CV:  S1S2; no rub  	Abd: tender/distended  	LE:  no edema  	Vascular access: av fistula     LABS/STUDIES  --------------------------------------------------------------------------------              8.6    6.63  >-----------<  231      [10-28-19 @ 07:37]              26.6     142  |  101  |  43  ----------------------------<  112      [10-28-19 @ 07:37]  4.6   |  25  |  7.1        Ca     9.0     [10-28-19 @ 07:37]            Creatinine Trend:  SCr 7.1 [10-28 @ 07:37]  SCr 5.9 [10-27 @ 06:39]  SCr 7.1 [10-26 @ 08:04]  SCr 5.6 [10-25 @ 09:01]  SCr 4.3 [10-24 @ 18:48]        Iron 95, TIBC 134, %sat 71      [07-16-19 @ 06:20]  Ferritin 2507      [07-16-19 @ 06:20]  PTH -- (Ca 7.3)      [07-16-19 @ 05:40]   430  HbA1c 5.6      [09-18-19 @ 07:12]  Lipid: chol 116, , HDL 26, LDL 73      [10-27-19 @ 06:39]

## 2019-10-28 NOTE — PROGRESS NOTE ADULT - SUBJECTIVE AND OBJECTIVE BOX
MAMTA FERNANDO  69y  Male  ***My note supersedes ALL resident notes that I sign.  My corrections for their notes are in my note.***    I can be reached directly on [id number]. My office number is [phone]. My personal cell number is [phone].    INTERVAL EVENTS: Here for f/u of anemia. Pt c/o mild RUQ pain (s/p lap chol recently) - expected. Pt also c/o suprapubic discomfort. He says he is urinating a lot. Pt has no SOB or CP. He is [id number] GI w/u.    T(F): 96.8 (10-28-19 @ 04:45), Max: 98.7 (10-27-19 @ 11:57)  HR: 88 (10-28-19 @ 04:45) (67 - 88)  BP: 152/72 (10-28-19 @ 04:45) (121/65 - 152/72)  RR: 18 (10-28-19 @ 04:45) (18 - 18)  SpO2: --    Gen: NAD  HEENT: pale conj; slc white; EOMI; PERRL; mouth clr; ears nl; no nasal d/c  Neck: no JVD; no nodes; thyroid nl  lung: clr  hrt: s1 s2 rrr  abd: 3 small lap chol scars in RUQ (1 suture seen in most-medial sx site) - well- healed, no infection, minor tenderness (expected); no HS megaly; soft; mild bladder fullness on exam (with discomfort to palpation)  ext: no edema, no c/c  neuro: WNL    LABS:                        8.6     (    94.7   6.63  )-----------( ---------      231      ( 28 Oct 2019 07:37 )             26.6    (    14.6     Hemoglobin: 8.6 g/dL (10-28 @ 07:37)  Hemoglobin: 8.4 g/dL (10-27 @ 06:39)  Hemoglobin: 7.9 g/dL (10-26 @ 08:04)  Hemoglobin: 8.2 g/dL (10-25 @ 17:48)  Hemoglobin: 7.2 g/dL (10-25 @ 09:01)  Hemoglobin: 4.9 g/dL (10-24 @ 18:48) s/p 3 units    142   (   101   (   112      10-28-19 @ 07:37  ----------------------               4.6   (   25   (   43                             -----                        7.1  Ca  9.0   Mg  --    P   --     Creatinine:   7.1 (10-28 @ 07:37)  GFR (AA):     8  GFR (non AA): 7    Creatinine:   5.9 (10-27 @ 06:39)  GFR (AA):     11  GFR (non AA): 9    Creatinine:   7.1 (10-26 @ 08:04)  GFR (AA):     8  GFR (non AA): 7    Creatinine:   5.6 (10-25 @ 09:01)  GFR (AA):     11  GFR (non AA): 10    Creatinine:   4.3 (10-24 @ 18:48)  GFR (AA):     15  GFR (non AA): 13      RADIOLOGY & ADDITIONAL TESTS:  < from: CT Abdomen and Pelvis No Cont (10.24.19 @ 21:43) >  VASCULAR: Calcified atherosclerotic disease within the abdominal aorta   and its major branches.      IMPRESSION:    No CT evidence of acute intra-abdominal pathology within the limits of a   noncontrast examination.    Postcholecystectomy with pneumobilia.    < end of copied text >    MEDICATIONS:    amLODIPine   Tablet 10 milliGRAM(s) Oral daily  artificial tears (preservative free) Ophthalmic Solution 1 Drop(s) Both EYES two times a day PRN  iohexol 300 mG (iodine)/mL Oral Solution 30 milliLiter(s) Oral once  isosorbide   mononitrate ER Tablet (IMDUR) 30 milliGRAM(s) Oral daily  losartan 25 milliGRAM(s) Oral daily  metoprolol tartrate 25 milliGRAM(s) Oral two times a day  pantoprazole  Injectable 40 milliGRAM(s) IV Push two times a day

## 2019-10-28 NOTE — PROGRESS NOTE ADULT - ASSESSMENT
68 y/o M w/ PMHx of CAD w/ stents, Afib, ESRD on HD TRS, cholangitis, recent cholecystectomy presents for weakness and dark stool.     #Acute on chronic anemia  - Hgb 6.9 in ED, s/p 2 units  - S/p EGD 10/25  - GI: possible capsule and colonoscopy today 10/28  - H/H 8.4 <- 7.9 <- 8.2  - IV PPI BID  - Daily CBCs  - 2 18 gauge IVs  - active T&S    #Afib on eliquis  - HR <90  - hold eliquis given bleed  - Can resume Eliquis after capsule, colonoscopy    #HTN  - c/w home medications    #HLD  - c/w home medications    #ESRD with HD TRS  - Cr 5.9 <- 7.1  - HD tomorrow 10/29  - Nephro following   - Daily BMPs    #Diet: Regular  #Activity: Ambulate as tolerated  #DVT ppx: hold for now - GI bleed 68 y/o M w/ PMHx of CAD w/ stents, Afib, ESRD on HD TRS, cholangitis, recent cholecystectomy presents for weakness and dark stool.     #Acute on chronic anemia  - Hgb 6.9 in ED, s/p 2 units  - H/H 8.6 <- 8.4 <- 7.9  - S/p EGD 10/25: kelsi esophagitis with bx taken, no blood in stomach or duodenum   - GI: possible capsule and colonoscopy today 10/28  - IV PPI BID  - Daily CBCs  - 2 18 gauge IVs  - active T&S    #Afib on eliquis   - Rate controlled   - holding eliquis given bleed  - Can resume Eliquis after capsule, colonoscopy    #HTN  - Systolic 120-150  - c/w home medications    #HLD  - c/w home medications    #ESRD with HD TRS  - Cr 5.9 <- 7.1  - HD tomorrow 10/29  - Nephro following   - Daily BMPs    #Diet: Regular  #Activity: Ambulate as tolerated  #DVT ppx: hold for now - GI bleed 68 y/o M w/ PMHx of CAD w/ stents, Afib, ESRD on HD TRS, cholangitis, recent cholecystectomy presents for weakness and dark stool.     #Acute on chronic anemia  - Hgb 6.9 in ED, s/p 3 units  - H/H 8.6 <- 8.4 <- 7.9  - S/p EGD 10/25: kelsi esophagitis with bx taken, no blood in stomach or duodenum   - GI: possible capsule and colonoscopy today 10/28  - IV PPI BID  - Daily CBCs  - 2 18 gauge IVs  - active T&S  - F/u EGD bx    #Afib on eliquis   - Rate controlled   - holding eliquis given bleed  - Can resume Eliquis after capsule, colonoscopy    #HTN  - Systolic 120-150  - c/w home medications    #HLD  - c/w home medications    #ESRD with HD TRS, with new c/o pelvic tenderness   - Makes urine  - Cr 5.9 <- 7.1  - HD tomorrow 10/29  - Nephro following   - Daily BMPs  - F/u bladder scan    # Recent lap cholecystectomy  - Tenderness in RUQ  - Possibly due to incision site, which might have to be removed    #Diet: Regular  #Activity: Ambulate as tolerated  #DVT ppx: hold for now - GI bleed A 70 y/o M w/ PMHx of CAD w/ stents, Afib, ESRD on HD TRS, cholangitis, recent cholecystectomy presents for weakness and dark stool.     #Acute on chronic anemia  - Hgb 6.9 in ED, s/p 3 units  - H/H 8.6 <- 8.4 <- 7.9  - S/p EGD 10/25: kelsi esophagitis with bx taken, no blood in stomach or duodenum   - GI: capsule and colonoscopy tomorrow 10/29  - IV PPI BID  - Daily CBCs  - 2 18 gauge IVs  - active T&S  - F/u EGD bx  - Bowel prep and preop labs ordered    #Afib on eliquis   - Rate controlled   - holding eliquis given bleed  - Can resume Eliquis after capsule, colonoscopy    #HTN  - Systolic 120-150  - c/w home medications    #HLD  - c/w home medications    #ESRD with HD TRS, with new c/o pelvic tenderness   - Makes urine  - Cr 5.9 <- 7.1  - HD tomorrow 10/29  - Nephro following   - Daily BMPs  - F/u bladder scan    # Recent lap cholecystectomy  - Tenderness in RUQ  - Possibly due to incision site, which might have to be removed    #Diet: NPO for colonoscopy 10/29  #Activity: Ambulate as tolerated  #DVT ppx: hold for now - GI bleed A 68 y/o M w/ PMHx of CAD w/ stents, Afib, ESRD on HD TRS, cholangitis, recent cholecystectomy presents for weakness and dark stool.     #Acute on chronic anemia  - Hgb 6.9 in ED, s/p 3 units  - H/H 8.6 <- 8.4 <- 7.9  - S/p EGD 10/25: kelsi esophagitis with bx taken, no blood in stomach or duodenum   - GI: capsule and colonoscopy tomorrow 10/29  - IV PPI BID  - Daily CBCs  - 2 18 gauge IVs  - active T&S  - F/u EGD bx  - Bowel prep and preop labs ordered  - F/u PT c/s    #Afib on eliquis   - Rate controlled   - holding eliquis given bleed  - Can resume Eliquis after capsule, colonoscopy    #HTN  - Systolic 120-150  - c/w home medications    #HLD  - c/w home medications    #ESRD with HD TRS, with new c/o pelvic tenderness   - Makes urine  - Cr 5.9 <- 7.1  - HD tomorrow 10/29  - Nephro following   - Daily BMPs  - F/u bladder scan    # Recent lap cholecystectomy  - Tenderness in RUQ  - Possibly due to incision site, which might have to be removed    #Diet: NPO for colonoscopy 10/29  #Activity: Ambulate as tolerated  #DVT ppx: hold for now - GI bleed

## 2019-10-28 NOTE — PROGRESS NOTE ADULT - SUBJECTIVE AND OBJECTIVE BOX
Hospital Day:  4d    Subjective:    Patient is a 69y old  Male who presents with a chief complaint of Anemia (27 Oct 2019 14:01)      Past Medical Hx:   Afib  Anemia  Heart failure  HLD (hyperlipidemia)  HTN (hypertension)  ESRD on dialysis    Past Sx:  S/P cataract surgery  S/P arteriovenous (AV) fistula creation  H/O right coronary artery stent placement    Allergies:  No Known Allergies    Current Meds:   Standng Meds:  amLODIPine   Tablet 10 milliGRAM(s) Oral daily  iohexol 300 mG (iodine)/mL Oral Solution 30 milliLiter(s) Oral once  isosorbide   mononitrate ER Tablet (IMDUR) 30 milliGRAM(s) Oral daily  losartan 25 milliGRAM(s) Oral daily  metoprolol tartrate 25 milliGRAM(s) Oral two times a day  pantoprazole  Injectable 40 milliGRAM(s) IV Push two times a day    PRN Meds:  artificial tears (preservative free) Ophthalmic Solution 1 Drop(s) Both EYES two times a day PRN Dry Eyes    HOME MEDICATIONS:  aspirin 81 mg oral tablet, chewable: 1 tab(s) orally once a day  atorvastatin 40 mg oral tablet: 1 tab(s) orally once a day  isosorbide mononitrate 30 mg oral tablet, extended release: 1 tab(s) orally once a day (in the morning)  losartan 25 mg oral tablet: 1 tab(s) orally once a day  metoprolol tartrate 25 mg oral tablet: 1 tab(s) orally 2 times a day      Vital Signs:   T(F): 96.8 (10-28-19 @ 04:45), Max: 98.7 (10-27-19 @ 11:57)  HR: 88 (10-28-19 @ 04:45) (67 - 88)  BP: 152/72 (10-28-19 @ 04:45) (121/65 - 152/72)  RR: 18 (10-28-19 @ 04:45) (18 - 18)  SpO2: 98% (10-27-19 @ 08:04) (98% - 98%)      10-26-19 @ 07:01  -  10-27-19 @ 07:00  --------------------------------------------------------  IN: 0 mL / OUT: 2500 mL / NET: -2500 mL        Physical Exam:   GENERAL: NAD  HEENT: NCAT  CHEST/LUNG: CTAB  HEART: Regular rate and rhythm; s1 s2 appreciated, No murmurs, rubs, or gallops  ABDOMEN: Soft, Nontender, Nondistended; Bowel sounds present  EXTREMITIES: No LE edema b/l  NERVOUS SYSTEM:  Alert & Oriented X3        Labs:                         8.4    5.73  )-----------( 227      ( 27 Oct 2019 06:39 )             26.0     Neutophil% 64.4, Lymphocyte% 19.0, Monocyte% 11.9, Bands% 0.3 10-27-19 @ 06:39    27 Oct 2019 06:39    143    |  102    |  30     ----------------------------<  108    4.0     |  27     |  5.9      Ca    8.8        27 Oct 2019 06:39  Mg     1.9       26 Oct 2019 08:04    TPro  5.7    /  Alb  3.4    /  TBili  1.1    /  DBili  x      /  AST  25     /  ALT  10     /  AlkPhos  110    26 Oct 2019 08:04      Chol 116, LDL 73, HDL 26, VLDL --,  10-27-19 @ 06:39      Hemoglobin A1C, Whole Blood: 5.6 % (09-18-19 @ 07:12) Hospital Day:  4d    Subjective:    Patient is a 69y old  Male who presents with a chief complaint of Anemia. No acute events overnight and in no distress this am. Reports mild improvement in melena, four episodes yesterday, one episode this am. Feels well, asking to go to short term rehab      Past Medical Hx:   Afib  Anemia  Heart failure  HLD (hyperlipidemia)  HTN (hypertension)  ESRD on dialysis    Past Sx:  S/P cataract surgery  S/P arteriovenous (AV) fistula creation  H/O right coronary artery stent placement    Allergies:  No Known Allergies    Current Meds:   Standng Meds:  amLODIPine   Tablet 10 milliGRAM(s) Oral daily  iohexol 300 mG (iodine)/mL Oral Solution 30 milliLiter(s) Oral once  isosorbide   mononitrate ER Tablet (IMDUR) 30 milliGRAM(s) Oral daily  losartan 25 milliGRAM(s) Oral daily  metoprolol tartrate 25 milliGRAM(s) Oral two times a day  pantoprazole  Injectable 40 milliGRAM(s) IV Push two times a day    PRN Meds:  artificial tears (preservative free) Ophthalmic Solution 1 Drop(s) Both EYES two times a day PRN Dry Eyes    HOME MEDICATIONS:  aspirin 81 mg oral tablet, chewable: 1 tab(s) orally once a day  atorvastatin 40 mg oral tablet: 1 tab(s) orally once a day  isosorbide mononitrate 30 mg oral tablet, extended release: 1 tab(s) orally once a day (in the morning)  losartan 25 mg oral tablet: 1 tab(s) orally once a day  metoprolol tartrate 25 mg oral tablet: 1 tab(s) orally 2 times a day      Vital Signs:   T(F): 96.8 (10-28-19 @ 04:45), Max: 98.7 (10-27-19 @ 11:57)  HR: 88 (10-28-19 @ 04:45) (67 - 88)  BP: 152/72 (10-28-19 @ 04:45) (121/65 - 152/72)  RR: 18 (10-28-19 @ 04:45) (18 - 18)  SpO2: 98% (10-27-19 @ 08:04) (98% - 98%)      10-26-19 @ 07:01  -  10-27-19 @ 07:00  --------------------------------------------------------  IN: 0 mL / OUT: 2500 mL / NET: -2500 mL        Physical Exam:   GENERAL: NAD, appears stated age, well nourished, well fed, RA, no elder, ambulates with cane, normal appetite, no bed ulcers   HEENT: NCAT  CHEST/LUNG: CTAB  HEART: Regular rate and rhythm; s1 s2 appreciated, No murmurs, rubs, or gallops  ABDOMEN: Soft, Nontender, Nondistended; Bowel sounds present  EXTREMITIES: No LE edema b/l  NERVOUS SYSTEM:  Alert & Oriented X3        Labs:                         8.4    5.73  )-----------( 227      ( 27 Oct 2019 06:39 )             26.0     Neutophil% 64.4, Lymphocyte% 19.0, Monocyte% 11.9, Bands% 0.3 10-27-19 @ 06:39    27 Oct 2019 06:39    143    |  102    |  30     ----------------------------<  108    4.0     |  27     |  5.9      Ca    8.8        27 Oct 2019 06:39  Mg     1.9       26 Oct 2019 08:04    TPro  5.7    /  Alb  3.4    /  TBili  1.1    /  DBili  x      /  AST  25     /  ALT  10     /  AlkPhos  110    26 Oct 2019 08:04      Chol 116, LDL 73, HDL 26, VLDL --,  10-27-19 @ 06:39      Hemoglobin A1C, Whole Blood: 5.6 % (09-18-19 @ 07:12) Hospital Day:  4d    Subjective:    Patient is a 69y old  Male who presents with a chief complaint of Anemia. No acute events overnight and in no distress this am. Reports mild improvement in melena, four episodes yesterday, one episode this am. Feels well, asking to go to short term rehab.       Past Medical Hx:   Afib  Anemia  Heart failure  HLD (hyperlipidemia)  HTN (hypertension)  ESRD on dialysis    Past Sx:  S/P cataract surgery  S/P arteriovenous (AV) fistula creation  H/O right coronary artery stent placement    Allergies:  No Known Allergies    Current Meds:   Standng Meds:  amLODIPine   Tablet 10 milliGRAM(s) Oral daily  iohexol 300 mG (iodine)/mL Oral Solution 30 milliLiter(s) Oral once  isosorbide   mononitrate ER Tablet (IMDUR) 30 milliGRAM(s) Oral daily  losartan 25 milliGRAM(s) Oral daily  metoprolol tartrate 25 milliGRAM(s) Oral two times a day  pantoprazole  Injectable 40 milliGRAM(s) IV Push two times a day    PRN Meds:  artificial tears (preservative free) Ophthalmic Solution 1 Drop(s) Both EYES two times a day PRN Dry Eyes    HOME MEDICATIONS:  aspirin 81 mg oral tablet, chewable: 1 tab(s) orally once a day  atorvastatin 40 mg oral tablet: 1 tab(s) orally once a day  isosorbide mononitrate 30 mg oral tablet, extended release: 1 tab(s) orally once a day (in the morning)  losartan 25 mg oral tablet: 1 tab(s) orally once a day  metoprolol tartrate 25 mg oral tablet: 1 tab(s) orally 2 times a day      Vital Signs:   T(F): 96.8 (10-28-19 @ 04:45), Max: 98.7 (10-27-19 @ 11:57)  HR: 88 (10-28-19 @ 04:45) (67 - 88)  BP: 152/72 (10-28-19 @ 04:45) (121/65 - 152/72)  RR: 18 (10-28-19 @ 04:45) (18 - 18)  SpO2: 98% (10-27-19 @ 08:04) (98% - 98%)      10-26-19 @ 07:01  -  10-27-19 @ 07:00  --------------------------------------------------------  IN: 0 mL / OUT: 2500 mL / NET: -2500 mL        Physical Exam:   GENERAL: NAD, appears stated age, well nourished, well fed, RA, no elder, ambulates with cane, normal appetite, no bed ulcers   HEENT: NCAT  CHEST/LUNG: CTAB  HEART: Regular rate and rhythm; s1 s2 appreciated, No murmurs, rubs, or gallops  ABDOMEN: Soft, Nontender, Nondistended; Bowel sounds present  EXTREMITIES: No LE edema b/l  NERVOUS SYSTEM:  Alert & Oriented X3        Labs:                         8.4    5.73  )-----------( 227      ( 27 Oct 2019 06:39 )             26.0     Neutophil% 64.4, Lymphocyte% 19.0, Monocyte% 11.9, Bands% 0.3 10-27-19 @ 06:39    27 Oct 2019 06:39    143    |  102    |  30     ----------------------------<  108    4.0     |  27     |  5.9      Ca    8.8        27 Oct 2019 06:39  Mg     1.9       26 Oct 2019 08:04    TPro  5.7    /  Alb  3.4    /  TBili  1.1    /  DBili  x      /  AST  25     /  ALT  10     /  AlkPhos  110    26 Oct 2019 08:04      Chol 116, LDL 73, HDL 26, VLDL --,  10-27-19 @ 06:39      Hemoglobin A1C, Whole Blood: 5.6 % (09-18-19 @ 07:12)

## 2019-10-29 ENCOUNTER — RESULT REVIEW (OUTPATIENT)
Age: 69
End: 2019-10-29

## 2019-10-29 LAB
ANION GAP SERPL CALC-SCNC: 17 MMOL/L — HIGH (ref 7–14)
BASOPHILS # BLD AUTO: 0.04 K/UL — SIGNIFICANT CHANGE UP (ref 0–0.2)
BASOPHILS NFR BLD AUTO: 0.7 % — SIGNIFICANT CHANGE UP (ref 0–1)
BUN SERPL-MCNC: 49 MG/DL — HIGH (ref 10–20)
CALCIUM SERPL-MCNC: 9.2 MG/DL — SIGNIFICANT CHANGE UP (ref 8.5–10.1)
CHLORIDE SERPL-SCNC: 102 MMOL/L — SIGNIFICANT CHANGE UP (ref 98–110)
CO2 SERPL-SCNC: 22 MMOL/L — SIGNIFICANT CHANGE UP (ref 17–32)
CREAT SERPL-MCNC: 8.1 MG/DL — CRITICAL HIGH (ref 0.7–1.5)
EOSINOPHIL # BLD AUTO: 0.34 K/UL — SIGNIFICANT CHANGE UP (ref 0–0.7)
EOSINOPHIL NFR BLD AUTO: 5.7 % — SIGNIFICANT CHANGE UP (ref 0–8)
GLUCOSE SERPL-MCNC: 92 MG/DL — SIGNIFICANT CHANGE UP (ref 70–99)
HCT VFR BLD CALC: 25.5 % — LOW (ref 42–52)
HGB BLD-MCNC: 8.4 G/DL — LOW (ref 14–18)
IGG SERPL-MCNC: 980 MG/DL — SIGNIFICANT CHANGE UP (ref 700–1600)
IGG1 SER-MCNC: 433 MG/DL — SIGNIFICANT CHANGE UP (ref 248–810)
IGG2 SER-MCNC: 472 MG/DL — SIGNIFICANT CHANGE UP (ref 130–555)
IGG3 SER-MCNC: 55 MG/DL — SIGNIFICANT CHANGE UP (ref 15–102)
IGG4 SER-MCNC: 6 MG/DL — SIGNIFICANT CHANGE UP (ref 2–96)
IMM GRANULOCYTES NFR BLD AUTO: 0.3 % — SIGNIFICANT CHANGE UP (ref 0.1–0.3)
LYMPHOCYTES # BLD AUTO: 1.12 K/UL — LOW (ref 1.2–3.4)
LYMPHOCYTES # BLD AUTO: 18.7 % — LOW (ref 20.5–51.1)
MAGNESIUM SERPL-MCNC: 1.8 MG/DL — SIGNIFICANT CHANGE UP (ref 1.8–2.4)
MCHC RBC-ENTMCNC: 30.8 PG — SIGNIFICANT CHANGE UP (ref 27–31)
MCHC RBC-ENTMCNC: 32.9 G/DL — SIGNIFICANT CHANGE UP (ref 32–37)
MCV RBC AUTO: 93.4 FL — SIGNIFICANT CHANGE UP (ref 80–94)
MONOCYTES # BLD AUTO: 0.64 K/UL — HIGH (ref 0.1–0.6)
MONOCYTES NFR BLD AUTO: 10.7 % — HIGH (ref 1.7–9.3)
NEUTROPHILS # BLD AUTO: 3.83 K/UL — SIGNIFICANT CHANGE UP (ref 1.4–6.5)
NEUTROPHILS NFR BLD AUTO: 63.9 % — SIGNIFICANT CHANGE UP (ref 42.2–75.2)
NRBC # BLD: 0 /100 WBCS — SIGNIFICANT CHANGE UP (ref 0–0)
PLATELET # BLD AUTO: 229 K/UL — SIGNIFICANT CHANGE UP (ref 130–400)
POTASSIUM SERPL-MCNC: 4.7 MMOL/L — SIGNIFICANT CHANGE UP (ref 3.5–5)
POTASSIUM SERPL-SCNC: 4.7 MMOL/L — SIGNIFICANT CHANGE UP (ref 3.5–5)
RBC # BLD: 2.73 M/UL — LOW (ref 4.7–6.1)
RBC # FLD: 14.6 % — HIGH (ref 11.5–14.5)
SODIUM SERPL-SCNC: 141 MMOL/L — SIGNIFICANT CHANGE UP (ref 135–146)
WBC # BLD: 5.99 K/UL — SIGNIFICANT CHANGE UP (ref 4.8–10.8)
WBC # FLD AUTO: 5.99 K/UL — SIGNIFICANT CHANGE UP (ref 4.8–10.8)

## 2019-10-29 PROCEDURE — 99232 SBSQ HOSP IP/OBS MODERATE 35: CPT

## 2019-10-29 PROCEDURE — 45385 COLONOSCOPY W/LESION REMOVAL: CPT

## 2019-10-29 RX ORDER — PANTOPRAZOLE SODIUM 20 MG/1
40 TABLET, DELAYED RELEASE ORAL
Refills: 0 | Status: DISCONTINUED | OUTPATIENT
Start: 2019-10-29 | End: 2019-11-06

## 2019-10-29 RX ORDER — AMLODIPINE BESYLATE 2.5 MG/1
10 TABLET ORAL AT BEDTIME
Refills: 0 | Status: DISCONTINUED | OUTPATIENT
Start: 2019-10-29 | End: 2019-10-30

## 2019-10-29 RX ADMIN — PANTOPRAZOLE SODIUM 40 MILLIGRAM(S): 20 TABLET, DELAYED RELEASE ORAL at 06:22

## 2019-10-29 RX ADMIN — ISOSORBIDE MONONITRATE 30 MILLIGRAM(S): 60 TABLET, EXTENDED RELEASE ORAL at 13:55

## 2019-10-29 RX ADMIN — Medication 25 MILLIGRAM(S): at 18:46

## 2019-10-29 RX ADMIN — LOSARTAN POTASSIUM 25 MILLIGRAM(S): 100 TABLET, FILM COATED ORAL at 06:22

## 2019-10-29 RX ADMIN — AMLODIPINE BESYLATE 10 MILLIGRAM(S): 2.5 TABLET ORAL at 21:45

## 2019-10-29 RX ADMIN — Medication 25 MILLIGRAM(S): at 06:22

## 2019-10-29 RX ADMIN — AMLODIPINE BESYLATE 10 MILLIGRAM(S): 2.5 TABLET ORAL at 06:22

## 2019-10-29 RX ADMIN — PANTOPRAZOLE SODIUM 40 MILLIGRAM(S): 20 TABLET, DELAYED RELEASE ORAL at 18:47

## 2019-10-29 NOTE — PROGRESS NOTE ADULT - ASSESSMENT
A 68 y/o M w/ PMHx of CAD w/ stents, Afib, ESRD on HD TRS, cholangitis, recent cholecystectomy presents for weakness and dark stool.     #Acute on chronic anemia  - Hgb 6.9 in ED, s/p 3 units  - H/H 8.4 <- 8.8  - S/p EGD 10/25: kelsi esophagitis with bx, no blood in stomach or duodenum   - EGD bx: gastritis no h. 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  - S/p colonoscopy 10/29: divertic dz, mild in desc colon; no stigmata of bleeding in colon or term ileum  - Protonix PO 40mg BID  - Daily CBCs  - 2 18 gauge IVs  - Active T&S  - Bowel prep and preop labs ordered  - F/u PT c/s: pt not in the room initially     #Afib on eliquis   - Rate controlled   - holding eliquis given bleed  - Can resume Eliquis after capsule, colonoscopy    #HTN  - Systolic 120-150  - c/w home medications    #HLD  - c/w home medications    #ESRD with HD TRS, with new c/o pelvic tenderness   - Makes urine  - Cr 7.7<- 7.1  - HD today  - Nephro following   - Daily BMPs    #Diet: clear liquids today, NPO at midnight for capsule tomorrow   #Activity: Ambulate as tolerated  #DVT ppx: hold for now - GI bleed

## 2019-10-29 NOTE — CONSULT NOTE ADULT - SUBJECTIVE AND OBJECTIVE BOX
Gastroenterology/Hepatology Consultation    For questions and inquiries please page [phone].  For urgent matters or after 5pm and on weekends please page the fellow on call through the GI paging system.    69y Male with   Patient is a 69y old  Male who presents with a chief complaint of Anemia (25 Oct 2019 06:22)    HPI:  70 y/o M w/ PMHx of CAD w/ stents, Afib, ESRD on HD, cholangitis, recent cholecystectomy presents for weakness and dark stool. Patient has had 3 episodes of dark stool over the last week and was to be scheduled for an outpatient colonoscopy with Dr. Nowak. However patient was feeling very weak and contacted GI office and was told to come to ED. On presentation was found to have Hgb of 4.9. Currently on interview receiving transfusion and reports he is feeling better. Denied current chest pain, SOB, abdominal pain, hematemesis. Last dark stool was prior to presentation to the ED. (24 Oct 2019 22:39)      GI Hx:  70 yo M hx CAD w stents and Afib on Eliquis ESRD on HD.  He known to the GI service follows [facility].  Patient was first seen at the clinic when he came for EGD and colonoscopy pre kidney transplant clearance. He was found to have anemia and was planned for endoscopy pending cardiac clearance. As he was in the process of getting evaluated and cleared by cardiology patient developped cholangitis and had plastic CBD stent placement by ERCP. Due to the fact that he was on AC sphincterotomy was not done. He came back on Oct 14th for sphincterotomy and stent removal OFF AC on the 14th of october on OP basis. came back to the ED with fever and concerns for cholangitis. repeat ERCP on the 17th failed to reveal cholangitis and the sphincterotomy was open (no intervention except for balloon sweeps) was made.  He resumed his elequis after his ERCP and has been reporting melena. As of the last 2 days he has reported dizziness.  He came to the ED and his Hgb was low to 4.6 from 9 on 10/19.    Previous colonoscopy(ies):  Previous ERCP(s): < from: ERCP (10.14.19 @ 14:45) >  ERCP Findings:   Limited views of the esophagus, stomach and duodenum were unremarkable. The  major papilla was noted in the second portion of duodenum. The previously placed  CBD stent was noted in place and draining bile.  radiograph was taken. The  major papilla was cannulated, using wire-guided cannulation, using a Clevercut  sphincterotome preloaded with a 0.035 guidewire, the wire was seen the CBD.  Cholangiogram was performed and showed no filling defect in the CBD, the CBD was  dilated to 12mm. Sphincterotomy was performed. The duct was swept multiple times  and sludge and stone debris were noted. Occlusion cholangiogram was afterwards  performed and showed no filling defects. Patient also received Indocin  suppository per rectum, 100mg prior to ERCP. The PD was not injected in this  exam. The scope was then removed and procedure terminated.     Fluoroscopic Interpretation:   I supervised the acquisition and interpretation of the fluoroscopic images at  the biliary tree. The quality of the images was good.    Plan: Follow-up office visit in 2-3 weeks, if no fever / hypotension / pain in 1  hour can start clear liquids     < end of copied text >    Repeat ERCP on 10/17 was done when he came back with fever and concern for cholangitis.  Balloon sweeps with removal of debris. No extension of sphincterotomy was done.    No pertinent family history in first degree relatives      Review of system  General:  (-) weight loss, (-) fevers  Eyes:  (-) visual changes  CV:  (-) chest pain  Resp: (-) SOB, (-) wheezing  GI: (-) abdominal pain,  (-) nausea, (-) vomiting, (-) dysphagia, (-) diarrhea, (-) constipation, (-) rectal bleeding, (-) melena, (-) hematemesis.  Neuro: (-) confusion, (-) weakness  Psych:  (-) Hallucinations  Heme:  (-) easy bruisability    Past medical/surgical Hx:  PAST MEDICAL & SURGICAL HISTORY:  Afib  Anemia  Heart failure  HLD (hyperlipidemia)  HTN (hypertension)  ESRD on dialysis: T/ TH/ S  S/P cataract surgery  S/P arteriovenous (AV) fistula creation  H/O right coronary artery stent placement: stent x3    Home Medications:  aspirin 81 mg oral tablet, chewable: 1 tab(s) orally once a day  atorvastatin 40 mg oral tablet: 1 tab(s) orally once a day  isosorbide mononitrate 30 mg oral tablet, extended release: 1 tab(s) orally once a day (in the morning)  losartan 25 mg oral tablet: 1 tab(s) orally once a day  metoprolol tartrate 25 mg oral tablet: 1 tab(s) orally 2 times a day      Allergies: No Known Allergies      Current Medications:   amLODIPine   Tablet 10 milliGRAM(s) Oral daily  atorvastatin Oral Tab/Cap - Peds 40 milliGRAM(s) Oral daily  isosorbide   mononitrate ER Tablet (IMDUR) 30 milliGRAM(s) Oral daily  losartan 25 milliGRAM(s) Oral daily  metoprolol tartrate 25 milliGRAM(s) Oral two times a day  pantoprazole  Injectable 40 milliGRAM(s) IV Push two times a day      Physical exam:  T(C): 36.2 (10-25-19 @ 03:33), Max: 36.6 (10-24-19 @ 17:52)  HR: 101 (10-25-19 @ 03:33) (77 - 107)  BP: 130/63 (10-25-19 @ 03:33) (117/68 - 133/62)  RR: 18 (10-25-19 @ 03:33) (18 - 18)  SpO2: 99% (10-24-19 @ 23:52) (98% - 99%)  GENERAL: NAD  HEAD:  Atraumatic, Normocephalic  EYES: Sclera:NL  NECK: Supple, no JVD or thyromegaly  CHEST/LUNG: Good bilateral air entry  HEART: normal S1, S2. Regular  ABDOMEN: (-) distended, (-) tender, (-) rebound, (+) BS, (-)HSM  EXTREMITIES: (-) edema  NEUROLOGY: (-) asterixis  SKIN: (-) jaundice  JOSEPH: (+) melena (-) brbpr    Data:                        4.9    7.02  )-----------( 228      ( 24 Oct 2019 18:48 )             14.8     MCV 91.4 (10-24-19)    RDW 15.0 (10-24-19)    HGB trend:  4.9  10-24-19 @ 18:48      10-24    139  |  96<L>  |  37<H>  ----------------------------<  89  3.6   |  25  |  4.3<HH>    Ca    8.6      24 Oct 2019 18:48    TPro  5.9<L>  /  Alb  3.6  /  TBili  0.6  /  DBili  0.3<H>  /  AST  25  /  ALT  13  /  AlkPhos  114  10-24    Liver panel trend:  TBili 0.6   /   AST 25   /   ALT 13   /   AlkP 114   /   Tptn 5.9   /   Alb 3.6    /   DBili 0.3      10-24  TBili 1.1   /   AST 56   /   ALT 57   /   AlkP 194   /   Tptn 6.2   /   Alb 3.5    /   DBili 0.7      10-18  TBili 1.7   /   AST 86   /   ALT 78   /   AlkP 198   /   Tptn 5.8   /   Alb 3.3    /   DBili --      10-17  TBili 2.9   /      /      /   AlkP 205   /   Tptn 5.8   /   Alb 3.5    /   DBili 2.4      10-16  TBili 2.8   /      /      /   AlkP 232   /   Tptn 6.9   /   Alb 4.0    /   DBili 2.1      10-15        PT/INR - ( 24 Oct 2019 18:48 )   PT: 11.40 sec;   INR: 0.99 ratio         PTT - ( 24 Oct 2019 18:48 )  PTT:30.6 sec
HPI:  68 y/o M w/ PMHx of CAD w/ stents, Afib, ESRD on HD, cholangitis, recent cholecystectomy presents for weakness and dark stool. Patient has had 3 episodes of dark stool over the last week and was to be scheduled for an outpatient colonoscopy with Dr. Nowak. However patient was feeling very weak and contacted GI office and was told to come to ED. On presentation was found to have Hgb of 4.9. Currently on interview receiving transfusion and reports he is feeling better. Denied current chest pain, SOB, abdominal pain, hematemesis. Last dark stool was prior to presentation to the ED. (24 Oct 2019 22:39)      PAST MEDICAL & SURGICAL HISTORY:  Afib  Anemia  Heart failure  HLD (hyperlipidemia)  HTN (hypertension)  ESRD on dialysis: T/ TH/ S  S/P cataract surgery  S/P arteriovenous (AV) fistula creation  H/O right coronary artery stent placement: stent x3      Hospital Course:  Colonoscopy showed diverticulosis. for capsule study. He is deconditioned and uses a walker at baseline on account of lumbar spinal stenosis.  TODAY'S SUBJECTIVE & REVIEW OF SYMPTOMS:     Constitutional WNL   Cardio WNL   Resp WNL   GI WNL  Heme WNL  Endo WNL  Skin WNL  MSK WNL  Neuro WNL  Cognitive WNL  Psych WNL      MEDICATIONS  (STANDING):  amLODIPine   Tablet 10 milliGRAM(s) Oral at bedtime  iohexol 300 mG (iodine)/mL Oral Solution 30 milliLiter(s) Oral once  isosorbide   mononitrate ER Tablet (IMDUR) 30 milliGRAM(s) Oral daily  losartan 25 milliGRAM(s) Oral daily  metoprolol tartrate 25 milliGRAM(s) Oral two times a day  pantoprazole    Tablet 40 milliGRAM(s) Oral two times a day    MEDICATIONS  (PRN):  artificial tears (preservative free) Ophthalmic Solution 1 Drop(s) Both EYES two times a day PRN Dry Eyes      FAMILY HISTORY:  No pertinent family history in first degree relatives      Allergies    No Known Allergies    Intolerances        SOCIAL HISTORY:    [  ] Etoh  [  ] Smoking  [  ] Substance abuse     Home Environment:  [  ] Home Alone  [x  ] Lives with Family  [  ] Home Health Aid    Dwelling:  [  ] Apartment  [  ] Private House  [  ] Adult Home  [  ] Skilled Nursing Facility      [  ] Short Term  [  ] Long Term  [  ] Stairs       Elevator [  ]    FUNCTIONAL STATUS PTA: (Check all that apply)  Ambulation: [ x  ]Independent    [  ] Dependent     [  ] Non-Ambulatory  Assistive Device: [  ] SA Cane  [  ]  Q Cane  [x  ] Walker  [  ]  Wheelchair  ADL : [  ] Independent  [  ]  Dependent       Vital Signs Last 24 Hrs  T(C): 36.1 (29 Oct 2019 13:16), Max: 36.8 (28 Oct 2019 22:00)  T(F): 96.9 (29 Oct 2019 13:16), Max: 98.2 (28 Oct 2019 22:00)  HR: 66 (29 Oct 2019 15:30) (66 - 88)  BP: 112/69 (29 Oct 2019 15:30) (112/69 - 152/72)  BP(mean): --  RR: 18 (29 Oct 2019 15:30) (18 - 18)  SpO2: 98% (29 Oct 2019 11:46) (98% - 100%)      PHYSICAL EXAM: Alert & Oriented X3  GENERAL: NAD, well-groomed, well-developed  HEAD:  Atraumatic, Normocephalic  EYES: EOMI, PERRLA, conjunctiva and sclera clear  NECK: Supple, No JVD, Normal thyroid  CHEST/LUNG: Clear to percussion bilaterally; No rales, rhonchi, wheezing, or rubs  HEART: Regular rate and rhythm; No murmurs, rubs, or gallops  ABDOMEN: Soft, Nontender, Nondistended; Bowel sounds present  EXTREMITIES:  2+ Peripheral Pulses, No clubbing, cyanosis, or edema    NERVOUS SYSTEM:  Cranial Nerves 2-12 intact [  ] Abnormal  [  ]  ROM: WFL all extremities [  ]  Abnormal [  ]  Motor Strength: WFL all extremities  [  ]  Abnormal [x  ]  5-/5 LE's  Sensation: intact to light touch [ x ] Abnormal [  ]  Reflexes: Symmetric [  ]  Abnormal [  ]    FUNCTIONAL STATUS:  Bed Mobility: Independent [  ]  Supervision [  ]  Needs Assistance [  ]  N/A [  ]  Transfers: Independent [  ]  Supervision [  ]  Needs Assistance [  ]  N/A [  ]   Ambulation: Independent [  ]  Supervision [  ]  Needs Assistance [  ]  N/A [  ]  ADL: Independent [  ] Requires Assistance [  ] N/A [  ]      LABS:                        8.4    5.99  )-----------( 229      ( 29 Oct 2019 07:50 )             25.5     10-29    141  |  102  |  49<H>  ----------------------------<  92  4.7   |  22  |  8.1<HH>    Ca    9.2      29 Oct 2019 07:50  Mg     1.8     10-29      PT/INR - ( 28 Oct 2019 22:02 )   PT: 11.10 sec;   INR: 0.96 ratio         PTT - ( 28 Oct 2019 22:02 )  PTT:32.1 sec      RADIOLOGY & ADDITIONAL STUDIES:    Assesment:
NEPHROLOGY CONSULTATION NOTE  Patient is a 69y Male whom presented to the hospital with lower GIB . History goes back to 3 days ptp to the ED when patient had multiple episodes of lower GIB , denied abdominal pain denied hematemesis . PAtient sp recent admission to the hospital 2 weeks ago sp cholecystectomy ERCP .  Seen today denied abdominal pain no nausea no fever no chills no chest pain no focal motor sensory deficit no other complaints  Sp EGD yesterday     PAST MEDICAL & SURGICAL HISTORY:  Afib  Anemia  Heart failure  HLD (hyperlipidemia)  HTN (hypertension)  ESRD on dialysis: T/ TH/ S  S/P cataract surgery  S/P arteriovenous (AV) fistula creation  H/O right coronary artery stent placement: stent x3    Allergies:  No Known Allergies    Home Medications Reviewed  Hospital Medications:   MEDICATIONS  (STANDING):  amLODIPine   Tablet 10 milliGRAM(s) Oral daily  isosorbide   mononitrate ER Tablet (IMDUR) 30 milliGRAM(s) Oral daily  losartan 25 milliGRAM(s) Oral daily  metoprolol tartrate 25 milliGRAM(s) Oral two times a day  pantoprazole  Injectable 40 milliGRAM(s) IV Push two times a day      SOCIAL HISTORY:  Denies ETOH,Smoking,   FAMILY HISTORY:  No pertinent family history in first degree relatives        REVIEW OF SYSTEMS:  All other review of systems is negative unless indicated above.    VITALS:  T(F): 97.7 (10-26-19 @ 04:58), Max: 98.4 (10-25-19 @ 21:15)  HR: 74 (10-26-19 @ 04:58)  BP: 133/68 (10-26-19 @ 04:58)  RR: 18 (10-26-19 @ 04:58)  SpO2: 98% (10-25-19 @ 12:45)    Height (cm): 167.64 (10-25 @ 11:59)  Weight (kg): 79.6 (10-26 @ 06:04)  BMI (kg/m2): 28.3 (10-26 @ 06:04)  BSA (m2): 1.89 (10-26 @ 06:04)    I&O's Detail        PHYSICAL EXAM:  Constitutional: NAD  HEENT: anicteric sclera, oropharynx clear, MMM  Neck: No JVD  Respiratory: CTAB, no wheezes, rales or rhonchi  Cardiovascular: S1, S2, RRR  Gastrointestinal: BS+, soft, NT/ND  Extremities: No cyanosis or clubbing. No peripheral edema  Neurological: A/O x 3, no focal deficits  Psychiatric: Normal mood, normal affect  : No CVA tenderness. No elder.   Skin: No rashes  Vascular Access:    LABS:  10-26    140  |  101  |  47<H>  ----------------------------<  162<H>  4.0   |  24  |  7.1<HH>    Ca    8.8      26 Oct 2019 08:04  Mg     1.9     10-26    TPro  5.7<L>  /  Alb  3.4<L>  /  TBili  1.1  /  DBili      /  AST  25  /  ALT  10  /  AlkPhos  110  10-26    Creatinine Trend: 7.1 <--, 5.6 <--, 4.3 <--, 7.8 <--, 6.3 <--, 6.3 <--, 6.7 <--, 6.1 <--, 7.2 <--                        7.9    6.04  )-----------( 213      ( 26 Oct 2019 08:04 )             24.5       Hemoglobin: 7.9 g/dL (10-26 @ 08:04)  Hemoglobin: 8.2 g/dL (10-25 @ 17:48)  Hemoglobin: 7.2 g/dL (10-25 @ 09:01)  Hemoglobin: 4.9 g/dL (10-24 @ 18:48)    Urine Studies:              RADIOLOGY & ADDITIONAL STUDIES:    < from: CT Abdomen and Pelvis No Cont (10.24.19 @ 21:43) >    IMPRESSION:    No CT evidence of acute intra-abdominal pathology within the limits of a   noncontrast examination.    Postcholecystectomy with pneumobilia.    < end of copied text >

## 2019-10-29 NOTE — PROGRESS NOTE ADULT - SUBJECTIVE AND OBJECTIVE BOX
seen and examined  no new complaints         Standing Inpatient Medications  amLODIPine   Tablet 10 milliGRAM(s) Oral daily  iohexol 300 mG (iodine)/mL Oral Solution 30 milliLiter(s) Oral once  isosorbide   mononitrate ER Tablet (IMDUR) 30 milliGRAM(s) Oral daily  losartan 25 milliGRAM(s) Oral daily  metoprolol tartrate 25 milliGRAM(s) Oral two times a day  pantoprazole  Injectable 40 milliGRAM(s) IV Push two times a day    PRN Inpatient Medications  artificial tears (preservative free) Ophthalmic Solution 1 Drop(s) Both EYES two times a day PRN        VITALS/PHYSICAL EXAM  --------------------------------------------------------------------------------  T(C): 35.7 (10-29-19 @ 05:08), Max: 37.2 (10-28-19 @ 13:54)  HR: 86 (10-29-19 @ 05:08) (65 - 88)  BP: 146/79 (10-29-19 @ 05:08) (142/65 - 149/70)  RR: 18 (10-29-19 @ 05:08) (18 - 18)  SpO2: --  Wt(kg): --        Physical Exam:  	Gen: NAD  	Pulm: decrease BS  B/L  	CV: S1S2; no rub  	Abd: +distended  	LE:  no edema  	Vascular access: av fistula     LABS/STUDIES  --------------------------------------------------------------------------------              8.4    5.99  >-----------<  229      [10-29-19 @ 07:50]              25.5     141  |  99  |  47  ----------------------------<  109      [10-28-19 @ 22:02]  5.1   |  24  |  7.7        Ca     9.2     [10-28-19 @ 22:02]      Mg     1.8     [10-28-19 @ 22:02]      PT/INR: PT 11.10, INR 0.96       [10-28-19 @ 22:02]  PTT: 32.1       [10-28-19 @ 22:02]      Creatinine Trend:  SCr 7.7 [10-28 @ 22:02]  SCr 7.1 [10-28 @ 07:37]  SCr 5.9 [10-27 @ 06:39]  SCr 7.1 [10-26 @ 08:04]  SCr 5.6 [10-25 @ 09:01]        Iron 95, TIBC 134, %sat 71      [07-16-19 @ 06:20]  Ferritin 2507      [07-16-19 @ 06:20]  PTH -- (Ca 7.3)      [07-16-19 @ 05:40]   430  HbA1c 5.6      [09-18-19 @ 07:12]  Lipid: chol 116, , HDL 26, LDL 73      [10-27-19 @ 06:39]

## 2019-10-29 NOTE — PRE-ANESTHESIA EVALUATION ADULT - MALLAMPATI CLASS
Class III - visualization of the soft palate and the base of the uvula
Class III - visualization of the soft palate and the base of the uvula/lower mandible

## 2019-10-29 NOTE — PROGRESS NOTE ADULT - SUBJECTIVE AND OBJECTIVE BOX
MAMTA FERNANDO  69y  Male  ***My note supersedes ALL resident notes that I sign.  My corrections for their notes are in my note.***    I can be reached directly on [employer] [id number]. My office number is [phone]. My personal cell number is [phone].    INTERVAL EVENTS: Here for f/u of gi bleed. Pt had c-scope. Did well. No complaints, except hungry. No further bladder pain.    T(F): 96.8 (10-29-19 @ 10:43), Max: 98.9 (10-28-19 @ 13:54)  HR: 76 (10-29-19 @ 12:10) (65 - 88)  BP: 152/72 (10-29-19 @ 12:10) (115/56 - 152/72)  RR: 18 (10-29-19 @ 12:10) (18 - 18)  SpO2: 98% (10-29-19 @ 11:46) (98% - 100%)    Gen: NAD  HEENT: pale conj; slc white; EOMI; PERRL; mouth clr; ears nl; no nasal d/c  Neck: no JVD; no nodes; thyroid nl  lung: clr  hrt: s1 s2 rrr  abd: 3 small lap chol scars in RUQ (1 suture seen in most-medial sx site) - well-healed, no infection, minor tenderness (expected); no HS megaly; soft;   ext: no edema, no c/c  neuro: WNL    LABS:                        8.4     (    93.4   5.99  )-----------( ---------      229      ( 29 Oct 2019 07:50 )             25.5    (    14.6     Hemoglobin: 8.4 g/dL (10-29 @ 07:50) - stable  Hemoglobin: 8.8 g/dL (10-28 @ 22:02)  Hemoglobin: 8.6 g/dL (10-28 @ 07:37)  Hemoglobin: 8.4 g/dL (10-27 @ 06:39)  Hemoglobin: 7.9 g/dL (10-26 @ 08:04)  Hemoglobin: 8.2 g/dL (10-25 @ 17:48)  Hemoglobin: 7.2 g/dL (10-25 @ 09:01)  Hemoglobin: 4.9 g/dL (10-24 @ 18:48)    141   (   102   (   92      10-29-19 @ 07:50  ----------------------               4.7   (   22   (   49                             -----                        8.1  Ca  9.2   Mg  1.8    P   --   141   (   99   (   109      10-28-19 @ 22:02  ----------------------               5.1   (   24   (   47                             -----                        7.7  Ca  9.2   Mg  1.8    P   --     PT/INR - ( 28 Oct 2019 22:02 )   PT: 11.10 sec;   INR: 0.96 ratio    PTT - ( 28 Oct 2019 22:02 )  PTT:32.1 sec    RADIOLOGY & ADDITIONAL TESTS:      MEDICATIONS:    amLODIPine   Tablet 10 milliGRAM(s) Oral at bedtime  artificial tears (preservative free) Ophthalmic Solution 1 Drop(s) Both EYES two times a day PRN  iohexol 300 mG (iodine)/mL Oral Solution 30 milliLiter(s) Oral once  isosorbide   mononitrate ER Tablet (IMDUR) 30 milliGRAM(s) Oral daily  losartan 25 milliGRAM(s) Oral daily  metoprolol tartrate 25 milliGRAM(s) Oral two times a day  pantoprazole    Tablet 40 milliGRAM(s) Oral two times a day

## 2019-10-29 NOTE — PROGRESS NOTE ADULT - ASSESSMENT
68 yo man with PMH of A fib anemia , TN ESRD on HD T Th Sat sp AVF surgery presenting with anemia acute and LGIB   # ESRD : hd today, standard bath, uf 2 liter as tolerated   # GI bleed, followed by GI ? colonoscopy today , ? capsule endoscopy, follow h/h, s/p EGD   #check ph level  # BP well controlled   # will start darbo 20 with hd in am with HD, no venofer elevated sat  # will follow

## 2019-10-29 NOTE — CHART NOTE - NSCHARTNOTEFT_GEN_A_CORE
Patient had Colonoscopy today. Patient had Diverticulosis but no stigmata of recent bleeding or active bleeding noted. TI interrogate and without evidence of blood in small bowel.   - Clears tonight , preferably no reds   - Plan Capsule study tomorrow morning   - NPO after midnight

## 2019-10-29 NOTE — CONSULT NOTE ADULT - ASSESSMENT
70 yo M CAD, AFIB, ESRD here for suspected UGIB however cannot rule out LGIB.
70 yo man with PMH of A fib anemia , TN ESRD on HD T Th Sat sp AVF surgery presenting with anemia acute and LGIB     ·	ESRD on HD for HD today 3h opti 160 3k UF 2l AVF   ·	check Phosphorus in AM   ·	lower GIB sp EGD / awaiting report / follow Hb / sp transfusion / Will resume GAURANG next week   ·	HTN controlled keep current
IMPRESSION: Rehab of   debility, lumbar spinal stenosis, gi bleed, for capsule endoscopy    PRECAUTIONS: [ x ] Cardiac  [  ] Respiratory  [  ] Seizures [  ] Contact Isolation  [  ] Droplet Isolation  [  ] Other    Weight Bearing Status:     RECOMMENDATION:    Out of Bed to Chair     DVT/Decubiti Prophylaxis    REHAB PLAN:     [  x ] Bedside P/T 3-5 times a week   [   ]   Bedside O/T  2-3 times a week             [   ] No Rehab Therapy Indicated                   [   ]  Speech Therapy   Conditioning/ROM                                    ADL  Bed Mobility                                               Conditioning/ROM  Transfers                                                     Bed Mobility  Sitting /Standing Balance                         Transfers                                        Gait Training                                               Sitting/Standing Balance  Stair Training [   ]Applicable                    Home equipment Eval                                                                        Splinting  [   ] Only      GOALS:   ADL   [ x  ]   Independent                    Transfers  [  x ] Independent                          Ambulation  [ x  ] Independent     [   x ] With device                            [   ]  CG                                                         [   ]  CG                                                                  [   ] CG                            [    ] Min A                                                   [   ] Min A                                                              [   ] Min  A          DISCHARGE PLAN:   [   ]  Good candidate for Intensive Rehabilitation/Hospital based-4A [facility]                                             Will tolerate 3hrs Intensive Rehab Daily                                       [  x  ]  Short Term Rehab in Skilled Nursing Facility                                                              VS                                     [ x   ]  Home with Outpatient or VN services                                         [    ]  Possible Candidate for Intensive Hospital based Rehab

## 2019-10-29 NOTE — PROGRESS NOTE ADULT - SUBJECTIVE AND OBJECTIVE BOX
A 70 y/o M w/ PMHx of CAD w/ stents, Afib, ESRD on HD TRS, cholangitis, recent cholecystectomy presents for weakness and dark stool.     #Acute on chronic anemia  - Hgb 6.9 in ED, s/p 3 units  - H/H 8.8 <- 8.6   - S/p EGD 10/25: kelsi esophagitis with bx taken, no blood in stomach or duodenum   - GI: capsule and colonoscopy tomorrow 10/29  - IV PPI BID  - Daily CBCs  - 2 18 gauge IVs  - active T&S  - F/u EGD bx  - Bowel prep and preop labs ordered  - F/u PT c/s    #Afib on eliquis   - Rate controlled   - holding eliquis given bleed  - Can resume Eliquis after capsule, colonoscopy    #HTN  - Systolic 120-150  - c/w home medications    #HLD  - c/w home medications    #ESRD with HD TRS, with new c/o pelvic tenderness   - Makes urine  - Cr 5.9 <- 7.1  - HD tomorrow 10/29  - Nephro following   - Daily BMPs  - F/u bladder scan    # Recent lap cholecystectomy  - Tenderness in RUQ  - Possibly due to incision site, which might have to be removed    #Diet: NPO for colonoscopy 10/29  #Activity: Ambulate as tolerated  #DVT ppx: hold for now - GI bleed A 70 y/o M w/ PMHx of CAD w/ stents, Afib, ESRD on HD TRS, cholangitis, recent cholecystectomy presents for weakness and dark stool.     #Acute on chronic anemia  - Hgb 6.9 in ED, s/p 3 units  - H/H 8.8 <- 8.6   - S/p EGD 10/25: kelsi esophagitis with bx taken, no blood in stomach or duodenum   - GI: capsule and colonoscopy today  - IV PPI BID  - Daily CBCs  - 2 18 gauge IVs  - active T&S  - F/u EGD bx  - Bowel prep and preop labs ordered  - F/u PT c/s    #Afib on eliquis   - Rate controlled   - holding eliquis given bleed  - Can resume Eliquis after capsule, colonoscopy    #HTN  - Systolic 120-150  - c/w home medications    #HLD  - c/w home medications    #ESRD with HD TRS, with new c/o pelvic tenderness   - Makes urine  - Cr 7.7<- 7.1  - HD today  - Nephro following   - Daily BMPs  - F/u bladder scan    # Recent lap cholecystectomy  - Tenderness in RUQ  - Possibly due to incision site, which might have to be removed    #Diet: NPO for colonoscopy 10/29  #Activity: Ambulate as tolerated  #DVT ppx: hold for now - GI bleed A 70 y/o M w/ PMHx of CAD w/ stents, Afib, ESRD on HD TRS, cholangitis, recent cholecystectomy presents for weakness and dark stool.     #Acute on chronic anemia  - Hgb 6.9 in ED, s/p 3 units  - H/H 8.4 <- 8.8  - S/p EGD 10/25: kelsi esophagitis with bx, no blood in stomach or duodenum   - EGD bx: No   - GI: capsule and colonoscopy today  - IV PPI BID  - Daily CBCs  - 2 18 gauge IVs  - Active T&S    - Bowel prep and preop labs ordered  - F/u PT c/s    #Afib on eliquis   - Rate controlled   - holding eliquis given bleed  - Can resume Eliquis after capsule, colonoscopy    #HTN  - Systolic 120-150  - c/w home medications    #HLD  - c/w home medications    #ESRD with HD TRS, with new c/o pelvic tenderness   - Makes urine  - Cr 7.7<- 7.1  - HD today  - Nephro following   - Daily BMPs  - F/u bladder scan    # Recent lap cholecystectomy  - Tenderness in RUQ  - Possibly due to incision site, which might have to be removed    #Diet: NPO for colonoscopy 10/29  #Activity: Ambulate as tolerated  #DVT ppx: hold for now - GI bleed

## 2019-10-29 NOTE — PROGRESS NOTE ADULT - ASSESSMENT
68 y/o M w/ PMHx of CAD w/ stents, Afib, ESRD on HD TRS, cholangitis, recent cholecystectomy presents for weakness and dark stool.     # Acute on chronic normocytic anemia, possibly GI bleed:   Hgb stable >8; CBC q24  s/p 3 units PRBC's this admission  keep Hb >7  S/p EGD 10/25 - report not avail  s/p c-scope 10/29: divertic dz, mild in desc colon; no stigmata of bleeding in colon or term ileum  f/u GI for capsule endoscopy kasandra morning -> clr liq diet today and NPO p MN   make PPI BID PO  active T&S  getting darbo  no venofer (high iron sat)    # suprapubic pain - resolved  no urine retention     # RUQ pain is expected 2/2 recent lap elba  might need to remove suture    # Afib was on eliquis:  HR controlled - c/o lopressor  eliquis on hold due to possible GI bleed and anticipated GI procedure  f/u w/ GI after capsule endo for resumption of a/c    # HTN  c/w losartan/ lopressor    # HLD  lipid profile is OK - cont off of statin    # ESRD with HD TTS:  c/w HD as per schedule  Nephro following   10/16 - Phos 4.1    disposition: f/u w/ GI to complete anemia w/u and decision re a/c; anticipate d/c home tomorrow    PMD: NAIN Guaman (RENALDO)

## 2019-10-30 LAB
ANION GAP SERPL CALC-SCNC: 12 MMOL/L — SIGNIFICANT CHANGE UP (ref 7–14)
BASOPHILS # BLD AUTO: 0.05 K/UL — SIGNIFICANT CHANGE UP (ref 0–0.2)
BASOPHILS NFR BLD AUTO: 1.1 % — HIGH (ref 0–1)
BUN SERPL-MCNC: 24 MG/DL — HIGH (ref 10–20)
CALCIUM SERPL-MCNC: 8.6 MG/DL — SIGNIFICANT CHANGE UP (ref 8.5–10.1)
CHLORIDE SERPL-SCNC: 99 MMOL/L — SIGNIFICANT CHANGE UP (ref 98–110)
CO2 SERPL-SCNC: 27 MMOL/L — SIGNIFICANT CHANGE UP (ref 17–32)
CREAT SERPL-MCNC: 5.4 MG/DL — CRITICAL HIGH (ref 0.7–1.5)
EOSINOPHIL # BLD AUTO: 0.21 K/UL — SIGNIFICANT CHANGE UP (ref 0–0.7)
EOSINOPHIL NFR BLD AUTO: 4.6 % — SIGNIFICANT CHANGE UP (ref 0–8)
GLUCOSE SERPL-MCNC: 91 MG/DL — SIGNIFICANT CHANGE UP (ref 70–99)
HCT VFR BLD CALC: 24.7 % — LOW (ref 42–52)
HGB BLD-MCNC: 8 G/DL — LOW (ref 14–18)
IMM GRANULOCYTES NFR BLD AUTO: 0.4 % — HIGH (ref 0.1–0.3)
LYMPHOCYTES # BLD AUTO: 0.93 K/UL — LOW (ref 1.2–3.4)
LYMPHOCYTES # BLD AUTO: 20.2 % — LOW (ref 20.5–51.1)
MAGNESIUM SERPL-MCNC: 1.8 MG/DL — SIGNIFICANT CHANGE UP (ref 1.8–2.4)
MCHC RBC-ENTMCNC: 30.1 PG — SIGNIFICANT CHANGE UP (ref 27–31)
MCHC RBC-ENTMCNC: 32.4 G/DL — SIGNIFICANT CHANGE UP (ref 32–37)
MCV RBC AUTO: 92.9 FL — SIGNIFICANT CHANGE UP (ref 80–94)
MONOCYTES # BLD AUTO: 0.45 K/UL — SIGNIFICANT CHANGE UP (ref 0.1–0.6)
MONOCYTES NFR BLD AUTO: 9.8 % — HIGH (ref 1.7–9.3)
NEUTROPHILS # BLD AUTO: 2.94 K/UL — SIGNIFICANT CHANGE UP (ref 1.4–6.5)
NEUTROPHILS NFR BLD AUTO: 63.9 % — SIGNIFICANT CHANGE UP (ref 42.2–75.2)
NRBC # BLD: 0 /100 WBCS — SIGNIFICANT CHANGE UP (ref 0–0)
PLATELET # BLD AUTO: 198 K/UL — SIGNIFICANT CHANGE UP (ref 130–400)
POTASSIUM SERPL-MCNC: 3.9 MMOL/L — SIGNIFICANT CHANGE UP (ref 3.5–5)
POTASSIUM SERPL-SCNC: 3.9 MMOL/L — SIGNIFICANT CHANGE UP (ref 3.5–5)
RBC # BLD: 2.66 M/UL — LOW (ref 4.7–6.1)
RBC # FLD: 14.5 % — SIGNIFICANT CHANGE UP (ref 11.5–14.5)
SODIUM SERPL-SCNC: 138 MMOL/L — SIGNIFICANT CHANGE UP (ref 135–146)
SURGICAL PATHOLOGY STUDY: SIGNIFICANT CHANGE UP
WBC # BLD: 4.6 K/UL — LOW (ref 4.8–10.8)
WBC # FLD AUTO: 4.6 K/UL — LOW (ref 4.8–10.8)

## 2019-10-30 PROCEDURE — 99233 SBSQ HOSP IP/OBS HIGH 50: CPT

## 2019-10-30 PROCEDURE — 91110 GI TRC IMG INTRAL ESOPH-ILE: CPT | Mod: 26

## 2019-10-30 RX ORDER — AMLODIPINE BESYLATE 2.5 MG/1
5 TABLET ORAL AT BEDTIME
Refills: 0 | Status: DISCONTINUED | OUTPATIENT
Start: 2019-10-31 | End: 2019-11-06

## 2019-10-30 RX ORDER — DIPHENHYDRAMINE HCL 50 MG
25 CAPSULE ORAL ONCE
Refills: 0 | Status: DISCONTINUED | OUTPATIENT
Start: 2019-10-30 | End: 2019-10-30

## 2019-10-30 RX ADMIN — PANTOPRAZOLE SODIUM 40 MILLIGRAM(S): 20 TABLET, DELAYED RELEASE ORAL at 17:34

## 2019-10-30 RX ADMIN — Medication 25 MILLIGRAM(S): at 17:33

## 2019-10-30 RX ADMIN — ISOSORBIDE MONONITRATE 30 MILLIGRAM(S): 60 TABLET, EXTENDED RELEASE ORAL at 17:33

## 2019-10-30 NOTE — PROGRESS NOTE ADULT - ASSESSMENT
A 68 y/o M w/ PMHx of CAD w/ stents, Afib, ESRD on HD TRS, cholangitis, recent cholecystectomy presents for weakness and dark stool.     #Acute on chronic anemia  - Hgb 6.9 in ED, s/p 3 units  - H/H ___<-8.4  - EGD 10/25: kelsi esophagitis with bx, no blood in stomach or duodenum   - EGD bx: gastritis no h. 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  - S/p colonoscopy 10/29: divertic dz, mild in desc colon; no stigmata of bleeding in colon or term ileum  - Protonix PO 40mg BID  - Daily CBCs  - 2 18 gauge IVs  - Active T&S  - PT/Physiatry eval: SNF vs home with oupt or VNS    #Afib on eliquis   - Rate controlled   - holding eliquis given bleed  - Can resume Eliquis after capsule, colonoscopy    #HTN  - Systolic 120-150  - c/w home medications    #HLD  - c/w home medications    #ESRD with HD TRS, with new c/o pelvic tenderness   - Makes urine  - Cr ___<- 7.7  - HD today  - Nephro following   - Daily BMPs    #Diet: clear liquids today, NPO at midnight for capsule tomorrow   #Activity: Ambulate as tolerated  #DVT ppx: hold for now - GI bleed A 70 y/o M w/ PMHx of CAD w/ stents, Afib, ESRD on HD TRS, cholangitis, recent cholecystectomy presents for weakness and dark stool.     #Acute on chronic anemia  - Hgb 6.9 in ED, s/p 3 units  - H/H 8.0<-8.4  - EGD 10/25: kelsi esophagitis with bx, no blood in stomach or duodenum   - EGD bx: gastritis no h. 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  - Colonoscopy 10/29: divertic dz, mild in desc colon; no stigmata of bleeding in colon or term ileum  - Capsule at bedside today  - C/w Protonix PO 40mg BID  - Daily CBCs  - 2 18 gauge IVs, Active T&S  - PT/Physiatry eval: SNF vs home with oupt or VNS    #Afib on eliquis   - Rate controlled   - holding eliquis given bleed  - F/u with re: resuming Eliquis    #HTN  - Systolic 110-150  - c/w home medications    #HLD  - c/w home medications    #ESRD with HD TRS, with new c/o pelvic tenderness   - Makes urine  - Cr 5.4 <- 8.1  - HD tomorrow  - Nephro following   - Daily BMPs    #Diet: NPO for bedside capsule today  #Activity: Ambulate as tolerated  #DVT ppx: hold for now - GI bleed A 68 y/o M w/ PMHx of CAD w/ stents, Afib, ESRD on HD TRS, cholangitis, recent cholecystectomy presents for weakness and dark stool.     #Acute on chronic anemia  - Hgb 6.9 in ED, s/p 3 units  - H/H 8.0<-8.4  - EGD 10/25: kelsi esophagitis with bx, no blood in stomach or duodenum   - EGD bx: gastritis no h. 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  - Colonoscopy 10/29: divertic dz, mild in desc colon; no stigmata of bleeding in colon or term ileum  - Capsule at bedside today  - C/w Protonix PO 40mg BID  - Daily CBCs  - 2 18 gauge IVs, Active T&S  - PT/Physiatry eval: SNF vs home with oupt or VNS    #Afib on eliquis   - Rate controlled   - holding eliquis given bleed  - F/u with re: resuming Eliquis    #HTN  - Systolic 110-150  - c/w home medications    #HLD  - c/w home medications    #ESRD with HD TRS, with new c/o pelvic tenderness   - Makes urine  - Cr 5.4 <- 8.1  - HD tomorrow  - Nephro following   - Daily BMPs    #Diet: NPO for bedside capsule today  #Activity: Ambulate as tolerated  #DVT ppx: hold for now - GI bleed  #Dispo: possible SNF for gait training

## 2019-10-30 NOTE — PROGRESS NOTE ADULT - SUBJECTIVE AND OBJECTIVE BOX
MAMTA FERNANDO  69y  Male  ***My note supersedes ALL resident notes that I sign.  My corrections for their notes are in my note.***    I can be reached directly on [employer] 0975. My office number is [phone]. My personal cell number is [phone].    INTERVAL EVENTS: Here for f/u of gi bleed. Pt reports having issues w/ balance for past 3 yrs. Pt says that he can walk, but cannot  place for more than 3 min (he starts to fall). Pt feels rehab had helped in past. His leg str is good, but his endurance is not. He has some tingling in both legs (worse on rt) from shins to feet. He has hx of gout, particularly in left ankle. he denies vertigo. He denies diff walking. He tends to fall more when dressing. Pt lives w/ his sister (who has cancer) and an elderly (88yo) mother.    T(F): 97.2 (10-30-19 @ 05:05), Max: 97.3 (10-29-19 @ 20:43)  HR: 84 (10-30-19 @ 05:05) (66 - 84)  BP: 114/59 (10-30-19 @ 05:05) (112/69 - 152/72)  RR: 18 (10-30-19 @ 05:05) (18 - 18)  SpO2: --    Gen: NAD  HEENT: pale conj; slc white; EOMI; PERRL; mouth clr; ears nl; no nasal d/c  Neck: no JVD; no nodes; thyroid nl  lung: clr  hrt: s1 s2 rrr  abd: 3 small lap chol scars in RUQ (1 suture seen in most-medial sx site) - well-healed, no infection, minor tenderness (expected); no HS megaly; soft;   ext: no edema, no c/c; DP 1+/2  neuro: WNL; patella reflex 1-/2    LABS:                        8.0     (    92.9   4.60  )-----------( ---------      198      ( 30 Oct 2019 08:29 )             24.7    (    14.5     Hemoglobin: 8.0 g/dL (10-30 @ 08:29)  Hemoglobin: 8.4 g/dL (10-29 @ 07:50)  Hemoglobin: 8.8 g/dL (10-28 @ 22:02)  Hemoglobin: 8.6 g/dL (10-28 @ 07:37)  Hemoglobin: 8.4 g/dL (10-27 @ 06:39)  Hemoglobin: 7.9 g/dL (10-26 @ 08:04)  Hemoglobin: 8.2 g/dL (10-25 @ 17:48)    138   (   99   (   91      10-30-19 @ 08:29  ----------------------               3.9   (   27   (   24                             -----                        5.4  Ca  8.6   Mg  1.8    P   --     PT/INR - ( 28 Oct 2019 22:02 )   PT: 11.10 sec;   INR: 0.96 ratio    PTT - ( 28 Oct 2019 22:02 )  PTT:32.1 sec    RADIOLOGY & ADDITIONAL TESTS:      MEDICATIONS:    amLODIPine   Tablet 10 milliGRAM(s) Oral at bedtime  artificial tears (preservative free) Ophthalmic Solution 1 Drop(s) Both EYES two times a day PRN  iohexol 300 mG (iodine)/mL Oral Solution 30 milliLiter(s) Oral once  isosorbide   mononitrate ER Tablet (IMDUR) 30 milliGRAM(s) Oral daily  losartan 25 milliGRAM(s) Oral daily  metoprolol tartrate 25 milliGRAM(s) Oral two times a day  pantoprazole    Tablet 40 milliGRAM(s) Oral two times a day

## 2019-10-30 NOTE — PROGRESS NOTE ADULT - ASSESSMENT
70 y/o M w/ PMHx of CAD w/ stents, Afib, ESRD on HD TRS, cholangitis, recent cholecystectomy presents for weakness and dark stool.     # Acute on chronic normocytic anemia, possibly GI bleed:   Hgb stable >8; CBC q24  s/p 3 units PRBC's this admission  keep Hb >7  S/p EGD 10/25 - report not avail  s/p c-scope 10/29: divertic dz, mild in desc colon; no stigmata of bleeding in colon or term ileum  f/u GI for capsule endoscopy today -> NPO now    make PPI BID PO  active T&S  getting darbo  no venofer (high iron sat)  f/u GI    # Afib was on eliquis:  HR controlled - c/o lopressor  eliquis on hold due to possible GI bleed and anticipated GI procedure  f/u w/ GI after capsule endo for resumption of a/c    # dizziness and balance instability - 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    PMD: NAIN Guaman (RENALDO)

## 2019-10-30 NOTE — PROGRESS NOTE ADULT - ASSESSMENT
70 yo man with PMH of A fib anemia , [place] ESRD on HD T Th Sat sp AVF surgery presenting with anemia acute and LGIB   # ESRD : hd yesterday next tomorrow  # GI bleed, followed by GI  colonoscopy  yesterday w/ diverticlosis no active bleeding  capsule endoscopy today , follow h/h,  #check ph level  # BP well controlled   # will start darbo 20 with hd, no venofer elevated sat  # will follow

## 2019-10-30 NOTE — PROGRESS NOTE ADULT - SUBJECTIVE AND OBJECTIVE BOX
Hospital Day:  6d    Subjective:    Patient is a 69y old  Male who presents with a chief complaint of Anemia (29 Oct 2019 16:20)      Past Medical Hx:   Afib  Anemia  Heart failure  HLD (hyperlipidemia)  HTN (hypertension)  ESRD on dialysis    Past Sx:  S/P cataract surgery  S/P arteriovenous (AV) fistula creation  H/O right coronary artery stent placement    Allergies:  No Known Allergies    Current Meds:   Standng Meds:  amLODIPine   Tablet 10 milliGRAM(s) Oral at bedtime  iohexol 300 mG (iodine)/mL Oral Solution 30 milliLiter(s) Oral once  isosorbide   mononitrate ER Tablet (IMDUR) 30 milliGRAM(s) Oral daily  losartan 25 milliGRAM(s) Oral daily  metoprolol tartrate 25 milliGRAM(s) Oral two times a day  pantoprazole    Tablet 40 milliGRAM(s) Oral two times a day    PRN Meds:  artificial tears (preservative free) Ophthalmic Solution 1 Drop(s) Both EYES two times a day PRN Dry Eyes    HOME MEDICATIONS:  aspirin 81 mg oral tablet, chewable: 1 tab(s) orally once a day  atorvastatin 40 mg oral tablet: 1 tab(s) orally once a day  isosorbide mononitrate 30 mg oral tablet, extended release: 1 tab(s) orally once a day (in the morning)  losartan 25 mg oral tablet: 1 tab(s) orally once a day  metoprolol tartrate 25 mg oral tablet: 1 tab(s) orally 2 times a day      Vital Signs:   T(F): 97.2 (10-30-19 @ 05:05), Max: 97.3 (10-29-19 @ 20:43)  HR: 84 (10-30-19 @ 05:05) (66 - 84)  BP: 114/59 (10-30-19 @ 05:05) (112/69 - 152/72)  RR: 18 (10-30-19 @ 05:05) (18 - 18)  SpO2: 98% (10-29-19 @ 11:46) (98% - 100%)      10-29-19 @ 07:01  -  10-30-19 @ 07:00  --------------------------------------------------------  IN: 0 mL / OUT: 1000 mL / NET: -1000 mL        Physical Exam:   GENERAL: NAD  HEENT: NCAT  CHEST/LUNG: CTAB  HEART: Regular rate and rhythm; s1 s2 appreciated, No murmurs, rubs, or gallops  ABDOMEN: Soft, Nontender, Nondistended; Bowel sounds present  EXTREMITIES: No LE edema b/l  NERVOUS SYSTEM:  Alert & Oriented X3        Labs:                         8.4    5.99  )-----------( 229      ( 29 Oct 2019 07:50 )             25.5     Neutophil% 63.9, Lymphocyte% 18.7, Monocyte% 10.7, Bands% 0.3 10-29-19 @ 07:50    29 Oct 2019 07:50    141    |  102    |  49     ----------------------------<  92     4.7     |  22     |  8.1      Ca    9.2        29 Oct 2019 07:50  Mg     1.8       29 Oct 2019 07:50            Hemoglobin A1C, Whole Blood: 5.6 % (09-18-19 @ 07:12) Hospital Day:  6d    Subjective:    Patient is a 69y old  Male who presents with a chief complaint of Anemia. No acute events overnight and in no distress this am. Reports improvement of melena, no dark stool in toilet bowl, but sees dark stool on tissue.       Past Medical Hx:   Afib  Anemia  Heart failure  HLD (hyperlipidemia)  HTN (hypertension)  ESRD on dialysis    Past Sx:  S/P cataract surgery  S/P arteriovenous (AV) fistula creation  H/O right coronary artery stent placement    Allergies:  No Known Allergies    Current Meds:   Standng Meds:  amLODIPine   Tablet 10 milliGRAM(s) Oral at bedtime  iohexol 300 mG (iodine)/mL Oral Solution 30 milliLiter(s) Oral once  isosorbide   mononitrate ER Tablet (IMDUR) 30 milliGRAM(s) Oral daily  losartan 25 milliGRAM(s) Oral daily  metoprolol tartrate 25 milliGRAM(s) Oral two times a day  pantoprazole    Tablet 40 milliGRAM(s) Oral two times a day    PRN Meds:  artificial tears (preservative free) Ophthalmic Solution 1 Drop(s) Both EYES two times a day PRN Dry Eyes    HOME MEDICATIONS:  aspirin 81 mg oral tablet, chewable: 1 tab(s) orally once a day  atorvastatin 40 mg oral tablet: 1 tab(s) orally once a day  isosorbide mononitrate 30 mg oral tablet, extended release: 1 tab(s) orally once a day (in the morning)  losartan 25 mg oral tablet: 1 tab(s) orally once a day  metoprolol tartrate 25 mg oral tablet: 1 tab(s) orally 2 times a day      Vital Signs:   T(F): 97.2 (10-30-19 @ 05:05), Max: 97.3 (10-29-19 @ 20:43)  HR: 84 (10-30-19 @ 05:05) (66 - 84)  BP: 114/59 (10-30-19 @ 05:05) (112/69 - 152/72)  RR: 18 (10-30-19 @ 05:05) (18 - 18)  SpO2: 98% (10-29-19 @ 11:46) (98% - 100%)      10-29-19 @ 07:01  -  10-30-19 @ 07:00  --------------------------------------------------------  IN: 0 mL / OUT: 1000 mL / NET: -1000 mL        Physical Exam:   GENERAL: NAD,   HEENT: NCAT  CHEST/LUNG: CTAB  HEART: Regular rate and rhythm; s1 s2 appreciated, No murmurs, rubs, or gallops  ABDOMEN: Soft, Nontender, Nondistended; Bowel sounds present  EXTREMITIES: No LE edema b/l  NERVOUS SYSTEM:  Alert & Oriented X3        Labs:                         8.4    5.99  )-----------( 229      ( 29 Oct 2019 07:50 )             25.5     Neutophil% 63.9, Lymphocyte% 18.7, Monocyte% 10.7, Bands% 0.3 10-29-19 @ 07:50    29 Oct 2019 07:50    141    |  102    |  49     ----------------------------<  92     4.7     |  22     |  8.1      Ca    9.2        29 Oct 2019 07:50  Mg     1.8       29 Oct 2019 07:50            Hemoglobin A1C, Whole Blood: 5.6 % (09-18-19 @ 07:12)

## 2019-10-30 NOTE — PROGRESS NOTE ADULT - SUBJECTIVE AND OBJECTIVE BOX
Nephrology progress note    Patient was seen and examined, events over the last 24 h noted .  HD yesterday    Allergies:  No Known Allergies    Hospital Medications:   MEDICATIONS  (STANDING):  amLODIPine   Tablet 10 milliGRAM(s) Oral at bedtime  iohexol 300 mG (iodine)/mL Oral Solution 30 milliLiter(s) Oral once  isosorbide   mononitrate ER Tablet (IMDUR) 30 milliGRAM(s) Oral daily  losartan 25 milliGRAM(s) Oral daily  metoprolol tartrate 25 milliGRAM(s) Oral two times a day  pantoprazole    Tablet 40 milliGRAM(s) Oral two times a day        VITALS:  T(F): 97.2 (10-30-19 @ 05:05), Max: 97.3 (10-29-19 @ 20:43)  HR: 84 (10-30-19 @ 05:05)  BP: 114/59 (10-30-19 @ 05:05)  RR: 18 (10-30-19 @ 05:05)  SpO2: 98% (10-29-19 @ 11:46)  Wt(kg): --    10-29 @ 07:01  -  10-30 @ 07:00  --------------------------------------------------------  IN: 0 mL / OUT: 1000 mL / NET: -1000 mL          PHYSICAL EXAM:  	Gen: NAD  	Pulm: decrease BS  B/L  	CV: S1S2; no rub  	Abd: +distended  	LE:  no edema  	Vascular access: av fistula   LABS:  10-30    138  |  99  |  24<H>  ----------------------------<  91  3.9   |  27  |  5.4<HH>    Ca    8.6      30 Oct 2019 08:29  Mg     1.8     10-30                            8.0    4.60  )-----------( 198      ( 30 Oct 2019 08:29 )             24.7       Urine Studies:      RADIOLOGY & ADDITIONAL STUDIES:

## 2019-10-31 LAB
ALBUMIN SERPL ELPH-MCNC: 3.5 G/DL — SIGNIFICANT CHANGE UP (ref 3.5–5.2)
ALP SERPL-CCNC: 115 U/L — SIGNIFICANT CHANGE UP (ref 30–115)
ALT FLD-CCNC: 9 U/L — SIGNIFICANT CHANGE UP (ref 0–41)
ANION GAP SERPL CALC-SCNC: 13 MMOL/L — SIGNIFICANT CHANGE UP (ref 7–14)
AST SERPL-CCNC: 15 U/L — SIGNIFICANT CHANGE UP (ref 0–41)
BASOPHILS # BLD AUTO: 0.05 K/UL — SIGNIFICANT CHANGE UP (ref 0–0.2)
BASOPHILS NFR BLD AUTO: 1.1 % — HIGH (ref 0–1)
BILIRUB SERPL-MCNC: 0.6 MG/DL — SIGNIFICANT CHANGE UP (ref 0.2–1.2)
BUN SERPL-MCNC: 34 MG/DL — HIGH (ref 10–20)
CALCIUM SERPL-MCNC: 8.9 MG/DL — SIGNIFICANT CHANGE UP (ref 8.5–10.1)
CHLORIDE SERPL-SCNC: 99 MMOL/L — SIGNIFICANT CHANGE UP (ref 98–110)
CO2 SERPL-SCNC: 27 MMOL/L — SIGNIFICANT CHANGE UP (ref 17–32)
CREAT SERPL-MCNC: 7.1 MG/DL — CRITICAL HIGH (ref 0.7–1.5)
EOSINOPHIL # BLD AUTO: 0.23 K/UL — SIGNIFICANT CHANGE UP (ref 0–0.7)
EOSINOPHIL NFR BLD AUTO: 5.1 % — SIGNIFICANT CHANGE UP (ref 0–8)
ERYTHROCYTE [SEDIMENTATION RATE] IN BLOOD: 64 MM/HR — HIGH (ref 0–10)
FOLATE SERPL-MCNC: 14.8 NG/ML — SIGNIFICANT CHANGE UP
GLUCOSE SERPL-MCNC: 108 MG/DL — HIGH (ref 70–99)
HCT VFR BLD CALC: 25.2 % — LOW (ref 42–52)
HGB BLD-MCNC: 8.1 G/DL — LOW (ref 14–18)
IMM GRANULOCYTES NFR BLD AUTO: 0.4 % — HIGH (ref 0.1–0.3)
INR BLD: 0.94 RATIO — SIGNIFICANT CHANGE UP (ref 0.65–1.3)
LYMPHOCYTES # BLD AUTO: 1.04 K/UL — LOW (ref 1.2–3.4)
LYMPHOCYTES # BLD AUTO: 23 % — SIGNIFICANT CHANGE UP (ref 20.5–51.1)
MAGNESIUM SERPL-MCNC: 1.9 MG/DL — SIGNIFICANT CHANGE UP (ref 1.8–2.4)
MCHC RBC-ENTMCNC: 30.1 PG — SIGNIFICANT CHANGE UP (ref 27–31)
MCHC RBC-ENTMCNC: 32.1 G/DL — SIGNIFICANT CHANGE UP (ref 32–37)
MCV RBC AUTO: 93.7 FL — SIGNIFICANT CHANGE UP (ref 80–94)
MONOCYTES # BLD AUTO: 0.49 K/UL — SIGNIFICANT CHANGE UP (ref 0.1–0.6)
MONOCYTES NFR BLD AUTO: 10.8 % — HIGH (ref 1.7–9.3)
NEUTROPHILS # BLD AUTO: 2.7 K/UL — SIGNIFICANT CHANGE UP (ref 1.4–6.5)
NEUTROPHILS NFR BLD AUTO: 59.6 % — SIGNIFICANT CHANGE UP (ref 42.2–75.2)
NRBC # BLD: 0 /100 WBCS — SIGNIFICANT CHANGE UP (ref 0–0)
PHOSPHATE SERPL-MCNC: 6 MG/DL — HIGH (ref 2.1–4.9)
PLATELET # BLD AUTO: 192 K/UL — SIGNIFICANT CHANGE UP (ref 130–400)
POTASSIUM SERPL-MCNC: 4.6 MMOL/L — SIGNIFICANT CHANGE UP (ref 3.5–5)
POTASSIUM SERPL-SCNC: 4.6 MMOL/L — SIGNIFICANT CHANGE UP (ref 3.5–5)
PROT SERPL-MCNC: 5.9 G/DL — LOW (ref 6–8)
PROTHROM AB SERPL-ACNC: 10.8 SEC — SIGNIFICANT CHANGE UP (ref 9.95–12.87)
RBC # BLD: 2.69 M/UL — LOW (ref 4.7–6.1)
RBC # FLD: 14.4 % — SIGNIFICANT CHANGE UP (ref 11.5–14.5)
SODIUM SERPL-SCNC: 139 MMOL/L — SIGNIFICANT CHANGE UP (ref 135–146)
WBC # BLD: 4.53 K/UL — LOW (ref 4.8–10.8)
WBC # FLD AUTO: 4.53 K/UL — LOW (ref 4.8–10.8)

## 2019-10-31 PROCEDURE — 99232 SBSQ HOSP IP/OBS MODERATE 35: CPT

## 2019-10-31 RX ORDER — CALCIUM ACETATE 667 MG
667 TABLET ORAL
Refills: 0 | Status: DISCONTINUED | OUTPATIENT
Start: 2019-10-31 | End: 2019-11-06

## 2019-10-31 RX ADMIN — PANTOPRAZOLE SODIUM 40 MILLIGRAM(S): 20 TABLET, DELAYED RELEASE ORAL at 17:20

## 2019-10-31 RX ADMIN — LOSARTAN POTASSIUM 25 MILLIGRAM(S): 100 TABLET, FILM COATED ORAL at 06:02

## 2019-10-31 RX ADMIN — Medication 667 MILLIGRAM(S): at 17:20

## 2019-10-31 RX ADMIN — Medication 25 MILLIGRAM(S): at 06:02

## 2019-10-31 RX ADMIN — PANTOPRAZOLE SODIUM 40 MILLIGRAM(S): 20 TABLET, DELAYED RELEASE ORAL at 06:02

## 2019-10-31 RX ADMIN — AMLODIPINE BESYLATE 5 MILLIGRAM(S): 2.5 TABLET ORAL at 22:03

## 2019-10-31 RX ADMIN — ISOSORBIDE MONONITRATE 30 MILLIGRAM(S): 60 TABLET, EXTENDED RELEASE ORAL at 13:17

## 2019-10-31 RX ADMIN — Medication 25 MILLIGRAM(S): at 17:20

## 2019-10-31 NOTE — DIETITIAN INITIAL EVALUATION ADULT. - PHYSICAL APPEARANCE
BMI 28.7  IBW: 64.5 kg UBW: 80kg No physical signs of malnutrition. No edema noted. BS 19 skin intact./overweight

## 2019-10-31 NOTE — PROGRESS NOTE ADULT - SUBJECTIVE AND OBJECTIVE BOX
seen and examined  no distress  lying comfortable       Standing Inpatient Medications  amLODIPine   Tablet 5 milliGRAM(s) Oral at bedtime  iohexol 300 mG (iodine)/mL Oral Solution 30 milliLiter(s) Oral once  isosorbide   mononitrate ER Tablet (IMDUR) 30 milliGRAM(s) Oral daily  losartan 25 milliGRAM(s) Oral daily  metoprolol tartrate 25 milliGRAM(s) Oral two times a day  pantoprazole    Tablet 40 milliGRAM(s) Oral two times a day    PRN Inpatient Medications  artificial tears (preservative free) Ophthalmic Solution 1 Drop(s) Both EYES two times a day PRN        VITALS/PHYSICAL EXAM  --------------------------------------------------------------------------------  T(C): 36.3 (10-31-19 @ 05:25), Max: 37 (10-30-19 @ 20:54)  HR: 72 (10-31-19 @ 05:25) (72 - 78)  BP: 130/64 (10-31-19 @ 05:25) (130/64 - 133/63)  RR: 18 (10-31-19 @ 05:25) (18 - 18)  SpO2: 97% (10-30-19 @ 19:53) (97% - 97%)  Wt(kg): --  Height (cm): 167.64 (10-29-19 @ 11:12)  Weight (kg): 80.7 (10-29-19 @ 11:12)  BMI (kg/m2): 28.7 (10-29-19 @ 11:12)  BSA (m2): 1.9 (10-29-19 @ 11:12)      Physical Exam:  	Gen: NAD  	Pulm: CTA B/L  	CV: S1S2; no rub  	Abd: +distended  	LE: no edema  	Vascular access: av fistula     LABS/STUDIES  --------------------------------------------------------------------------------              8.1    4.53  >-----------<  192      [10-31-19 @ 07:20]              25.2     139  |  99  |  34  ----------------------------<  108      [10-31-19 @ 07:20]  4.6   |  27  |  x         Ca     8.9     [10-31-19 @ 07:20]      Mg     1.9     [10-31-19 @ 07:20]      Phos  6.0     [10-31-19 @ 07:20]    TPro  5.9  /  Alb  3.5  /  TBili  0.6  /  DBili  x   /  AST  15  /  ALT  9   /  AlkPhos  115  [10-31-19 @ 07:20]    PT/INR: PT 10.80, INR 0.94       [10-31-19 @ 07:20]      Creatinine Trend:  SCr 5.4 [10-30 @ 08:29]  SCr 8.1 [10-29 @ 07:50]  SCr 7.7 [10-28 @ 22:02]  SCr 7.1 [10-28 @ 07:37]  SCr 5.9 [10-27 @ 06:39]        Iron 95, TIBC 134, %sat 71      [07-16-19 @ 06:20]  Ferritin 2507      [07-16-19 @ 06:20]  PTH -- (Ca 7.3)      [07-16-19 @ 05:40]   430  HbA1c 5.6      [09-18-19 @ 07:12]  Lipid: chol 116, , HDL 26, LDL 73      [10-27-19 @ 06:39]

## 2019-10-31 NOTE — DIETITIAN INITIAL EVALUATION ADULT. - OTHER INFO
pt adm for Acute on chronic normocytic anemia. Here for f/u of gi bleed. S/P capsule endo placement. S/p colonoscopy 10/29: diverticulosis. Interested in rehab for his gait instability. 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 ESRD with HD TTS - renal following.

## 2019-10-31 NOTE — DIETITIAN INITIAL EVALUATION ADULT. - ENERGY NEEDS
estimated calorie need: 1935 - 2257 kcals/day (30-35 kcals/day IBW for pt on HD and overweight)  estimated protein needs: 77 - 97 gms/day (1.2 - 1.5 gm/kg IBW for same as above)  estimated fluid needs: UO + 1000ml

## 2019-10-31 NOTE — DIETITIAN INITIAL EVALUATION ADULT. - FACTORS AFF FOOD INTAKE
Pt reports fair appetite. He c/o that sometimes he is not able to eat before dialysis because the tray comes late. For lunch, pt ate everything except a few bites of turkey. Denies GI distress. LBM 10/30. Denies chewing/swallowing. Pt unsure of food allergies but says he gets itchy here. (unsure r/t to meds or food). Did not take nutrition supplements at home.

## 2019-10-31 NOTE — PHYSICAL THERAPY INITIAL EVALUATION ADULT - LEVEL OF INDEPENDENCE: GAIT, REHAB EVAL
minimum assist (75% patients effort)/pt highly unsteady during gait, knees buckling at times. safety concerns

## 2019-10-31 NOTE — PROGRESS NOTE ADULT - ASSESSMENT
68 y/o M w/ PMHx of CAD w/ stents, Afib, ESRD on HD TRS, cholangitis, recent cholecystectomy presents for weakness and dark stool.     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

## 2019-10-31 NOTE — PHYSICAL THERAPY INITIAL EVALUATION ADULT - GENERAL OBSERVATIONS, REHAB EVAL
chart reviewed - pt attempted to PT IE - pt at HD - PT will follow up
Attempted to see pt for b/s PT, pt declined reporting that he recently ambulated to bathroom with staff. Pt educated on importance of mobility, by PT as well as RN, but pt still declined, requesting for PT to come back tomorrow. Will f/u.
pt encountered supine in bed in NAD agreeable to PT IE. pt reports feeling weak

## 2019-10-31 NOTE — PROGRESS NOTE ADULT - ASSESSMENT
· Assessment	  A 70 y/o M w/ PMHx of CAD w/ stents, Afib, ESRD on HD TRS, cholangitis, recent cholecystectomy presents for weakness and dark stool.     #Acute on chronic anemia  - Hgb 6.9 in ED, s/p 3 units  - H/H __ <- 8.0  - EGD 10/25: kelsi esophagitis with bx, no blood in stomach or duodenum   - EGD bx: gastritis no h. 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  - Colonoscopy 10/29: divertic dz, mild in desc colon; no stigmata of bleeding in colon or term ileum  - Capsule 10/30:   - C/w Protonix PO 40mg BID  - Daily CBCs  - 2 18 gauge IVs, Active T&S  - PT/Physiatry eval: SNF vs home with oupt or VNS    #Afib on eliquis   - Rate controlled   - holding eliquis given bleed  - F/u with re: resuming Eliquis    #HTN  - Systolic 115-130  - c/w home medications  - Amlodipine decreased to 5mg and QHS now    #HLD  - c/w home medications    #ESRD with HD TRS, with new c/o pelvic tenderness   - Makes urine  - Cr ___<- 5.4  - HD today  - Nephro following   - Daily BMPs    #Diet: NPO for bedside capsule today  #Activity: Ambulate as tolerated  #DVT ppx: hold for now - GI bleed  #Dispo: possible SNF for gait training A 68 y/o M w/ PMHx of CAD w/ stents, Afib, ESRD on HD TRS, cholangitis, recent cholecystectomy presents for weakness and dark stool.     #Acute on chronic anemia  - Hgb 6.9 in ED, s/p 3 units  - H/H 8.1 <- 8.0  - EGD 10/25: kelsi esophagitis with bx, no blood in stomach or duodenum   - EGD bx: gastritis no h. 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  - Colonoscopy 10/29: divertic dz, mild in desc colon; no stigmata of bleeding in colon or term ileum  - Capsule 10/30: f/u report  - C/w Protonix PO 40mg BID  - Daily CBCs  - 2 18 gauge IVs, Active T&S  - Physiatry eval: SNF vs home with oupt or VNS  - F/u PT eval      #Afib on eliquis   - Rate controlled   - holding eliquis given bleed  - F/u with GI re: resuming Eliquis    #HTN  - Systolic 115-130  - c/w home medications  - Amlodipine decreased to 5mg and QHS now    #HLD  - c/w home medications    #ESRD with HD TRS, with new c/o pelvic tenderness   - Makes urine  - Cr 7.1<- 5.4  - HD today  - Nephro following   - Daily BMPs    #Diet: Renal  #Activity: Ambulate as tolerated  #DVT ppx: hold for now - GI bleed  #Dispo: possible SNF for gait training A 70 y/o M w/ PMHx of CAD w/ stents, Afib, ESRD on HD TRS, cholangitis, recent cholecystectomy presents for weakness and dark stool.     #Acute on chronic anemia  - Hgb 6.9 in ED, s/p 3 units  - H/H 8.1 <- 8.0  - EGD 10/25: kelsi esophagitis with bx, no blood in stomach or duodenum   - EGD bx: gastritis no h. 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  - Colonoscopy 10/29: divertic dz, mild in desc colon; no stigmata of bleeding in colon or term ileum  - Capsule 10/30: f/u report  - C/w Protonix PO 40mg BID  - Daily CBCs  - 2 18 gauge IVs, Active T&S  - Physiatry eval: SNF vs home with oupt or VNS  - F/u PT eval      #Afib on eliquis   - Rate controlled   - holding eliquis given bleed  - F/u with GI re: resuming Eliquis    #HTN  - Systolic 115-130  - c/w home medications  - Amlodipine decreased to 5mg and QHS now    #HLD  - c/w home medications    #ESRD with HD TRS, with new c/o pelvic tenderness   - Makes urine  - Cr 7.1<- 5.4  - HD today  - Nephro following   - Daily BMPs    #Diet: Renal  #Activity: Ambulate as tolerated  #DVT ppx: hold for now - GI bleed  #Dispo: anticipated for tomorrow, possible SNF for gait training

## 2019-10-31 NOTE — PROGRESS NOTE ADULT - SUBJECTIVE AND OBJECTIVE BOX
MAMTA FERNANDO  69y  Male  ***My note supersedes ALL resident notes that I sign.  My corrections for their notes are in my note.***    I can be reached directly on [employer]. My office number is [phone]. My personal cell number is [phone].    INTERVAL EVENTS: Here for f/u of gi bleed. S/P capsule endo placement - doing OK.  Pt still interested in rehab for his gait instability.    T(F): 97.4 (10-31-19 @ 05:25), Max: 98.6 (10-30-19 @ 20:54)  HR: 64 (10-31-19 @ 08:40) (64 - 78)  BP: 122/66 (10-31-19 @ 08:40) (122/66 - 133/63)  RR: 18 (10-31-19 @ 08:40) (18 - 18)  SpO2: 97% (10-30-19 @ 19:53) (97% - 97%)    Gen: NAD  HEENT: pale conj; slc white; EOMI; PERRL; mouth clr; ears nl; no nasal d/c  Neck: no JVD; no nodes; thyroid nl  lung: clr  hrt: s1 s2 rrr  abd: 3 small lap chol scars in RUQ (1 suture seen in most-medial sx site) - well-healed, no infection, minor tenderness (expected); no HS megaly; soft;   ext: no edema, no c/c; DP 1+/2  neuro: WNL; patella reflex 1-/2    LABS:                        8.1     (    93.7   4.53  )-----------( ---------      192      ( 31 Oct 2019 07:20 )             25.2    (    14.4     139   (   99   (   108      10-31-19 @ 07:20  ----------------------               4.6   (   27   (   34                             -----                        7.1  Ca  8.9   Mg  1.9    P   6.0     LFT  5.9  (  0.6  (  15       10-31-19 @ 07:20  -------------------------  3.5  (  115  (  9    PT/INR - ( 31 Oct 2019 07:20 )   PT: 10.80 sec;   INR: 0.94 ratio      RADIOLOGY & ADDITIONAL TESTS:      MEDICATIONS:    amLODIPine   Tablet 5 milliGRAM(s) Oral at bedtime  artificial tears (preservative free) Ophthalmic Solution 1 Drop(s) Both EYES two times a day PRN  calcium acetate 667 milliGRAM(s) Oral three times a day with meals  iohexol 300 mG (iodine)/mL Oral Solution 30 milliLiter(s) Oral once  isosorbide   mononitrate ER Tablet (IMDUR) 30 milliGRAM(s) Oral daily  losartan 25 milliGRAM(s) Oral daily  metoprolol tartrate 25 milliGRAM(s) Oral two times a day  pantoprazole    Tablet 40 milliGRAM(s) Oral two times a day

## 2019-10-31 NOTE — PROGRESS NOTE ADULT - SUBJECTIVE AND OBJECTIVE BOX
DIAGNOSIS:   HOSPITAL DAY #:    STATUS POST:    POST OPERATIVE DAY #:     Vital Signs Last 24 Hrs  T(C): 36.9 (31 Oct 2019 20:56), Max: 36.9 (31 Oct 2019 20:56)  T(F): 98.5 (31 Oct 2019 20:56), Max: 98.5 (31 Oct 2019 20:56)  HR: 69 (31 Oct 2019 20:56) (64 - 84)  BP: 152/75 (31 Oct 2019 20:56) (122/66 - 152/75)  BP(mean): --  RR: 18 (31 Oct 2019 20:56) (18 - 18)  SpO2: 99% (31 Oct 2019 14:07) (99% - 99%)    SUBJECTIVE: Pt seen    Pain: YES  [ ]   NO [ ]   Nausea: [ ] YES [ ] NO           Vomiting: [ ] YES [ ] NO  Flatus: [ ] YES [ ] NO             Bowel Movement: [ ] YES [ ] NO     Void: [ ]YES [ ]No      DARSHANA DRAINAGE: SIGNIFICANT [ ]   NOT SIGNIFICANT [ ]   NOT APPLICABLE [ ]  YES [ ] NO    General Appearance: Appears well, NAD  Neck: Supple no mass   Chest: Equal expansion bilaterally, equal breath sounds  CV: Pulse regular presently  Abdomen: Soft [x ] YES [ ]NO  DISTENDED [ ] YES [x ] NO TENDERNESS [ ]YES [ ]NO  INCISIONS: HEALING WELL [ ] YES  [ ] NO ERYTHEMA [ ] YES [ ] NO DRAINAGE [ ] YES  [ ] NO  Extremities: Grossly symmetric, CALF TENDERNESS [ ] YES  [ ] NO  RT arm fistula ok no infection    LABS:                        8.1    4.53  )-----------( 192      ( 31 Oct 2019 07:20 )             25.2     10-31    139  |  99  |  34<H>  ----------------------------<  108<H>  4.6   |  27  |  7.1<HH>    Ca    8.9      31 Oct 2019 07:20  Phos  6.0     10-31  Mg     1.9     10-31    TPro  5.9<L>  /  Alb  3.5  /  TBili  0.6  /  DBili  x   /  AST  15  /  ALT  9   /  AlkPhos  115  10-31    PT/INR - ( 31 Oct 2019 07:20 )   PT: 10.80 sec;   INR: 0.94 ratio                 ASSESSMENT:     GOOD POST OP COURSE [ ]  YES  [ ] NO  CONDITION IMPROVING  []  YES  [ ]  NO          PLAN:    CONTINUE PRESENT MANAGEMENT  [ ] YES  [ ] NO

## 2019-10-31 NOTE — PROGRESS NOTE ADULT - SUBJECTIVE AND OBJECTIVE BOX
Hospital Day:  7d    Subjective:    Patient is a 69y old  Male who presents with a chief complaint of Anemia (30 Oct 2019 12:07)      Past Medical Hx:   Afib  Anemia  Heart failure  HLD (hyperlipidemia)  HTN (hypertension)  ESRD on dialysis    Past Sx:  S/P cataract surgery  S/P arteriovenous (AV) fistula creation  H/O right coronary artery stent placement    Allergies:  No Known Allergies    Current Meds:   Standng Meds:  amLODIPine   Tablet 5 milliGRAM(s) Oral at bedtime  iohexol 300 mG (iodine)/mL Oral Solution 30 milliLiter(s) Oral once  isosorbide   mononitrate ER Tablet (IMDUR) 30 milliGRAM(s) Oral daily  losartan 25 milliGRAM(s) Oral daily  metoprolol tartrate 25 milliGRAM(s) Oral two times a day  pantoprazole    Tablet 40 milliGRAM(s) Oral two times a day    PRN Meds:  artificial tears (preservative free) Ophthalmic Solution 1 Drop(s) Both EYES two times a day PRN Dry Eyes    HOME MEDICATIONS:  aspirin 81 mg oral tablet, chewable: 1 tab(s) orally once a day  atorvastatin 40 mg oral tablet: 1 tab(s) orally once a day  isosorbide mononitrate 30 mg oral tablet, extended release: 1 tab(s) orally once a day (in the morning)  losartan 25 mg oral tablet: 1 tab(s) orally once a day  metoprolol tartrate 25 mg oral tablet: 1 tab(s) orally 2 times a day      Vital Signs:   T(F): 97.4 (10-31-19 @ 05:25), Max: 98.6 (10-30-19 @ 20:54)  HR: 72 (10-31-19 @ 05:25) (72 - 78)  BP: 130/64 (10-31-19 @ 05:25) (130/64 - 133/63)  RR: 18 (10-31-19 @ 05:25) (18 - 18)  SpO2: 97% (10-30-19 @ 19:53) (97% - 97%)      10-29-19 @ 07:01  -  10-30-19 @ 07:00  --------------------------------------------------------  IN: 0 mL / OUT: 1000 mL / NET: -1000 mL        Physical Exam:   GENERAL: NAD  HEENT: NCAT  CHEST/LUNG: CTAB  HEART: Regular rate and rhythm; s1 s2 appreciated, No murmurs, rubs, or gallops  ABDOMEN: Soft, Nontender, Nondistended; Bowel sounds present  EXTREMITIES: No LE edema b/l  NERVOUS SYSTEM:  Alert & Oriented X3        Labs:                         8.0    4.60  )-----------( 198      ( 30 Oct 2019 08:29 )             24.7     Neutophil% 63.9, Lymphocyte% 20.2, Monocyte% 9.8, Bands% 0.4 10-30-19 @ 08:29    30 Oct 2019 08:29    138    |  99     |  24     ----------------------------<  91     3.9     |  27     |  5.4      Ca    8.6        30 Oct 2019 08:29  Mg     1.8       30 Oct 2019 08:29            Hemoglobin A1C, Whole Blood: 5.6 % (09-18-19 @ 07:12) Hospital Day:  7d    Subjective:    Patient is a 69y old  Male who presents with a chief complaint of Anemia. No acute events overnight. Gone for HD this am.       Past Medical Hx:   Afib  Anemia  Heart failure  HLD (hyperlipidemia)  HTN (hypertension)  ESRD on dialysis    Past Sx:  S/P cataract surgery  S/P arteriovenous (AV) fistula creation  H/O right coronary artery stent placement    Allergies:  No Known Allergies    Current Meds:   Standng Meds:  amLODIPine   Tablet 5 milliGRAM(s) Oral at bedtime  iohexol 300 mG (iodine)/mL Oral Solution 30 milliLiter(s) Oral once  isosorbide   mononitrate ER Tablet (IMDUR) 30 milliGRAM(s) Oral daily  losartan 25 milliGRAM(s) Oral daily  metoprolol tartrate 25 milliGRAM(s) Oral two times a day  pantoprazole    Tablet 40 milliGRAM(s) Oral two times a day    PRN Meds:  artificial tears (preservative free) Ophthalmic Solution 1 Drop(s) Both EYES two times a day PRN Dry Eyes    HOME MEDICATIONS:  aspirin 81 mg oral tablet, chewable: 1 tab(s) orally once a day  atorvastatin 40 mg oral tablet: 1 tab(s) orally once a day  isosorbide mononitrate 30 mg oral tablet, extended release: 1 tab(s) orally once a day (in the morning)  losartan 25 mg oral tablet: 1 tab(s) orally once a day  metoprolol tartrate 25 mg oral tablet: 1 tab(s) orally 2 times a day      Vital Signs:   T(F): 97.4 (10-31-19 @ 05:25), Max: 98.6 (10-30-19 @ 20:54)  HR: 72 (10-31-19 @ 05:25) (72 - 78)  BP: 130/64 (10-31-19 @ 05:25) (130/64 - 133/63)  RR: 18 (10-31-19 @ 05:25) (18 - 18)  SpO2: 97% (10-30-19 @ 19:53) (97% - 97%)      10-29-19 @ 07:01  -  10-30-19 @ 07:00  --------------------------------------------------------  IN: 0 mL / OUT: 1000 mL / NET: -1000 mL        Physical Exam:   GENERAL: NAD  HEENT: NCAT  CHEST/LUNG: CTAB  HEART: Regular rate and rhythm; s1 s2 appreciated, No murmurs, rubs, or gallops  ABDOMEN: Soft, Nontender, Nondistended; Bowel sounds present  EXTREMITIES: No LE edema b/l  NERVOUS SYSTEM:  Alert & Oriented X3        Labs:                         8.0    4.60  )-----------( 198      ( 30 Oct 2019 08:29 )             24.7     Neutophil% 63.9, Lymphocyte% 20.2, Monocyte% 9.8, Bands% 0.4 10-30-19 @ 08:29    30 Oct 2019 08:29    138    |  99     |  24     ----------------------------<  91     3.9     |  27     |  5.4      Ca    8.6        30 Oct 2019 08:29  Mg     1.8       30 Oct 2019 08:29            Hemoglobin A1C, Whole Blood: 5.6 % (09-18-19 @ 07:12)

## 2019-11-01 LAB
ALBUMIN SERPL ELPH-MCNC: 3.6 G/DL — SIGNIFICANT CHANGE UP (ref 3.5–5.2)
ALP SERPL-CCNC: 113 U/L — SIGNIFICANT CHANGE UP (ref 30–115)
ALT FLD-CCNC: 11 U/L — SIGNIFICANT CHANGE UP (ref 0–41)
ANION GAP SERPL CALC-SCNC: 14 MMOL/L — SIGNIFICANT CHANGE UP (ref 7–14)
AST SERPL-CCNC: 18 U/L — SIGNIFICANT CHANGE UP (ref 0–41)
BASOPHILS # BLD AUTO: 0.05 K/UL — SIGNIFICANT CHANGE UP (ref 0–0.2)
BASOPHILS NFR BLD AUTO: 1.2 % — HIGH (ref 0–1)
BILIRUB SERPL-MCNC: 0.7 MG/DL — SIGNIFICANT CHANGE UP (ref 0.2–1.2)
BUN SERPL-MCNC: 32 MG/DL — HIGH (ref 10–20)
CALCIUM SERPL-MCNC: 9.2 MG/DL — SIGNIFICANT CHANGE UP (ref 8.5–10.1)
CHLORIDE SERPL-SCNC: 100 MMOL/L — SIGNIFICANT CHANGE UP (ref 98–110)
CO2 SERPL-SCNC: 26 MMOL/L — SIGNIFICANT CHANGE UP (ref 17–32)
CREAT SERPL-MCNC: 6 MG/DL — CRITICAL HIGH (ref 0.7–1.5)
EOSINOPHIL # BLD AUTO: 0.28 K/UL — SIGNIFICANT CHANGE UP (ref 0–0.7)
EOSINOPHIL NFR BLD AUTO: 6.5 % — SIGNIFICANT CHANGE UP (ref 0–8)
GLUCOSE SERPL-MCNC: 104 MG/DL — HIGH (ref 70–99)
HCT VFR BLD CALC: 26.9 % — LOW (ref 42–52)
HGB BLD-MCNC: 8.9 G/DL — LOW (ref 14–18)
IMM GRANULOCYTES NFR BLD AUTO: 0.2 % — SIGNIFICANT CHANGE UP (ref 0.1–0.3)
LYMPHOCYTES # BLD AUTO: 1.13 K/UL — LOW (ref 1.2–3.4)
LYMPHOCYTES # BLD AUTO: 26.2 % — SIGNIFICANT CHANGE UP (ref 20.5–51.1)
MCHC RBC-ENTMCNC: 30.8 PG — SIGNIFICANT CHANGE UP (ref 27–31)
MCHC RBC-ENTMCNC: 33.1 G/DL — SIGNIFICANT CHANGE UP (ref 32–37)
MCV RBC AUTO: 93.1 FL — SIGNIFICANT CHANGE UP (ref 80–94)
MONOCYTES # BLD AUTO: 0.4 K/UL — SIGNIFICANT CHANGE UP (ref 0.1–0.6)
MONOCYTES NFR BLD AUTO: 9.3 % — SIGNIFICANT CHANGE UP (ref 1.7–9.3)
NEUTROPHILS # BLD AUTO: 2.45 K/UL — SIGNIFICANT CHANGE UP (ref 1.4–6.5)
NEUTROPHILS NFR BLD AUTO: 56.6 % — SIGNIFICANT CHANGE UP (ref 42.2–75.2)
NRBC # BLD: 0 /100 WBCS — SIGNIFICANT CHANGE UP (ref 0–0)
PHOSPHATE SERPL-MCNC: 5.2 MG/DL — HIGH (ref 2.1–4.9)
PLATELET # BLD AUTO: 177 K/UL — SIGNIFICANT CHANGE UP (ref 130–400)
POTASSIUM SERPL-MCNC: 4.1 MMOL/L — SIGNIFICANT CHANGE UP (ref 3.5–5)
POTASSIUM SERPL-SCNC: 4.1 MMOL/L — SIGNIFICANT CHANGE UP (ref 3.5–5)
PROT SERPL-MCNC: 6.2 G/DL — SIGNIFICANT CHANGE UP (ref 6–8)
RBC # BLD: 2.89 M/UL — LOW (ref 4.7–6.1)
RBC # FLD: 14.1 % — SIGNIFICANT CHANGE UP (ref 11.5–14.5)
SODIUM SERPL-SCNC: 140 MMOL/L — SIGNIFICANT CHANGE UP (ref 135–146)
WBC # BLD: 4.32 K/UL — LOW (ref 4.8–10.8)
WBC # FLD AUTO: 4.32 K/UL — LOW (ref 4.8–10.8)

## 2019-11-01 PROCEDURE — 99232 SBSQ HOSP IP/OBS MODERATE 35: CPT

## 2019-11-01 RX ORDER — APIXABAN 2.5 MG/1
5 TABLET, FILM COATED ORAL
Refills: 0 | Status: DISCONTINUED | OUTPATIENT
Start: 2019-11-01 | End: 2019-11-03

## 2019-11-01 RX ORDER — DARBEPOETIN ALFA IN POLYSORBAT 200MCG/0.4
40 PEN INJECTOR (ML) SUBCUTANEOUS
Refills: 0 | Status: DISCONTINUED | OUTPATIENT
Start: 2019-11-01 | End: 2019-11-06

## 2019-11-01 RX ADMIN — Medication 667 MILLIGRAM(S): at 17:51

## 2019-11-01 RX ADMIN — ISOSORBIDE MONONITRATE 30 MILLIGRAM(S): 60 TABLET, EXTENDED RELEASE ORAL at 13:02

## 2019-11-01 RX ADMIN — AMLODIPINE BESYLATE 5 MILLIGRAM(S): 2.5 TABLET ORAL at 23:09

## 2019-11-01 RX ADMIN — PANTOPRAZOLE SODIUM 40 MILLIGRAM(S): 20 TABLET, DELAYED RELEASE ORAL at 17:51

## 2019-11-01 RX ADMIN — Medication 667 MILLIGRAM(S): at 08:11

## 2019-11-01 RX ADMIN — APIXABAN 5 MILLIGRAM(S): 2.5 TABLET, FILM COATED ORAL at 17:51

## 2019-11-01 RX ADMIN — Medication 667 MILLIGRAM(S): at 13:02

## 2019-11-01 RX ADMIN — Medication 25 MILLIGRAM(S): at 17:51

## 2019-11-01 RX ADMIN — LOSARTAN POTASSIUM 25 MILLIGRAM(S): 100 TABLET, FILM COATED ORAL at 05:32

## 2019-11-01 RX ADMIN — PANTOPRAZOLE SODIUM 40 MILLIGRAM(S): 20 TABLET, DELAYED RELEASE ORAL at 05:32

## 2019-11-01 RX ADMIN — Medication 25 MILLIGRAM(S): at 05:32

## 2019-11-01 NOTE — PROGRESS NOTE ADULT - SUBJECTIVE AND OBJECTIVE BOX
MAMTA FERNANDO  69y  Male  ***My note supersedes ALL resident notes that I sign.  My corrections for their notes are in my note.***    I can be reached directly on [employer] [id number]. My office number is [phone]. My personal cell number is [phone].    INTERVAL EVENTS: Here for f/u of anemia. Pt mostly c/o dizziness w/ standing. Says he feels the most dizzy post HD.  Pt did work w/ PT.  Pt s/p cap endo. Pt able to eat and drink. He has no SOB or CP.    T(F): 97.1 (11-01-19 @ 05:02), Max: 98.5 (10-31-19 @ 20:56)  HR: 77 (11-01-19 @ 05:02) (69 - 84)  BP: 133/72 (11-01-19 @ 05:02) (133/72 - 152/75)  RR: 18 (11-01-19 @ 05:02) (18 - 18)  SpO2: 99% (10-31-19 @ 23:46) (99% - 99%)    Gen: NAD  HEENT: slc white; EOMI; PERRL; mouth clr; ears nl; no nasal d/c  Neck: no JVD  lung: clr  hrt: s1 s2 rrr  abd: 3 small lap chol scars in RUQ (1 suture seen in most-medial sx site) - well-healed, no infection, minor tenderness (expected); no HS megaly; soft;   ext: no edema, no c/c; DP 1+/2    LABS:                        8.9     (    93.1   4.32  )-----------( ---------      177      ( 01 Nov 2019 07:19 )             26.9    (    14.1     Hemoglobin: 8.9 g/dL (11-01 @ 07:19) - stable  Hemoglobin: 8.1 g/dL (10-31 @ 07:20)  Hemoglobin: 8.0 g/dL (10-30 @ 08:29)  Hemoglobin: 8.4 g/dL (10-29 @ 07:50)  Hemoglobin: 8.8 g/dL (10-28 @ 22:02)  Hemoglobin: 8.6 g/dL (10-28 @ 07:37)    140   (   100   (   104      11-01-19 @ 07:19  ----------------------               4.1   (   26   (   32                             -----                        6.0  Ca  9.2   Mg  --    P   5.2     LFT  6.2  (  0.7  (  18       11-01-19 @ 07:19  -------------------------  3.6  (  113  (  11    PT/INR - ( 31 Oct 2019 07:20 )   PT: 10.80 sec;   INR: 0.94 ratio      RADIOLOGY & ADDITIONAL TESTS:      MEDICATIONS:    amLODIPine   Tablet 5 milliGRAM(s) Oral at bedtime  apixaban 5 milliGRAM(s) Oral two times a day  artificial tears (preservative free) Ophthalmic Solution 1 Drop(s) Both EYES two times a day PRN  calcium acetate 667 milliGRAM(s) Oral three times a day with meals  darbepoetin Injectable Syringe 40 MICROGram(s) IV Push <User Schedule>  iohexol 300 mG (iodine)/mL Oral Solution 30 milliLiter(s) Oral once  isosorbide   mononitrate ER Tablet (IMDUR) 30 milliGRAM(s) Oral daily  losartan 25 milliGRAM(s) Oral daily  metoprolol tartrate 25 milliGRAM(s) Oral two times a day  pantoprazole    Tablet 40 milliGRAM(s) Oral two times a day

## 2019-11-01 NOTE — PROGRESS NOTE ADULT - SUBJECTIVE AND OBJECTIVE BOX
Nephrology progress note    Patient was seen and examined, events over the last 24 h noted .   HD yesterday    Allergies:  No Known Allergies    Hospital Medications:   MEDICATIONS  (STANDING):  amLODIPine   Tablet 5 milliGRAM(s) Oral at bedtime  apixaban 5 milliGRAM(s) Oral two times a day  calcium acetate 667 milliGRAM(s) Oral three times a day with meals  iohexol 300 mG (iodine)/mL Oral Solution 30 milliLiter(s) Oral once  isosorbide   mononitrate ER Tablet (IMDUR) 30 milliGRAM(s) Oral daily  losartan 25 milliGRAM(s) Oral daily  metoprolol tartrate 25 milliGRAM(s) Oral two times a day  pantoprazole    Tablet 40 milliGRAM(s) Oral two times a day        VITALS:  T(F): 97.1 (11-01-19 @ 05:02), Max: 98.5 (10-31-19 @ 20:56)  HR: 77 (11-01-19 @ 05:02)  BP: 133/72 (11-01-19 @ 05:02)  RR: 18 (11-01-19 @ 05:02)  SpO2: 99% (10-31-19 @ 23:46)  Wt(kg): --    10-31 @ 07:01  -  11-01 @ 07:00  --------------------------------------------------------  IN: 0 mL / OUT: 2000 mL / NET: -2000 mL          PHYSICAL EXAM:  	Gen: NAD  	Pulm: CTA B/L  	CV: S1S2; no rub  	Abd: +distended  	LE: no edema  	Vascular access: av fistula     LABS:  11-01    140  |  100  |  32<H>  ----------------------------<  104<H>  4.1   |  26  |  6.0<HH>    Ca    9.2      01 Nov 2019 07:19  Phos  5.2     11-01  Mg     1.9     10-31    TPro  6.2  /  Alb  3.6  /  TBili  0.7  /  DBili      /  AST  18  /  ALT  11  /  AlkPhos  113  11-01                          8.9    4.32  )-----------( 177      ( 01 Nov 2019 07:19 )             26.9       Urine Studies:      RADIOLOGY & ADDITIONAL STUDIES:

## 2019-11-01 NOTE — PROGRESS NOTE ADULT - ASSESSMENT
68 yo man with PMH of A fib anemia , [place] ESRD on HD T Th Sat sp AVF surgery presenting with anemia acute and LGIB   # ESRD : yesterday, next tomorrow  # GI bleed,s/p EGD, colonoscopy, diverticular disease, Bach , followed by GI  #ph level noted , start phoslo 1 tablet po q 8 with meals   # BP well controlled   # will start darbo 20 with hd, no venofer elevated sat  # will follow    # ? d/c plan

## 2019-11-01 NOTE — PROGRESS NOTE ADULT - ASSESSMENT
68 yo M CAD, AFIB, ESRD admitted for acute anemia obscure GI bleeding SP EGD colonoscopy and VCE/  VCE ? red hue at 70%.  It was suggested to perform VCE on AC on monday.  May DC to follow up on Friday at the [facility] clinic for repeat VCE. 70 yo M CAD, AFIB, ESRD admitted for acute anemia obscure GI bleeding SP EGD colonoscopy and VCE/  VCE ? red hue at 70%, polypoid lesion (non bleeding in the small intestines).    1)Low risk adenoma- LRA  2)Anemia- obscure occult GI bleed    Rec:  Resume Eliquis.  Monitor CBC BID and transfuse.  VCE on AC on Monday.  Advance diet.  1/2 golytely Sunday night  Will need repeat colonoscopy in 5 years.

## 2019-11-01 NOTE — PROGRESS NOTE ADULT - SUBJECTIVE AND OBJECTIVE BOX
Hepatology/GI follow up note: Pt seen and examined at bedside.     For questions and inquiries please page [phone].  For urgent matters or after 5pm and on weekends please page the fellow on call through the GI paging system.    69y Male seen for:    Subjective/Interval events:    Review of system  General:  (-) weight loss, (-) fevers  Eyes:  (-) visual changes  CV:  (-) chest pain  Resp: (-) SOB, (-) wheezing  GI: (-) abdominal pain,  (-) nausea, (-) vomiting, (-) dysphagia, (-) diarrhea, (-) constipation, (-) rectal bleeding, (-) melena, (-) hematemesis.  Neuro: (-) confusion, (-) weakness  Psych:  (-) Hallucinations  Heme:  (-) easy bruisability    Past medical/surgical Hx:  PAST MEDICAL & SURGICAL HISTORY:  Afib  Anemia  Heart failure  HLD (hyperlipidemia)  HTN (hypertension)  ESRD on dialysis: T/ TH/ S  S/P cataract surgery  S/P arteriovenous (AV) fistula creation  H/O right coronary artery stent placement: stent x3    Home Medications:  Last Order Reconciliation Date: 10-24-19 @ 23:33 (Admission Reconciliation)  amLODIPine 10 mg oral tablet: 1 tab(s) orally once a day   apixaban 5 mg oral tablet: 1 tab(s) orally 2 times a day  aspirin 81 mg oral tablet, chewable: 1 tab(s) orally once a day  atorvastatin 40 mg oral tablet: 1 tab(s) orally once a day  Flagyl 500 mg oral tablet: 1 tab(s) orally 2 times a day   isosorbide mononitrate 30 mg oral tablet, extended release: 1 tab(s) orally once a day (in the morning)  losartan 25 mg oral tablet: 1 tab(s) orally once a day  metoprolol tartrate 25 mg oral tablet: 1 tab(s) orally 2 times a day      Allergies:  No Known Allergies      Current Medications:   amLODIPine   Tablet 5 milliGRAM(s) Oral at bedtime  apixaban 5 milliGRAM(s) Oral two times a day  artificial tears (preservative free) Ophthalmic Solution 1 Drop(s) Both EYES two times a day PRN  calcium acetate 667 milliGRAM(s) Oral three times a day with meals  darbepoetin Injectable Syringe 40 MICROGram(s) IV Push <User Schedule>  iohexol 300 mG (iodine)/mL Oral Solution 30 milliLiter(s) Oral once  isosorbide   mononitrate ER Tablet (IMDUR) 30 milliGRAM(s) Oral daily  losartan 25 milliGRAM(s) Oral daily  metoprolol tartrate 25 milliGRAM(s) Oral two times a day  pantoprazole    Tablet 40 milliGRAM(s) Oral two times a day        Physical exam:  T(C): 36.1 (11-01-19 @ 13:28), Max: 36.9 (10-31-19 @ 20:56)  HR: 59 (11-01-19 @ 13:28) (59 - 77)  BP: 118/67 (11-01-19 @ 13:28) (118/67 - 152/75)  RR: 18 (11-01-19 @ 13:28) (18 - 18)  SpO2: 99% (10-31-19 @ 23:46) (99% - 99%)    GENERAL: NAD  HEAD:  Atraumatic, Normocephalic  EYES: Sclera:NL  NECK: Supple, no JVD or thyromegaly  CHEST/LUNG: Good bilateral air entry  HEART: normal S1, S2. Regular  ABDOMEN: (-) distended, (-) tender, (-) rebound, (+) BS, (-)HSM  EXTREMITIES: (-) edema  NEUROLOGY: (-) asterixis  SKIN: (-) jaundice  JOSEPH: (-) melena (-) brbpr      Data:                        8.9    4.32  )-----------( 177      ( 01 Nov 2019 07:19 )             26.9     MCV 93.1 (11-01-19)    RDW 14.1 (11-01-19)    HGB trend:  8.9  11-01-19 @ 07:19  8.1  10-31-19 @ 07:20  8.0  10-30-19 @ 08:29        WBC trend:  4.32  11-01-19 @ 07:19  4.53  10-31-19 @ 07:20  4.60  10-30-19 @ 08:29    11-01    140  |  100  |  32<H>  ----------------------------<  104<H>  4.1   |  26  |  6.0<HH>    Ca    9.2      01 Nov 2019 07:19  Phos  5.2     11-01  Mg     1.9     10-31    TPro  6.2  /  Alb  3.6  /  TBili  0.7  /  DBili  x   /  AST  18  /  ALT  11  /  AlkPhos  113  11-01    Liver panel trend:  TBili 0.7   /   AST 18   /   ALT 11   /   AlkP 113   /   Tptn 6.2   /   Alb 3.6    /   DBili --      11-01  TBili 0.6   /   AST 15   /   ALT 9   /   AlkP 115   /   Tptn 5.9   /   Alb 3.5    /   DBili --      10-31  TBili 1.1   /   AST 25   /   ALT 10   /   AlkP 110   /   Tptn 5.7   /   Alb 3.4    /   DBili --      10-26  TBili 0.8   /   AST 26   /   ALT 10   /   AlkP 105   /   Tptn 5.4   /   Alb 3.3    /   DBili --      10-25  TBili 0.6   /   AST 25   /   ALT 13   /   AlkP 114   /   Tptn 5.9   /   Alb 3.6    /   DBili 0.3      10-24        PT/INR - ( 31 Oct 2019 07:20 )   PT: 10.80 sec;   INR: 0.94 ratio             Culture - Blood (collected 30 Oct 2019 08:29)  Source: .Blood None  Preliminary Report (31 Oct 2019 18:00):    No growth to date. Hepatology/GI follow up note: Pt seen and examined at bedside.     For questions and inquiries please page [phone].  For urgent matters or after 5pm and on weekends please page the fellow on call through the GI paging system.    69y Male seen for: Anemia, melena.    Subjective/Interval events:  No BMs  on clears tolerating    Review of system  General:  (-) weight loss, (-) fevers  Eyes:  (-) visual changes  CV:  (-) chest pain  Resp: (-) SOB, (-) wheezing  GI: (-) abdominal pain,  (-) nausea, (-) vomiting, (-) dysphagia, (-) diarrhea, (-) constipation, (-) rectal bleeding, (-) melena, (-) hematemesis.  Neuro: (-) confusion, (-) weakness  Psych:  (-) Hallucinations  Heme:  (-) easy bruisability    Past medical/surgical Hx:  PAST MEDICAL & SURGICAL HISTORY:  Afib  Anemia  Heart failure  HLD (hyperlipidemia)  HTN (hypertension)  ESRD on dialysis: T/ TH/ S  S/P cataract surgery  S/P arteriovenous (AV) fistula creation  H/O right coronary artery stent placement: stent x3    Home Medications:  Last Order Reconciliation Date: 10-24-19 @ 23:33 (Admission Reconciliation)  amLODIPine 10 mg oral tablet: 1 tab(s) orally once a day   apixaban 5 mg oral tablet: 1 tab(s) orally 2 times a day  aspirin 81 mg oral tablet, chewable: 1 tab(s) orally once a day  atorvastatin 40 mg oral tablet: 1 tab(s) orally once a day  Flagyl 500 mg oral tablet: 1 tab(s) orally 2 times a day   isosorbide mononitrate 30 mg oral tablet, extended release: 1 tab(s) orally once a day (in the morning)  losartan 25 mg oral tablet: 1 tab(s) orally once a day  metoprolol tartrate 25 mg oral tablet: 1 tab(s) orally 2 times a day      Allergies:  No Known Allergies      Current Medications:   amLODIPine   Tablet 5 milliGRAM(s) Oral at bedtime  apixaban 5 milliGRAM(s) Oral two times a day  artificial tears (preservative free) Ophthalmic Solution 1 Drop(s) Both EYES two times a day PRN  calcium acetate 667 milliGRAM(s) Oral three times a day with meals  darbepoetin Injectable Syringe 40 MICROGram(s) IV Push <User Schedule>  iohexol 300 mG (iodine)/mL Oral Solution 30 milliLiter(s) Oral once  isosorbide   mononitrate ER Tablet (IMDUR) 30 milliGRAM(s) Oral daily  losartan 25 milliGRAM(s) Oral daily  metoprolol tartrate 25 milliGRAM(s) Oral two times a day  pantoprazole    Tablet 40 milliGRAM(s) Oral two times a day        Physical exam:  T(C): 36.1 (11-01-19 @ 13:28), Max: 36.9 (10-31-19 @ 20:56)  HR: 59 (11-01-19 @ 13:28) (59 - 77)  BP: 118/67 (11-01-19 @ 13:28) (118/67 - 152/75)  RR: 18 (11-01-19 @ 13:28) (18 - 18)  SpO2: 99% (10-31-19 @ 23:46) (99% - 99%)    GENERAL: NAD  HEAD:  Atraumatic, Normocephalic  EYES: Sclera:NL  NECK: Supple, no JVD or thyromegaly  CHEST/LUNG: Good bilateral air entry  HEART: normal S1, S2. Regular  ABDOMEN: (-) distended, (-) tender, (-) rebound, (+) BS, (-)HSM  EXTREMITIES: (-) edema  NEUROLOGY: (-) asterixis  SKIN: (-) jaundice  JOSEPH: (-) melena (-) brbpr      Data:                        8.9    4.32  )-----------( 177      ( 01 Nov 2019 07:19 )             26.9     MCV 93.1 (11-01-19)    RDW 14.1 (11-01-19)    HGB trend:  8.9  11-01-19 @ 07:19  8.1  10-31-19 @ 07:20  8.0  10-30-19 @ 08:29        WBC trend:  4.32  11-01-19 @ 07:19  4.53  10-31-19 @ 07:20  4.60  10-30-19 @ 08:29    11-01    140  |  100  |  32<H>  ----------------------------<  104<H>  4.1   |  26  |  6.0<HH>    Ca    9.2      01 Nov 2019 07:19  Phos  5.2     11-01  Mg     1.9     10-31    TPro  6.2  /  Alb  3.6  /  TBili  0.7  /  DBili  x   /  AST  18  /  ALT  11  /  AlkPhos  113  11-01    Liver panel trend:  TBili 0.7   /   AST 18   /   ALT 11   /   AlkP 113   /   Tptn 6.2   /   Alb 3.6    /   DBili --      11-01  TBili 0.6   /   AST 15   /   ALT 9   /   AlkP 115   /   Tptn 5.9   /   Alb 3.5    /   DBili --      10-31  TBili 1.1   /   AST 25   /   ALT 10   /   AlkP 110   /   Tptn 5.7   /   Alb 3.4    /   DBili --      10-26  TBili 0.8   /   AST 26   /   ALT 10   /   AlkP 105   /   Tptn 5.4   /   Alb 3.3    /   DBili --      10-25  TBili 0.6   /   AST 25   /   ALT 13   /   AlkP 114   /   Tptn 5.9   /   Alb 3.6    /   DBili 0.3      10-24        PT/INR - ( 31 Oct 2019 07:20 )   PT: 10.80 sec;   INR: 0.94 ratio             Culture - Blood (collected 30 Oct 2019 08:29)  Source: .Blood None  Preliminary Report (31 Oct 2019 18:00):    No growth to date.

## 2019-11-01 NOTE — PROGRESS NOTE ADULT - ASSESSMENT
70 y/o M w/ PMHx of CAD w/ stents, Afib, ESRD on HD TRS, cholangitis, recent cholecystectomy presents for weakness and dark stool.     # Acute on chronic normocytic anemia, possibly GI bleed:   Hgb stable >8; CBC q24  s/p 3 units PRBC's this admission  keep Hb >7  S/p EGD 10/25 - report not avail  s/p c-scope 10/29: divertic dz, mild in desc colon; no stigmata of bleeding in colon or term ileum  s/p capsule endoscopy - report not available  OK to feed renal diet    PPI BID PO  active T&S  getting darbo  no venofer (high iron sat)  placed call to GI team (no progress notes from GI to follow)  reportedly, via PGY1, OK to restart a/c and poss rpt cap endo on Mon (? 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

## 2019-11-01 NOTE — PROGRESS NOTE ADULT - ASSESSMENT
A 70 y/o M w/ PMHx of CAD w/ stents, Afib, ESRD on HD TRS, cholangitis, recent cholecystectomy presents for weakness and dark stool.     #Acute on chronic anemia  - Hgb 6.9 in ED, s/p 3 units  - H/H __<-8.1  - EGD 10/25: kelsi esophagitis with bx, no blood in stomach or duodenum   - EGD bx: gastritis no h. 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  - Colonoscopy 10/29: divertic dz, mild in desc colon; no stigmata of bleeding in colon or term ileum  - Colonoscopy bx: tubular adenoma   - Capsule 10/30: f/u report  - C/w Protonix PO 40mg BID  - Daily CBCs  - 2 18 gauge IVs, Active T&S  - Physiatry eval: SNF vs home with oupt or VNS  - PT eval: poor balance, rehab potential   - bl cx: NTD    #Afib on eliquis   - Rate controlled   - holding eliquis given bleed  - F/u with GI re: resuming Eliquis    #HTN  - Systolic 115-130  - c/w home medications  - Amlodipine decreased to 5mg and QHS now    #HLD  - c/w home medications    #ESRD with HD TRS, with new c/o pelvic tenderness   - Makes urine  - Cr __<-7.1  - HD tomorrow  - Nephro following  - Surgery following: AVF functioning properly   - Daily BMPs    #Diet: Renal  #Activity: Ambulate as tolerated  #DVT ppx: hold for now - GI bleed  #Dispo: possible SNF for gait training A 70 y/o M w/ PMHx of CAD w/ stents, Afib, ESRD on HD TRS, cholangitis, recent cholecystectomy presents for weakness and dark stool.     #Acute on chronic anemia  - Hgb 6.9 in ED, s/p 3 units  - H/H 8.9 <-8.1  - EGD 10/25: kelsi esophagitis with bx, no blood in stomach or duodenum   - EGD bx: gastritis no h. 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  - Colonoscopy 10/29: divertic dz, mild in desc colon; no stigmata of bleeding in colon or term ileum  - Colonoscopy bx: tubular adenoma   - Capsule 10/30: f/u report  - Planned for second capsule while on Eliquis on 11/4  - Pt needs 1/2 golytely (2L) on 11/3 4pm  - C/w Protonix PO 40mg BID  - CBC BID  - 2 18 gauge IVs, Active T&S  - Physiatry eval 10/29: SNF vs home with oupt or VNS  - PT eval 10/31: poor balance, rehab potential   - bl cx 10/30: NTD    #Afib on eliquis   - Rate controlled   - Eliquis 5mg PO BID     #HTN  - Systolic 115-130  - c/w home medications  - Amlodipine decreased to 5mg and QHS now    #HLD  - c/w home medications    #ESRD with HD TRS, with new c/o pelvic tenderness   - Makes urine  - Cr 6.0 <-7.1  - HD tomorrow  - Nephro following  - Surgery following: AVF functioning properly   - Daily BMPs    #Diet: Renal  #Activity: Ambulate as tolerated  #DVT ppx: Eliquis   #Dispo: possible SNF for gait training

## 2019-11-01 NOTE — PROGRESS NOTE ADULT - SUBJECTIVE AND OBJECTIVE BOX
Hospital Day:  8d    Subjective:    Patient is a 69y old  Male who presents with a chief complaint of Anemia (31 Oct 2019 22:42)      Past Medical Hx:   Afib  Anemia  Heart failure  HLD (hyperlipidemia)  HTN (hypertension)  ESRD on dialysis    Past Sx:  S/P cataract surgery  S/P arteriovenous (AV) fistula creation  H/O right coronary artery stent placement    Allergies:  No Known Allergies    Current Meds:   Standng Meds:  amLODIPine   Tablet 5 milliGRAM(s) Oral at bedtime  calcium acetate 667 milliGRAM(s) Oral three times a day with meals  iohexol 300 mG (iodine)/mL Oral Solution 30 milliLiter(s) Oral once  isosorbide   mononitrate ER Tablet (IMDUR) 30 milliGRAM(s) Oral daily  losartan 25 milliGRAM(s) Oral daily  metoprolol tartrate 25 milliGRAM(s) Oral two times a day  pantoprazole    Tablet 40 milliGRAM(s) Oral two times a day    PRN Meds:  artificial tears (preservative free) Ophthalmic Solution 1 Drop(s) Both EYES two times a day PRN Dry Eyes    HOME MEDICATIONS:  aspirin 81 mg oral tablet, chewable: 1 tab(s) orally once a day  atorvastatin 40 mg oral tablet: 1 tab(s) orally once a day  isosorbide mononitrate 30 mg oral tablet, extended release: 1 tab(s) orally once a day (in the morning)  losartan 25 mg oral tablet: 1 tab(s) orally once a day  metoprolol tartrate 25 mg oral tablet: 1 tab(s) orally 2 times a day      Vital Signs:   T(F): 97.1 (11-01-19 @ 05:02), Max: 98.5 (10-31-19 @ 20:56)  HR: 77 (11-01-19 @ 05:02) (64 - 84)  BP: 133/72 (11-01-19 @ 05:02) (122/66 - 152/75)  RR: 18 (11-01-19 @ 05:02) (18 - 18)  SpO2: 99% (10-31-19 @ 23:46) (99% - 99%)      10-31-19 @ 07:01  -  11-01-19 @ 06:33  --------------------------------------------------------  IN: 0 mL / OUT: 2000 mL / NET: -2000 mL        Physical Exam:   GENERAL: NAD  HEENT: NCAT  CHEST/LUNG: CTAB  HEART: Regular rate and rhythm; s1 s2 appreciated, No murmurs, rubs, or gallops  ABDOMEN: Soft, Nontender, Nondistended; Bowel sounds present  EXTREMITIES: No LE edema b/l  NERVOUS SYSTEM:  Alert & Oriented X3        Labs:                         8.1    4.53  )-----------( 192      ( 31 Oct 2019 07:20 )             25.2     Neutophil% 59.6, Lymphocyte% 23.0, Monocyte% 10.8, Bands% 0.4 10-31-19 @ 07:20    31 Oct 2019 07:20    139    |  99     |  34     ----------------------------<  108    4.6     |  27     |  7.1      Ca    8.9        31 Oct 2019 07:20  Phos  6.0       31 Oct 2019 07:20  Mg     1.9       31 Oct 2019 07:20    TPro  5.9    /  Alb  3.5    /  TBili  0.6    /  DBili  x      /  AST  15     /  ALT  9      /  AlkPhos  115    31 Oct 2019 07:20       pTT    --             ----< 0.94 INR  (10-31-19 @ 07:20)    10.80        PT      Hemoglobin A1C, Whole Blood: 5.6 % (09-18-19 @ 07:12)                  Culture - Blood (collected 10-30-19 @ 08:29)  Source: .Blood None  Preliminary Report (10-31-19 @ 18:00):    No growth to date. Hospital Day:  8d    Subjective:    Patient is a 69y old  Male who presents with a chief complaint of Anemia. No acute events overnight and in no distress this am.       Past Medical Hx:   Afib  Anemia  Heart failure  HLD (hyperlipidemia)  HTN (hypertension)  ESRD on dialysis    Past Sx:  S/P cataract surgery  S/P arteriovenous (AV) fistula creation  H/O right coronary artery stent placement    Allergies:  No Known Allergies    Current Meds:   Standng Meds:  amLODIPine   Tablet 5 milliGRAM(s) Oral at bedtime  calcium acetate 667 milliGRAM(s) Oral three times a day with meals  iohexol 300 mG (iodine)/mL Oral Solution 30 milliLiter(s) Oral once  isosorbide   mononitrate ER Tablet (IMDUR) 30 milliGRAM(s) Oral daily  losartan 25 milliGRAM(s) Oral daily  metoprolol tartrate 25 milliGRAM(s) Oral two times a day  pantoprazole    Tablet 40 milliGRAM(s) Oral two times a day    PRN Meds:  artificial tears (preservative free) Ophthalmic Solution 1 Drop(s) Both EYES two times a day PRN Dry Eyes    HOME MEDICATIONS:  aspirin 81 mg oral tablet, chewable: 1 tab(s) orally once a day  atorvastatin 40 mg oral tablet: 1 tab(s) orally once a day  isosorbide mononitrate 30 mg oral tablet, extended release: 1 tab(s) orally once a day (in the morning)  losartan 25 mg oral tablet: 1 tab(s) orally once a day  metoprolol tartrate 25 mg oral tablet: 1 tab(s) orally 2 times a day      Vital Signs:   T(F): 97.1 (11-01-19 @ 05:02), Max: 98.5 (10-31-19 @ 20:56)  HR: 77 (11-01-19 @ 05:02) (64 - 84)  BP: 133/72 (11-01-19 @ 05:02) (122/66 - 152/75)  RR: 18 (11-01-19 @ 05:02) (18 - 18)  SpO2: 99% (10-31-19 @ 23:46) (99% - 99%)      10-31-19 @ 07:01  -  11-01-19 @ 06:33  --------------------------------------------------------  IN: 0 mL / OUT: 2000 mL / NET: -2000 mL        Physical Exam:   GENERAL: NAD, appears stated age, well nourished, well kempt, pleasant   HEENT: NCAT  CHEST/LUNG: CTAB  HEART: Regular rate and rhythm; s1 s2 appreciated, No murmurs, rubs, or gallops  ABDOMEN: Soft, Nontender, Nondistended; Bowel sounds present  EXTREMITIES: No LE edema b/l  NERVOUS SYSTEM:  Alert & Oriented X3        Labs:                         8.1    4.53  )-----------( 192      ( 31 Oct 2019 07:20 )             25.2     Neutophil% 59.6, Lymphocyte% 23.0, Monocyte% 10.8, Bands% 0.4 10-31-19 @ 07:20    31 Oct 2019 07:20    139    |  99     |  34     ----------------------------<  108    4.6     |  27     |  7.1      Ca    8.9        31 Oct 2019 07:20  Phos  6.0       31 Oct 2019 07:20  Mg     1.9       31 Oct 2019 07:20    TPro  5.9    /  Alb  3.5    /  TBili  0.6    /  DBili  x      /  AST  15     /  ALT  9      /  AlkPhos  115    31 Oct 2019 07:20       pTT    --             ----< 0.94 INR  (10-31-19 @ 07:20)    10.80        PT      Hemoglobin A1C, Whole Blood: 5.6 % (09-18-19 @ 07:12)                  Culture - Blood (collected 10-30-19 @ 08:29)  Source: .Blood None  Preliminary Report (10-31-19 @ 18:00):    No growth to date.

## 2019-11-02 LAB
ANION GAP SERPL CALC-SCNC: 17 MMOL/L — HIGH (ref 7–14)
BASOPHILS # BLD AUTO: 0.04 K/UL — SIGNIFICANT CHANGE UP (ref 0–0.2)
BASOPHILS # BLD AUTO: 0.06 K/UL — SIGNIFICANT CHANGE UP (ref 0–0.2)
BASOPHILS NFR BLD AUTO: 0.9 % — SIGNIFICANT CHANGE UP (ref 0–1)
BASOPHILS NFR BLD AUTO: 1.4 % — HIGH (ref 0–1)
BUN SERPL-MCNC: 47 MG/DL — HIGH (ref 10–20)
CALCIUM SERPL-MCNC: 9.2 MG/DL — SIGNIFICANT CHANGE UP (ref 8.5–10.1)
CHLORIDE SERPL-SCNC: 99 MMOL/L — SIGNIFICANT CHANGE UP (ref 98–110)
CO2 SERPL-SCNC: 23 MMOL/L — SIGNIFICANT CHANGE UP (ref 17–32)
CREAT SERPL-MCNC: 7.5 MG/DL — CRITICAL HIGH (ref 0.7–1.5)
EOSINOPHIL # BLD AUTO: 0.25 K/UL — SIGNIFICANT CHANGE UP (ref 0–0.7)
EOSINOPHIL # BLD AUTO: 0.36 K/UL — SIGNIFICANT CHANGE UP (ref 0–0.7)
EOSINOPHIL NFR BLD AUTO: 5.5 % — SIGNIFICANT CHANGE UP (ref 0–8)
EOSINOPHIL NFR BLD AUTO: 8.3 % — HIGH (ref 0–8)
GLUCOSE SERPL-MCNC: 94 MG/DL — SIGNIFICANT CHANGE UP (ref 70–99)
HCT VFR BLD CALC: 25.2 % — LOW (ref 42–52)
HCT VFR BLD CALC: 28.3 % — LOW (ref 42–52)
HGB BLD-MCNC: 8.3 G/DL — LOW (ref 14–18)
HGB BLD-MCNC: 9.2 G/DL — LOW (ref 14–18)
IMM GRANULOCYTES NFR BLD AUTO: 0.2 % — SIGNIFICANT CHANGE UP (ref 0.1–0.3)
IMM GRANULOCYTES NFR BLD AUTO: 0.4 % — HIGH (ref 0.1–0.3)
LYMPHOCYTES # BLD AUTO: 1.11 K/UL — LOW (ref 1.2–3.4)
LYMPHOCYTES # BLD AUTO: 1.3 K/UL — SIGNIFICANT CHANGE UP (ref 1.2–3.4)
LYMPHOCYTES # BLD AUTO: 24.6 % — SIGNIFICANT CHANGE UP (ref 20.5–51.1)
LYMPHOCYTES # BLD AUTO: 29.9 % — SIGNIFICANT CHANGE UP (ref 20.5–51.1)
MCHC RBC-ENTMCNC: 30.1 PG — SIGNIFICANT CHANGE UP (ref 27–31)
MCHC RBC-ENTMCNC: 30.3 PG — SIGNIFICANT CHANGE UP (ref 27–31)
MCHC RBC-ENTMCNC: 32.5 G/DL — SIGNIFICANT CHANGE UP (ref 32–37)
MCHC RBC-ENTMCNC: 32.9 G/DL — SIGNIFICANT CHANGE UP (ref 32–37)
MCV RBC AUTO: 92 FL — SIGNIFICANT CHANGE UP (ref 80–94)
MCV RBC AUTO: 92.5 FL — SIGNIFICANT CHANGE UP (ref 80–94)
MONOCYTES # BLD AUTO: 0.32 K/UL — SIGNIFICANT CHANGE UP (ref 0.1–0.6)
MONOCYTES # BLD AUTO: 0.41 K/UL — SIGNIFICANT CHANGE UP (ref 0.1–0.6)
MONOCYTES NFR BLD AUTO: 7.4 % — SIGNIFICANT CHANGE UP (ref 1.7–9.3)
MONOCYTES NFR BLD AUTO: 9.1 % — SIGNIFICANT CHANGE UP (ref 1.7–9.3)
NEUTROPHILS # BLD AUTO: 2.3 K/UL — SIGNIFICANT CHANGE UP (ref 1.4–6.5)
NEUTROPHILS # BLD AUTO: 2.68 K/UL — SIGNIFICANT CHANGE UP (ref 1.4–6.5)
NEUTROPHILS NFR BLD AUTO: 52.8 % — SIGNIFICANT CHANGE UP (ref 42.2–75.2)
NEUTROPHILS NFR BLD AUTO: 59.5 % — SIGNIFICANT CHANGE UP (ref 42.2–75.2)
NRBC # BLD: 0 /100 WBCS — SIGNIFICANT CHANGE UP (ref 0–0)
NRBC # BLD: 0 /100 WBCS — SIGNIFICANT CHANGE UP (ref 0–0)
PLATELET # BLD AUTO: 183 K/UL — SIGNIFICANT CHANGE UP (ref 130–400)
PLATELET # BLD AUTO: 195 K/UL — SIGNIFICANT CHANGE UP (ref 130–400)
POTASSIUM SERPL-MCNC: 4.5 MMOL/L — SIGNIFICANT CHANGE UP (ref 3.5–5)
POTASSIUM SERPL-SCNC: 4.5 MMOL/L — SIGNIFICANT CHANGE UP (ref 3.5–5)
RBC # BLD: 2.74 M/UL — LOW (ref 4.7–6.1)
RBC # BLD: 3.06 M/UL — LOW (ref 4.7–6.1)
RBC # FLD: 13.8 % — SIGNIFICANT CHANGE UP (ref 11.5–14.5)
RBC # FLD: 13.8 % — SIGNIFICANT CHANGE UP (ref 11.5–14.5)
SODIUM SERPL-SCNC: 139 MMOL/L — SIGNIFICANT CHANGE UP (ref 135–146)
WBC # BLD: 4.35 K/UL — LOW (ref 4.8–10.8)
WBC # BLD: 4.51 K/UL — LOW (ref 4.8–10.8)
WBC # FLD AUTO: 4.35 K/UL — LOW (ref 4.8–10.8)
WBC # FLD AUTO: 4.51 K/UL — LOW (ref 4.8–10.8)

## 2019-11-02 PROCEDURE — 99232 SBSQ HOSP IP/OBS MODERATE 35: CPT

## 2019-11-02 RX ORDER — SOD SULF/SODIUM/NAHCO3/KCL/PEG
2000 SOLUTION, RECONSTITUTED, ORAL ORAL
Refills: 0 | Status: DISCONTINUED | OUTPATIENT
Start: 2019-11-03 | End: 2019-11-04

## 2019-11-02 RX ORDER — POLYETHYLENE GLYCOL 3350 17 G/17G
17 POWDER, FOR SOLUTION ORAL DAILY
Refills: 0 | Status: DISCONTINUED | OUTPATIENT
Start: 2019-11-02 | End: 2019-11-06

## 2019-11-02 RX ORDER — SENNA PLUS 8.6 MG/1
2 TABLET ORAL AT BEDTIME
Refills: 0 | Status: DISCONTINUED | OUTPATIENT
Start: 2019-11-02 | End: 2019-11-06

## 2019-11-02 RX ADMIN — APIXABAN 5 MILLIGRAM(S): 2.5 TABLET, FILM COATED ORAL at 17:51

## 2019-11-02 RX ADMIN — SENNA PLUS 2 TABLET(S): 8.6 TABLET ORAL at 21:07

## 2019-11-02 RX ADMIN — PANTOPRAZOLE SODIUM 40 MILLIGRAM(S): 20 TABLET, DELAYED RELEASE ORAL at 17:51

## 2019-11-02 RX ADMIN — PANTOPRAZOLE SODIUM 40 MILLIGRAM(S): 20 TABLET, DELAYED RELEASE ORAL at 06:05

## 2019-11-02 RX ADMIN — APIXABAN 5 MILLIGRAM(S): 2.5 TABLET, FILM COATED ORAL at 06:04

## 2019-11-02 RX ADMIN — AMLODIPINE BESYLATE 5 MILLIGRAM(S): 2.5 TABLET ORAL at 21:07

## 2019-11-02 RX ADMIN — LOSARTAN POTASSIUM 25 MILLIGRAM(S): 100 TABLET, FILM COATED ORAL at 06:04

## 2019-11-02 RX ADMIN — Medication 40 MICROGRAM(S): at 10:14

## 2019-11-02 RX ADMIN — Medication 667 MILLIGRAM(S): at 13:08

## 2019-11-02 RX ADMIN — Medication 667 MILLIGRAM(S): at 17:51

## 2019-11-02 RX ADMIN — Medication 25 MILLIGRAM(S): at 06:05

## 2019-11-02 RX ADMIN — Medication 25 MILLIGRAM(S): at 17:51

## 2019-11-02 RX ADMIN — POLYETHYLENE GLYCOL 3350 17 GRAM(S): 17 POWDER, FOR SOLUTION ORAL at 13:07

## 2019-11-02 RX ADMIN — ISOSORBIDE MONONITRATE 30 MILLIGRAM(S): 60 TABLET, EXTENDED RELEASE ORAL at 13:07

## 2019-11-02 NOTE — PROGRESS NOTE ADULT - SUBJECTIVE AND OBJECTIVE BOX
Hospital Day:  9d    Subjective:    Patient is a 69y old  Male who presents with a chief complaint of Anemia (01 Nov 2019 16:30)      Past Medical Hx:   Afib  Anemia  Heart failure  HLD (hyperlipidemia)  HTN (hypertension)  ESRD on dialysis    Past Sx:  S/P cataract surgery  S/P arteriovenous (AV) fistula creation  H/O right coronary artery stent placement    Allergies:  No Known Allergies    Current Meds:   Standng Meds:  amLODIPine   Tablet 5 milliGRAM(s) Oral at bedtime  apixaban 5 milliGRAM(s) Oral two times a day  calcium acetate 667 milliGRAM(s) Oral three times a day with meals  darbepoetin Injectable Syringe 40 MICROGram(s) IV Push <User Schedule>  iohexol 300 mG (iodine)/mL Oral Solution 30 milliLiter(s) Oral once  isosorbide   mononitrate ER Tablet (IMDUR) 30 milliGRAM(s) Oral daily  losartan 25 milliGRAM(s) Oral daily  metoprolol tartrate 25 milliGRAM(s) Oral two times a day  pantoprazole    Tablet 40 milliGRAM(s) Oral two times a day    PRN Meds:  artificial tears (preservative free) Ophthalmic Solution 1 Drop(s) Both EYES two times a day PRN Dry Eyes    HOME MEDICATIONS:  aspirin 81 mg oral tablet, chewable: 1 tab(s) orally once a day  atorvastatin 40 mg oral tablet: 1 tab(s) orally once a day  isosorbide mononitrate 30 mg oral tablet, extended release: 1 tab(s) orally once a day (in the morning)  losartan 25 mg oral tablet: 1 tab(s) orally once a day  metoprolol tartrate 25 mg oral tablet: 1 tab(s) orally 2 times a day      Vital Signs:   T(F): 97 (11-02-19 @ 05:09), Max: 98 (11-01-19 @ 20:00)  HR: 72 (11-02-19 @ 05:09) (59 - 74)  BP: 131/68 (11-02-19 @ 05:09) (118/67 - 158/71)  RR: 18 (11-02-19 @ 05:09) (18 - 18)  SpO2: --        Physical Exam:   GENERAL: NAD  HEENT: NCAT  CHEST/LUNG: CTAB  HEART: Regular rate and rhythm; s1 s2 appreciated, No murmurs, rubs, or gallops  ABDOMEN: Soft, Nontender, Nondistended; Bowel sounds present  EXTREMITIES: No LE edema b/l  NERVOUS SYSTEM:  Alert & Oriented X3        Labs:                         8.9    4.32  )-----------( 177      ( 01 Nov 2019 07:19 )             26.9       01 Nov 2019 07:19    140    |  100    |  32     ----------------------------<  104    4.1     |  26     |  6.0      Ca    9.2        01 Nov 2019 07:19  Phos  5.2       01 Nov 2019 07:19    TPro  6.2    /  Alb  3.6    /  TBili  0.7    /  DBili  x      /  AST  18     /  ALT  11     /  AlkPhos  113    01 Nov 2019 07:19          Hemoglobin A1C, Whole Blood: 5.6 % (09-18-19 @ 07:12)          Culture - Blood (collected 10-30-19 @ 08:29)  Source: .Blood None  Preliminary Report (10-31-19 @ 18:00):    No growth to date. Hospital Day:  9d    Subjective:    Patient is a 69y old  Male who presents with a chief complaint of Anemia. No acute events overnight and in no distress this am.       Past Medical Hx:   Afib  Anemia  Heart failure  HLD (hyperlipidemia)  HTN (hypertension)  ESRD on dialysis    Past Sx:  S/P cataract surgery  S/P arteriovenous (AV) fistula creation  H/O right coronary artery stent placement    Allergies:  No Known Allergies    Current Meds:   Standng Meds:  amLODIPine   Tablet 5 milliGRAM(s) Oral at bedtime  apixaban 5 milliGRAM(s) Oral two times a day  calcium acetate 667 milliGRAM(s) Oral three times a day with meals  darbepoetin Injectable Syringe 40 MICROGram(s) IV Push <User Schedule>  iohexol 300 mG (iodine)/mL Oral Solution 30 milliLiter(s) Oral once  isosorbide   mononitrate ER Tablet (IMDUR) 30 milliGRAM(s) Oral daily  losartan 25 milliGRAM(s) Oral daily  metoprolol tartrate 25 milliGRAM(s) Oral two times a day  pantoprazole    Tablet 40 milliGRAM(s) Oral two times a day    PRN Meds:  artificial tears (preservative free) Ophthalmic Solution 1 Drop(s) Both EYES two times a day PRN Dry Eyes    HOME MEDICATIONS:  aspirin 81 mg oral tablet, chewable: 1 tab(s) orally once a day  atorvastatin 40 mg oral tablet: 1 tab(s) orally once a day  isosorbide mononitrate 30 mg oral tablet, extended release: 1 tab(s) orally once a day (in the morning)  losartan 25 mg oral tablet: 1 tab(s) orally once a day  metoprolol tartrate 25 mg oral tablet: 1 tab(s) orally 2 times a day      Vital Signs:   T(F): 97 (11-02-19 @ 05:09), Max: 98 (11-01-19 @ 20:00)  HR: 72 (11-02-19 @ 05:09) (59 - 74)  BP: 131/68 (11-02-19 @ 05:09) (118/67 - 158/71)  RR: 18 (11-02-19 @ 05:09) (18 - 18)  SpO2: --        Physical Exam:   GENERAL: NAD, well kempt, well groomed, pleasant, answering questions appropriately   HEENT: NCAT  CHEST/LUNG: CTAB  HEART: Regular rate and rhythm; s1 s2 appreciated, No murmurs, rubs, or gallops  ABDOMEN: Soft, Nontender, Nondistended; Bowel sounds present  EXTREMITIES: No LE edema b/l  NERVOUS SYSTEM:  Alert & Oriented X3        Labs:                         8.9    4.32  )-----------( 177      ( 01 Nov 2019 07:19 )             26.9       01 Nov 2019 07:19    140    |  100    |  32     ----------------------------<  104    4.1     |  26     |  6.0      Ca    9.2        01 Nov 2019 07:19  Phos  5.2       01 Nov 2019 07:19    TPro  6.2    /  Alb  3.6    /  TBili  0.7    /  DBili  x      /  AST  18     /  ALT  11     /  AlkPhos  113    01 Nov 2019 07:19          Hemoglobin A1C, Whole Blood: 5.6 % (09-18-19 @ 07:12)          Culture - Blood (collected 10-30-19 @ 08:29)  Source: .Blood None  Preliminary Report (10-31-19 @ 18:00):    No growth to date.

## 2019-11-02 NOTE — PROGRESS NOTE ADULT - ASSESSMENT
68 yo man with PMH of A fib anemia , TN ESRD on HD T Th Sat sp AVF surgery presenting with anemia acute and LGIB   # ESRD : hd today, standard bath, uf 2 liters as tolerated   # GI bleed,s/p EGD, colonoscopy, diverticular disease, Bach , followed by GI  #ph level noted , on  phoslo 1 tablet po q 8 with meals   # BP well controlled   # on darbo 40 with hd, no venofer elevated sat  # will follow    # ? d/c plan

## 2019-11-02 NOTE — PROGRESS NOTE ADULT - SUBJECTIVE AND OBJECTIVE BOX
MAMTA FERNANDO  69y  Male  ***My note supersedes ALL resident notes that I sign.  My corrections for their notes are in my note.***    I can be reached directly on [employer] [address]. My office number is [phone]. My personal cell number is [phone].    INTERVAL EVENTS: Here for f/u of gi bleed. Pt says he feels chilly. Had dizziness last night, but none today. Pt doing HD OK now. Able to eat and drink.    T(F): 97 (11-02-19 @ 05:09), Max: 98 (11-01-19 @ 20:00)  HR: 69 (11-02-19 @ 11:05) (59 - 74)  BP: 135/71 (11-02-19 @ 11:05) (118/67 - 158/71)  RR: 18 (11-02-19 @ 05:09) (18 - 18)  SpO2: --    Gen: NAD  HEENT: slc white; EOMI; PERRL; mouth clr; ears nl; no nasal d/c  Neck: no JVD  lung: clr  hrt: s1 s2 rrr  abd: 3 small lap chol scars in RUQ (1 suture seen in most-medial sx site) - well-healed, no infection, minor tenderness (expected); no HS megaly; soft;   ext: no edema, no c/c; DP 1+/2    LABS:                        8.3     (    92.0   4.35  )-----------( ---------      195      ( 02 Nov 2019 09:49 )             25.2    (    13.8     Hemoglobin: 8.3 g/dL (11-02 @ 09:49)  Hemoglobin: 8.9 g/dL (11-01 @ 07:19)  Hemoglobin: 8.1 g/dL (10-31 @ 07:20)  Hemoglobin: 8.0 g/dL (10-30 @ 08:29)  Hemoglobin: 8.4 g/dL (10-29 @ 07:50)  Hemoglobin: 8.8 g/dL (10-28 @ 22:02)    139   (   99   (   94      11-02-19 @ 09:49  ----------------------               4.5   (   23   (   47                             -----                        7.5  Ca  9.2   Mg  --    P   --     RADIOLOGY & ADDITIONAL TESTS:      MEDICATIONS:    amLODIPine   Tablet 5 milliGRAM(s) Oral at bedtime  apixaban 5 milliGRAM(s) Oral two times a day  artificial tears (preservative free) Ophthalmic Solution 1 Drop(s) Both EYES two times a day PRN  calcium acetate 667 milliGRAM(s) Oral three times a day with meals  darbepoetin Injectable Syringe 40 MICROGram(s) IV Push <User Schedule>  iohexol 300 mG (iodine)/mL Oral Solution 30 milliLiter(s) Oral once  isosorbide   mononitrate ER Tablet (IMDUR) 30 milliGRAM(s) Oral daily  losartan 25 milliGRAM(s) Oral daily  metoprolol tartrate 25 milliGRAM(s) Oral two times a day  pantoprazole    Tablet 40 milliGRAM(s) Oral two times a day  polyethylene glycol 3350 17 Gram(s) Oral daily  senna 2 Tablet(s) Oral at bedtime

## 2019-11-02 NOTE — PROGRESS NOTE ADULT - ASSESSMENT
68 y/o M w/ PMHx of CAD w/ stents, Afib, ESRD on HD TRS, cholangitis, recent cholecystectomy presents for weakness and dark stool.     # Acute on chronic normocytic anemia, possibly GI bleed:   Hgb stable >8; CBC q24  s/p 3 units PRBC's this admission  keep Hb >7  S/p EGD 10/25 - report not avail  s/p c-scope 10/29: divertic dz, mild in desc colon; no stigmata of bleeding in colon or term ileum  s/p capsule endoscopy - report not available, but reportedly neg  OK to feed renal diet    PPI BID PO  active T&S  getting darbo  no venofer (high iron sat)  GI plans to rpt capsule endo on MON  CBC q12 per GI    # Afib was on eliquis:  HR controlled - c/o lopressor  restart eliquis  f/u w/ GI     # dizziness and balance instability (3 yrs) - 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 plus BP meds  c/w norvasc 5mg po and use HS   rehab eval - worked w/ PT - only walked 20' and was unsteady (not ataxic)  no brain imaging for now  can see neuro as outpt - after (or during) rehab    # HTN: control is fair  c/w losartan/ lopressor, norvasc    # HLD  lipid profile is OK - cont off of statin    # ESRD with HD TTS:  c/w HD as per schedule  Nephro following   Phos 6 - adjust diet to renal; PhosLo 667mg po q8 w/ meals    disposition: resume a/c; f/u GI Mon for caps endo (NPO p MN Alisa); anticipate d/c to SNF or [facility] for balance training (f/u w/ CM) - once OK for d/c from GI standpoint    PMD: T. Gut (MAP)

## 2019-11-02 NOTE — PROGRESS NOTE ADULT - SUBJECTIVE AND OBJECTIVE BOX
seen and examined  no distress  lying comfortable  c/o constipation       Standing Inpatient Medications  amLODIPine   Tablet 5 milliGRAM(s) Oral at bedtime  apixaban 5 milliGRAM(s) Oral two times a day  calcium acetate 667 milliGRAM(s) Oral three times a day with meals  darbepoetin Injectable Syringe 40 MICROGram(s) IV Push <User Schedule>  iohexol 300 mG (iodine)/mL Oral Solution 30 milliLiter(s) Oral once  isosorbide   mononitrate ER Tablet (IMDUR) 30 milliGRAM(s) Oral daily  losartan 25 milliGRAM(s) Oral daily  metoprolol tartrate 25 milliGRAM(s) Oral two times a day  pantoprazole    Tablet 40 milliGRAM(s) Oral two times a day  senna 2 Tablet(s) Oral at bedtime    PRN Inpatient Medications  artificial tears (preservative free) Ophthalmic Solution 1 Drop(s) Both EYES two times a day PRN        VITALS/PHYSICAL EXAM  --------------------------------------------------------------------------------  T(C): 36.1 (11-02-19 @ 05:09), Max: 36.7 (11-01-19 @ 20:00)  HR: 72 (11-02-19 @ 05:09) (59 - 74)  BP: 131/68 (11-02-19 @ 05:09) (118/67 - 158/71)  RR: 18 (11-02-19 @ 05:09) (18 - 18)  SpO2: --  Wt(kg): --        Physical Exam:  	Gen: NAD  	Pulm: CTA B/L  	CV: S1S2; no rub  	Abd: +distended  	LE: no edema  	Vascular access: av fistula     LABS/STUDIES  --------------------------------------------------------------------------------              8.9    4.32  >-----------<  177      [11-01-19 @ 07:19]              26.9     140  |  100  |  32  ----------------------------<  104      [11-01-19 @ 07:19]  4.1   |  26  |  6.0        Ca     9.2     [11-01-19 @ 07:19]      Phos  5.2     [11-01-19 @ 07:19]    TPro  6.2  /  Alb  3.6  /  TBili  0.7  /  DBili  x   /  AST  18  /  ALT  11  /  AlkPhos  113  [11-01-19 @ 07:19]    Creatinine Trend:  SCr 6.0 [11-01 @ 07:19]  SCr 7.1 [10-31 @ 07:20]  SCr 5.4 [10-30 @ 08:29]  SCr 8.1 [10-29 @ 07:50]  SCr 7.7 [10-28 @ 22:02]        Iron 95, TIBC 134, %sat 71      [07-16-19 @ 06:20]  Ferritin 2507      [07-16-19 @ 06:20]  PTH -- (Ca 7.3)      [07-16-19 @ 05:40]   430  HbA1c 5.6      [09-18-19 @ 07:12]  Lipid: chol 116, , HDL 26, LDL 73      [10-27-19 @ 06:39]

## 2019-11-02 NOTE — PROGRESS NOTE ADULT - ASSESSMENT
· Assessment	  A 68 y/o M w/ PMHx of CAD w/ stents, Afib, ESRD on HD TRS, cholangitis, recent cholecystectomy presents for weakness and dark stool.     #Acute on chronic anemia  - Hgb 6.9 in ED, s/p 3 units  - H/H 8.9 <-8.1  - EGD 10/25: kelsi esophagitis with bx, no blood in stomach or duodenum   - EGD bx: gastritis no h. 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  - Colonoscopy 10/29: divertic dz, mild in desc colon; no stigmata of bleeding in colon or term ileum  - Colonoscopy bx: tubular adenoma   - Capsule 10/30: negative  - Planned for second capsule while on Eliquis on 11/4  - Pt needs 1/2 golytely (2L) on 11/3 4pm  - C/w Protonix PO 40mg BID  - CBC BID  - 2 18 gauge IVs, Active T&S  - Physiatry eval 10/29: SNF vs home with oupt or VNS  - PT eval 10/31: poor balance, rehab potential   - bl cx 10/30: NTD    #Afib on eliquis   - Rate controlled   - Eliquis 5mg PO BID     #HTN  - Systolic 115-130  - c/w home medications  - Amlodipine decreased to 5mg and QHS now    #HLD  - c/w home medications    #ESRD with HD TRS, with new c/o pelvic tenderness   - Makes urine  - Cr __<-  6.0  - HD today   - Nephro following  - Surgery following: AVF functioning properly   - Daily BMPs    #Diet: Renal  #Activity: Ambulate as tolerated  #DVT ppx: Eliquis   #Dispo: pending SNF for gait training A 68 y/o M w/ PMHx of CAD w/ stents, Afib, ESRD on HD TRS, cholangitis, recent cholecystectomy presents for weakness and dark stool.     #Acute on chronic anemia  - Hgb 6.9 in ED, s/p 3 units  - H/H 8.9 <-8.1  - EGD 10/25: kelsi esophagitis with bx, no blood in stomach or duodenum   - EGD bx: gastritis no h. 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  - Colonoscopy 10/29: divertic dz, mild in desc colon; no stigmata of bleeding in colon or term ileum  - Colonoscopy bx: tubular adenoma   - Capsule 10/30: negative  - Planned for second capsule while on Eliquis on 11/4  - Pt needs 1/2 golytely (2L) on 11/3 4pm, no dulcolax   - C/w Protonix PO 40mg BID  - CBC BID  - 2 18 gauge IVs, Active T&S  - Physiatry eval 10/29: SNF vs home with oupt or VNS  - PT eval 10/31: poor balance, rehab potential   - bl cx 10/30: NTD    #Afib on eliquis   - Rate controlled   - Eliquis 5mg PO BID     #HTN  - Systolic 115-130  - c/w home medications  - Amlodipine decreased to 5mg and QHS now    #HLD  - c/w home medications    #ESRD with HD TRS, with new c/o pelvic tenderness   - Makes urine  - Cr 6.0 <- 7.1  - HD today   - Nephro following  - Surgery following: AVF functioning properly   - Daily BMPs    #Diet: Renal  #Activity: Ambulate as tolerated  #DVT ppx: Eliquis   #Dispo: pending SNF for gait training

## 2019-11-03 LAB
BASOPHILS # BLD AUTO: 0.05 K/UL — SIGNIFICANT CHANGE UP (ref 0–0.2)
BASOPHILS NFR BLD AUTO: 1 % — SIGNIFICANT CHANGE UP (ref 0–1)
EOSINOPHIL # BLD AUTO: 0.28 K/UL — SIGNIFICANT CHANGE UP (ref 0–0.7)
EOSINOPHIL NFR BLD AUTO: 5.8 % — SIGNIFICANT CHANGE UP (ref 0–8)
HCT VFR BLD CALC: 27.3 % — LOW (ref 42–52)
HGB BLD-MCNC: 8.8 G/DL — LOW (ref 14–18)
IMM GRANULOCYTES NFR BLD AUTO: 0.2 % — SIGNIFICANT CHANGE UP (ref 0.1–0.3)
LYMPHOCYTES # BLD AUTO: 1.31 K/UL — SIGNIFICANT CHANGE UP (ref 1.2–3.4)
LYMPHOCYTES # BLD AUTO: 27.3 % — SIGNIFICANT CHANGE UP (ref 20.5–51.1)
MCHC RBC-ENTMCNC: 29.7 PG — SIGNIFICANT CHANGE UP (ref 27–31)
MCHC RBC-ENTMCNC: 32.2 G/DL — SIGNIFICANT CHANGE UP (ref 32–37)
MCV RBC AUTO: 92.2 FL — SIGNIFICANT CHANGE UP (ref 80–94)
MONOCYTES # BLD AUTO: 0.54 K/UL — SIGNIFICANT CHANGE UP (ref 0.1–0.6)
MONOCYTES NFR BLD AUTO: 11.3 % — HIGH (ref 1.7–9.3)
NEUTROPHILS # BLD AUTO: 2.61 K/UL — SIGNIFICANT CHANGE UP (ref 1.4–6.5)
NEUTROPHILS NFR BLD AUTO: 54.4 % — SIGNIFICANT CHANGE UP (ref 42.2–75.2)
NRBC # BLD: 0 /100 WBCS — SIGNIFICANT CHANGE UP (ref 0–0)
PLATELET # BLD AUTO: 176 K/UL — SIGNIFICANT CHANGE UP (ref 130–400)
RBC # BLD: 2.96 M/UL — LOW (ref 4.7–6.1)
RBC # FLD: 13.8 % — SIGNIFICANT CHANGE UP (ref 11.5–14.5)
WBC # BLD: 4.8 K/UL — SIGNIFICANT CHANGE UP (ref 4.8–10.8)
WBC # FLD AUTO: 4.8 K/UL — SIGNIFICANT CHANGE UP (ref 4.8–10.8)

## 2019-11-03 PROCEDURE — 99232 SBSQ HOSP IP/OBS MODERATE 35: CPT

## 2019-11-03 RX ORDER — APIXABAN 2.5 MG/1
5 TABLET, FILM COATED ORAL
Refills: 0 | Status: DISCONTINUED | OUTPATIENT
Start: 2019-11-03 | End: 2019-11-06

## 2019-11-03 RX ADMIN — Medication 667 MILLIGRAM(S): at 09:36

## 2019-11-03 RX ADMIN — APIXABAN 5 MILLIGRAM(S): 2.5 TABLET, FILM COATED ORAL at 05:38

## 2019-11-03 RX ADMIN — ISOSORBIDE MONONITRATE 30 MILLIGRAM(S): 60 TABLET, EXTENDED RELEASE ORAL at 11:30

## 2019-11-03 RX ADMIN — SENNA PLUS 2 TABLET(S): 8.6 TABLET ORAL at 21:12

## 2019-11-03 RX ADMIN — Medication 667 MILLIGRAM(S): at 11:30

## 2019-11-03 RX ADMIN — PANTOPRAZOLE SODIUM 40 MILLIGRAM(S): 20 TABLET, DELAYED RELEASE ORAL at 18:04

## 2019-11-03 RX ADMIN — PANTOPRAZOLE SODIUM 40 MILLIGRAM(S): 20 TABLET, DELAYED RELEASE ORAL at 05:38

## 2019-11-03 RX ADMIN — Medication 25 MILLIGRAM(S): at 18:04

## 2019-11-03 RX ADMIN — LOSARTAN POTASSIUM 25 MILLIGRAM(S): 100 TABLET, FILM COATED ORAL at 05:38

## 2019-11-03 RX ADMIN — AMLODIPINE BESYLATE 5 MILLIGRAM(S): 2.5 TABLET ORAL at 21:12

## 2019-11-03 RX ADMIN — Medication 2000 MILLILITER(S): at 18:03

## 2019-11-03 RX ADMIN — Medication 25 MILLIGRAM(S): at 05:38

## 2019-11-03 RX ADMIN — APIXABAN 5 MILLIGRAM(S): 2.5 TABLET, FILM COATED ORAL at 18:04

## 2019-11-03 RX ADMIN — POLYETHYLENE GLYCOL 3350 17 GRAM(S): 17 POWDER, FOR SOLUTION ORAL at 11:30

## 2019-11-03 NOTE — PROGRESS NOTE ADULT - SUBJECTIVE AND OBJECTIVE BOX
MAMTA FERNANDO  69y  Male  ***My note supersedes ALL resident notes that I sign.  My corrections for their notes are in my note.***    I can be reached directly on [email or web address]. My office number is [phone]. My personal cell number is [phone].    INTERVAL EVENTS: Here for f/u of anemia. Pt says his dizziness is actually better. He even thinks he might not need SNF. However, he likes the idea of SNF because he feels his labs will be watched better. I told him that labs can be done at .  We will re-eval kasandra after GI assessment.    T(F): 97.2 (11-03-19 @ 04:41), Max: 98 (11-02-19 @ 17:36)  HR: 80 (11-03-19 @ 04:41) (65 - 85)  BP: 136/67 (11-03-19 @ 04:41) (131/59 - 156/67)  RR: 18 (11-03-19 @ 04:41) (17 - 18)  SpO2: 96% (11-02-19 @ 19:25) (96% - 96%)    Gen: NAD  HEENT: slc white; EOMI; PERRL; mouth clr; ears nl; no nasal d/c  Neck: no JVD  lung: clr  hrt: s1 s2 rrr  abd: 3 small lap chol scars in RUQ (1 suture seen in most-medial sx site) - well-healed, no infection, minor tenderness (expected); no HS megaly; soft;   ext: no edema, no c/c; DP 1+/2    LABS:                        8.8     (    92.2   4.80  )-----------( ---------      176      ( 03 Nov 2019 04:50 )             27.3    (    13.8     Hemoglobin: 8.8 g/dL (11-03 @ 04:50)  Hemoglobin: 9.2 g/dL (11-02 @ 18:30)  Hemoglobin: 8.3 g/dL (11-02 @ 09:49)  Hemoglobin: 8.9 g/dL (11-01 @ 07:19)  Hemoglobin: 8.1 g/dL (10-31 @ 07:20)  Hemoglobin: 8.0 g/dL (10-30 @ 08:29)    139   (   99   (   94      11-02-19 @ 09:49  ----------------------               4.5   (   23   (   47                             -----                        7.5  Ca  9.2   Mg  --    P   --     RADIOLOGY & ADDITIONAL TESTS:      MEDICATIONS:    amLODIPine   Tablet 5 milliGRAM(s) Oral at bedtime  apixaban 5 milliGRAM(s) Oral two times a day  artificial tears (preservative free) Ophthalmic Solution 1 Drop(s) Both EYES two times a day PRN  calcium acetate 667 milliGRAM(s) Oral three times a day with meals  darbepoetin Injectable Syringe 40 MICROGram(s) IV Push <User Schedule>  iohexol 300 mG (iodine)/mL Oral Solution 30 milliLiter(s) Oral once  isosorbide   mononitrate ER Tablet (IMDUR) 30 milliGRAM(s) Oral daily  losartan 25 milliGRAM(s) Oral daily  metoprolol tartrate 25 milliGRAM(s) Oral two times a day  pantoprazole    Tablet 40 milliGRAM(s) Oral two times a day  polyethylene glycol 3350 17 Gram(s) Oral daily  polyethylene glycol/electrolyte Solution. 2000 milliLiter(s) Oral <User Schedule>  senna 2 Tablet(s) Oral at bedtime

## 2019-11-03 NOTE — PROGRESS NOTE ADULT - ASSESSMENT
70 y/o M w/ PMHx of CAD w/ stents, Afib, ESRD on HD TRS, cholangitis, recent cholecystectomy presents for weakness and dark stool.     # Acute on chronic normocytic anemia, possibly GI bleed:   Hgb stable >8; CBC q24  s/p 3 units PRBC's this admission  keep Hb >7  S/p EGD 10/25 - report not avail  s/p c-scope 10/29: divertic dz, mild in desc colon; no stigmata of bleeding in colon or term ileum  s/p capsule endoscopy - report not available, but reportedly neg  OK to feed renal diet    PPI BID PO  active T&S  getting darbo  no venofer (high iron sat)  GI plans to rpt capsule endo on MON - pt not sure he wants it or needs it (will d/w GI kasandra)  cbc kasandra    # Afib was on eliquis:  HR controlled - c/o lopressor  restart eliquis 5mg po q12 - if pt rebleeds, could entertain 2.5mg po q12 as usual dose  f/u w/ GI Mon re rpt cap endo    # dizziness and balance instability (3 yrs) - 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 plus BP meds  c/w norvasc 5mg po and use HS - seems better now  rehab eval - worked w/ PT   no brain imaging for now  can see neuro as outpt if symptoms return/persist -> can be after (or during) rehab    # HTN: control is decent  c/w losartan, lopressor, norvasc    # HLD  lipid profile is OK - cont off of statin    # ESRD with HD TTS:  c/w HD as per schedule  Nephro following   Phos 6 - adjust diet to renal; PhosLo 667mg po q8 w/ meals    disposition: resume a/c; f/u GI Mon for poss rpt caps endo (NPO p MN Sun); anticipate d/c to [facility] (pt not keen on SNF) vs home for balance training (f/u w/ CM) - once OK for d/c from GI standpoint; anticipate d/c tomorrow (dallin if no cap endo)    PMD: T. Gut (MAP)

## 2019-11-04 LAB
ANION GAP SERPL CALC-SCNC: 18 MMOL/L — HIGH (ref 7–14)
BASOPHILS # BLD AUTO: 0.05 K/UL — SIGNIFICANT CHANGE UP (ref 0–0.2)
BASOPHILS NFR BLD AUTO: 1.2 % — HIGH (ref 0–1)
BUN SERPL-MCNC: 51 MG/DL — HIGH (ref 10–20)
CALCIUM SERPL-MCNC: 9.2 MG/DL — SIGNIFICANT CHANGE UP (ref 8.5–10.1)
CHLORIDE SERPL-SCNC: 100 MMOL/L — SIGNIFICANT CHANGE UP (ref 98–110)
CO2 SERPL-SCNC: 24 MMOL/L — SIGNIFICANT CHANGE UP (ref 17–32)
CREAT SERPL-MCNC: 7.1 MG/DL — CRITICAL HIGH (ref 0.7–1.5)
CULTURE RESULTS: SIGNIFICANT CHANGE UP
EOSINOPHIL # BLD AUTO: 0.27 K/UL — SIGNIFICANT CHANGE UP (ref 0–0.7)
EOSINOPHIL NFR BLD AUTO: 6.2 % — SIGNIFICANT CHANGE UP (ref 0–8)
GLUCOSE SERPL-MCNC: 97 MG/DL — SIGNIFICANT CHANGE UP (ref 70–99)
HCT VFR BLD CALC: 30 % — LOW (ref 42–52)
HGB BLD-MCNC: 9.6 G/DL — LOW (ref 14–18)
IMM GRANULOCYTES NFR BLD AUTO: 0.2 % — SIGNIFICANT CHANGE UP (ref 0.1–0.3)
LYMPHOCYTES # BLD AUTO: 1.22 K/UL — SIGNIFICANT CHANGE UP (ref 1.2–3.4)
LYMPHOCYTES # BLD AUTO: 28.1 % — SIGNIFICANT CHANGE UP (ref 20.5–51.1)
MCHC RBC-ENTMCNC: 29.8 PG — SIGNIFICANT CHANGE UP (ref 27–31)
MCHC RBC-ENTMCNC: 32 G/DL — SIGNIFICANT CHANGE UP (ref 32–37)
MCV RBC AUTO: 93.2 FL — SIGNIFICANT CHANGE UP (ref 80–94)
MONOCYTES # BLD AUTO: 0.52 K/UL — SIGNIFICANT CHANGE UP (ref 0.1–0.6)
MONOCYTES NFR BLD AUTO: 12 % — HIGH (ref 1.7–9.3)
NEUTROPHILS # BLD AUTO: 2.27 K/UL — SIGNIFICANT CHANGE UP (ref 1.4–6.5)
NEUTROPHILS NFR BLD AUTO: 52.3 % — SIGNIFICANT CHANGE UP (ref 42.2–75.2)
NRBC # BLD: 0 /100 WBCS — SIGNIFICANT CHANGE UP (ref 0–0)
PLATELET # BLD AUTO: 196 K/UL — SIGNIFICANT CHANGE UP (ref 130–400)
POTASSIUM SERPL-MCNC: 4.5 MMOL/L — SIGNIFICANT CHANGE UP (ref 3.5–5)
POTASSIUM SERPL-SCNC: 4.5 MMOL/L — SIGNIFICANT CHANGE UP (ref 3.5–5)
RBC # BLD: 3.22 M/UL — LOW (ref 4.7–6.1)
RBC # FLD: 13.6 % — SIGNIFICANT CHANGE UP (ref 11.5–14.5)
SODIUM SERPL-SCNC: 142 MMOL/L — SIGNIFICANT CHANGE UP (ref 135–146)
SPECIMEN SOURCE: SIGNIFICANT CHANGE UP
WBC # BLD: 4.34 K/UL — LOW (ref 4.8–10.8)
WBC # FLD AUTO: 4.34 K/UL — LOW (ref 4.8–10.8)

## 2019-11-04 PROCEDURE — 91110 GI TRC IMG INTRAL ESOPH-ILE: CPT | Mod: 26

## 2019-11-04 PROCEDURE — 99232 SBSQ HOSP IP/OBS MODERATE 35: CPT

## 2019-11-04 RX ADMIN — Medication 25 MILLIGRAM(S): at 17:12

## 2019-11-04 RX ADMIN — APIXABAN 5 MILLIGRAM(S): 2.5 TABLET, FILM COATED ORAL at 17:12

## 2019-11-04 RX ADMIN — AMLODIPINE BESYLATE 5 MILLIGRAM(S): 2.5 TABLET ORAL at 21:31

## 2019-11-04 RX ADMIN — SENNA PLUS 2 TABLET(S): 8.6 TABLET ORAL at 21:31

## 2019-11-04 RX ADMIN — PANTOPRAZOLE SODIUM 40 MILLIGRAM(S): 20 TABLET, DELAYED RELEASE ORAL at 17:12

## 2019-11-04 RX ADMIN — ISOSORBIDE MONONITRATE 30 MILLIGRAM(S): 60 TABLET, EXTENDED RELEASE ORAL at 11:19

## 2019-11-04 RX ADMIN — Medication 667 MILLIGRAM(S): at 17:12

## 2019-11-04 NOTE — CHART NOTE - NSCHARTNOTEFT_GEN_A_CORE
Registered Dietitian Follow-Up     Patient Profile Reviewed                           Yes [x]   No []     Nutrition History Previously Obtained        Yes [x]  No []       Pertinent Subjective Information:  -Pt to undergo repeat capsule endoscopy today. As per RN, pt to receive clear liquids until 4pm, then soft diet and then resume diet at 6pm. When diet was provided pt consumed %. Nepro at bedside and pt reports consuming 1x if he did not eat 100% all meals. Reports he is hungry and waiting to eat. Recommendations for diet adv d/w pt and LIP. will continue to follow.     Pertinent Medical Interventions:  1. ESRD on HD s/p HD saturday, next session tomorrow   2. Acute on chronic normocytic anemia   --s/p 3units PRBC since admit   --s/p colonoscopy 10/29: diverticulosis, no active bleeding   --repeat capsule endoscopy today as first attempt results not complete      Diet order: NPO at midnight, advance to clears      Anthropometrics:  - Ht. 66in   - Wt. 171# (11/2) vs 178# (10/29), wt changes likely 2/2 HD. will continue to monitor   - BMI  - IBW     Pertinent Lab Data: (11/4/19) WBC 4.34, RBC 3.22, H/H 9.6/30.0, BUN 51, Cr 7.1, GFR 7      Pertinent Meds: eliquis, miralax, senna, amlodipine, phoslo, protonix, cozaar, metoprolol      Physical Findings:  - Appearance: AAO   - GI function: none reported by pt   - Tubes:  - Oral/Mouth cavity: none reported by pt   - Skin: no edema noted, skin intact      Nutrition Requirements  Weight Used: 64.5kg IBW (continued from RD initial assessment)      estimated calorie need: 1935 - 2257 kcals/day (30-35 kcals/day IBW for pt on HD and overweight)  estimated protein needs: 77 - 97 gms/day (1.2 - 1.5 gm/kg IBW for same as above)  estimated fluid needs: UOP + 1000ml     Nutrient Intake: not meeting needs d/t nutritionally insufficient diet order      [x] Previous Nutrition Diagnosis: increased nutrient needs             [x] Ongoing          [] Resolved     Nutrition Intervention: meals and snacks, medical food supplements  Recommend:  1. Advance diet as tolerated to Renal + Nepro daily. Pt could benefit from additional calories from supplement.      Goal/Expected Outcome: As diet adv, pt to tolerate >75% meals and supplements. f/u in 3 days.      Indicator/Monitoring: RD to monitor energy intake, diet order, body comp, NFPF, renal profile.

## 2019-11-04 NOTE — PROGRESS NOTE ADULT - SUBJECTIVE AND OBJECTIVE BOX
Nephrology progress note    THIS IS AN INCOMPLETE NOTE . FULL NOTE TO FOLLOW SHORTLY    Patient is seen and examined, events over the last 24 h noted .    Allergies:  No Known Allergies    Hospital Medications:   MEDICATIONS  (STANDING):  amLODIPine   Tablet 5 milliGRAM(s) Oral at bedtime  apixaban 5 milliGRAM(s) Oral two times a day  calcium acetate 667 milliGRAM(s) Oral three times a day with meals  darbepoetin Injectable Syringe 40 MICROGram(s) IV Push <User Schedule>  isosorbide   mononitrate ER Tablet (IMDUR) 30 milliGRAM(s) Oral daily  losartan 25 milliGRAM(s) Oral daily  metoprolol tartrate 25 milliGRAM(s) Oral two times a day  pantoprazole    Tablet 40 milliGRAM(s) Oral two times a day  polyethylene glycol 3350 17 Gram(s) Oral daily  senna 2 Tablet(s) Oral at bedtime        VITALS:  T(F): 97.1 (11-04-19 @ 04:59), Max: 98.5 (11-03-19 @ 14:08)  HR: 73 (11-04-19 @ 04:59)  BP: 129/74 (11-04-19 @ 04:59)  RR: 20 (11-04-19 @ 04:59)  SpO2: 100% (11-04-19 @ 08:12)  Wt(kg): --    11-02 @ 08:01  -  11-03 @ 07:00  --------------------------------------------------------  IN: 0 mL / OUT: 2000 mL / NET: -2000 mL          PHYSICAL EXAM:  Constitutional: NAD  HEENT: anicteric sclera, oropharynx clear, MMM  Neck: No JVD  Respiratory: CTAB, no wheezes, rales or rhonchi  Cardiovascular: S1, S2, RRR  Gastrointestinal: BS+, soft, NT/ND  Extremities: No cyanosis or clubbing. No peripheral edema  :  No eledr.   Skin: No rashes    LABS:  11-04    142  |  100  |  51<H>  ----------------------------<  97  4.5   |  24  |  7.1<HH>    Ca    9.2      04 Nov 2019 05:58                            9.6    4.34  )-----------( 196      ( 04 Nov 2019 05:58 )             30.0       Urine Studies:      RADIOLOGY & ADDITIONAL STUDIES: Nephrology progress note    Patient is seen and examined, events over the last 24 h noted .  had capsule endoscopy today     Allergies:  No Known Allergies    Hospital Medications:   MEDICATIONS  (STANDING):  amLODIPine   Tablet 5 milliGRAM(s) Oral at bedtime  apixaban 5 milliGRAM(s) Oral two times a day  calcium acetate 667 milliGRAM(s) Oral three times a day with meals  darbepoetin Injectable Syringe 40 MICROGram(s) IV Push <User Schedule>  isosorbide   mononitrate ER Tablet (IMDUR) 30 milliGRAM(s) Oral daily  losartan 25 milliGRAM(s) Oral daily  metoprolol tartrate 25 milliGRAM(s) Oral two times a day  pantoprazole    Tablet 40 milliGRAM(s) Oral two times a day  polyethylene glycol 3350 17 Gram(s) Oral daily  senna 2 Tablet(s) Oral at bedtime        VITALS:  T(F): 97.1 (11-04-19 @ 04:59), Max: 98.5 (11-03-19 @ 14:08)  HR: 73 (11-04-19 @ 04:59)  BP: 129/74 (11-04-19 @ 04:59)  RR: 20 (11-04-19 @ 04:59)  SpO2: 100% (11-04-19 @ 08:12)      11-02 @ 08:01  -  11-03 @ 07:00  --------------------------------------------------------  IN: 0 mL / OUT: 2000 mL / NET: -2000 mL          PHYSICAL EXAM:  Constitutional: NAD  HEENT: anicteric sclera, oropharynx clear, MMM  Neck: No JVD  Respiratory: CTAB, no wheezes, rales or rhonchi  Cardiovascular: S1, S2, RRR  Gastrointestinal: BS+, soft, NT/ND  Extremities: No cyanosis or clubbing. No peripheral edema  :  No elder.   Skin: No rashes    LABS:  11-04    142  |  100  |  51<H>  ----------------------------<  97  4.5   |  24  |  7.1<HH>    Ca    9.2      04 Nov 2019 05:58                            9.6    4.34  )-----------( 196      ( 04 Nov 2019 05:58 )             30.0       Urine Studies:      RADIOLOGY & ADDITIONAL STUDIES:

## 2019-11-04 NOTE — PROGRESS NOTE ADULT - ASSESSMENT
68 yo man with PMH of A fib anemia , TN ESRD on HD T Th Sat sp AVF surgery presenting with anemia acute and LGIB   # ESRD : sp HD Saturday next HD expected in AM   # GI bleed, s/p EGD, colonoscopy, diverticular disease, Bach , followed by GI for capsule endoscopy today   # phosphorus  level noted , on  phoslo 1 tablet po q 8 with meals   # BP well controlled   # on darbo 40 with HD , no venofer elevated sat  # will follow    # ? d/c plan

## 2019-11-04 NOTE — PROGRESS NOTE ADULT - ASSESSMENT
70 y/o M w/ PMHx of CAD w/ stents, Afib, ESRD on HD TRS, cholangitis, recent cholecystectomy presents for weakness and dark stool.     # Acute on chronic normocytic anemia, possibly GI bleed:   Hgb stable >8; CBC q24  s/p 3 units PRBC's this admission  keep Hb >7  s/p EGD 10/25 - report not avail  s/p c-scope 10/29: divertic dz, mild in desc colon; no stigmata of bleeding in colon or term ileum  s/p capsule endoscopy 10/31 - report not available, but reportedly neg by fellow (poss red hue in SB on 1st capsule)  s/p 2nd cap endo 11/4 - results pending  NPO for now, until cleared to eat by GI  PPI BID PO  active T&S  getting darbo  no venofer (high iron sat)  cbc q24    # Afib was on eliquis:  HR controlled - c/o lopressor  c/w eliquis 5mg po q12 - if pt rebleeds, could entertain 2.5mg po q12 as usual dose  f/u w/ GI re rpt cap endo results    # dizziness and balance instability (3 yrs) - 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 plus BP meds  c/w norvasc 5mg po and use HS - seems better now  rehab eval - worked w/ PT - f/u re acceptance to [facility], if not d/c home w/ home PT  no brain imaging for now  can see neuro as outpt if symptoms return/persist -> can be after (or during) rehab    # HTN: control is decent  c/w losartan, lopressor, norvasc    # HLD  lipid profile is OK - cont off of statin    # ESRD with HD TTS:  c/w HD as per schedule  Nephro following   Phos 6 - adjust diet to renal; PhosLo 667mg po q8 w/ meals    disposition: resume a/c; f/u GI re rpt caps endo; can d/c to [facility] rehab vs home for balance training (f/u w/ CM) - once OK for d/c from GI standpoint; poss d/c today     PMD: T. Gut (MAP)

## 2019-11-04 NOTE — PROGRESS NOTE ADULT - SUBJECTIVE AND OBJECTIVE BOX
MAMTA FERNANDO  69y  Male  ***My note supersedes ALL resident notes that I sign.  My corrections for their notes are in my note.***    I can be reached directly on [employer]. My office number is [phone]. My personal cell number is [phone].    INTERVAL EVENTS: Here for f/u of gi bleed. Pt s/p 2nd caps endo. Pt feels OK. Pt says that he would go to  if accept, but would not go to SNF (would prefer home, if that is the case).    T(F): 97.1 (11-04-19 @ 04:59), Max: 98.5 (11-03-19 @ 14:08)  HR: 73 (11-04-19 @ 04:59) (73 - 90)  BP: 129/74 (11-04-19 @ 04:59) (129/74 - 160/72)  RR: 20 (11-04-19 @ 04:59) (20 - 20)  SpO2: 100% (11-04-19 @ 08:12) (98% - 100%)    Gen: NAD  HEENT: slc white; EOMI; PERRL; mouth clr; ears nl; no nasal d/c  Neck: no JVD  lung: clr  hrt: s1 s2 rrr  abd: 3 small lap chol scars in RUQ (1 suture seen in most-medial sx site) - well-healed, no infection, minor tenderness (expected); no HS megaly; soft;   ext: no edema, no c/c; DP 1+/2    LABS:                        9.6     (    93.2   4.34  )-----------( ---------      196      ( 04 Nov 2019 05:58 )             30.0    (    13.6     142   (   100   (   97      11-04-19 @ 05:58  ----------------------               4.5   (   24   (   51                             -----                        7.1  Ca  9.2   Mg  --    P   --     RADIOLOGY & ADDITIONAL TESTS:      MEDICATIONS:    amLODIPine   Tablet 5 milliGRAM(s) Oral at bedtime  apixaban 5 milliGRAM(s) Oral two times a day  artificial tears (preservative free) Ophthalmic Solution 1 Drop(s) Both EYES two times a day PRN  calcium acetate 667 milliGRAM(s) Oral three times a day with meals  darbepoetin Injectable Syringe 40 MICROGram(s) IV Push <User Schedule>  isosorbide   mononitrate ER Tablet (IMDUR) 30 milliGRAM(s) Oral daily  losartan 25 milliGRAM(s) Oral daily  metoprolol tartrate 25 milliGRAM(s) Oral two times a day  pantoprazole    Tablet 40 milliGRAM(s) Oral two times a day  polyethylene glycol 3350 17 Gram(s) Oral daily  senna 2 Tablet(s) Oral at bedtime

## 2019-11-04 NOTE — PROGRESS NOTE ADULT - ASSESSMENT
A 68 y/o M w/ PMHx of CAD w/ stents, Afib, ESRD on HD TRS, cholangitis, recent cholecystectomy presents for weakness and dark stool.     #Acute on chronic anemia  - Hgb 6.9 in ED, s/p 3 units  - H/H 8.9 <-8.1  - EGD 10/25: kelsi esophagitis with bx, no blood in stomach or duodenum   - EGD bx: gastritis no h. 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  - Colonoscopy 10/29: divertic dz, mild in desc colon; no stigmata of bleeding in colon or term ileum  - Colonoscopy bx: tubular adenoma   - Capsule 10/30: negative, ? red hue at 70%, polypoid lesion (non bleeding in the small intestines).  - will need repeat colonoscopy in 5 years  - C/w Protonix PO 40mg BID  - 2 18 gauge IVs, Active T&S  - Physiatry eval 10/29: SNF vs home with oupt or VNS  - PT eval 10/31: poor balance, rehab potential   - bl cx 10/30: NTD    #Afib on eliquis   - Rate controlled   - Eliquis 5mg PO BID   - Per attending if pt rebleeds, could entertain 2.5mg po q12 as usual dose    #HTN  - Systolic 115-130  - c/w home medications  - Amlodipine decreased to 5mg and QHS now    #HLD  - c/w home medications    #ESRD with HD TTS, with new c/o pelvic tenderness   - Makes urine  - HD possibly tomorrow  - Nephro following  - Surgery following: AVF functioning properly   - Daily BMPs    #Diet: Renal  #Activity: Ambulate as tolerated  #DVT ppx: Eliquis   #Dispo: pending SNF for gait training     Pending: Recorder to for VCE coming off at 8 pm, will anticipate for am. On a/c; f/u GI, can follow neuro for dizziness as outpatient.   Can d/c to [facility] vs home for balance training A 68 y/o M w/ PMHx of CAD w/ stents, Afib, ESRD on HD TRS, cholangitis, recent cholecystectomy presents for weakness and dark stool.     #Acute on chronic anemia  - Hgb 6.9 in ED, s/p 3 units  - H/H 8.9 <-8.1  - EGD 10/25: kelsi esophagitis with bx, no blood in stomach or duodenum   - EGD bx: gastritis no h. 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  - Colonoscopy 10/29: divertic dz, mild in desc colon; no stigmata of bleeding in colon or term ileum  - Colonoscopy bx: tubular adenoma   - Capsule 10/30: negative, ? red hue at 70%, polypoid lesion (non bleeding in the small intestines).  - will need repeat colonoscopy in 5 years  - C/w Protonix PO 40mg BID  - 2 18 gauge IVs, Active T&S  - Physiatry eval 10/29: SNF vs home with oupt or VNS  - PT eval 10/31: poor balance, rehab potential   - bl cx 10/30: NTD    #Afib on eliquis   - Rate controlled   - Eliquis 5mg PO BID   - Per attending if pt rebleeds, could entertain 2.5mg po q12 as usual dose    #HTN  - Systolic 115-130  - c/w home medications  - Amlodipine decreased to 5mg and QHS now    #HLD  - c/w home medications    #ESRD with HD TTS, with new c/o pelvic tenderness   - Makes urine  - HD possibly tomorrow  - Nephro following  - Surgery following: AVF functioning properly   - Daily BMPs    #Diet: Renal  #Activity: Ambulate as tolerated  #DVT ppx: Eliquis   #Dispo: pending SNF for gait training     Pending: VCE record coming off at 8 pm, will anticipate for am. On a/c; f/u GI, can follow neuro for dizziness as outpatient.   Can d/c to [facility] re[facility] vs home for balance training

## 2019-11-04 NOTE — PROGRESS NOTE ADULT - SUBJECTIVE AND OBJECTIVE BOX
SUBJECTIVE:    Today is hospital day 11d. This morning he is resting comfortably in bed and reports no new issues or overnight events. For repeat capuslule endoscopy today.    PAST MEDICAL & SURGICAL HISTORY  Afib  Anemia  Heart failure  HLD (hyperlipidemia)  HTN (hypertension)  ESRD on dialysis: T/ TH/ S  S/P cataract surgery  S/P arteriovenous (AV) fistula creation  H/O right coronary artery stent placement: stent x3    SOCIAL HISTORY:  Negative for smoking/alcohol/drug use.     ALLERGIES:  No Known Allergies    MEDICATIONS:  STANDING MEDICATIONS  amLODIPine   Tablet 5 milliGRAM(s) Oral at bedtime  apixaban 5 milliGRAM(s) Oral two times a day  calcium acetate 667 milliGRAM(s) Oral three times a day with meals  darbepoetin Injectable Syringe 40 MICROGram(s) IV Push <User Schedule>  isosorbide   mononitrate ER Tablet (IMDUR) 30 milliGRAM(s) Oral daily  losartan 25 milliGRAM(s) Oral daily  metoprolol tartrate 25 milliGRAM(s) Oral two times a day  pantoprazole    Tablet 40 milliGRAM(s) Oral two times a day  polyethylene glycol 3350 17 Gram(s) Oral daily  senna 2 Tablet(s) Oral at bedtime    PRN MEDICATIONS  artificial tears (preservative free) Ophthalmic Solution 1 Drop(s) Both EYES two times a day PRN    VITALS:   T(F): 96.2  HR: 70  BP: 159/68  RR: 20  SpO2: 100%    LABS:                        9.6    4.34  )-----------( 196      ( 04 Nov 2019 05:58 )             30.0     11-04    142  |  100  |  51<H>  ----------------------------<  97  4.5   |  24  |  7.1<HH>    Ca    9.2      04 Nov 2019 05:58        PHYSICAL EXAM:  GENERAL: NAD, lying in bed comfortably  HEAD:  Atraumatic, Normocephalic  EYES:  conjunctiva and sclera clear  ENT: Moist mucous membranes  NECK: Supple, No JVD  CHEST/LUNG: Clear to auscultation bilaterally; No rales, rhonchi, wheezing, or rubs. Unlabored respirations  HEART: Regular rate and rhythm; No murmurs, rubs, or gallops  ABDOMEN: Bowel sounds present; Soft, Nontender, recorder placed on abdomen  EXTREMITIES:  2+ Peripheral Pulses, brisk capillary refill. No clubbing, cyanosis, or edema  NERVOUS SYSTEM:  Alert & Oriented X3, speech clear. No deficits   MSK: FROM all 4 extremities, full and equal strength  SKIN: No rashes or lesions

## 2019-11-05 ENCOUNTER — TRANSCRIPTION ENCOUNTER (OUTPATIENT)
Age: 69
End: 2019-11-05

## 2019-11-05 LAB
ANION GAP SERPL CALC-SCNC: 14 MMOL/L — SIGNIFICANT CHANGE UP (ref 7–14)
BASOPHILS # BLD AUTO: 0.02 K/UL — SIGNIFICANT CHANGE UP (ref 0–0.2)
BASOPHILS NFR BLD AUTO: 0.6 % — SIGNIFICANT CHANGE UP (ref 0–1)
BUN SERPL-MCNC: 27 MG/DL — HIGH (ref 10–20)
CALCIUM SERPL-MCNC: 9.1 MG/DL — SIGNIFICANT CHANGE UP (ref 8.5–10.1)
CHLORIDE SERPL-SCNC: 97 MMOL/L — LOW (ref 98–110)
CO2 SERPL-SCNC: 27 MMOL/L — SIGNIFICANT CHANGE UP (ref 17–32)
CREAT SERPL-MCNC: 4.3 MG/DL — CRITICAL HIGH (ref 0.7–1.5)
EOSINOPHIL # BLD AUTO: 0.16 K/UL — SIGNIFICANT CHANGE UP (ref 0–0.7)
EOSINOPHIL NFR BLD AUTO: 4.8 % — SIGNIFICANT CHANGE UP (ref 0–8)
GLUCOSE SERPL-MCNC: 144 MG/DL — HIGH (ref 70–99)
HCT VFR BLD CALC: 29.6 % — LOW (ref 42–52)
HGB BLD-MCNC: 9.9 G/DL — LOW (ref 14–18)
IMM GRANULOCYTES NFR BLD AUTO: 0.3 % — SIGNIFICANT CHANGE UP (ref 0.1–0.3)
LYMPHOCYTES # BLD AUTO: 0.94 K/UL — LOW (ref 1.2–3.4)
LYMPHOCYTES # BLD AUTO: 28.3 % — SIGNIFICANT CHANGE UP (ref 20.5–51.1)
MCHC RBC-ENTMCNC: 30.5 PG — SIGNIFICANT CHANGE UP (ref 27–31)
MCHC RBC-ENTMCNC: 33.4 G/DL — SIGNIFICANT CHANGE UP (ref 32–37)
MCV RBC AUTO: 91.1 FL — SIGNIFICANT CHANGE UP (ref 80–94)
MONOCYTES # BLD AUTO: 0.38 K/UL — SIGNIFICANT CHANGE UP (ref 0.1–0.6)
MONOCYTES NFR BLD AUTO: 11.4 % — HIGH (ref 1.7–9.3)
NEUTROPHILS # BLD AUTO: 1.81 K/UL — SIGNIFICANT CHANGE UP (ref 1.4–6.5)
NEUTROPHILS NFR BLD AUTO: 54.6 % — SIGNIFICANT CHANGE UP (ref 42.2–75.2)
NRBC # BLD: 0 /100 WBCS — SIGNIFICANT CHANGE UP (ref 0–0)
PHOSPHATE SERPL-MCNC: 2.8 MG/DL — SIGNIFICANT CHANGE UP (ref 2.1–4.9)
PLATELET # BLD AUTO: 193 K/UL — SIGNIFICANT CHANGE UP (ref 130–400)
POTASSIUM SERPL-MCNC: 3.5 MMOL/L — SIGNIFICANT CHANGE UP (ref 3.5–5)
POTASSIUM SERPL-SCNC: 3.5 MMOL/L — SIGNIFICANT CHANGE UP (ref 3.5–5)
RBC # BLD: 3.25 M/UL — LOW (ref 4.7–6.1)
RBC # FLD: 13.5 % — SIGNIFICANT CHANGE UP (ref 11.5–14.5)
SODIUM SERPL-SCNC: 138 MMOL/L — SIGNIFICANT CHANGE UP (ref 135–146)
WBC # BLD: 3.32 K/UL — LOW (ref 4.8–10.8)
WBC # FLD AUTO: 3.32 K/UL — LOW (ref 4.8–10.8)

## 2019-11-05 PROCEDURE — 99239 HOSP IP/OBS DSCHRG MGMT >30: CPT

## 2019-11-05 RX ORDER — ATORVASTATIN CALCIUM 80 MG/1
1 TABLET, FILM COATED ORAL
Qty: 0 | Refills: 0 | DISCHARGE

## 2019-11-05 RX ORDER — SENNA PLUS 8.6 MG/1
2 TABLET ORAL
Qty: 14 | Refills: 0
Start: 2019-11-05 | End: 2019-11-11

## 2019-11-05 RX ORDER — PANTOPRAZOLE SODIUM 20 MG/1
1 TABLET, DELAYED RELEASE ORAL
Qty: 30 | Refills: 0
Start: 2019-11-05 | End: 2019-12-04

## 2019-11-05 RX ORDER — POLYETHYLENE GLYCOL 3350 17 G/17G
17 POWDER, FOR SOLUTION ORAL
Qty: 1 | Refills: 0
Start: 2019-11-05 | End: 2019-11-11

## 2019-11-05 RX ORDER — CALCIUM ACETATE 667 MG
1 TABLET ORAL
Qty: 21 | Refills: 0
Start: 2019-11-05 | End: 2019-11-11

## 2019-11-05 RX ORDER — AMLODIPINE BESYLATE 2.5 MG/1
1 TABLET ORAL
Qty: 30 | Refills: 1
Start: 2019-11-05 | End: 2020-01-03

## 2019-11-05 RX ORDER — SODIUM CHLORIDE 9 MG/ML
250 INJECTION INTRAMUSCULAR; INTRAVENOUS; SUBCUTANEOUS ONCE
Refills: 0 | Status: COMPLETED | OUTPATIENT
Start: 2019-11-05 | End: 2019-11-05

## 2019-11-05 RX ADMIN — SODIUM CHLORIDE 333.33 MILLILITER(S): 9 INJECTION INTRAMUSCULAR; INTRAVENOUS; SUBCUTANEOUS at 19:30

## 2019-11-05 RX ADMIN — ISOSORBIDE MONONITRATE 30 MILLIGRAM(S): 60 TABLET, EXTENDED RELEASE ORAL at 12:58

## 2019-11-05 RX ADMIN — PANTOPRAZOLE SODIUM 40 MILLIGRAM(S): 20 TABLET, DELAYED RELEASE ORAL at 05:26

## 2019-11-05 RX ADMIN — Medication 667 MILLIGRAM(S): at 12:58

## 2019-11-05 RX ADMIN — APIXABAN 5 MILLIGRAM(S): 2.5 TABLET, FILM COATED ORAL at 17:54

## 2019-11-05 RX ADMIN — Medication 25 MILLIGRAM(S): at 05:26

## 2019-11-05 RX ADMIN — Medication 667 MILLIGRAM(S): at 08:12

## 2019-11-05 RX ADMIN — Medication 667 MILLIGRAM(S): at 17:54

## 2019-11-05 RX ADMIN — POLYETHYLENE GLYCOL 3350 17 GRAM(S): 17 POWDER, FOR SOLUTION ORAL at 12:58

## 2019-11-05 RX ADMIN — Medication 25 MILLIGRAM(S): at 17:54

## 2019-11-05 RX ADMIN — APIXABAN 5 MILLIGRAM(S): 2.5 TABLET, FILM COATED ORAL at 05:26

## 2019-11-05 RX ADMIN — LOSARTAN POTASSIUM 25 MILLIGRAM(S): 100 TABLET, FILM COATED ORAL at 05:26

## 2019-11-05 NOTE — DISCHARGE NOTE NURSING/CASE MANAGEMENT/SOCIAL WORK - PATIENT PORTAL LINK FT
You can access the FollowMyHealth Patient Portal offered by [facility] by registering at the following website: [email or web address]. By joining [employer]’s FollowMyHealth portal, you will also be able to view your health information using other applications (apps) compatible with our system.

## 2019-11-05 NOTE — PROGRESS NOTE ADULT - ASSESSMENT
time spendt on discharge >30 minutes including coordination of discharge care    discharge home today- patient lives with sister--refusing any home svcs (no hc or home PT)  plan for f/u with outpatient pcp ([facility]es [facility] Dr. Guaman) and f/u with GI (will need to get results of capsular endoscopy as outpatient).    advised to return to ED should he develop any recurrent GI Bleed/melana or weakness.

## 2019-11-05 NOTE — CHART NOTE - NSCHARTNOTEFT_GEN_A_CORE
Received call from nurse @ 7:15 pm because patient has dizziness. Blood pressure was 95/58 ( pt had been in the 140s/150s throughout the day).   Patient was awaiting transport for home, and when to sit in the chair when the sx's started.  Patient denies any bleeding, bloody stools, sob, chest pain, diarrhea.    Repeat manual blood pressure by me 92/60.    Physical Exam:  General: NAD, sitting in chair, interactive, no obvious signs of hypoperfusion  HENT: mmm, no scleral icterus  Lungs cta b/l  Chest: rrr, no murmurs  Abdomen: Soft, nontender, no rebound, no guarding  Extremities: No pitting edema    Repeat manual blood pressure in bed 117/ 72    Assessment:  Symptomatic Orthostatic hypotension    Plan:  Obtain Orthostatic bp, if patient still symptomatic will cancel discharge  250 cc NS bolus ( note pt ESRD)

## 2019-11-05 NOTE — DISCHARGE NOTE PROVIDER - CARE PROVIDERS DIRECT ADDRESSES
[email or web address]

## 2019-11-05 NOTE — DISCHARGE NOTE PROVIDER - NSDCFUADDAPPT_GEN_ALL_CORE_FT
Please follow up with Dr. Mosquera and Dr. Guaman as outpatient. If dizziness returns may consider follow up with Dr. Cox at the Neurology clinic ( contact info provided) Please follow up with Dr. Tejeda and Dr. Guaman as outpatient. If dizziness returns may consider follow up with Dr. Cox at the Neurology clinic ( contact info provided) Please follow up with Dr. Tejeda and Dr. Guaman as outpatient. If dizziness returns may consider follow up with Dr. Cox at the Neurology clinic ( contact info provided)    Continue to follow up with Nephrology for your HD sessions

## 2019-11-05 NOTE — DISCHARGE NOTE PROVIDER - PROVIDER TOKENS
PROVIDER:[TOKEN:[[id number]],FOLLOWUP:[1-3 days]],PROVIDER:[TOKEN:[[id number]],FOLLOWUP:[1-3 days]],PROVIDER:[TOKEN:[[id number]],FOLLOWUP:[1-3 days]]

## 2019-11-05 NOTE — PROGRESS NOTE ADULT - SUBJECTIVE AND OBJECTIVE BOX
Nephrology progress note    Patient is seen and examined, events over the last 24 h noted .  Seen on HD, no complaints  Had capsule endoscopy yesterday  Allergies:  No Known Allergies    Hospital Medications:   MEDICATIONS  (STANDING):  amLODIPine   Tablet 5 milliGRAM(s) Oral at bedtime  apixaban 5 milliGRAM(s) Oral two times a day  calcium acetate 667 milliGRAM(s) Oral three times a day with meals  darbepoetin Injectable Syringe 40 MICROGram(s) IV Push <User Schedule>  isosorbide   mononitrate ER Tablet (IMDUR) 30 milliGRAM(s) Oral daily  losartan 25 milliGRAM(s) Oral daily  metoprolol tartrate 25 milliGRAM(s) Oral two times a day  pantoprazole    Tablet 40 milliGRAM(s) Oral two times a day  polyethylene glycol 3350 17 Gram(s) Oral daily  senna 2 Tablet(s) Oral at bedtime        VITALS:  T(F): 95.9 (11-05-19 @ 05:21), Max: 97.2 (11-04-19 @ 22:57)  HR: 63 (11-05-19 @ 08:35)  BP: 143/105 (11-05-19 @ 08:35)  RR: 18 (11-05-19 @ 08:35)  SpO2: 98% (11-05-19 @ 08:05)  Wt(kg): --        PHYSICAL EXAM:  Constitutional: NAD  HEENT: anicteric sclera, oropharynx clear, MMM  Neck: No JVD  Respiratory: CTAB, no wheezes, rales or rhonchi  Cardiovascular: S1, S2, RRR  Gastrointestinal: BS+, soft, NT/ND  Extremities: No peripheral edema  Neurological: A/O x 3, n  : No CVA tenderness. No elder.   Skin: No rashes  Vascular Access: AVF    LABS:  11-04    142  |  100  |  51<H>  ----------------------------<  97  4.5   |  24  |  7.1<HH>    Ca    9.2      04 Nov 2019 05:58                            9.6    4.34  )-----------( 196      ( 04 Nov 2019 05:58 )             30.0       Urine Studies:      RADIOLOGY & ADDITIONAL STUDIES:

## 2019-11-05 NOTE — DISCHARGE NOTE PROVIDER - HOSPITAL COURSE
A 68 y/o M w/ PMHx of CAD w/ stents, Afib, ESRD on HD TRS, cholangitis, recent cholecystectomy presents for weakness and dark stool. Acute on chronic anemia. Hgb 6.9 in ED, s/p 3 units. HD now stable. Had upper endoscopy on 10/25 that showed kelsi esophagitis with bx, no blood in stomach or duodenum. EGD biopsy gastritis no h. 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. Colonoscopy 10/29: divertic disease, mild in desc colon; no stigmata of bleeding in colon or term ileum. Colonoscopy biopsy: tubular adenoma. Capsule 10/30: negative, red hue at 70%, polypoid lesion (non bleeding in the small intestines). Will need repeat colonoscopy in 5 years. Cleared for follow up with GI as outpatient. A 68 y/o M w/ PMHx of CAD w/ stents, Afib, ESRD on HD TRS, cholangitis, recent cholecystectomy presents for weakness and dark stool. Acute on chronic anemia. Hgb 6.9 in ED, s/p 3 units condition improved. HD now stable. Had upper endoscopy on 10/25 that showed kelsi esophagitis with bx, no blood in stomach or duodenum. EGD biopsy gastritis no h. 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. Colonoscopy 10/29: divertic disease, mild in desc colon; no stigmata of bleeding in colon or term ileum. Colonoscopy biopsy: tubular adenoma. Capsule 10/30: negative, red hue at 70%, polypoid lesion (non bleeding in the small intestines). Will need repeat colonoscopy in 5 years. Cleared for follow up with GI as outpatient.

## 2019-11-05 NOTE — DISCHARGE NOTE PROVIDER - NSDCCPCAREPLAN_GEN_ALL_CORE_FT
PRINCIPAL DISCHARGE DIAGNOSIS  Diagnosis: GI bleed  Assessment and Plan of Treatment: You had EGD, colponoscopy and capsule endoscopy x 2. Please follow up with GI doctor as outpatient for further discussion of results.  Return to care if blood red blood with stools, dark or tarry stools or increased weakness and lightheadedness.      SECONDARY DISCHARGE DIAGNOSES  Diagnosis: Atrial fibrillation  Assessment and Plan of Treatment: C/w Apixiban - blood thinner as indicated    Diagnosis: Dizziness  Assessment and Plan of Treatment: Continue with Norvasc 5 mg PO and use at bedtime  Can follow up with Neurology as needed if sx's return    Diagnosis: Hypertension  Assessment and Plan of Treatment: c/w losartan, lopressor, norvasc    Diagnosis: Anemia  Assessment and Plan of Treatment: Received 3 units this admission  Please follow up with GI as indicated    Diagnosis: ESRD on dialysis  Assessment and Plan of Treatment: T/ TH/ S  On Phoslo  Follow up with your Nephrologist for dialysis as outpatient PRINCIPAL DISCHARGE DIAGNOSIS  Diagnosis: GI bleed  Assessment and Plan of Treatment: You received 3 units of blood during this admission.   EGD 10/25 showed kelsi esophagitis with bx, no blood in stomach or duodenum   - EGD biopsy showed gastritis no h. 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  - Colonoscopy 10/29 showerd divertic disease, mild in desc colon; no stigmata of bleeding in colon or term ileum  - Capsule study showed possible red hue, was repeated on 11/4. Please follow up with GI for results.      SECONDARY DISCHARGE DIAGNOSES  Diagnosis: Atrial fibrillation  Assessment and Plan of Treatment: C/w Apixiban - blood thinner as indicated    Diagnosis: Dizziness  Assessment and Plan of Treatment: Discontinued amlodopine.   Compression stockings, please take your time when moving to sitting position and when going from sitting to standing  Can follow up with Neurology as needed if sx's return    Diagnosis: Hypertension  Assessment and Plan of Treatment: c/w losartan, lopressor    Diagnosis: ESRD on dialysis  Assessment and Plan of Treatment: T/ TH/ S  On Phoslo  Follow up with your Nephrologist for dialysis as outpatient PRINCIPAL DISCHARGE DIAGNOSIS  Diagnosis: GI bleed  Assessment and Plan of Treatment: You received 3 units of blood during this admission.   EGD 10/25 showed kelsi esophagitis with bx, no blood in stomach or duodenum   - EGD biopsy showed gastritis no h. 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  - Colonoscopy 10/29 showerd divertic disease, mild in desc colon; no stigmata of bleeding in colon or term ileum  - Capsule study showed possible red hue, was repeated on 11/4. Please follow up with GI for results.  If rebleeding , next options include:  reduce dose eliquis 2.5mg po q12  d/c eliquis and use plavix 75mg po q24  d/c a/c and antiplt and have EPS eval for Watchman Device   outpt f/u w/ cardio        SECONDARY DISCHARGE DIAGNOSES  Diagnosis: Atrial fibrillation  Assessment and Plan of Treatment: C/w Apixiban - blood thinner as indicated    Diagnosis: Dizziness  Assessment and Plan of Treatment: Discontinued amlodipine.   Compression stockings, please take your time when moving to sitting position and when going from sitting to standing  Can follow up with Neurology as needed if sx's return    Diagnosis: Hypertension  Assessment and Plan of Treatment: c/w losartan, lopressor    Diagnosis: ESRD on dialysis  Assessment and Plan of Treatment: T/ TH/ S  On Phoslo  Follow up with your Nephrologist for dialysis as outpatient

## 2019-11-05 NOTE — DISCHARGE NOTE NURSING/CASE MANAGEMENT/SOCIAL WORK - NSDCFUADDAPPT_GEN_ALL_CORE_FT
Please follow up with Dr. Tejeda and Dr. Guaman as outpatient. If dizziness returns may consider follow up with Dr. Cox at the Neurology clinic ( contact info provided)

## 2019-11-05 NOTE — PROGRESS NOTE ADULT - SUBJECTIVE AND OBJECTIVE BOX
Patient is a 69y old  Male who presents with a chief complaint of Anemia (05 Nov 2019 10:33)    HPI:  70 y/o M w/ PMHx of CAD w/ stents, Afib, ESRD on HD, cholangitis, recent cholecystectomy presents for weakness and dark stool. Patient has had 3 episodes of dark stool over the last week and was to be scheduled for an outpatient colonoscopy with Dr. Nowak. However patient was feeling very weak and contacted GI office and was told to come to ED. On presentation was found to have Hgb of 4.9. Currently on interview receiving transfusion and reports he is feeling better. Denied current chest pain, SOB, abdominal pain, hematemesis. Last dark stool was prior to presentation to the ED. (24 Oct 2019 22:39)    PAST MEDICAL & SURGICAL HISTORY:  Afib  Anemia  Heart failure  HLD (hyperlipidemia)  HTN (hypertension)  ESRD on dialysis: T/ TH/ S  S/P cataract surgery  S/P arteriovenous (AV) fistula creation  H/O right coronary artery stent placement: stent x3    patient seen and examined independently on morning rounds for the first time today, chart reviewed and discussed with the medicine resident and on interdisciplinary rounds.    s/p HD today- waiting for results of capsular study--as per GI will take over 24-48 hrs minimum for results-----patient to f/u with outpatient GI and pcp ([facility])- very concerned about results and location of GIbleed (suspect remote bleeding diverticular)    Vital Signs Last 24 Hrs  T(C): 36.4 (05 Nov 2019 13:38), Max: 36.4 (05 Nov 2019 13:38)  T(F): 97.6 (05 Nov 2019 13:38), Max: 97.6 (05 Nov 2019 13:38)  HR: 77 (05 Nov 2019 13:38) (63 - 91)  BP: 150/78 (05 Nov 2019 13:38) (142/78 - 154/70)  BP(mean): --  RR: 18 (05 Nov 2019 13:38) (18 - 18)  SpO2: 98% (05 Nov 2019 08:05) (98% - 98%)             PE:  GEN-NAD, AAOx3, +pallor  PULM- Clear to auscultation bilaterally, fair air entry  CVS- +s1/s2 RRR no murmurs  GI- soft NT ND +bs, no rebound, no guarding  EXT- no edema               9.9    3.32  )-----------( 193      ( 05 Nov 2019 12:41 )             29.6     11-05    138  |  97<L>  |  27<H>  ----------------------------<  144<H>  3.5   |  27  |  4.3<HH>    Ca    9.1      05 Nov 2019 12:41  Phos  2.8     11-05        MEDICATIONS  (STANDING):  amLODIPine   Tablet 5 milliGRAM(s) Oral at bedtime  apixaban 5 milliGRAM(s) Oral two times a day  calcium acetate 667 milliGRAM(s) Oral three times a day with meals  darbepoetin Injectable Syringe 40 MICROGram(s) IV Push <User Schedule>  isosorbide   mononitrate ER Tablet (IMDUR) 30 milliGRAM(s) Oral daily  losartan 25 milliGRAM(s) Oral daily  metoprolol tartrate 25 milliGRAM(s) Oral two times a day  pantoprazole    Tablet 40 milliGRAM(s) Oral two times a day  polyethylene glycol 3350 17 Gram(s) Oral daily  senna 2 Tablet(s) Oral at bedtime

## 2019-11-05 NOTE — PROGRESS NOTE ADULT - ASSESSMENT
68 yo man with PMH of A fib anemia , TN ESRD on HD T Th Sat sp AVF surgery presenting with anemia acute and LGIB   # ESRD : HD today 2 K bath UF 2 L   # GI bleed, s/p EGD, colonoscopy, diverticular disease, Bach , followed by GI for capsule endoscopy done yesterday, pending report  # phosphorus  level noted , on  phoslo 1 tablet po q 8 with meals   # BP well controlled   # on darbo 40 with HD , no venofer elevated sat  # will follow

## 2019-11-05 NOTE — DISCHARGE NOTE PROVIDER - NSDCMRMEDTOKEN_GEN_ALL_CORE_FT
amLODIPine 5 mg oral tablet: 1 tab(s) orally once a day (at bedtime)  apixaban 5 mg oral tablet: 1 tab(s) orally 2 times a day  aspirin 81 mg oral tablet, chewable: 1 tab(s) orally once a day  calcium acetate 667 mg oral tablet: 1 tab(s) orally 3 times a day   isosorbide mononitrate 30 mg oral tablet, extended release: 1 tab(s) orally once a day (in the morning)  losartan 25 mg oral tablet: 1 tab(s) orally once a day  metoprolol tartrate 25 mg oral tablet: 1 tab(s) orally 2 times a day  ocular lubricant ophthalmic solution: 1 drop(s) to each affected eye 2 times a day, As needed, Dry Eyes  polyethylene glycol 3350 oral powder for reconstitution: 17 gram(s) orally once a day  senna oral tablet: 2 tab(s) orally once a day (at bedtime) amLODIPine 5 mg oral tablet: 1 tab(s) orally once a day (at bedtime)  apixaban 5 mg oral tablet: 1 tab(s) orally 2 times a day  aspirin 81 mg oral tablet, chewable: 1 tab(s) orally once a day  calcium acetate 667 mg oral tablet: 1 tab(s) orally 3 times a day   isosorbide mononitrate 30 mg oral tablet, extended release: 1 tab(s) orally once a day (in the morning)  losartan 25 mg oral tablet: 1 tab(s) orally once a day  metoprolol tartrate 25 mg oral tablet: 1 tab(s) orally 2 times a day  ocular lubricant ophthalmic solution: 1 drop(s) to each affected eye 2 times a day, As needed, Dry Eyes  pantoprazole 40 mg oral delayed release tablet: 1 tab(s) orally once a day (at bedtime)   polyethylene glycol 3350 oral powder for reconstitution: 17 gram(s) orally once a day  senna oral tablet: 2 tab(s) orally once a day (at bedtime) apixaban 5 mg oral tablet: 1 tab(s) orally 2 times a day  aspirin 81 mg oral tablet, chewable: 1 tab(s) orally once a day  calcium acetate 667 mg oral tablet: 1 tab(s) orally 3 times a day   isosorbide mononitrate 30 mg oral tablet, extended release: 1 tab(s) orally once a day (in the morning)  losartan 25 mg oral tablet: 1 tab(s) orally once a day  metoprolol tartrate 25 mg oral tablet: 1 tab(s) orally 2 times a day  ocular lubricant ophthalmic solution: 1 drop(s) to each affected eye 2 times a day, As needed, Dry Eyes  pantoprazole 40 mg oral delayed release tablet: 1 tab(s) orally once a day (at bedtime)   polyethylene glycol 3350 oral powder for reconstitution: 17 gram(s) orally once a day  senna oral tablet: 2 tab(s) orally once a day (at bedtime)

## 2019-11-05 NOTE — DISCHARGE NOTE PROVIDER - CARE PROVIDER_API CALL
Kirt Tejeda)  Gastroenterology; Internal Medicine  [address] [place] [address]  Phone: [phone]  Fax: [phone]  Follow Up Time: 1-3 days    Wilber Guaman (DO)  Medicine  Physicians  [address] [place] [id number]  Phone: [phone]  Fax: [phone]  Follow Up Time: 1-3 days    Hamilton Cox)  Neuromuscular Medicine  [address] [place] [address], [address]  [place], [place] [id number]  Phone: [phone]  Fax: [phone]  Follow Up Time: 1-3 days

## 2019-11-06 VITALS — TEMPERATURE: 97 F | RESPIRATION RATE: 18 BRPM | HEART RATE: 78 BPM

## 2019-11-06 LAB
ALBUMIN SERPL ELPH-MCNC: 3.7 G/DL — SIGNIFICANT CHANGE UP (ref 3.5–5.2)
ALP SERPL-CCNC: 112 U/L — SIGNIFICANT CHANGE UP (ref 30–115)
ALT FLD-CCNC: 15 U/L — SIGNIFICANT CHANGE UP (ref 0–41)
ANION GAP SERPL CALC-SCNC: 15 MMOL/L — HIGH (ref 7–14)
AST SERPL-CCNC: 17 U/L — SIGNIFICANT CHANGE UP (ref 0–41)
BILIRUB SERPL-MCNC: 0.5 MG/DL — SIGNIFICANT CHANGE UP (ref 0.2–1.2)
BUN SERPL-MCNC: 42 MG/DL — HIGH (ref 10–20)
CALCIUM SERPL-MCNC: 9.4 MG/DL — SIGNIFICANT CHANGE UP (ref 8.5–10.1)
CHLORIDE SERPL-SCNC: 98 MMOL/L — SIGNIFICANT CHANGE UP (ref 98–110)
CO2 SERPL-SCNC: 25 MMOL/L — SIGNIFICANT CHANGE UP (ref 17–32)
CREAT SERPL-MCNC: 6.5 MG/DL — CRITICAL HIGH (ref 0.7–1.5)
GLUCOSE SERPL-MCNC: 110 MG/DL — HIGH (ref 70–99)
HCT VFR BLD CALC: 28.3 % — LOW (ref 42–52)
HGB BLD-MCNC: 9.2 G/DL — LOW (ref 14–18)
MCHC RBC-ENTMCNC: 30.3 PG — SIGNIFICANT CHANGE UP (ref 27–31)
MCHC RBC-ENTMCNC: 32.5 G/DL — SIGNIFICANT CHANGE UP (ref 32–37)
MCV RBC AUTO: 93.1 FL — SIGNIFICANT CHANGE UP (ref 80–94)
NRBC # BLD: 0 /100 WBCS — SIGNIFICANT CHANGE UP (ref 0–0)
PLATELET # BLD AUTO: 194 K/UL — SIGNIFICANT CHANGE UP (ref 130–400)
POTASSIUM SERPL-MCNC: 4.1 MMOL/L — SIGNIFICANT CHANGE UP (ref 3.5–5)
POTASSIUM SERPL-SCNC: 4.1 MMOL/L — SIGNIFICANT CHANGE UP (ref 3.5–5)
PROT SERPL-MCNC: 6.3 G/DL — SIGNIFICANT CHANGE UP (ref 6–8)
RBC # BLD: 3.04 M/UL — LOW (ref 4.7–6.1)
RBC # FLD: 14 % — SIGNIFICANT CHANGE UP (ref 11.5–14.5)
SODIUM SERPL-SCNC: 138 MMOL/L — SIGNIFICANT CHANGE UP (ref 135–146)
WBC # BLD: 4.19 K/UL — LOW (ref 4.8–10.8)
WBC # FLD AUTO: 4.19 K/UL — LOW (ref 4.8–10.8)

## 2019-11-06 PROCEDURE — 99239 HOSP IP/OBS DSCHRG MGMT >30: CPT

## 2019-11-06 RX ADMIN — ISOSORBIDE MONONITRATE 30 MILLIGRAM(S): 60 TABLET, EXTENDED RELEASE ORAL at 11:31

## 2019-11-06 RX ADMIN — PANTOPRAZOLE SODIUM 40 MILLIGRAM(S): 20 TABLET, DELAYED RELEASE ORAL at 05:46

## 2019-11-06 RX ADMIN — Medication 25 MILLIGRAM(S): at 05:46

## 2019-11-06 RX ADMIN — Medication 667 MILLIGRAM(S): at 08:57

## 2019-11-06 RX ADMIN — APIXABAN 5 MILLIGRAM(S): 2.5 TABLET, FILM COATED ORAL at 05:46

## 2019-11-06 RX ADMIN — Medication 667 MILLIGRAM(S): at 11:31

## 2019-11-06 RX ADMIN — LOSARTAN POTASSIUM 25 MILLIGRAM(S): 100 TABLET, FILM COATED ORAL at 05:46

## 2019-11-06 RX ADMIN — POLYETHYLENE GLYCOL 3350 17 GRAM(S): 17 POWDER, FOR SOLUTION ORAL at 11:31

## 2019-11-06 NOTE — PROGRESS NOTE ADULT - SUBJECTIVE AND OBJECTIVE BOX
----- Message from Patty Salcedo sent at 5/14/2018 11:18 AM EDT -----  Regarding: MRI RESULTS  Contact: [phone]  Patient request mri results   MAMTA FERNANDO  69y  Male  ***My note supersedes ALL resident notes that I sign.  My corrections for their notes are in my note.***    I can be reached directly on [employer]. My office number is [phone]. My personal cell number is [phone].    INTERVAL EVENTS: Here for f/u of gi bleed. Pt doing fine. Dizziness is improving w/ less BP meds. Pt was found to be orthostatic.    T(F): 96.8 (11-06-19 @ 12:00), Max: 99.2 (11-05-19 @ 21:40)  HR: 78 (11-06-19 @ 12:00) (72 - 89)  BP: 129/64 (11-06-19 @ 04:41) (80/40 - 150/78)  RR: 18 (11-06-19 @ 12:00) (18 - 18)  SpO2: 99% (11-06-19 @ 08:56) (99% - 99%)    Gen: NAD  HEENT: slc white; EOMI; PERRL; mouth clr; ears nl; no nasal d/c  Neck: no JVD  lung: clr  hrt: s1 s2 rrr  abd: 3 small lap chol scars in RUQ (1 suture seen in most-medial sx site) - well-healed, no infection, minor tenderness (expected); no HS megaly; soft;   ext: no edema, no c/c; DP 1+/2    LABS:                        9.2     (    93.1   4.19  )-----------( ---------      194      ( 06 Nov 2019 07:17 )             28.3    (    14.0     138   (   98   (   110      11-06-19 @ 07:17  ----------------------               4.1   (   25   (   42                             -----                        6.5  Ca  9.4   Mg  --    P   --     LFT  6.3  (  0.5  (  17       11-06-19 @ 07:17  -------------------------  3.7  (  112  (  15    RADIOLOGY & ADDITIONAL TESTS:      MEDICATIONS:    apixaban 5 milliGRAM(s) Oral two times a day  artificial tears (preservative free) Ophthalmic Solution 1 Drop(s) Both EYES two times a day PRN  calcium acetate 667 milliGRAM(s) Oral three times a day with meals  darbepoetin Injectable Syringe 40 MICROGram(s) IV Push <User Schedule>  isosorbide   mononitrate ER Tablet (IMDUR) 30 milliGRAM(s) Oral daily  losartan 25 milliGRAM(s) Oral daily  metoprolol tartrate 25 milliGRAM(s) Oral two times a day  pantoprazole    Tablet 40 milliGRAM(s) Oral two times a day  polyethylene glycol 3350 17 Gram(s) Oral daily  senna 2 Tablet(s) Oral at bedtime

## 2019-11-06 NOTE — PROGRESS NOTE ADULT - PROVIDER SPECIALTY LIST ADULT
Gastroenterology
Hospitalist
Hospitalist
Internal Medicine
Nephrology
Surgery
Internal Medicine
Internal Medicine
Nephrology

## 2019-11-06 NOTE — PROGRESS NOTE ADULT - SUBJECTIVE AND OBJECTIVE BOX
Nephrology progress note    Patient was seen and examined, events over the last 24 h noted .  D/c cancelled due to dizziness /hypotension    Allergies:  No Known Allergies    Hospital Medications:   MEDICATIONS  (STANDING):  apixaban 5 milliGRAM(s) Oral two times a day  calcium acetate 667 milliGRAM(s) Oral three times a day with meals  darbepoetin Injectable Syringe 40 MICROGram(s) IV Push <User Schedule>  isosorbide   mononitrate ER Tablet (IMDUR) 30 milliGRAM(s) Oral daily  losartan 25 milliGRAM(s) Oral daily  metoprolol tartrate 25 milliGRAM(s) Oral two times a day  pantoprazole    Tablet 40 milliGRAM(s) Oral two times a day  polyethylene glycol 3350 17 Gram(s) Oral daily  senna 2 Tablet(s) Oral at bedtime        VITALS:  T(F): 96.8 (11-06-19 @ 12:00), Max: 99.2 (11-05-19 @ 21:40)  HR: 78 (11-06-19 @ 12:00)  BP: 129/64 (11-06-19 @ 04:41)  RR: 18 (11-06-19 @ 12:00)  SpO2: 99% (11-06-19 @ 08:56)  Wt(kg): --    11-05 @ 07:01  -  11-06 @ 07:00  --------------------------------------------------------  IN: 0 mL / OUT: 2000 mL / NET: -2000 mL          PHYSICAL EXAM:  Constitutional: NAD  HEENT: anicteric sclera, oropharynx clear, MMM  Neck: No JVD  Respiratory: CTAB, no wheezes, rales or rhonchi  Cardiovascular: S1, S2, RRR  Gastrointestinal: BS+, soft, NT/ND  Extremities: No peripheral edema  Neurological: A/O x 3, n  : No CVA tenderness. No elder.   Skin: No rashes  Vascular Access: AVF    LABS:  11-06    138  |  98  |  42<H>  ----------------------------<  110<H>  4.1   |  25  |  6.5<HH>    Ca    9.4      06 Nov 2019 07:17  Phos  2.8     11-05    TPro  6.3  /  Alb  3.7  /  TBili  0.5  /  DBili      /  AST  17  /  ALT  15  /  AlkPhos  112  11-06                          9.2    4.19  )-----------( 194      ( 06 Nov 2019 07:17 )             28.3       Urine Studies:      RADIOLOGY & ADDITIONAL STUDIES:

## 2019-11-06 NOTE — PROGRESS NOTE ADULT - ASSESSMENT
68 yo man with PMH of A fib anemia , TN ESRD on HD T Th Sat sp AVF surgery presenting with anemia acute and LGIB   # ESRD : HD yesterday   # GI bleed, s/p EGD, colonoscopy, diverticular disease, Abch , followed by GI for capsule endoscopy   # phosphorus  level noted , on  phoslo 1 tablet po q 8 with meals   # BP well controlled   # on darbo 40 with HD , no venofer elevated sat  # will follow

## 2019-11-06 NOTE — PROGRESS NOTE ADULT - REASON FOR ADMISSION
Anemia

## 2019-11-06 NOTE — PROGRESS NOTE ADULT - ASSESSMENT
70 y/o M w/ PMHx of CAD w/ stents, Afib, ESRD on HD TRS, cholangitis, recent cholecystectomy presents for weakness and dark stool.     # Acute on chronic normocytic anemia, possibly GI bleed:   Hgb stable >8; CBC q24  s/p 3 units PRBC's this admission  keep Hb >7  s/p EGD 10/25 - report not avail  s/p c-scope 10/29: divertic dz, mild in desc colon; no stigmata of bleeding in colon or term ileum  s/p capsule endoscopy 10/31 - report not available, but reportedly neg by fellow (poss red hue in SB on 1st capsule)  s/p 2nd cap endo 11/4 - was negative  OK to feed pt  PPI BID PO  active T&S  getting darbo  no venofer (high iron sat)  cbc q24    # Afib was on eliquis:  HR controlled - c/o lopressor  c/w eliquis 5mg po q12  If pt rebleeds, other options include:  reduce dose eliquis 2.5mg po q12  d/c eliquis and use plavix 75mg po q24  d/c a/c and antiplt and have EPS eval for Watchman Device   outpt f/u w/ cardio    # dizziness and balance instability (3 yrs) - + orthostatic HoTN  d/c norvasc   pt denied 4A; pt declined SNF  no brain imaging for now  can see neuro as outpt if symptoms return/persist     # HTN: control is decent  c/w losartan, lopressor    # HLD  lipid profile is OK - cont off of statin    # ESRD with HD TTS:  c/w HD as per schedule  Nephro following   Phos 6 - adjust diet to renal; PhosLo 667mg po q8 w/ meals    disposition: resume a/c; d/c home today     PMD: T. Gut (MAP)

## 2019-11-13 ENCOUNTER — OUTPATIENT (OUTPATIENT)
Dept: OUTPATIENT SERVICES | Facility: HOSPITAL | Age: 69
LOS: 1 days | Discharge: HOME | End: 2019-11-13

## 2019-11-13 DIAGNOSIS — Z95.5 PRESENCE OF CORONARY ANGIOPLASTY IMPLANT AND GRAFT: Chronic | ICD-10-CM

## 2019-11-13 DIAGNOSIS — Z98.890 OTHER SPECIFIED POSTPROCEDURAL STATES: Chronic | ICD-10-CM

## 2019-11-13 DIAGNOSIS — I10 ESSENTIAL (PRIMARY) HYPERTENSION: ICD-10-CM

## 2019-11-13 DIAGNOSIS — E78.5 HYPERLIPIDEMIA, UNSPECIFIED: ICD-10-CM

## 2019-11-13 DIAGNOSIS — Z98.49 CATARACT EXTRACTION STATUS, UNSPECIFIED EYE: Chronic | ICD-10-CM

## 2019-11-15 ENCOUNTER — OUTPATIENT (OUTPATIENT)
Dept: OUTPATIENT SERVICES | Facility: HOSPITAL | Age: 69
LOS: 1 days | Discharge: HOME | End: 2019-11-15

## 2019-11-15 ENCOUNTER — APPOINTMENT (OUTPATIENT)
Dept: GASTROENTEROLOGY | Facility: CLINIC | Age: 69
End: 2019-11-15
Payer: MEDICAID

## 2019-11-15 DIAGNOSIS — Z98.890 OTHER SPECIFIED POSTPROCEDURAL STATES: Chronic | ICD-10-CM

## 2019-11-15 DIAGNOSIS — Z98.49 CATARACT EXTRACTION STATUS, UNSPECIFIED EYE: Chronic | ICD-10-CM

## 2019-11-15 DIAGNOSIS — Z95.5 PRESENCE OF CORONARY ANGIOPLASTY IMPLANT AND GRAFT: Chronic | ICD-10-CM

## 2019-11-15 PROCEDURE — 99214 OFFICE O/P EST MOD 30 MIN: CPT

## 2019-11-15 NOTE — HISTORY OF PRESENT ILLNESS
[de-identified] : 68 year old male with PMH of CAD ( s/p PCI with Stents X3 on 2017) , ESRD on HD (TTS) , HTN, DLD, pulmonary nodule, is here for follow up after an ERCP where he had a CBD stent placed. He is scheduled to have the stent and stone removed next week. He was scheduled for a EGD/colonoscopy for end of July but was unable to go for the procedure because he started experiencing gallbladder issues. \par \par He also requests GI clearance for his kidney transplant. \par He says his GERD has gotten better but his diarrhea has persisted. He "cannot count" how many times he has to go and complains of fecal incontinence.\par \par He denies any hematochezia, but mentioned black stool likely secondary to Iron supplement for anemia. \par \par Her sister found to have CRC diagnosed at age 63.\par \par 68 year old male with PMH of CAD ( s/p PCI with Stents X3 on 2017) , ESRD on HD (TTS) , HTN, DLD, pulmonary nodule, is here for follow up after an ERCP where he had a CBD stent placed. He is scheduled to have the stent and stone removed next week. He was scheduled for a EGD/colonoscopy for end of July but was unable to go for the procedure because he started experiencing gallbladder issues. \par \par He also requests GI clearance for his kidney transplant. \par \par He says his GERD has gotten better but his diarrhea has persisted. He "cannot count" how many times he has to go and complains of fecal incontinence.\par \par He denies any hematochezia, but mentioned black stool likely secondary to Iron supplement for anemia. \par \par Her sister found to have CRC diagnosed at age 63.\par \par Interval Hx:\par Developed melena SP Sphincterotomy and resuming AC (eliquis) Repeat dupodenospcy negative, received VCE colonoscopy and EGD results listed below:\par \par Colonoscopy 10/30/19: 2 TA  (<1cm) Complete adequate prep\par EGD: 10/28/19: GEJ NO IM NO HP\par ERCP: 3 x 1)stent no spnhincterotomy (was on AC), 2)Stent removal + Sphincterotomy, 3 ERCP no bleeding\par VCE: 2 off them (undocumented dates- reportedly red hue at 70%, repeat on AC negative\par \par Currently denies overt GI bleeding with1 large BM/day yellow in color.

## 2019-11-15 NOTE — PHYSICAL EXAM
[General Appearance - Alert] : alert [General Appearance - In No Acute Distress] : in no acute distress [Sclera] : the sclera and conjunctiva were normal [Outer Ear] : the ears and nose were normal in appearance [] : no respiratory distress [Neck Appearance] : the appearance of the neck was normal [Abdomen Soft] : soft [Bowel Sounds] : normal bowel sounds [Cervical Lymph Nodes Enlarged Posterior Bilaterally] : posterior cervical [No CVA Tenderness] : no ~M costovertebral angle tenderness [Skin Color & Pigmentation] : normal skin color and pigmentation [Oriented To Time, Place, And Person] : oriented to person, place, and time

## 2019-11-15 NOTE — ASSESSMENT
[FreeTextEntry1] : 69 year old male with PMH of CAD ( s/p PCI with Stents X3 on 2017) , ESRD on HD (TTS) , HTN, DLD, Pulmonary nodule, admitted for cholangitis/choledocholithiasis SP ERCP with stent was followed for diarrhea, GERD and pre transplant clearance.\par \par 1)Chronic diarrhea- Resolved\par 2)Pre transplant clearance\par 3)Choledocholithiasis s/p sphincterotomy complecated by bleeding- resolved SP cholecystectomy\par 4)Normocytic anemia- No overt GI bleeding on AC- likely of chronic disease\par 5)CAD on DAPT\par 6)LRA (2 ascending TA <1cm)\par \par Rec:\par - Colonoscopy in 5 years (2024)\par - Continue PPI once daily on ASA\par Cleared for transplant from GI point of view\par - Follow up as needed

## 2019-11-18 ENCOUNTER — OUTPATIENT (OUTPATIENT)
Dept: OUTPATIENT SERVICES | Facility: HOSPITAL | Age: 69
LOS: 1 days | Discharge: HOME | End: 2019-11-18

## 2019-11-18 ENCOUNTER — APPOINTMENT (OUTPATIENT)
Dept: INTERNAL MEDICINE | Facility: CLINIC | Age: 69
End: 2019-11-18
Payer: MEDICAID

## 2019-11-18 VITALS
HEART RATE: 69 BPM | WEIGHT: 178 LBS | HEIGHT: 66 IN | DIASTOLIC BLOOD PRESSURE: 72 MMHG | SYSTOLIC BLOOD PRESSURE: 170 MMHG | BODY MASS INDEX: 28.61 KG/M2 | TEMPERATURE: 98 F

## 2019-11-18 VITALS — HEART RATE: 71 BPM | SYSTOLIC BLOOD PRESSURE: 132 MMHG | DIASTOLIC BLOOD PRESSURE: 69 MMHG

## 2019-11-18 VITALS — DIASTOLIC BLOOD PRESSURE: 73 MMHG | HEART RATE: 90 BPM | SYSTOLIC BLOOD PRESSURE: 134 MMHG

## 2019-11-18 DIAGNOSIS — K80.50 CALCULUS OF BILE DUCT W/OUT CHOLANGITIS OR CHOLECYSTITIS W/OUT OBSTRUCTION: ICD-10-CM

## 2019-11-18 DIAGNOSIS — Z87.898 PERSONAL HISTORY OF OTHER SPECIFIED CONDITIONS: ICD-10-CM

## 2019-11-18 DIAGNOSIS — Z98.890 OTHER SPECIFIED POSTPROCEDURAL STATES: Chronic | ICD-10-CM

## 2019-11-18 DIAGNOSIS — Z95.5 PRESENCE OF CORONARY ANGIOPLASTY IMPLANT AND GRAFT: Chronic | ICD-10-CM

## 2019-11-18 DIAGNOSIS — Z98.49 CATARACT EXTRACTION STATUS, UNSPECIFIED EYE: Chronic | ICD-10-CM

## 2019-11-18 DIAGNOSIS — Z87.19 PERSONAL HISTORY OF OTHER DISEASES OF THE DIGESTIVE SYSTEM: ICD-10-CM

## 2019-11-18 PROCEDURE — 99213 OFFICE O/P EST LOW 20 MIN: CPT | Mod: GC

## 2019-11-18 RX ORDER — METOPROLOL TARTRATE 25 MG/1
25 TABLET, FILM COATED ORAL
Qty: 90 | Refills: 2 | Status: DISCONTINUED | COMMUNITY
Start: 2017-05-17 | End: 2019-11-18

## 2019-11-18 RX ORDER — SEVELAMER CARBONATE 800 MG/1
800 TABLET, FILM COATED ORAL
Qty: 90 | Refills: 0 | Status: DISCONTINUED | COMMUNITY
Start: 2017-09-22 | End: 2019-11-18

## 2019-11-18 RX ORDER — APIXABAN 2.5 MG/1
2.5 TABLET, FILM COATED ORAL
Qty: 30 | Refills: 5 | Status: DISCONTINUED | COMMUNITY
Start: 2019-07-26 | End: 2019-11-18

## 2019-11-20 NOTE — PHYSICAL EXAM
[No Acute Distress] : no acute distress [Well Nourished] : well nourished [Well-Appearing] : well-appearing [Well Developed] : well developed [EOMI] : extraocular movements intact [PERRL] : pupils equal round and reactive to light [Normal Sclera/Conjunctiva] : normal sclera/conjunctiva [Normal] : normal sclera/conjunctiva, pupils are equal, round and reactive to light and extraocular movements are intact [Normal Outer Ear/Nose] : the outer ears and nose were normal in appearance [No Lymphadenopathy] : no lymphadenopathy [No JVD] : no jugular venous distention [Normal Oropharynx] : the oropharynx was normal [No Respiratory Distress] : no respiratory distress  [Supple] : supple [Clear to Auscultation] : lungs were clear to auscultation bilaterally [No Accessory Muscle Use] : no accessory muscle use [Normal Rate] : normal rate  [Regular Rhythm] : with a regular rhythm [Normal S1, S2] : normal S1 and S2 [No Carotid Bruits] : no carotid bruits [Soft] : abdomen soft [Non-distended] : non-distended [Non Tender] : non-tender [Normal Bowel Sounds] : normal bowel sounds [No HSM] : no HSM [No Rash] : no rash [No CVA Tenderness] : no CVA  tenderness [No Joint Swelling] : no joint swelling [Coordination Grossly Intact] : coordination grossly intact [No Focal Deficits] : no focal deficits [de-identified] : minimal pitting edema

## 2019-11-20 NOTE — REVIEW OF SYSTEMS
[Dizziness] : dizziness [Unsteady Walk] : ataxia [Negative] : Heme/Lymph [Headache] : no headache [Fainting] : no fainting [Confusion] : no confusion [Memory Loss] : no memory loss

## 2019-11-20 NOTE — ASSESSMENT
[FreeTextEntry1] : 70 yo M with PMH of anemia, ESRD on HD, CAD s/p stents, DLD, Afib on eliquis 5 mg, cholangitis s/p recent cholecystectomy comes to the clinic for the follow up.\par \par # Dizziness\par - repeat blood work to ensure anemia is not the culprit. \par - orthostatics negative in clinic: 132/69 sitting and 134/73 standing. Inpatient notes showed patient was positive for orthostatics hypotension.  Amlodipine was discontinued due to orthostatics hypotension. \par - Neuro follow up\par - will change metoprolol tartrate 25 mg BID to metoprolol succinate 25 mg. \par \par # A.FIB\par - Eliquis was changed to 5 mg BID at the hospital.  Will change back to Eliquis 2.5 mg BID \par - Cardiology follow-up\par  \par # Coronary artery disease s/p stents\par - Continue with aspirin, statins\par - Plavix was discontinued \par \par # DLD - Continue with statins \par \par # ESRD on T,Th,Sat\par - On dialysis 3 times a week\par - Follow up with the nephrologist \par \par # Hypertension \par - Controlled\par - Continue with isosorbide mononitrate and losartan. Amlodipine discontinued in the hospital.  Metoprolol tartrate 25 mg BID changed to metoprolol succinate 25 mg qdaily today. \par \par # Subpleural nodule - Stable \par \par # GERD - on Protonix\par \par # RTC \par - pt had flu shot 9/2019\par - colonoscopy revealed 1 tubular adenoma. Follow up in 5 years.

## 2019-11-27 DIAGNOSIS — K59.00 CONSTIPATION, UNSPECIFIED: ICD-10-CM

## 2019-11-27 DIAGNOSIS — R42 DIZZINESS AND GIDDINESS: ICD-10-CM

## 2019-11-27 DIAGNOSIS — Z86.010 PERSONAL HISTORY OF COLONIC POLYPS: ICD-10-CM

## 2019-11-27 DIAGNOSIS — N18.6 END STAGE RENAL DISEASE: ICD-10-CM

## 2019-11-27 DIAGNOSIS — E78.5 HYPERLIPIDEMIA, UNSPECIFIED: ICD-10-CM

## 2019-11-27 DIAGNOSIS — I10 ESSENTIAL (PRIMARY) HYPERTENSION: ICD-10-CM

## 2019-11-27 DIAGNOSIS — I48.91 UNSPECIFIED ATRIAL FIBRILLATION: ICD-10-CM

## 2019-11-27 DIAGNOSIS — I25.10 ATHEROSCLEROTIC HEART DISEASE OF NATIVE CORONARY ARTERY WITHOUT ANGINA PECTORIS: ICD-10-CM

## 2019-11-27 DIAGNOSIS — Z00.00 ENCOUNTER FOR GENERAL ADULT MEDICAL EXAMINATION WITHOUT ABNORMAL FINDINGS: ICD-10-CM

## 2019-12-02 ENCOUNTER — CHART COPY (OUTPATIENT)
Age: 69
End: 2019-12-02

## 2019-12-03 ENCOUNTER — APPOINTMENT (OUTPATIENT)
Dept: NEUROLOGY | Facility: CLINIC | Age: 69
End: 2019-12-03

## 2020-01-06 ENCOUNTER — APPOINTMENT (OUTPATIENT)
Dept: CARDIOLOGY | Facility: CLINIC | Age: 70
End: 2020-01-06

## 2020-01-31 ENCOUNTER — OUTPATIENT (OUTPATIENT)
Dept: OUTPATIENT SERVICES | Facility: HOSPITAL | Age: 70
LOS: 1 days | Discharge: HOME | End: 2020-01-31

## 2020-01-31 ENCOUNTER — APPOINTMENT (OUTPATIENT)
Dept: INTERNAL MEDICINE | Facility: CLINIC | Age: 70
End: 2020-01-31
Payer: MEDICAID

## 2020-01-31 VITALS
TEMPERATURE: 96.4 F | HEART RATE: 72 BPM | SYSTOLIC BLOOD PRESSURE: 137 MMHG | HEIGHT: 66 IN | WEIGHT: 189.6 LBS | BODY MASS INDEX: 30.47 KG/M2 | DIASTOLIC BLOOD PRESSURE: 78 MMHG

## 2020-01-31 DIAGNOSIS — Z98.890 OTHER SPECIFIED POSTPROCEDURAL STATES: Chronic | ICD-10-CM

## 2020-01-31 DIAGNOSIS — Z95.5 PRESENCE OF CORONARY ANGIOPLASTY IMPLANT AND GRAFT: Chronic | ICD-10-CM

## 2020-01-31 DIAGNOSIS — Z98.49 CATARACT EXTRACTION STATUS, UNSPECIFIED EYE: Chronic | ICD-10-CM

## 2020-01-31 PROCEDURE — 99213 OFFICE O/P EST LOW 20 MIN: CPT | Mod: GC

## 2020-01-31 RX ORDER — METOPROLOL SUCCINATE 25 MG/1
25 TABLET, EXTENDED RELEASE ORAL DAILY
Qty: 30 | Refills: 5 | Status: DISCONTINUED | COMMUNITY
Start: 2019-11-18 | End: 2020-01-31

## 2020-01-31 RX ORDER — LOSARTAN POTASSIUM 25 MG/1
25 TABLET, FILM COATED ORAL DAILY
Qty: 90 | Refills: 3 | Status: DISCONTINUED | COMMUNITY
Start: 2018-06-29 | End: 2020-01-31

## 2020-01-31 RX ORDER — CALCIUM ACETATE 667 MG/1
667 CAPSULE ORAL 3 TIMES DAILY
Qty: 90 | Refills: 5 | Status: DISCONTINUED | COMMUNITY
Start: 2019-11-18 | End: 2020-01-31

## 2020-01-31 NOTE — PHYSICAL EXAM
[No Acute Distress] : no acute distress [Well Nourished] : well nourished [Well Developed] : well developed [Normal Sclera/Conjunctiva] : normal sclera/conjunctiva [PERRL] : pupils equal round and reactive to light [EOMI] : extraocular movements intact [Normal Outer Ear/Nose] : the outer ears and nose were normal in appearance [Normal Oropharynx] : the oropharynx was normal [No JVD] : no jugular venous distention [No Lymphadenopathy] : no lymphadenopathy [Supple] : supple [No Respiratory Distress] : no respiratory distress  [No Accessory Muscle Use] : no accessory muscle use [Clear to Auscultation] : lungs were clear to auscultation bilaterally [Normal Rate] : normal rate  [Regular Rhythm] : with a regular rhythm [Normal S1, S2] : normal S1 and S2 [No Murmur] : no murmur heard [No Carotid Bruits] : no carotid bruits [No Edema] : there was no peripheral edema [Soft] : abdomen soft [Non Tender] : non-tender [Non-distended] : non-distended [No Masses] : no abdominal mass palpated [No HSM] : no HSM [Normal Bowel Sounds] : normal bowel sounds [Normal Posterior Cervical Nodes] : no posterior cervical lymphadenopathy [Normal Anterior Cervical Nodes] : no anterior cervical lymphadenopathy [No CVA Tenderness] : no CVA  tenderness [No Spinal Tenderness] : no spinal tenderness [No Joint Swelling] : no joint swelling [Grossly Normal Strength/Tone] : grossly normal strength/tone [No Rash] : no rash [Coordination Grossly Intact] : coordination grossly intact [No Focal Deficits] : no focal deficits [Normal Gait] : normal gait [Normal Affect] : the affect was normal [Normal Insight/Judgement] : insight and judgment were intact

## 2020-02-05 DIAGNOSIS — I25.10 ATHEROSCLEROTIC HEART DISEASE OF NATIVE CORONARY ARTERY WITHOUT ANGINA PECTORIS: ICD-10-CM

## 2020-02-05 DIAGNOSIS — I10 ESSENTIAL (PRIMARY) HYPERTENSION: ICD-10-CM

## 2020-02-05 DIAGNOSIS — Z00.00 ENCOUNTER FOR GENERAL ADULT MEDICAL EXAMINATION WITHOUT ABNORMAL FINDINGS: ICD-10-CM

## 2020-02-12 LAB
ALBUMIN SERPL ELPH-MCNC: 4.4 G/DL
ALP BLD-CCNC: 111 U/L
ALT SERPL-CCNC: 14 U/L
ANION GAP SERPL CALC-SCNC: 17 MMOL/L
AST SERPL-CCNC: 18 U/L
BASOPHILS # BLD AUTO: 0.05 K/UL
BASOPHILS NFR BLD AUTO: 0.8 %
BILIRUB SERPL-MCNC: 0.7 MG/DL
BUN SERPL-MCNC: 34 MG/DL
CALCIUM SERPL-MCNC: 9.4 MG/DL
CHLORIDE SERPL-SCNC: 96 MMOL/L
CHOLEST SERPL-MCNC: 74 MG/DL
CHOLEST/HDLC SERPL: 2.3 RATIO
CO2 SERPL-SCNC: 26 MMOL/L
CREAT SERPL-MCNC: 6 MG/DL
EOSINOPHIL # BLD AUTO: 0.27 K/UL
EOSINOPHIL NFR BLD AUTO: 4.5 %
ESTIMATED AVERAGE GLUCOSE: 137 MG/DL
GLUCOSE SERPL-MCNC: 88 MG/DL
HBA1C MFR BLD HPLC: 6.4 %
HCT VFR BLD CALC: 36.5 %
HDLC SERPL-MCNC: 32 MG/DL
HGB BLD-MCNC: 11.8 G/DL
IMM GRANULOCYTES NFR BLD AUTO: 0.2 %
LDH SERPL-CCNC: 208 U/L
LDLC SERPL CALC-MCNC: 35 MG/DL
LYMPHOCYTES # BLD AUTO: 1.47 K/UL
LYMPHOCYTES NFR BLD AUTO: 24.5 %
MAN DIFF?: NORMAL
MCHC RBC-ENTMCNC: 29.5 PG
MCHC RBC-ENTMCNC: 32.3 G/DL
MCV RBC AUTO: 91.3 FL
MONOCYTES # BLD AUTO: 0.66 K/UL
MONOCYTES NFR BLD AUTO: 11 %
NEUTROPHILS # BLD AUTO: 3.55 K/UL
NEUTROPHILS NFR BLD AUTO: 59 %
PLATELET # BLD AUTO: 157 K/UL
POTASSIUM SERPL-SCNC: 5 MMOL/L
PROT SERPL-MCNC: 7.4 G/DL
RBC # BLD: 4 M/UL
RBC # FLD: 13.2 %
SODIUM SERPL-SCNC: 139 MMOL/L
TRIGL SERPL-MCNC: 91 MG/DL
WBC # FLD AUTO: 6.01 K/UL

## 2020-02-13 LAB
CREAT SPEC-SCNC: 58 MG/DL
FERRITIN SERPL-MCNC: 415 NG/ML
HAPTOGLOB SERPL-MCNC: 111 MG/DL
MICROALBUMIN 24H UR DL<=1MG/L-MCNC: 73.1 MG/DL
MICROALBUMIN/CREAT 24H UR-RTO: 1269 MG/G
PSA SERPL-MCNC: 6.43 NG/ML
TSH SERPL-ACNC: 0.86 UIU/ML

## 2020-02-27 ENCOUNTER — RX RENEWAL (OUTPATIENT)
Age: 70
End: 2020-02-27

## 2020-03-04 ENCOUNTER — APPOINTMENT (OUTPATIENT)
Dept: INTERNAL MEDICINE | Facility: CLINIC | Age: 70
End: 2020-03-04
Payer: MEDICAID

## 2020-03-04 ENCOUNTER — OUTPATIENT (OUTPATIENT)
Dept: OUTPATIENT SERVICES | Facility: HOSPITAL | Age: 70
LOS: 1 days | Discharge: HOME | End: 2020-03-04

## 2020-03-04 VITALS
TEMPERATURE: 97.6 F | SYSTOLIC BLOOD PRESSURE: 151 MMHG | DIASTOLIC BLOOD PRESSURE: 81 MMHG | HEIGHT: 66 IN | HEART RATE: 94 BPM | WEIGHT: 183 LBS | BODY MASS INDEX: 29.41 KG/M2

## 2020-03-04 DIAGNOSIS — Z98.49 CATARACT EXTRACTION STATUS, UNSPECIFIED EYE: Chronic | ICD-10-CM

## 2020-03-04 DIAGNOSIS — Z95.5 PRESENCE OF CORONARY ANGIOPLASTY IMPLANT AND GRAFT: Chronic | ICD-10-CM

## 2020-03-04 DIAGNOSIS — Z98.890 OTHER SPECIFIED POSTPROCEDURAL STATES: Chronic | ICD-10-CM

## 2020-03-04 PROCEDURE — XXXXX: CPT | Mod: GC

## 2020-03-04 RX ORDER — MONTELUKAST 10 MG/1
10 TABLET, FILM COATED ORAL
Qty: 14 | Refills: 0 | Status: DISCONTINUED | COMMUNITY
Start: 2018-09-28 | End: 2020-03-04

## 2020-03-04 RX ORDER — DARBEPOETIN ALFA 10 UG/.4ML
10 INJECTION, SOLUTION INTRAVENOUS; SUBCUTANEOUS
Refills: 0 | Status: COMPLETED | COMMUNITY
Start: 2020-03-04 | End: 2020-03-04

## 2020-03-04 RX ORDER — ALBUTEROL SULFATE 90 UG/1
108 (90 BASE) POWDER, METERED RESPIRATORY (INHALATION)
Qty: 3 | Refills: 3 | Status: DISCONTINUED | COMMUNITY
Start: 2018-09-28 | End: 2020-03-04

## 2020-03-04 RX ORDER — DIPHENHYDRAMINE HCL 25 MG/1
25 TABLET ORAL
Qty: 30 | Refills: 0 | Status: DISCONTINUED | COMMUNITY
Start: 2019-04-08 | End: 2020-03-04

## 2020-03-04 NOTE — PHYSICAL EXAM
[No Acute Distress] : no acute distress [Well Developed] : well developed [Well Nourished] : well nourished [Normal Sclera/Conjunctiva] : normal sclera/conjunctiva [PERRL] : pupils equal round and reactive to light [EOMI] : extraocular movements intact [Normal Outer Ear/Nose] : the outer ears and nose were normal in appearance [Normal Oropharynx] : the oropharynx was normal [No JVD] : no jugular venous distention [No Lymphadenopathy] : no lymphadenopathy [Supple] : supple [No Respiratory Distress] : no respiratory distress  [Normal] : no jugular venous distention, supple, no lymphadenopathy and the thyroid was normal and there were no nodules present [No Accessory Muscle Use] : no accessory muscle use [Clear to Auscultation] : lungs were clear to auscultation bilaterally [Normal Rate] : normal rate  [Regular Rhythm] : with a regular rhythm [Normal S1, S2] : normal S1 and S2 [No Carotid Bruits] : no carotid bruits [No Edema] : there was no peripheral edema [Soft] : abdomen soft [Non Tender] : non-tender [Non-distended] : non-distended [No HSM] : no HSM [Normal Anterior Cervical Nodes] : no anterior cervical lymphadenopathy [Normal Bowel Sounds] : normal bowel sounds [No CVA Tenderness] : no CVA  tenderness [No Joint Swelling] : no joint swelling [Grossly Normal Strength/Tone] : grossly normal strength/tone [No Rash] : no rash [Coordination Grossly Intact] : coordination grossly intact [No Focal Deficits] : no focal deficits

## 2020-03-11 DIAGNOSIS — I48.91 UNSPECIFIED ATRIAL FIBRILLATION: ICD-10-CM

## 2020-03-11 DIAGNOSIS — I25.10 ATHEROSCLEROTIC HEART DISEASE OF NATIVE CORONARY ARTERY WITHOUT ANGINA PECTORIS: ICD-10-CM

## 2020-04-24 NOTE — HISTORY OF PRESENT ILLNESS
[de-identified] : 68 yo M with PMH of anemia, ESRD on HD, CAD s/p stents, DLD, Afib on eliquis 2.5 mg, cholangitis s/p recent cholecystectomy comes to the clinic for the follow up.\par \par \par Reports dizziness resolved.  No complaints at this time.

## 2020-04-24 NOTE — ASSESSMENT
[FreeTextEntry1] : 68 yo M with PMH of anemia, ESRD on HD, CAD s/p stents, DLD, Afib on eliquis 2.5 mg, cholangitis s/p recent cholecystectomy comes to the clinic for the follow up.\par \par # Pre-DM\par - previous A1c is normal. Now A1c is 6.4. \par \par # Dizziness\par - now resolved\par \par # A.FIB\par - c/w Eliquis 2.5 mg BID \par - c/w metoprolol tartrate 25 mg BID\par \par # Coronary artery disease s/p stents\par - Continue with aspirin, statins\par - Plavix was discontinued \par \par # DLD - Continue with statins \par \par # ESRD on T,Th,Sat\par - On dialysis 3 times a week\par - Follow up with the nephrologist \par \par # Hypertension \par - Controlled\par - Continue with isosorbide mononitrate and losartan.\par \par # Subpleural nodule - Stable \par \par # GERD - on Protonix\par \par # RTC \par - pt had flu shot 9/2019\par - colonoscopy revealed 1 tubular adenoma in 2019. Follow up in 5 years in 2024\par - blood work in 3 months

## 2020-04-24 NOTE — REVIEW OF SYSTEMS
[Cough] : cough [Negative] : Heme/Lymph [Shortness Of Breath] : no shortness of breath [Dyspnea on Exertion] : not dyspnea on exertion [Wheezing] : no wheezing [FreeTextEntry6] : dry cough

## 2020-05-02 NOTE — HISTORY OF PRESENT ILLNESS
[de-identified] : 70 yo M with PMH of anemia, ESRD on HD, CAD s/p stents, DLD, Afib on eliquis 5 mg, cholangitis s/p recent cholecystectomy comes to the clinic for the follow up.\par \par Last visit, patient complained of dizziness since discharge from [facility]. At the time, he was found to have hemoglobin of 4.9.  He was found to be orthostatic positive.  Patient's medication was changed from metoprolol tartrate 25 mg BID to metoprolol succinate 25 mg once a day.  He reports his losartan dosage was recently.

## 2020-05-02 NOTE — ASSESSMENT
[FreeTextEntry1] : 70 yo M with PMH of anemia, ESRD on HD, CAD s/p stents, DLD, Afib on eliquis 5 mg, cholangitis s/p recent cholecystectomy comes to the clinic for the follow up.\par \par # Dizziness\par - now resolved\par \par # A.FIB\par - c/w Eliquis 2.5 mg BID \par - c/w metoprolol tartrate 25 mg BID\par \par # Coronary artery disease s/p stents\par - Continue with aspirin, statins\par - Plavix was discontinued \par \par # DLD - Continue with statins \par \par # ESRD on T,Th,Sat\par - On dialysis 3 times a week\par - Follow up with the nephrologist \par \par # Hypertension \par - Controlled\par - Continue with isosorbide mononitrate and losartan.\par \par # Subpleural nodule - Stable \par \par # GERD - on Protonix\par \par # RTC \par - pt had flu shot 9/2019\par - colonoscopy revealed 1 tubular adenoma. Follow up in 5 years.

## 2020-08-26 ENCOUNTER — RESULT CHARGE (OUTPATIENT)
Age: 70
End: 2020-08-26

## 2020-08-27 ENCOUNTER — APPOINTMENT (OUTPATIENT)
Dept: INTERNAL MEDICINE | Facility: CLINIC | Age: 70
End: 2020-08-27
Payer: MEDICAID

## 2020-08-27 ENCOUNTER — OUTPATIENT (OUTPATIENT)
Dept: OUTPATIENT SERVICES | Facility: HOSPITAL | Age: 70
LOS: 1 days | Discharge: HOME | End: 2020-08-27

## 2020-08-27 VITALS
BODY MASS INDEX: 30.05 KG/M2 | WEIGHT: 187 LBS | SYSTOLIC BLOOD PRESSURE: 148 MMHG | TEMPERATURE: 97.7 F | DIASTOLIC BLOOD PRESSURE: 71 MMHG | HEART RATE: 61 BPM | HEIGHT: 66 IN

## 2020-08-27 DIAGNOSIS — Z98.890 OTHER SPECIFIED POSTPROCEDURAL STATES: Chronic | ICD-10-CM

## 2020-08-27 DIAGNOSIS — Z98.49 CATARACT EXTRACTION STATUS, UNSPECIFIED EYE: Chronic | ICD-10-CM

## 2020-08-27 DIAGNOSIS — Z95.5 PRESENCE OF CORONARY ANGIOPLASTY IMPLANT AND GRAFT: Chronic | ICD-10-CM

## 2020-08-27 PROCEDURE — 99213 OFFICE O/P EST LOW 20 MIN: CPT | Mod: GC

## 2020-08-27 RX ORDER — FOLIC ACID 1 MG/1
1 TABLET ORAL DAILY
Qty: 30 | Refills: 2 | Status: DISCONTINUED | COMMUNITY
Start: 2020-03-04 | End: 2020-08-27

## 2020-08-27 RX ORDER — SENNOSIDES 8.6 MG/1
8.6 CAPSULE, GELATIN COATED ORAL
Qty: 30 | Refills: 5 | Status: DISCONTINUED | COMMUNITY
Start: 2019-11-18 | End: 2020-08-27

## 2020-08-27 RX ORDER — SENNOSIDES 8.6 MG TABLETS 8.6 MG/1
8.6 TABLET ORAL
Qty: 30 | Refills: 5 | Status: DISCONTINUED | COMMUNITY
Start: 2019-10-07 | End: 2020-08-27

## 2020-09-15 NOTE — HISTORY OF PRESENT ILLNESS
[de-identified] : 68 yo M with PMH of anemia, ESRD on HD, CAD s/p stents, DLD, Afib on Eliquis 2.5 mg, cholangitis s/p recent cholecystectomy comes to the clinic for follow up visit. Pt states that he feels well, although endorses mild abdominal discomfort in the epigastric area that often improves after eating. He states that he also has rhinorrhea, causing post nasal drip, assoc with mild itching in his throat. He states that his nasal spray improves his symptoms, but requests to be prescribed more Loratadine as well.\par Pt denies any additional complaints. States that he has been following up with his Nephrologist, Dr. Wise, has HD done three times a week, is compliant with his medications. [FreeTextEntry1] : follow up visit

## 2020-09-15 NOTE — REVIEW OF SYSTEMS
[Postnasal Drip] : postnasal drip [Nasal Discharge] : nasal discharge [Abdominal Pain] : abdominal pain [Nausea] : nausea [Muscle Pain] : muscle pain [Negative] : Respiratory [FreeTextEntry9] : RADHA

## 2020-09-15 NOTE — PHYSICAL EXAM
[Normal] : normal rate, regular rhythm, normal S1 and S2 and no murmur heard [No Edema] : there was no peripheral edema [Normal Oropharynx] : the oropharynx was normal [de-identified] : mild conjuctival pallor [de-identified] : mild, diffuse erythema of ant LE bilaterally

## 2020-09-15 NOTE — PLAN
[FreeTextEntry1] : #Allergic rhinitis\par - Improves with Fluticasone nasal spray\par - Will renew Loratadine prescription\par \par #Abdominal discomfort\par - Likely due to GERD\par - States he does not take his PPI as prescribed\par - Discussed importance of taking PPI in the AM\par \par #PreDM\par - Prior HbA1c 6.4% in February\par - Will recheck prior to next visit\par \par #HTN\par - C/w isosorbide mononitrate, Losartan\par - Pt states that Nephrologist recently initiated him on Amlodipine 10 mg qd\par \par #CAD\par - C/w ASA, statin\par \par #AFib\par - C/w Eliquis, Metoprolol\par - Will obtain routine EKG today\par \par #ESRD\par - On HD 3x a wk, follows with Dr. Wise\par - C/w Sevelamer\par \par #Constipation\par - C/w Miralax PRN\par \par #HM\par - Hep C screen today\par - Tdap today\par - States that he had Pneumovax done at dialysis center, need record of it\par - FOBT to be provided to pt today\par \par RTC in 4 mo.

## 2020-09-15 NOTE — ASSESSMENT
[FreeTextEntry1] : 68 yo M with PMH of anemia, ESRD on HD, CAD s/p stents, DLD, Afib on Eliquis 2.5 mg, cholangitis s/p recent cholecystectomy comes to the clinic for follow up visit.

## 2020-09-16 NOTE — ASU PREOP CHECKLIST - SITE MARKED BY ANESTHESIOLOGIST
----- Message from Alida Moore [facility] in [place]?    Thank you and stay safe.  Not hiking or kayaking d.t. the smoke.    Greg   n/a

## 2020-10-23 ENCOUNTER — EMERGENCY (EMERGENCY)
Facility: HOSPITAL | Age: 70
LOS: 0 days | Discharge: HOME | End: 2020-10-23
Attending: STUDENT IN AN ORGANIZED HEALTH CARE EDUCATION/TRAINING PROGRAM | Admitting: STUDENT IN AN ORGANIZED HEALTH CARE EDUCATION/TRAINING PROGRAM
Payer: MEDICAID

## 2020-10-23 VITALS
WEIGHT: 189.6 LBS | OXYGEN SATURATION: 100 % | DIASTOLIC BLOOD PRESSURE: 71 MMHG | HEIGHT: 66 IN | SYSTOLIC BLOOD PRESSURE: 103 MMHG | RESPIRATION RATE: 18 BRPM | HEART RATE: 72 BPM | TEMPERATURE: 99 F

## 2020-10-23 VITALS
HEART RATE: 69 BPM | RESPIRATION RATE: 18 BRPM | DIASTOLIC BLOOD PRESSURE: 63 MMHG | OXYGEN SATURATION: 100 % | SYSTOLIC BLOOD PRESSURE: 118 MMHG

## 2020-10-23 DIAGNOSIS — Z98.890 OTHER SPECIFIED POSTPROCEDURAL STATES: ICD-10-CM

## 2020-10-23 DIAGNOSIS — Z98.49 CATARACT EXTRACTION STATUS, UNSPECIFIED EYE: Chronic | ICD-10-CM

## 2020-10-23 DIAGNOSIS — I13.2 HYPERTENSIVE HEART AND CHRONIC KIDNEY DISEASE WITH HEART FAILURE AND WITH STAGE 5 CHRONIC KIDNEY DISEASE, OR END STAGE RENAL DISEASE: ICD-10-CM

## 2020-10-23 DIAGNOSIS — Z79.82 LONG TERM (CURRENT) USE OF ASPIRIN: ICD-10-CM
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
## 2020-10-23 DIAGNOSIS — T82.838A HEMORRHAGE DUE TO VASCULAR PROSTHETIC DEVICES, IMPLANTS AND GRAFTS, INITIAL ENCOUNTER: ICD-10-CM

## 2020-10-23 DIAGNOSIS — Z79.899 OTHER LONG TERM (CURRENT) DRUG THERAPY: ICD-10-CM

## 2020-10-23 DIAGNOSIS — E78.5 HYPERLIPIDEMIA, UNSPECIFIED: ICD-10-CM

## 2020-10-23 DIAGNOSIS — Z95.5 PRESENCE OF CORONARY ANGIOPLASTY IMPLANT AND GRAFT: Chronic | ICD-10-CM

## 2020-10-23 DIAGNOSIS — Z98.890 OTHER SPECIFIED POSTPROCEDURAL STATES: Chronic | ICD-10-CM

## 2020-10-23 DIAGNOSIS — N18.6 END STAGE RENAL DISEASE: ICD-10-CM

## 2020-10-23 DIAGNOSIS — I50.9 HEART FAILURE, UNSPECIFIED: ICD-10-CM

## 2020-10-23 LAB
ANION GAP SERPL CALC-SCNC: 15 MMOL/L — HIGH (ref 7–14)
BASOPHILS # BLD AUTO: 0.03 K/UL — SIGNIFICANT CHANGE UP (ref 0–0.2)
BASOPHILS NFR BLD AUTO: 0.5 % — SIGNIFICANT CHANGE UP (ref 0–1)
BUN SERPL-MCNC: 54 MG/DL — HIGH (ref 10–20)
CALCIUM SERPL-MCNC: 9.6 MG/DL — SIGNIFICANT CHANGE UP (ref 8.5–10.1)
CHLORIDE SERPL-SCNC: 99 MMOL/L — SIGNIFICANT CHANGE UP (ref 98–110)
CO2 SERPL-SCNC: 22 MMOL/L — SIGNIFICANT CHANGE UP (ref 17–32)
CREAT SERPL-MCNC: 8 MG/DL — CRITICAL HIGH (ref 0.7–1.5)
EOSINOPHIL # BLD AUTO: 0.17 K/UL — SIGNIFICANT CHANGE UP (ref 0–0.7)
EOSINOPHIL NFR BLD AUTO: 3 % — SIGNIFICANT CHANGE UP (ref 0–8)
GLUCOSE SERPL-MCNC: 113 MG/DL — HIGH (ref 70–99)
HCT VFR BLD CALC: 26.5 % — LOW (ref 42–52)
HGB BLD-MCNC: 8.5 G/DL — LOW (ref 14–18)
IMM GRANULOCYTES NFR BLD AUTO: 0.4 % — HIGH (ref 0.1–0.3)
LYMPHOCYTES # BLD AUTO: 1.21 K/UL — SIGNIFICANT CHANGE UP (ref 1.2–3.4)
LYMPHOCYTES # BLD AUTO: 21.5 % — SIGNIFICANT CHANGE UP (ref 20.5–51.1)
MCHC RBC-ENTMCNC: 29.7 PG — SIGNIFICANT CHANGE UP (ref 27–31)
MCHC RBC-ENTMCNC: 32.1 G/DL — SIGNIFICANT CHANGE UP (ref 32–37)
MCV RBC AUTO: 92.7 FL — SIGNIFICANT CHANGE UP (ref 80–94)
MONOCYTES # BLD AUTO: 0.51 K/UL — SIGNIFICANT CHANGE UP (ref 0.1–0.6)
MONOCYTES NFR BLD AUTO: 9 % — SIGNIFICANT CHANGE UP (ref 1.7–9.3)
NEUTROPHILS # BLD AUTO: 3.7 K/UL — SIGNIFICANT CHANGE UP (ref 1.4–6.5)
NEUTROPHILS NFR BLD AUTO: 65.6 % — SIGNIFICANT CHANGE UP (ref 42.2–75.2)
NRBC # BLD: 0 /100 WBCS — SIGNIFICANT CHANGE UP (ref 0–0)
PLATELET # BLD AUTO: 165 K/UL — SIGNIFICANT CHANGE UP (ref 130–400)
POTASSIUM SERPL-MCNC: 4.8 MMOL/L — SIGNIFICANT CHANGE UP (ref 3.5–5)
POTASSIUM SERPL-SCNC: 4.8 MMOL/L — SIGNIFICANT CHANGE UP (ref 3.5–5)
RBC # BLD: 2.86 M/UL — LOW (ref 4.7–6.1)
RBC # FLD: 13.3 % — SIGNIFICANT CHANGE UP (ref 11.5–14.5)
SODIUM SERPL-SCNC: 136 MMOL/L — SIGNIFICANT CHANGE UP (ref 135–146)
WBC # BLD: 5.64 K/UL — SIGNIFICANT CHANGE UP (ref 4.8–10.8)
WBC # FLD AUTO: 5.64 K/UL — SIGNIFICANT CHANGE UP (ref 4.8–10.8)

## 2020-10-23 PROCEDURE — 93010 ELECTROCARDIOGRAM REPORT: CPT

## 2020-10-23 PROCEDURE — 99284 EMERGENCY DEPT VISIT MOD MDM: CPT

## 2020-10-23 NOTE — ED PROVIDER NOTE - NSFOLLOWUPINSTRUCTIONS_ED_ALL_ED_FT
Please present to the dialysis center tomorrow at 12:00pm for dialysis.   Please be aware of any new or worsening signs or symptoms that should prompt your return to the er.     Hemodialysis   Hemodialysis is a treatment for kidney failure. Normally, the kidneys work to filter the blood and remove waste and excess salt and water (figure 1). Kidney failure, also called "end-stage kidney disease," is when the kidneys stop working completely.    With hemodialysis, a machine takes over the job of the kidneys. Blood is pumped from the body, filtered through a dialysis machine, and then returned to the body (figure 2).    Where will I have hemodialysis?  Most people can choose between having hemodialysis at a dialysis center (in a hospital or clinic) or at home.    There are downsides and benefits to both options:    ?If you have dialysis at a center, you will need to travel there and back. But doctors and nurses at the center can watch you closely during your dialysis.    ?If you have dialysis at home, you or someone else will need to learn how to do it. You will also need special equipment and supplies. But people who do home dialysis often feel better and feel more independent and in control of their life. Also, some studies show that people who do home dialysis end up being healthier than those who get dialysis in a center.    How often will I have hemodialysis and how long does it take?  You will have hemodialysis at least 3 times a week. Your schedule will depend on where you have it:    ?People who go to a center usually have dialysis 3 times a week. Each treatment usually takes 3 to 5 hours.    ?People do home dialysis 3 to 7 times a week, but they have more choice with their schedule. Some people do dialysis during the day. Other people do dialysis overnight while they sleep. Each treatment usually takes 3 to 10 hours.    When will I start hemodialysis?  You and your doctor will decide the right time for you to start hemodialysis. It will depend partly on how well your kidneys work, your symptoms, and your overall health. Your doctor will do blood tests to check how well your kidneys are working.    Before you start hemodialysis, you need surgery to prepare your body. Your doctor will create an "access," which is a way for the blood to leave and return to your body (see "Patient education: Preparing for hemodialysis (The Basics)").    There are 3 different types of access:    ?AV fistula – This is the most common type of access (figure 3).    ?AV graft (figure 4)    ?Central venous catheter (figure 5)    What happens during hemodialysis?  It depends on your access. If you have an AV fistula or AV graft, the doctor or nurse will put 2 needles into your arm, in your access. If you have a central venous catheter, he or she will connect the catheter tube to tubes from the dialysis machine.    During hemodialysis, blood leaves your body through the access. The blood travels through and is filtered by the dialysis machine. Then the blood returns to your body.    What problems can happen with hemodialysis?  People can have problems with their access. An access can get infected, get blocked, or stop working.    People can also have problems during dialysis treatments. These can include:    ?Feeling lightheaded    ?Trouble breathing    ?Belly or muscle cramps    ?Nausea or vomiting    Let your doctor or nurse know if you have any problems. Many of them can be treated.    Is there anything else I should do?  Yes. If you get dialysis on a regular basis, you will need to:    ?Take care of your fistula or graft, if you have one – Wash your fistula or graft with soap and warm water every day and before each dialysis treatment. Don't scratch or pick at the area. Don't let anyone use that arm to take blood or measure blood pressure.    ?Check your fistula or graft every day, if you have one – When your fistula or graft is working normally and blood is flowing through it, you can feel a vibration over the area. Let your doctor or nurse know if you don't feel a vibration.    ?Keep your catheter protected, if you have one – This is important to help prevent infection. A doctor or nurse will cover your catheter with a bandage and change it at each dialysis session. Do not try to remove or change your bandages at home.    If you have a clear waterproof bandage that sticks to the skin around your catheter site, you can take a shower or bath with it on. But do not put the area underwater. If you have a gauze bandage that is not waterproof, do not get it wet at all.    ?Weigh yourself every day – When your kidneys don't work, fluid collects in your body. Let your doctor or nurse know if you gain more weight than usual between dialysis treatments.    ?Follow a special diet – You will need to limit the amount of fluids you drink. You might also need to avoid foods with a lot of sodium, potassium, and phosphorus. These are minerals that can build up in your body if you have kidney problems.

## 2020-10-23 NOTE — ED PROVIDER NOTE - CLINICAL SUMMARY MEDICAL DECISION MAKING FREE TEXT BOX
65M ESRD on HD TRS via R AVF, LBIG, Bach's esophagus, diverticular disease, who presents to [facility] for bleeding to her right arm 2cm proximal to her AVF that charge nurse noted to be ~400cc. Bleeding controlled with pressure however pt reporting being lightheaded while bleeding occurred so they checked her BP (96/60) which improved to 135/60 a repeat BP check. Patient asymptomatic on eval, no active bleeding on exam, no hematomas. Site of prior bleeding 3cm from AVF, with adequate thrill and no suspicion for AVF compromise. EKG AF, no peaked TW, no ischemic changes. Labs reviewed and similar to baseline, no hyperkalemia. Nephrology consulted and state pt is clear to get dialysis tmw at 12pm. Pt ambulatory with a steady gait. No bleeding throughout ED stay. I have fully discussed the medical management and delivery of care with the patient. I have discussed any available labs, imaging and treatment options with the patient. All Questions answered at the bedside. Patient confirms understanding and has been given detailed return precautions. Patient instructed to return to the ED should symptoms persist or worsen. Patient has demonstrated capacity and has verbalized understanding. Patient is well appearing upon discharge, ambulatory with a steady gait.

## 2020-10-23 NOTE — ED ADULT TRIAGE NOTE - CHIEF COMPLAINT QUOTE
BIBA from dialysis center for bleeding to the right AV fistula site. pt only tolerated 30 minutes of HD. no bleeding noted in triage

## 2020-10-23 NOTE — ED PROVIDER NOTE - PATIENT PORTAL LINK FT
You can access the FollowMyHealth Patient Portal offered by [facility] by registering at the following website: [email or web address]. By joining [email or web address]’s FollowMyHealth portal, you will also be able to view your health information using other applications (apps) compatible with our system.

## 2020-10-23 NOTE — ED PROVIDER NOTE - OBJECTIVE STATEMENT
70 year old male, past medical history ESRD on HD MWF, BIB EMS w/ dialysis w bleeding AV fistula. patient was at dialysis when venous catheter was dislodged from fistula resulting in loss of 400cc of blood. patient felt lightheadedness, BP 98/50, rpt /80. patient arrived to ER with resolution of bleeding from AV fistula. Patient states he feels well. no chest pain, shortness of breath, lightheadedness, weakness.

## 2020-10-23 NOTE — ED PROVIDER NOTE - PROGRESS NOTE DETAILS
discussed case w shyla harry, dialysis nurse, who was with patient during blood loss, states patient lost ~400cc blood, no syncope, no loc. pending labs/nephro callback spoke with dr reyez, nephrologist, who states patient can receive dialysis tomorrow at noon. pending labs. spoke with Dr Wise, nephrologist, who states patient can receive dialysis tomorrow at noon. pending labs. patient verbalizes understanding of plan.

## 2020-10-23 NOTE — ED PROVIDER NOTE - ATTENDING CONTRIBUTION TO CARE
I personally evaluated the patient. I reviewed the Resident’s or Physician Assistant’s note (as assigned above), and agree with the findings and plan except as documented in my note. 65M ESRD on HD TRS via R AVF, LBIG, Bach's esophagus, diverticular disease, who presents to [facility] for bleeding to her right arm 2cm proximal to her AVF that charge nurse noted to be ~400cc. Bleeding controlled with pressure however pt reporting being lightheaded while bleeding occurred so they checked her BP (96/60) which improved to 135/60 a repeat BP check. She has anemia of chronic disease at baseline and they were concerned that she lost too much blood. She only received 30mins of dialysis and charge nurse states that it is too late to return for dialysis today. Pt currently asymptomatic, denies current dizziness, lightheadedness, CP, SOB.     CONSTITUTIONAL: Well-developed; well-nourished; in no acute distress. Sitting up and providing appropriate history and physical examination  SKIN: skin exam is warm and dry, no acute rash.  HEAD: Normocephalic; atraumatic.  EYES: PERRL, 3 mm bilateral, no nystagmus, EOM intact; conjunctiva and sclera clear.  ENT: No nasal discharge; airway clear.  NECK: Supple; non tender. + full passive ROM in all directions. No JVD  CARD: S1, S2 normal; no murmurs, gallops, or rubs. Regular rate and rhythm. + Symmetric Strong Pulses  RESP: No wheezes, rales or rhonchi. Good air movement bilaterally  ABD: soft; non-distended; non-tender. No Rebound, No Guarding, No signs of peritonitis, No CVA tenderness. No pulsatile abdominal mass. + Strong and Symmetric Pulses  EXT: Pressure dressing to RUE with small eccymoses, no rapidly expanding hematoma, approx 3cm from AVF. R AVF with + bruit. Normal ROM. No clubbing, cyanosis or edema. Dp and Pt Pulses intact. Cap refill less than 3 seconds  NEURO: stable gait, no sensory or motor deficits, Alert, oriented, grossly unremarkable. No Focal deficits. GCS 15.   PSYCH: Cooperative, appropriate.    P: will assess for acute blood loss anemia with CBC, BMP and will consult nephrology to determine if she needs to get dialysis today or not. Will re-eval.

## 2020-10-23 NOTE — ED PROVIDER NOTE - PHYSICAL EXAMINATION
CONSTITUTIONAL: Well-developed; well-nourished; in no acute distress, nontoxic appearing  SKIN: AV fistula in place, no active bleeding, no surrounding erythema/tenderness  HEAD: Normocephalic; atraumatic.  EYES: conjunctiva and sclera clear.  CARD: S1, S2 normal, no murmur  RESP: No wheezes, rales or rhonchi. Good air movement bilaterally  ABD: soft; non-distended; non-tender. No Rebound, No guarding  EXT: Normal ROM.   NEURO: awake, alert, following commands, oriented, grossly unremarkable. No Focal deficits. GCS 15.   PSYCH: Cooperative, appropriate.

## 2020-10-23 NOTE — ED PROVIDER NOTE - CARE PLAN
Principal Discharge DX:	AV fistula   Principal Discharge DX:	AV fistula  Secondary Diagnosis:	Bleeding

## 2020-10-23 NOTE — ED PROVIDER NOTE - NS ED ROS FT
Review of Systems:  	•	CONSTITUTIONAL: no fever, no diaphoresis, no chills  	•	SKIN: no rash  	•	HEMATOLOGIC: +bleeding from AV fistula  	•	RESPIRATORY: no shortness of breath, no cough  	•	CARDIAC: no chest pain, no palpitations  	•	GI: no abd pain, no nausea, no vomiting  	•	MUSCULOSKELETAL: no joint paint, no swelling, no redness  	•	NEUROLOGIC: no weakness, no syncope

## 2020-10-23 NOTE — ED ADULT NURSE REASSESSMENT NOTE - NS ED NURSE REASSESS COMMENT FT1
Pt refusing IV line, reports he does not want to stay in the hospital. Pt only agreed to butterfly for labs. PA aware. Spoke to patient at bedside.

## 2020-11-16 NOTE — DISCHARGE NOTE PROVIDER - NSDCQMPCI_CARD_ALL_CORE
Caller (Wife Jade) states patient has a sinus infection and is looking to see if [facility] would call in prescription for it (RAYNA she thinks)  Caller states any questions please call back ok to leave a detail message   Pharmacy  [facility]    No

## 2020-12-23 NOTE — ASU PATIENT PROFILE, ADULT - VISION (WITH CORRECTIVE LENSES IF THE PATIENT USUALLY WEARS THEM):
Comprehensive Nutrition Assessment Type and Reason for Visit: Initial, NPO/clear liquid Nutrition Recommendations/Plan: If pt wants aggressive care consider placement of central line and initiation of TPN (consult RD to provide specific recommendations) Nutrition Assessment:   Pt admitted with respiratory failure 2' COVID 19. PMH: HTN, DM, CKD stage 3, CHF. Chart reviewed, case discussed during CCU rounds. Pt is BiPAP dependent, unable to come off long enough for dobbhoff placement. She is desperate to take sips but too high an aspiration risk per NP. Technically NPO day 5 but did have ensures bedside to sip on a few days ago. She is a DNR, no plans to intubate at this time. Pt only has peripherals so would need central line in order to start TPN, NP would like to hold off on this per IDR discussion this morning. Palliative has been consulted. Will standby and provide nutrition support recommendations prn. Malnutrition Assessment: 
Malnutrition Status:    UTD Estimated Daily Nutrient Needs: 
Energy (kcal): MSJ 2200 (1892 x 1.2); Weight Used for Energy Requirements: Current Protein (g): 127-159g (2-2.5gPro/kg IBW); Weight Used for Protein Requirements: Ideal 
Fluid (ml/day): 1500mL or per MD; Method Used for Fluid Requirements: 1 ml/kcal 
 
 
Nutrition Related Findings:  Meds: cefepime, decadron, humalog, protonix. Edema: trace-generalized. BM 12/23-flexiseal removed. Wounds:   
None Current Nutrition Therapies: DIET NPO 
DIET NUTRITIONAL SUPPLEMENTS All Meals; Ensure [employer] Anthropometric Measures: 
· Height:  5' 8\" (172.7 cm) · Current Body Wt:  128.9 kg (284 lb 2.8 oz) · Ideal Body Wt:  140 lbs:  203 % · BMI Category:  Obese class 3 (BMI 40.0 or greater) Nutrition Diagnosis:  
· Inadequate protein-energy intake related to impaired respiratory function as evidenced by NPO or clear liquid status due to medical condition Nutrition Interventions: Food and/or Nutrient Delivery: Start tube feeding, Start parenteral nutrition Nutrition Education and Counseling: No recommendations at this time Coordination of Nutrition Care: Continue to monitor while inpatient, Interdisciplinary rounds Goals: 
Plan of care will be determined re: nutrition vs comfort care in 1-5 days. Nutrition Monitoring and Evaluation:  
Behavioral-Environmental Outcomes: None identified Food/Nutrient Intake Outcomes: IVF intake Physical Signs/Symptoms Outcomes: Biochemical data, Fluid status or edema, Hemodynamic status, Weight, GI status Discharge Planning: Too soon to determine Electronically signed by Darion Ponce RD, [id number]  on 12/23/2020 at 2:43 PM 
 
Contact: [id number] Normal vision: sees adequately in most situations; can see medication labels, newsprint

## 2021-04-20 ENCOUNTER — APPOINTMENT (OUTPATIENT)
Dept: INTERNAL MEDICINE | Facility: CLINIC | Age: 71
End: 2021-04-20
Payer: MEDICAID

## 2021-04-20 ENCOUNTER — OUTPATIENT (OUTPATIENT)
Dept: OUTPATIENT SERVICES | Facility: HOSPITAL | Age: 71
LOS: 1 days | Discharge: HOME | End: 2021-04-20

## 2021-04-20 VITALS
HEART RATE: 72 BPM | WEIGHT: 198 LBS | HEIGHT: 66 IN | SYSTOLIC BLOOD PRESSURE: 151 MMHG | TEMPERATURE: 97.9 F | DIASTOLIC BLOOD PRESSURE: 133 MMHG | OXYGEN SATURATION: 97 % | BODY MASS INDEX: 31.82 KG/M2

## 2021-04-20 DIAGNOSIS — Z98.890 OTHER SPECIFIED POSTPROCEDURAL STATES: Chronic | ICD-10-CM

## 2021-04-20 DIAGNOSIS — Z98.49 CATARACT EXTRACTION STATUS, UNSPECIFIED EYE: Chronic | ICD-10-CM

## 2021-04-20 DIAGNOSIS — Z95.5 PRESENCE OF CORONARY ANGIOPLASTY IMPLANT AND GRAFT: Chronic | ICD-10-CM

## 2021-04-20 PROCEDURE — 99213 OFFICE O/P EST LOW 20 MIN: CPT | Mod: GC

## 2021-04-21 NOTE — PHYSICAL EXAM
[No Acute Distress] : no acute distress [Well-Appearing] : well-appearing [Normal Sclera/Conjunctiva] : normal sclera/conjunctiva [EOMI] : extraocular movements intact [No JVD] : no jugular venous distention [No Respiratory Distress] : no respiratory distress  [Supple] : supple [Clear to Auscultation] : lungs were clear to auscultation bilaterally [Normal Rate] : normal rate  [No Murmur] : no murmur heard [No Varicosities] : no varicosities [Coordination Grossly Intact] : coordination grossly intact [No Edema] : there was no peripheral edema [Speech Grossly Normal] : speech grossly normal [Normal Affect] : the affect was normal [Normal Mood] : the mood was normal [Normal Insight/Judgement] : insight and judgment were intact [de-identified] : irregularly irregular rhythm

## 2021-04-21 NOTE — REVIEW OF SYSTEMS
[Shortness Of Breath] : shortness of breath [Dyspnea on Exertion] : dyspnea on exertion [Heartburn] : heartburn [Fever] : no fever [Chills] : no chills [Chest Pain] : no chest pain [Palpitations] : no palpitations [Lower Ext Edema] : no lower extremity edema [Wheezing] : no wheezing [Cough] : no cough [Diarrhea] : no diarrhea [Vomiting] : no vomiting

## 2021-04-21 NOTE — HISTORY OF PRESENT ILLNESS
[FreeTextEntry1] : Follow-up. [de-identified] : 69 yo M with PMH of anemia, ESRD on HD, CAD s/p stents, DLD, Afib on Eliquis 2.5 mg, cholangitis s/p cholecystectomy comes to the clinic for follow up visit. He has had dizziness with varying activities recently. This is worsened by sunlight or general activity. Some continued shortness of breath that last a few seconds also with activity, relieved with rest. No recent chest pain. Has been having some relief of GERD symptoms with pantoprozole, he does continue to have daily symptoms when not taking the ppi. He also endorses back pain and upper leg pain and weakness that has been present for the past 6 years but he has not been seen for this. He has trouble getting up from bending over and feels overall weakness in the area. Was seen by cardiology today and amlodopine reduced from 10mg to 5.

## 2021-04-21 NOTE — ASSESSMENT
[FreeTextEntry1] : # anemia\par - hx of blood transfusion\par - hgb 8.7, 2 weeks ago\par - should be receving EPO with HD\par \par # lower back and leg pain\par - back XR ordered\par - PT referral placed\par \par # DM\par - A1C 4/7/21- 7.2%\par - Prior HbA1c 6.4% 1 year ago\par - trujenta started today\par \par #HTN\par - did not take amlodopine yesterday, today /70\par - C/w isosorbide mononitrate, Losartan\par - amlodopine dose reduced from 10mg to 5mg\par \par #CAD\par - C/w ASA, statin\par \par #AFib\par - C/w Eliquis, Metoprolol\par \par #ESRD\par - On HD 3x a wk, follows with Dr. Wise\par - C/w Sevelamer\par \par #Constipation\par - C/w Miralax PRN\par \par #HCM\par - repeat CT chest for previous nodule (unchanged in 2019). 60+ pack years, previous smoker.\par - colonoscopy? November 2019- normal\par RTC in 6 mo.

## 2021-04-26 DIAGNOSIS — E11.8 TYPE 2 DIABETES MELLITUS WITH UNSPECIFIED COMPLICATIONS: ICD-10-CM

## 2021-04-26 DIAGNOSIS — M54.5 LOW BACK PAIN: ICD-10-CM

## 2021-04-29 RX ORDER — LINAGLIPTIN 5 MG/1
5 TABLET, FILM COATED ORAL DAILY
Qty: 30 | Refills: 3 | Status: DISCONTINUED | COMMUNITY
Start: 2021-04-20 | End: 2021-04-29

## 2021-07-28 ENCOUNTER — NON-APPOINTMENT (OUTPATIENT)
Age: 71
End: 2021-07-28

## 2021-08-09 NOTE — ASU PATIENT PROFILE, ADULT - FALL HARM RISK CONCLUSION
----- Message from Qing Beard MD sent at 8/9/2021 10:22 AM EDT -----   All the labs are normal, stool studies are normal, lipids are normal, bilirubin is normal, hemoglobin is normal   There is a slight decrease in white blood cells  We need to repeat CBC in one months   Requisition in the chart Fall Risk

## 2021-09-07 NOTE — PHYSICAL THERAPY INITIAL EVALUATION ADULT - TINETTI GAIT TEST, REHAB EVAL
Patient is in the supine position.   The body was positioned using the following devices: safety strap and gel pad mattress.  The head was positioned using the following devices: gel pad mattress and regular pillow.  The left arm was positioned using the following devices: arm board and gel arm pads.  The right arm was positioned using the following devices: arm board and gel arm pads.  The patient was positioned by Nathalia Orourke RN, Shirin Spencer  Based on this exam patient is at a high risk for falls

## 2021-09-14 ENCOUNTER — NON-APPOINTMENT (OUTPATIENT)
Age: 71
End: 2021-09-14

## 2021-09-22 ENCOUNTER — NON-APPOINTMENT (OUTPATIENT)
Age: 71
End: 2021-09-22

## 2021-09-23 ENCOUNTER — NON-APPOINTMENT (OUTPATIENT)
Age: 71
End: 2021-09-23

## 2021-09-24 ENCOUNTER — NON-APPOINTMENT (OUTPATIENT)
Age: 71
End: 2021-09-24

## 2021-09-27 ENCOUNTER — NON-APPOINTMENT (OUTPATIENT)
Age: 71
End: 2021-09-27

## 2021-09-28 ENCOUNTER — APPOINTMENT (OUTPATIENT)
Dept: INTERNAL MEDICINE | Facility: CLINIC | Age: 71
End: 2021-09-28
Payer: MEDICAID

## 2021-09-28 ENCOUNTER — NON-APPOINTMENT (OUTPATIENT)
Age: 71
End: 2021-09-28

## 2021-09-28 ENCOUNTER — APPOINTMENT (OUTPATIENT)
Dept: INTERNAL MEDICINE | Facility: CLINIC | Age: 71
End: 2021-09-28

## 2021-09-28 ENCOUNTER — OUTPATIENT (OUTPATIENT)
Dept: OUTPATIENT SERVICES | Facility: HOSPITAL | Age: 71
LOS: 1 days | Discharge: HOME | End: 2021-09-28

## 2021-09-28 VITALS
TEMPERATURE: 97.4 F | WEIGHT: 177 LBS | HEART RATE: 63 BPM | BODY MASS INDEX: 28.45 KG/M2 | OXYGEN SATURATION: 99 % | DIASTOLIC BLOOD PRESSURE: 75 MMHG | SYSTOLIC BLOOD PRESSURE: 132 MMHG | HEIGHT: 66 IN

## 2021-09-28 DIAGNOSIS — Z95.5 PRESENCE OF CORONARY ANGIOPLASTY IMPLANT AND GRAFT: Chronic | ICD-10-CM

## 2021-09-28 DIAGNOSIS — Z98.890 OTHER SPECIFIED POSTPROCEDURAL STATES: Chronic | ICD-10-CM

## 2021-09-28 DIAGNOSIS — Z98.49 CATARACT EXTRACTION STATUS, UNSPECIFIED EYE: Chronic | ICD-10-CM

## 2021-09-28 PROCEDURE — 99214 OFFICE O/P EST MOD 30 MIN: CPT | Mod: GC

## 2021-09-28 NOTE — PHYSICAL EXAM
[No Respiratory Distress] : no respiratory distress  [No Acute Distress] : no acute distress [Normal] : no acute distress, well nourished, well developed and well-appearing [Soft] : abdomen soft [Non Tender] : non-tender [de-identified] : Irregular

## 2021-09-28 NOTE — HISTORY OF PRESENT ILLNESS
[FreeTextEntry1] : 69 yo M with PMH of anemia, ESRD on HD, CAD s/p stents, DLD, Afib on Eliquis 2.5 mg, cholangitis s/p cholecystectomy comes to the clinic for follow up visit. He has had dizziness with varying activities recently. This is worsened by sunlight or general activity. Some continued shortness of breath that last a few seconds also with activity, relieved with rest. No recent chest pain. Has been having some relief of GERD symptoms with pantoprozole, he does continue to have daily symptoms when not taking the ppi. He also endorses back pain and upper leg pain and weakness that has been present for the past 6 years but he has not been seen for this. He has trouble getting up from bending over and feels overall weakness in the area. Was seen by cardiology today and amlodopine reduced from 10mg to 5.  [de-identified] : 69 y/o M w/ pmhx of Chronic Anemia, ESRD on HD, CAD s/p stents, DLD, Afib on Eliquis 2.5 mg, Cholangitis s/p cholecystectomy & dizziness; Here for routine follow up and referrals;\par \par Still complains of dizziness (As per pt, he was instructed to stop Amlodipine); \par \par Denies Abdominal pain, chest pain, Hematuria, Dysuria, N/V;

## 2021-09-28 NOTE — HISTORY OF PRESENT ILLNESS
[FreeTextEntry1] : 69 yo M with PMH of anemia, ESRD on HD, CAD s/p stents, DLD, Afib on Eliquis 2.5 mg, cholangitis s/p cholecystectomy comes to the clinic for follow up visit. He has had dizziness with varying activities recently. This is worsened by sunlight or general activity. Some continued shortness of breath that last a few seconds also with activity, relieved with rest. No recent chest pain. Has been having some relief of GERD symptoms with pantoprozole, he does continue to have daily symptoms when not taking the ppi. He also endorses back pain and upper leg pain and weakness that has been present for the past 6 years but he has not been seen for this. He has trouble getting up from bending over and feels overall weakness in the area. Was seen by cardiology today and amlodopine reduced from 10mg to 5.  [de-identified] : 71 y/o M w/ pmhx of Chronic Anemia, ESRD on HD, CAD s/p stents, DLD, Afib on Eliquis 2.5 mg, Cholangitis s/p cholecystectomy & dizziness; Here for routine follow up and referrals;\par \par Still complains of dizziness (As per pt, he was instructed to stop Amlodipine); \par \par Denies Abdominal pain, chest pain, Hematuria, Dysuria, N/V;

## 2021-09-28 NOTE — ASSESSMENT
[FreeTextEntry1] : 71 y/o M w/ pmhx of Chronic Anemia, ESRD on HD, CAD s/p stents, DLD, Afib on Eliquis 2.5 mg, Cholangitis s/p cholecystectomy & dizziness; \par Here for routine follow up and referrals;\par \par Patient is having living situation problems. However given his medical condition, he is unable to take care of himself & will need assisted living for assistance w/ medication & ADL\par \par # Chronic Normocytic Anemia - hx of blood transfusion; Most likely 2ry to ESRD; \par - Hb 8.3(April,2021)>> 7.9 (Sept, 2021)\par - Receives EPO with HD\par > referred to Hematology; \par \par # Chronic Lower Back Pain - Referred to PT; \par \par # T2DM - Controlled; \par - A1C 4/7/21- 7.2%\par - Prior HbA1c 6.4% 1 year ago\par - c/w Trudjenta\par > Refer to Podiatry & Opthalmology for annual screening; \par \par # Essential HTN - Controlled; c/w Imdur & Losartan; \par Amlodipine dc'd by Cardiologist; \par \par # hx of Lung Nodule - Annual CT Chest for monitoring; Last one performed 2 years ago; Stable so far; \par > fu CT Chest; \par \par # hx ofr CAD - Stable s/p PCI w/ stent placement; c/w ASA, statin\par \par # AFib - Rate controlled; c/w Eliquis & Lopressor; \par \par # ESRD - On HD x3/wk; Follows Dr. Wise; c/w Sevelamer & EPO (w/ HD); \par \par # Constipation - C/w Miralax PRN\par \par # HCM\par - Pfizer COVID vaccine: Completed on May, 2021\par - Flu vaccine: Wants to take it at [facility];\par - repeat CT chest for previous nodule (unchanged in 2019). 60+ pack years, previous smoker.\par - Colonoscopy: Last in 2019; No significant findings (As per patient);\par RTC in 6 mo. \par

## 2021-09-28 NOTE — PHYSICAL EXAM
[No Respiratory Distress] : no respiratory distress  [No Acute Distress] : no acute distress [Normal] : no acute distress, well nourished, well developed and well-appearing [Soft] : abdomen soft [Non Tender] : non-tender [de-identified] : Irregular

## 2021-09-28 NOTE — ASSESSMENT
[FreeTextEntry1] : 69 y/o M w/ pmhx of Chronic Anemia, ESRD on HD, CAD s/p stents, DLD, Afib on Eliquis 2.5 mg, Cholangitis s/p cholecystectomy & dizziness; \par Here for routine follow up and referrals;\par \par Patient is having living situation problems. However given his medical condition, he is unable to take care of himself & will need assisted living for assistance w/ medication & ADL\par \par # Chronic Normocytic Anemia - hx of blood transfusion; Most likely 2ry to ESRD; \par - Hb 8.3(April,2021)>> 7.9 (Sept, 2021)\par - Receives EPO with HD\par > referred to Hematology; \par \par # Chronic Lower Back Pain - Referred to PT; \par \par # T2DM - Controlled; \par - A1C 4/7/21- 7.2%\par - Prior HbA1c 6.4% 1 year ago\par - c/w Trudjenta\par > Refer to Podiatry & Opthalmology for annual screening; \par \par # Essential HTN - Controlled; c/w Imdur & Losartan; \par Amlodipine dc'd by Cardiologist; \par \par # hx of Lung Nodule - Annual CT Chest for monitoring; Last one performed 2 years ago; Stable so far; \par > fu CT Chest; \par \par # hx ofr CAD - Stable s/p PCI w/ stent placement; c/w ASA, statin\par \par # AFib - Rate controlled; c/w Eliquis & Lopressor; \par \par # ESRD - On HD x3/wk; Follows Dr. Wise; c/w Sevelamer & EPO (w/ HD); \par \par # Constipation - C/w Miralax PRN\par \par # HCM\par - Pfizer COVID vaccine: Completed on May, 2021\par - Flu vaccine: Wants to take it at [facility];\par - repeat CT chest for previous nodule (unchanged in 2019). 60+ pack years, previous smoker.\par - Colonoscopy: Last in 2019; No significant findings (As per patient);\par RTC in 6 mo. \par

## 2021-09-29 DIAGNOSIS — K21.9 GASTRO-ESOPHAGEAL REFLUX DISEASE WITHOUT ESOPHAGITIS: ICD-10-CM

## 2021-09-29 DIAGNOSIS — N18.6 END STAGE RENAL DISEASE: ICD-10-CM

## 2021-09-29 DIAGNOSIS — I25.10 ATHEROSCLEROTIC HEART DISEASE OF NATIVE CORONARY ARTERY WITHOUT ANGINA PECTORIS: ICD-10-CM

## 2021-09-29 DIAGNOSIS — I48.91 UNSPECIFIED ATRIAL FIBRILLATION: ICD-10-CM

## 2021-09-29 DIAGNOSIS — E11.8 TYPE 2 DIABETES MELLITUS WITH UNSPECIFIED COMPLICATIONS: ICD-10-CM

## 2021-09-29 DIAGNOSIS — I10 ESSENTIAL (PRIMARY) HYPERTENSION: ICD-10-CM

## 2021-10-01 ENCOUNTER — NON-APPOINTMENT (OUTPATIENT)
Age: 71
End: 2021-10-01

## 2021-10-04 ENCOUNTER — TRANSCRIPTION ENCOUNTER (OUTPATIENT)
Age: 71
End: 2021-10-04

## 2021-10-13 ENCOUNTER — NON-APPOINTMENT (OUTPATIENT)
Age: 71
End: 2021-10-13

## 2021-10-14 ENCOUNTER — NON-APPOINTMENT (OUTPATIENT)
Age: 71
End: 2021-10-14

## 2021-10-27 ENCOUNTER — NON-APPOINTMENT (OUTPATIENT)
Age: 71
End: 2021-10-27

## 2021-10-29 ENCOUNTER — NON-APPOINTMENT (OUTPATIENT)
Age: 71
End: 2021-10-29

## 2021-11-11 ENCOUNTER — OUTPATIENT (OUTPATIENT)
Dept: OUTPATIENT SERVICES | Facility: HOSPITAL | Age: 71
LOS: 1 days | Discharge: HOME | End: 2021-11-11
Payer: MEDICAID

## 2021-11-11 ENCOUNTER — RESULT REVIEW (OUTPATIENT)
Age: 71
End: 2021-11-11

## 2021-11-11 DIAGNOSIS — Z95.5 PRESENCE OF CORONARY ANGIOPLASTY IMPLANT AND GRAFT: Chronic | ICD-10-CM

## 2021-11-11 DIAGNOSIS — Z98.49 CATARACT EXTRACTION STATUS, UNSPECIFIED EYE: Chronic | ICD-10-CM

## 2021-11-11 DIAGNOSIS — R91.1 SOLITARY PULMONARY NODULE: ICD-10-CM

## 2021-11-11 DIAGNOSIS — Z98.890 OTHER SPECIFIED POSTPROCEDURAL STATES: Chronic | ICD-10-CM

## 2021-11-11 PROCEDURE — 71250 CT THORAX DX C-: CPT | Mod: 26

## 2021-11-29 RX ORDER — AMLODIPINE BESYLATE 10 MG/1
10 TABLET ORAL DAILY
Refills: 0 | Status: DISCONTINUED | COMMUNITY
Start: 2020-08-27 | End: 2021-11-29

## 2022-02-22 NOTE — H&P ADULT - NSTOBACCOSCREENHP_GEN_A_NCS
No Arterial Catheter/Central Venous Catheter/Transesophageal Echocardiogram Arterial Catheter/Central Venous Catheter/Pulmonary Artery Catheter

## 2022-03-08 ENCOUNTER — APPOINTMENT (OUTPATIENT)
Dept: CARDIOLOGY | Facility: CLINIC | Age: 72
End: 2022-03-08
Payer: MEDICAID

## 2022-03-08 VITALS
WEIGHT: 140 LBS | TEMPERATURE: 97.8 F | SYSTOLIC BLOOD PRESSURE: 101 MMHG | HEART RATE: 78 BPM | HEIGHT: 66 IN | BODY MASS INDEX: 22.5 KG/M2 | DIASTOLIC BLOOD PRESSURE: 69 MMHG

## 2022-03-08 PROCEDURE — 93000 ELECTROCARDIOGRAM COMPLETE: CPT

## 2022-03-08 PROCEDURE — 99204 OFFICE O/P NEW MOD 45 MIN: CPT

## 2022-03-08 RX ORDER — ISOSORBIDE MONONITRATE 30 MG/1
30 TABLET, EXTENDED RELEASE ORAL
Qty: 90 | Refills: 3 | Status: COMPLETED | COMMUNITY
Start: 2016-08-31 | End: 2022-03-08

## 2022-03-08 RX ORDER — LOSARTAN POTASSIUM 50 MG/1
50 TABLET, FILM COATED ORAL DAILY
Qty: 90 | Refills: 2 | Status: COMPLETED | COMMUNITY
Start: 2020-01-31 | End: 2022-03-08

## 2022-03-08 NOTE — ASSESSMENT
[FreeTextEntry1] : 70 y/o male with history of CAD, s/p PCI, DM, DL, HTN, ESRD on HD.\par \par Low BP\par \par Prior w/u reviewed.\par Echo, ECG, labs reviewed\par Cath films reviewed\par \par Plan:\par \par C/w ASA\par C/w statin\par D/c Imdur\par Decrease metoprolol to 12.5 mg q12\par F/u with Nephrology.

## 2022-03-08 NOTE — HISTORY OF PRESENT ILLNESS
[FreeTextEntry1] : 72 y/o male with history of ESRD on HD, HTN, DL, CAD, s/p PCI (3 stents in LAD by Dr. Martin in 2017), presents to establish care. Patient reports no chest pain, dyspnea, orthopnea or PND. No syncope, but does get dizzy with low BP. He had episodes of low BP, especially during HD. He also reports low pulse rate as well. He stopped amlodipine and losartan, but still gets episodes of low BP.

## 2022-03-24 NOTE — DISCHARGE NOTE NURSING/CASE MANAGEMENT/SOCIAL WORK - NSFLUVACAGEDISCH_IMM_ALL_CORE
This Rx Request does not qualify for assessment with the OR   Please review protocol details and the Care Due Message for extra clinical information    Reasons Rx Request may be deferred:  Over 40 MG; Outside of protocol    Note composed:9:55 AM 03/24/2022            Adult

## 2022-03-31 ENCOUNTER — APPOINTMENT (OUTPATIENT)
Dept: INTERNAL MEDICINE | Facility: CLINIC | Age: 72
End: 2022-03-31
Payer: MEDICAID

## 2022-03-31 ENCOUNTER — OUTPATIENT (OUTPATIENT)
Dept: OUTPATIENT SERVICES | Facility: HOSPITAL | Age: 72
LOS: 1 days | Discharge: HOME | End: 2022-03-31

## 2022-03-31 ENCOUNTER — NON-APPOINTMENT (OUTPATIENT)
Age: 72
End: 2022-03-31

## 2022-03-31 VITALS
WEIGHT: 161 LBS | BODY MASS INDEX: 25.88 KG/M2 | OXYGEN SATURATION: 98 % | DIASTOLIC BLOOD PRESSURE: 56 MMHG | HEIGHT: 66 IN | HEART RATE: 57 BPM | SYSTOLIC BLOOD PRESSURE: 126 MMHG

## 2022-03-31 DIAGNOSIS — Z87.898 PERSONAL HISTORY OF OTHER SPECIFIED CONDITIONS: ICD-10-CM

## 2022-03-31 DIAGNOSIS — Z98.890 OTHER SPECIFIED POSTPROCEDURAL STATES: Chronic | ICD-10-CM

## 2022-03-31 DIAGNOSIS — Z98.49 CATARACT EXTRACTION STATUS, UNSPECIFIED EYE: Chronic | ICD-10-CM

## 2022-03-31 DIAGNOSIS — Z95.5 PRESENCE OF CORONARY ANGIOPLASTY IMPLANT AND GRAFT: Chronic | ICD-10-CM

## 2022-03-31 DIAGNOSIS — Z87.19 PERSONAL HISTORY OF OTHER DISEASES OF THE DIGESTIVE SYSTEM: ICD-10-CM

## 2022-03-31 DIAGNOSIS — R91.1 SOLITARY PULMONARY NODULE: ICD-10-CM

## 2022-03-31 DIAGNOSIS — R10.13 EPIGASTRIC PAIN: ICD-10-CM

## 2022-03-31 DIAGNOSIS — H04.209 OTHER SPECIFIED DISORDERS OF NOSE AND NASAL SINUSES: ICD-10-CM

## 2022-03-31 DIAGNOSIS — E83.39 OTHER DISORDERS OF PHOSPHORUS METABOLISM: ICD-10-CM

## 2022-03-31 DIAGNOSIS — J34.89 OTHER SPECIFIED DISORDERS OF NOSE AND NASAL SINUSES: ICD-10-CM

## 2022-03-31 DIAGNOSIS — Z86.010 PERSONAL HISTORY OF COLONIC POLYPS: ICD-10-CM

## 2022-03-31 DIAGNOSIS — M54.50 LOW BACK PAIN, UNSPECIFIED: ICD-10-CM

## 2022-03-31 DIAGNOSIS — R25.2 CRAMP AND SPASM: ICD-10-CM

## 2022-03-31 DIAGNOSIS — Z86.2 PERSONAL HISTORY OF DISEASES OF THE BLOOD AND BLOOD-FORMING ORGANS AND CERTAIN DISORDERS INVOLVING THE IMMUNE MECHANISM: ICD-10-CM

## 2022-03-31 DIAGNOSIS — H04.123 DRY EYE SYNDROME OF BILATERAL LACRIMAL GLANDS: ICD-10-CM

## 2022-03-31 DIAGNOSIS — L85.3 XEROSIS CUTIS: ICD-10-CM

## 2022-03-31 PROCEDURE — 99214 OFFICE O/P EST MOD 30 MIN: CPT | Mod: GC

## 2022-03-31 RX ORDER — METOPROLOL TARTRATE 25 MG/1
25 TABLET, FILM COATED ORAL
Qty: 90 | Refills: 3 | Status: COMPLETED | COMMUNITY
Start: 2020-01-31 | End: 2022-03-31

## 2022-03-31 RX ORDER — FLUTICASONE PROPIONATE 50 UG/1
50 SPRAY, METERED NASAL DAILY
Qty: 1 | Refills: 2 | Status: COMPLETED | COMMUNITY
Start: 2018-09-28 | End: 2022-03-31

## 2022-03-31 RX ORDER — ALOGLIPTIN 6.25 MG/1
6.25 TABLET, FILM COATED ORAL DAILY
Qty: 30 | Refills: 5 | Status: DISCONTINUED | COMMUNITY
Start: 2021-04-29 | End: 2022-03-31

## 2022-03-31 RX ORDER — POLYETHYLENE GLYCOL 3350 17 G/17G
17 POWDER, FOR SOLUTION ORAL DAILY
Qty: 510 | Refills: 2 | Status: COMPLETED | COMMUNITY
Start: 2019-11-18 | End: 2022-03-31

## 2022-04-12 NOTE — HISTORY OF PRESENT ILLNESS
[FreeTextEntry1] : 6 month f/u [de-identified] : 71M w/ h/o ESRD on HD, CAD s/p stents, DM, DLD, Afib on Eliquis, and chronic anemia p/w 6 month f/u. Previously reported dizziness, which has resolved since stopping amlodipine, losartan, and Imdur. Now only reports dizziness during HD sessions, but never after HD or anytime else throughout the week. \par \par Also reports having worsening acid reflux symptoms. Patient describes symptoms as burning sensation in epigastric and midsternal area that worsens after eating or laying down at night. Previously well controlled with pantoprazole 40mg BID, but current regimen only provides mild relief now.\par \par Also reporting new rash on forehead.

## 2022-04-12 NOTE — ASSESSMENT
[FreeTextEntry1] : 71M w/ h/o ESRD on HD, CAD s/p stents, DM, DLD, Afib on Eliquis, and chronic anemia p/w 6 month f/u and c/o worsening GERD and new onset forehead rash.\par \par #GERD\par - p/w worsening GERD sxs despite PPI BID\par - no evidence of Bach's esophagus on EGD Oct 2019\par - referral to GI\par \par #Eczema\par - Atopic dermatitis noted on forehead\par - will  prescribe HC cream 0.5% BID\par \par #DM\par - A1c 5.6% in Jan 2022 (not on labwork, but on separate paperwork w/ pt)\par - d/c Trudjenta (alogliptin) in setting of A1c <6.0%\par - Podiatry: referral given last visit\par - Ophthalmology: referral given last visit\par \par #CAD\par #HTN\par - s/p PCI w/ stent placement\par - follows w/ Dr. Thompson (previously Dr Martin, but switched due to insurance)\par - c/w ASA, statin\par - no longer on amlodipine, lisinopril, or Imdur due to suspected symptomatic orthostatic hypotension\par \par #Chronic AFib\par - c/w Lopressor 12.5mg BID on non-HD days\par - c/w Eliquis 2.5mg BID (dose reduced in setting of AVF and GI bleeds)\par \par #ESRD\par #Anemia of Chronic Disease\par - On HD x3/wk; Follows Dr. Wise\par - c/w Sevelamer 2 tabs TIDAC\par - c/w Phoslo 1 tab TIDAC\par - Hb 8.7 on most recent labs (anemia likely secondary to ESRD; iron studies WNL, but no ferritin)\par \par #Lung Nodule\par #Former Smoker\par - 60+ pk-yr smoker; quit ~2017\par - Stable in size on CT Chest Nov 2021\par - No new nodules noted on last CT\par - Repeat in Nov 2022\par \par #Healthcare Management\par - Colorectal Cancer: Last in 2019 (no significant findings per pt); repeat due in 2024\par - Lung Cancer: Last CT Chest in Nov 2021 (no suspicious pulmonary modules); repeat in Nov 2022\par - Influenza: Received through HD center\par - Pneumonia: previously received (pt unclear when and which version)\par - COVID-19: completed in May 2021; booster in Nov 2021\par - Labwork: CBC, CMP, A1c, TSH, Lipid Panel (performed through HD center)\par - RTC in 3 months or PRN\par

## 2022-04-12 NOTE — PHYSICAL EXAM
[No Acute Distress] : no acute distress [Normal] : no acute distress, well nourished, well developed and well-appearing [Soft] : abdomen soft [Non Tender] : non-tender [No Respiratory Distress] : no respiratory distress  [No Accessory Muscle Use] : no accessory muscle use [Clear to Auscultation] : lungs were clear to auscultation bilaterally [de-identified] : Irregular

## 2022-06-04 ENCOUNTER — EMERGENCY (EMERGENCY)
Facility: HOSPITAL | Age: 72
LOS: 0 days | Discharge: HOME | End: 2022-06-05
Attending: EMERGENCY MEDICINE | Admitting: EMERGENCY MEDICINE
Payer: MEDICAID

## 2022-06-04 VITALS
HEIGHT: 66 IN | HEART RATE: 66 BPM | SYSTOLIC BLOOD PRESSURE: 140 MMHG | WEIGHT: 154.32 LBS | TEMPERATURE: 98 F | DIASTOLIC BLOOD PRESSURE: 66 MMHG | RESPIRATION RATE: 19 BRPM | OXYGEN SATURATION: 100 %

## 2022-06-04 DIAGNOSIS — I25.2 OLD MYOCARDIAL INFARCTION: ICD-10-CM

## 2022-06-04 DIAGNOSIS — I50.9 HEART FAILURE, UNSPECIFIED: ICD-10-CM

## 2022-06-04 DIAGNOSIS — Z79.82 LONG TERM (CURRENT) USE OF ASPIRIN: ICD-10-CM

## 2022-06-04 DIAGNOSIS — Z98.49 CATARACT EXTRACTION STATUS, UNSPECIFIED EYE: Chronic | ICD-10-CM

## 2022-06-04 DIAGNOSIS — I25.10 ATHEROSCLEROTIC HEART DISEASE OF NATIVE CORONARY ARTERY WITHOUT ANGINA PECTORIS: ICD-10-CM

## 2022-06-04 DIAGNOSIS — Z79.01 LONG TERM (CURRENT) USE OF ANTICOAGULANTS: ICD-10-CM

## 2022-06-04 DIAGNOSIS — N18.6 END STAGE RENAL DISEASE: ICD-10-CM

## 2022-06-04 DIAGNOSIS — I13.2 HYPERTENSIVE HEART AND CHRONIC KIDNEY DISEASE WITH HEART FAILURE AND WITH STAGE 5 CHRONIC KIDNEY DISEASE, OR END STAGE RENAL DISEASE: ICD-10-CM

## 2022-06-04 DIAGNOSIS — L08.9 LOCAL INFECTION OF THE SKIN AND SUBCUTANEOUS TISSUE, UNSPECIFIED: ICD-10-CM

## 2022-06-04 DIAGNOSIS — Z95.5 PRESENCE OF CORONARY ANGIOPLASTY IMPLANT AND GRAFT: Chronic | ICD-10-CM

## 2022-06-04 DIAGNOSIS — E78.5 HYPERLIPIDEMIA, UNSPECIFIED: ICD-10-CM

## 2022-06-04 DIAGNOSIS — I48.91 UNSPECIFIED ATRIAL FIBRILLATION: ICD-10-CM

## 2022-06-04 DIAGNOSIS — Z98.890 OTHER SPECIFIED POSTPROCEDURAL STATES: Chronic | ICD-10-CM

## 2022-06-04 DIAGNOSIS — Z99.2 DEPENDENCE ON RENAL DIALYSIS: ICD-10-CM

## 2022-06-04 DIAGNOSIS — Z95.5 PRESENCE OF CORONARY ANGIOPLASTY IMPLANT AND GRAFT: ICD-10-CM

## 2022-06-04 DIAGNOSIS — L03.031 CELLULITIS OF RIGHT TOE: ICD-10-CM

## 2022-06-04 DIAGNOSIS — L02.611 CUTANEOUS ABSCESS OF RIGHT FOOT: ICD-10-CM

## 2022-06-04 LAB
ALBUMIN SERPL ELPH-MCNC: 4.1 G/DL — SIGNIFICANT CHANGE UP (ref 3.5–5.2)
ALP SERPL-CCNC: 112 U/L — SIGNIFICANT CHANGE UP (ref 30–115)
ALT FLD-CCNC: 7 U/L — SIGNIFICANT CHANGE UP (ref 0–41)
ANION GAP SERPL CALC-SCNC: 14 MMOL/L — SIGNIFICANT CHANGE UP (ref 7–14)
AST SERPL-CCNC: 11 U/L — SIGNIFICANT CHANGE UP (ref 0–41)
BASOPHILS # BLD AUTO: 0.05 K/UL — SIGNIFICANT CHANGE UP (ref 0–0.2)
BASOPHILS NFR BLD AUTO: 0.7 % — SIGNIFICANT CHANGE UP (ref 0–1)
BILIRUB SERPL-MCNC: 0.7 MG/DL — SIGNIFICANT CHANGE UP (ref 0.2–1.2)
BUN SERPL-MCNC: 43 MG/DL — HIGH (ref 10–20)
CALCIUM SERPL-MCNC: 9.8 MG/DL — SIGNIFICANT CHANGE UP (ref 8.5–10.1)
CHLORIDE SERPL-SCNC: 96 MMOL/L — LOW (ref 98–110)
CO2 SERPL-SCNC: 27 MMOL/L — SIGNIFICANT CHANGE UP (ref 17–32)
CREAT SERPL-MCNC: 7.6 MG/DL — CRITICAL HIGH (ref 0.7–1.5)
EGFR: 7 ML/MIN/1.73M2 — LOW
EOSINOPHIL # BLD AUTO: 0.09 K/UL — SIGNIFICANT CHANGE UP (ref 0–0.7)
EOSINOPHIL NFR BLD AUTO: 1.3 % — SIGNIFICANT CHANGE UP (ref 0–8)
GLUCOSE SERPL-MCNC: 112 MG/DL — HIGH (ref 70–99)
HCT VFR BLD CALC: 34.1 % — LOW (ref 42–52)
HGB BLD-MCNC: 11.3 G/DL — LOW (ref 14–18)
IMM GRANULOCYTES NFR BLD AUTO: 0.6 % — HIGH (ref 0.1–0.3)
LACTATE SERPL-SCNC: 1 MMOL/L — SIGNIFICANT CHANGE UP (ref 0.7–2)
LYMPHOCYTES # BLD AUTO: 1.38 K/UL — SIGNIFICANT CHANGE UP (ref 1.2–3.4)
LYMPHOCYTES # BLD AUTO: 19.2 % — LOW (ref 20.5–51.1)
MCHC RBC-ENTMCNC: 30.4 PG — SIGNIFICANT CHANGE UP (ref 27–31)
MCHC RBC-ENTMCNC: 33.1 G/DL — SIGNIFICANT CHANGE UP (ref 32–37)
MCV RBC AUTO: 91.7 FL — SIGNIFICANT CHANGE UP (ref 80–94)
MONOCYTES # BLD AUTO: 0.75 K/UL — HIGH (ref 0.1–0.6)
MONOCYTES NFR BLD AUTO: 10.4 % — HIGH (ref 1.7–9.3)
NEUTROPHILS # BLD AUTO: 4.89 K/UL — SIGNIFICANT CHANGE UP (ref 1.4–6.5)
NEUTROPHILS NFR BLD AUTO: 67.8 % — SIGNIFICANT CHANGE UP (ref 42.2–75.2)
NRBC # BLD: 0 /100 WBCS — SIGNIFICANT CHANGE UP (ref 0–0)
PLATELET # BLD AUTO: 154 K/UL — SIGNIFICANT CHANGE UP (ref 130–400)
POTASSIUM SERPL-MCNC: 4.2 MMOL/L — SIGNIFICANT CHANGE UP (ref 3.5–5)
POTASSIUM SERPL-SCNC: 4.2 MMOL/L — SIGNIFICANT CHANGE UP (ref 3.5–5)
PROT SERPL-MCNC: 6.8 G/DL — SIGNIFICANT CHANGE UP (ref 6–8)
RBC # BLD: 3.72 M/UL — LOW (ref 4.7–6.1)
RBC # FLD: 14.2 % — SIGNIFICANT CHANGE UP (ref 11.5–14.5)
SODIUM SERPL-SCNC: 137 MMOL/L — SIGNIFICANT CHANGE UP (ref 135–146)
WBC # BLD: 7.2 K/UL — SIGNIFICANT CHANGE UP (ref 4.8–10.8)
WBC # FLD AUTO: 7.2 K/UL — SIGNIFICANT CHANGE UP (ref 4.8–10.8)

## 2022-06-04 PROCEDURE — 99284 EMERGENCY DEPT VISIT MOD MDM: CPT

## 2022-06-04 RX ORDER — CEFAZOLIN SODIUM 1 G
2000 VIAL (EA) INJECTION ONCE
Refills: 0 | Status: COMPLETED | OUTPATIENT
Start: 2022-06-04 | End: 2022-06-04

## 2022-06-04 RX ADMIN — Medication 100 MILLIGRAM(S): at 22:34

## 2022-06-04 NOTE — ED ADULT TRIAGE NOTE - CHIEF COMPLAINT QUOTE
BIBA with complaints of cellulitis to feet with swelling and redness to both feet but worst on right big toe. Sent from assisted living

## 2022-06-04 NOTE — CONSULT NOTE ADULT - SUBJECTIVE AND OBJECTIVE BOX
Podiatry Consult Note    Subjective:  MAMTA FERNANDO is a pleasant well-nourished, well developed 71y Male in no acute distress, alert awake, and oriented to person, place and time.   Patient is a 71y old  Male who presents with a chief complaint of R big toe pain. States he has had 3 episodes over the past 2 months but each time they resolved on their own. Sent in from Assisted Living for wound check. States R big toe painful. Also reports that this episode he noticed white coloration on tip of toe. States the toe had previously filled with blood and that the nail started lifting off. Denies N/V/F/C/trauma. No other pedal complaints.   HPI:    PAST MEDICAL & SURGICAL HISTORY:  ESRD on dialysis  T/ TH/ S      HTN (hypertension)      HLD (hyperlipidemia)      Heart failure      Anemia      Afib      H/O right coronary artery stent placement  stent x3      S/P arteriovenous (AV) fistula creation      S/P cataract surgery    Objective:  Vital Signs Last 24 Hrs  T(C): 36.4 (04 Jun 2022 21:06), Max: 36.4 (04 Jun 2022 21:06)  T(F): 97.6 (04 Jun 2022 21:06), Max: 97.6 (04 Jun 2022 21:06)  HR: 66 (04 Jun 2022 21:06) (66 - 66)  BP: 140/66 (04 Jun 2022 21:06) (140/66 - 140/66)  BP(mean): --  RR: 19 (04 Jun 2022 21:06) (19 - 19)  SpO2: 100% (04 Jun 2022 21:06) (100% - 100%)                        11.3   7.20  )-----------( 154      ( 04 Jun 2022 22:50 )             34.1                 06-04    137  |  96<L>  |  43<H>  ----------------------------<  112<H>  4.2   |  27  |  7.6<HH>    Ca    9.8      04 Jun 2022 22:50    TPro  6.8  /  Alb  4.1  /  TBili  0.7  /  DBili  x   /  AST  11  /  ALT  7   /  AlkPhos  112  06-04        Physical Exam - Lower Extremity Focused:   #Right Lower Extremity  Derm:   Erythema and Cellulitis to Right Hallux  Purulent Encapsulated Abscess at Distal Tip;   No Active Drainage/Bleeding; No Malodor noted;  Skin Thin & Atrophic;    Vascular: DP and PT Pulses Diminished; Foot is Warm to Warm to the touch   Neuro: Protective Sensation Diminished / Moderately Neuropathic   MSK: Pain On Palpation at Wound Site     Assessment:  Right Hallux Abscess - Probes to Bone  Cellulitic Right Lower Extremity     Plan:  Chart reviewed and Patient evaluated. All Questions and Concerns Addressed and Answered  Discussed diagnosis and treatment with patient  Bedside I&D and Total Nail Avulsion performed; See Procedure Note;  5cc Purulent drainage manually expressed;  Wound Base Granular; Probes to Bone;  Wound Flushed w/ NS; Wound Dressed w/ Bacitracin / DSD / ABD / Kerlix / ACE  Local Wound Care; As Stated Above;  Wound Culture Obtained; Sent to Pathology; Pending Results   XR Imaging Right Foot; Pending Results  Recommend; Lower Extremity Arterial Duplex B/L; ESR/CRP; MRI-Right Foot (eval for Osteomyelitis);  Request ID Consult; WBC; 7.20 but Probes to Bone / Follow Up w/ Wound Culture  Possible Surgical Debridement; Pending Arterial Duplex, ESR/CRP, and XR/MRI Imaging;  Request Medical Clearance;  Discussed Plan w/ Attending;    Podiatry        Podiatry Consult Note    Subjective:  MAMTA FERNANDO is a pleasant well-nourished, well developed 71y Male in no acute distress, alert awake, and oriented to person, place and time.   Patient is a 71y old  Male who presents with a chief complaint of R big toe pain. States he has had 3 episodes over the past 2 months but each time they resolved on their own. Sent in from Assisted Living for wound check. States R big toe painful. Also reports that this episode he noticed white coloration on tip of toe. States the toe had previously filled with blood and that the nail started lifting off. Denies N/V/F/C/trauma. No other pedal complaints.   HPI:    PAST MEDICAL & SURGICAL HISTORY:  ESRD on dialysis  T/ TH/ S      HTN (hypertension)      HLD (hyperlipidemia)      Heart failure      Anemia      Afib      H/O right coronary artery stent placement  stent x3      S/P arteriovenous (AV) fistula creation      S/P cataract surgery    Objective:  Vital Signs Last 24 Hrs  T(C): 36.4 (04 Jun 2022 21:06), Max: 36.4 (04 Jun 2022 21:06)  T(F): 97.6 (04 Jun 2022 21:06), Max: 97.6 (04 Jun 2022 21:06)  HR: 66 (04 Jun 2022 21:06) (66 - 66)  BP: 140/66 (04 Jun 2022 21:06) (140/66 - 140/66)  BP(mean): --  RR: 19 (04 Jun 2022 21:06) (19 - 19)  SpO2: 100% (04 Jun 2022 21:06) (100% - 100%)                        11.3   7.20  )-----------( 154      ( 04 Jun 2022 22:50 )             34.1                 06-04    137  |  96<L>  |  43<H>  ----------------------------<  112<H>  4.2   |  27  |  7.6<HH>    Ca    9.8      04 Jun 2022 22:50    TPro  6.8  /  Alb  4.1  /  TBili  0.7  /  DBili  x   /  AST  11  /  ALT  7   /  AlkPhos  112  06-04        Physical Exam - Lower Extremity Focused:   #Right Lower Extremity  Derm:   Erythema and Cellulitis to Right Hallux  Purulent Encapsulated Abscess at Distal Tip;   No Active Drainage/Bleeding; No Malodor noted;  Skin Thin & Atrophic;    Vascular: DP and PT Pulses Diminished; Foot is Warm to Warm to the touch   Neuro: Protective Sensation Diminished / Moderately Neuropathic   MSK: Pain On Palpation at Wound Site     Assessment:  Right Hallux Abscess - Probes to Bone  Cellulitic Right Lower Extremity     Plan:  Chart reviewed and Patient evaluated. All Questions and Concerns Addressed and Answered  Discussed diagnosis and treatment with patient  Bedside I&D and Total Nail Avulsion performed; See Procedure Note;  5cc Purulent drainage manually expressed;  Wound Base Granular; Probes to Bone;  Wound Flushed w/ NS; Wound Dressed w/ Bacitracin / DSD / ABD / Kerlix / ACE  Local Wound Care; As Stated Above;  Wound Culture Obtained; Sent to Pathology; Pending Results   XR Imaging Right Foot; Pending Results  Recommend; Lower Extremity Arterial Duplex B/L; ESR/CRP; MRI-Right Foot (eval for Osteomyelitis);  Request ID Consult; WBC; 7.20 but Probes to Bone / Follow Up w/ Wound Culture  Plan for Surgical Debridement; Pending Arterial Duplex, ESR/CRP, and XR/MRI Imaging;  Patient requests to go home and take care of personal duties. Pt agrees to return to ED Sun 6/5 evening to be admitted for sx;  Discussed Plan w/ Attending;    Podiatry

## 2022-06-05 PROCEDURE — 73620 X-RAY EXAM OF FOOT: CPT | Mod: 26,RT

## 2022-06-05 RX ORDER — THROMBIN (BOVINE) 10000 UNIT
20000 KIT TOPICAL ONCE
Refills: 0 | Status: DISCONTINUED | OUTPATIENT
Start: 2022-06-05 | End: 2022-06-05

## 2022-06-05 NOTE — ED PROVIDER NOTE - CLINICAL SUMMARY MEDICAL DECISION MAKING FREE TEXT BOX
70 yo man with toe infection which appears to be osteo.  Podiatry evaluated and recommended admission, but patient wants to come back later for treatment.  No evidence of severe sepsis or septic shock.  He is aware he may lose the toe with delay.  Antibiotics and plan to return in 24 hours.

## 2022-06-05 NOTE — ED PROVIDER NOTE - PHYSICAL EXAMINATION
Physical Exam    Vital Signs: I have reviewed the initial vital signs.  Constitutional: well-nourished, appears stated age, no acute distress  Eyes: Conjunctiva pink, Sclera clear  Cardiovascular: S1 and S2, regular rate, regular rhythm, well-perfused extremities, radial and pedal pulses equal and 2+ b/l.   Respiratory: unlabored respiratory effort, clear to auscultation bilaterally no wheezing, rales and rhonchi. pt is speaking full sentences. no accessory muscle use.   Musculoskeletal: FROM of toes b/l.   Integumentary: warm, dry, no rash. (+) right big toe is warm, erythematous, and edematous. right toe nail is avulsed, and the distal pad of the right big toe has black discoloration extending to the area beneath the toe nail with tenderness to palpation.   Neurologic: awake, alert, cranial nerves II-XII grossly intact, extremities’ motor and sensory functions grossly intact.   Psychiatric: appropriate mood, appropriate affect

## 2022-06-05 NOTE — PROGRESS NOTE ADULT - SUBJECTIVE AND OBJECTIVE BOX
Podiatry Progress Note    Subjective:   MAMTA FERNANDO is a pleasant well-nourished, well developed 71y Male in no acute distress, alert awake, and oriented to person, place and time.  Patient is a 71y old  Male who presents with a chief complaint of Right big toe pain & redness. Patient not DSC from ED; Podiatry called for strikethrough dressing. Pt seen & evaluated at bedside. Pt reports mild pain to RLE. Pt denies N/V/F/C. Strikethrough noted to ACE bandage. Pt states he wants to be DSC and come back for admission tomorrow, Mon 6/6. No other pedal complaints.     PAST MEDICAL & SURGICAL HISTORY:  ESRD on dialysis  T/ TH/ S      HTN (hypertension)      HLD (hyperlipidemia)      Heart failure      Anemia      Afib      H/O right coronary artery stent placement  stent x3      S/P arteriovenous (AV) fistula creation      S/P cataract surgery           Objective:  Vital Signs Last 24 Hrs  T(C): 36.4 (04 Jun 2022 21:06), Max: 36.4 (04 Jun 2022 21:06)  T(F): 97.6 (04 Jun 2022 21:06), Max: 97.6 (04 Jun 2022 21:06)  HR: 66 (04 Jun 2022 21:06) (66 - 66)  BP: 140/66 (04 Jun 2022 21:06) (140/66 - 140/66)  BP(mean): --  RR: 19 (04 Jun 2022 21:06) (19 - 19)  SpO2: 100% (04 Jun 2022 21:06) (100% - 100%)                        11.3   7.20  )-----------( 154      ( 04 Jun 2022 22:50 )             34.1                 06-04    137  |  96<L>  |  43<H>  ----------------------------<  112<H>  4.2   |  27  |  7.6<HH>    Ca    9.8      04 Jun 2022 22:50    TPro  6.8  /  Alb  4.1  /  TBili  0.7  /  DBili  x   /  AST  11  /  ALT  7   /  AlkPhos  112  06-04      ---------------------  Physical Exam - Lower Extremity Focused:   #Right Lower Extremity  Derm:   Hallux nail avulsion;   Wound Base Granular; Mild Active Bleeding noted from medial nail bed; (+) Probe to Bone  Periwound: Erythema and Cellulitis to Right Hallux improved;  No Malodor noted;  Skin Thin & Atrophic;    Vascular: DP and PT Pulses Diminished; Foot is Warm to Warm to the touch   Neuro: Protective Sensation Diminished / Moderately Neuropathic   MSK: Pain On Palpation at Wound Site     Assessment:  Right Hallux Abscess - Probes to Bone  Cellulitic Right Lower Extremity   s/p Bedside Total Hallux Nail Avulsion, 6/5    Plan:  Chart reviewed and Patient evaluated. All Questions and Concerns Addressed and Answered  Discussed diagnosis and treatment with patient  Wound Flushed w/ NS; Wound Dressed w/ Thrombin / Gelfoam / DSD / ABD / Kerlix / ACE  Local Wound Care; As Stated Above;  Wound Culture; Pending Results   XR Imaging Right Foot; Pending Read;  Recommend; Lower Extremity Arterial Duplex B/L; ESR/CRP; MRI-Right Foot (eval for Osteomyelitis);  Request ID Consult; WBC; 7.20 but Probes to Bone / Follow Up w/ Wound Culture  Plan for Surgical Debridement; Pending Arterial Duplex, ESR/CRP, and XR/MRI Imaging;  Patient can be DSC Home and return to ED Mon 6/6 to be admitted for sx;  Discussed Plan w/ Attending;    Podiatry    Podiatry Progress Note    Subjective:   MAMTA FERNANDO is a pleasant well-nourished, well developed 71y Male in no acute distress, alert awake, and oriented to person, place and time.  Patient is a 71y old  Male who presents with a chief complaint of Right big toe pain & redness. Patient not DSC from ED; Podiatry called for strikethrough dressing. Pt seen & evaluated at bedside. Pt reports mild pain to RLE. Pt denies N/V/F/C. Strikethrough noted to ACE bandage. Pt states he wants to be DSC and come back for admission tomorrow, Mon 6/6. No other pedal complaints.     PAST MEDICAL & SURGICAL HISTORY:  ESRD on dialysis  T/ TH/ S      HTN (hypertension)      HLD (hyperlipidemia)      Heart failure      Anemia      Afib      H/O right coronary artery stent placement  stent x3      S/P arteriovenous (AV) fistula creation      S/P cataract surgery           Objective:  Vital Signs Last 24 Hrs  T(C): 36.4 (04 Jun 2022 21:06), Max: 36.4 (04 Jun 2022 21:06)  T(F): 97.6 (04 Jun 2022 21:06), Max: 97.6 (04 Jun 2022 21:06)  HR: 66 (04 Jun 2022 21:06) (66 - 66)  BP: 140/66 (04 Jun 2022 21:06) (140/66 - 140/66)  BP(mean): --  RR: 19 (04 Jun 2022 21:06) (19 - 19)  SpO2: 100% (04 Jun 2022 21:06) (100% - 100%)                        11.3   7.20  )-----------( 154      ( 04 Jun 2022 22:50 )             34.1                 06-04    137  |  96<L>  |  43<H>  ----------------------------<  112<H>  4.2   |  27  |  7.6<HH>    Ca    9.8      04 Jun 2022 22:50    TPro  6.8  /  Alb  4.1  /  TBili  0.7  /  DBili  x   /  AST  11  /  ALT  7   /  AlkPhos  112  06-04      ---------------------  Physical Exam - Lower Extremity Focused:   #Right Lower Extremity  Derm:   Hallux nail avulsion;   Wound Base Granular; Mild Active Bleeding noted from medial nail bed; (+) Probe to Bone  Periwound: Erythema and Cellulitis to Right Hallux improved;  No Malodor noted;  Skin Thin & Atrophic;    Vascular: DP and PT Pulses Diminished; Foot is Warm to Warm to the touch   Neuro: Protective Sensation Diminished / Moderately Neuropathic   MSK: Pain On Palpation at Wound Site     Assessment:  Right Hallux Abscess - Probes to Bone  Cellulitic Right Lower Extremity   s/p Bedside Total Hallux Nail Avulsion, 6/5    Plan:  Chart reviewed and Patient evaluated. All Questions and Concerns Addressed and Answered  Discussed diagnosis and treatment with patient  Wound Flushed w/ NS; Wound Dressed w/ Thrombin / Gelfoam / DSD / ABD / Kerlix / ACE  Local Wound Care; As Stated Above;  Wound Culture; Pending Results   XR Imaging Right Foot; Pending Read;  Recommend; Lower Extremity Arterial Duplex B/L; ESR/CRP; MRI-Right Foot (eval for Osteomyelitis);  Request ID Consult; WBC; 7.20 but Probes to Bone / Follow Up w/ Wound Culture  Plan for Surgical Debridement; Pending Arterial Duplex, ESR/CRP, and XR/MRI Imaging;  Patient wants to leave and return Mon 6/6. If re-admitted Attending will evaluate Mon 6/6.  Discussed Plan w/ Attending;    Podiatry

## 2022-06-05 NOTE — ED ADULT NURSE NOTE - NSIMPLEMENTINTERV_GEN_ALL_ED
Implemented All Fall with Harm Risk Interventions:  [facility] to call system. Call bell, personal items and telephone within reach. Instruct patient to call for assistance. Room bathroom lighting operational. Non-slip footwear when patient is off stretcher. Physically safe environment: no spills, clutter or unnecessary equipment. Stretcher in lowest position, wheels locked, appropriate side rails in place. Provide visual cue, wrist band, yellow gown, etc. Monitor gait and stability. Monitor for mental status changes and reorient to person, place, and time. Review medications for side effects contributing to fall risk. Reinforce activity limits and safety measures with patient and family. Provide visual clues: red socks.

## 2022-06-05 NOTE — ED ADULT NURSE NOTE - IS THE PATIENT ABLE TO BE SCREENED?
"Mom states that child was diagnosed with pink eye on Monday and she has noted issues with getting the Poly[place] drops filled at [facility], called in to 24 [address] [facility] on [address] in [place], however Mom states that child is crying a lot, constantly rubbing her eyes, no eye redness, no pus or drainage noted. Severe redness around the corners of her eyes and eyelid, appears painful. Reviewed guidelines below and recommended UC today at [facility], mom unsure of plan will check with spouse.     Reason for Disposition    [1] Eyelid is very swollen (shut or almost) OR very red AND [2] no fever    Additional Information    Negative: Followed an injury to the eye    Negative: Yellow or green pus in the eye (Reason: Dried pus in the eye can cause mild eye pain and a FB sensation)    Negative: Chemical got in the eye    Negative: Piece of something (foreign body) got in the eye    Negative: [1] Pollen or other allergic substance got in the eye AND [2] MILD eye pain (Reason: Pollen in the eye can cause mild pain or burning, as well as being itchy)    Negative: [1] Tender, red lump or pimple AND [2] located along the eyelid margin    Negative: [1] Pink eyes (pink sclera) BUT [2] eyes are NOT painful or pain is MILD    Negative: [1] Blurred vision BUT [2] eyes are NOT painful    Negative: Has sinus pain or pressure    Negative: [1] Migraine headache AND [2] eye pain is part of it    Negative: SEVERE eye pain    Negative: Complete loss of vision in one or both eyes    Negative: [1] Area around the eye is very red AND [2] fever    Negative: [1] Eye is very swollen (shut or almost) AND [2] fever    Negative: [1] Stiff neck (can't touch chin to chest) AND [2] fever    Negative: [1] Foreign body sensation (\"feels like something is in there\") AND [2] irrigation didn't help    Negative: Cloudy spot or haziness of cornea (clear part of eye)    Negative: [1] Blurred vision AND [2] new or worsening    Negative: Child sounds very " sick or weak to the triager    Protocols used: EYE PAIN-PEDIATRIC-    Raiza Perez, RN, BSN  [facility]       Yes

## 2022-06-05 NOTE — ED PROVIDER NOTE - OBJECTIVE STATEMENT
70 y/o male with a PMH of ESRD on HD dialysis via right AV fistula M/W/F followed by nephrologist Dr. Taveras, CAD 3 stents, and MI presents to the ED from [facility] for evaluation of right big toe infection that began yesterday. pt reports for the past two months he has had 3 intermittent right big toe infection. pt denies fever, drainage from the toe, recent trauma, weakness, numbness, or tingling.

## 2022-06-05 NOTE — ED PROVIDER NOTE - NS ED ROS FT
CONST: No fever, chills or bodyaches  EYES: No pain, redness, drainage or visual changes.  ENT: No ear pain or discharge, nasal discharge or congestion. No sore throat  CARD: No chest pain, palpitations  RESP: No SOB, cough, hemoptysis. No hx of asthma or COPD  GI: No abdominal pain, N/V/D  : No urinary symptoms  MS: No joint pain, back pain or extremity pain/injury  SKIN: No rashes. (+) right big toe infection  NEURO: No headache, dizziness, paresthesias or LOC

## 2022-06-05 NOTE — ED PROVIDER NOTE - NSFOLLOWUPINSTRUCTIONS_ED_ALL_ED_FT
PATIENT SEEN BY PODIATRY AND THERE IS PLAN FOR SURGICAL DEBRIDEMENT; HOWEVER PT REPORTS HE WOULD LIKE TO GO HOME TONIGHT TO TAKE CARE OF DUTIES, AND WILL RETURN TO THE ED THIS EVENING FOR SURGICAL DEBRIDEMENT. PLAN DISCUSSED WITH PODIATRY.     Abscess    WHAT YOU NEED TO KNOW:    A warm compress may help your abscess drain. Your healthcare provider may make a cut in the abscess so it can drain. You may need surgery to remove an abscess that is on your hands or buttocks.     DISCHARGE INSTRUCTIONS:    Return to the emergency department if:     The area around your abscess becomes very painful, warm, or has red streaks.      You have a fever and chills.      Your heart is beating faster than usual.       You feel faint or confused.    Contact your healthcare provider if:     Your abscess gets bigger or does not get better.      Your abscess returns.      You have questions or concerns about your condition or care.    Medicines: You may need any of the following:     Antibiotics help treat a bacterial infection.       Acetaminophen decreases pain and fever. It is available without a doctor's order. Ask how much to take and how often to take it. Follow directions. Acetaminophen can cause liver damage if not taken correctly.      NSAIDs, such as ibuprofen, help decrease swelling, pain, and fever. This medicine is available with or without a doctor's order. NSAIDs can cause stomach bleeding or kidney problems in certain people. If you take blood thinner medicine, always ask your healthcare provider if NSAIDs are safe for you. Always read the medicine label and follow directions.      Take your medicine as directed. Contact your healthcare provider if you think your medicine is not helping or if you have side effects. Tell him or her if you are allergic to any medicine. Keep a list of the medicines, vitamins, and herbs you take. Include the amounts, and when and why you take them. Bring the list or the pill bottles to follow-up visits. Carry your medicine list with you in case of an emergency.    Self-care:     Apply a warm compress to your abscess. This will help it open and drain. Wet a washcloth in warm, but not hot, water. Apply the compress for 10 minutes. Repeat this 4 times each day. Do not press on an abscess or try to open it with a needle. You may push the bacteria deeper or into your blood.       Do not share your clothes, towels, or sheets with anyone. This can spread the infection to others.       Wash your hands often. This can help prevent the spread of germs. Use soap and water or an alcohol-based hand rub.     Care for your wound after it is drained:     Care for your wound as directed. If your healthcare provider says it is okay, carefully remove the bandage and gauze packing. You may need to soak the gauze to get it out of your wound. Clean your wound and the area around it as directed. Dry the area and put on new, clean bandages. Change your bandages when they get wet or dirty.       Ask your healthcare provider how to change the gauze in your wound. Keep track of how many pieces of gauze are placed inside the wound. Do not put too much packing in the wound. Do not pack the gauze too tightly in your wound.     Follow up with your healthcare provider in 1 to 3 days: You may need to have your packing removed or your bandage changed. Write down your questions so you remember to ask them during your visits.        © Copyright [employer] 2019 All illustrations and images included in CareNotes are the copyrighted property of [employer] or [employer].          Cellulitis, Adult  Cellulitis is a skin infection. The infected area is usually red and tender. This condition occurs most often in the arms and lower legs. The infection can travel to the muscles, blood, and underlying tissue and become serious. It is very important to get treated for this condition.    What are the causes?  Cellulitis is caused by bacteria. The bacteria enter through a break in the skin, such as a cut, burn, insect bite, open sore, or crack.    What increases the risk?  This condition is more likely to occur in people who:    Have a weak defense system (immune system).  Have open wounds on the skin such as cuts, burns, bites, and scrapes. Bacteria can enter the body through these open wounds.  Are older.  Have diabetes.  Have a type of long-lasting (chronic) liver disease (cirrhosis) or kidney disease.  Use IV drugs.    What are the signs or symptoms?  Symptoms of this condition include:    Redness, streaking, or spotting on the skin.  Swollen area of the skin.  Tenderness or pain when an area of the skin is touched.  Warm skin.  Fever.  Chills.  Blisters.    How is this diagnosed?  This condition is diagnosed based on a medical history and physical exam. You may also have tests, including:    Blood tests.  Lab tests.  Imaging tests.    How is this treated?  Treatment for this condition may include:    Medicines, such as antibiotic medicines or antihistamines.  Supportive care, such as rest and application of cold or warm cloths (cold or warm compresses) to the skin.  Hospital care, if the condition is severe.    The infection usually gets better within 1–2 days of treatment.    Follow these instructions at home:  Take over-the-counter and prescription medicines only as told by your health care provider.  If you were prescribed an antibiotic medicine, take it as told by your health care provider. Do not stop taking the antibiotic even if you start to feel better.  Drink enough fluid to keep your urine clear or pale yellow.  Do not touch or rub the infected area.  Raise (elevate) the infected area above the level of your heart while you are sitting or lying down.  Apply warm or cold compresses to the affected area as told by your health care provider.  Keep all follow-up visits as told by your health care provider. This is important. These visits let your health care provider make sure a more serious infection is not developing.  Contact a health care provider if:  You have a fever.  Your symptoms do not improve within 1–2 days of starting treatment.  Your bone or joint underneath the infected area becomes painful after the skin has healed.  Your infection returns in the same area or another area.  You notice a swollen bump in the infected area.  You develop new symptoms.  You have a general ill feeling (malaise) with muscle aches and pains.  Get help right away if:  Your symptoms get worse.  You feel very sleepy.  You develop vomiting or diarrhea that persists.  You notice red streaks coming from the infected area.  Your red area gets larger or turns dark in color.  This information is not intended to replace advice given to you by your health care provider. Make sure you discuss any questions you have with your health care provider.

## 2022-06-05 NOTE — ED PROVIDER NOTE - PATIENT PORTAL LINK FT
You can access the FollowMyHealth Patient Portal offered by [facility] by registering at the following website: [email or web address]. By joining "[employer]’s FollowMyHealth portal, you will also be able to view your health information using other applications (apps) compatible with our system.

## 2022-06-05 NOTE — ED PROVIDER NOTE - PROGRESS NOTE DETAILS
FF: I spoke with franklin from [facility] to discuss plan and make sure pt returns this evening. FF: I discussed radiology and lab results with pt. pt seen by podiatry. plan for surgical debridement.   Patient requests to go home and take care of personal duties. Pt agrees to return to ED Sun 6/5 evening to be admitted for sx when podiatry spoke with pt. I spoke with pt regarding podiatry plan and still wants to go home and return this evening.

## 2022-06-05 NOTE — ED PROVIDER NOTE - CARE PROVIDER_API CALL
Smooth Gibbs (DPM)  Foot Surgery; Podiatric Medicine  [address] [place] [address], [address] [place] [address]  Phone: [phone]  Fax: [phone]  Follow Up Time: Urgent

## 2022-06-05 NOTE — ED ADULT NURSE NOTE - OBJECTIVE STATEMENT
Patient BIBA from assisted living with pain, swelling and redness to the R big toe.  States that the symptoms began one day ago.  Denies any past or recent trauma to the area, numbness or tingling.

## 2022-06-07 ENCOUNTER — APPOINTMENT (OUTPATIENT)
Dept: PODIATRY | Facility: CLINIC | Age: 72
End: 2022-06-07
Payer: MEDICAID

## 2022-06-07 ENCOUNTER — OUTPATIENT (OUTPATIENT)
Dept: OUTPATIENT SERVICES | Facility: HOSPITAL | Age: 72
LOS: 1 days | Discharge: HOME | End: 2022-06-07

## 2022-06-07 DIAGNOSIS — Z98.49 CATARACT EXTRACTION STATUS, UNSPECIFIED EYE: Chronic | ICD-10-CM

## 2022-06-07 DIAGNOSIS — Z98.890 OTHER SPECIFIED POSTPROCEDURAL STATES: Chronic | ICD-10-CM

## 2022-06-07 DIAGNOSIS — Z95.5 PRESENCE OF CORONARY ANGIOPLASTY IMPLANT AND GRAFT: Chronic | ICD-10-CM

## 2022-06-07 LAB
CULTURE RESULTS: SIGNIFICANT CHANGE UP
SPECIMEN SOURCE: SIGNIFICANT CHANGE UP

## 2022-06-07 PROCEDURE — 99203 OFFICE O/P NEW LOW 30 MIN: CPT | Mod: NC

## 2022-06-07 NOTE — END OF VISIT
[] : Resident [FreeTextEntry3] : xrays reviewed\par referred for MRI\par rx augmentin\par rx wound care supplies\par return 2 weeks

## 2022-06-07 NOTE — PHYSICAL EXAM
[1+] : left foot dorsalis pedis 1+ [FreeTextEntry1] : right hallux nail ulceration.  nail bed laceration at distal lateral end. tender to palpation. light erythema [Oriented To Time, Place, And Person] : oriented to person, place, and time [Impaired Insight] : insight and judgment were intact

## 2022-06-07 NOTE — ASSESSMENT
[FreeTextEntry1] : 71M . \par Patient wound care of right hallux previous nail avulsion. \par \par Rx: MRI R foot. \par \par Patient to follow up in clinic in 1 month.  [Verbal] : verbal [Good - alert, interested, motivated] : Good - alert, interested, motivated

## 2022-06-07 NOTE — HISTORY OF PRESENT ILLNESS
[FreeTextEntry1] : 70 y/o diabetic male here today for management of right hallux ulcer. Patient was in the ED for acute avulsion of the right hallux nail. xrays revealed erosive changes of the first distal phalanx

## 2022-06-21 ENCOUNTER — OUTPATIENT (OUTPATIENT)
Dept: OUTPATIENT SERVICES | Facility: HOSPITAL | Age: 72
LOS: 1 days | Discharge: HOME | End: 2022-06-21

## 2022-06-21 ENCOUNTER — NON-APPOINTMENT (OUTPATIENT)
Age: 72
End: 2022-06-21

## 2022-06-21 ENCOUNTER — APPOINTMENT (OUTPATIENT)
Dept: PODIATRY | Facility: CLINIC | Age: 72
End: 2022-06-21
Payer: MEDICAID

## 2022-06-21 DIAGNOSIS — Z95.5 PRESENCE OF CORONARY ANGIOPLASTY IMPLANT AND GRAFT: Chronic | ICD-10-CM

## 2022-06-21 DIAGNOSIS — Z98.890 OTHER SPECIFIED POSTPROCEDURAL STATES: Chronic | ICD-10-CM

## 2022-06-21 DIAGNOSIS — Z98.49 CATARACT EXTRACTION STATUS, UNSPECIFIED EYE: Chronic | ICD-10-CM

## 2022-06-21 PROCEDURE — 99213 OFFICE O/P EST LOW 20 MIN: CPT | Mod: NC

## 2022-06-21 NOTE — PHYSICAL EXAM
[1+] : left foot dorsalis pedis 1+ [Oriented To Time, Place, And Person] : oriented to person, place, and time [Impaired Insight] : insight and judgment were intact [FreeTextEntry1] : right hallux nail ulceration has healed. no tenderness on palpation of nail bed

## 2022-06-21 NOTE — HISTORY OF PRESENT ILLNESS
[FreeTextEntry1] : 70 y/o diabetic male here today for management of right hallux ulcer. Patient was in the ED for acute avulsion of the right hallux nail. xrays revealed erosive changes of the first distal phalanx\par \par 6/21- returns today for continued care.

## 2022-06-21 NOTE — ASSESSMENT
[Verbal] : verbal [Good - alert, interested, motivated] : Good - alert, interested, motivated [FreeTextEntry1] : pt has not followed up with MRI\par no local wound care need-->nail bed has healed\par follow up after  MRI\par

## 2022-06-28 ENCOUNTER — APPOINTMENT (OUTPATIENT)
Dept: CARDIOLOGY | Facility: CLINIC | Age: 72
End: 2022-06-28

## 2022-06-28 VITALS
DIASTOLIC BLOOD PRESSURE: 65 MMHG | HEIGHT: 66 IN | SYSTOLIC BLOOD PRESSURE: 108 MMHG | HEART RATE: 63 BPM | WEIGHT: 156 LBS | BODY MASS INDEX: 25.07 KG/M2 | TEMPERATURE: 97.8 F

## 2022-06-28 PROCEDURE — 93000 ELECTROCARDIOGRAM COMPLETE: CPT

## 2022-06-28 PROCEDURE — 99213 OFFICE O/P EST LOW 20 MIN: CPT

## 2022-06-28 RX ORDER — SUCRALFATE 1 G/10ML
1 SUSPENSION ORAL
Qty: 900 | Refills: 5 | Status: COMPLETED | COMMUNITY
Start: 2022-03-31 | End: 2022-06-28

## 2022-06-28 RX ORDER — GAUZE BANDAGE 4" X 4"
SPONGE TOPICAL
Qty: 1 | Refills: 0 | Status: COMPLETED | COMMUNITY
Start: 2022-06-07 | End: 2022-06-28

## 2022-06-28 RX ORDER — SEVELAMER CARBONATE 800 MG/1
800 TABLET, FILM COATED ORAL
Qty: 180 | Refills: 5 | Status: COMPLETED | COMMUNITY
End: 2022-06-28

## 2022-06-28 RX ORDER — METOPROLOL TARTRATE 25 MG/1
25 TABLET, FILM COATED ORAL TWICE DAILY
Qty: 30 | Refills: 11 | Status: COMPLETED | COMMUNITY
Start: 2022-03-09 | End: 2022-06-28

## 2022-06-28 RX ORDER — ADHESIVE TAPE 3"X 2.3 YD
4"X4" TAPE, NON-MEDICATED TOPICAL
Qty: 1 | Refills: 0 | Status: COMPLETED | COMMUNITY
Start: 2022-06-07 | End: 2022-06-28

## 2022-06-28 RX ORDER — ATORVASTATIN CALCIUM 40 MG/1
40 TABLET, FILM COATED ORAL
Qty: 90 | Refills: 2 | Status: COMPLETED | COMMUNITY
Start: 2017-05-24 | End: 2022-06-28

## 2022-06-28 RX ORDER — CALCIUM ACETATE 667 MG/1
667 CAPSULE ORAL 3 TIMES DAILY
Refills: 0 | Status: COMPLETED | COMMUNITY
End: 2022-06-28

## 2022-06-28 RX ORDER — APIXABAN 2.5 MG/1
2.5 TABLET, FILM COATED ORAL
Qty: 60 | Refills: 5 | Status: COMPLETED | COMMUNITY
Start: 2019-11-18 | End: 2022-06-28

## 2022-06-28 RX ORDER — HYDROCORTISONE 0.5 G/100G
0.5 CREAM TOPICAL TWICE DAILY
Qty: 1 | Refills: 0 | Status: COMPLETED | COMMUNITY
Start: 2022-03-31 | End: 2022-06-28

## 2022-06-28 RX ORDER — PANTOPRAZOLE 40 MG/1
40 TABLET, DELAYED RELEASE ORAL TWICE DAILY
Qty: 180 | Refills: 0 | Status: COMPLETED | COMMUNITY
Start: 2017-05-31 | End: 2022-06-28

## 2022-06-28 NOTE — HISTORY OF PRESENT ILLNESS
[FreeTextEntry1] : 70 y/o male with history of ESRD on HD, HTN, DL, CAD, s/p PCI (3 stents in LAD by Dr. Martin in 2017), presents for f/u. Patient reports no chest pain, dyspnea, orthopnea or PND. No syncope, but does get dizzy with low BP. He had episodes of low BP, especially during HD. He also reports low pulse rate as well. He stopped amlodipine and losartan. Had toe nail removed, following up with podiatry. No new events.

## 2022-06-28 NOTE — PHYSICAL EXAM
[Well Developed] : well developed [No Acute Distress] : no acute distress [Ill-Appearing] : ill-appearing [Normal Conjunctiva] : normal conjunctiva [No Carotid Bruit] : no carotid bruit [Normal S1, S2] : normal S1, S2 [No Murmur] : no murmur [No Rub] : no rub [No Gallop] : no gallop [Clear Lung Fields] : clear lung fields [Good Air Entry] : good air entry [No Respiratory Distress] : no respiratory distress  [Soft] : abdomen soft [Non Tender] : non-tender [No Masses/organomegaly] : no masses/organomegaly [Normal Bowel Sounds] : normal bowel sounds [No Edema] : no edema [No Cyanosis] : no cyanosis [No Clubbing] : no clubbing [No Varicosities] : no varicosities [No Rash] : no rash [No Skin Lesions] : no skin lesions [Moves all extremities] : moves all extremities [No Focal Deficits] : no focal deficits [Normal Speech] : normal speech [Alert and Oriented] : alert and oriented [Normal memory] : normal memory [de-identified] : no JVD [de-identified] : walks with a walker

## 2022-06-28 NOTE — ASSESSMENT
[FreeTextEntry1] : 72 y/o male with history of CAD, s/p PCI, DM, DL, HTN, ESRD on HD.\par \par Low BP\par PAF\par Prior w/u reviewed.\par Echo, ECG, labs reviewed\par \par \par Plan:\par \par C/w ASA\par C/w Eliquis\par C/w statin\par D/c Imdur\par Decrease metoprolol to 12.5 mg q12\par F/u with Nephrology.\par F/u with podiatry\par RTC in 6 months

## 2022-06-29 LAB
ALBUMIN SERPL ELPH-MCNC: 4 G/DL
ALP BLD-CCNC: 90 U/L
ALT SERPL-CCNC: 6 U/L
ANION GAP SERPL CALC-SCNC: 14 MMOL/L
AST SERPL-CCNC: 13 U/L
BASOPHILS # BLD AUTO: 0.04 K/UL
BASOPHILS NFR BLD AUTO: 0.8 %
BILIRUB SERPL-MCNC: 0.3 MG/DL
BUN SERPL-MCNC: 30 MG/DL
CALCIUM SERPL-MCNC: 9.4 MG/DL
CHLORIDE SERPL-SCNC: 96 MMOL/L
CHOLEST SERPL-MCNC: 99 MG/DL
CO2 SERPL-SCNC: 28 MMOL/L
CREAT SERPL-MCNC: 6.56 MG/DL
EGFR: 9 ML/MIN/1.73M2
EOSINOPHIL # BLD AUTO: 0.23 K/UL
EOSINOPHIL NFR BLD AUTO: 4.4 %
ESTIMATED AVERAGE GLUCOSE: 114 MG/DL
GLUCOSE SERPL-MCNC: 124 MG/DL
HBA1C MFR BLD HPLC: 5.6 %
HCT VFR BLD CALC: 27.4 %
HDLC SERPL-MCNC: 35 MG/DL
HGB BLD-MCNC: 8.5 G/DL
IMM GRANULOCYTES NFR BLD AUTO: 0.2 %
LDLC SERPL CALC-MCNC: 43 MG/DL
LYMPHOCYTES # BLD AUTO: 0.91 K/UL
LYMPHOCYTES NFR BLD AUTO: 17.5 %
MAN DIFF?: NORMAL
MCHC RBC-ENTMCNC: 29.8 PG
MCHC RBC-ENTMCNC: 31 GM/DL
MCV RBC AUTO: 96.1 FL
MONOCYTES # BLD AUTO: 0.76 K/UL
MONOCYTES NFR BLD AUTO: 14.6 %
NEUTROPHILS # BLD AUTO: 3.25 K/UL
NEUTROPHILS NFR BLD AUTO: 62.5 %
NONHDLC SERPL-MCNC: 64 MG/DL
PLATELET # BLD AUTO: 170 K/UL
POTASSIUM SERPL-SCNC: 5.1 MMOL/L
PROT SERPL-MCNC: 6.7 G/DL
RBC # BLD: 2.85 M/UL
RBC # FLD: 15.3 %
SODIUM SERPL-SCNC: 139 MMOL/L
TRIGL SERPL-MCNC: 106 MG/DL
TSH SERPL-ACNC: 0.77 UIU/ML
WBC # FLD AUTO: 5.2 K/UL

## 2022-07-05 ENCOUNTER — OUTPATIENT (OUTPATIENT)
Dept: OUTPATIENT SERVICES | Facility: HOSPITAL | Age: 72
LOS: 1 days | Discharge: HOME | End: 2022-07-05

## 2022-07-05 DIAGNOSIS — Z98.890 OTHER SPECIFIED POSTPROCEDURAL STATES: Chronic | ICD-10-CM

## 2022-07-05 DIAGNOSIS — Z98.49 CATARACT EXTRACTION STATUS, UNSPECIFIED EYE: Chronic | ICD-10-CM

## 2022-07-05 DIAGNOSIS — Z95.5 PRESENCE OF CORONARY ANGIOPLASTY IMPLANT AND GRAFT: Chronic | ICD-10-CM

## 2022-07-05 DIAGNOSIS — L97.519 NON-PRESSURE CHRONIC ULCER OF OTHER PART OF RIGHT FOOT WITH UNSPECIFIED SEVERITY: ICD-10-CM

## 2022-07-05 PROCEDURE — 73718 MRI LOWER EXTREMITY W/O DYE: CPT | Mod: 26,RT

## 2022-07-07 ENCOUNTER — NON-APPOINTMENT (OUTPATIENT)
Age: 72
End: 2022-07-07

## 2022-07-12 ENCOUNTER — OUTPATIENT (OUTPATIENT)
Dept: OUTPATIENT SERVICES | Facility: HOSPITAL | Age: 72
LOS: 1 days | Discharge: HOME | End: 2022-07-12

## 2022-07-12 ENCOUNTER — APPOINTMENT (OUTPATIENT)
Dept: PODIATRY | Facility: CLINIC | Age: 72
End: 2022-07-12

## 2022-07-12 DIAGNOSIS — Z95.5 PRESENCE OF CORONARY ANGIOPLASTY IMPLANT AND GRAFT: Chronic | ICD-10-CM

## 2022-07-12 DIAGNOSIS — Z98.49 CATARACT EXTRACTION STATUS, UNSPECIFIED EYE: Chronic | ICD-10-CM

## 2022-07-12 DIAGNOSIS — Z98.890 OTHER SPECIFIED POSTPROCEDURAL STATES: Chronic | ICD-10-CM

## 2022-07-12 PROCEDURE — 99213 OFFICE O/P EST LOW 20 MIN: CPT | Mod: NC

## 2022-07-13 NOTE — HISTORY OF PRESENT ILLNESS
[FreeTextEntry1] : 72 y/o diabetic male here today for management of right hallux ulcer. Patient was in the ED for acute avulsion of the right hallux nail. xrays revealed erosive changes of the first distal phalanx\par \par 6/21- returns today for continued care. \par \par 7/12-here today for follow up. no complaints

## 2022-07-13 NOTE — ASSESSMENT
[Verbal] : verbal [Good - alert, interested, motivated] : Good - alert, interested, motivated [FreeTextEntry1] : MRI: Ulcer at the dorsal aspect of the first toe, with findings consistent with osteomyelitis of the first distal phalanx with likely subungual and intraosseous abscess measuring 1.8 x 0.8 x 1.5 cm.\par \par -findings discussed with patient\par -social work on board with coordinating ID follow up\par -toe does not appear to be acutely infected.\par -pt to follow up in 1 month

## 2022-07-13 NOTE — PHYSICAL EXAM
[1+] : left foot dorsalis pedis 1+ [Oriented To Time, Place, And Person] : oriented to person, place, and time [Impaired Insight] : insight and judgment were intact [FreeTextEntry1] : right hallux nail ulceration has healed. no tenderness on palpation of nail bed. hyperkeratotic overgrowth at the distal nailbed

## 2022-07-18 ENCOUNTER — NON-APPOINTMENT (OUTPATIENT)
Age: 72
End: 2022-07-18

## 2022-07-20 ENCOUNTER — NON-APPOINTMENT (OUTPATIENT)
Age: 72
End: 2022-07-20

## 2022-07-21 ENCOUNTER — OUTPATIENT (OUTPATIENT)
Dept: OUTPATIENT SERVICES | Facility: HOSPITAL | Age: 72
LOS: 1 days | Discharge: HOME | End: 2022-07-21

## 2022-07-21 ENCOUNTER — APPOINTMENT (OUTPATIENT)
Dept: INTERNAL MEDICINE | Facility: CLINIC | Age: 72
End: 2022-07-21

## 2022-07-21 ENCOUNTER — NON-APPOINTMENT (OUTPATIENT)
Age: 72
End: 2022-07-21

## 2022-07-21 VITALS
HEIGHT: 66 IN | WEIGHT: 156 LBS | DIASTOLIC BLOOD PRESSURE: 72 MMHG | OXYGEN SATURATION: 98 % | SYSTOLIC BLOOD PRESSURE: 122 MMHG | HEART RATE: 58 BPM | BODY MASS INDEX: 25.07 KG/M2 | TEMPERATURE: 96.9 F

## 2022-07-21 DIAGNOSIS — Z98.890 OTHER SPECIFIED POSTPROCEDURAL STATES: Chronic | ICD-10-CM

## 2022-07-21 DIAGNOSIS — Z95.5 PRESENCE OF CORONARY ANGIOPLASTY IMPLANT AND GRAFT: Chronic | ICD-10-CM

## 2022-07-21 DIAGNOSIS — Z98.49 CATARACT EXTRACTION STATUS, UNSPECIFIED EYE: Chronic | ICD-10-CM

## 2022-07-21 PROCEDURE — 99214 OFFICE O/P EST MOD 30 MIN: CPT | Mod: GC

## 2022-07-21 NOTE — PHYSICAL EXAM
[No Acute Distress] : no acute distress [Well Nourished] : well nourished [Normal Sclera/Conjunctiva] : normal sclera/conjunctiva [No Respiratory Distress] : no respiratory distress  [No Accessory Muscle Use] : no accessory muscle use [Clear to Auscultation] : lungs were clear to auscultation bilaterally [Normal Rate] : normal rate  [Regular Rhythm] : with a regular rhythm [Soft] : abdomen soft [Non Tender] : non-tender [Non-distended] : non-distended

## 2022-07-26 DIAGNOSIS — I10 ESSENTIAL (PRIMARY) HYPERTENSION: ICD-10-CM

## 2022-07-26 DIAGNOSIS — E11.8 TYPE 2 DIABETES MELLITUS WITH UNSPECIFIED COMPLICATIONS: ICD-10-CM

## 2022-07-26 DIAGNOSIS — Z00.00 ENCOUNTER FOR GENERAL ADULT MEDICAL EXAMINATION WITHOUT ABNORMAL FINDINGS: ICD-10-CM

## 2022-07-26 DIAGNOSIS — N18.6 END STAGE RENAL DISEASE: ICD-10-CM

## 2022-07-26 DIAGNOSIS — I48.91 UNSPECIFIED ATRIAL FIBRILLATION: ICD-10-CM

## 2022-07-26 DIAGNOSIS — I25.10 ATHEROSCLEROTIC HEART DISEASE OF NATIVE CORONARY ARTERY WITHOUT ANGINA PECTORIS: ICD-10-CM

## 2022-07-27 NOTE — HISTORY OF PRESENT ILLNESS
[FreeTextEntry1] : 3mth f/u and discuss lab results. pt states some slight breathing difficulty [de-identified] : 71M w/ h/o ESRD on HD, CAD s/p stents, DM, DLD, Afib on Eliquis, and chronic anemia p/w 3 month f/u. \par Pt still reports dizziness despite stopping amlodipine and imdur. Dizziness decribed as both lightheadedness and room spinning, happens even at rest while note moving, pt thinks its because his hgb is low. \par Still taking losartan. \par Labs from HD: K 5.8, Hgb 7.3 which is lowest it has been, baseline ~8\par

## 2022-07-27 NOTE — ASSESSMENT
[FreeTextEntry1] : 71M w/ h/o ESRD on HD, CAD s/p stents, DM, DLD, Afib on Eliquis, and chronic anemia p/w 6 month f/u and c/o worsening GERD and new onset forehead rash.\par \par #Acute on chronic anemia, likley anemia of chronic dx\par -Hgb 7.3 MCV 91 (from labwork brought from HD), baseline ~8s\par -iron studies with serum iron 33, TIBC 184, ferrttin 450, mixed picture, but likely more chronic dx \par -GI for repeat EGD/[facility], has appt in august \par -may need epo shots, discussed with pt to discuss with nephro \par \par #Hyperkalemia\par -K 5.8 on labwork brought from HD \par -d/c losartan \par \par #GERD\par - c/w pantoprazole bid\par - no evidence of Bach's esophagus on EGD Oct 2019\par -GI appt in august\par \par #Eczema\par -HC cream 0.5% BID prn\par \par #DM\par - A1c 5.6% 7/22 \par - d/c'd Trudjenta at last visit (alogliptin) in setting of A1c <6.0%\par - Podiatry: utd 7/22\par - Ophthalmology: needs appt\par \par #CAD\par - s/p PCI w/ stent placement\par - follows w/ Dr. Thompson\par - c/w ASA, statin, eliquis\par \par #HTN\par - no longer on amlodipine, lisinopril, or Imdur due to suspected symptomatic orthostatic hypotension\par -d/c losartan, given good bp and hyperkalemia \par -clonidine .1 prn only if bp consistently above 160 \par \par #Chronic AFib\par - c/w Lopressor 12.5mg BID on non-HD days\par - c/w Eliquis 2.5mg BID (dose reduced in setting of AVF and GI bleeds)\par \par #ESRD\par #Anemia of Chronic Disease\par - On HD x3/wk; Follows Dr. Wise\par - c/w Sevelamer 2 tabs TIDAC\par - c/w Phoslo 1 tab TIDAC\par \par #Lung Nodule\par #Former Smoker\par - 60+ pk-yr smoker; quit ~2017\par - Stable in size on CT Chest Nov 2021\par - No new nodules noted on last CT\par - Repeat in Nov 2022\par \par #Healthcare Management\par - Colorectal Cancer: Last in 2019 (no significant findings per pt); repeat due in 2024\par - Lung Cancer: Last CT Chest in Nov 2021 (no suspicious pulmonary modules); repeat in Nov 2022\par - Pneumonia: previously received (pt unclear when and which version)\par - COVID-19: completed in May 2021; booster in Nov 2021\par - RTC in 3 months \par

## 2022-08-09 ENCOUNTER — APPOINTMENT (OUTPATIENT)
Dept: GASTROENTEROLOGY | Facility: CLINIC | Age: 72
End: 2022-08-09

## 2022-08-16 ENCOUNTER — OUTPATIENT (OUTPATIENT)
Dept: OUTPATIENT SERVICES | Facility: HOSPITAL | Age: 72
LOS: 1 days | Discharge: HOME | End: 2022-08-16

## 2022-08-16 ENCOUNTER — APPOINTMENT (OUTPATIENT)
Dept: PODIATRY | Facility: CLINIC | Age: 72
End: 2022-08-16

## 2022-08-16 DIAGNOSIS — Z98.890 OTHER SPECIFIED POSTPROCEDURAL STATES: Chronic | ICD-10-CM

## 2022-08-16 DIAGNOSIS — Z98.49 CATARACT EXTRACTION STATUS, UNSPECIFIED EYE: Chronic | ICD-10-CM

## 2022-08-16 DIAGNOSIS — Z95.5 PRESENCE OF CORONARY ANGIOPLASTY IMPLANT AND GRAFT: Chronic | ICD-10-CM

## 2022-08-16 PROCEDURE — 99213 OFFICE O/P EST LOW 20 MIN: CPT | Mod: NC,GC

## 2022-08-17 NOTE — PHYSICAL EXAM
[General Appearance - Alert] : alert [General Appearance - In No Acute Distress] : in no acute distress [1+] : left foot dorsalis pedis 1+ [Oriented To Time, Place, And Person] : oriented to person, place, and time [Impaired Insight] : insight and judgment were intact [FreeTextEntry1] : right hallux nail bed hyperkeratotic overgrowth at the distal nail bed. nail bed appears intact after nail debx of the right hallux. no open wounds noted.

## 2022-08-17 NOTE — END OF VISIT
[] : Resident [FreeTextEntry3] : right foot first toe nail bed debridement\par referred for xrays to to eval for any progression of OM

## 2022-08-17 NOTE — HISTORY OF PRESENT ILLNESS
[Flip Flops] : flip flops [FreeTextEntry1] : 71 y/o diabetic male here today for management of right hallux ulcer. Patient was in the ED for acute avulsion of the right hallux nail. xrays revealed erosive changes of the first distal phalanx\par \par 6/21- returns today for continued care. \par \par 7/12-here today for follow up. no complaints \par 8/16 follow up on a hallux wound, right foot

## 2022-08-17 NOTE — ASSESSMENT
[Verbal] : verbal [Good - alert, interested, motivated] : Good - alert, interested, motivated [FreeTextEntry1] : MRI: Ulcer at the dorsal aspect of the first toe, with findings consistent with osteomyelitis of the first distal phalanx with likely subungual and intraosseous abscess measuring 1.8 x 0.8 x 1.5 cm.\par \par -findings discussed with patient\par - Rx new x-rays for follow up\par -social work on board with coordinating ID follow up\par -toe does not appear to be acutely infected.\par - RTC in 3 months or sooner if new symptoms occur.

## 2022-08-23 ENCOUNTER — OUTPATIENT (OUTPATIENT)
Dept: OUTPATIENT SERVICES | Facility: HOSPITAL | Age: 72
LOS: 1 days | Discharge: HOME | End: 2022-08-23

## 2022-08-23 ENCOUNTER — APPOINTMENT (OUTPATIENT)
Dept: PODIATRY | Facility: CLINIC | Age: 72
End: 2022-08-23

## 2022-08-23 DIAGNOSIS — Z98.49 CATARACT EXTRACTION STATUS, UNSPECIFIED EYE: Chronic | ICD-10-CM

## 2022-08-23 DIAGNOSIS — Z98.890 OTHER SPECIFIED POSTPROCEDURAL STATES: Chronic | ICD-10-CM

## 2022-08-23 DIAGNOSIS — Z95.5 PRESENCE OF CORONARY ANGIOPLASTY IMPLANT AND GRAFT: Chronic | ICD-10-CM

## 2022-08-23 PROCEDURE — 99214 OFFICE O/P EST MOD 30 MIN: CPT | Mod: NC

## 2022-08-24 NOTE — HISTORY OF PRESENT ILLNESS
[Flip Flops] : flip flops [FreeTextEntry1] : 73 y/o diabetic male here today for management of right hallux ulcer. Patient was in the ED for acute avulsion of the right hallux nail. xrays revealed erosive changes of the first distal phalanx\par \par 6/21- returns today for continued care. \par \par 7/12-here today for follow up. no complaints \par 8/16 follow up on a hallux wound, right foot \par 8/23-pt returns today, as instructed by his nephrologist for recurrence of right first toe infection.

## 2022-08-24 NOTE — PHYSICAL EXAM
[General Appearance - Alert] : alert [General Appearance - In No Acute Distress] : in no acute distress [1+] : left foot dorsalis pedis 1+ [Oriented To Time, Place, And Person] : oriented to person, place, and time [Impaired Insight] : insight and judgment were intact [FreeTextEntry1] : right hallux erythema. Noted presence of some devitalized tissue at the nail bed. no active drainage

## 2022-08-24 NOTE — ASSESSMENT
[Verbal] : verbal [Good - alert, interested, motivated] : Good - alert, interested, motivated [FreeTextEntry1] : MRI: Ulcer at the dorsal aspect of the first toe, with findings consistent with osteomyelitis of the first distal phalanx with likely subungual and intraosseous abscess measuring 1.8 x 0.8 x 1.5 cm.\par \par -rx doxycycline\par -follow up with xrays\par -case d/w with patients nephrologist Dr. Wise. Pt to get one dose of vancomycin on next dialysis if he does not receive his antibiotics \par -follow up one week\par .

## 2022-08-30 ENCOUNTER — OUTPATIENT (OUTPATIENT)
Dept: OUTPATIENT SERVICES | Facility: HOSPITAL | Age: 72
LOS: 1 days | Discharge: HOME | End: 2022-08-30

## 2022-08-30 ENCOUNTER — APPOINTMENT (OUTPATIENT)
Dept: PODIATRY | Facility: CLINIC | Age: 72
End: 2022-08-30

## 2022-08-30 DIAGNOSIS — Z98.49 CATARACT EXTRACTION STATUS, UNSPECIFIED EYE: Chronic | ICD-10-CM

## 2022-08-30 DIAGNOSIS — Z98.890 OTHER SPECIFIED POSTPROCEDURAL STATES: Chronic | ICD-10-CM

## 2022-08-30 DIAGNOSIS — Z95.5 PRESENCE OF CORONARY ANGIOPLASTY IMPLANT AND GRAFT: Chronic | ICD-10-CM

## 2022-08-30 DIAGNOSIS — L97.519 NON-PRESSURE CHRONIC ULCER OF OTHER PART OF RIGHT FOOT WITH UNSPECIFIED SEVERITY: ICD-10-CM

## 2022-08-30 PROCEDURE — 99212 OFFICE O/P EST SF 10 MIN: CPT | Mod: NC

## 2022-08-31 PROBLEM — L97.519 ULCER OF RIGHT FOOT, UNSPECIFIED ULCER STAGE: Status: ACTIVE | Noted: 2022-06-07

## 2022-08-31 NOTE — HISTORY OF PRESENT ILLNESS
[Flip Flops] : flip flops [FreeTextEntry1] : 73 y/o diabetic male here today for management of right hallux ulcer. Patient was in the ED for acute avulsion of the right hallux nail. xrays revealed erosive changes of the first distal phalanx\par \par 6/21- returns today for continued care. \par \par 7/12-here today for follow up. no complaints \par 8/16 follow up on a hallux wound, right foot \par 8/23-pt returns today, as instructed by his nephrologist for recurrence of right first toe infection. \par 8/30- returns for continued care. Relates improvement in pain

## 2022-08-31 NOTE — PHYSICAL EXAM
[General Appearance - Alert] : alert [General Appearance - In No Acute Distress] : in no acute distress [1+] : left foot dorsalis pedis 1+ [Oriented To Time, Place, And Person] : oriented to person, place, and time [Impaired Insight] : insight and judgment were intact [FreeTextEntry1] : right hallux erythema has resolved. nail bed w/ dried eschar-->on debridement of eschar noted healthy underlying nail bed with small pinpoint ulcer.

## 2022-08-31 NOTE — ASSESSMENT
[Verbal] : verbal [Good - alert, interested, motivated] : Good - alert, interested, motivated [FreeTextEntry1] : MRI: Ulcer at the dorsal aspect of the first toe, with findings consistent with osteomyelitis of the first distal phalanx with likely subungual and intraosseous abscess measuring 1.8 x 0.8 x 1.5 cm.\par \par -continue w/ doxycycline\par -continue w/ local wound care\par -f/u xrays\par -return one week

## 2022-09-06 ENCOUNTER — OUTPATIENT (OUTPATIENT)
Dept: OUTPATIENT SERVICES | Facility: HOSPITAL | Age: 72
LOS: 1 days | Discharge: HOME | End: 2022-09-06

## 2022-09-06 ENCOUNTER — APPOINTMENT (OUTPATIENT)
Dept: PODIATRY | Facility: CLINIC | Age: 72
End: 2022-09-06

## 2022-09-06 DIAGNOSIS — Z95.5 PRESENCE OF CORONARY ANGIOPLASTY IMPLANT AND GRAFT: Chronic | ICD-10-CM

## 2022-09-06 DIAGNOSIS — Z98.890 OTHER SPECIFIED POSTPROCEDURAL STATES: Chronic | ICD-10-CM

## 2022-09-06 DIAGNOSIS — Z98.49 CATARACT EXTRACTION STATUS, UNSPECIFIED EYE: Chronic | ICD-10-CM

## 2022-09-06 PROCEDURE — 99212 OFFICE O/P EST SF 10 MIN: CPT | Mod: NC,GC

## 2022-09-07 NOTE — ASSESSMENT
[Verbal] : verbal [Good - alert, interested, motivated] : Good - alert, interested, motivated [Patient] : patient [FreeTextEntry1] : Assessment:\par -Osteomyelitis, Right Hallux\par -MRI: Ulcer at the dorsal aspect of the first toe, with findings consistent with osteomyelitis of the first distal phalanx with likely subungual and intraosseous abscess measuring 1.8 x 0.8 x 1.5 cm.\par \par Plan:\par -Pt seen & evaluated\par -No wound care needed; no open lesions\par -Pt to get FXR prior to next appt;\par -RTC 1 month to review FXR

## 2022-09-07 NOTE — PHYSICAL EXAM
[General Appearance - Alert] : alert [General Appearance - In No Acute Distress] : in no acute distress [1+] : left foot dorsalis pedis 1+ [Oriented To Time, Place, And Person] : oriented to person, place, and time [Impaired Insight] : insight and judgment were intact [Ankle Swelling Bilaterally] : bilaterally  [Normal Foot/Ankle] : Both lower extremities were exposed and visualized. Standing exam demonstrates normal foot posture and alignment. Hindfoot exam shows no hindfoot valgus or varus [Vibration Dec.] : diminished vibratory sensation at the level of the toes [Diminished Throughout Right Foot] : diminished sensation with monofilament testing throughout right foot [Diminished Throughout Left Foot] : diminished sensation with monofilament testing throughout left foot [Ankle Swelling (On Exam)] : not present [Varicose Veins Of Lower Extremities] : not present [] : not present [Foot Ulcer] : no foot ulcer [Skin Induration] : no skin induration [FreeTextEntry1] : Right hallux erythema resolved. \par Nail bed clean; no eschar; no ulcer; no open wounds/lesions; \par No active drainage/bleeding\par

## 2022-09-07 NOTE — END OF VISIT
[] : Resident [FreeTextEntry3] : erythema and cellulitis resolved\par no acute signs of infection\par follow up xray

## 2022-09-07 NOTE — HISTORY OF PRESENT ILLNESS
[Other: ____] : [unfilled] [FreeTextEntry1] : 73 y/o diabetic male here today for management of right hallux ulcer. Patient was in the ED for acute avulsion of the right hallux nail. xrays revealed erosive changes of the first distal phalanx\par \par 6/21- returns today for continued care. \par \par 7/12-here today for follow up. no complaints \par 8/16 follow up on a hallux wound, right foot \par 8/23-pt returns today, as instructed by his nephrologist for recurrence of right first toe infection. \par 8/30- returns for continued care. Relates improvement in pain \par 9/6 - Returns for continued care. Relates resolution of pain, just feels "uncomfortable." Pt states he wasn't able to get FXR because of the weather.

## 2022-10-04 ENCOUNTER — APPOINTMENT (OUTPATIENT)
Dept: PODIATRY | Facility: CLINIC | Age: 72
End: 2022-10-04

## 2022-10-04 ENCOUNTER — NON-APPOINTMENT (OUTPATIENT)
Age: 72
End: 2022-10-04

## 2022-10-04 ENCOUNTER — OUTPATIENT (OUTPATIENT)
Dept: OUTPATIENT SERVICES | Facility: HOSPITAL | Age: 72
LOS: 1 days | Discharge: HOME | End: 2022-10-04

## 2022-10-04 DIAGNOSIS — L03.031 CELLULITIS OF RIGHT TOE: ICD-10-CM

## 2022-10-04 DIAGNOSIS — M86.9 OSTEOMYELITIS, UNSPECIFIED: ICD-10-CM

## 2022-10-04 DIAGNOSIS — Z95.5 PRESENCE OF CORONARY ANGIOPLASTY IMPLANT AND GRAFT: Chronic | ICD-10-CM

## 2022-10-04 DIAGNOSIS — Z98.890 OTHER SPECIFIED POSTPROCEDURAL STATES: Chronic | ICD-10-CM

## 2022-10-04 DIAGNOSIS — Z98.49 CATARACT EXTRACTION STATUS, UNSPECIFIED EYE: Chronic | ICD-10-CM

## 2022-10-04 PROCEDURE — 99213 OFFICE O/P EST LOW 20 MIN: CPT | Mod: NC,GC

## 2022-10-04 NOTE — PHYSICAL EXAM
[General Appearance - Alert] : alert [General Appearance - In No Acute Distress] : in no acute distress [Ankle Swelling Bilaterally] : bilaterally  [1+] : left foot dorsalis pedis 1+ [Normal Foot/Ankle] : Both lower extremities were exposed and visualized. Standing exam demonstrates normal foot posture and alignment. Hindfoot exam shows no hindfoot valgus or varus [Vibration Dec.] : diminished vibratory sensation at the level of the toes [Diminished Throughout Right Foot] : diminished sensation with monofilament testing throughout right foot [Diminished Throughout Left Foot] : diminished sensation with monofilament testing throughout left foot [Ankle Swelling (On Exam)] : not present [Varicose Veins Of Lower Extremities] : not present [] : not present [Foot Ulcer] : no foot ulcer [Skin Induration] : no skin induration [FreeTextEntry1] : Right hallux mild erythema with small open wound; minimal drainage. No malodors. \par \par

## 2022-10-04 NOTE — ASSESSMENT
[Verbal] : verbal [Patient] : patient [Good - alert, interested, motivated] : Good - alert, interested, motivated [FreeTextEntry1] : Assessment:\par -Osteomyelitis, Right Hallux\par -MRI: Ulcer at the dorsal aspect of the first toe, with findings consistent with osteomyelitis of the first distal phalanx with likely subungual and intraosseous abscess measuring 1.8 x 0.8 x 1.5 cm.\par \par Plan:\par -Pt seen & evaluated\par - Betadine soaked gauze, coban to right hallux nail \par -Pt to get FXR prior to next appt;\par - Advised patient to go to ED for admission for IV abx to treat OM of right hallux; \par -Patient agrees to the above plan\par -RTC 2 months

## 2022-10-04 NOTE — END OF VISIT
[] : Resident [FreeTextEntry3] : light erythema to right first toe \par pt hasn’t followed up with xrays\par MRI + for OM\par has a scheduled appointment end of October with ID\par recommend patient go the ED for admission and evaluation by ID.\par in the meantime, pt needs antibiotics for acute cellulitis right toe \par case d/w with Dr. Wise from nephrology via Teams

## 2022-10-04 NOTE — HISTORY OF PRESENT ILLNESS
[Other: ____] : [unfilled] [FreeTextEntry1] : 73 y/o diabetic male here today for management of right hallux ulcer. Patient was in the ED for acute avulsion of the right hallux nail. xrays revealed erosive changes of the first distal phalanx\par \par 6/21- returns today for continued care. \par \par 7/12-here today for follow up. no complaints \par 8/16 follow up on a hallux wound, right foot \par 8/23-pt returns today, as instructed by his nephrologist for recurrence of right first toe infection. \par 8/30- returns for continued care. Relates improvement in pain \par 9/6 - Returns for continued care. Relates resolution of pain, just feels "uncomfortable." Pt states he wasn't able to get FXR because of the weather.\par 10/4 - returns for continued care, Right hallux nail is still hurting, minimal drainage, still did not get foot xrays.

## 2022-10-07 ENCOUNTER — NON-APPOINTMENT (OUTPATIENT)
Age: 72
End: 2022-10-07

## 2022-10-11 ENCOUNTER — NON-APPOINTMENT (OUTPATIENT)
Age: 72
End: 2022-10-11

## 2022-10-18 ENCOUNTER — OUTPATIENT (OUTPATIENT)
Dept: OUTPATIENT SERVICES | Facility: HOSPITAL | Age: 72
LOS: 1 days | Discharge: HOME | End: 2022-10-18

## 2022-10-18 ENCOUNTER — APPOINTMENT (OUTPATIENT)
Dept: PODIATRY | Facility: CLINIC | Age: 72
End: 2022-10-18

## 2022-10-18 ENCOUNTER — NON-APPOINTMENT (OUTPATIENT)
Age: 72
End: 2022-10-18

## 2022-10-18 DIAGNOSIS — Z98.890 OTHER SPECIFIED POSTPROCEDURAL STATES: Chronic | ICD-10-CM

## 2022-10-18 DIAGNOSIS — L03.032 CELLULITIS OF LEFT TOE: ICD-10-CM

## 2022-10-18 DIAGNOSIS — Z98.49 CATARACT EXTRACTION STATUS, UNSPECIFIED EYE: Chronic | ICD-10-CM

## 2022-10-18 DIAGNOSIS — Z95.5 PRESENCE OF CORONARY ANGIOPLASTY IMPLANT AND GRAFT: Chronic | ICD-10-CM

## 2022-10-18 PROCEDURE — 99214 OFFICE O/P EST MOD 30 MIN: CPT | Mod: NC,GC

## 2022-10-18 NOTE — ASSESSMENT
[Patient] : patient [Good - alert, interested, motivated] : Good - alert, interested, motivated [Verbal] : verbal [Demonstrates independently] : demonstrates independently [FreeTextEntry1] : Assessment:\par -Osteomyelitis, Right Hallux\par -Vascular insufficiency/Cellulitis, Left Hallux\par \par Plan:\par -Pt seen & evaluated\par -No dressings b/l foot\par -FXR-BL\par -L hallux erythema/cellulitis -> Vascular Consult\par -L hallux erythema/cellulitis -> Rx abx (Doxycycline) given to pt\par -RTC 2 weeks or PRN

## 2022-10-18 NOTE — END OF VISIT
[] : Resident [FreeTextEntry3] : has not obtained right foot xrays\par ID appointment at [facility] in november \par new c/c of acute left first toe pain. on physical exam noted TTP of left first toe and light erythema\par non-palpable pulses and diminished capillary refill left foot\par referred to vascular surgery to eval for PAD\par Rx doxycycline \par referred for left foot xrays\par return 2 weeks\par

## 2022-10-18 NOTE — REVIEW OF SYSTEMS
[As Noted in HPI] : as noted in HPI [Negative] : Musculoskeletal [Limb Weakness] : limb weakness [Difficulty Walking] : difficulty walking

## 2022-10-18 NOTE — HISTORY OF PRESENT ILLNESS
[Other: ____] : [unfilled] [Walker] : a walker [FreeTextEntry1] : 73 y/o diabetic male here today for management of right hallux ulcer. Patient was in the ED for acute avulsion of the right hallux nail. xrays revealed erosive changes of the first distal phalanx\par \par 6/21- returns today for continued care. \par \par 7/12-here today for follow up. no complaints \par 8/16 follow up on a hallux wound, right foot \par 8/23-pt returns today, as instructed by his nephrologist for recurrence of right first toe infection. \par 8/30- returns for continued care. Relates improvement in pain \par 9/6 - Returns for continued care. Relates resolution of pain, just feels "uncomfortable." Pt states he wasn't able to get FXR because of the weather.\par 10/4 - returns for continued care, Right hallux nail is still hurting, minimal drainage, still did not get foot xrays. \par 10/18 - Returns for 1 mo f/u. Did not go to ED for IV abx because he got abx x2 last week during dialysis. Did not get FXR because had episode of high BP.\par Social Work states ID providers on [facility] no longer seeing pts. Earliest ID appt SW could secure is w/ [facility] in November. Pt reports L big toe red x 1 week. Denies trauma. MRI: Ulcer at the dorsal aspect of the first toe, with findings consistent w/osteomyelitis of the first distal phalanx with likely subungual and intraosseous abscess measuring 1.8 x 0.8 x 1.5 cm.

## 2022-10-18 NOTE — PHYSICAL EXAM
[General Appearance - Alert] : alert [General Appearance - In No Acute Distress] : in no acute distress [Ankle Swelling Bilaterally] : bilaterally  [1+] : left foot dorsalis pedis 1+ [Normal Foot/Ankle] : Both lower extremities were exposed and visualized. Standing exam demonstrates normal foot posture and alignment. Hindfoot exam shows no hindfoot valgus or varus [Vibration Dec.] : diminished vibratory sensation at the level of the toes [Diminished Throughout Right Foot] : diminished sensation with monofilament testing throughout right foot [Diminished Throughout Left Foot] : diminished sensation with monofilament testing throughout left foot [Ankle Swelling (On Exam)] : not present [Varicose Veins Of Lower Extremities] : not present [] : not present [Foot Ulcer] : no foot ulcer [Skin Induration] : no skin induration [FreeTextEntry1] : Right hallux erythema resolved; no open wounds/lesions\par Left hallux erythematous to level of MPJ, warm to touch. No open wounds/lesions

## 2022-10-20 ENCOUNTER — NON-APPOINTMENT (OUTPATIENT)
Age: 72
End: 2022-10-20

## 2022-10-20 DIAGNOSIS — E11.8 TYPE 2 DIABETES MELLITUS WITH UNSPECIFIED COMPLICATIONS: ICD-10-CM

## 2022-10-20 DIAGNOSIS — L03.032 CELLULITIS OF LEFT TOE: ICD-10-CM

## 2022-10-20 DIAGNOSIS — I73.9 PERIPHERAL VASCULAR DISEASE, UNSPECIFIED: ICD-10-CM

## 2022-10-31 ENCOUNTER — NON-APPOINTMENT (OUTPATIENT)
Age: 72
End: 2022-10-31

## 2022-11-01 ENCOUNTER — NON-APPOINTMENT (OUTPATIENT)
Age: 72
End: 2022-11-01

## 2022-11-02 ENCOUNTER — NON-APPOINTMENT (OUTPATIENT)
Age: 72
End: 2022-11-02

## 2022-11-03 ENCOUNTER — APPOINTMENT (OUTPATIENT)
Dept: VASCULAR SURGERY | Facility: CLINIC | Age: 72
End: 2022-11-03

## 2022-11-03 VITALS — HEIGHT: 66 IN | WEIGHT: 154.32 LBS | BODY MASS INDEX: 24.8 KG/M2

## 2022-11-03 DIAGNOSIS — I70.235 ATHEROSCLEROSIS OF NATIVE ARTERIES OF RIGHT LEG WITH ULCERATION OF OTHER PART OF FOOT: ICD-10-CM

## 2022-11-03 DIAGNOSIS — I70.245 ATHEROSCLEROSIS OF NATIVE ARTERIES OF LEFT LEG WITH ULCERATION OF OTHER PART OF FOOT: ICD-10-CM

## 2022-11-03 PROCEDURE — 99204 OFFICE O/P NEW MOD 45 MIN: CPT

## 2022-11-03 PROCEDURE — 93925 LOWER EXTREMITY STUDY: CPT

## 2022-11-03 NOTE — ASSESSMENT
[FreeTextEntry1] : 73 y/o gentleman with DM, ESRD on HD, with recent foot ulcerations.\par \par No evidence of significant arterial disease in the lower extremities bilaterally. He maintains pulses in the feet bilaterally and the wound are healed.\par \par No surgical intervention needed at this time and I will see him back in six months time.\par \par

## 2022-11-03 NOTE — HISTORY OF PRESENT ILLNESS
[FreeTextEntry1] : 73 y/o gentleman with h/o ESRD on HD (M/W/F), atrial fibrillation on Eliquis, DM, sent in by Podiatry for evaluation of PVD. He developed wound to right first toe that has now healed but developed left first toe blood blister. He denies any pain, c/o coldness to bilateral feet.

## 2022-11-03 NOTE — CONSULT LETTER
[Dear  ___] : Dear  [unfilled], [Consult Letter:] : I had the pleasure of evaluating your patient, [unfilled]. [Please see my note below.] : Please see my note below. [FreeTextEntry2] : Dear Dr. Smooth Gibbs,

## 2022-11-10 ENCOUNTER — OUTPATIENT (OUTPATIENT)
Dept: OUTPATIENT SERVICES | Facility: HOSPITAL | Age: 72
LOS: 1 days | Discharge: HOME | End: 2022-11-10

## 2022-11-10 ENCOUNTER — APPOINTMENT (OUTPATIENT)
Dept: INTERNAL MEDICINE | Facility: CLINIC | Age: 72
End: 2022-11-10

## 2022-11-10 ENCOUNTER — NON-APPOINTMENT (OUTPATIENT)
Age: 72
End: 2022-11-10

## 2022-11-10 VITALS
TEMPERATURE: 97.3 F | OXYGEN SATURATION: 100 % | WEIGHT: 154 LBS | HEART RATE: 90 BPM | SYSTOLIC BLOOD PRESSURE: 173 MMHG | HEIGHT: 66 IN | BODY MASS INDEX: 24.75 KG/M2 | DIASTOLIC BLOOD PRESSURE: 100 MMHG

## 2022-11-10 DIAGNOSIS — Z95.5 PRESENCE OF CORONARY ANGIOPLASTY IMPLANT AND GRAFT: Chronic | ICD-10-CM

## 2022-11-10 DIAGNOSIS — Z98.49 CATARACT EXTRACTION STATUS, UNSPECIFIED EYE: Chronic | ICD-10-CM

## 2022-11-10 DIAGNOSIS — R19.7 DIARRHEA, UNSPECIFIED: ICD-10-CM

## 2022-11-10 DIAGNOSIS — J30.9 ALLERGIC RHINITIS, UNSPECIFIED: ICD-10-CM

## 2022-11-10 DIAGNOSIS — Z98.890 OTHER SPECIFIED POSTPROCEDURAL STATES: Chronic | ICD-10-CM

## 2022-11-10 DIAGNOSIS — T14.8XXA OTHER INJURY OF UNSPECIFIED BODY REGION, INITIAL ENCOUNTER: ICD-10-CM

## 2022-11-10 LAB
ALBUMIN SERPL ELPH-MCNC: 4.3 G/DL
ALP BLD-CCNC: 161 U/L
ALT SERPL-CCNC: 11 U/L
ANION GAP SERPL CALC-SCNC: 15 MMOL/L
AST SERPL-CCNC: 15 U/L
BASOPHILS # BLD AUTO: 0.04 K/UL
BASOPHILS NFR BLD AUTO: 0.8 %
BILIRUB SERPL-MCNC: 0.8 MG/DL
BUN SERPL-MCNC: 37 MG/DL
CALCIUM SERPL-MCNC: 9 MG/DL
CHLORIDE SERPL-SCNC: 98 MMOL/L
CHOLEST SERPL-MCNC: 96 MG/DL
CO2 SERPL-SCNC: 26 MMOL/L
CREAT SERPL-MCNC: 6.53 MG/DL
EGFR: 9 ML/MIN/1.73M2
EOSINOPHIL # BLD AUTO: 0.14 K/UL
EOSINOPHIL NFR BLD AUTO: 2.9 %
ESTIMATED AVERAGE GLUCOSE: 146 MG/DL
GLUCOSE SERPL-MCNC: 96 MG/DL
HBA1C MFR BLD HPLC: 6.7 %
HCT VFR BLD CALC: 28.8 %
HDLC SERPL-MCNC: 35 MG/DL
HGB BLD-MCNC: 9.7 G/DL
IMM GRANULOCYTES NFR BLD AUTO: 0.2 %
LDLC SERPL CALC-MCNC: 42 MG/DL
LYMPHOCYTES # BLD AUTO: 1.05 K/UL
LYMPHOCYTES NFR BLD AUTO: 21.5 %
MAN DIFF?: NORMAL
MCHC RBC-ENTMCNC: 28.7 PG
MCHC RBC-ENTMCNC: 33.7 G/DL
MCV RBC AUTO: 85.2 FL
MONOCYTES # BLD AUTO: 0.5 K/UL
MONOCYTES NFR BLD AUTO: 10.2 %
NEUTROPHILS # BLD AUTO: 3.15 K/UL
NEUTROPHILS NFR BLD AUTO: 64.4 %
NONHDLC SERPL-MCNC: 61 MG/DL
PLATELET # BLD AUTO: 142 K/UL
POTASSIUM SERPL-SCNC: 4 MMOL/L
PROT SERPL-MCNC: 7.1 G/DL
RBC # BLD: 3.38 M/UL
RBC # FLD: 14.5 %
SODIUM SERPL-SCNC: 140 MMOL/L
TRIGL SERPL-MCNC: 93 MG/DL
TSH SERPL-ACNC: 1.38 UIU/ML
WBC # FLD AUTO: 4.89 K/UL

## 2022-11-10 PROCEDURE — 99214 OFFICE O/P EST MOD 30 MIN: CPT | Mod: GC

## 2022-11-10 RX ORDER — DOXYCYCLINE HYCLATE 100 MG/1
100 CAPSULE ORAL
Qty: 14 | Refills: 0 | Status: DISCONTINUED | COMMUNITY
Start: 2022-10-18 | End: 2022-11-10

## 2022-11-10 RX ORDER — DOXYCYCLINE HYCLATE 100 MG/1
100 CAPSULE ORAL
Qty: 20 | Refills: 0 | Status: DISCONTINUED | COMMUNITY
Start: 2022-08-23 | End: 2022-11-10

## 2022-11-10 NOTE — REVIEW OF SYSTEMS
[Abdominal Pain] : abdominal pain [Nausea] : nausea [Diarrhea] : diarrhea [Vomiting] : vomiting [Fever] : no fever [Chills] : no chills [Fatigue] : no fatigue [Pain] : no pain [Redness] : no redness [Earache] : no earache [Hearing Loss] : no hearing loss [Chest Pain] : no chest pain [Palpitations] : no palpitations [Shortness Of Breath] : no shortness of breath [Wheezing] : no wheezing [Constipation] : no constipation [Dysuria] : no dysuria [Joint Pain] : no joint pain [Itching] : no itching [Headache] : no headache [FreeTextEntry4] : Pallor

## 2022-11-10 NOTE — HISTORY OF PRESENT ILLNESS
[FreeTextEntry1] : Labs follow up with 2 days of watery diarhea [de-identified] : 71M w/ h/o ESRD on HD, CAD s/p stents, DM, DLD, Afib on Eliquis, and chronic anemia  presnts with 2 days of watery diarrhea, associated with abdominal pain, and vomiting. He also mentions that he didn’t eat anything for the past 2 days He has also missed his morning Bp medication and was hypertensive in the clinic. He was given clonidine to control his bp  \par \par several active issues\par c/o L elbow hematoma following recent blood draw. possibly expanding. slightly painful. no warmth/ drainage. some itching from dryness\par chronic nasal congestion. previously had relief c flonase\par elevated bp, asymptomatic. missed am meds from residence\par diarrhea x 2 days watery associated c poor oral intake/ no abdominal pain, + nausea, possible sick contacts at residence vs HD, on ppi\par \par \par

## 2022-11-10 NOTE — PHYSICAL EXAM
[No Acute Distress] : no acute distress [Normal Sclera/Conjunctiva] : normal sclera/conjunctiva [No JVD] : no jugular venous distention [No Respiratory Distress] : no respiratory distress  [Normal Rate] : normal rate  [No Carotid Bruits] : no carotid bruits [Non-distended] : non-distended [de-identified] : s [de-identified] : slight cobblestoning, no pharyngeal exudate or sinus tenderness [de-identified] :  soft nt nd [de-identified] : dry, L upper arm hematoma adjacent to blood draw site, no drainage, by olecranon, slightly tender; LUE fistula cdi

## 2022-11-10 NOTE — ASSESSMENT
[FreeTextEntry1] : 71M w/ h/o ESRD on HD, CAD s/p stents, DM, DLD, Afib on Eliquis, and chronic anemia p/w 6 month f/u and c/o worsening GERD and new onset forehead rash.\par \par # Diarhea\par - GI PCR and C diff PCR ordered\par - Contact isolation at [facility]-> called [facility] to inform\par - Associated with Nausea and Vomiting and abdominal pain\par - Nursing home informed about the paln of care\par - Soft diet advised- BLAND/ renal, advance as tolerated\par IF infectious pcr's negative then + immodium PRN\par \par \par #HTN\par -clonidine .1 prn only if bp consistently above 160 \par - uncontrolled here-> given clonidine here\par -Isosorbide mononitrate 1 tablet\par - Losartan 100 mg, Norvasac 5 mg\par HTN;\par DASH diet discussed and recommended\par Exercise and weight loss counseled \par Frequency and target at home BP readings discussed\par Decrease caffeine intake\par Treatment options and possible side effects discussed\par Patient counseled on symptoms of hypo/hypertension\par Counseled: Yearly Ophthalmology exams\par \par -\par \par #Acute on chronic anemia, likley anemia of chronic dx\par -Hgb 9 (f, baseline ~8s\par \par \par #GERD\par - c/w pantoprazole bid\par - no evidence of Bach's esophagus on EGD Oct 2019\par -GI appt in august\par GERD;\par Anti-reflux diet d/w patient\par Avoid laying down after meals\par Smoking cessation encouraged, if applicable\par Counseled on use of extra pillows at night time to elevate position\par Discussed treatment options and all side effects\par \par \par \par #DM\par - A1c 6.7 11/3\par - diet and a lifestyle modification advised\par Diabetes;\par Low sugar, low carbohydrate diet \par Exercise Counseled \par Patient given opportunity to discuss frequency and target blood sugar levels\par Patient educated on symptoms of hypo/hyperglycemic events \par Counseled on: Yearly Ophthalmology and Podiatry Exam\par \par \par \par \par #CAD\par - s/p PCI w/ stent placement\par - follows w/ Dr. Thompson\par - c/w ASA, statin, eliquis\par \par \par #Chronic AFib\par - c/w Lopressor 12.5mg BID on non-HD days\par - c/w Eliquis 2.5mg BID (dose reduced in setting of AVF and GI bleeds)\par \par #ESRD\par #Anemia of Chronic Disease\par - On HD x3/wk; Follows Dr. Wise\par - c/w Sevelamer 2 tabs TIDAC\par - c/w Phoslo 1 tab TIDAC\par \par #L elbow hematoma\par US ordered\par likely because of being on AC and blood draw/ not appearing infected \par \par #Lung Nodule\par #Former Smoker\par - 60+ pk-yr smoker; quit ~2017\par - Stable in size on CT Chest Nov 2021\par - No new nodules noted on last CT\par - Repeat in Nov 2022\par \par #Healthcare Management\par - Colorectal Cancer: Last in 2019 (no significant findings per pt); repeat due in 2024\par - Lung Cancer: Last CT Chest in Nov 2021 (no suspicious pulmonary modules); repeat in Nov 2022)\par - COVID-19: completed in May 2021; booster in Nov 2021\par - RTC in 3 months \par

## 2022-11-15 ENCOUNTER — APPOINTMENT (OUTPATIENT)
Dept: PODIATRY | Facility: CLINIC | Age: 72
End: 2022-11-15

## 2022-11-17 DIAGNOSIS — N18.6 END STAGE RENAL DISEASE: ICD-10-CM

## 2022-11-17 DIAGNOSIS — R19.7 DIARRHEA, UNSPECIFIED: ICD-10-CM

## 2022-11-17 DIAGNOSIS — I10 ESSENTIAL (PRIMARY) HYPERTENSION: ICD-10-CM

## 2022-11-17 DIAGNOSIS — J30.9 ALLERGIC RHINITIS, UNSPECIFIED: ICD-10-CM

## 2022-11-17 DIAGNOSIS — E11.8 TYPE 2 DIABETES MELLITUS WITH UNSPECIFIED COMPLICATIONS: ICD-10-CM

## 2022-11-17 DIAGNOSIS — I25.10 ATHEROSCLEROTIC HEART DISEASE OF NATIVE CORONARY ARTERY WITHOUT ANGINA PECTORIS: ICD-10-CM

## 2022-11-17 DIAGNOSIS — I48.91 UNSPECIFIED ATRIAL FIBRILLATION: ICD-10-CM

## 2022-11-23 ENCOUNTER — NON-APPOINTMENT (OUTPATIENT)
Age: 72
End: 2022-11-23

## 2022-12-21 NOTE — PRE-ANESTHESIA EVALUATION ADULT - NSATTENDATTESTRD_GEN_ALL_CORE
21-Dec-2022 00:14 The patient has been re-examined and I agree with the above assessment or I updated with my findings.

## 2023-01-03 ENCOUNTER — APPOINTMENT (OUTPATIENT)
Dept: CARDIOLOGY | Facility: CLINIC | Age: 73
End: 2023-01-03
Payer: MEDICAID

## 2023-01-03 VITALS
DIASTOLIC BLOOD PRESSURE: 67 MMHG | TEMPERATURE: 97.3 F | HEIGHT: 66 IN | WEIGHT: 164 LBS | HEART RATE: 71 BPM | BODY MASS INDEX: 26.36 KG/M2 | SYSTOLIC BLOOD PRESSURE: 108 MMHG

## 2023-01-03 DIAGNOSIS — I73.9 PERIPHERAL VASCULAR DISEASE, UNSPECIFIED: ICD-10-CM

## 2023-01-03 PROCEDURE — 99214 OFFICE O/P EST MOD 30 MIN: CPT

## 2023-01-03 PROCEDURE — 93000 ELECTROCARDIOGRAM COMPLETE: CPT

## 2023-01-03 RX ORDER — CINACALCET HYDROCHLORIDE 30 MG/1
30 TABLET, COATED ORAL
Refills: 0 | Status: COMPLETED | COMMUNITY
End: 2023-01-03

## 2023-01-03 RX ORDER — PANTOPRAZOLE 40 MG/1
40 TABLET, DELAYED RELEASE ORAL
Refills: 0 | Status: COMPLETED | COMMUNITY
End: 2023-01-03

## 2023-01-03 RX ORDER — SUCRALFATE 1 G/1
1 TABLET ORAL 3 TIMES DAILY
Refills: 0 | Status: COMPLETED | COMMUNITY
End: 2023-01-03

## 2023-01-03 RX ORDER — ISOSORBIDE MONONITRATE 30 MG
30 TABLET, EXTENDED RELEASE 24 HR ORAL
Refills: 0 | Status: COMPLETED | COMMUNITY
End: 2023-01-03

## 2023-01-03 RX ORDER — RENAGEL 800 MG/1
800 TABLET ORAL 3 TIMES DAILY
Refills: 0 | Status: COMPLETED | COMMUNITY
End: 2023-01-03

## 2023-01-03 RX ORDER — FLUTICASONE PROPIONATE 50 UG/1
50 SPRAY, METERED NASAL TWICE DAILY
Qty: 2 | Refills: 0 | Status: COMPLETED | COMMUNITY
Start: 2022-11-10 | End: 2023-01-03

## 2023-01-03 RX ORDER — ALOGLIPTIN 6.25 MG/1
6.25 TABLET, FILM COATED ORAL
Refills: 0 | Status: COMPLETED | COMMUNITY
End: 2023-01-03

## 2023-01-03 NOTE — HISTORY OF PRESENT ILLNESS
[FreeTextEntry1] : 73 y/o male with history of ESRD on HD, HTN, DL, CAD, s/p PCI (3 stents in LAD by Dr. Martin in 2017), presents for f/u. Patient reports no dyspnea, orthopnea or PND. No syncope, but does get dizzy with low BP. He had episodes of low BP, especially during HD, but also episodes of elevated BP. He also reports low pulse rate as well. He stopped amlodipine, but back on losartan. Had diarrhea - resolved. Also c/o episodes of chest pain.

## 2023-01-03 NOTE — PHYSICAL EXAM
[Well Developed] : well developed [No Acute Distress] : no acute distress [Ill-Appearing] : ill-appearing [Normal Conjunctiva] : normal conjunctiva [No Carotid Bruit] : no carotid bruit [No Murmur] : no murmur [No Rub] : no rub [No Gallop] : no gallop [Clear Lung Fields] : clear lung fields [Good Air Entry] : good air entry [No Respiratory Distress] : no respiratory distress  [Soft] : abdomen soft [Non Tender] : non-tender [No Masses/organomegaly] : no masses/organomegaly [Normal Bowel Sounds] : normal bowel sounds [No Edema] : no edema [No Cyanosis] : no cyanosis [No Clubbing] : no clubbing [No Varicosities] : no varicosities [No Rash] : no rash [No Skin Lesions] : no skin lesions [Moves all extremities] : moves all extremities [No Focal Deficits] : no focal deficits [Normal Speech] : normal speech [Alert and Oriented] : alert and oriented [Normal memory] : normal memory [de-identified] : no JVD [de-identified] : irregular [de-identified] : walks with a walker

## 2023-01-03 NOTE — ASSESSMENT
[FreeTextEntry1] : 71 y/o male with history of CAD, s/p PCI, DM, DL, HTN, ESRD on HD.\par \par Low BP\par PAF\par Prior w/u reviewed.\par Echo, ECG, labs reviewed\par \par \par Plan:\par \par C/w ASA\par C/w Eliquis\par C/w statin\par D/c Imdur, d/c amlodipine\par Decrease metoprolol to 12.5 mg q12\par C/w clonindine 0.1 BID\par F/u with Nephrology.\par F/u with podiatry\par CAD, chest pain - get a stress test (Pharmacological, unable to exercise)\par RTC in 6 months

## 2023-01-19 ENCOUNTER — APPOINTMENT (OUTPATIENT)
Dept: PODIATRY | Facility: CLINIC | Age: 73
End: 2023-01-19

## 2023-01-24 ENCOUNTER — APPOINTMENT (OUTPATIENT)
Dept: PODIATRY | Facility: CLINIC | Age: 73
End: 2023-01-24
Payer: MEDICAID

## 2023-01-24 ENCOUNTER — OUTPATIENT (OUTPATIENT)
Dept: OUTPATIENT SERVICES | Facility: HOSPITAL | Age: 73
LOS: 1 days | Discharge: HOME | End: 2023-01-24

## 2023-01-24 DIAGNOSIS — Z98.49 CATARACT EXTRACTION STATUS, UNSPECIFIED EYE: Chronic | ICD-10-CM

## 2023-01-24 DIAGNOSIS — Z95.5 PRESENCE OF CORONARY ANGIOPLASTY IMPLANT AND GRAFT: Chronic | ICD-10-CM

## 2023-01-24 DIAGNOSIS — L85.3 XEROSIS CUTIS: ICD-10-CM

## 2023-01-24 DIAGNOSIS — Z98.890 OTHER SPECIFIED POSTPROCEDURAL STATES: Chronic | ICD-10-CM

## 2023-01-24 PROCEDURE — 99213 OFFICE O/P EST LOW 20 MIN: CPT | Mod: NC

## 2023-01-24 RX ORDER — AMMONIUM LACTATE 12 %
12 CREAM (GRAM) TOPICAL TWICE DAILY
Qty: 1 | Refills: 3 | Status: ACTIVE | COMMUNITY
Start: 2023-01-24 | End: 1900-01-01

## 2023-01-24 NOTE — REVIEW OF SYSTEMS
[As Noted in HPI] : as noted in HPI [Limb Weakness] : limb weakness [Difficulty Walking] : difficulty walking [Negative] : Musculoskeletal

## 2023-01-24 NOTE — ASSESSMENT
[Verbal] : verbal [Patient] : patient [Good - alert, interested, motivated] : Good - alert, interested, motivated [Demonstrates independently] : demonstrates independently [FreeTextEntry1] : left hallux nail debrided proximally. nailbed inspected. no nail bed laceration appreciated\par recommend topical bacitracin to apply to left first toe\par rx ammonium lactate cream\par return 2 month

## 2023-01-24 NOTE — HISTORY OF PRESENT ILLNESS
[Other: ____] : [unfilled] [Walker] : a walker [FreeTextEntry1] : 71 y/o diabetic male here today for management of right hallux ulcer. Patient was in the ED for acute avulsion of the right hallux nail. xrays revealed erosive changes of the first distal phalanx\par \par 6/21- returns today for continued care. \par \par 7/12-here today for follow up. no complaints \par 8/16 follow up on a hallux wound, right foot \par 8/23-pt returns today, as instructed by his nephrologist for recurrence of right first toe infection. \par 8/30- returns for continued care. Relates improvement in pain \par 9/6 - Returns for continued care. Relates resolution of pain, just feels "uncomfortable." Pt states he wasn't able to get FXR because of the weather.\par 10/4 - returns for continued care, Right hallux nail is still hurting, minimal drainage, still did not get foot xrays. \par 10/18 - Returns for 1 mo f/u. Did not go to ED for IV abx because he got abx x2 last week during dialysis. Did not get FXR because had episode of high BP.\par Social Work states ID providers on [facility] no longer seeing pts. Earliest ID appt SW could secure is w/ [facility] in November. Pt reports L big toe red x 1 week. Denies trauma. MRI: Ulcer at the dorsal aspect of the first toe, with findings consistent w/osteomyelitis of the first distal phalanx with likely subungual and intraosseous abscess measuring 1.8 x 0.8 x 1.5 cm.\par 1/24/23 returns for continued care. Has not had follow up xrays.

## 2023-01-24 NOTE — PHYSICAL EXAM
[General Appearance - Alert] : alert [General Appearance - In No Acute Distress] : in no acute distress [Ankle Swelling Bilaterally] : bilaterally  [1+] : left foot dorsalis pedis 1+ [Normal Foot/Ankle] : Both lower extremities were exposed and visualized. Standing exam demonstrates normal foot posture and alignment. Hindfoot exam shows no hindfoot valgus or varus [Diminished Throughout Right Foot] : diminished sensation with monofilament testing throughout right foot [Vibration Dec.] : diminished vibratory sensation at the level of the toes [Diminished Throughout Left Foot] : diminished sensation with monofilament testing throughout left foot [Ankle Swelling (On Exam)] : not present [Varicose Veins Of Lower Extremities] : not present [] : not present [Foot Ulcer] : no foot ulcer [Skin Induration] : no skin induration [FreeTextEntry1] : left first toe onychodystrophy with lifting of nail plate. There is minimal subungual dry hematoma no acute signs of infection\par dry scaling skin b/l feet

## 2023-01-26 DIAGNOSIS — L85.3 XEROSIS CUTIS: ICD-10-CM

## 2023-01-26 DIAGNOSIS — E11.8 TYPE 2 DIABETES MELLITUS WITH UNSPECIFIED COMPLICATIONS: ICD-10-CM

## 2023-01-26 DIAGNOSIS — L60.3 NAIL DYSTROPHY: ICD-10-CM

## 2023-02-23 ENCOUNTER — APPOINTMENT (OUTPATIENT)
Dept: INTERNAL MEDICINE | Facility: CLINIC | Age: 73
End: 2023-02-23

## 2023-03-28 ENCOUNTER — APPOINTMENT (OUTPATIENT)
Dept: PODIATRY | Facility: CLINIC | Age: 73
End: 2023-03-28

## 2023-04-03 ENCOUNTER — RX RENEWAL (OUTPATIENT)
Age: 73
End: 2023-04-03

## 2023-04-18 ENCOUNTER — APPOINTMENT (OUTPATIENT)
Dept: PODIATRY | Facility: CLINIC | Age: 73
End: 2023-04-18
Payer: MEDICAID

## 2023-04-18 ENCOUNTER — OUTPATIENT (OUTPATIENT)
Dept: OUTPATIENT SERVICES | Facility: HOSPITAL | Age: 73
LOS: 1 days | End: 2023-04-18
Payer: MEDICAID

## 2023-04-18 ENCOUNTER — NON-APPOINTMENT (OUTPATIENT)
Age: 73
End: 2023-04-18

## 2023-04-18 DIAGNOSIS — Z98.890 OTHER SPECIFIED POSTPROCEDURAL STATES: Chronic | ICD-10-CM

## 2023-04-18 DIAGNOSIS — L60.3 NAIL DYSTROPHY: ICD-10-CM

## 2023-04-18 DIAGNOSIS — Z00.00 ENCOUNTER FOR GENERAL ADULT MEDICAL EXAMINATION WITHOUT ABNORMAL FINDINGS: ICD-10-CM

## 2023-04-18 DIAGNOSIS — Z95.5 PRESENCE OF CORONARY ANGIOPLASTY IMPLANT AND GRAFT: Chronic | ICD-10-CM

## 2023-04-18 PROCEDURE — 99213 OFFICE O/P EST LOW 20 MIN: CPT | Mod: NC

## 2023-04-18 PROCEDURE — 99213 OFFICE O/P EST LOW 20 MIN: CPT

## 2023-04-19 PROBLEM — L60.3 ONYCHODYSTROPHY: Status: ACTIVE | Noted: 2023-01-24

## 2023-04-19 NOTE — ASSESSMENT
[Verbal] : verbal [Patient] : patient [Good - alert, interested, motivated] : Good - alert, interested, motivated [Demonstrates independently] : demonstrates independently [FreeTextEntry1] : Modified ADA Diabetic Foot Risk Classification 1 \par A1c reviewed \par -Loss of protective sensation: yes  \par -Presence of foot deformity:    no \par -presence of pre-ulcerative lesion:  no\par   -hemorrhage into callus: no\par -atrophy of heel or metatarsal fat pads: no\par \par -Diabetic neurovascular foot assessment  performed. \par -Discussed with patient diabetic foot hygiene.Patient instructed to regularly check the bottom of the feet\par -Return 6 month\par \par \par

## 2023-04-19 NOTE — REVIEW OF SYSTEMS
[As Noted in HPI] : as noted in HPI [Difficulty Walking] : difficulty walking [Limb Weakness] : limb weakness [Negative] : Musculoskeletal

## 2023-04-19 NOTE — HISTORY OF PRESENT ILLNESS
[Other: ____] : [unfilled] [Walker] : a walker [FreeTextEntry1] : 73 y/o diabetic male here today for management of right hallux ulcer. Patient was in the ED for acute avulsion of the right hallux nail. xrays revealed erosive changes of the first distal phalanx\par \par 6/21- returns today for continued care. \par \par 7/12-here today for follow up. no complaints \par 8/16 follow up on a hallux wound, right foot \par 8/23-pt returns today, as instructed by his nephrologist for recurrence of right first toe infection. \par 8/30- returns for continued care. Relates improvement in pain \par 9/6 - Returns for continued care. Relates resolution of pain, just feels "uncomfortable." Pt states he wasn't able to get FXR because of the weather.\par 10/4 - returns for continued care, Right hallux nail is still hurting, minimal drainage, still did not get foot xrays. \par 10/18 - Returns for 1 mo f/u. Did not go to ED for IV abx because he got abx x2 last week during dialysis. Did not get FXR because had episode of high BP.\par Social Work states ID providers on [facility] no longer seeing pts. Earliest ID appt SW could secure is w/ [facility] in November. Pt reports L big toe red x 1 week. Denies trauma. MRI: Ulcer at the dorsal aspect of the first toe, with findings consistent w/osteomyelitis of the first distal phalanx with likely subungual and intraosseous abscess measuring 1.8 x 0.8 x 1.5 cm.\par 1/24/23 returns for continued care. Has not had follow up xrays. \par 4/18 returns for follow up. no new complaints. Here for DM foot eval

## 2023-04-19 NOTE — PHYSICAL EXAM
[General Appearance - Alert] : alert [General Appearance - In No Acute Distress] : in no acute distress [1+] : left foot dorsalis pedis 1+ [Normal Foot/Ankle] : Both lower extremities were exposed and visualized. Standing exam demonstrates normal foot posture and alignment. Hindfoot exam shows no hindfoot valgus or varus [Vibration Dec.] : diminished vibratory sensation at the level of the toes [Oriented To Time, Place, And Person] : oriented to person, place, and time [Impaired Insight] : insight and judgment were intact [Ankle Swelling (On Exam)] : not present [Varicose Veins Of Lower Extremities] : not present [] : not present [Foot Ulcer] : no foot ulcer [FreeTextEntry1] : left first toe onychodystrophy. no open wouunds \par dry scaling skin b/l feet [Diminished Throughout Right Foot] : normal sensation with monofilament testing throughout right foot [Diminished Throughout Left Foot] : normal sensation with monofilament testing throughout left foot

## 2023-04-24 DIAGNOSIS — E11.8 TYPE 2 DIABETES MELLITUS WITH UNSPECIFIED COMPLICATIONS: ICD-10-CM

## 2023-04-24 DIAGNOSIS — L60.3 NAIL DYSTROPHY: ICD-10-CM

## 2023-05-11 ENCOUNTER — APPOINTMENT (OUTPATIENT)
Dept: INTERNAL MEDICINE | Facility: CLINIC | Age: 73
End: 2023-05-11
Payer: MEDICAID

## 2023-05-11 ENCOUNTER — OUTPATIENT (OUTPATIENT)
Dept: OUTPATIENT SERVICES | Facility: HOSPITAL | Age: 73
LOS: 1 days | End: 2023-05-11
Payer: MEDICAID

## 2023-05-11 VITALS
TEMPERATURE: 96.6 F | HEIGHT: 66 IN | SYSTOLIC BLOOD PRESSURE: 130 MMHG | DIASTOLIC BLOOD PRESSURE: 77 MMHG | HEART RATE: 80 BPM | OXYGEN SATURATION: 99 %

## 2023-05-11 VITALS
HEART RATE: 106 BPM | SYSTOLIC BLOOD PRESSURE: 100 MMHG | DIASTOLIC BLOOD PRESSURE: 64 MMHG | HEIGHT: 66 IN | OXYGEN SATURATION: 99 % | TEMPERATURE: 96.6 F

## 2023-05-11 VITALS — BODY MASS INDEX: 22.52 KG/M2 | WEIGHT: 139.55 LBS

## 2023-05-11 DIAGNOSIS — N18.6 END STAGE RENAL DISEASE: ICD-10-CM

## 2023-05-11 DIAGNOSIS — E11.8 TYPE 2 DIABETES MELLITUS WITH UNSPECIFIED COMPLICATIONS: ICD-10-CM

## 2023-05-11 DIAGNOSIS — Z00.00 ENCOUNTER FOR GENERAL ADULT MEDICAL EXAMINATION WITHOUT ABNORMAL FINDINGS: ICD-10-CM

## 2023-05-11 DIAGNOSIS — D63.1 END STAGE RENAL DISEASE: ICD-10-CM

## 2023-05-11 DIAGNOSIS — I10 ESSENTIAL (PRIMARY) HYPERTENSION: ICD-10-CM

## 2023-05-11 DIAGNOSIS — I48.91 UNSPECIFIED ATRIAL FIBRILLATION: ICD-10-CM

## 2023-05-11 DIAGNOSIS — Z99.2 END STAGE RENAL DISEASE: ICD-10-CM

## 2023-05-11 DIAGNOSIS — Z98.49 CATARACT EXTRACTION STATUS, UNSPECIFIED EYE: Chronic | ICD-10-CM

## 2023-05-11 DIAGNOSIS — Z00.00 ENCOUNTER FOR GENERAL ADULT MEDICAL EXAMINATION W/OUT ABNORMAL FINDINGS: ICD-10-CM

## 2023-05-11 DIAGNOSIS — Z12.2 ENCOUNTER FOR SCREENING FOR MALIGNANT NEOPLASM OF RESPIRATORY ORGANS: ICD-10-CM

## 2023-05-11 DIAGNOSIS — Z98.890 OTHER SPECIFIED POSTPROCEDURAL STATES: Chronic | ICD-10-CM

## 2023-05-11 DIAGNOSIS — I25.10 ATHEROSCLEROTIC HEART DISEASE OF NATIVE CORONARY ARTERY W/OUT ANGINA PECTORIS: ICD-10-CM

## 2023-05-11 DIAGNOSIS — E78.5 HYPERLIPIDEMIA, UNSPECIFIED: ICD-10-CM

## 2023-05-11 DIAGNOSIS — Z95.5 PRESENCE OF CORONARY ANGIOPLASTY IMPLANT AND GRAFT: Chronic | ICD-10-CM

## 2023-05-11 DIAGNOSIS — R11.2 NAUSEA WITH VOMITING, UNSPECIFIED: ICD-10-CM

## 2023-05-11 DIAGNOSIS — K21.9 GASTRO-ESOPHAGEAL REFLUX DISEASE W/OUT ESOPHAGITIS: ICD-10-CM

## 2023-05-11 PROCEDURE — 93010 ELECTROCARDIOGRAM REPORT: CPT

## 2023-05-11 PROCEDURE — 99214 OFFICE O/P EST MOD 30 MIN: CPT | Mod: GC

## 2023-05-11 PROCEDURE — 93005 ELECTROCARDIOGRAM TRACING: CPT

## 2023-05-11 PROCEDURE — 99214 OFFICE O/P EST MOD 30 MIN: CPT

## 2023-05-11 RX ORDER — METOPROLOL TARTRATE 25 MG/1
25 TABLET, FILM COATED ORAL
Qty: 30 | Refills: 5 | Status: DISCONTINUED | COMMUNITY
End: 2023-05-11

## 2023-05-11 RX ORDER — METOPROLOL SUCCINATE 25 MG/1
25 TABLET, EXTENDED RELEASE ORAL
Qty: 30 | Refills: 5 | Status: DISCONTINUED | COMMUNITY
Start: 2023-05-11 | End: 2023-05-11

## 2023-05-11 RX ORDER — LOSARTAN POTASSIUM 50 MG/1
50 TABLET, FILM COATED ORAL AT BEDTIME
Qty: 90 | Refills: 3 | Status: ACTIVE | COMMUNITY

## 2023-05-11 RX ORDER — ATORVASTATIN CALCIUM 40 MG/1
40 TABLET, FILM COATED ORAL
Qty: 90 | Refills: 1 | Status: ACTIVE | COMMUNITY

## 2023-05-11 RX ORDER — CLONIDINE HYDROCHLORIDE 0.1 MG/1
0.1 TABLET ORAL TWICE DAILY
Qty: 60 | Refills: 1 | Status: ACTIVE | COMMUNITY
Start: 2022-07-21 | End: 1900-01-01

## 2023-05-11 RX ORDER — PANTOPRAZOLE 40 MG/1
40 TABLET, DELAYED RELEASE ORAL
Qty: 60 | Refills: 5 | Status: ACTIVE | COMMUNITY
Start: 2023-01-03 | End: 1900-01-01

## 2023-05-11 RX ORDER — APIXABAN 2.5 MG/1
2.5 TABLET, FILM COATED ORAL
Qty: 30 | Refills: 3 | Status: ACTIVE | COMMUNITY
Start: 2023-05-11 | End: 1900-01-01

## 2023-05-11 RX ORDER — METOCLOPRAMIDE 5 MG/1
5 TABLET ORAL 4 TIMES DAILY
Qty: 120 | Refills: 0 | Status: ACTIVE | COMMUNITY
Start: 2023-05-11 | End: 1900-01-01

## 2023-05-11 RX ORDER — METOPROLOL TARTRATE 25 MG/1
25 TABLET, FILM COATED ORAL TWICE DAILY
Qty: 90 | Refills: 2 | Status: ACTIVE | COMMUNITY
Start: 2023-05-11 | End: 1900-01-01

## 2023-05-11 RX ORDER — ASPIRIN ENTERIC COATED TABLETS 81 MG 81 MG/1
81 TABLET, DELAYED RELEASE ORAL DAILY
Qty: 30 | Refills: 5 | Status: ACTIVE | COMMUNITY
Start: 2016-09-07 | End: 1900-01-01

## 2023-05-11 NOTE — PHYSICAL EXAM
[No Acute Distress] : no acute distress [No JVD] : no jugular venous distention [Supple] : supple [No Respiratory Distress] : no respiratory distress  [No Accessory Muscle Use] : no accessory muscle use [Clear to Auscultation] : lungs were clear to auscultation bilaterally [Normal Rate] : normal rate  [Normal S1, S2] : normal S1 and S2 [Soft] : abdomen soft [Non Tender] : non-tender [Non-distended] : non-distended [Normal Insight/Judgement] : insight and judgment were intact [de-identified] : RUE fistula CDI

## 2023-05-11 NOTE — HISTORY OF PRESENT ILLNESS
[FreeTextEntry1] : Nausea [de-identified] : 72-year-old w/ h/o ESRD on HD, CAD s/p stents, DM, DLD, Afib on Eliquis, and chronic anemia presents for Nausea. He reports that he has been having nausea for months now ( around 3-4 months). It is mainly aggravated by food. No specific alleviating factors. Reports associated vomiting of a brownish material 2-3 times per month especially after food. He reports decreased PO intake and weight loss. reports inconsistent bowel habits ( alternates between diarrhea and constipation). Denies any dysphagia or odynophagia, but reports that does have reflux like symptoms. \par

## 2023-05-11 NOTE — REVIEW OF SYSTEMS
[Recent Change In Weight] : ~T recent weight change [Nausea] : nausea [Vomiting] : vomiting [Negative] : Genitourinary [FreeTextEntry7] : Refer to HPI

## 2023-05-11 NOTE — ASSESSMENT
[FreeTextEntry1] : #Nausea: ongoing- suspect possible diabetic gastroparesis\par - Nausea for months now ( around 3-4 months). \par It is mainly aggravated by food. No specific alleviating factors.\par already taking high doses of PPI without improvement\par  Reports associated vomiting of a brownish material 2-3 times per month especially after food. \par - He reports decreased PO intake and weight loss. \par - Reports inconsistent bowel habits ( alternates between diarrhea and constipation). Denies any dysphagia or odynophagia, but reports that does have reflux like symptoms. \par - Low dose Reglan 5mg po q8hrs ~20 minutes prior to meals ( QTC: 398msec)- ekg personally reviewed on this visit- no acute injury pattern\par - Follow up in 6-8 weeks If persistent will need GI referral for EGD and gastric emptying studies\par \par #HTN- labile with low BP's occasionally\par - BP today: 100/64mg po once daily\par - As per cardiology and given borderline bp they recommended ( on last visit): \par previously STOPPED Imdur and amlodipine and to decrease metoprolol 12.5mg po q12hrs due to low bp ; on losartan  50 mg qhs, also on clonidine 0.1 bid (added hold parameters)\par \par - Please measure BP before giving Anti-hypertensive medications, hold BP med if Systolic BP < 110mmHg or Diastolic BP <60mmHg\par HTN;\par DASH diet discussed and recommended\par Exercise and weight loss counseled \par Frequency and target at home BP readings discussed\par Decrease caffeine intake\par Treatment options and possible side effects discussed\par Patient counseled on symptoms of hypo/hypertension\par Counseled: Yearly Ophthalmology exams\par HOLD PARAMETERS AS ABOVE- patient needs to have BP checked PRIOR to taking his BID antihypertensives \par \par DASH diet discussed and recommended\par Exercise and weight loss counseled \par Frequency and target at home BP readings discussed\par Decrease caffeine intake\par Treatment options and possible side effects discussed\par Patient counseled on symptoms of hypo/hypertension\par Counseled: Yearly Ophthalmology exams\par \par #Type II DM with Ho of DFU and OM\par - Last HbA1c 6.7% from November 2022, repeat\par - diet and a lifestyle modification advised\par - given underlying risk factors, can consider SGLT-2 inhibitors, to be used with caution given also risk of ulcers/amputation. \par Diabetes;\par Low sugar, low carbohydrate diet \par Exercise Counseled \par Patient given opportunity to discuss frequency and target blood sugar levels\par Patient educated on symptoms of hypo/hyperglycemic events \par Counseled on: Yearly Ophthalmology and Podiatry Exam\par \par #CAD\par - s/p PCI w/ stent placement ( 3 SOHAM in LAD by Dr. Martin in 2017)\par - follows w/ Dr. Thompson, last seen cardiology Jan 2023\par - Cardiology recommended to: \par - c/w ASA, statin, eliquis and STOPPED Imdur and amlodipine and to decrease metoprolol 12.5mg po q12hrs due to low bp ; on losartan  50 mg qhs, also on clonidine 0.1 bid (added hold parameters)\par \par #Chronic AFib\par - c/w rate control\par - c/w Eliquis 2.5mg BID (dose reduced in setting of AVF and GI bleeds)\par \par #ESRD\par #Anemia of Chronic Disease\par - On HD x3/wk; Follows Dr. Wise\par - c/w Sevelamer 2 tabs TIDAC\par - c/w Phoslo 1 tab TIDAC\par c/w HD, follow c nephro\par \par #Lung Nodule (2mm Subpleural nodule)\par #Former Smoker\par #Lung Cancer screening\par - 60+ pk-yr smoker; quit ~2017\par - Stable in size on CT Chest Nov 2021\par - No new nodules noted on last CT\par - Likely would not need any further follow up, however the patient is a candidate for Low Dose CT for lung cancer screening ( Age >50 years, >20 pack years and quit < 15 years ago.)\par \par #Reji's Esophagus: \par - EGD fro EUS in 2019 showed Reji's esophagus\par - C/w pantoprazole 40mg po q12hrs\par gi f/u for egd\par \par #HO of OM: \par - HO of OM 2/2 DM. \par - First toe OM\par - Resolved\par - Follow up with podiatry, last seen podiatry Jan 2023\par \par #HO of cholecystitis s/p cholecystectomy: \par - Sept 2019 ( pathology showed chronic cholecystitis)\par \par #Healthcare Management\par - Colorectal Cancer: Last in 2019 (no significant findings per pt); repeat due in 2024 (BUT patient having abnormal bowel habits/ recommend colonoscopy)\par Colon Cancer Screening;\par Patient counseled on the importance of colonoscopy screening and directed to follow-up with GI doctor. Failure to perform test can result in Colon Cancer and Death.\par \par - Lung Cancer: Last CT Chest in Nov 2021 (no suspicious pulmonary modules); Annual Low dose CT scan is indicated ( Age >50 years, >20 pack years and quit < 15 years ago.)\par - COVID-19: completed in May 2021; booster in Nov 2021\par \par - Follow up in 6-8 weeks If persistent will need GI referral for EGD and gastric emptying studies

## 2023-05-12 ENCOUNTER — NON-APPOINTMENT (OUTPATIENT)
Age: 73
End: 2023-05-12

## 2023-05-18 ENCOUNTER — APPOINTMENT (OUTPATIENT)
Dept: VASCULAR SURGERY | Facility: CLINIC | Age: 73
End: 2023-05-18

## 2023-05-19 DIAGNOSIS — R11.2 NAUSEA WITH VOMITING, UNSPECIFIED: ICD-10-CM

## 2023-05-19 DIAGNOSIS — I25.10 ATHEROSCLEROTIC HEART DISEASE OF NATIVE CORONARY ARTERY WITHOUT ANGINA PECTORIS: ICD-10-CM

## 2023-05-19 DIAGNOSIS — E78.5 HYPERLIPIDEMIA, UNSPECIFIED: ICD-10-CM

## 2023-05-19 DIAGNOSIS — I48.91 UNSPECIFIED ATRIAL FIBRILLATION: ICD-10-CM

## 2023-05-19 DIAGNOSIS — K21.9 GASTRO-ESOPHAGEAL REFLUX DISEASE WITHOUT ESOPHAGITIS: ICD-10-CM

## 2023-05-19 DIAGNOSIS — N18.6 END STAGE RENAL DISEASE: ICD-10-CM

## 2023-05-19 DIAGNOSIS — Z00.00 ENCOUNTER FOR GENERAL ADULT MEDICAL EXAMINATION WITHOUT ABNORMAL FINDINGS: ICD-10-CM

## 2023-05-19 DIAGNOSIS — E11.9 TYPE 2 DIABETES MELLITUS WITHOUT COMPLICATIONS: ICD-10-CM

## 2023-05-19 DIAGNOSIS — I10 ESSENTIAL (PRIMARY) HYPERTENSION: ICD-10-CM

## 2023-05-19 DIAGNOSIS — Z12.2 ENCOUNTER FOR SCREENING FOR MALIGNANT NEOPLASM OF RESPIRATORY ORGANS: ICD-10-CM

## 2023-06-21 NOTE — CHART NOTE - NSCHARTNOTESELECT_GEN_ALL_CORE
Event Note Detail Level: Detailed Quality 130: Documentation Of Current Medications In The Medical Record: Current Medications Documented Quality 431: Preventive Care And Screening: Unhealthy Alcohol Use - Screening: Patient screened for unhealthy alcohol use using a single question and scores less than 2 times per year Quality 110: Preventive Care And Screening: Influenza Immunization: Influenza Immunization previously received during influenza season Quality 226: Preventive Care And Screening: Tobacco Use: Screening And Cessation Intervention: Patient screened for tobacco use and is an ex/non-smoker

## 2023-06-22 ENCOUNTER — OUTPATIENT (OUTPATIENT)
Dept: OUTPATIENT SERVICES | Facility: HOSPITAL | Age: 73
LOS: 1 days | End: 2023-06-22
Payer: MEDICAID

## 2023-06-22 ENCOUNTER — APPOINTMENT (OUTPATIENT)
Dept: DERMATOLOGY | Facility: CLINIC | Age: 73
End: 2023-06-22

## 2023-06-22 VITALS
WEIGHT: 150 LBS | HEART RATE: 60 BPM | DIASTOLIC BLOOD PRESSURE: 74 MMHG | HEIGHT: 66 IN | OXYGEN SATURATION: 100 % | BODY MASS INDEX: 24.11 KG/M2 | SYSTOLIC BLOOD PRESSURE: 134 MMHG | TEMPERATURE: 97.5 F

## 2023-06-22 DIAGNOSIS — Z98.890 OTHER SPECIFIED POSTPROCEDURAL STATES: Chronic | ICD-10-CM

## 2023-06-22 DIAGNOSIS — Z98.49 CATARACT EXTRACTION STATUS, UNSPECIFIED EYE: Chronic | ICD-10-CM

## 2023-06-22 DIAGNOSIS — L40.9 PSORIASIS, UNSPECIFIED: ICD-10-CM

## 2023-06-22 DIAGNOSIS — Z00.00 ENCOUNTER FOR GENERAL ADULT MEDICAL EXAMINATION WITHOUT ABNORMAL FINDINGS: ICD-10-CM

## 2023-06-22 PROCEDURE — 99204 OFFICE O/P NEW MOD 45 MIN: CPT

## 2023-06-22 RX ORDER — HYDROCORTISONE 25 MG/G
2.5 OINTMENT TOPICAL 3 TIMES DAILY
Qty: 2 | Refills: 5 | Status: ACTIVE | COMMUNITY
Start: 2023-06-22 | End: 1900-01-01

## 2023-06-22 RX ORDER — KETOCONAZOLE 20.5 MG/ML
2 SHAMPOO, SUSPENSION TOPICAL
Qty: 1 | Refills: 5 | Status: ACTIVE | COMMUNITY
Start: 2023-06-22 | End: 1900-01-01

## 2023-06-22 RX ORDER — FLUOCINONIDE 0.5 MG/ML
0.05 SOLUTION TOPICAL
Qty: 1 | Refills: 5 | Status: ACTIVE | COMMUNITY
Start: 2023-06-22 | End: 1900-01-01

## 2023-06-22 NOTE — HISTORY OF PRESENT ILLNESS
[de-identified] : 72-year-old w/ h/o ESRD on HD, CAD s/p stents, DM, DLD, Afib on Eliquis, and chronic anemia presents scalp rash and forehead rash for the past year, used to get it on the elbows too. He has been using triamcolone 0.1% cream which he says improves the pruritis but the rash persists and hard to use on scalp. \par Also with itchy groin, no meds used there. Uses unknown soap and shampoo. \par No joint pains, no fever, no chills.

## 2023-06-22 NOTE — ASSESSMENT
[FreeTextEntry1] : #Psoriasis \par - hydrocortisone 2.5% 2-3x day to scalp \par - Ketaconazole shampoo for scalp  \par - fluocinonide 0.05% for the scalp\par \par # Intertrigo r/o inverse psoriasis groin : \par - hydrocortisone 2.5% 3x day to groin\par - Ketaconazole shampoo for  groin \par \par RTC 2-3 months

## 2023-06-22 NOTE — PHYSICAL EXAM
[FreeTextEntry3] : - erythematous with scaly plaques noted in upper right scalp, bilateral cheek, excoriations noted and patch on R forehead; and slightly eryth patches groin blt.

## 2023-06-25 ENCOUNTER — RESULT REVIEW (OUTPATIENT)
Age: 73
End: 2023-06-25

## 2023-06-25 ENCOUNTER — OUTPATIENT (OUTPATIENT)
Dept: OUTPATIENT SERVICES | Facility: HOSPITAL | Age: 73
LOS: 1 days | End: 2023-06-25
Payer: MEDICAID

## 2023-06-25 DIAGNOSIS — Z95.5 PRESENCE OF CORONARY ANGIOPLASTY IMPLANT AND GRAFT: Chronic | ICD-10-CM

## 2023-06-25 DIAGNOSIS — Z98.890 OTHER SPECIFIED POSTPROCEDURAL STATES: Chronic | ICD-10-CM

## 2023-06-25 DIAGNOSIS — R91.1 SOLITARY PULMONARY NODULE: ICD-10-CM

## 2023-06-25 DIAGNOSIS — Z00.8 ENCOUNTER FOR OTHER GENERAL EXAMINATION: ICD-10-CM

## 2023-06-25 DIAGNOSIS — Z98.49 CATARACT EXTRACTION STATUS, UNSPECIFIED EYE: Chronic | ICD-10-CM

## 2023-06-25 PROCEDURE — 71271 CT THORAX LUNG CANCER SCR C-: CPT | Mod: 26

## 2023-06-25 PROCEDURE — 71271 CT THORAX LUNG CANCER SCR C-: CPT

## 2023-06-26 DIAGNOSIS — R91.1 SOLITARY PULMONARY NODULE: ICD-10-CM

## 2023-06-27 DIAGNOSIS — L40.9 PSORIASIS, UNSPECIFIED: ICD-10-CM

## 2023-06-27 DIAGNOSIS — L30.4 ERYTHEMA INTERTRIGO: ICD-10-CM

## 2023-07-04 ENCOUNTER — EMERGENCY (EMERGENCY)
Facility: HOSPITAL | Age: 73
LOS: 0 days | Discharge: HOME | End: 2023-07-04
Attending: EMERGENCY MEDICINE
Payer: MEDICAID

## 2023-07-04 ENCOUNTER — INPATIENT (INPATIENT)
Facility: HOSPITAL | Age: 73
LOS: 3 days | Discharge: HOME | DRG: 347 | End: 2023-07-08
Attending: STUDENT IN AN ORGANIZED HEALTH CARE EDUCATION/TRAINING PROGRAM | Admitting: INTERNAL MEDICINE
Payer: MEDICAID

## 2023-07-04 VITALS
TEMPERATURE: 98 F | HEART RATE: 52 BPM | RESPIRATION RATE: 20 BRPM | DIASTOLIC BLOOD PRESSURE: 72 MMHG | WEIGHT: 150.8 LBS | SYSTOLIC BLOOD PRESSURE: 175 MMHG | OXYGEN SATURATION: 97 %

## 2023-07-04 VITALS
TEMPERATURE: 98 F | RESPIRATION RATE: 18 BRPM | HEART RATE: 64 BPM | OXYGEN SATURATION: 97 % | SYSTOLIC BLOOD PRESSURE: 170 MMHG | WEIGHT: 164.91 LBS | DIASTOLIC BLOOD PRESSURE: 81 MMHG

## 2023-07-04 DIAGNOSIS — I50.9 HEART FAILURE, UNSPECIFIED: ICD-10-CM

## 2023-07-04 DIAGNOSIS — I48.91 UNSPECIFIED ATRIAL FIBRILLATION: ICD-10-CM

## 2023-07-04 DIAGNOSIS — Z98.49 CATARACT EXTRACTION STATUS, UNSPECIFIED EYE: Chronic | ICD-10-CM

## 2023-07-04 DIAGNOSIS — Z98.890 OTHER SPECIFIED POSTPROCEDURAL STATES: Chronic | ICD-10-CM

## 2023-07-04 DIAGNOSIS — Z95.5 PRESENCE OF CORONARY ANGIOPLASTY IMPLANT AND GRAFT: Chronic | ICD-10-CM

## 2023-07-04 DIAGNOSIS — M54.50 LOW BACK PAIN, UNSPECIFIED: ICD-10-CM

## 2023-07-04 DIAGNOSIS — Z86.2 PERSONAL HISTORY OF DISEASES OF THE BLOOD AND BLOOD-FORMING ORGANS AND CERTAIN DISORDERS INVOLVING THE IMMUNE MECHANISM: ICD-10-CM

## 2023-07-04 DIAGNOSIS — Z79.82 LONG TERM (CURRENT) USE OF ASPIRIN: ICD-10-CM

## 2023-07-04 DIAGNOSIS — N18.6 END STAGE RENAL DISEASE: ICD-10-CM

## 2023-07-04 DIAGNOSIS — E78.5 HYPERLIPIDEMIA, UNSPECIFIED: ICD-10-CM

## 2023-07-04 DIAGNOSIS — Z99.2 DEPENDENCE ON RENAL DIALYSIS: ICD-10-CM

## 2023-07-04 DIAGNOSIS — Z95.5 PRESENCE OF CORONARY ANGIOPLASTY IMPLANT AND GRAFT: ICD-10-CM

## 2023-07-04 DIAGNOSIS — I13.2 HYPERTENSIVE HEART AND CHRONIC KIDNEY DISEASE WITH HEART FAILURE AND WITH STAGE 5 CHRONIC KIDNEY DISEASE, OR END STAGE RENAL DISEASE: ICD-10-CM

## 2023-07-04 DIAGNOSIS — Z87.891 PERSONAL HISTORY OF NICOTINE DEPENDENCE: ICD-10-CM

## 2023-07-04 DIAGNOSIS — Z79.01 LONG TERM (CURRENT) USE OF ANTICOAGULANTS: ICD-10-CM

## 2023-07-04 DIAGNOSIS — Z86.69 PERSONAL HISTORY OF OTHER DISEASES OF THE NERVOUS SYSTEM AND SENSE ORGANS: ICD-10-CM

## 2023-07-04 PROCEDURE — 99283 EMERGENCY DEPT VISIT LOW MDM: CPT

## 2023-07-04 PROCEDURE — 99284 EMERGENCY DEPT VISIT MOD MDM: CPT

## 2023-07-04 PROCEDURE — 99285 EMERGENCY DEPT VISIT HI MDM: CPT

## 2023-07-04 RX ORDER — LIDOCAINE 4 G/100G
1 CREAM TOPICAL ONCE
Refills: 0 | Status: COMPLETED | OUTPATIENT
Start: 2023-07-04 | End: 2023-07-04

## 2023-07-04 RX ORDER — ACETAMINOPHEN 500 MG
650 TABLET ORAL ONCE
Refills: 0 | Status: COMPLETED | OUTPATIENT
Start: 2023-07-04 | End: 2023-07-04

## 2023-07-04 RX ORDER — METHOCARBAMOL 500 MG/1
1500 TABLET, FILM COATED ORAL ONCE
Refills: 0 | Status: COMPLETED | OUTPATIENT
Start: 2023-07-04 | End: 2023-07-04

## 2023-07-04 RX ORDER — METHOCARBAMOL 500 MG/1
2 TABLET, FILM COATED ORAL
Qty: 18 | Refills: 0
Start: 2023-07-04 | End: 2023-07-06

## 2023-07-04 RX ORDER — METHOCARBAMOL 500 MG/1
1000 TABLET, FILM COATED ORAL ONCE
Refills: 0 | Status: COMPLETED | OUTPATIENT
Start: 2023-07-04 | End: 2023-07-04

## 2023-07-04 RX ORDER — OXYCODONE AND ACETAMINOPHEN 5; 325 MG/1; MG/1
1 TABLET ORAL ONCE
Refills: 0 | Status: DISCONTINUED | OUTPATIENT
Start: 2023-07-04 | End: 2023-07-04

## 2023-07-04 RX ADMIN — LIDOCAINE 1 PATCH: 4 CREAM TOPICAL at 22:32

## 2023-07-04 RX ADMIN — METHOCARBAMOL 1500 MILLIGRAM(S): 500 TABLET, FILM COATED ORAL at 12:02

## 2023-07-04 RX ADMIN — OXYCODONE AND ACETAMINOPHEN 1 TABLET(S): 5; 325 TABLET ORAL at 22:34

## 2023-07-04 RX ADMIN — METHOCARBAMOL 1000 MILLIGRAM(S): 500 TABLET, FILM COATED ORAL at 22:34

## 2023-07-04 RX ADMIN — Medication 650 MILLIGRAM(S): at 12:02

## 2023-07-04 RX ADMIN — LIDOCAINE 1 PATCH: 4 CREAM TOPICAL at 12:03

## 2023-07-04 RX ADMIN — Medication 650 MILLIGRAM(S): at 13:23

## 2023-07-04 NOTE — ED PROVIDER NOTE - CLINICAL SUMMARY MEDICAL DECISION MAKING FREE TEXT BOX
Patient presented with lower back pain x months. Otherwise afebrile, HD stable, neurovascularly intact. No red flags in hx or on exam. Exam consistent with muscular pain. Treated in ED with significant improvement of pain after which time patient ambulatory without difficulty. Given the above, will discharge home with outpatient follow up. Patient agreeable with plan. Agrees to return to ED for any new or worsening symptoms.

## 2023-07-04 NOTE — ED PROVIDER NOTE - CLINICAL SUMMARY MEDICAL DECISION MAKING FREE TEXT BOX
72yM hx   CAD w/ stents, Afib, ESRD on HD T, thur S, chronic back pain for 10 years  pw exacerbation of his chronic back pain right sided since sitting for dialysis for 3 hours.  Pt seen in ED given  pain meds and dcd back home. Pt returns  now shortly after being sent home for recurrence of pain.  analgesia given  however pt  stil with pain  unable to ambulate secondary to pain    PE alert nontoxic cvs rrr resp cta ab soft nontender no pulsatile mass,  2+ radial pulse bl equal  5/5 strength LE sensation intact  no saddle anesthesia,  + dorsiflexion b/l great toe. no vertebral tenderness or erythema.  pt does nt want to be discharged - want sot be admitted for pain control and physical therapy

## 2023-07-04 NOTE — ED PROVIDER NOTE - OBTAINED AND REVIEWED OLD RECORDS MULTI-SELECT OPTIONS
"  Assessment & Plan     Left wrist pain  Patient with traumatic soft tissue injury to his left wrist and forearm. Concerned about possible tendon injury. Likely mild given patient's active range of motion of motion remains intact, although painful with wrist flexion. Pain may be exacerbated by patient being on warfarin, likely has small to medium sized hematoma on left forearm. Placed orthopedics referral for further evaluation of soft tissue injury if symptoms or not improving in 1 to 2 weeks. X-rays in clinic did not show any obvious fractures, although over read by radiology pending.  - XR Wrist Left G/E 3 Views  - XR Forearm Left 2 Views  - Orthopedic  Referral  - Patient given wrist brace to help support wrist and forearm in the interim.             BMI:   Estimated body mass index is 50.37 kg/m  as calculated from the following:    Height as of 1/26/23: 1.727 m (5' 8\").    Weight as of this encounter: 150.3 kg (331 lb 4.8 oz).     Sudeep Campbell MD  [facility] MARNIE Angelo is a 66 year old, presenting for the following health issues:  Musculoskeletal Problem        5/10/2023    10:08 AM   Additional Questions   Roomed by    Accompanied by self     Forms 5/10/2023   Any forms needing to be completed Yes     Musculoskeletal Problem    History of Present Illness       Reason for visit:  Hurt hand at work  Symptom intensity:  Moderate  Had these symptoms before:  No    He eats 2-3 servings of fruits and vegetables daily.He consumes 1 sweetened beverage(s) daily.He exercises with enough effort to increase his heart rate 10 to 19 minutes per day.  He exercises with enough effort to increase his heart rate 3 or less days per week.   He is taking medications regularly.     Patient had an incident 4-5 days ago. He was getting out of the car with his walker, grabbed something with left hand and felt a pop on his wrist. Since then he has been worsening pain and swelling " extending to his thumb and forearm. No numbness or tingling in the fingers, although at night does feel some paresthesias in the median nerve distribution.            Review of Systems   Constitutional, HEENT, cardiovascular, pulmonary, gi and gu systems are negative, except as otherwise noted.      Objective    /74   Pulse 62   Temp 98.2  F (36.8  C)   Resp 20   Wt (!) 150.3 kg (331 lb 4.8 oz)   SpO2 97%   BMI 50.37 kg/m    Body mass index is 50.37 kg/m .  Physical Exam   GENERAL: healthy, alert and no distress  NECK: no adenopathy, no asymmetry, masses, or scars and thyroid normal to palpation  RESP: lungs clear to auscultation - no rales, rhonchi or wheezes  CV: regular rate and rhythm, normal S1 S2, no S3 or S4, no murmur, click or rub, no peripheral edema and peripheral pulses strong  MS: Patient with mild redness and proximal part of the wrist. Does have fair amount of swelling in the forearm. Point tenderness at the proximal area of the wrist. Unable to elicit paresthesias with Tinel's due to patient discomfort with extension of wrist. Patient is able to flex and extend wrist, although mildly limited due to pain. Otherwise, no gross musculoskeletal defects noted, no edema        Physician Attestation   I, Adalid Appiah MD, saw this patient and agree with the findings and plan of care as documented in the note.      Items personally reviewed/procedural attestation: vitals and imaging and agree with the interpretation documented in the note.    Adalid Appiah MD             Inpatient Records

## 2023-07-04 NOTE — ED PROVIDER NOTE - NSFOLLOWUPINSTRUCTIONS_ED_ALL_ED_FT
BACK PAIN - AfterCare(R) Instructions(ER/ED)     Back Pain    WHAT YOU NEED TO KNOW:    Back pain is common. It can be caused by many conditions, such as arthritis or the breakdown of spinal discs. Your risk for back pain is increased by injuries, lack of activity, or repeated bending and twisting. You may feel sore or stiff on one or both sides of your back. The pain may spread to your buttocks or thighs.    DISCHARGE INSTRUCTIONS:    Return to the emergency department if:     You have pain, numbness, or weakness in one or both legs.      Your pain becomes so severe that you cannot walk.      You cannot control your urine or bowel movements.      You have severe back pain with chest pain.      You have severe back pain, nausea, and vomiting.      You have severe back pain that spreads to your side or genital area.    Contact your healthcare provider if:     You have back pain that does not get better with rest and pain medicine.      You have a fever.      You have pain that worsens when you are on your back or when you rest.      You have pain that worsens when you cough or sneeze.      You lose weight without trying.      You have questions or concerns about your condition or care.    Medicines:     NSAIDs help decrease swelling and pain. This medicine is available with or without a doctor's order. NSAIDs can cause stomach bleeding or kidney problems in certain people. If you take blood thinner medicine, always ask your healthcare provider if NSAIDs are safe for you. Always read the medicine label and follow directions.      Acetaminophen decreases pain and fever. It is available without a doctor's order. Ask how much to take and how often to take it. Follow directions. Read the labels of all other medicines you are using to see if they also contain acetaminophen, or ask your doctor or pharmacist. Acetaminophen can cause liver damage if not taken correctly. Do not use more than 4 grams (4,000 milligrams) total of acetaminophen in one day.       Muscle relaxers help decrease muscle spasms and back pain.      Prescription pain medicine may be given. Ask your healthcare provider how to take this medicine safely. Some prescription pain medicines contain acetaminophen. Do not take other medicines that contain acetaminophen without talking to your healthcare provider. Too much acetaminophen may cause liver damage. Prescription pain medicine may cause constipation. Ask your healthcare provider how to prevent or treat constipation.       Take your medicine as directed. Contact your healthcare provider if you think your medicine is not helping or if you have side effects. Tell him or her if you are allergic to any medicine. Keep a list of the medicines, vitamins, and herbs you take. Include the amounts, and when and why you take them. Bring the list or the pill bottles to follow-up visits. Carry your medicine list with you in case of an emergency.    How to manage your back pain:     Apply ice on your back for 15 to 20 minutes every hour or as directed. Use an ice pack, or put crushed ice in a plastic bag. Cover it with a towel before you apply it to your skin. Ice helps prevent tissue damage and decreases pain.      Apply heat on your back for 20 to 30 minutes every 2 hours for as many days as directed. Heat helps decrease pain and muscle spasms.      Stay active as much as you can without causing more pain. Bed rest could make your back pain worse. Avoid heavy lifting until your pain is gone.      Go to physical therapy as directed. A physical therapist can teach you exercises to help improve movement and strength, and to decrease pain.    Follow up with your healthcare provider in 2 weeks, or as directed: Write down your questions so you remember to ask them during your visits

## 2023-07-04 NOTE — ED PROVIDER NOTE - NSFOLLOWUPINSTRUCTIONS_ED_ALL_ED_FT
Follow-up with Dr Ann in 1-3 days regarding your visit to the ED.      Back Pain    Back pain is very common in adults. The cause of back pain is rarely dangerous and the pain often gets better over time. The cause of your back pain may not be known and may include strain of muscles or ligaments, degeneration of the spinal disks, or arthritis. Occasionally the pain may radiate down your leg(s). Over-the-counter medicines to reduce pain and inflammation are often the most helpful. Stretching and remaining active frequently helps the healing process.     SEEK IMMEDIATE MEDICAL CARE IF YOU HAVE ANY OF THE FOLLOWING SYMPTOMS: bowel or bladder control problems, unusual weakness or numbness in your arms or legs, nausea or vomiting, abdominal pain, fever, dizziness/lightheadedness.

## 2023-07-04 NOTE — ED PROVIDER NOTE - PATIENT PORTAL LINK FT
You can access the FollowMyHealth Patient Portal offered by [facility] by registering at the following website: [email or web address]. By joining [employer]’s FollowMyHealth portal, you will also be able to view your health information using other applications (apps) compatible with our system.

## 2023-07-04 NOTE — ED PROVIDER NOTE - OBJECTIVE STATEMENT
73yo male PMHx as listed presenting with chronic lower back pain. Pt says that he is having the same pain in the same location (left lower back) that is worse than normal over the past 3 days. No recent trauma, took 500mg TYlenol PTA.  No urinary sx, no numbness or weakness, no saddle anesthesia. No F/C, never used IVD.

## 2023-07-04 NOTE — ED PROVIDER NOTE - PROGRESS NOTE DETAILS
TJY: Patient was feeling improved following muscle relaxers, ambulating to and from the bathroom with assistance, per baseline. Pt eager for return back to NH.   Clarified with patient he stills produces urine, so no nsaids given.

## 2023-07-04 NOTE — ED PROVIDER NOTE - CARE PLAN
1 Principal Discharge DX:	Musculoskeletal back pain   Principal Discharge DX:	Intractable back pain

## 2023-07-04 NOTE — ED PROVIDER NOTE - OBJECTIVE STATEMENT
72-year-old male with past medical history of ESRD on HD (M/W/F), CAD s/p 3 stents, A-fib on Eliquis, HTN and chronic back pain presents to the ED complaining of exacerbation of chronic back pain..  Pain is mostly left-sided, radiates into his left leg, is constant, worse with movement and not improving despite taking pain medication.  At baseline patient walks with a walker but is having difficulty walking secondary to his pain.  He denies other complaints. Pt denies fever, chills, nausea, vomiting, abdominal pain, diarrhea, headache, dizziness, weakness, chest pain, SOB, LOC, trauma, urinary symptoms, cough, calf pain/swelling, recent travel, recent surgery.

## 2023-07-04 NOTE — ED ADULT TRIAGE NOTE - CHIEF COMPLAINT QUOTE
Patient BIBA c/o worsened back pain. Patient reports hx of chronic back pain and denies any reinjury

## 2023-07-04 NOTE — ED ADULT NURSE NOTE - NSFALLHARMRISKINTERV_ED_ALL_ED

## 2023-07-04 NOTE — ED PROVIDER NOTE - PHYSICAL EXAMINATION
VITAL SIGNS: I have reviewed nursing notes and confirm.  CONSTITUTIONAL: Well-appearing, non-toxic, sitting up in stretcher in NAD  SKIN: Warm dry, normal skin turgor  HEAD: NCAT  EYES: No conjunctival injection, scleral anicteric  ENT: MMM  NECK: Supple; FROM.   CARD: RRR  RESP: CTAB  ABD: Soft, NDNT  BACK: +left parasacral spinal tenderness, no midline tenderness, no overlying skin chagnes.   EXT: No pedal edema, no calf tenderness. Negative SLR b/l.   NEURO: Grossly normal examination, CN grossly intact  PSYCH: Cooperative, appropriate

## 2023-07-04 NOTE — ED PROVIDER NOTE - PHYSICAL EXAMINATION
VITAL SIGNS: I have reviewed nursing notes and confirm.  CONSTITUTIONAL: 73 yo M laying on stretcher; in no acute distress.  SKIN: Skin exam is warm and dry, no acute rash.  HEAD: Normocephalic; atraumatic.  EYES: Conjunctiva and sclera clear.  ENT: No nasal discharge; airway clear.   CARD: S1, S2 normal; no murmurs, gallops, or rubs. Regular rate and rhythm.  RESP: No wheezes, rales or rhonchi. Speaking in full sentences.   ABD: Normal bowel sounds; soft; non-distended; non-tender; No rebound or guarding. No CVA tenderness.  BACK: No midline TTP. (+) left lumbar paraspinal TTP.   EXT: Normal ROM. No clubbing, cyanosis or edema.  NEURO: Alert, oriented. Grossly unremarkable. No focal deficits. No saddle anesthesia.

## 2023-07-04 NOTE — ED PROVIDER NOTE - PATIENT PORTAL LINK FT
You can access the FollowMyHealth Patient Portal offered by [facility] by registering at the following website: [email or web address]. By joining [facility]’s FollowMyHealth portal, you will also be able to view your health information using other applications (apps) compatible with our system.

## 2023-07-04 NOTE — ED ADULT TRIAGE NOTE - NS ED NURSE BANDS TYPE
Group Topic: BH DAWN Process Group    Date: 5/4/2023  Start Time:  2:10 PM  End Time:  3:00 PM  Facilitators: Sis Melo LPC    Focus: Process group  Number in attendance: 5        Method: Group  Attendance: Present  Mood/Affect: Appropriate  Behavior/Socialization: Appropriate to group  Participation: Active  Overall Patient Response to Group: Appropriate to topic    Patient was active in process group today. The group participated in new peer introductions and patient active shared history of use, associated consequences and experiences in recovery thus far. Patient shared plans for the weekend. He is considering returning to support groups this weekend after getting out of residential programming last week. He denied concern for use, stating that his S.O. is supportive and does not use.     Sis Melo MA LPC SAC             Name band;

## 2023-07-05 DIAGNOSIS — M54.9 DORSALGIA, UNSPECIFIED: ICD-10-CM

## 2023-07-05 LAB
ALBUMIN SERPL ELPH-MCNC: 3.8 G/DL — SIGNIFICANT CHANGE UP (ref 3.5–5.2)
ALP SERPL-CCNC: 81 U/L — SIGNIFICANT CHANGE UP (ref 30–115)
ALT FLD-CCNC: 7 U/L — SIGNIFICANT CHANGE UP (ref 0–41)
ANION GAP SERPL CALC-SCNC: 16 MMOL/L — HIGH (ref 7–14)
AST SERPL-CCNC: 14 U/L — SIGNIFICANT CHANGE UP (ref 0–41)
BASOPHILS # BLD AUTO: 0.05 K/UL — SIGNIFICANT CHANGE UP (ref 0–0.2)
BASOPHILS NFR BLD AUTO: 0.8 % — SIGNIFICANT CHANGE UP (ref 0–1)
BILIRUB SERPL-MCNC: 0.8 MG/DL — SIGNIFICANT CHANGE UP (ref 0.2–1.2)
BUN SERPL-MCNC: 39 MG/DL — HIGH (ref 10–20)
CALCIUM SERPL-MCNC: 7.9 MG/DL — LOW (ref 8.4–10.5)
CHLORIDE SERPL-SCNC: 96 MMOL/L — LOW (ref 98–110)
CO2 SERPL-SCNC: 25 MMOL/L — SIGNIFICANT CHANGE UP (ref 17–32)
CREAT SERPL-MCNC: 6.7 MG/DL — CRITICAL HIGH (ref 0.7–1.5)
EGFR: 8 ML/MIN/1.73M2 — LOW
EOSINOPHIL # BLD AUTO: 0.13 K/UL — SIGNIFICANT CHANGE UP (ref 0–0.7)
EOSINOPHIL NFR BLD AUTO: 2.1 % — SIGNIFICANT CHANGE UP (ref 0–8)
GLUCOSE BLDC GLUCOMTR-MCNC: 211 MG/DL — HIGH (ref 70–99)
GLUCOSE SERPL-MCNC: 76 MG/DL — SIGNIFICANT CHANGE UP (ref 70–99)
HCT VFR BLD CALC: 35.8 % — LOW (ref 42–52)
HGB BLD-MCNC: 11.4 G/DL — LOW (ref 14–18)
IMM GRANULOCYTES NFR BLD AUTO: 0.3 % — SIGNIFICANT CHANGE UP (ref 0.1–0.3)
LYMPHOCYTES # BLD AUTO: 1.04 K/UL — LOW (ref 1.2–3.4)
LYMPHOCYTES # BLD AUTO: 16.6 % — LOW (ref 20.5–51.1)
MCHC RBC-ENTMCNC: 30.9 PG — SIGNIFICANT CHANGE UP (ref 27–31)
MCHC RBC-ENTMCNC: 31.8 G/DL — LOW (ref 32–37)
MCV RBC AUTO: 97 FL — HIGH (ref 80–94)
MONOCYTES # BLD AUTO: 0.98 K/UL — HIGH (ref 0.1–0.6)
MONOCYTES NFR BLD AUTO: 15.6 % — HIGH (ref 1.7–9.3)
NEUTROPHILS # BLD AUTO: 4.05 K/UL — SIGNIFICANT CHANGE UP (ref 1.4–6.5)
NEUTROPHILS NFR BLD AUTO: 64.6 % — SIGNIFICANT CHANGE UP (ref 42.2–75.2)
NRBC # BLD: 0 /100 WBCS — SIGNIFICANT CHANGE UP (ref 0–0)
PLATELET # BLD AUTO: 170 K/UL — SIGNIFICANT CHANGE UP (ref 130–400)
PMV BLD: 10.5 FL — HIGH (ref 7.4–10.4)
POTASSIUM SERPL-MCNC: 5.1 MMOL/L — HIGH (ref 3.5–5)
POTASSIUM SERPL-SCNC: 5.1 MMOL/L — HIGH (ref 3.5–5)
PROT SERPL-MCNC: 6.4 G/DL — SIGNIFICANT CHANGE UP (ref 6–8)
RBC # BLD: 3.69 M/UL — LOW (ref 4.7–6.1)
RBC # FLD: 16.5 % — HIGH (ref 11.5–14.5)
SODIUM SERPL-SCNC: 137 MMOL/L — SIGNIFICANT CHANGE UP (ref 135–146)
WBC # BLD: 6.27 K/UL — SIGNIFICANT CHANGE UP (ref 4.8–10.8)
WBC # FLD AUTO: 6.27 K/UL — SIGNIFICANT CHANGE UP (ref 4.8–10.8)

## 2023-07-05 PROCEDURE — 71045 X-RAY EXAM CHEST 1 VIEW: CPT

## 2023-07-05 PROCEDURE — 85025 COMPLETE CBC W/AUTO DIFF WBC: CPT

## 2023-07-05 PROCEDURE — 84100 ASSAY OF PHOSPHORUS: CPT

## 2023-07-05 PROCEDURE — 97162 PT EVAL MOD COMPLEX 30 MIN: CPT | Mod: GP

## 2023-07-05 PROCEDURE — 82962 GLUCOSE BLOOD TEST: CPT

## 2023-07-05 PROCEDURE — 99222 1ST HOSP IP/OBS MODERATE 55: CPT

## 2023-07-05 PROCEDURE — 36415 COLL VENOUS BLD VENIPUNCTURE: CPT

## 2023-07-05 PROCEDURE — 93005 ELECTROCARDIOGRAM TRACING: CPT

## 2023-07-05 PROCEDURE — 90935 HEMODIALYSIS ONE EVALUATION: CPT

## 2023-07-05 PROCEDURE — 85027 COMPLETE CBC AUTOMATED: CPT

## 2023-07-05 PROCEDURE — 72148 MRI LUMBAR SPINE W/O DYE: CPT

## 2023-07-05 PROCEDURE — 72131 CT LUMBAR SPINE W/O DYE: CPT | Mod: 26

## 2023-07-05 PROCEDURE — 70450 CT HEAD/BRAIN W/O DYE: CPT

## 2023-07-05 PROCEDURE — 80048 BASIC METABOLIC PNL TOTAL CA: CPT

## 2023-07-05 PROCEDURE — 72131 CT LUMBAR SPINE W/O DYE: CPT

## 2023-07-05 RX ORDER — PANTOPRAZOLE SODIUM 20 MG/1
40 TABLET, DELAYED RELEASE ORAL
Refills: 0 | Status: DISCONTINUED | OUTPATIENT
Start: 2023-07-05 | End: 2023-07-08

## 2023-07-05 RX ORDER — HYDROCORTISONE 1 %
1 OINTMENT (GRAM) TOPICAL
Refills: 0 | Status: DISCONTINUED | OUTPATIENT
Start: 2023-07-05 | End: 2023-07-08

## 2023-07-05 RX ORDER — SEVELAMER CARBONATE 2400 MG/1
800 POWDER, FOR SUSPENSION ORAL
Refills: 0 | Status: DISCONTINUED | OUTPATIENT
Start: 2023-07-05 | End: 2023-07-08

## 2023-07-05 RX ORDER — ASPIRIN/CALCIUM CARB/MAGNESIUM 324 MG
81 TABLET ORAL DAILY
Refills: 0 | Status: DISCONTINUED | OUTPATIENT
Start: 2023-07-05 | End: 2023-07-08

## 2023-07-05 RX ORDER — ISOSORBIDE MONONITRATE 60 MG/1
1 TABLET, EXTENDED RELEASE ORAL
Qty: 0 | Refills: 0 | DISCHARGE

## 2023-07-05 RX ORDER — LOSARTAN POTASSIUM 100 MG/1
25 TABLET, FILM COATED ORAL ONCE
Refills: 0 | Status: DISCONTINUED | OUTPATIENT
Start: 2023-07-05 | End: 2023-07-05

## 2023-07-05 RX ORDER — SEVELAMER CARBONATE 2400 MG/1
1600 POWDER, FOR SUSPENSION ORAL
Refills: 0 | Status: DISCONTINUED | OUTPATIENT
Start: 2023-07-05 | End: 2023-07-08

## 2023-07-05 RX ORDER — ATORVASTATIN CALCIUM 80 MG/1
1 TABLET, FILM COATED ORAL
Refills: 0 | DISCHARGE

## 2023-07-05 RX ORDER — APIXABAN 2.5 MG/1
2.5 TABLET, FILM COATED ORAL
Refills: 0 | Status: DISCONTINUED | OUTPATIENT
Start: 2023-07-05 | End: 2023-07-08

## 2023-07-05 RX ORDER — ONDANSETRON 8 MG/1
4 TABLET, FILM COATED ORAL EVERY 8 HOURS
Refills: 0 | Status: DISCONTINUED | OUTPATIENT
Start: 2023-07-05 | End: 2023-07-08

## 2023-07-05 RX ORDER — LOSARTAN POTASSIUM 100 MG/1
25 TABLET, FILM COATED ORAL DAILY
Refills: 0 | Status: DISCONTINUED | OUTPATIENT
Start: 2023-07-05 | End: 2023-07-08

## 2023-07-05 RX ORDER — CALCIUM ACETATE 667 MG
667 TABLET ORAL
Refills: 0 | Status: DISCONTINUED | OUTPATIENT
Start: 2023-07-05 | End: 2023-07-08

## 2023-07-05 RX ORDER — METOCLOPRAMIDE HCL 10 MG
5 TABLET ORAL
Refills: 0 | Status: DISCONTINUED | OUTPATIENT
Start: 2023-07-05 | End: 2023-07-08

## 2023-07-05 RX ORDER — METHOCARBAMOL 500 MG/1
750 TABLET, FILM COATED ORAL EVERY 8 HOURS
Refills: 0 | Status: DISCONTINUED | OUTPATIENT
Start: 2023-07-05 | End: 2023-07-07

## 2023-07-05 RX ORDER — HYDRALAZINE HCL 50 MG
10 TABLET ORAL ONCE
Refills: 0 | Status: COMPLETED | OUTPATIENT
Start: 2023-07-05 | End: 2023-07-05

## 2023-07-05 RX ORDER — METOPROLOL TARTRATE 50 MG
25 TABLET ORAL AT BEDTIME
Refills: 0 | Status: DISCONTINUED | OUTPATIENT
Start: 2023-07-05 | End: 2023-07-08

## 2023-07-05 RX ORDER — DEXAMETHASONE 0.5 MG/5ML
4 ELIXIR ORAL EVERY 4 HOURS
Refills: 0 | Status: COMPLETED | OUTPATIENT
Start: 2023-07-05 | End: 2023-07-05

## 2023-07-05 RX ORDER — AMLODIPINE BESYLATE 2.5 MG/1
5 TABLET ORAL AT BEDTIME
Refills: 0 | Status: DISCONTINUED | OUTPATIENT
Start: 2023-07-05 | End: 2023-07-08

## 2023-07-05 RX ORDER — MORPHINE SULFATE 50 MG/1
4 CAPSULE, EXTENDED RELEASE ORAL EVERY 4 HOURS
Refills: 0 | Status: DISCONTINUED | OUTPATIENT
Start: 2023-07-05 | End: 2023-07-07

## 2023-07-05 RX ORDER — METHOCARBAMOL 500 MG/1
1000 TABLET, FILM COATED ORAL ONCE
Refills: 0 | Status: COMPLETED | OUTPATIENT
Start: 2023-07-05 | End: 2023-07-05

## 2023-07-05 RX ORDER — POLYETHYLENE GLYCOL 3350 17 G/17G
17 POWDER, FOR SOLUTION ORAL DAILY
Refills: 0 | Status: DISCONTINUED | OUTPATIENT
Start: 2023-07-05 | End: 2023-07-08

## 2023-07-05 RX ORDER — SENNA PLUS 8.6 MG/1
2 TABLET ORAL AT BEDTIME
Refills: 0 | Status: DISCONTINUED | OUTPATIENT
Start: 2023-07-05 | End: 2023-07-08

## 2023-07-05 RX ORDER — METOPROLOL TARTRATE 50 MG
25 TABLET ORAL
Refills: 0 | Status: DISCONTINUED | OUTPATIENT
Start: 2023-07-05 | End: 2023-07-05

## 2023-07-05 RX ORDER — ISOSORBIDE MONONITRATE 60 MG/1
30 TABLET, EXTENDED RELEASE ORAL DAILY
Refills: 0 | Status: DISCONTINUED | OUTPATIENT
Start: 2023-07-05 | End: 2023-07-05

## 2023-07-05 RX ORDER — ATORVASTATIN CALCIUM 80 MG/1
40 TABLET, FILM COATED ORAL AT BEDTIME
Refills: 0 | Status: DISCONTINUED | OUTPATIENT
Start: 2023-07-05 | End: 2023-07-08

## 2023-07-05 RX ORDER — ASPIRIN/CALCIUM CARB/MAGNESIUM 324 MG
1 TABLET ORAL
Qty: 0 | Refills: 0 | DISCHARGE

## 2023-07-05 RX ORDER — APIXABAN 2.5 MG/1
5 TABLET, FILM COATED ORAL
Refills: 0 | Status: DISCONTINUED | OUTPATIENT
Start: 2023-07-05 | End: 2023-07-05

## 2023-07-05 RX ORDER — LANOLIN ALCOHOL/MO/W.PET/CERES
3 CREAM (GRAM) TOPICAL AT BEDTIME
Refills: 0 | Status: DISCONTINUED | OUTPATIENT
Start: 2023-07-05 | End: 2023-07-08

## 2023-07-05 RX ADMIN — METHOCARBAMOL 1000 MILLIGRAM(S): 500 TABLET, FILM COATED ORAL at 06:13

## 2023-07-05 RX ADMIN — SEVELAMER CARBONATE 1600 MILLIGRAM(S): 2400 POWDER, FOR SUSPENSION ORAL at 21:17

## 2023-07-05 RX ADMIN — Medication 667 MILLIGRAM(S): at 21:18

## 2023-07-05 RX ADMIN — LIDOCAINE 1 PATCH: 4 CREAM TOPICAL at 08:15

## 2023-07-05 RX ADMIN — APIXABAN 2.5 MILLIGRAM(S): 2.5 TABLET, FILM COATED ORAL at 21:34

## 2023-07-05 RX ADMIN — Medication 10 MILLIGRAM(S): at 12:49

## 2023-07-05 RX ADMIN — SENNA PLUS 2 TABLET(S): 8.6 TABLET ORAL at 21:19

## 2023-07-05 RX ADMIN — Medication 81 MILLIGRAM(S): at 11:52

## 2023-07-05 RX ADMIN — Medication 667 MILLIGRAM(S): at 09:01

## 2023-07-05 RX ADMIN — AMLODIPINE BESYLATE 5 MILLIGRAM(S): 2.5 TABLET ORAL at 21:17

## 2023-07-05 RX ADMIN — Medication 25 MILLIGRAM(S): at 21:19

## 2023-07-05 RX ADMIN — Medication 4 MILLIGRAM(S): at 06:13

## 2023-07-05 RX ADMIN — Medication 667 MILLIGRAM(S): at 11:52

## 2023-07-05 RX ADMIN — Medication 4 MILLIGRAM(S): at 09:59

## 2023-07-05 RX ADMIN — ATORVASTATIN CALCIUM 40 MILLIGRAM(S): 80 TABLET, FILM COATED ORAL at 21:19

## 2023-07-05 RX ADMIN — PANTOPRAZOLE SODIUM 40 MILLIGRAM(S): 20 TABLET, DELAYED RELEASE ORAL at 08:18

## 2023-07-05 RX ADMIN — POLYETHYLENE GLYCOL 3350 17 GRAM(S): 17 POWDER, FOR SOLUTION ORAL at 12:06

## 2023-07-05 RX ADMIN — Medication 0.2 MILLIGRAM(S): at 21:18

## 2023-07-05 RX ADMIN — LIDOCAINE 1 PATCH: 4 CREAM TOPICAL at 12:13

## 2023-07-05 RX ADMIN — Medication 4 MILLIGRAM(S): at 14:46

## 2023-07-05 RX ADMIN — APIXABAN 5 MILLIGRAM(S): 2.5 TABLET, FILM COATED ORAL at 06:14

## 2023-07-05 NOTE — H&P ADULT - ASSESSMENT
72-year-old male presents to the ED complaining of exacerbation of chronic back pain..  Pain is mostly left-sided, radiates into his left leg, is constant, worse with movement and not improving with home medications.  Pt with past medical history of ESRD on HD (M/W/F), CAD s/p 3 stents, A-fib on Eliquis, HTN and chronic back pain presents to the ED complaining of worsening  back pain..  Pain is mostly left-sided, radiates into his left leg, is constant, worse with movement and not improving despite taking pain medication.  Patient walks with a walker at [place], but is having difficulty walking due to pain.   Pt denies  weakness, chest pain, SOB, LOC, trauma.   Pt denies fever, chills, nausea, vomiting, abdominal pain, diarrhea, headache, dizziness,   72-year-old male presents to the ED complaining of exacerbation of chronic back pain..  Pain is mostly left-sided, radiates into his left leg, is constant, worse with movement and not improving with home medications.  Pt with past medical history of ESRD on HD (M/W/F), CAD s/p 3 stents, A-fib on Eliquis, HTN and chronic back pain presents to the ED complaining of worsening  back pain..  Pain is mostly left-sided, radiates into his left leg, is constant, worse with movement and not improving despite taking pain medication.  Patient walks with a walker at [place], but is having difficulty walking due to pain.   Pt denies  weakness, chest pain, SOB, LOC, trauma.   Pt denies fever, chills, nausea, vomiting, abdominal pain, diarrhea, headache, dizziness,    DX radiculopathy /  acute on chronic back pain:  consider neurosurgery consult  decadron 4 mg iv q 4  pain: morphine 4 mg iv q4 prn    continue home meds as per PMD    dialysis patient due today  nephrology    on eliquis for a fib    prophylaxis GI/VTE    Case D/W Dr Nuñez 72-year-old male presents to the ED complaining of exacerbation of chronic back pain..  Pain is mostly left-sided, radiates into his left leg, is constant, worse with movement and not improving with home medications.  Pt with past medical history of ESRD on HD (M/W/F), CAD s/p 3 stents, A-fib on Eliquis, HTN and chronic back pain presents to the ED complaining of worsening  back pain..  Pain is mostly left-sided, radiates into his left leg, is constant, worse with movement and not improving despite taking pain medication.  Patient walks with a walker at [place], but is having difficulty walking due to pain.   Pt denies  weakness, chest pain, SOB, LOC, trauma.   Pt denies fever, chills, nausea, vomiting, abdominal pain, diarrhea, headache, dizziness,    DX radiculopathy /  acute on chronic back pain:  consider neurosurgery consult  PT consult  consider MRI L spine  decadron 4 mg iv q 4  pain: morphine 4 mg iv q4 prn    continue home meds as per PMD    dialysis patient due today  nephrology    on eliquis for a fib    prophylaxis GI/VTE    Case D/W Dr Nuñez 72-year-old male presents to the ED complaining of exacerbation of chronic back pain..  Pain is mostly left-sided, radiates into his left leg, is constant, worse with movement and not improving with home medications.  Pt with past medical history of ESRD on HD (M/W/F), CAD s/p 3 stents, A-fib on Eliquis, HTN and chronic back pain presents to the ED complaining of worsening  back pain..  Pain is mostly left-sided, radiates into his left leg, is constant, worse with movement and not improving despite taking pain medication.  Patient walks with a walker at home but is having difficulty walking due to pain.   Pt denies  weakness, chest pain, SOB, LOC, trauma.   Pt denies fever, chills, nausea, vomiting, abdominal pain, diarrhea, headache, dizziness,    DX radiculopathy /  acute on chronic back pain:  consider neurosurgery consult  PT consult  consider MRI L spine  decadron 4 mg iv q 4  pain: morphine 4 mg iv q4 prn    continue home meds as per PMD    dialysis patient due today  nephrology    on Eliquis for a fib    prophylaxis GI/VTE    Case D/W Dr Nuñez

## 2023-07-05 NOTE — CONSULT NOTE ADULT - ASSESSMENT
72-year-old male presents to the ED complaining of exacerbation of chronic back pain..  pt states that the pain started 3 days ago.Pain is mostly left-sided, radiates into his left leg, is constant, worse with movement and not improving with home medications.  Pt with past medical history of ESRD on HD (M/W/F), CAD s/p 3 stents, A-fib on Eliquis, HTN and chronic back pain presents to the ED complaining of worsening  back pain..  Pain is mostly left-sided, radiates into his left leg, is constant, worse with movement and not improving despite taking pain medication.  Patient walks with a walker at [place], but is having difficulty walking due to pain.   Pt denies  weakness, chest pain, SOB, LOC, trauma.   Pt denies fever, chills, nausea, vomiting, abdominal pain, diarrhea, headache, dizziness,   72-year-old male presents to the ED complaining of exacerbation of chronic back pain and Left sciatica.  Pt with past medical history of ESRD on HD (M/W/F), CAD s/p 3 stents, A-fib on Eliquis, HTN and chronic back pain presents to the ED complaining of worsening  back pain..  Pain is mostly left-sided, radiates into his left leg, is constant, worse with movement and not improving despite taking pain medication.  Patient walks with a walker at home but is having difficulty walking due to pain.   Pt denies  weakness, chest pain, SOB, LOC, trauma.      ESRD HD today  for 3 hrs 2 k bath  UF 2 L as carol  check phos  fluid restrict 1 L   renal diet  Pleural effusions on CT ? pericardial effusion -   repeat CXR post HD, may need 2D Ech if pericardial effusion is  suspected    Back pain and Left sciatica - please order imaging studies, CT LS in order to better evaluate the the reason for the pain  neurology consult  may need an MRI and Epidural as OP in order to maintain mobility    will follow

## 2023-07-05 NOTE — H&P ADULT - HISTORY OF PRESENT ILLNESS
72-year-old male presents to the ED complaining of exacerbation of chronic back pain..  Pain is mostly left-sided, radiates into his left leg, is constant, worse with movement and not improving with home medications.  Pt with past medical history of ESRD on HD (M/W/F), CAD s/p 3 stents, A-fib on Eliquis, HTN and chronic back pain presents to the ED complaining of worsening  back pain..  Pain is mostly left-sided, radiates into his left leg, is constant, worse with movement and not improving despite taking pain medication.  Patient walks with a walker at [place], but is having difficulty walking due to pain.   Pt denies  weakness, chest pain, SOB, LOC, trauma.   Pt denies fever, chills, nausea, vomiting, abdominal pain, diarrhea, headache, dizziness, 72-year-old male presents to the ED complaining of exacerbation of chronic back pain..  pt states that the pain started 3 days ago.Pain is mostly left-sided, radiates into his left leg, is constant, worse with movement and not improving with home medications.  Pt with past medical history of ESRD on HD (M/W/F), CAD s/p 3 stents, A-fib on Eliquis, HTN and chronic back pain presents to the ED complaining of worsening  back pain..  Pain is mostly left-sided, radiates into his left leg, is constant, worse with movement and not improving despite taking pain medication.  Patient walks with a walker at [place], but is having difficulty walking due to pain.   Pt denies  weakness, chest pain, SOB, LOC, trauma.   Pt denies fever, chills, nausea, vomiting, abdominal pain, diarrhea, headache, dizziness,

## 2023-07-05 NOTE — PHYSICAL THERAPY INITIAL EVALUATION ADULT - GENERAL OBSERVATIONS, REHAB EVAL
14:15-14:40 Chart reviewed. Patient available to be seen for physical therapy, denies pain, confirmed with RN.  Pt rec'd on ED stretcher in NAD.

## 2023-07-05 NOTE — H&P ADULT - NSHPPHYSICALEXAM_GEN_ALL_CORE
Vital Signs Last 24 Hrs  T(C): 36.1 (05 Jul 2023 01:20), Max: 36.9 (04 Jul 2023 21:54)  T(F): 97 (05 Jul 2023 01:20), Max: 98.4 (04 Jul 2023 21:54)  HR: 48 (05 Jul 2023 01:20) (48 - 64)  BP: 148/68 (05 Jul 2023 01:20) (148/68 - 175/72)  BP(mean): --  RR: 18 (05 Jul 2023 01:20) (18 - 20)  SpO2: 97% (05 Jul 2023 01:20) (97% - 97%)    Parameters below as of 05 Jul 2023 01:20  Patient On (Oxygen Delivery Method): room air      GENERAL:  71y/o Male NAD, resting comfortably.  HEAD:  Atraumatic, Normocephalic  EYES: EOMI, PERRLA, conjunctiva and sclera clear  NECK: Supple, No JVD, no cervical lymphadenopathy, non-tender  CHEST/LUNG: Clear to auscultation bilaterally; No wheeze, rhonchi, or rales  HEART: Regular rate and rhythm; S1&S2  ABDOMEN: Soft, Nontender, Nondistended x 4 quadrants; Bowel sounds present  EXTREMITIES:   Peripheral Pulses Present, No clubbing, no cyanosis, or no edema, no calf tenderness  PSYCH: AAOx3, cooperative, appropriate  NEUROLOGY: WNL  SKIN: WNL

## 2023-07-05 NOTE — H&P ADULT - NSHPLABSRESULTS_GEN_ALL_CORE
11.4   6.27  )-----------( 170      ( 05 Jul 2023 02:27 )             35.8       07-05    137  |  96<L>  |  39<H>  ----------------------------<  76  5.1<H>   |  25  |  6.7<HH>    Ca    7.9<L>      05 Jul 2023 02:27    TPro  6.4  /  Alb  3.8  /  TBili  0.8  /  DBili  x   /  AST  14  /  ALT  7   /  AlkPhos  81  07-05              Urinalysis Basic - ( 05 Jul 2023 02:27 )    Color: x / Appearance: x / SG: x / pH: x  Gluc: 76 mg/dL / Ketone: x  / Bili: x / Urobili: x   Blood: x / Protein: x / Nitrite: x   Leuk Esterase: x / RBC: x / WBC x   Sq Epi: x / Non Sq Epi: x / Bacteria: x            Lactate Trend            CAPILLARY BLOOD GLUCOSE

## 2023-07-05 NOTE — PHYSICAL THERAPY INITIAL EVALUATION ADULT - REHAB POTENTIAL, PT EVAL
good, to achieve stated therapy goals
Detail Level: Zone
Continue Regimen: Epiduo Forte gel QHS as tolerated \\nClindamycin 1% lotion BID\\nOTC BPO 2.5-10% wash QD in showers
Plan: Patient previously on Accutane (07/2018)
Render In Strict Bullet Format?: No
Plan: Discussed patient may call to request wart formula rx if needed or patient may RTC in 1 month to treat again with LN2 if he has time away from school

## 2023-07-05 NOTE — CONSULT NOTE ADULT - SUBJECTIVE AND OBJECTIVE BOX
NEPHROLOGY CONSULTATION NOTE    72-year-old male presents to the ED complaining of exacerbation of chronic back pain..  pt states that the pain started 3 days ago.Pain is mostly left-sided, radiates into his left leg, is constant, worse with movement and not improving with home medications.  Pt with past medical history of ESRD on HD (M/W/F), CAD s/p 3 stents, A-fib on Eliquis, HTN and chronic back pain presents to the ED complaining of worsening  back pain..  Pain is mostly left-sided, radiates into his left leg, is constant, worse with movement and not improving despite taking pain medication.  Patient walks with a walker at [place], but is having difficulty walking due to pain.   Pt denies  weakness, chest pain, SOB, LOC, trauma.   Pt denies fever, chills, nausea, vomiting, abdominal pain, diarrhea, headache, dizziness,      PAST MEDICAL & SURGICAL HISTORY:  ESRD on dialysis  T/ TH/ S      HTN (hypertension)      HLD (hyperlipidemia)      Heart failure      Anemia      Afib      H/O right coronary artery stent placement  stent x3      S/P arteriovenous (AV) fistula creation      S/P cataract surgery        Allergies:  No Known Allergies    Home Medications Reviewed  Hospital Medications:   MEDICATIONS  (STANDING):  apixaban 5 milliGRAM(s) Oral two times a day  aspirin enteric coated 81 milliGRAM(s) Oral daily  calcium acetate 667 milliGRAM(s) Oral four times a day with meals  dexAMETHasone  Injectable 4 milliGRAM(s) IV Push every 4 hours  isosorbide   mononitrate ER Tablet (IMDUR) 30 milliGRAM(s) Oral daily  losartan 25 milliGRAM(s) Oral daily  metoprolol tartrate 25 milliGRAM(s) Oral two times a day  pantoprazole    Tablet 40 milliGRAM(s) Oral before breakfast  polyethylene glycol 3350 17 Gram(s) Oral daily  senna 2 Tablet(s) Oral at bedtime      SOCIAL HISTORY:  Denies ETOH,Smoking,   FAMILY HISTORY:  No pertinent family history in first degree relatives          REVIEW OF SYSTEMS:  CONSTITUTIONAL: No weakness, fevers or chills  EYES/ENT: No visual changes;  No vertigo or throat pain   NECK: No pain or stiffness  RESPIRATORY: No cough, wheezing, hemoptysis; No shortness of breath  CARDIOVASCULAR: No chest pain or palpitations.  GASTROINTESTINAL: No abdominal or epigastric pain. No nausea, vomiting, or hematemesis; No diarrhea or constipation. No melena or hematochezia.  GENITOURINARY: No dysuria, frequency, foamy urine, urinary urgency, incontinence or hematuria  NEUROLOGICAL: No numbness or weakness  SKIN: No itching, burning, rashes, or lesions   VASCULAR: No bilateral lower extremity edema.   All other review of systems is negative unless indicated above.    VITALS:  T(F): 97.7 (07-05-23 @ 05:57), Max: 98.4 (07-04-23 @ 21:54)  HR: 62 (07-05-23 @ 05:57)  BP: 146/70 (07-05-23 @ 06:10)  RR: 18 (07-05-23 @ 05:57)  SpO2: 98% (07-05-23 @ 05:57)      Weight (kg): 68.4 (07-04 @ 21:54), 74.8 (07-04 @ 11:20)    I&O's Detail        PHYSICAL EXAM:  Constitutional: NAD  HEENT: anicteric sclera, oropharynx clear, MMM  Neck: No JVD  Respiratory: CTAB, no wheezes, rales or rhonchi  Cardiovascular: S1, S2, RRR  Gastrointestinal: BS+, soft, NT/ND  Extremities: No cyanosis or clubbing. No peripheral edema  Neurological: A/O x 3, no focal deficits  Psychiatric: Normal mood, normal affect  : No CVA tenderness. No elder.   Skin: No rashes  Vascular Access:    LABS:  07-05    137  |  96<L>  |  39<H>  ----------------------------<  76  5.1<H>   |  25  |  6.7<HH>    Ca    7.9<L>      05 Jul 2023 02:27    TPro  6.4  /  Alb  3.8  /  TBili  0.8  /  DBili      /  AST  14  /  ALT  7   /  AlkPhos  81  07-05    Creatinine Trend: 6.7 <--                        11.4   6.27  )-----------( 170      ( 05 Jul 2023 02:27 )             35.8     Urine Studies:  Urinalysis Basic - ( 05 Jul 2023 02:27 )    Color:  / Appearance:  / SG:  / pH:   Gluc: 76 mg/dL / Ketone:   / Bili:  / Urobili:    Blood:  / Protein:  / Nitrite:    Leuk Esterase:  / RBC:  / WBC    Sq Epi:  / Non Sq Epi:  / Bacteria:                 RADIOLOGY & ADDITIONAL STUDIES:    < from: CT Low Dose Lung Cancer Screening (06.25.23 @ 14:55) >    Impression:    Cardiomegaly, pericardial effusions, groundglass opacifications. Findings   consistent with congestive heart failure.    No suspicious pulmonary nodules.    Status post cholecystectomy.    Intraductal air.    Atrophic kidneys.    Lung Rads Category :  2    < end of copied text >               NEPHROLOGY CONSULTATION NOTE    72-year-old male presents to the ED complaining of exacerbation of chronic back pain..  pt states that the pain started 3 days ago. Pain is mostly left-sided, radiates into his left leg, is constant, worse with movement and not improving with home medications.  Pt with past medical history of ESRD on HD (M/W/F), CAD s/p 3 stents, A-fib on Eliquis, HTN and chronic back pain presents to the ED complaining of worsening  back pain..  Pain is mostly left-sided, radiates into his left leg, is constant, worse with movement and not improving despite taking pain medication.  Patient walks with a walker at home but is having difficulty walking due to pain.   Pt denies  weakness, chest pain, SOB, LOC, trauma.   Pt denies fever, chills, nausea, vomiting, abdominal pain, diarrhea, headache, dizziness    Pt with ESRD on HD MWF at [facility], last HD on monday  PAST MEDICAL & SURGICAL HISTORY:  ESRD on dialysis  T/ TH/ S      HTN (hypertension)      HLD (hyperlipidemia)      Heart failure      Anemia      Afib      H/O right coronary artery stent placement  stent x3      S/P arteriovenous (AV) fistula creation      S/P cataract surgery        Allergies:  No Known Allergies    Home Medications Reviewed  Hospital Medications:   MEDICATIONS  (STANDING):  apixaban 5 milliGRAM(s) Oral two times a day  aspirin enteric coated 81 milliGRAM(s) Oral daily  calcium acetate 667 milliGRAM(s) Oral four times a day with meals  dexAMETHasone  Injectable 4 milliGRAM(s) IV Push every 4 hours  isosorbide   mononitrate ER Tablet (IMDUR) 30 milliGRAM(s) Oral daily  losartan 25 milliGRAM(s) Oral daily  metoprolol tartrate 25 milliGRAM(s) Oral two times a day  pantoprazole    Tablet 40 milliGRAM(s) Oral before breakfast  polyethylene glycol 3350 17 Gram(s) Oral daily  senna 2 Tablet(s) Oral at bedtime      SOCIAL HISTORY:  Denies ETOH,Smoking,   FAMILY HISTORY:  No pertinent family history in first degree relatives          REVIEW OF SYSTEMS:  CONSTITUTIONAL: No weakness, fevers or chills  EYES/ENT: No visual changes;  No vertigo or throat pain   NECK: No pain or stiffness  RESPIRATORY: No cough, wheezing, hemoptysis; No shortness of breath  CARDIOVASCULAR: No chest pain or palpitations.  GASTROINTESTINAL: No abdominal or epigastric pain. No nausea, vomiting,     GENITOURINARY: No dysuria, or hematuria  NEUROLOGICAL: HAs  on the Left leg  SKIN: No itching, burning, rashes, or lesions   VASCULAR: No bilateral lower extremity edema.   All other review of systems is negative unless indicated above.    VITALS:  T(F): 97.7 (07-05-23 @ 05:57), Max: 98.4 (07-04-23 @ 21:54)  HR: 62 (07-05-23 @ 05:57)  BP: 146/70 (07-05-23 @ 06:10)  RR: 18 (07-05-23 @ 05:57)  SpO2: 98% (07-05-23 @ 05:57)      Weight (kg): 68.4 (07-04 @ 21:54), 74.8 (07-04 @ 11:20)    I&O's Detail        PHYSICAL EXAM:  Constitutional: NAD  HEENT: anicteric sclera, oropharynx clear, MMM  Neck: No JVD  Respiratory: CTAB, no wheezes, rales or rhonchi  Cardiovascular: S1, S2, RRR  Gastrointestinal: BS+, soft, NT/ND  Extremities: No cyanosis or clubbing. No peripheral edema  Neurological: A/O x 3, lEFT le NUMBNESS  Psychiatric: Normal mood, normal affect  : No CVA tenderness. No elder.   Skin: No rashes  Vascular Access: AVF    LABS:  07-05    137  |  96<L>  |  39<H>  ----------------------------<  76  5.1<H>   |  25  |  6.7<HH>    Ca    7.9<L>      05 Jul 2023 02:27    TPro  6.4  /  Alb  3.8  /  TBili  0.8  /  DBili      /  AST  14  /  ALT  7   /  AlkPhos  81  07-05    Creatinine Trend: 6.7 <--                        11.4   6.27  )-----------( 170      ( 05 Jul 2023 02:27 )             35.8     Urine Studies:  Urinalysis Basic - ( 05 Jul 2023 02:27 )    Color:  / Appearance:  / SG:  / pH:   Gluc: 76 mg/dL / Ketone:   / Bili:  / Urobili:    Blood:  / Protein:  / Nitrite:    Leuk Esterase:  / RBC:  / WBC    Sq Epi:  / Non Sq Epi:  / Bacteria:                 RADIOLOGY & ADDITIONAL STUDIES:    < from: CT Low Dose Lung Cancer Screening (06.25.23 @ 14:55) >    Impression:    Cardiomegaly, pericardial effusions, groundglass opacifications. Findings   consistent with congestive heart failure.    No suspicious pulmonary nodules.    Status post cholecystectomy.    Intraductal air.    Atrophic kidneys.    Lung Rads Category :  2    < end of copied text >

## 2023-07-05 NOTE — CONSULT NOTE ADULT - SUBJECTIVE AND OBJECTIVE BOX
HPI: 72-year-old male presents to the ED complaining of exacerbation of chronic back pain..  pt states that the pain started 3 days ago.Pain is mostly left-sided, radiates into his left leg, is constant, worse with movement and not improving with home medications.  Pt with past medical history of ESRD on HD (M/W/F), CAD s/p 3 stents, A-fib on Eliquis, HTN and chronic back pain presents to the ED complaining of worsening  back pain..    Patient walks with a walker at [facility] , but is having difficulty walking due to pain.   Pt denies  weakness, chest pain, SOB, LOC, trauma.   Pt denies fever, chills, nausea, vomiting, abdominal pain, diarrhea, headache, dizziness, ptn seen and  exam at  bed  side  c.o  lt  lbp  7/10  ptn  labs  and  imaging  and  medical  notes  are  appreciated  and  reviewed      PTN  REFERRED TO ACUTE  REHAB  FOR  EVAL AND  TX   PAST MEDICAL & SURGICAL HISTORY:  ESRD on dialysis  T/ TH/ S      HTN (hypertension)      HLD (hyperlipidemia)      Heart failure      Anemia      Afib      H/O right coronary artery stent placement  stent x3      S/P arteriovenous (AV) fistula creation      S/P cataract surgery          Hospital Course:    TODAY'S SUBJECTIVE & REVIEW OF SYMPTOMS:     Constitutional WNL   Cardio WNL   Resp WNL   GI WNL  Heme WNL  Endo WNL  Skin WNL  MSK WNL  Neuro WNL  Cognitive WNL  Psych WNL      MEDICATIONS  (STANDING):  apixaban 5 milliGRAM(s) Oral two times a day  aspirin enteric coated 81 milliGRAM(s) Oral daily  calcium acetate 667 milliGRAM(s) Oral four times a day with meals  dexAMETHasone  Injectable 4 milliGRAM(s) IV Push every 4 hours  isosorbide   mononitrate ER Tablet (IMDUR) 30 milliGRAM(s) Oral daily  losartan 25 milliGRAM(s) Oral daily  metoprolol tartrate 25 milliGRAM(s) Oral two times a day  pantoprazole    Tablet 40 milliGRAM(s) Oral before breakfast  polyethylene glycol 3350 17 Gram(s) Oral daily  senna 2 Tablet(s) Oral at bedtime    MEDICATIONS  (PRN):  aluminum hydroxide/magnesium hydroxide/simethicone Suspension 30 milliLiter(s) Oral every 4 hours PRN Dyspepsia  artificial  tears Solution 1 Drop(s) Both EYES two times a day PRN Dry Eyes  melatonin 3 milliGRAM(s) Oral at bedtime PRN Insomnia  methocarbamol 750 milliGRAM(s) Oral every 8 hours PRN for back pain  morphine  - Injectable 4 milliGRAM(s) IV Push every 4 hours PRN Severe Pain (7 - 10)  ondansetron Injectable 4 milliGRAM(s) IV Push every 8 hours PRN Nausea and/or Vomiting  oxycodone    5 mG/acetaminophen 325 mG 1 Tablet(s) Oral every 4 hours PRN Moderate Pain (4 - 6)      FAMILY HISTORY:  No pertinent family history in first degree relatives        Allergies    No Known Allergies    Intolerances        SOCIAL HISTORY:    [  ] Etoh  [  ] Smoking  [  ] Substance abuse     Home Environment:  [  ] Home Alone  [  ] Lives with Family  [  ] Home Health Aid    Dwelling:  [  ] Apartment  [  ] Private House  [  ] Adult Home  [ x ] Skilled Nursing Facility    [facility]  [  ] Short Term  [  ] Long Term  [ x ] Stairs       Elevator [x  ]    FUNCTIONAL STATUS PTA: (Check all that apply)  Ambulation: [ x  ]Independent    [  ] Dependent     [  ] Non-Ambulatory  Assistive Device: [  ] SA Cane  [  ]  Q Cane  [  x Walker  [  ]  Wheelchair  ADL : [x  ] Independent  [  ]  Dependent       Vital Signs Last 24 Hrs  T(C): 36.5 (05 Jul 2023 05:57), Max: 36.9 (04 Jul 2023 21:54)  T(F): 97.7 (05 Jul 2023 05:57), Max: 98.4 (04 Jul 2023 21:54)  HR: 62 (05 Jul 2023 05:57) (48 - 64)  BP: 146/70 (05 Jul 2023 06:10) (146/70 - 175/72)  BP(mean): --  RR: 18 (05 Jul 2023 05:57) (18 - 20)  SpO2: 98% (05 Jul 2023 05:57) (97% - 98%)    Parameters below as of 05 Jul 2023 05:57  Patient On (Oxygen Delivery Method): room air          PHYSICAL EXAM: Alert & Oriented X3  GENERAL: NAD, well-groomed, well-developed  HEAD:  Atraumatic, Normocephalic  EYES: EOMI, PERRLA, conjunctiva and sclera clear  NECK: Supple, No JVD, Normal thyroid  CHEST/LUNG: Clear to percussion bilaterally; No rales, rhonchi, wheezing, or rubs  HEART: Regular rate and rhythm; No murmurs, rubs, or gallops  ABDOMEN: Soft, Nontender, Nondistended; Bowel sounds present  EXTREMITIES:  2+ Peripheral Pulses, No clubbing, cyanosis, or edema    NERVOUS SYSTEM:  Cranial Nerves 2-12 intact [ x ] Abnormal  [  ]  ROM: WFL all extremities [  x]  Abnormal [  ]  Motor Strength: WFL all extremities  [ x ]  Abnormal [  ]  Sensation: intact to light touch [x  ] Abnormal [  ]  Reflexes: Symmetric [ x ]  Abnormal [  ]    FUNCTIONAL STATUS:  Bed Mobility: Independent [  ]  Supervision [  ]  Needs Assistance [  ]  N/A [ x ]  Transfers: Independent [  ]  Supervision [  ]  Needs Assistance [  ]  N/A [ x ]   Ambulation: Independent [  ]  Supervision [  ]  Needs Assistance [  ]  N/A [x  ]  ADL: Independent [  ] Requires Assistance [  ] N/A [x  ]  SEE PT/OT IE NOTES    LABS:                        11.4   6.27  )-----------( 170      ( 05 Jul 2023 02:27 )             35.8     07-05    137  |  96<L>  |  39<H>  ----------------------------<  76  5.1<H>   |  25  |  6.7<HH>    Ca    7.9<L>      05 Jul 2023 02:27    TPro  6.4  /  Alb  3.8  /  TBili  0.8  /  DBili  x   /  AST  14  /  ALT  7   /  AlkPhos  81  07-05      Urinalysis Basic - ( 05 Jul 2023 02:27 )    Color: x / Appearance: x / SG: x / pH: x  Gluc: 76 mg/dL / Ketone: x  / Bili: x / Urobili: x   Blood: x / Protein: x / Nitrite: x   Leuk Esterase: x / RBC: x / WBC x   Sq Epi: x / Non Sq Epi: x / Bacteria: x        RADIOLOGY & ADDITIONAL STUDIES:    Assesment:

## 2023-07-05 NOTE — CONSULT NOTE ADULT - ASSESSMENT
IMPRESSION: Rehab of 73 y/o  m  rehab  for  lbp  lt  sciatica      PRECAUTIONS: [  ] Cardiac  [  ] Respiratory  [  ] Seizures [  ] Contact Isolation  [  ] Droplet Isolation  [ FALL ] Other    Weight Bearing Status:     RECOMMENDATION:    Out of Bed to Chair     DVT/Decubiti Prophylaxis    REHAB PLAN:     [ x  ] Bedside P/T 3-5 times a week   [   ]   Bedside O/T  2-3 times a week             [   ] No Rehab Therapy Indicated                   [   ]  Speech Therapy   Conditioning/ROM                                    ADL  Bed Mobility                                               Conditioning/ROM  Transfers                                                     Bed Mobility  Sitting /Standing Balance                         Transfers                                        Gait Training                                               Sitting/Standing Balance  Stair Training [   ]Applicable                    Home equipment Eval                                                                        Splinting  [   ] Only      GOALS:   ADL   [x   ]   Independent                    Transfers  [x   ] Independent                          Ambulation  [   ] Independent     [    ] With device                            [x   ]  CG                                                         [   ]  CG                                                                  [   x] CG                            [    ] Min A                                                   [   ] Min A                                                              [   ] Min  A          DISCHARGE PLAN:   [   ]  Good candidate for Intensive Rehabilitation/Hospital based-4A [facility]                                             Will tolerate 3hrs Intensive Rehab Daily                                       [  xx  ]  Short Term Rehab in Skilled Nursing Facility pain med                                         [    ]  Home with Outpatient or VN services                                         [    ]  Possible Candidate for Intensive Hospital based Rehab

## 2023-07-05 NOTE — H&P ADULT - NS ATTEND AMEND GEN_ALL_CORE FT
Seen while in the ER Seen in the ER, currently fully awake and cooperative. Agree with above assessment and plan (Decadron 4 mg IVP for 3 doses). Of note and despite good bilateral radial pulses, RN unable to obtain a BP on available (Left) arm. However BP obtained from lower left leg is 146/70. For this morning only,   For thi morning only, ill hol Losartan an Metoprolol (probably houl

## 2023-07-05 NOTE — CHART NOTE - NSCHARTNOTEFT_GEN_A_CORE
pt with /87 HR 76  pt from adult home , found med list on chart  will restart clonidine, amlodipine   pt reports does not take isosorbide any more, event though listed , will hold it for now   Eliquis adjusted to 2.5 BID as home dose, pt HD

## 2023-07-05 NOTE — PHYSICAL THERAPY INITIAL EVALUATION ADULT - PERTINENT HX OF CURRENT PROBLEM, REHAB EVAL
Reason for Admission: back pain  History of Present Illness:   72-year-old male presents to the ED complaining of exacerbation of chronic back pain..  pt states that the pain started 3 days ago.Pain is mostly left-sided, radiates into his left leg, is constant, worse with movement and not improving with home medications.  Pt with past medical history of ESRD on HD (M/W/F), CAD s/p 3 stents, A-fib on Eliquis, HTN and chronic back pain presents to the ED complaining of worsening  back pain..  Pain is mostly left-sided, radiates into his left leg, is constant, worse with movement and not improving despite taking pain medication.  [facility]er [facility], but is having difficulty walking due to pain.   Pt denies  weakness, chest pain, SOB, LOC, trauma.

## 2023-07-06 LAB
ANION GAP SERPL CALC-SCNC: 17 MMOL/L — HIGH (ref 7–14)
BUN SERPL-MCNC: 30 MG/DL — HIGH (ref 10–20)
CALCIUM SERPL-MCNC: 8.1 MG/DL — LOW (ref 8.4–10.5)
CHLORIDE SERPL-SCNC: 95 MMOL/L — LOW (ref 98–110)
CO2 SERPL-SCNC: 26 MMOL/L — SIGNIFICANT CHANGE UP (ref 17–32)
CREAT SERPL-MCNC: 5.1 MG/DL — CRITICAL HIGH (ref 0.7–1.5)
EGFR: 11 ML/MIN/1.73M2 — LOW
GLUCOSE SERPL-MCNC: 90 MG/DL — SIGNIFICANT CHANGE UP (ref 70–99)
HCT VFR BLD CALC: 35.6 % — LOW (ref 42–52)
HGB BLD-MCNC: 11.6 G/DL — LOW (ref 14–18)
MCHC RBC-ENTMCNC: 31.6 PG — HIGH (ref 27–31)
MCHC RBC-ENTMCNC: 32.6 G/DL — SIGNIFICANT CHANGE UP (ref 32–37)
MCV RBC AUTO: 97 FL — HIGH (ref 80–94)
NRBC # BLD: 0 /100 WBCS — SIGNIFICANT CHANGE UP (ref 0–0)
PLATELET # BLD AUTO: 176 K/UL — SIGNIFICANT CHANGE UP (ref 130–400)
PMV BLD: 10.6 FL — HIGH (ref 7.4–10.4)
POTASSIUM SERPL-MCNC: 4.3 MMOL/L — SIGNIFICANT CHANGE UP (ref 3.5–5)
POTASSIUM SERPL-SCNC: 4.3 MMOL/L — SIGNIFICANT CHANGE UP (ref 3.5–5)
RBC # BLD: 3.67 M/UL — LOW (ref 4.7–6.1)
RBC # FLD: 16.4 % — HIGH (ref 11.5–14.5)
SODIUM SERPL-SCNC: 138 MMOL/L — SIGNIFICANT CHANGE UP (ref 135–146)
WBC # BLD: 7.07 K/UL — SIGNIFICANT CHANGE UP (ref 4.8–10.8)
WBC # FLD AUTO: 7.07 K/UL — SIGNIFICANT CHANGE UP (ref 4.8–10.8)

## 2023-07-06 PROCEDURE — 71045 X-RAY EXAM CHEST 1 VIEW: CPT | Mod: 26

## 2023-07-06 PROCEDURE — 99222 1ST HOSP IP/OBS MODERATE 55: CPT

## 2023-07-06 PROCEDURE — 99232 SBSQ HOSP IP/OBS MODERATE 35: CPT

## 2023-07-06 RX ORDER — GABAPENTIN 400 MG/1
100 CAPSULE ORAL EVERY 8 HOURS
Refills: 0 | Status: DISCONTINUED | OUTPATIENT
Start: 2023-07-06 | End: 2023-07-08

## 2023-07-06 RX ADMIN — Medication 0.2 MILLIGRAM(S): at 06:07

## 2023-07-06 RX ADMIN — Medication 5 MILLIGRAM(S): at 13:18

## 2023-07-06 RX ADMIN — SEVELAMER CARBONATE 1600 MILLIGRAM(S): 2400 POWDER, FOR SUSPENSION ORAL at 09:42

## 2023-07-06 RX ADMIN — Medication 5 MILLIGRAM(S): at 06:07

## 2023-07-06 RX ADMIN — APIXABAN 2.5 MILLIGRAM(S): 2.5 TABLET, FILM COATED ORAL at 06:07

## 2023-07-06 RX ADMIN — Medication 81 MILLIGRAM(S): at 13:17

## 2023-07-06 RX ADMIN — PANTOPRAZOLE SODIUM 40 MILLIGRAM(S): 20 TABLET, DELAYED RELEASE ORAL at 06:07

## 2023-07-06 RX ADMIN — Medication 1 APPLICATION(S): at 04:58

## 2023-07-06 RX ADMIN — Medication 5 MILLIGRAM(S): at 17:49

## 2023-07-06 RX ADMIN — Medication 5 MILLIGRAM(S): at 23:26

## 2023-07-06 RX ADMIN — Medication 0.1 MILLIGRAM(S): at 17:52

## 2023-07-06 RX ADMIN — ATORVASTATIN CALCIUM 40 MILLIGRAM(S): 80 TABLET, FILM COATED ORAL at 21:36

## 2023-07-06 RX ADMIN — SEVELAMER CARBONATE 800 MILLIGRAM(S): 2400 POWDER, FOR SUSPENSION ORAL at 17:48

## 2023-07-06 RX ADMIN — LOSARTAN POTASSIUM 25 MILLIGRAM(S): 100 TABLET, FILM COATED ORAL at 06:07

## 2023-07-06 RX ADMIN — AMLODIPINE BESYLATE 5 MILLIGRAM(S): 2.5 TABLET ORAL at 21:36

## 2023-07-06 RX ADMIN — Medication 1 APPLICATION(S): at 17:52

## 2023-07-06 RX ADMIN — GABAPENTIN 100 MILLIGRAM(S): 400 CAPSULE ORAL at 17:48

## 2023-07-06 RX ADMIN — APIXABAN 2.5 MILLIGRAM(S): 2.5 TABLET, FILM COATED ORAL at 17:49

## 2023-07-06 RX ADMIN — GABAPENTIN 100 MILLIGRAM(S): 400 CAPSULE ORAL at 21:35

## 2023-07-06 NOTE — PATIENT PROFILE ADULT - NSPROEXTENSIONSOFSELF_GEN_A_NUR
Per md request, called pt to arrive early to have an ultrasound done at bedside.   Pt hopes to arrive by 830 am. none

## 2023-07-06 NOTE — PATIENT PROFILE ADULT - PRO INTERPRETER NEED 2
Surgeon: Ramez Pacheco DPM  Assistant: Pan Victor, PGY 3  Preoperative diagnosis: Loosened hardware right calcaneus  Postoperative diagnosis: Same  Procedure: Removal of hardware right foot  Anesthesia: MAC and local  Hemostasis: Ankle tourniquet  EBL: 5 cc  Materials: None  Injectables: 5 cc preoperatively lidocaine plain  Complications none    Indications: Patient presents to same-day surgery for removal of hardware right lower extremity.  Patient is status post tibiotalar calcaneal joint arthrodesis with hindfoot nail secondary to chronic Charcot arthropathy and associated deformity.  The proximal screw within the calcaneus has been loosening and causing associated irritation with shoe gear.  Patient was consented for surgery today.  All risks and benefits were discussed in extensive detail.  All questions were answered.  A written signed consent were obtained and placed in the chart.    Procedure: Patient was prepped and draped.  Ankle tourniquet was inflated.  Tourniquet was inflated.  Attention was directed to the posterior aspect of the right lower extremity where a percutaneous incision was made.  Fluoroscopy was utilized to guide placement of incision.  Rongeur was utilized to remove bony buildup and soft tissue around the screw head.  Screw was very loose in nature and was easily removed with hemostat.  Wound was flushed with copious amounts of normal saline.  X-rays were taken illustrating removal of screw.  Incision was closed with accommodation of Vicryl and nylon.  Patient is placed in a well-padded dressing and cam walker boot.  Patient is to be weightbearing as tolerated in cam walker boot.  Patient will follow up approximately 2 weeks postoperatively.     English

## 2023-07-06 NOTE — PATIENT PROFILE ADULT - FALL HARM RISK - RISK INTERVENTIONS

## 2023-07-06 NOTE — CONSULT NOTE ADULT - ASSESSMENT
71 yo M w/ h/o ESRD on HD, HTN, DLD, CHF, Anemia, CAD, AFib on anticoagulation currently with acute on chronic lower back pain s/o worsening L radiculopathy.     Recommendations:  - MRI LS spine NC  - start gabapentin 100 mg TID  - prednisone taper 60 mg taper by 10 mg daily until off  - PT eval and treat 71 yo M w/ h/o ESRD on HD, HTN, DLD, CHF, Anemia, CAD, AFib on anticoagulation currently with acute on chronic lower back pain s/o worsening L radiculopathy.     Recommendations:  - MRI LS spine NC if possible (facility with technical issues); if not possible, may f/u as outpt for further neuroimaging and EMG/NCS  - start gabapentin 100 mg TID  - prednisone taper 60 mg taper by 10 mg daily until off  - PT eval and treat

## 2023-07-06 NOTE — CONSULT NOTE ADULT - SUBJECTIVE AND OBJECTIVE BOX
Neurology Consult    Patient is a 72y old  Male who presents with a chief complaint of back pain (05 Jul 2023 09:38)    HPI:  71 yo M w/ h/o ESRD on HD, HTN, DLD, CHF, Anemia, CAD, AFib currently p/w acute on chronic LBP starting 4 days prior localized to the L side radiating into the LLE no improving with OTC medications.  Pain is constant but worse with movement and has difficulty ambulating.  Pt denies  weakness, chest pain, SOB, LOC, trauma.  Pt denies fever, chills, nausea, vomiting, abdominal pain, diarrhea, headache, dizziness.  No incontinence or saddle anesthesia.  No sensory level.      PAST MEDICAL & SURGICAL HISTORY:  ESRD on dialysis T/ TH/ S  HTN (hypertension)  HLD (hyperlipidemia)  Heart failure  Anemia  Afib  H/O right coronary artery stent placement stent x3  S/P arteriovenous (AV) fistula creation  S/P cataract surgery    FAMILY HISTORY:  No pertinent family history in first degree relatives    Social History: (-) x 3    Allergies    No Known Allergies    Intolerances    MEDICATIONS  (STANDING):  amLODIPine   Tablet 5 milliGRAM(s) Oral at bedtime  apixaban 2.5 milliGRAM(s) Oral two times a day  aspirin enteric coated 81 milliGRAM(s) Oral daily  atorvastatin 40 milliGRAM(s) Oral at bedtime  calcium acetate 667 milliGRAM(s) Oral four times a day with meals  cloNIDine 0.2 milliGRAM(s) Oral daily  cloNIDine 0.1 milliGRAM(s) Oral <User Schedule>  hydrocortisone 1% Cream 1 Application(s) Topical two times a day  losartan 25 milliGRAM(s) Oral daily  metoclopramide 5 milliGRAM(s) Oral four times a day  metoprolol succinate ER 25 milliGRAM(s) Oral at bedtime  pantoprazole    Tablet 40 milliGRAM(s) Oral before breakfast  polyethylene glycol 3350 17 Gram(s) Oral daily  senna 2 Tablet(s) Oral at bedtime  sevelamer carbonate 1600 milliGRAM(s) Oral <User Schedule>  sevelamer carbonate 800 milliGRAM(s) Oral <User Schedule>    MEDICATIONS  (PRN):  aluminum hydroxide/magnesium hydroxide/simethicone Suspension 30 milliLiter(s) Oral every 4 hours PRN Dyspepsia  artificial  tears Solution 1 Drop(s) Both EYES two times a day PRN Dry Eyes  melatonin 3 milliGRAM(s) Oral at bedtime PRN Insomnia  methocarbamol 750 milliGRAM(s) Oral every 8 hours PRN for back pain  morphine  - Injectable 4 milliGRAM(s) IV Push every 4 hours PRN Severe Pain (7 - 10)  ondansetron Injectable 4 milliGRAM(s) IV Push every 8 hours PRN Nausea and/or Vomiting  oxycodone    5 mG/acetaminophen 325 mG 1 Tablet(s) Oral every 4 hours PRN Moderate Pain (4 - 6)    Review of systems:    Constitutional: as per HPI  Eyes: No eye pain or discharge  ENMT:  No difficulty hearing; No sinus or throat pain  Neck: No pain or stiffness  Respiratory: No cough, wheezing, chills or hemoptysis  Cardiovascular: No chest pain, palpitations, shortness of breath, dyspnea on exertion  Gastrointestinal: No abdominal pain, nausea, vomiting or hematemesis; No diarrhea or constipation.   Genitourinary: No dysuria, frequency, hematuria or incontinence  Neurological: As per HPI  Skin: No rashes or lesions   Endocrine: No heat or cold intolerance; No hair loss  Musculoskeletal: No joint pain or swelling  Psychiatric: No depression, anxiety, mood swings  Heme/Lymph: No easy bruising or bleeding gums    Vital Signs Last 24 Hrs  T(C): 36.7 (05 Jul 2023 21:00), Max: 36.7 (05 Jul 2023 21:00)  T(F): 98.1 (05 Jul 2023 21:00), Max: 98.1 (05 Jul 2023 21:00)  HR: 70 (05 Jul 2023 21:00) (56 - 70)  BP: 162/72 (05 Jul 2023 21:00) (147/63 - 212/80)  BP(mean): --  RR: 18 (05 Jul 2023 21:00) (16 - 18)  SpO2: 96% (05 Jul 2023 21:00) (96% - 98%)    Parameters below as of 05 Jul 2023 15:42  Patient On (Oxygen Delivery Method): room air    Examination:  General:  Appearance is consistent with chronologic age.  No abnormal facies.  Gross skin survey within normal limits.    Cognitive/Language:  The patient is oriented to person, place, time and date.  Recent and remote memory intact.  Fund of knowledge is intact and normal.  Language with normal repetition, comprehension and naming.  Nondysarthric.    Eyes: intact VA, VFF.  EOMI w/o nystagmus, skew or reported double vision.  PERRL.  No ptosis/weakness of eyelid closure.    Face:  Facial sensation normal V1 - 3, no facial asymmetry.    Ears/Nose/Throat:  Hearing grossly intact b/l.  Palate elevates midline.  Tongue and uvula midline.   Motor examination:   Normal tone, bulk and range of motion.  No tenderness, twitching, tremors or involuntary movements.  Formal Muscle Strength Testing: (MRC grade R/L) 5/5 UE; 5/5 LE.  No observable drift.  Reflexes:   2+ b/l pectoralis, biceps, triceps, brachioradialis, patella and Achilles.  Plantar response downgoing b/l.  Jaw jerk, Rochelle, clonus absent.  Sensory examination:   Intact to light touch and pinprick, pain, temperature and proprioception and vibration in all extremities.  Cerebellum:   FTN/HKS intact with normal CARLOS in all limbs.  No dysmetria or dysdiadokinesia.  Gait narrow based and normal.    Labs:   CBC Full  -  ( 05 Jul 2023 02:27 )  WBC Count : 6.27 K/uL  RBC Count : 3.69 M/uL  Hemoglobin : 11.4 g/dL  Hematocrit : 35.8 %  Platelet Count - Automated : 170 K/uL  Mean Cell Volume : 97.0 fL  Mean Cell Hemoglobin : 30.9 pg  Mean Cell Hemoglobin Concentration : 31.8 g/dL  Auto Neutrophil # : 4.05 K/uL  Auto Lymphocyte # : 1.04 K/uL  Auto Monocyte # : 0.98 K/uL  Auto Eosinophil # : 0.13 K/uL  Auto Basophil # : 0.05 K/uL  Auto Neutrophil % : 64.6 %  Auto Lymphocyte % : 16.6 %  Auto Monocyte % : 15.6 %  Auto Eosinophil % : 2.1 %  Auto Basophil % : 0.8 %    07-05    137  |  96<L>  |  39<H>  ----------------------------<  76  5.1<H>   |  25  |  6.7<HH>    Ca    7.9<L>      05 Jul 2023 02:27    TPro  6.4  /  Alb  3.8  /  TBili  0.8  /  DBili  x   /  AST  14  /  ALT  7   /  AlkPhos  81  07-05    LIVER FUNCTIONS - ( 05 Jul 2023 02:27 )  Alb: 3.8 g/dL / Pro: 6.4 g/dL / ALK PHOS: 81 U/L / ALT: 7 U/L / AST: 14 U/L / GGT: x           Urinalysis Basic - ( 05 Jul 2023 02:27 )    Color: x / Appearance: x / SG: x / pH: x  Gluc: 76 mg/dL / Ketone: x  / Bili: x / Urobili: x   Blood: x / Protein: x / Nitrite: x   Leuk Esterase: x / RBC: x / WBC x   Sq Epi: x / Non Sq Epi: x / Bacteria: x    CT LS spine:  IMPRESSION:    1.  No CT evidence of acute osseous abnormality.    2.  Advanced degenerative changes notable for:  -Severe spinal stenosis at L2-3, L3-4 and L4-5 (worst at L4-5).  -Multilevel severe foraminal stenosis.      --- End of Report ---            BARBI TEE MD; Attending Radiologist  This document has been electronically signed. Jul 5 2023 11:06AM Neurology Consult    Patient is a 72y old  Male who presents with a chief complaint of back pain (05 Jul 2023 09:38)    HPI:  73 yo M w/ h/o ESRD on HD, HTN, DLD, CHF, Anemia, CAD, AFib currently p/w acute on chronic LBP starting 4 days prior localized to the L side radiating into the LLE no improving with OTC medications.  Pain is constant but worse with movement and has difficulty ambulating.  Pt denies  weakness, chest pain, SOB, LOC, trauma.  Pt denies fever, chills, nausea, vomiting, abdominal pain, diarrhea, headache, dizziness.  No incontinence or saddle anesthesia.  No sensory level.  Pain worse with laying down and exacerbated with standing, alleviated with sitting. Has paresthesias and numbness in the left lateral leg.  Unable to ambulate well due to lancinating pain but denies any weakness or coordination issues.  Has longstanding chronic LBP requiring walker for ambulation over the last 10 years without any prior surgery.      PAST MEDICAL & SURGICAL HISTORY:  ESRD on dialysis T/ TH/ S  HTN (hypertension)  HLD (hyperlipidemia)  Heart failure  Anemia  Afib  H/O right coronary artery stent placement stent x3  S/P arteriovenous (AV) fistula creation  S/P cataract surgery    FAMILY HISTORY:  No pertinent family history in first degree relatives    Social History: (-) x 3    Allergies    No Known Allergies    Intolerances    MEDICATIONS  (STANDING):  amLODIPine   Tablet 5 milliGRAM(s) Oral at bedtime  apixaban 2.5 milliGRAM(s) Oral two times a day  aspirin enteric coated 81 milliGRAM(s) Oral daily  atorvastatin 40 milliGRAM(s) Oral at bedtime  calcium acetate 667 milliGRAM(s) Oral four times a day with meals  cloNIDine 0.2 milliGRAM(s) Oral daily  cloNIDine 0.1 milliGRAM(s) Oral <User Schedule>  hydrocortisone 1% Cream 1 Application(s) Topical two times a day  losartan 25 milliGRAM(s) Oral daily  metoclopramide 5 milliGRAM(s) Oral four times a day  metoprolol succinate ER 25 milliGRAM(s) Oral at bedtime  pantoprazole    Tablet 40 milliGRAM(s) Oral before breakfast  polyethylene glycol 3350 17 Gram(s) Oral daily  senna 2 Tablet(s) Oral at bedtime  sevelamer carbonate 1600 milliGRAM(s) Oral <User Schedule>  sevelamer carbonate 800 milliGRAM(s) Oral <User Schedule>    MEDICATIONS  (PRN):  aluminum hydroxide/magnesium hydroxide/simethicone Suspension 30 milliLiter(s) Oral every 4 hours PRN Dyspepsia  artificial  tears Solution 1 Drop(s) Both EYES two times a day PRN Dry Eyes  melatonin 3 milliGRAM(s) Oral at bedtime PRN Insomnia  methocarbamol 750 milliGRAM(s) Oral every 8 hours PRN for back pain  morphine  - Injectable 4 milliGRAM(s) IV Push every 4 hours PRN Severe Pain (7 - 10)  ondansetron Injectable 4 milliGRAM(s) IV Push every 8 hours PRN Nausea and/or Vomiting  oxycodone    5 mG/acetaminophen 325 mG 1 Tablet(s) Oral every 4 hours PRN Moderate Pain (4 - 6)    Review of systems:    Constitutional: as per HPI  Eyes: No eye pain or discharge  ENMT:  No difficulty hearing; No sinus or throat pain  Neck: No pain or stiffness  Respiratory: No cough, wheezing, chills or hemoptysis  Cardiovascular: No chest pain, palpitations, shortness of breath, dyspnea on exertion  Gastrointestinal: No abdominal pain, nausea, vomiting or hematemesis; No diarrhea or constipation.   Genitourinary: No dysuria, frequency, hematuria or incontinence  Neurological: As per HPI  Skin: No rashes or lesions   Endocrine: No heat or cold intolerance; No hair loss  Musculoskeletal: No joint pain or swelling  Psychiatric: No depression, anxiety, mood swings  Heme/Lymph: No easy bruising or bleeding gums    Vital Signs Last 24 Hrs  T(C): 36.7 (05 Jul 2023 21:00), Max: 36.7 (05 Jul 2023 21:00)  T(F): 98.1 (05 Jul 2023 21:00), Max: 98.1 (05 Jul 2023 21:00)  HR: 70 (05 Jul 2023 21:00) (56 - 70)  BP: 162/72 (05 Jul 2023 21:00) (147/63 - 212/80)  BP(mean): --  RR: 18 (05 Jul 2023 21:00) (16 - 18)  SpO2: 96% (05 Jul 2023 21:00) (96% - 98%)    Parameters below as of 05 Jul 2023 15:42  Patient On (Oxygen Delivery Method): room air    Examination:  General:  Appearance is consistent with chronologic age.  No abnormal facies.  Gross skin survey within normal limits.    Cognitive/Language:  The patient is oriented to person, place, time and date.  Recent and remote memory intact.  Fund of knowledge is intact and normal.  Language with normal repetition, comprehension and naming.  Nondysarthric.    Eyes: intact VA, VFF.  EOMI w/o nystagmus, skew or reported double vision.  PERRL.  No ptosis/weakness of eyelid closure.    Face:  Facial sensation normal V1 - 3, no facial asymmetry.    Ears/Nose/Throat:  Hearing grossly intact b/l.  Palate elevates midline.  Tongue and uvula midline.   Motor examination:   Normal tone, bulk and range of motion.  No tenderness, twitching, tremors or involuntary movements.  Formal Muscle Strength Testing: (MRC grade R/L) 5/5 UE; 5/5 RLE.  4+/5 L DF, 5/5 PF, 5/5 proximal.  No observable drift.  Reflexes:   2+ b/l pectoralis, biceps, triceps, brachioradialis, 1+/2+ patella and 1+/1+ Achilles.  Plantar response downgoing b/l.  Jaw jerk, Rochelle, clonus absent.  Sensory examination:   Slight decrease LT L lateral leg, otherwise intact to light touch and pinprick, pain, temperature and proprioception and vibration in all extremities.  Cerebellum:   FTN/HKS intact with normal CARLOS in all limbs.  No dysmetria or dysdiadokinesia.  Gait N/A    Labs:   CBC Full  -  ( 05 Jul 2023 02:27 )  WBC Count : 6.27 K/uL  RBC Count : 3.69 M/uL  Hemoglobin : 11.4 g/dL  Hematocrit : 35.8 %  Platelet Count - Automated : 170 K/uL  Mean Cell Volume : 97.0 fL  Mean Cell Hemoglobin : 30.9 pg  Mean Cell Hemoglobin Concentration : 31.8 g/dL  Auto Neutrophil # : 4.05 K/uL  Auto Lymphocyte # : 1.04 K/uL  Auto Monocyte # : 0.98 K/uL  Auto Eosinophil # : 0.13 K/uL  Auto Basophil # : 0.05 K/uL  Auto Neutrophil % : 64.6 %  Auto Lymphocyte % : 16.6 %  Auto Monocyte % : 15.6 %  Auto Eosinophil % : 2.1 %  Auto Basophil % : 0.8 %    07-05    137  |  96<L>  |  39<H>  ----------------------------<  76  5.1<H>   |  25  |  6.7<HH>    Ca    7.9<L>      05 Jul 2023 02:27    TPro  6.4  /  Alb  3.8  /  TBili  0.8  /  DBili  x   /  AST  14  /  ALT  7   /  AlkPhos  81  07-05    LIVER FUNCTIONS - ( 05 Jul 2023 02:27 )  Alb: 3.8 g/dL / Pro: 6.4 g/dL / ALK PHOS: 81 U/L / ALT: 7 U/L / AST: 14 U/L / GGT: x           Urinalysis Basic - ( 05 Jul 2023 02:27 )    Color: x / Appearance: x / SG: x / pH: x  Gluc: 76 mg/dL / Ketone: x  / Bili: x / Urobili: x   Blood: x / Protein: x / Nitrite: x   Leuk Esterase: x / RBC: x / WBC x   Sq Epi: x / Non Sq Epi: x / Bacteria: x    CT LS spine:  IMPRESSION:    1.  No CT evidence of acute osseous abnormality.    2.  Advanced degenerative changes notable for:  -Severe spinal stenosis at L2-3, L3-4 and L4-5 (worst at L4-5).  -Multilevel severe foraminal stenosis.    BARBI TEE MD; Attending Radiologist  This document has been electronically signed. Jul 5 2023 11:06AM

## 2023-07-07 LAB
ANION GAP SERPL CALC-SCNC: 15 MMOL/L — HIGH (ref 7–14)
BASOPHILS # BLD AUTO: 0.04 K/UL — SIGNIFICANT CHANGE UP (ref 0–0.2)
BASOPHILS NFR BLD AUTO: 0.7 % — SIGNIFICANT CHANGE UP (ref 0–1)
BUN SERPL-MCNC: 43 MG/DL — HIGH (ref 10–20)
CALCIUM SERPL-MCNC: 8 MG/DL — LOW (ref 8.4–10.5)
CHLORIDE SERPL-SCNC: 98 MMOL/L — SIGNIFICANT CHANGE UP (ref 98–110)
CO2 SERPL-SCNC: 26 MMOL/L — SIGNIFICANT CHANGE UP (ref 17–32)
CREAT SERPL-MCNC: 6.8 MG/DL — CRITICAL HIGH (ref 0.7–1.5)
EGFR: 8 ML/MIN/1.73M2 — LOW
EOSINOPHIL # BLD AUTO: 0.09 K/UL — SIGNIFICANT CHANGE UP (ref 0–0.7)
EOSINOPHIL NFR BLD AUTO: 1.7 % — SIGNIFICANT CHANGE UP (ref 0–8)
GLUCOSE SERPL-MCNC: 108 MG/DL — HIGH (ref 70–99)
HCT VFR BLD CALC: 34.6 % — LOW (ref 42–52)
HGB BLD-MCNC: 11.1 G/DL — LOW (ref 14–18)
IMM GRANULOCYTES NFR BLD AUTO: 0.2 % — SIGNIFICANT CHANGE UP (ref 0.1–0.3)
LYMPHOCYTES # BLD AUTO: 0.85 K/UL — LOW (ref 1.2–3.4)
LYMPHOCYTES # BLD AUTO: 15.7 % — LOW (ref 20.5–51.1)
MCHC RBC-ENTMCNC: 31 PG — SIGNIFICANT CHANGE UP (ref 27–31)
MCHC RBC-ENTMCNC: 32.1 G/DL — SIGNIFICANT CHANGE UP (ref 32–37)
MCV RBC AUTO: 96.6 FL — HIGH (ref 80–94)
MONOCYTES # BLD AUTO: 0.81 K/UL — HIGH (ref 0.1–0.6)
MONOCYTES NFR BLD AUTO: 15 % — HIGH (ref 1.7–9.3)
NEUTROPHILS # BLD AUTO: 3.61 K/UL — SIGNIFICANT CHANGE UP (ref 1.4–6.5)
NEUTROPHILS NFR BLD AUTO: 66.7 % — SIGNIFICANT CHANGE UP (ref 42.2–75.2)
NRBC # BLD: 0 /100 WBCS — SIGNIFICANT CHANGE UP (ref 0–0)
PHOSPHATE SERPL-MCNC: 5.7 MG/DL — HIGH (ref 2.1–4.9)
PLATELET # BLD AUTO: 177 K/UL — SIGNIFICANT CHANGE UP (ref 130–400)
PMV BLD: 10.8 FL — HIGH (ref 7.4–10.4)
POTASSIUM SERPL-MCNC: 4.5 MMOL/L — SIGNIFICANT CHANGE UP (ref 3.5–5)
POTASSIUM SERPL-SCNC: 4.5 MMOL/L — SIGNIFICANT CHANGE UP (ref 3.5–5)
RBC # BLD: 3.58 M/UL — LOW (ref 4.7–6.1)
RBC # FLD: 16.9 % — HIGH (ref 11.5–14.5)
SODIUM SERPL-SCNC: 139 MMOL/L — SIGNIFICANT CHANGE UP (ref 135–146)
WBC # BLD: 5.41 K/UL — SIGNIFICANT CHANGE UP (ref 4.8–10.8)
WBC # FLD AUTO: 5.41 K/UL — SIGNIFICANT CHANGE UP (ref 4.8–10.8)

## 2023-07-07 PROCEDURE — 99232 SBSQ HOSP IP/OBS MODERATE 35: CPT

## 2023-07-07 PROCEDURE — 70450 CT HEAD/BRAIN W/O DYE: CPT | Mod: 26

## 2023-07-07 PROCEDURE — 72148 MRI LUMBAR SPINE W/O DYE: CPT | Mod: 26

## 2023-07-07 RX ADMIN — SEVELAMER CARBONATE 1600 MILLIGRAM(S): 2400 POWDER, FOR SUSPENSION ORAL at 08:10

## 2023-07-07 RX ADMIN — Medication 5 MILLIGRAM(S): at 23:24

## 2023-07-07 RX ADMIN — Medication 1 APPLICATION(S): at 05:21

## 2023-07-07 RX ADMIN — ATORVASTATIN CALCIUM 40 MILLIGRAM(S): 80 TABLET, FILM COATED ORAL at 22:14

## 2023-07-07 RX ADMIN — PANTOPRAZOLE SODIUM 40 MILLIGRAM(S): 20 TABLET, DELAYED RELEASE ORAL at 05:20

## 2023-07-07 RX ADMIN — GABAPENTIN 100 MILLIGRAM(S): 400 CAPSULE ORAL at 05:20

## 2023-07-07 RX ADMIN — Medication 5 MILLIGRAM(S): at 05:20

## 2023-07-07 RX ADMIN — Medication 667 MILLIGRAM(S): at 12:39

## 2023-07-07 RX ADMIN — Medication 667 MILLIGRAM(S): at 08:10

## 2023-07-07 RX ADMIN — GABAPENTIN 100 MILLIGRAM(S): 400 CAPSULE ORAL at 22:14

## 2023-07-07 RX ADMIN — Medication 5 MILLIGRAM(S): at 12:40

## 2023-07-07 RX ADMIN — APIXABAN 2.5 MILLIGRAM(S): 2.5 TABLET, FILM COATED ORAL at 05:20

## 2023-07-07 RX ADMIN — LOSARTAN POTASSIUM 25 MILLIGRAM(S): 100 TABLET, FILM COATED ORAL at 05:20

## 2023-07-07 RX ADMIN — Medication 81 MILLIGRAM(S): at 12:40

## 2023-07-07 NOTE — CHART NOTE - NSCHARTNOTEFT_GEN_A_CORE
Pt c/o dizziness and generalized weakness.  Pt denies cp or sob.    Vital Signs Last 24 Hrs  T(C): 36.4 (07 Jul 2023 04:26), Max: 36.4 (07 Jul 2023 04:26)  T(F): 97.5 (07 Jul 2023 04:26), Max: 97.5 (07 Jul 2023 04:26)  HR: 55 (07 Jul 2023 07:42) (47 - 55)  BP: 140/63 (07 Jul 2023 07:42) (140/58 - 155/64)  BP(mean): --  RR: 18 (07 Jul 2023 04:26) (18 - 18)  SpO2: 96% (07 Jul 2023 07:42) (96% - 99%)    PHYSICAL EXAM:      Constitutional: A&Ox4  Respiratory: cta b/l  Cardiovascular: s1 s2 rrr  Gastrointestinal: soft nt  nd + bs no rebound or guarding  Genitourinary: no cva tenderness  Extremities: normal rom, no edema, calf tenderness  Neurological: no focal deficits, no dysarthria  Skin: no rash      1.dizziness, generalized weakness  -CTH  -orhto bp  -neurology following for lbp  -meds reviewed, has parameters on toprol held for bradycardia, bp is ok, has not received any pain meds today will dc morphine and robaxin for now  -d/w Dr Rai and nephrology, holding HD pending above eval for dizziness Pt c/o dizziness and generalized weakness.  Pt denies cp or sob.    Vital Signs Last 24 Hrs  T(C): 36.4 (07 Jul 2023 04:26), Max: 36.4 (07 Jul 2023 04:26)  T(F): 97.5 (07 Jul 2023 04:26), Max: 97.5 (07 Jul 2023 04:26)  HR: 55 (07 Jul 2023 07:42) (47 - 55)  BP: 140/63 (07 Jul 2023 07:42) (140/58 - 155/64)  BP(mean): --  RR: 18 (07 Jul 2023 04:26) (18 - 18)  SpO2: 96% (07 Jul 2023 07:42) (96% - 99%)    PHYSICAL EXAM:      Constitutional: A&Ox4  Respiratory: cta b/l  Cardiovascular: s1 s2 rrr  Gastrointestinal: soft nt  nd + bs no rebound or guarding  Genitourinary: no cva tenderness  Extremities: normal rom, no edema, calf tenderness  Neurological: no focal deficits, no dysarthria  Skin: no rash      1.dizziness, generalized weakness  -CTH  -orhto bp  -neurology following for lbp  -meds reviewed, has parameters on toprol held for bradycardia, bp is ok, has not received any pain meds today will dc morphine, percocet,  and robaxin for now  -d/w Dr Rai and nephrology, holding HD pending above eval for dizziness

## 2023-07-08 ENCOUNTER — INPATIENT (INPATIENT)
Facility: HOSPITAL | Age: 73
LOS: 6 days | Discharge: NURSING, REHAB OR LONG-TERM CARE | DRG: 201 | End: 2023-07-15
Attending: INTERNAL MEDICINE | Admitting: HOSPITALIST
Payer: MEDICAID

## 2023-07-08 ENCOUNTER — TRANSCRIPTION ENCOUNTER (OUTPATIENT)
Age: 73
End: 2023-07-08

## 2023-07-08 VITALS
OXYGEN SATURATION: 98 % | DIASTOLIC BLOOD PRESSURE: 64 MMHG | TEMPERATURE: 96 F | SYSTOLIC BLOOD PRESSURE: 150 MMHG | HEART RATE: 95 BPM

## 2023-07-08 VITALS
WEIGHT: 154.98 LBS | TEMPERATURE: 98 F | HEART RATE: 57 BPM | SYSTOLIC BLOOD PRESSURE: 108 MMHG | RESPIRATION RATE: 18 BRPM | OXYGEN SATURATION: 97 % | DIASTOLIC BLOOD PRESSURE: 52 MMHG

## 2023-07-08 DIAGNOSIS — Z98.49 CATARACT EXTRACTION STATUS, UNSPECIFIED EYE: Chronic | ICD-10-CM

## 2023-07-08 DIAGNOSIS — R00.1 BRADYCARDIA, UNSPECIFIED: ICD-10-CM

## 2023-07-08 DIAGNOSIS — Z98.890 OTHER SPECIFIED POSTPROCEDURAL STATES: Chronic | ICD-10-CM

## 2023-07-08 DIAGNOSIS — Z95.5 PRESENCE OF CORONARY ANGIOPLASTY IMPLANT AND GRAFT: Chronic | ICD-10-CM

## 2023-07-08 LAB
ALBUMIN SERPL ELPH-MCNC: 3.7 G/DL — SIGNIFICANT CHANGE UP (ref 3.5–5.2)
ALP SERPL-CCNC: 86 U/L — SIGNIFICANT CHANGE UP (ref 30–115)
ALT FLD-CCNC: 6 U/L — SIGNIFICANT CHANGE UP (ref 0–41)
ANION GAP SERPL CALC-SCNC: 15 MMOL/L — HIGH (ref 7–14)
ANION GAP SERPL CALC-SCNC: 16 MMOL/L — HIGH (ref 7–14)
AST SERPL-CCNC: 13 U/L — SIGNIFICANT CHANGE UP (ref 0–41)
BASOPHILS # BLD AUTO: 0.03 K/UL — SIGNIFICANT CHANGE UP (ref 0–0.2)
BASOPHILS # BLD AUTO: 0.04 K/UL — SIGNIFICANT CHANGE UP (ref 0–0.2)
BASOPHILS NFR BLD AUTO: 0.5 % — SIGNIFICANT CHANGE UP (ref 0–1)
BASOPHILS NFR BLD AUTO: 0.7 % — SIGNIFICANT CHANGE UP (ref 0–1)
BILIRUB SERPL-MCNC: 0.5 MG/DL — SIGNIFICANT CHANGE UP (ref 0.2–1.2)
BUN SERPL-MCNC: 32 MG/DL — HIGH (ref 10–20)
BUN SERPL-MCNC: 42 MG/DL — HIGH (ref 10–20)
CALCIUM SERPL-MCNC: 8.1 MG/DL — LOW (ref 8.4–10.5)
CALCIUM SERPL-MCNC: 8.6 MG/DL — SIGNIFICANT CHANGE UP (ref 8.4–10.5)
CHLORIDE SERPL-SCNC: 93 MMOL/L — LOW (ref 98–110)
CHLORIDE SERPL-SCNC: 95 MMOL/L — LOW (ref 98–110)
CO2 SERPL-SCNC: 25 MMOL/L — SIGNIFICANT CHANGE UP (ref 17–32)
CO2 SERPL-SCNC: 27 MMOL/L — SIGNIFICANT CHANGE UP (ref 17–32)
CREAT SERPL-MCNC: 6 MG/DL — CRITICAL HIGH (ref 0.7–1.5)
CREAT SERPL-MCNC: 7 MG/DL — CRITICAL HIGH (ref 0.7–1.5)
EGFR: 8 ML/MIN/1.73M2 — LOW
EGFR: 9 ML/MIN/1.73M2 — LOW
EOSINOPHIL # BLD AUTO: 0.09 K/UL — SIGNIFICANT CHANGE UP (ref 0–0.7)
EOSINOPHIL # BLD AUTO: 0.13 K/UL — SIGNIFICANT CHANGE UP (ref 0–0.7)
EOSINOPHIL NFR BLD AUTO: 1.4 % — SIGNIFICANT CHANGE UP (ref 0–8)
EOSINOPHIL NFR BLD AUTO: 2.2 % — SIGNIFICANT CHANGE UP (ref 0–8)
GLUCOSE SERPL-MCNC: 100 MG/DL — HIGH (ref 70–99)
GLUCOSE SERPL-MCNC: 79 MG/DL — SIGNIFICANT CHANGE UP (ref 70–99)
HCT VFR BLD CALC: 33.7 % — LOW (ref 42–52)
HCT VFR BLD CALC: 36.3 % — LOW (ref 42–52)
HGB BLD-MCNC: 11 G/DL — LOW (ref 14–18)
HGB BLD-MCNC: 11.6 G/DL — LOW (ref 14–18)
IMM GRANULOCYTES NFR BLD AUTO: 0.2 % — SIGNIFICANT CHANGE UP (ref 0.1–0.3)
IMM GRANULOCYTES NFR BLD AUTO: 0.3 % — SIGNIFICANT CHANGE UP (ref 0.1–0.3)
LYMPHOCYTES # BLD AUTO: 1.03 K/UL — LOW (ref 1.2–3.4)
LYMPHOCYTES # BLD AUTO: 1.09 K/UL — LOW (ref 1.2–3.4)
LYMPHOCYTES # BLD AUTO: 15.6 % — LOW (ref 20.5–51.1)
LYMPHOCYTES # BLD AUTO: 18.2 % — LOW (ref 20.5–51.1)
MAGNESIUM SERPL-MCNC: 1.8 MG/DL — SIGNIFICANT CHANGE UP (ref 1.8–2.4)
MCHC RBC-ENTMCNC: 31.4 PG — HIGH (ref 27–31)
MCHC RBC-ENTMCNC: 31.4 PG — HIGH (ref 27–31)
MCHC RBC-ENTMCNC: 32 G/DL — SIGNIFICANT CHANGE UP (ref 32–37)
MCHC RBC-ENTMCNC: 32.6 G/DL — SIGNIFICANT CHANGE UP (ref 32–37)
MCV RBC AUTO: 96.3 FL — HIGH (ref 80–94)
MCV RBC AUTO: 98.4 FL — HIGH (ref 80–94)
MONOCYTES # BLD AUTO: 0.85 K/UL — HIGH (ref 0.1–0.6)
MONOCYTES # BLD AUTO: 0.97 K/UL — HIGH (ref 0.1–0.6)
MONOCYTES NFR BLD AUTO: 14.2 % — HIGH (ref 1.7–9.3)
MONOCYTES NFR BLD AUTO: 14.7 % — HIGH (ref 1.7–9.3)
NEUTROPHILS # BLD AUTO: 3.85 K/UL — SIGNIFICANT CHANGE UP (ref 1.4–6.5)
NEUTROPHILS # BLD AUTO: 4.47 K/UL — SIGNIFICANT CHANGE UP (ref 1.4–6.5)
NEUTROPHILS NFR BLD AUTO: 64.4 % — SIGNIFICANT CHANGE UP (ref 42.2–75.2)
NEUTROPHILS NFR BLD AUTO: 67.6 % — SIGNIFICANT CHANGE UP (ref 42.2–75.2)
NRBC # BLD: 0 /100 WBCS — SIGNIFICANT CHANGE UP (ref 0–0)
NRBC # BLD: 0 /100 WBCS — SIGNIFICANT CHANGE UP (ref 0–0)
NT-PROBNP SERPL-SCNC: HIGH PG/ML (ref 0–300)
PHOSPHATE SERPL-MCNC: 5.9 MG/DL — HIGH (ref 2.1–4.9)
PLATELET # BLD AUTO: 183 K/UL — SIGNIFICANT CHANGE UP (ref 130–400)
PLATELET # BLD AUTO: 189 K/UL — SIGNIFICANT CHANGE UP (ref 130–400)
PMV BLD: 10.7 FL — HIGH (ref 7.4–10.4)
PMV BLD: 10.7 FL — HIGH (ref 7.4–10.4)
POTASSIUM SERPL-MCNC: 4.8 MMOL/L — SIGNIFICANT CHANGE UP (ref 3.5–5)
POTASSIUM SERPL-MCNC: 5.4 MMOL/L — HIGH (ref 3.5–5)
POTASSIUM SERPL-SCNC: 4.8 MMOL/L — SIGNIFICANT CHANGE UP (ref 3.5–5)
POTASSIUM SERPL-SCNC: 5.4 MMOL/L — HIGH (ref 3.5–5)
PROT SERPL-MCNC: 6.1 G/DL — SIGNIFICANT CHANGE UP (ref 6–8)
RBC # BLD: 3.5 M/UL — LOW (ref 4.7–6.1)
RBC # BLD: 3.69 M/UL — LOW (ref 4.7–6.1)
RBC # FLD: 16.8 % — HIGH (ref 11.5–14.5)
RBC # FLD: 17 % — HIGH (ref 11.5–14.5)
SODIUM SERPL-SCNC: 135 MMOL/L — SIGNIFICANT CHANGE UP (ref 135–146)
SODIUM SERPL-SCNC: 136 MMOL/L — SIGNIFICANT CHANGE UP (ref 135–146)
TROPONIN T SERPL-MCNC: 0.08 NG/ML — CRITICAL HIGH
WBC # BLD: 5.98 K/UL — SIGNIFICANT CHANGE UP (ref 4.8–10.8)
WBC # BLD: 6.6 K/UL — SIGNIFICANT CHANGE UP (ref 4.8–10.8)
WBC # FLD AUTO: 5.98 K/UL — SIGNIFICANT CHANGE UP (ref 4.8–10.8)
WBC # FLD AUTO: 6.6 K/UL — SIGNIFICANT CHANGE UP (ref 4.8–10.8)

## 2023-07-08 PROCEDURE — 0225U NFCT DS DNA&RNA 21 SARSCOV2: CPT

## 2023-07-08 PROCEDURE — 87186 SC STD MICRODIL/AGAR DIL: CPT

## 2023-07-08 PROCEDURE — 84145 PROCALCITONIN (PCT): CPT

## 2023-07-08 PROCEDURE — 97161 PT EVAL LOW COMPLEX 20 MIN: CPT | Mod: GP

## 2023-07-08 PROCEDURE — 97116 GAIT TRAINING THERAPY: CPT | Mod: GP

## 2023-07-08 PROCEDURE — 90935 HEMODIALYSIS ONE EVALUATION: CPT

## 2023-07-08 PROCEDURE — 87086 URINE CULTURE/COLONY COUNT: CPT

## 2023-07-08 PROCEDURE — 94640 AIRWAY INHALATION TREATMENT: CPT

## 2023-07-08 PROCEDURE — 93010 ELECTROCARDIOGRAM REPORT: CPT | Mod: 77

## 2023-07-08 PROCEDURE — 84244 ASSAY OF RENIN: CPT

## 2023-07-08 PROCEDURE — 93010 ELECTROCARDIOGRAM REPORT: CPT

## 2023-07-08 PROCEDURE — 82088 ASSAY OF ALDOSTERONE: CPT

## 2023-07-08 PROCEDURE — 93306 TTE W/DOPPLER COMPLETE: CPT

## 2023-07-08 PROCEDURE — 81001 URINALYSIS AUTO W/SCOPE: CPT

## 2023-07-08 PROCEDURE — 71045 X-RAY EXAM CHEST 1 VIEW: CPT | Mod: 26

## 2023-07-08 PROCEDURE — 99285 EMERGENCY DEPT VISIT HI MDM: CPT

## 2023-07-08 PROCEDURE — 87070 CULTURE OTHR SPECIMN AEROBIC: CPT

## 2023-07-08 PROCEDURE — 99239 HOSP IP/OBS DSCHRG MGMT >30: CPT

## 2023-07-08 PROCEDURE — 87077 CULTURE AEROBIC IDENTIFY: CPT

## 2023-07-08 PROCEDURE — 84484 ASSAY OF TROPONIN QUANT: CPT

## 2023-07-08 PROCEDURE — 80048 BASIC METABOLIC PNL TOTAL CA: CPT

## 2023-07-08 PROCEDURE — 80053 COMPREHEN METABOLIC PANEL: CPT

## 2023-07-08 PROCEDURE — 93005 ELECTROCARDIOGRAM TRACING: CPT

## 2023-07-08 PROCEDURE — 87040 BLOOD CULTURE FOR BACTERIA: CPT

## 2023-07-08 PROCEDURE — 84100 ASSAY OF PHOSPHORUS: CPT

## 2023-07-08 PROCEDURE — 84443 ASSAY THYROID STIM HORMONE: CPT

## 2023-07-08 PROCEDURE — 85025 COMPLETE CBC W/AUTO DIFF WBC: CPT

## 2023-07-08 PROCEDURE — 83735 ASSAY OF MAGNESIUM: CPT

## 2023-07-08 PROCEDURE — 36415 COLL VENOUS BLD VENIPUNCTURE: CPT

## 2023-07-08 PROCEDURE — 71045 X-RAY EXAM CHEST 1 VIEW: CPT

## 2023-07-08 PROCEDURE — 97110 THERAPEUTIC EXERCISES: CPT | Mod: GP

## 2023-07-08 RX ORDER — SEVELAMER CARBONATE 2400 MG/1
1600 POWDER, FOR SUSPENSION ORAL
Refills: 0 | Status: DISCONTINUED | OUTPATIENT
Start: 2023-07-09 | End: 2023-07-15

## 2023-07-08 RX ORDER — AMLODIPINE BESYLATE 2.5 MG/1
1 TABLET ORAL
Refills: 0 | DISCHARGE

## 2023-07-08 RX ORDER — ASPIRIN/CALCIUM CARB/MAGNESIUM 324 MG
81 TABLET ORAL DAILY
Refills: 0 | Status: DISCONTINUED | OUTPATIENT
Start: 2023-07-08 | End: 2023-07-15

## 2023-07-08 RX ORDER — METOCLOPRAMIDE HCL 10 MG
5 TABLET ORAL
Refills: 0 | Status: DISCONTINUED | OUTPATIENT
Start: 2023-07-08 | End: 2023-07-15

## 2023-07-08 RX ORDER — LOSARTAN POTASSIUM 100 MG/1
1 TABLET, FILM COATED ORAL
Qty: 0 | Refills: 0 | DISCHARGE

## 2023-07-08 RX ORDER — APIXABAN 2.5 MG/1
2.5 TABLET, FILM COATED ORAL
Refills: 0 | Status: DISCONTINUED | OUTPATIENT
Start: 2023-07-08 | End: 2023-07-15

## 2023-07-08 RX ORDER — GABAPENTIN 400 MG/1
1 CAPSULE ORAL
Qty: 0 | Refills: 0 | DISCHARGE
Start: 2023-07-08

## 2023-07-08 RX ORDER — POLYETHYLENE GLYCOL 3350 17 G/17G
17 POWDER, FOR SOLUTION ORAL DAILY
Refills: 0 | Status: DISCONTINUED | OUTPATIENT
Start: 2023-07-08 | End: 2023-07-15

## 2023-07-08 RX ORDER — SENNA PLUS 8.6 MG/1
2 TABLET ORAL AT BEDTIME
Refills: 0 | Status: DISCONTINUED | OUTPATIENT
Start: 2023-07-08 | End: 2023-07-15

## 2023-07-08 RX ORDER — CALCIUM ACETATE 667 MG
1 TABLET ORAL
Qty: 120 | Refills: 0
Start: 2023-07-08 | End: 2023-08-06

## 2023-07-08 RX ORDER — GABAPENTIN 400 MG/1
100 CAPSULE ORAL EVERY 8 HOURS
Refills: 0 | Status: DISCONTINUED | OUTPATIENT
Start: 2023-07-08 | End: 2023-07-15

## 2023-07-08 RX ORDER — LOSARTAN POTASSIUM 100 MG/1
1 TABLET, FILM COATED ORAL
Qty: 30 | Refills: 0
Start: 2023-07-08 | End: 2023-08-06

## 2023-07-08 RX ORDER — ATORVASTATIN CALCIUM 80 MG/1
40 TABLET, FILM COATED ORAL AT BEDTIME
Refills: 0 | Status: DISCONTINUED | OUTPATIENT
Start: 2023-07-08 | End: 2023-07-15

## 2023-07-08 RX ORDER — METOPROLOL TARTRATE 50 MG
1 TABLET ORAL
Qty: 0 | Refills: 0 | DISCHARGE

## 2023-07-08 RX ORDER — ISOSORBIDE MONONITRATE 60 MG/1
1 TABLET, EXTENDED RELEASE ORAL
Refills: 0 | DISCHARGE

## 2023-07-08 RX ORDER — PANTOPRAZOLE SODIUM 20 MG/1
40 TABLET, DELAYED RELEASE ORAL
Refills: 0 | Status: DISCONTINUED | OUTPATIENT
Start: 2023-07-08 | End: 2023-07-15

## 2023-07-08 RX ORDER — SEVELAMER CARBONATE 2400 MG/1
1 POWDER, FOR SUSPENSION ORAL
Qty: 30 | Refills: 0
Start: 2023-07-08 | End: 2023-08-06

## 2023-07-08 RX ADMIN — APIXABAN 2.5 MILLIGRAM(S): 2.5 TABLET, FILM COATED ORAL at 05:07

## 2023-07-08 RX ADMIN — Medication 5 MILLIGRAM(S): at 12:00

## 2023-07-08 RX ADMIN — Medication 81 MILLIGRAM(S): at 12:00

## 2023-07-08 RX ADMIN — Medication 0.2 MILLIGRAM(S): at 05:07

## 2023-07-08 RX ADMIN — Medication 1 APPLICATION(S): at 05:08

## 2023-07-08 RX ADMIN — LOSARTAN POTASSIUM 25 MILLIGRAM(S): 100 TABLET, FILM COATED ORAL at 05:07

## 2023-07-08 RX ADMIN — GABAPENTIN 100 MILLIGRAM(S): 400 CAPSULE ORAL at 05:07

## 2023-07-08 RX ADMIN — GABAPENTIN 100 MILLIGRAM(S): 400 CAPSULE ORAL at 13:14

## 2023-07-08 RX ADMIN — Medication 5 MILLIGRAM(S): at 05:07

## 2023-07-08 RX ADMIN — PANTOPRAZOLE SODIUM 40 MILLIGRAM(S): 20 TABLET, DELAYED RELEASE ORAL at 05:08

## 2023-07-08 NOTE — PATIENT PROFILE ADULT - NURSING HOMES
[facility] and [facility], [facility]

## 2023-07-08 NOTE — DISCHARGE NOTE PROVIDER - NSDCCPCAREPLAN_GEN_ALL_CORE_FT
PRINCIPAL DISCHARGE DIAGNOSIS  Diagnosis: Lumbar radiculopathy  Assessment and Plan of Treatment: Imroved with Steriods, pain medication and gabapentin. You will be discharged to Rehab for training exersices      SECONDARY DISCHARGE DIAGNOSES  Diagnosis: Dizziness  Assessment and Plan of Treatment: CT head and Orthostatic vitals negative, pt states is due to Amlodipine which has happend in the past. AVOID Amlodipine

## 2023-07-08 NOTE — DISCHARGE NOTE NURSING/CASE MANAGEMENT/SOCIAL WORK - PATIENT PORTAL LINK FT
You can access the FollowMyHealth Patient Portal offered by [facility] by registering at the following website: [email or web address]. By joining [email or web address] [email or web address] portal, you will also be able to view your health information using other applications (apps) compatible with our system.

## 2023-07-08 NOTE — PROGRESS NOTE ADULT - ASSESSMENT
72-year-old male presents to the ED complaining of exacerbation of chronic back pain..  Pain is mostly left-sided, radiates into his left leg, is constant, worse with movement and not improving with home medications.  Pt with past medical history of ESRD on HD (M/W/F), CAD s/p 3 stents, A-fib on Eliquis, HTN and chronic back pain presents to the ED complaining of worsening  back pain..  Pain is mostly left-sided, radiates into his left leg, is constant, worse with movement and not improving despite taking pain medication.  Patient walks with a walker at home but is having difficulty walking due to pain.       Lumbosacral  radiculopathy  Acute on chronic back pain  No signs of Cord compression, denies symptoms as well   Started on decadron x 3 prophylactically  with goal to taper   Pain control with Percocet and Morphine for severe pain   Neuro consult appreciated: MR Lumbar spine + gabapentin   -MR results without cord compression, Neuro f/up requested   -Bowel regimen     Chronic AFIB: c/w BB and Eliquis   -Episode of Bradycardia  7/6, HR in the 40's with complaints of dizziness   - Parameters added for  BB,  Orthostatic vitals and CTH Negative    - Monitor     Type II DM   FS before meals and at bedtime  Keep FS below 180  c/w Insulin regimen     ESRD M/W/F  + Uncontrolled HTN   -Nephro eval appreciated   -c/w Losartan, Clonidine and Amlodipine     DVT PPX: Eliquis   GI PPX: PPI   Full Code  Dispo: from Home   Pending  neuro Clearance:  home vs Rehab     
72-year-old male presents to the ED complaining of exacerbation of chronic back pain and Left sciatica.  Pt with past medical history of ESRD on HD (M/W/F), CAD s/p 3 stents, A-fib on Eliquis, HTN and chronic back pain presents to the ED complaining of worsening  back pain.    Dizziness this am, some speech difficulties - orthostatics, neuro check  if cleared, will have HD today    ESRD HD today MWF  for 3 hrs 2 k bath  UF 2 L as carol  check phos  fluid restrict 1 L   renal diet  Pleural effusions on CT ? pericardial effusion -   repeat CXR 7/6 - clear, no effusions    Back pain and Left sciatica - CT LS showed severe spinal stenosis L4-5 worst  neurology consult recs noted - quick Prednisone taper recommended and renal dose gabapentin    will follow      
72-year-old male presents to the ED complaining of exacerbation of chronic back pain..  Pain is mostly left-sided, radiates into his left leg, is constant, worse with movement and not improving with home medications.  Pt with past medical history of ESRD on HD (M/W/F), CAD s/p 3 stents, A-fib on Eliquis, HTN and chronic back pain presents to the ED complaining of worsening  back pain..  Pain is mostly left-sided, radiates into his left leg, is constant, worse with movement and not improving despite taking pain medication.  Patient walks with a walker at home but is having difficulty walking due to pain.       Lumbosacral  radiculopathy  Acute on chronic back pain  No signs of Cord compression, denies symptoms as well   Started on decadron x 3 prophylactically  with goal to taper   Pain control with Percocet and Morphine for severe pain   Neuro consult appreciated: MR Lumbar spine + gabapentin   -F/up MR results  -Bowel regimen     Chronic AFIB: c/w BB and Eliquis   -Episode of Bradycardia today 7/6, HR in the 40's   -will add parameters and monitor     Type II DM   FS before meals and at bedtime  Keep FS below 180  c/w Insulin regimen     ESRD M/W/F  + Uncontrolled HTN   -Nephro eval appreciated   -c/w Losartan, Clonidine and Amlodipine     DVT PPX: Eliquis   GI PPX: PPI   Full Code  Dispo: from Home   Pending Clinical Improvement, MR results, neuro Clearance   Anticipate in AM : home vs Rehab   
72-year-old male presents to the ED complaining of exacerbation of chronic back pain and Left sciatica.  Pt with past medical history of ESRD on HD (M/W/F), CAD s/p 3 stents, A-fib on Eliquis, HTN and chronic back pain presents to the ED complaining of worsening  back pain.    ESRD HD tomorrow MWF  for 3 hrs 2 k bath  UF 2 L as carol  check phos  fluid restrict 1 L   renal diet  Pleural effusions on CT ? pericardial effusion -   repeat CXR today, may need 2D Ech if pericardial effusion is  suspected    Back pain and Left sciatica - CT LS showed severe spinal stenosis L4-5 worst  neurology consult recs noted - quick Prednisone taper recommended and renal dose gabapentin    will follow      
72-year-old male presents to the ED complaining of exacerbation of chronic back pain..  Pain is mostly left-sided, radiates into his left leg, is constant, worse with movement and not improving with home medications.  Pt with past medical history of ESRD on HD (M/W/F), CAD s/p 3 stents, A-fib on Eliquis, HTN and chronic back pain presents to the ED complaining of worsening  back pain..  Pain is mostly left-sided, radiates into his left leg, is constant, worse with movement and not improving despite taking pain medication.  Patient walks with a walker at home but is having difficulty walking due to pain.       Lumbosacral  radiculopathy  Acute on chronic back pain  No signs of Cord compression, denies symptoms as well   Started on decadron x 3 prophylactically  with goal to taper   Pain control with Percocet and Morphine for severe pain   Neuro consult appreciated: MR Lumbar spine + gabapentin   -F/up MR results  -Bowel regimen     Chronic AFIB: c/w BB and Eliquis   -Episode of Bradycardia  7/6, HR in the 40's with complaints of dizziness   -will add parameters to BB, Get Orthostatic vitals and CTH (complained of Slurred speech this am )  -F/up results      Type II DM   FS before meals and at bedtime  Keep FS below 180  c/w Insulin regimen     ESRD M/W/F  + Uncontrolled HTN   -Nephro eval appreciated   -c/w Losartan, Clonidine and Amlodipine     DVT PPX: Eliquis   GI PPX: PPI   Full Code  Dispo: from Home   Pending Clinical Improvement, MR results, neuro Clearance    home vs Rehab

## 2023-07-08 NOTE — CHART NOTE - NSCHARTNOTEFT_GEN_A_CORE
Patient initially seen 2 days ago by neurology for severe LBP radiating down the left leg.  MRI LS spine done showing severe spinal stenosis and multilevel moderate and severe radiculopathy.  If pain not improving with steroids and pain medications would consult neurosurgery to review imaging and consult pain management.  If no neurosurgical intervention can follow with neurology as an out patient for EMG/NCS of Lower extremities      < from: MR Lumbar Spine No Cont (07.07.23 @ 18:20) >    INTERPRETATION:  CLINICAL INDICATION: Severe spinal stenosis, acute on   chronic back pain.    TECHNIQUE: Multiplanar multisequence MRI of the lumbar spine was   performed without the administration of intravenous contrast, according   to standard protocol.    COMPARISON: Correlated with CT lumbar spine 7/5/2023.    FINDINGS/  IMPRESSION:    Multilevel degenerative changes resulting in severe spinal canal stenosis   at L2-3, L3-4, and L4-5 with nerve root crowding at those levels. Conus   medullaris is seen at L1. No cord edema.    Please note, this report is only for evaluation of cord edema. Full   report to follow.      ****************FINAL REPORT******************    FINDINGS:    The last well-formed disc space will be designated as L5-S1 for the   purpose of this report.    ALIGNMENT: Straightening of the lumbar spine with trace anterolisthesis   of L4 on L5.    VERTEBRAE: Diffuse heterogeneous marrow signal, nonspecific in etiology.   There are mixed degenerative edema, fatty marrow change, sclerosis with   endplate erosions affecting L4-5 and L5-S1 likely reflecting Modic type   I, II, and III changes. There is a Schmorl's node in the posterior   superior endplate of L4 with mild reactive edema. The vertebral bodies   are normal in height. There is no fracture or aggressive osseous lesion.    DISCS: Degenerative vacuum phenomena at L4-5 and L5-S1.    CONUS MEDULLARIS AND CAUDA EQUINA: The conus medullaris terminates at   L1-2. There is normal appearance of the conus medullaris and cauda equina.    PARAVERTEBRAL SOFT TISSUES AND VISUALIZED RETROPERITONEUM: Bilateral   atrophic kidneys with multiple cysts. Diffuse atherosclerosis of the   aorta with small ulcerative plaque.    EVALUATION OF INDIVIDUAL LEVELS:  T12-L1:  No disc herniation, spinal canal or neuroforaminal stenosis.    L1-2: Shallow disc bulge and mild bilateral facet hypertrophy   contributing to mild bilateral foraminal stenosis. Mild spinal stenosis.    L2-3: Shallow disc bulge with superimposed central/right paracentral disc   protrusion and mild bilateral facet arthropathy contributing to   moderate-severe spinal stenosis with effacementof the right lateral   recess, and moderate bilateral foraminal stenosis.    L3-4: Disc bulge with superimposed disc protrusion extending from central   to extraforaminal compartment, and moderate bilateral facet hypertrophy   contributing to severe spinal stenosis and moderate-severe right worse   than left foraminal stenosis.    L4-5: Disc bulge with superimposed superiorly migrating left paracentral   disc extrusion and marked bilateral facet hypertrophy contributing to   severe spinal stenosis with near effacement of the spinal space, as well   as the effacement of the left lateral recess at L4 level. There are   severe bilateral foraminal stenosis with flattening of the exiting L4   nerve root. There are small cystic lesions in the paraspinal space   arising from bilateral facets likely reflecting synovial cysts measuring   up to 1 cm in the right. Small bilateral facet joint effusions.    L5-S1: Disc bulge with superimposed central disc protrusion and mild   bilateral facet hypertrophy contributing to mild spinal stenosis and   severe bilateral foraminal stenosis with impingement of the exiting   bilateral L5 nerve root.    LIMITED EVALUATION OF UPPER SACRUM AND SACROILIAC JOINTS: Mild   degenerative changes of the sacroiliac joints.    IMPRESSION:    Multilevel spondylosis, most severe at L4-5 with severe spinal stenosis   with near effacement of the spinal space, and severe bilateral foraminal   stenosis with flattening of the exiting bilateral L4 nerve roots.    Additional high-grade spinal and foraminal stenosis at:  -L2-3 with moderate-severe spinal stenosis.  -L3-4 with severe spinal stenosis and moderate-severe right worse than   left foraminal stenosis.  -L5-S1 with severe bilateral foraminal stenosis.    Advanced degenerative endplate marrow changes at L4-5 and L5-S1 with   endplate erosions (Modic type I-III). Small Schmorl's node in the   superior endplate of L4 with reactive edema.    Diffuse atherosclerosis of the aorta with small ulcerative plaque.    --- End of Report ---

## 2023-07-08 NOTE — H&P ADULT - HISTORY OF PRESENT ILLNESS
72 year old male past medical history ESRD on dialysis (MWF), CAD s/p 3 stents, A-fib on Eliquis, hypertension, chronic back pain recently discharged from [facility] earlier today after work-up for chronic back pain.  Per EMS, patient blood pressure was elevated at time of discharge the patient received his clonidine early.  When patient arrived at [facility], patient noted to be bradycardic with heart rate 29 so patient was sent back to ED for further evaluation.  On ED arrival, patient heart rate initially read as 30s on the monitor however EKG revealing heart rate 57 sinus bradycardia with first-degree AV block.  Blood pressure stable at 160/60.  Patient denies chest pain, shortness of breath, dizziness, lightheadedness, dizziness, weakness.    In the ED, HR 57, /52. Labs showing K 5.4, phosphorus 5.9, trops 0.08, BNP ~29K. EKG showing sinus armando and bigeminy.

## 2023-07-08 NOTE — PROGRESS NOTE ADULT - SUBJECTIVE AND OBJECTIVE BOX
Patient is a 72y old  Male who presents with a chief complaint of back pain (07 Jul 2023 09:01)      MEDICATIONS  (STANDING):  amLODIPine   Tablet 5 milliGRAM(s) Oral at bedtime  apixaban 2.5 milliGRAM(s) Oral two times a day  aspirin enteric coated 81 milliGRAM(s) Oral daily  atorvastatin 40 milliGRAM(s) Oral at bedtime  calcium acetate 667 milliGRAM(s) Oral four times a day with meals  cloNIDine 0.2 milliGRAM(s) Oral daily  cloNIDine 0.1 milliGRAM(s) Oral <User Schedule>  gabapentin 100 milliGRAM(s) Oral every 8 hours  hydrocortisone 1% Cream 1 Application(s) Topical two times a day  losartan 25 milliGRAM(s) Oral daily  metoclopramide 5 milliGRAM(s) Oral four times a day  metoprolol succinate ER 25 milliGRAM(s) Oral at bedtime  pantoprazole    Tablet 40 milliGRAM(s) Oral before breakfast  polyethylene glycol 3350 17 Gram(s) Oral daily  senna 2 Tablet(s) Oral at bedtime  sevelamer carbonate 1600 milliGRAM(s) Oral <User Schedule>  sevelamer carbonate 800 milliGRAM(s) Oral <User Schedule>    MEDICATIONS  (PRN):  aluminum hydroxide/magnesium hydroxide/simethicone Suspension 30 milliLiter(s) Oral every 4 hours PRN Dyspepsia  artificial  tears Solution 1 Drop(s) Both EYES two times a day PRN Dry Eyes  melatonin 3 milliGRAM(s) Oral at bedtime PRN Insomnia  ondansetron Injectable 4 milliGRAM(s) IV Push every 8 hours PRN Nausea and/or Vomiting      CAPILLARY BLOOD GLUCOSE  I&O's Summary      PHYSICAL EXAM:  Vital Signs Last 24 Hrs  T(C): 36.4 (07 Jul 2023 04:26), Max: 36.4 (07 Jul 2023 04:26)  T(F): 97.5 (07 Jul 2023 04:26), Max: 97.5 (07 Jul 2023 04:26)  HR: 55 (07 Jul 2023 07:42) (47 - 55)  BP: 140/63 (07 Jul 2023 07:42) (140/58 - 155/64)  BP(mean): --  RR: 18 (07 Jul 2023 04:26) (18 - 18)  SpO2: 96% (07 Jul 2023 07:42) (96% - 99%)    Parameters below as of 07 Jul 2023 07:42  Patient On (Oxygen Delivery Method): room air        GENERAL: No acute distress, well-developed  HEAD:  Atraumatic, Normocephalic  EYES: EOMI, PERRLA, conjunctiva and sclera clear  NECK: Supple, no lymphadenopathy, no JVD  CHEST/LUNG: CTAB; No wheezes, rales, or rhonchi  HEART: Regular rate and rhythm; No murmurs, rubs, or gallops  ABDOMEN: Soft, non-tender, non-distended; normal bowel sounds, no organomegaly  EXTREMITIES:  2+ peripheral pulses b/l, No clubbing, cyanosis, or edema  NEUROLOGY: A&O x 3, no focal deficits, Pain, tingling and numbness on LLE   SKIN: No rashes or lesions    LABS:                        11.6   7.07  )-----------( 176      ( 06 Jul 2023 05:58 )             35.6     07-06    138  |  95<L>  |  30<H>  ----------------------------<  90  4.3   |  26  |  5.1<HH>    Ca    8.1<L>      06 Jul 2023 05:58            Urinalysis Basic - ( 06 Jul 2023 05:58 )    Color: x / Appearance: x / SG: x / pH: x  Gluc: 90 mg/dL / Ketone: x  / Bili: x / Urobili: x   Blood: x / Protein: x / Nitrite: x   Leuk Esterase: x / RBC: x / WBC x   Sq Epi: x / Non Sq Epi: x / Bacteria: x        
Nephrology progress note    Patient is seen and examined, events over the last 24 h noted .  Lt sciatica a little feels better    Allergies:  No Known Allergies    Hospital Medications:   MEDICATIONS  (STANDING):  amLODIPine   Tablet 5 milliGRAM(s) Oral at bedtime  apixaban 2.5 milliGRAM(s) Oral two times a day  aspirin enteric coated 81 milliGRAM(s) Oral daily  atorvastatin 40 milliGRAM(s) Oral at bedtime  calcium acetate 667 milliGRAM(s) Oral four times a day with meals  cloNIDine 0.1 milliGRAM(s) Oral <User Schedule>  cloNIDine 0.2 milliGRAM(s) Oral daily  hydrocortisone 1% Cream 1 Application(s) Topical two times a day  losartan 25 milliGRAM(s) Oral daily  metoclopramide 5 milliGRAM(s) Oral four times a day  metoprolol succinate ER 25 milliGRAM(s) Oral at bedtime  pantoprazole    Tablet 40 milliGRAM(s) Oral before breakfast  polyethylene glycol 3350 17 Gram(s) Oral daily  senna 2 Tablet(s) Oral at bedtime  sevelamer carbonate 1600 milliGRAM(s) Oral <User Schedule>  sevelamer carbonate 800 milliGRAM(s) Oral <User Schedule>        VITALS:  T(F): 98.1 (07-05-23 @ 21:00), Max: 98.1 (07-05-23 @ 21:00)  HR: 70 (07-05-23 @ 21:00)  BP: 162/72 (07-05-23 @ 21:00)  RR: 18 (07-05-23 @ 21:00)  SpO2: 96% (07-05-23 @ 21:00)  Wt(kg): --    07-05 @ 07:01  -  07-06 @ 07:00  --------------------------------------------------------  IN: 0 mL / OUT: 1500 mL / NET: -1500 mL          PHYSICAL EXAM:  Constitutional: NAD  HEENT: anicteric sclera, oropharynx clear, MMM  Neck: No JVD  Respiratory: CTAB, no wheezes, rales or rhonchi  Cardiovascular: S1, S2, RRR  Gastrointestinal: BS+, soft, NT/ND  Extremities: No cyanosis or clubbing. No peripheral edema  Neurological: A/O x 3  : No CVA tenderness. No elder.   Skin: No rashes  Vascular Access: AVF    LABS:  07-05    137  |  96<L>  |  39<H>  ----------------------------<  76  5.1<H>   |  25  |  6.7<HH>    Ca    7.9<L>      05 Jul 2023 02:27    TPro  6.4  /  Alb  3.8  /  TBili  0.8  /  DBili      /  AST  14  /  ALT  7   /  AlkPhos  81  07-05                          11.6   7.07  )-----------( 176      ( 06 Jul 2023 05:58 )             35.6       Urine Studies:  Urinalysis Basic - ( 05 Jul 2023 02:27 )    Color:  / Appearance:  / SG:  / pH:   Gluc: 76 mg/dL / Ketone:   / Bili:  / Urobili:    Blood:  / Protein:  / Nitrite:    Leuk Esterase:  / RBC:  / WBC    Sq Epi:  / Non Sq Epi:  / Bacteria:         RADIOLOGY & ADDITIONAL STUDIES:    < from: CT Lumbar Spine No Cont (07.05.23 @ 10:25) >    2.  Advanced degenerative changes notable for:  -Severe spinal stenosis at L2-3, L3-4 and L4-5 (worst at L4-5).  -Multilevel severe foraminal stenosis.    < end of copied text >  
Nephrology progress note    Patient is seen and examined, events over the last 24 h noted .  Pt reported feeling dizzy this am, speech difficulties  Took Gabapentin this am    Allergies:  No Known Allergies    Hospital Medications:   MEDICATIONS  (STANDING):  amLODIPine   Tablet 5 milliGRAM(s) Oral at bedtime  apixaban 2.5 milliGRAM(s) Oral two times a day  aspirin enteric coated 81 milliGRAM(s) Oral daily  atorvastatin 40 milliGRAM(s) Oral at bedtime  calcium acetate 667 milliGRAM(s) Oral four times a day with meals  cloNIDine 0.2 milliGRAM(s) Oral daily  cloNIDine 0.1 milliGRAM(s) Oral <User Schedule>  gabapentin 100 milliGRAM(s) Oral every 8 hours  hydrocortisone 1% Cream 1 Application(s) Topical two times a day  losartan 25 milliGRAM(s) Oral daily  metoclopramide 5 milliGRAM(s) Oral four times a day  metoprolol succinate ER 25 milliGRAM(s) Oral at bedtime  pantoprazole    Tablet 40 milliGRAM(s) Oral before breakfast  polyethylene glycol 3350 17 Gram(s) Oral daily  senna 2 Tablet(s) Oral at bedtime  sevelamer carbonate 1600 milliGRAM(s) Oral <User Schedule>  sevelamer carbonate 800 milliGRAM(s) Oral <User Schedule>        VITALS:  T(F): 97.5 (07-07-23 @ 04:26), Max: 97.5 (07-07-23 @ 04:26)  HR: 55 (07-07-23 @ 07:42)  BP: 140/63 (07-07-23 @ 07:42)  RR: 18 (07-07-23 @ 04:26)  SpO2: 96% (07-07-23 @ 07:42)  Wt(kg): --    07-05 @ 07:01  -  07-06 @ 07:00  --------------------------------------------------------  IN: 0 mL / OUT: 1500 mL / NET: -1500 mL          PHYSICAL EXAM:  Constitutional: NAD  HEENT: anicteric sclera, oropharynx clear, MMM  Neck: No JVD  Respiratory: CTAB, no wheezes, rales or rhonchi  Cardiovascular: S1, S2, RRR  Gastrointestinal: BS+, soft, NT/ND  Extremities: No cyanosis or clubbing. No peripheral edema  Neurological: A/O x 3  : No CVA tenderness. No elder.   Skin: No rashes  Vascular Access: AVF    LABS:  07-06    138  |  95<L>  |  30<H>  ----------------------------<  90  4.3   |  26  |  5.1<HH>    Ca    8.1<L>      06 Jul 2023 05:58                            11.6   7.07  )-----------( 176      ( 06 Jul 2023 05:58 )             35.6       Urine Studies:  Urinalysis Basic - ( 06 Jul 2023 05:58 )    Color:  / Appearance:  / SG:  / pH:   Gluc: 90 mg/dL / Ketone:   / Bili:  / Urobili:    Blood:  / Protein:  / Nitrite:    Leuk Esterase:  / RBC:  / WBC    Sq Epi:  / Non Sq Epi:  / Bacteria:         RADIOLOGY & ADDITIONAL STUDIES:  < from: Xray Chest 1 View- PORTABLE-Urgent (Xray Chest 1 View- PORTABLE-Urgent .) (07.06.23 @ 16:24) >  No radiographic evidence of acute cardiopulmonary disease.    No pleural effusions on chest radiograph.    < end of copied text >  
Patient is a 72y old  Male who presents with a chief complaint of back pain      MEDICATIONS  (STANDING):  amLODIPine   Tablet 5 milliGRAM(s) Oral at bedtime  apixaban 2.5 milliGRAM(s) Oral two times a day  aspirin enteric coated 81 milliGRAM(s) Oral daily  atorvastatin 40 milliGRAM(s) Oral at bedtime  calcium acetate 667 milliGRAM(s) Oral four times a day with meals  cloNIDine 0.2 milliGRAM(s) Oral daily  cloNIDine 0.1 milliGRAM(s) Oral <User Schedule>  gabapentin 100 milliGRAM(s) Oral every 8 hours  hydrocortisone 1% Cream 1 Application(s) Topical two times a day  losartan 25 milliGRAM(s) Oral daily  metoclopramide 5 milliGRAM(s) Oral four times a day  metoprolol succinate ER 25 milliGRAM(s) Oral at bedtime  pantoprazole    Tablet 40 milliGRAM(s) Oral before breakfast  polyethylene glycol 3350 17 Gram(s) Oral daily  senna 2 Tablet(s) Oral at bedtime  sevelamer carbonate 1600 milliGRAM(s) Oral <User Schedule>  sevelamer carbonate 800 milliGRAM(s) Oral <User Schedule>    MEDICATIONS  (PRN):  aluminum hydroxide/magnesium hydroxide/simethicone Suspension 30 milliLiter(s) Oral every 4 hours PRN Dyspepsia  artificial  tears Solution 1 Drop(s) Both EYES two times a day PRN Dry Eyes  melatonin 3 milliGRAM(s) Oral at bedtime PRN Insomnia  methocarbamol 750 milliGRAM(s) Oral every 8 hours PRN for back pain  morphine  - Injectable 4 milliGRAM(s) IV Push every 4 hours PRN Severe Pain (7 - 10)  ondansetron Injectable 4 milliGRAM(s) IV Push every 8 hours PRN Nausea and/or Vomiting  oxycodone    5 mG/acetaminophen 325 mG 1 Tablet(s) Oral every 4 hours PRN Moderate Pain (4 - 6)      CAPILLARY BLOOD GLUCOSE    POCT Blood Glucose.: 211 mg/dL (05 Jul 2023 15:26)    I&O's Summary    05 Jul 2023 07:01  -  06 Jul 2023 07:00  --------------------------------------------------------  IN: 0 mL / OUT: 1500 mL / NET: -1500 mL        PHYSICAL EXAM:  Vital Signs Last 24 Hrs  T(C): 36.7 (05 Jul 2023 21:00), Max: 36.7 (05 Jul 2023 21:00)  T(F): 98.1 (05 Jul 2023 21:00), Max: 98.1 (05 Jul 2023 21:00)  HR: 70 (05 Jul 2023 21:00) (60 - 70)  BP: 140/58 (06 Jul 2023 14:12) (140/58 - 191/78)  BP(mean): --  RR: 18 (06 Jul 2023 14:12) (18 - 18)  SpO2: 96% (06 Jul 2023 14:12) (96% - 96%)    Parameters below as of 06 Jul 2023 14:12  Patient On (Oxygen Delivery Method): room air      GENERAL: No acute distress, well-developed  HEAD:  Atraumatic, Normocephalic  EYES: EOMI, PERRLA, conjunctiva and sclera clear  NECK: Supple, no lymphadenopathy, no JVD  CHEST/LUNG: CTAB; No wheezes, rales, or rhonchi  HEART: Regular rate and rhythm; No murmurs, rubs, or gallops  ABDOMEN: Soft, non-tender, non-distended; normal bowel sounds, no organomegaly  EXTREMITIES:  2+ peripheral pulses b/l, No clubbing, cyanosis, or edema  NEUROLOGY: A&O x 3, no focal deficits, Pain, tingling and numbness on LLE   SKIN: No rashes or lesions    LABS:                        11.6   7.07  )-----------( 176      ( 06 Jul 2023 05:58 )             35.6     07-06    138  |  95<L>  |  30<H>  ----------------------------<  90  4.3   |  26  |  5.1<HH>    Ca    8.1<L>      06 Jul 2023 05:58    TPro  6.4  /  Alb  3.8  /  TBili  0.8  /  DBili  x   /  AST  14  /  ALT  7   /  AlkPhos  81  07-05      Urinalysis Basic - ( 06 Jul 2023 05:58 )    Color: x / Appearance: x / SG: x / pH: x  Gluc: 90 mg/dL / Ketone: x  / Bili: x / Urobili: x   Blood: x / Protein: x / Nitrite: x   Leuk Esterase: x / RBC: x / WBC x   Sq Epi: x / Non Sq Epi: x / Bacteria: x      
Patient is a 72y old  Male who presents with a chief complaint of back pain        MEDICATIONS  (STANDING):  amLODIPine   Tablet 5 milliGRAM(s) Oral at bedtime  apixaban 2.5 milliGRAM(s) Oral two times a day  aspirin enteric coated 81 milliGRAM(s) Oral daily  atorvastatin 40 milliGRAM(s) Oral at bedtime  calcium acetate 667 milliGRAM(s) Oral four times a day with meals  cloNIDine 0.2 milliGRAM(s) Oral daily  cloNIDine 0.1 milliGRAM(s) Oral <User Schedule>  gabapentin 100 milliGRAM(s) Oral every 8 hours  hydrocortisone 1% Cream 1 Application(s) Topical two times a day  losartan 25 milliGRAM(s) Oral daily  metoclopramide 5 milliGRAM(s) Oral four times a day  metoprolol succinate ER 25 milliGRAM(s) Oral at bedtime  pantoprazole    Tablet 40 milliGRAM(s) Oral before breakfast  polyethylene glycol 3350 17 Gram(s) Oral daily  senna 2 Tablet(s) Oral at bedtime  sevelamer carbonate 800 milliGRAM(s) Oral <User Schedule>  sevelamer carbonate 1600 milliGRAM(s) Oral <User Schedule>    MEDICATIONS  (PRN):  aluminum hydroxide/magnesium hydroxide/simethicone Suspension 30 milliLiter(s) Oral every 4 hours PRN Dyspepsia  artificial  tears Solution 1 Drop(s) Both EYES two times a day PRN Dry Eyes  melatonin 3 milliGRAM(s) Oral at bedtime PRN Insomnia  ondansetron Injectable 4 milliGRAM(s) IV Push every 8 hours PRN Nausea and/or Vomiting      CAPILLARY BLOOD GLUCOSE    I&OPalaksSumjanice    07 Jul 2023 07:01  -  08 Jul 2023 07:00  --------------------------------------------------------  IN: 0 mL / OUT: 1500 mL / NET: -1500 mL      PHYSICAL EXAM:  Vital Signs Last 24 Hrs  T(C): 35.9 (08 Jul 2023 05:00), Max: 36.7 (07 Jul 2023 21:36)  T(F): 96.6 (08 Jul 2023 05:00), Max: 98.1 (07 Jul 2023 21:36)  HR: 94 (08 Jul 2023 06:22) (52 - 94)  BP: 111/49 (08 Jul 2023 06:22) (111/49 - 198/76)  BP(mean): --  RR: 18 (08 Jul 2023 05:00) (18 - 20)  SpO2: 97% (08 Jul 2023 05:00) (97% - 98%)    Parameters below as of 07 Jul 2023 21:36  Patient On (Oxygen Delivery Method): room air      GENERAL: No acute distress, well-developed  HEAD:  Atraumatic, Normocephalic  EYES: EOMI, PERRLA, conjunctiva and sclera clear  NECK: Supple, no lymphadenopathy, no JVD  CHEST/LUNG: CTAB; No wheezes, rales, or rhonchi  HEART: Regular rate and rhythm; No murmurs, rubs, or gallops  ABDOMEN: Soft, non-tender, non-distended; normal bowel sounds, no organomegaly  EXTREMITIES:  2+ peripheral pulses b/l, No clubbing, cyanosis, or edema  NEUROLOGY: A&O x 3, no focal deficits, Pain, tingling and numbness on LLE, mildly improving   SKIN: No rashes or lesions      LABS:                        11.1   5.41  )-----------( 177      ( 07 Jul 2023 10:06 )             34.6     07-07    139  |  98  |  43<H>  ----------------------------<  108<H>  4.5   |  26  |  6.8<HH>    Ca    8.0<L>      07 Jul 2023 10:06  Phos  5.7     07-07        Urinalysis Basic - ( 07 Jul 2023 10:06 )    Color: x / Appearance: x / SG: x / pH: x  Gluc: 108 mg/dL / Ketone: x  / Bili: x / Urobili: x   Blood: x / Protein: x / Nitrite: x   Leuk Esterase: x / RBC: x / WBC x   Sq Epi: x / Non Sq Epi: x / Bacteria: x

## 2023-07-08 NOTE — DISCHARGE NOTE PROVIDER - HOSPITAL COURSE
72-year-old male presents to the ED complaining of exacerbation of chronic back pain..  Pain is mostly left-sided, radiates into his left leg, is constant, worse with movement and not improving with home medications.  Pt with past medical history of ESRD on HD (M/W/F), CAD s/p 3 stents, A-fib on Eliquis, HTN and chronic back pain presents to the ED complaining of worsening  back pain..  Pain is mostly left-sided, radiates into his left leg, is constant, worse with movement and not improving despite taking pain medication.  Patient walks with a walker at home but is having difficulty walking due to pain.       Lumbosacral  radiculopathy  Acute on chronic back pain  No signs of Cord compression, denies symptoms as well   Started on decadron x 3 prophylactically  with goal to taper   Pain control with Percocet and Morphine for severe pain   Neuro consult appreciated: MR Lumbar spine + gabapentin   -MR results without cord compression, Neuro f/up requested   -Bowel regimen     Chronic AFIB: c/w BB and Eliquis   -Episode of Bradycardia  7/6, HR in the 40's with complaints of dizziness   - Parameters added for  BB,  Orthostatic vitals and CTH Negative    - Monitor     Type II DM   FS before meals and at bedtime  Keep FS below 180  c/w Insulin regimen

## 2023-07-08 NOTE — DISCHARGE NOTE PROVIDER - NSDCMRMEDTOKEN_GEN_ALL_CORE_FT
apixaban 5 mg oral tablet: 0.5 tab(s) orally 2 times a day  aspirin 81 mg oral tablet, chewable: 1 tab(s) orally once a day  atorvastatin 40 mg oral tablet: 1 orally once a day (at bedtime)  cloNIDine 0.1 mg oral tablet: 1 tablet orally once a day At 5PM, Hold for  or less  cloNIDine 0.2 mg oral tablet: 1 tab(s) orally once a day Morning  gabapentin 100 mg oral capsule: 1 cap(s) orally every 8 hours  ocular lubricant ophthalmic solution: 1 drop(s) to each affected eye 2 times a day, As needed, Dry Eyes  pantoprazole 40 mg oral delayed release tablet: 1 tab(s) orally once a day (at bedtime)   polyethylene glycol 3350 oral powder for reconstitution: 17 gram(s) orally once a day  Reglan 5 mg oral tablet: 1 orally 4 times a day  Renagel 800 mg oral tablet: 2 tab(s) orally once a day  senna oral tablet: 2 tab(s) orally once a day (at bedtime)  Toprol-XL 25 mg oral tablet, extended release: 1 tablet, extended release orally once a day (at bedtime) HOLD FOR HR LESS THAN 60

## 2023-07-08 NOTE — ED ADULT NURSE NOTE - NSFALLUNIVINTERV_ED_ALL_ED
Bed/Stretcher in lowest position, wheels locked, appropriate side rails in place/Call bell, personal items and telephone in reach/Instruct patient to call for assistance before getting out of bed/chair/stretcher/Non-slip footwear applied when patient is off stretcher/[place] to call system/Physically safe environment - no spills, clutter or unnecessary equipment/Purposeful proactive rounding/Room/bathroom lighting operational, light cord in reach

## 2023-07-08 NOTE — DISCHARGE NOTE PROVIDER - NSDCFUSCHEDAPPT_GEN_ALL_CORE_FT
Dez Thompson  [facility]  CARDIOLOGY [address]  Scheduled Appointment: 07/11/2023    Smooth Gibbs  [facility] PreAdmits  Scheduled Appointment: 08/15/2023    [facility]  PODIATRY  [address]  Scheduled Appointment: 08/15/2023    Donavon Ann  [facility] PreAdmits  Scheduled Appointment: 08/23/2023    Donavon Ann  [facility]  INTMED  [address]  Scheduled Appointment: 08/23/2023

## 2023-07-08 NOTE — ED PROVIDER NOTE - ATTENDING CONTRIBUTION TO CARE
73 yo m with pmh of esrd on hd mwf, cad s/p stents, afib on eliquis, htn, lumbar radiculopathy, sent from NH for bradycardia.  pt was found to have HR of 29 when vitals were being taken when he arrived there from dc today from [facility].  pt has no symptoms currently.  pt was not being accepted by NH and was sent back to ED.  as per ems, pt was given an extra dose of clonidine prior to dc from [facility] because his bp was high.  on EMR review, pt was found to be bradycardic to the 40s 2 days ago while admitted.  last dialysis was yesterday.  no fever or chills.  no recent trop or ekg during last admission.  exam: nad, ncat, perrl, eomi, mmm, bradycardia, ctab, abd soft, nt, nd aox3, R avf imp: pt with new bradycardic bigeminy, ischemic w/u and will need to see EP, will admit to tele

## 2023-07-08 NOTE — ED ADULT NURSE NOTE - OBJECTIVE STATEMENT
72 yr M d/c from [facility] today taken to [facility] on arrival patient Hr was 29. Pt was apparently given an extra dose of clonidine as per ems by rn at [facility]. Pt c/o dizziness. A&Ox4.

## 2023-07-08 NOTE — DISCHARGE NOTE PROVIDER - ATTENDING DISCHARGE PHYSICAL EXAMINATION:
GENERAL: No acute distress, well-developed  HEAD:  Atraumatic, Normocephalic  EYES: EOMI, PERRLA, conjunctiva and sclera clear  NECK: Supple, no lymphadenopathy, no JVD  CHEST/LUNG: CTAB; No wheezes, rales, or rhonchi  HEART: Regular rate and rhythm; No murmurs, rubs, or gallops  ABDOMEN: Soft, non-tender, non-distended; normal bowel sounds, no organomegaly  EXTREMITIES:  2+ peripheral pulses b/l, No clubbing, cyanosis, or edema  NEUROLOGY: A&O x 3, no focal deficits, Pain, tingling and numbness on LLE, mildly improving   SKIN: No rashes or lesions

## 2023-07-08 NOTE — H&P ADULT - NSHPLABSRESULTS_GEN_ALL_CORE
07-08    135  |  93<L>  |  42<H>  ----------------------------<  100<H>  5.4<H>   |  27  |  7.0<HH>    Ca    8.6      08 Jul 2023 18:50  Phos  5.9     07-08  Mg     1.8     07-08    TPro  6.1  /  Alb  3.7  /  TBili  0.5  /  DBili  x   /  AST  13  /  ALT  6   /  AlkPhos  86  07-08                        11.6   5.98  )-----------( 189      ( 08 Jul 2023 18:50 )             36.3

## 2023-07-08 NOTE — ED ADULT TRIAGE NOTE - CHIEF COMPLAINT QUOTE
pt bibems from [facility] for low heart rate in the 30s, pt c/o dizziness and back pain. pt took 2 clonidine today for elevated bp pt bibems from [facility] for low heart rate in the 30s, pt c/o dizziness and back pain. pt took 2 clonidine today for elevated bp. ok to go to [facility] on a monitor per dr valderrama

## 2023-07-08 NOTE — ED PROVIDER NOTE - CLINICAL SUMMARY MEDICAL DECISION MAKING FREE TEXT BOX
pt with new bradycardic bigeminy, ischemic w/u and will need to see EP, trop mildly elevated, will admit to tele. Any ordered labs and EKG were reviewed.  Any imaging was ordered and reviewed by me.  Appropriate medications for patient's presenting complaints were ordered and effects were reassessed.  Patient's records (prior hospital, ED visit, and/or nursing home notes if available) were reviewed.  Additional history was obtained from EMS, family, and/or PCP (where available).  Escalation to admission/observation was considered.  Patient requires inpatient hospitalization - monitored setting.

## 2023-07-08 NOTE — ED PROVIDER NOTE - PHYSICAL EXAMINATION
Vital Signs: I have reviewed the initial vital signs.  Constitutional: appears stated age, no acute distress.  HEENT: Airway patent, moist MM, EOMI,   CV: bradycardic, no murmurs  Lungs: Clear to ascultation bilaterally, no increased work of breathing.  ABD: Non-tender, Non-distended,   MSK: Neck supple, nontender, normal range of motion,   INTEG: Skin warm, dry, no rash.  NEURO: A&Ox3, moving all extremities, normal speech.

## 2023-07-08 NOTE — PATIENT PROFILE ADULT - FALL HARM RISK - HARM RISK INTERVENTIONS

## 2023-07-08 NOTE — H&P ADULT - ATTENDING COMMENTS
73 YO M w/ a PMH of ESRD on HD (MWF), CAD s/p 3 stents, chronic Afib (Apixaban), HTN, and CBP who was sent into the hospital from [facility] for eval of bradycardia (29) noted on vitals. Pt denies any SOB, CP, palpitations, diaphoresis, or LE swelling. ROS is negative except as above.     Of note, the pt was discharged from [facility] earlier today, prior to discharge the pt received a single dose of Clonidine.     FMHx:   -No family Hx of early cardiac death, CAD, asthma, or genetic disorders identified    Physical exam shows pt in NAD. VSS, afebrile, not hypoxic on RA. A&Ox3. Neuro exam without deficits, motor/sensory intact, no dysarthria, no facial asymmetry. Muscle strength/sensation intact. CTA B/L with no W/C/R. RRR, no M/G/R. ABD is soft and non-tender, normoactive BSs. LEs without swelling. No rashes. Labs and radiology as above.     Asymptomatic bradycardia, possibly medication adverse effect. Cardio consult. Tele. Serial cardiac enzymes and EKGs. TSH. Echo. Hold AV sri blocking agents.     Hyperkalemia. Treat now. Repeat BMP in the AM.     Macrocytic anemia, above baseline. Send B12/Folate levels. Replace PRN.     Troponin elevation, at baseline. Doubt ACS. No CP or EKG changes. Serial cardiac enzymes and EKGs.     ESRD on HD (MWF). No current indication for emergent HD. Renal consult for in-pt HD. Restart home meds.     Hx of CAD s/p 3 stents, chronic Afib (Apixaban), HTN, and CBP. Restart home meds, except as stated above. DVT PPX. Inform PCP of pt's admission to hospital. My note supersedes the residents note.     Date seen by [email or web address] 71 YO M w/ a PMH of ESRD on HD (MWF), CAD s/p 3 stents, chronic Afib (Apixaban), HTN, and CBP who was sent into the hospital from [facility] for eval of bradycardia (29) noted on vitals. Pt denies any SOB, CP, palpitations, diaphoresis, or LE swelling. ROS is negative except as above.     Of note, the pt was discharged from [facility] earlier today, prior to discharge the pt received a single dose of Clonidine.     FMHx:   -No family Hx of early cardiac death, CAD, asthma, or genetic disorders identified    Physical exam shows pt in NAD. VSS, afebrile, not hypoxic on RA. A&Ox3. Neuro exam without deficits, motor/sensory intact, no dysarthria, no facial asymmetry. Muscle strength/sensation intact. CTA B/L with no W/C/R. RRR, no M/G/R. ABD is soft and non-tender, normoactive BSs. LEs without swelling. No rashes. Labs and radiology as above.     Asymptomatic bradycardia, possibly medication adverse effect. Cardio consult. Tele. Serial cardiac enzymes and EKGs. TSH. Echo. Hold AV sri blocking agents.     Hyperkalemia. Treat now. Repeat BMP in the AM.     Macrocytic anemia, above baseline. Send B12/Folate levels. Replace PRN.     Troponin elevation, at baseline. Doubt ACS. No CP or EKG changes. Serial cardiac enzymes and EKGs.     ESRD on HD (MWF). No current indication for emergent HD. Renal consult for in-pt HD. Restart home meds.     Hx of CAD s/p 3 stents, chronic Afib (Apixaban), HTN, and CBP. Restart home meds, except as stated above. DVT PPX. Inform PCP of pt's admission to hospital. My note supersedes the residents note.     Date seen by [email or web address]

## 2023-07-08 NOTE — H&P ADULT - ASSESSMENT
72 year old male past medical history ESRD on dialysis (MWF), CAD s/p 3 stents, A-fib on Eliquis, hypertension, chronic back pain recently discharged from [facility] earlier today after work-up for chronic back pain      #Asymptomatic bradycardia 2/2 1st degree AV block  - was discharged yesterday from [facility], at nursing home found to be armando to 29, asymptomatic  - EKG in ED showed sinus armando with bigeminy  -  no prior hx of AV block  - trops 0.08, no chest pain; BNP 90295, euvolemic on exam  - trend trops; f/u AM level  - f/u TSH/T4  - hold metoprolol for now; avoid other AV sri blocking agents, antiarrhythmics, nondihydro-CCB  - follows with Dr. Thompson, cardio consult    #CAD s/p 3 stents  #Afib on eliquis  #HTN  - holding metoprolol for now  - continue ASA, eliquis, clonidine    #ESRD on HD (MWF)  - follows with Dr. Wise  - continue phosphate binders    #Back pain  - continue gabapentin      DVT ppx: eliquis  GI ppx: protonix  Diet: renal diet  Full code   72 year old male past medical history ESRD on dialysis (MWF), CAD s/p 3 stents, A-fib on Eliquis, hypertension, chronic back pain recently discharged from [facility] earlier today after work-up for chronic back pain      #Asymptomatic bradycardia 2/2 1st degree AV block  - was discharged yesterday from [facility], at nursing home found to be armando to 29, asymptomatic  - EKG in ED showed sinus armando with bigeminy  -  no prior hx of AV block  - trops 0.08, no chest pain; BNP 67548, euvolemic on exam  - trend trops; f/u AM level  - f/u TSH/T4  - f/u echo  - hold metoprolol for now; avoid other AV sri blocking agents, antiarrhythmics, nondihydro-CCB  - follows with Dr. Thompson, cardio consult    #CAD s/p 3 stents  #Afib on eliquis  #HTN  - holding metoprolol for now  - continue ASA, eliquis, clonidine    #ESRD on HD (MWF)  - follows with Dr. Wise  - continue phosphate binders    #Back pain  - continue gabapentin      DVT ppx: eliquis  GI ppx: protonix  Diet: renal diet  Full code

## 2023-07-08 NOTE — DISCHARGE NOTE PROVIDER - CARE PROVIDER_API CALL
Seth [address], Admin - Room [address]  [place] [address]  Phone: [phone]  Fax: [phone]  Follow Up Time:

## 2023-07-08 NOTE — ED ADULT NURSE NOTE - CHIEF COMPLAINT QUOTE
pt bibems from [facility] for low heart rate in the 30s, pt c/o dizziness and back pain. pt took 2 clonidine today for elevated bp. ok to go to [facility] on a monitor per dr valderrama

## 2023-07-08 NOTE — ED PROVIDER NOTE - OBJECTIVE STATEMENT
72-year-old male past medical history ESRD on dialysis Monday Wednesday Friday, CAD status post 3 stents, A-fib on Eliquis, hypertension, chronic back pain recently discharged from [facility] earlier today after work-up for chronic back pain.  Per EMS, patient blood pressure was elevated at time of discharge the patient received his clonidine early.  When patient arrived at [facility], patient noted to be bradycardic heart rate 29 so patient was sent back to ED for further evaluation.  On ED arrival, patient heart rate initially read as 30s on the monitor however EKG revealing heart rate 57 sinus bradycardia with first-degree AV block.  Blood pressure stable at 160/60.  Patient denies chest pain, shortness of breath, dizziness, lightheadedness, weakness.

## 2023-07-08 NOTE — ED PROVIDER NOTE - CARE PLAN
1 Principal Discharge DX:	Bradycardia  Secondary Diagnosis:	ESRD on dialysis  Secondary Diagnosis:	HTN (hypertension)

## 2023-07-09 LAB
ALBUMIN SERPL ELPH-MCNC: 3.3 G/DL — LOW (ref 3.5–5.2)
ALP SERPL-CCNC: 71 U/L — SIGNIFICANT CHANGE UP (ref 30–115)
ALT FLD-CCNC: 7 U/L — SIGNIFICANT CHANGE UP (ref 0–41)
ANION GAP SERPL CALC-SCNC: 17 MMOL/L — HIGH (ref 7–14)
AST SERPL-CCNC: 10 U/L — SIGNIFICANT CHANGE UP (ref 0–41)
BASOPHILS # BLD AUTO: 0.05 K/UL — SIGNIFICANT CHANGE UP (ref 0–0.2)
BASOPHILS NFR BLD AUTO: 0.8 % — SIGNIFICANT CHANGE UP (ref 0–1)
BILIRUB SERPL-MCNC: 0.5 MG/DL — SIGNIFICANT CHANGE UP (ref 0.2–1.2)
BUN SERPL-MCNC: 49 MG/DL — HIGH (ref 10–20)
CALCIUM SERPL-MCNC: 8.2 MG/DL — LOW (ref 8.4–10.5)
CHLORIDE SERPL-SCNC: 95 MMOL/L — LOW (ref 98–110)
CO2 SERPL-SCNC: 24 MMOL/L — SIGNIFICANT CHANGE UP (ref 17–32)
CREAT SERPL-MCNC: 7.6 MG/DL — CRITICAL HIGH (ref 0.7–1.5)
EGFR: 7 ML/MIN/1.73M2 — LOW
EOSINOPHIL # BLD AUTO: 0.21 K/UL — SIGNIFICANT CHANGE UP (ref 0–0.7)
EOSINOPHIL NFR BLD AUTO: 3.5 % — SIGNIFICANT CHANGE UP (ref 0–8)
GLUCOSE SERPL-MCNC: 103 MG/DL — HIGH (ref 70–99)
HCT VFR BLD CALC: 34 % — LOW (ref 42–52)
HGB BLD-MCNC: 10.8 G/DL — LOW (ref 14–18)
IMM GRANULOCYTES NFR BLD AUTO: 0.3 % — SIGNIFICANT CHANGE UP (ref 0.1–0.3)
LYMPHOCYTES # BLD AUTO: 1 K/UL — LOW (ref 1.2–3.4)
LYMPHOCYTES # BLD AUTO: 16.8 % — LOW (ref 20.5–51.1)
MAGNESIUM SERPL-MCNC: 1.9 MG/DL — SIGNIFICANT CHANGE UP (ref 1.8–2.4)
MCHC RBC-ENTMCNC: 31.2 PG — HIGH (ref 27–31)
MCHC RBC-ENTMCNC: 31.8 G/DL — LOW (ref 32–37)
MCV RBC AUTO: 98.3 FL — HIGH (ref 80–94)
MONOCYTES # BLD AUTO: 0.77 K/UL — HIGH (ref 0.1–0.6)
MONOCYTES NFR BLD AUTO: 12.9 % — HIGH (ref 1.7–9.3)
NEUTROPHILS # BLD AUTO: 3.9 K/UL — SIGNIFICANT CHANGE UP (ref 1.4–6.5)
NEUTROPHILS NFR BLD AUTO: 65.7 % — SIGNIFICANT CHANGE UP (ref 42.2–75.2)
NRBC # BLD: 0 /100 WBCS — SIGNIFICANT CHANGE UP (ref 0–0)
PHOSPHATE SERPL-MCNC: 6.6 MG/DL — HIGH (ref 2.1–4.9)
PLATELET # BLD AUTO: 171 K/UL — SIGNIFICANT CHANGE UP (ref 130–400)
PMV BLD: 10.6 FL — HIGH (ref 7.4–10.4)
POTASSIUM SERPL-MCNC: 5.1 MMOL/L — HIGH (ref 3.5–5)
POTASSIUM SERPL-SCNC: 5.1 MMOL/L — HIGH (ref 3.5–5)
PROT SERPL-MCNC: 5.4 G/DL — LOW (ref 6–8)
RBC # BLD: 3.46 M/UL — LOW (ref 4.7–6.1)
RBC # FLD: 17.2 % — HIGH (ref 11.5–14.5)
SODIUM SERPL-SCNC: 136 MMOL/L — SIGNIFICANT CHANGE UP (ref 135–146)
TROPONIN T SERPL-MCNC: 0.07 NG/ML — CRITICAL HIGH
TROPONIN T SERPL-MCNC: 0.08 NG/ML — CRITICAL HIGH
WBC # BLD: 5.95 K/UL — SIGNIFICANT CHANGE UP (ref 4.8–10.8)
WBC # FLD AUTO: 5.95 K/UL — SIGNIFICANT CHANGE UP (ref 4.8–10.8)

## 2023-07-09 PROCEDURE — 99222 1ST HOSP IP/OBS MODERATE 55: CPT

## 2023-07-09 PROCEDURE — 93306 TTE W/DOPPLER COMPLETE: CPT | Mod: 26

## 2023-07-09 PROCEDURE — 99223 1ST HOSP IP/OBS HIGH 75: CPT

## 2023-07-09 RX ORDER — ISOSORBIDE MONONITRATE 60 MG/1
30 TABLET, EXTENDED RELEASE ORAL DAILY
Refills: 0 | Status: DISCONTINUED | OUTPATIENT
Start: 2023-07-09 | End: 2023-07-09

## 2023-07-09 RX ORDER — METOCLOPRAMIDE HCL 10 MG
5 TABLET ORAL ONCE
Refills: 0 | Status: COMPLETED | OUTPATIENT
Start: 2023-07-09 | End: 2023-07-09

## 2023-07-09 RX ORDER — ISOSORBIDE MONONITRATE 60 MG/1
30 TABLET, EXTENDED RELEASE ORAL DAILY
Refills: 0 | Status: DISCONTINUED | OUTPATIENT
Start: 2023-07-09 | End: 2023-07-15

## 2023-07-09 RX ORDER — AMLODIPINE BESYLATE 2.5 MG/1
5 TABLET ORAL DAILY
Refills: 0 | Status: DISCONTINUED | OUTPATIENT
Start: 2023-07-09 | End: 2023-07-10

## 2023-07-09 RX ORDER — ACETAMINOPHEN 500 MG
650 TABLET ORAL ONCE
Refills: 0 | Status: COMPLETED | OUTPATIENT
Start: 2023-07-09 | End: 2023-07-09

## 2023-07-09 RX ORDER — FLUTICASONE PROPIONATE 50 MCG
1 SPRAY, SUSPENSION NASAL
Refills: 0 | Status: DISCONTINUED | OUTPATIENT
Start: 2023-07-09 | End: 2023-07-15

## 2023-07-09 RX ORDER — ISOSORBIDE MONONITRATE 60 MG/1
30 TABLET, EXTENDED RELEASE ORAL ONCE
Refills: 0 | Status: COMPLETED | OUTPATIENT
Start: 2023-07-09 | End: 2023-07-09

## 2023-07-09 RX ADMIN — Medication 81 MILLIGRAM(S): at 11:05

## 2023-07-09 RX ADMIN — GABAPENTIN 100 MILLIGRAM(S): 400 CAPSULE ORAL at 05:34

## 2023-07-09 RX ADMIN — Medication 0.2 MILLIGRAM(S): at 05:34

## 2023-07-09 RX ADMIN — SENNA PLUS 2 TABLET(S): 8.6 TABLET ORAL at 22:04

## 2023-07-09 RX ADMIN — Medication 1 SPRAY(S): at 16:48

## 2023-07-09 RX ADMIN — GABAPENTIN 100 MILLIGRAM(S): 400 CAPSULE ORAL at 22:04

## 2023-07-09 RX ADMIN — Medication 650 MILLIGRAM(S): at 16:47

## 2023-07-09 RX ADMIN — PANTOPRAZOLE SODIUM 40 MILLIGRAM(S): 20 TABLET, DELAYED RELEASE ORAL at 05:34

## 2023-07-09 RX ADMIN — AMLODIPINE BESYLATE 5 MILLIGRAM(S): 2.5 TABLET ORAL at 16:57

## 2023-07-09 RX ADMIN — APIXABAN 2.5 MILLIGRAM(S): 2.5 TABLET, FILM COATED ORAL at 16:47

## 2023-07-09 RX ADMIN — Medication 5 MILLIGRAM(S): at 22:03

## 2023-07-09 RX ADMIN — APIXABAN 2.5 MILLIGRAM(S): 2.5 TABLET, FILM COATED ORAL at 05:34

## 2023-07-09 RX ADMIN — GABAPENTIN 100 MILLIGRAM(S): 400 CAPSULE ORAL at 11:05

## 2023-07-09 RX ADMIN — POLYETHYLENE GLYCOL 3350 17 GRAM(S): 17 POWDER, FOR SOLUTION ORAL at 11:05

## 2023-07-09 RX ADMIN — ATORVASTATIN CALCIUM 40 MILLIGRAM(S): 80 TABLET, FILM COATED ORAL at 22:04

## 2023-07-09 NOTE — PHYSICAL THERAPY INITIAL EVALUATION ADULT - GENERAL OBSERVATIONS, REHAB EVAL
ED4. 73 y/o M rec'd/left in bed, nad, + tele. pt speaks basic English, A+Ox2, appears confused at times, following simple VC's. pt ambulated 2 ft sideways at bedside with mod A 2/2 post LOB. vitals: in bed at rest 164/81 63 96% on RA, in bed post transfer 181/77 59 95% on RA., RN aware.

## 2023-07-09 NOTE — PROGRESS NOTE ADULT - ASSESSMENT
72-year-old male presents to the ED complaining of exacerbation of chronic back pain and Left sciatica.  Pt with past medical history of ESRD on HD (M/W/F), CAD s/p 3 stents, A-fib on Eliquis, HTN and chronic back pain presents  complaining of worsening  back pain/ bradycardia         ESRD HD MWF   for HD iN Am   check phos  fluid restrict 1 L   renal diet  Pleural effusions on CT ? pericardial effusion -  2 D echo repeat pending   Bradycardia will need taper of clonidine to off / keep on tele   Back pain and Left sciatica - CT LS showed severe spinal stenosis L4-5 [place]       will follow

## 2023-07-09 NOTE — CONSULT NOTE ADULT - ATTENDING COMMENTS
Sinus bradycardia with bigeminy.     TTE 7/9/23 normal global LV sys function, G2DD, mildly enlarged LA    Hold clonidine and beta blocker   Can restart amlodipine and Imdur if needed for better BP control  Monitor on telemetry  EP consult tomorrow if remains bradycardic  Patient's cardiologist, Dr. Thompson, updated     Macy Morales MD  Vascular Cardiology Attending  Please text or call via MS Teams with questions

## 2023-07-09 NOTE — PHYSICAL THERAPY INITIAL EVALUATION ADULT - PERTINENT HX OF CURRENT PROBLEM, REHAB EVAL
72 year old male past medical history ESRD on dialysis (MWF), CAD s/p 3 stents, A-fib on Eliquis, hypertension, chronic back pain recently discharged from [facility] earlier today after work-up for chronic back pain.  Per EMS, patient blood pressure was elevated at time of discharge the patient received his clonidine early.  When patient arrived at [facility], patient noted to be bradycardic with heart rate 29 so patient was sent back to ED for further evaluation.  On ED arrival, patient heart rate initially read as 30s on the monitor however EKG revealing heart rate 57 sinus bradycardia with first-degree AV block.  Blood pressure stable at 160/60.  Patient denies chest pain, shortness of breath, dizziness, lightheadedness, dizziness, weakness.

## 2023-07-09 NOTE — CONSULT NOTE ADULT - ASSESSMENT
72M w/ h/o ESRD on HD (MWF), CAD s/p 3 stents, A-fib on Eliquis, HTN, and chronic back pain presenting for bradycardia (HR 29). Recently discharged from [facility] earlier on 7/8 after work-up for chronic back pain. Found to be bradycardia on vitals and subsequently admitted to telemetry. Cardiology consulted for sinus bradycardia.    #Sinus Bradycardia  - STOP clonidine (can cause bradycardia, but unlikely to this extent)  - STOP Toprol XL  - continue to monitor on telemetry  - if still bradycardic in AM, then consult EP    #Hypertension  - stop clonidine as above  - stop Toprol XL  - avoid AVN blocking agents (such as BB and non-DHP CCB)  - start amlodipine 5mg PO QD  - if BP still poorly controlled, can add on Imdur    #Chronic Atrial Fibrillation  - c/w Eliquis 2.5mg PO BID  - hold BB for now    #Coronary Artery Disease  - c/a ASA 81mg PO QD  - c/w atorvastatin 40mg PO QHS

## 2023-07-09 NOTE — CONSULT NOTE ADULT - SUBJECTIVE AND OBJECTIVE BOX
HPI  72 year old male past medical history ESRD on dialysis (MWF), CAD s/p 3 stents, A-fib on Eliquis, hypertension, chronic back pain recently discharged from [facility] earlier today after work-up for chronic back pain.  Per EMS, patient blood pressure was elevated at time of discharge the patient received his clonidine early.  When patient arrived at [facility], patient noted to be bradycardic with heart rate 29 so patient was sent back to ED for further evaluation.  On ED arrival, patient heart rate initially read as 30s on the monitor however EKG revealing heart rate 57 sinus bradycardia with first-degree AV block.  Blood pressure stable at 160/60.  Patient denies chest pain, shortness of breath, dizziness, lightheadedness, dizziness, weakness.    In the ED, HR 57, /52. Labs showing K 5.4, phosphorus 5.9, trops 0.08, BNP ~29K. EKG showing sinus armando and bigeminy. (08 Jul 2023 23:21)    CARDIOLOGY CONSULT  Cardiology consulted for bradycardia. Pt reports no symptoms at this time (no lightheadedness or dizziness).       PAST MEDICAL & SURGICAL HISTORY  ESRD on dialysis  T/ TH/ S    HTN (hypertension)    HLD (hyperlipidemia)    Heart failure    Anemia    Afib    H/O right coronary artery stent placement  stent x3    S/P arteriovenous (AV) fistula creation    S/P cataract surgery        FAMILY HISTORY  FAMILY HISTORY:  No pertinent family history in first degree relatives        SOCIAL HISTORY  []smoker  []Alcohol  []Drug    ALLERGIES  No Known Allergies      MEDICATIONS:  MEDICATIONS  (STANDING):  apixaban 2.5 milliGRAM(s) Oral two times a day  aspirin  chewable 81 milliGRAM(s) Oral daily  atorvastatin 40 milliGRAM(s) Oral at bedtime  fluticasone propionate 50 MICROgram(s)/spray Nasal Spray 1 Spray(s) Both Nostrils two times a day  gabapentin 100 milliGRAM(s) Oral every 8 hours  pantoprazole    Tablet 40 milliGRAM(s) Oral before breakfast  polyethylene glycol 3350 17 Gram(s) Oral daily  senna 2 Tablet(s) Oral at bedtime  sevelamer carbonate 1600 milliGRAM(s) Oral <User Schedule>    MEDICATIONS  (PRN):  metoclopramide 5 milliGRAM(s) Oral four times a day PRN nausea/vomiting      HOME MEDICATIONS  Home Medications:  apixaban 5 mg oral tablet: 0.5 tab(s) orally 2 times a day (05 Jul 2023 12:55)  aspirin 81 mg oral tablet, chewable: 1 tab(s) orally once a day (05 Jul 2023 12:59)  atorvastatin 40 mg oral tablet: 1 orally once a day (at bedtime) (05 Jul 2023 12:53)  cloNIDine 0.1 mg oral tablet: 1 tablet orally once a day At 5PM, Hold for  or less (08 Jul 2023 13:17)  gabapentin 100 mg oral capsule: 1 cap(s) orally every 8 hours (08 Jul 2023 13:02)  Reglan 5 mg oral tablet: 1 orally 4 times a day (05 Jul 2023 13:13)  Renagel 800 mg oral tablet: 2 tab(s) orally once a day (08 Jul 2023 13:17)  Toprol-XL 25 mg oral tablet, extended release: 1 tablet, extended release orally once a day (at bedtime) HOLD FOR HR LESS THAN 60 (08 Jul 2023 13:32)      VITALS   T(F): 97.5 (07-09 @ 06:12), Max: 98.2 (07-08 @ 23:00)  HR: 83 (07-09 @ 06:12) (47 - 95)  BP: 149/67 (07-09 @ 06:12) (108/52 - 198/76)  BP(mean): --  RR: 18 (07-09 @ 06:12) (18 - 20)  SpO2: 98% (07-09 @ 06:12) (95% - 99%)    REVIEW OF SYSTEMS  CONSTITUTIONAL: dizziness, but no lightheadedness or fall.  EYES: No visual changes  ENT: No vertigo or throat pain   NECK: No pain or stiffness  RESPIRATORY: No cough, wheezing, hemoptysis; No shortness of breath  CARDIOVASCULAR: No chest pain or palpitations  GASTROINTESTINAL: No abdominal or epigastric pain. No nausea, vomiting, or hematemesis; No diarrhea or constipation. No melena or hematochezia.  GENITOURINARY: No dysuria, frequency or hematuria  NEUROLOGICAL: No numbness or weakness  SKIN: No itching, no rashes  MSK: No pain    PHYSICAL EXAM  NEURO: patient is awake , alert and oriented  GEN: Not in acute distress  NECK: no thyroid enlargement, no JVD  LUNGS: Clear to auscultation bilaterally   CARDIOVASCULAR: bradycardia, S1/S2 present, no murmurs or rubs, no carotid bruits,  + PP bilaterally  ABD: Soft, non-tender, non-distended, +BS  EXT: No CLEMENTE  SKIN: Intact    LABS:                        10.8   5.95  )-----------( 171      ( 09 Jul 2023 07:19 )             34.0     136  |  95<L>  |  49<H>  ----------------------------<  103<H>  5.1<H>   |  24  |  7.6<HH>    Ca    8.2<L>      09 Jul 2023 07:19  Phos  6.6     07-09  Mg     1.9     07-09    TPro  5.4<L>  /  Alb  3.3<L>  /  TBili  0.5  /  DBili  x   /  AST  10  /  ALT  7   /  AlkPhos  71  07-09    Troponin Trend:  Troponin T, Serum: 0.08 ng/mL (07-08-23 @ 18:50)  Troponin T, Serum: 0.07 ng/mL (07-09-23 @ 00:14)  Troponin T, Serum: 0.08 ng/mL (07-09-23 @ 07:19)    RADIOLOGY    Xray Chest 1 View- PORTABLE-Urgent (07.08.23 @ 18:53)  No radiographic evidence of acute cardiopulmonary disease    TTE Echo Complete w/o Contrast w/ Doppler (07.09.23 @ 08:22)  1. Normal global left ventricular systolic function.  2. LV Ejection Fraction by Rapp's Method with a biplane EF of 56 %.  3. Spectral Doppler shows pseudonormal pattern of left ventricular myocardial filling (Grade II diastolic dysfunction).  4. Mildly enlarged left atrium.  5. Normal right atrial size.  6. Mild mitral valve regurgitation.  7. Mild thickening of the anterior and posterior mitral valve leaflets.  8. Mild tricuspid regurgitation.  9. Aortic valve thickening with mildly decreased leaflet opening. TALHA 2.1 cm^2.    ECG    12 Lead ECG (07.08.23 @ 20:00)  1. Sinus bradycardia with 1st degree A-V block with frequent Premature  2. Ventricular complexes in a pattern of bigeminy  3. Right axis deviation  4. Possible Right ventricular hypertrophy  5. Abnormal ECG    TELEMETRY  bradycardia in bigeminy pattern

## 2023-07-09 NOTE — CHART NOTE - NSCHARTNOTEFT_GEN_A_CORE
Pt seen and examined in ED4 today. Previous notes reviewed by me. Telemetry reviewed by me.  He c/o left leg pain and some dizziness. No chest pain, SOB, back pain.  He remains armando in the high 40's to low 50's range  he received clonidine 0.2mg today  bblocker stopped  will reduce clonidine to 0.1mg bid for now (to start on 7/10) and monitor on telemetry  hold parameters in place  HD per renal  cardio consult pending  case discussed with RN today

## 2023-07-09 NOTE — PROGRESS NOTE ADULT - SUBJECTIVE AND OBJECTIVE BOX
Nephrology progress note    THIS IS AN INCOMPLETE NOTE . FULL NOTE TO FOLLOW SHORTLY    Patient is seen and examined, events over the last 24 h noted .    Allergies:  No Known Allergies    Hospital Medications:   MEDICATIONS  (STANDING):  apixaban 2.5 milliGRAM(s) Oral two times a day  aspirin  chewable 81 milliGRAM(s) Oral daily  atorvastatin 40 milliGRAM(s) Oral at bedtime  cloNIDine 0.1 milliGRAM(s) Oral <User Schedule>  cloNIDine 0.2 milliGRAM(s) Oral daily  gabapentin 100 milliGRAM(s) Oral every 8 hours  pantoprazole    Tablet 40 milliGRAM(s) Oral before breakfast  polyethylene glycol 3350 17 Gram(s) Oral daily  senna 2 Tablet(s) Oral at bedtime  sevelamer carbonate 1600 milliGRAM(s) Oral <User Schedule>        VITALS:  T(F): 97.5 (07-09-23 @ 06:12), Max: 98.2 (07-08-23 @ 23:00)  HR: 83 (07-09-23 @ 06:12)  BP: 149/67 (07-09-23 @ 06:12)  RR: 18 (07-09-23 @ 06:12)  SpO2: 98% (07-09-23 @ 06:12)  Wt(kg): --      Weight (kg): 70.3 (07-08 @ 17:47)    PHYSICAL EXAM:  Constitutional: NAD  HEENT: anicteric sclera, oropharynx clear, MMM  Neck: No JVD  Respiratory: CTAB, no wheezes, rales or rhonchi  Cardiovascular: S1, S2, RRR  Gastrointestinal: BS+, soft, NT/ND  Extremities: No cyanosis or clubbing. No peripheral edema  :  No elder.   Skin: No rashes    LABS:  07-09    136  |  95<L>  |  49<H>  ----------------------------<  103<H>  5.1<H>   |  24  |  x     Ca    8.2<L>      09 Jul 2023 07:19  Phos  6.6     07-09  Mg     1.9     07-09    TPro  5.4<L>  /  Alb  3.3<L>  /  TBili  0.5  /  DBili      /  AST  10  /  ALT  7   /  AlkPhos  71  07-09                          10.8   5.95  )-----------( 171      ( 09 Jul 2023 07:19 )             34.0       Urine Studies:  Urinalysis Basic - ( 09 Jul 2023 07:19 )    Color:  / Appearance:  / SG:  / pH:   Gluc: 103 mg/dL / Ketone:   / Bili:  / Urobili:    Blood:  / Protein:  / Nitrite:    Leuk Esterase:  / RBC:  / WBC    Sq Epi:  / Non Sq Epi:  / Bacteria:           HbA1c 5.6      [09-18-19 @ 07:12]    HBsAb Nonreact      [09-21-19 @ 20:52]  HBsAg Nonreact      [09-21-19 @ 20:52]  HBcAb Reactive      [09-21-19 @ 20:52]  HCV 0.13, Nonreact      [09-21-19 @ 20:52]        RADIOLOGY & ADDITIONAL STUDIES:   Nephrology progress note    Patient is seen and examined, events over the last 24 h noted .  sp recent discharge yesterday from [facility]   back with bradycardia     Allergies:  No Known Allergies    Hospital Medications:   MEDICATIONS  (STANDING):  apixaban 2.5 milliGRAM(s) Oral two times a day  aspirin  chewable 81 milliGRAM(s) Oral daily  atorvastatin 40 milliGRAM(s) Oral at bedtime  cloNIDine 0.1 milliGRAM(s) Oral <User Schedule>  cloNIDine 0.2 milliGRAM(s) Oral daily  gabapentin 100 milliGRAM(s) Oral every 8 hours  pantoprazole    Tablet 40 milliGRAM(s) Oral before breakfast  polyethylene glycol 3350 17 Gram(s) Oral daily  senna 2 Tablet(s) Oral at bedtime  sevelamer carbonate 1600 milliGRAM(s) Oral <User Schedule>        VITALS:  T(F): 97.5 (07-09-23 @ 06:12), Max: 98.2 (07-08-23 @ 23:00)  HR: 83 (07-09-23 @ 06:12)  BP: 149/67 (07-09-23 @ 06:12)  RR: 18 (07-09-23 @ 06:12)  SpO2: 98% (07-09-23 @ 06:12)        Weight (kg): 70.3 (07-08 @ 17:47)    PHYSICAL EXAM:  Constitutional: NAD  Respiratory: CTAB,   Cardiovascular: S1, S2, RRR  Gastrointestinal: BS+, soft, NT/ND  Extremities: No cyanosis or clubbing. No peripheral edema  :  No elder.   Skin: No rashes    LABS:    07-09    136  |  95<L>  |  49<H>  ----------------------------<  103<H>  5.1<H>   |  24  |  7.6<HH>    Ca    8.2<L>      09 Jul 2023 07:19  Phos  6.6     07-09  Mg     1.9     07-09    TPro  5.4<L>  /  Alb  3.3<L>  /  TBili  0.5  /  DBili  x   /  AST  10  /  ALT  7   /  AlkPhos  71  07-09                            10.8   5.95  )-----------( 171      ( 09 Jul 2023 07:19 )             34.0       Urine Studies:  Urinalysis Basic - ( 09 Jul 2023 07:19 )    Color:  / Appearance:  / SG:  / pH:   Gluc: 103 mg/dL / Ketone:   / Bili:  / Urobili:    Blood:  / Protein:  / Nitrite:    Leuk Esterase:  / RBC:  / WBC    Sq Epi:  / Non Sq Epi:  / Bacteria:           HbA1c 5.6      [09-18-19 @ 07:12]    HBsAb Nonreact      [09-21-19 @ 20:52]  HBsAg Nonreact      [09-21-19 @ 20:52]  HBcAb Reactive      [09-21-19 @ 20:52]  HCV 0.13, Nonreact      [09-21-19 @ 20:52]        RADIOLOGY & ADDITIONAL STUDIES:

## 2023-07-10 LAB
ALBUMIN SERPL ELPH-MCNC: 3.7 G/DL — SIGNIFICANT CHANGE UP (ref 3.5–5.2)
ALP SERPL-CCNC: 101 U/L — SIGNIFICANT CHANGE UP (ref 30–115)
ALT FLD-CCNC: 6 U/L — SIGNIFICANT CHANGE UP (ref 0–41)
ANION GAP SERPL CALC-SCNC: 22 MMOL/L — HIGH (ref 7–14)
AST SERPL-CCNC: 10 U/L — SIGNIFICANT CHANGE UP (ref 0–41)
BASOPHILS # BLD AUTO: 0.03 K/UL — SIGNIFICANT CHANGE UP (ref 0–0.2)
BASOPHILS NFR BLD AUTO: 0.3 % — SIGNIFICANT CHANGE UP (ref 0–1)
BILIRUB SERPL-MCNC: 0.5 MG/DL — SIGNIFICANT CHANGE UP (ref 0.2–1.2)
BUN SERPL-MCNC: 72 MG/DL — CRITICAL HIGH (ref 10–20)
CALCIUM SERPL-MCNC: 8.5 MG/DL — SIGNIFICANT CHANGE UP (ref 8.4–10.5)
CHLORIDE SERPL-SCNC: 96 MMOL/L — LOW (ref 98–110)
CO2 SERPL-SCNC: 20 MMOL/L — SIGNIFICANT CHANGE UP (ref 17–32)
CREAT SERPL-MCNC: 8.4 MG/DL — CRITICAL HIGH (ref 0.7–1.5)
EGFR: 6 ML/MIN/1.73M2 — LOW
EOSINOPHIL # BLD AUTO: 0.21 K/UL — SIGNIFICANT CHANGE UP (ref 0–0.7)
EOSINOPHIL NFR BLD AUTO: 1.9 % — SIGNIFICANT CHANGE UP (ref 0–8)
GLUCOSE SERPL-MCNC: 109 MG/DL — HIGH (ref 70–99)
HCT VFR BLD CALC: 36.8 % — LOW (ref 42–52)
HGB BLD-MCNC: 12.1 G/DL — LOW (ref 14–18)
IMM GRANULOCYTES NFR BLD AUTO: 0.4 % — HIGH (ref 0.1–0.3)
LYMPHOCYTES # BLD AUTO: 1.1 K/UL — LOW (ref 1.2–3.4)
LYMPHOCYTES # BLD AUTO: 9.7 % — LOW (ref 20.5–51.1)
MAGNESIUM SERPL-MCNC: 2 MG/DL — SIGNIFICANT CHANGE UP (ref 1.8–2.4)
MCHC RBC-ENTMCNC: 32.1 PG — HIGH (ref 27–31)
MCHC RBC-ENTMCNC: 32.9 G/DL — SIGNIFICANT CHANGE UP (ref 32–37)
MCV RBC AUTO: 97.6 FL — HIGH (ref 80–94)
MONOCYTES # BLD AUTO: 1.11 K/UL — HIGH (ref 0.1–0.6)
MONOCYTES NFR BLD AUTO: 9.8 % — HIGH (ref 1.7–9.3)
NEUTROPHILS # BLD AUTO: 8.83 K/UL — HIGH (ref 1.4–6.5)
NEUTROPHILS NFR BLD AUTO: 77.9 % — HIGH (ref 42.2–75.2)
NRBC # BLD: 0 /100 WBCS — SIGNIFICANT CHANGE UP (ref 0–0)
PHOSPHATE SERPL-MCNC: 7.7 MG/DL — HIGH (ref 2.1–4.9)
PLATELET # BLD AUTO: 202 K/UL — SIGNIFICANT CHANGE UP (ref 130–400)
PMV BLD: 10.6 FL — HIGH (ref 7.4–10.4)
POTASSIUM SERPL-MCNC: 5.6 MMOL/L — HIGH (ref 3.5–5)
POTASSIUM SERPL-SCNC: 5.6 MMOL/L — HIGH (ref 3.5–5)
PROT SERPL-MCNC: 6 G/DL — SIGNIFICANT CHANGE UP (ref 6–8)
RBC # BLD: 3.77 M/UL — LOW (ref 4.7–6.1)
RBC # FLD: 17.1 % — HIGH (ref 11.5–14.5)
SODIUM SERPL-SCNC: 138 MMOL/L — SIGNIFICANT CHANGE UP (ref 135–146)
T4 FREE+ TSH PNL SERPL: 0.78 UIU/ML — SIGNIFICANT CHANGE UP (ref 0.27–4.2)
WBC # BLD: 11.32 K/UL — HIGH (ref 4.8–10.8)
WBC # FLD AUTO: 11.32 K/UL — HIGH (ref 4.8–10.8)

## 2023-07-10 PROCEDURE — 99233 SBSQ HOSP IP/OBS HIGH 50: CPT

## 2023-07-10 RX ORDER — ACETAMINOPHEN 500 MG
650 TABLET ORAL ONCE
Refills: 0 | Status: COMPLETED | OUTPATIENT
Start: 2023-07-10 | End: 2023-07-10

## 2023-07-10 RX ORDER — SODIUM ZIRCONIUM CYCLOSILICATE 10 G/10G
5 POWDER, FOR SUSPENSION ORAL ONCE
Refills: 0 | Status: DISCONTINUED | OUTPATIENT
Start: 2023-07-10 | End: 2023-07-10

## 2023-07-10 RX ORDER — HYDRALAZINE HCL 50 MG
25 TABLET ORAL THREE TIMES A DAY
Refills: 0 | Status: DISCONTINUED | OUTPATIENT
Start: 2023-07-10 | End: 2023-07-14

## 2023-07-10 RX ORDER — HYDRALAZINE HCL 50 MG
25 TABLET ORAL ONCE
Refills: 0 | Status: COMPLETED | OUTPATIENT
Start: 2023-07-10 | End: 2023-07-10

## 2023-07-10 RX ADMIN — ATORVASTATIN CALCIUM 40 MILLIGRAM(S): 80 TABLET, FILM COATED ORAL at 21:04

## 2023-07-10 RX ADMIN — GABAPENTIN 100 MILLIGRAM(S): 400 CAPSULE ORAL at 17:23

## 2023-07-10 RX ADMIN — Medication 600 MILLIGRAM(S): at 21:51

## 2023-07-10 RX ADMIN — PANTOPRAZOLE SODIUM 40 MILLIGRAM(S): 20 TABLET, DELAYED RELEASE ORAL at 06:27

## 2023-07-10 RX ADMIN — Medication 650 MILLIGRAM(S): at 21:49

## 2023-07-10 RX ADMIN — AMLODIPINE BESYLATE 5 MILLIGRAM(S): 2.5 TABLET ORAL at 06:27

## 2023-07-10 RX ADMIN — APIXABAN 2.5 MILLIGRAM(S): 2.5 TABLET, FILM COATED ORAL at 06:26

## 2023-07-10 RX ADMIN — APIXABAN 2.5 MILLIGRAM(S): 2.5 TABLET, FILM COATED ORAL at 17:23

## 2023-07-10 RX ADMIN — Medication 25 MILLIGRAM(S): at 17:23

## 2023-07-10 RX ADMIN — POLYETHYLENE GLYCOL 3350 17 GRAM(S): 17 POWDER, FOR SOLUTION ORAL at 10:52

## 2023-07-10 RX ADMIN — Medication 81 MILLIGRAM(S): at 10:51

## 2023-07-10 RX ADMIN — SEVELAMER CARBONATE 1600 MILLIGRAM(S): 2400 POWDER, FOR SUSPENSION ORAL at 09:35

## 2023-07-10 RX ADMIN — Medication 25 MILLIGRAM(S): at 03:52

## 2023-07-10 RX ADMIN — GABAPENTIN 100 MILLIGRAM(S): 400 CAPSULE ORAL at 21:04

## 2023-07-10 RX ADMIN — ISOSORBIDE MONONITRATE 30 MILLIGRAM(S): 60 TABLET, EXTENDED RELEASE ORAL at 10:51

## 2023-07-10 RX ADMIN — GABAPENTIN 100 MILLIGRAM(S): 400 CAPSULE ORAL at 06:27

## 2023-07-10 RX ADMIN — ISOSORBIDE MONONITRATE 30 MILLIGRAM(S): 60 TABLET, EXTENDED RELEASE ORAL at 00:05

## 2023-07-10 RX ADMIN — Medication 1 SPRAY(S): at 17:22

## 2023-07-10 RX ADMIN — Medication 25 MILLIGRAM(S): at 21:04

## 2023-07-10 RX ADMIN — Medication 1 SPRAY(S): at 06:35

## 2023-07-10 RX ADMIN — Medication 650 MILLIGRAM(S): at 21:03

## 2023-07-10 NOTE — PROGRESS NOTE ADULT - ASSESSMENT
72-year-old male presents to the ED complaining of exacerbation of chronic back pain and Left sciatica.  Pt with past medical history of ESRD on HD (M/W/F), CAD s/p 3 stents, A-fib on Eliquis, HTN and chronic back pain presents  complaining of worsening  back pain/ bradycardia         ESRD HD MWF   HD today 3h opti 160 UF 2l as tolerated   Phos elevated needs renal diet / add phoslo one tab po qac   fluid restrict 1 L   2 D echo repeat noted no pericardial effusion   Bradycardia off clonidine  / keep on tele / for HTN increase amlodipine to 10 mg q24h   Back pain and Left sciatica - CT LS showed severe spinal stenosis L4-5 worst [place]       will follow

## 2023-07-10 NOTE — PROGRESS NOTE ADULT - SUBJECTIVE AND OBJECTIVE BOX
MAMTA FERNANDO 72y Male  MRN#: [id number]        Hospital Day: 2d  HPI:  72 year old male past medical history ESRD on dialysis (MWF), CAD s/p 3 stents, A-fib on Eliquis, hypertension, chronic back pain recently discharged from [facility] earlier today after work-up for chronic back pain.  Per EMS, patient blood pressure was elevated at time of discharge the patient received his clonidine early.  When patient arrived at [facility], patient noted to be bradycardic with heart rate 29 so patient was sent back to ED for further evaluation.  On ED arrival, patient heart rate initially read as 30s on the monitor however EKG revealing heart rate 57 sinus bradycardia with first-degree AV block.  Blood pressure stable at 160/60.  Patient denies chest pain, shortness of breath, dizziness, lightheadedness, dizziness, weakness.    In the ED, HR 57, /52. Labs showing K 5.4, phosphorus 5.9, trops 0.08, BNP ~29K. EKG showing sinus armando and bigeminy.    Overnight events:  Given PO hydralazine x 1 for elevated BP, asx.     Subjective complaints:  Pt was seen and examined at [facility]. C/o chronic L-sided back pain.                                           ----------------------------------------------------------    PAST MEDICAL & SURGICAL HISTORY  ESRD on dialysis  T/ TH/ S    HTN (hypertension)    HLD (hyperlipidemia)    Heart failure    Anemia    Afib    H/O right coronary artery stent placement  stent x3    S/P arteriovenous (AV) fistula creation    S/P cataract surgery                                              -----------------------------------------------------------  ALLERGIES:  No Known Allergies                                            ------------------------------------------------------------    HOME MEDICATIONS  Home Medications:  apixaban 5 mg oral tablet: 0.5 tab(s) orally 2 times a day (05 Jul 2023 12:55)  aspirin 81 mg oral tablet, chewable: 1 tab(s) orally once a day (05 Jul 2023 12:59)  atorvastatin 40 mg oral tablet: 1 orally once a day (at bedtime) (05 Jul 2023 12:53)  cloNIDine 0.1 mg oral tablet: 1 tablet orally once a day At 5PM, Hold for  or less (08 Jul 2023 13:17)  gabapentin 100 mg oral capsule: 1 cap(s) orally every 8 hours (08 Jul 2023 13:02)  Reglan 5 mg oral tablet: 1 orally 4 times a day (05 Jul 2023 13:13)  Renagel 800 mg oral tablet: 2 tab(s) orally once a day (08 Jul 2023 13:17)  Toprol-XL 25 mg oral tablet, extended release: 1 tablet, extended release orally once a day (at bedtime) HOLD FOR HR LESS THAN 60 (08 Jul 2023 13:32)                           MEDICATIONS:  STANDING MEDICATIONS  apixaban 2.5 milliGRAM(s) Oral two times a day  aspirin  chewable 81 milliGRAM(s) Oral daily  atorvastatin 40 milliGRAM(s) Oral at bedtime  fluticasone propionate 50 MICROgram(s)/spray Nasal Spray 1 Spray(s) Both Nostrils two times a day  gabapentin 100 milliGRAM(s) Oral every 8 hours  hydrALAZINE 25 milliGRAM(s) Oral three times a day  isosorbide   mononitrate ER Tablet (IMDUR) 30 milliGRAM(s) Oral daily  pantoprazole    Tablet 40 milliGRAM(s) Oral before breakfast  polyethylene glycol 3350 17 Gram(s) Oral daily  senna 2 Tablet(s) Oral at bedtime  sevelamer carbonate 1600 milliGRAM(s) Oral <User Schedule>    PRN MEDICATIONS  metoclopramide 5 milliGRAM(s) Oral four times a day PRN                                            ------------------------------------------------------------  VITAL SIGNS: Last 24 Hours  T(C): 38.1 (10 Jul 2023 20:41), Max: 38.1 (10 Jul 2023 20:41)  T(F): 100.6 (10 Jul 2023 20:41), Max: 100.6 (10 Jul 2023 20:41)  HR: 74 (10 Jul 2023 20:41) (62 - 98)  BP: 137/63 (10 Jul 2023 20:41) (137/63 - 202/85)  BP(mean): 96 (10 Jul 2023 09:37) (96 - 96)  RR: 18 (10 Jul 2023 20:41) (17 - 18)  SpO2: 97% (10 Jul 2023 05:03) (97% - 98%)      07-09-23 @ 07:01  -  07-10-23 @ 07:00  --------------------------------------------------------  IN: 60 mL / OUT: 100 mL / NET: -40 mL    07-10-23 @ 07:01  -  07-10-23 @ 22:49  --------------------------------------------------------  IN: 472 mL / OUT: 2100 mL / NET: -1628 mL                                             --------------------------------------------------------------  LABS:                        12.1   11.32 )-----------( 202      ( 10 Jul 2023 05:37 )             36.8     07-10    138  |  96<L>  |  72<HH>  ----------------------------<  109<H>  5.6<H>   |  20  |  8.4<HH>    Ca    8.5      10 Jul 2023 05:37  Phos  7.7     07-10  Mg     2.0     07-10    TPro  6.0  /  Alb  3.7  /  TBili  0.5  /  DBili  x   /  AST  10  /  ALT  6   /  AlkPhos  101  07-10      Urinalysis Basic - ( 10 Jul 2023 05:37 )    Color: x / Appearance: x / SG: x / pH: x  Gluc: 109 mg/dL / Ketone: x  / Bili: x / Urobili: x   Blood: x / Protein: x / Nitrite: x   Leuk Esterase: x / RBC: x / WBC x   Sq Epi: x / Non Sq Epi: x / Bacteria: x                CARDIAC MARKERS ( 09 Jul 2023 07:19 )  x     / 0.08 ng/mL / x     / x     / x      CARDIAC MARKERS ( 09 Jul 2023 00:14 )  x     / 0.07 ng/mL / x     / x     / x                                                    --------------------------------------------------------------    PHYSICAL EXAM:  General: NAD. AAOx1-2; following commands and awake, alert  HEENT: NCAT  LUNGS: CTAB  HEART: RRR.   ABDOMEN: Nontender, nondistended.   EXT: Nonedematous.

## 2023-07-10 NOTE — PROGRESS NOTE ADULT - ASSESSMENT
71 y/o M w/ PMHx of ESRD on dialysis (MWF), CAD s/p 3 stents, A-fib on Eliquis, HTN, chronic back pain recently discharged from [facility] earlier today after work-up for chronic back pain who presented for asymptomatic bradycardia. Admitted to medicine for bradycardia 2/2 medication     #Asymptomatic bradycardia 2/2 medication, resolved  #1st degree AV block  - Found to be armando to 29 @ NH  - EKG in ED showed sinus armando with bigeminy, no prior hx of AV block  - Trop stable 0.08>>0.07>>0.08  - ECHO (7/9/23): LVEF 56%, GIIDD, mild MR, mild TR  - On Toprol XL and clonidine @ NH-held per cardio recs  - HR improved to ~ 60s since meds held    #HTN  - Uncontrolled; SBP ~ 180-200 over past 24 hours  - Holding home clonidine  - Holding home Toprol XL  - Avoid AVN blocking agents (such as BB and non-DHP CCB)  - Started on amlodipine 5 mg qd >> d/c'd 2/2 dizziness per pt on 7/10/23  - C/w imdur 30 mg daily  - Started on hydralazine 25 mg TID (started on 7/10/23)    #ESRD  - On HD M/W/F  - Nephro on board  - C/w sevelamer 1600 mg qd  - Added phoslo 667 mg qac per nephro recs      #Chronic AFib  - C/w home Eliquis 2.5mg PO BID  - Hold BB 2/2 bradycardia    #CAD s/p 3 stents  - C/w home ASA 81mg PO QD  - C/w home atorvastatin 40mg PO QHS  - Hold BB 2/2 bradycardia    #Chronic back pain  - 2/2 spinal stenosis seen on CT LS   - C/w home gabapentin 100 mg daily     #MISC  DVT ppx: Eliquis  GI ppx: Protonix  Diet: Renal  Full code  Dispo: Home pending BP control

## 2023-07-10 NOTE — PROGRESS NOTE ADULT - SUBJECTIVE AND OBJECTIVE BOX
Progress note    INTERVAL HPI/OVERNIGHT EVENTS:    Patient seen and examined at bedside. He states he feels slightly dizzy, denies the room spinning. He states this typically happens when he takes amlodipine. No nausea or vomiting.      REVIEW OF SYSTEMS:  All other 13 Review of systems were reviewed and are negative    FAMILY HISTORY:  No pertinent family history in first degree relatives      T(C): 37.3 (07-10-23 @ 03:02), Max: 37.3 (07-09-23 @ 22:00)  HR: 98 (07-10-23 @ 15:50) (60 - 98)  BP: 146/66 (07-10-23 @ 15:50) (138/57 - 202/85)  RR: 17 (07-10-23 @ 15:50) (17 - 18)  SpO2: 97% (07-10-23 @ 05:03) (97% - 98%)  Wt(kg): --Vital Signs Last 24 Hrs  T(C): 37.3 (10 Jul 2023 03:02), Max: 37.3 (09 Jul 2023 22:00)  T(F): 99.1 (10 Jul 2023 03:02), Max: 99.1 (09 Jul 2023 22:00)  HR: 98 (10 Jul 2023 15:50) (60 - 98)  BP: 146/66 (10 Jul 2023 15:50) (138/57 - 202/85)  BP(mean): 96 (10 Jul 2023 09:37) (96 - 96)  RR: 17 (10 Jul 2023 15:50) (17 - 18)  SpO2: 97% (10 Jul 2023 05:03) (97% - 98%)    Parameters below as of 10 Jul 2023 15:50  Patient On (Oxygen Delivery Method): room air      No Known Allergies      PHYSICAL EXAM:    GEN: Not in acute distress  NECK: no thyroid enlargement, no JVD  LUNGS: Clear to auscultation bilaterally   CARDIOVASCULAR: bradycardia, S1/S2 present, no murmurs or rubs, no carotid bruits  ABD: Soft, non-tender, non-distended, +BS  NEURO: patient is awake , alert and oriented  EXT: No CLEMENTE  SKIN: Intact    Consultant(s) Notes Reviewed:  [x ] YES  [ ] NO  Care Discussed with Consultants/Other Providers [ x] YES  [ ] NO    LABS:      RADIOLOGY & ADDITIONAL TESTS:    Imaging Personally Reviewed:  [ ] YES  [ ] NO  apixaban 2.5 milliGRAM(s) Oral two times a day  aspirin  chewable 81 milliGRAM(s) Oral daily  atorvastatin 40 milliGRAM(s) Oral at bedtime  fluticasone propionate 50 MICROgram(s)/spray Nasal Spray 1 Spray(s) Both Nostrils two times a day  gabapentin 100 milliGRAM(s) Oral every 8 hours  hydrALAZINE 25 milliGRAM(s) Oral three times a day  isosorbide   mononitrate ER Tablet (IMDUR) 30 milliGRAM(s) Oral daily  metoclopramide 5 milliGRAM(s) Oral four times a day PRN  pantoprazole    Tablet 40 milliGRAM(s) Oral before breakfast  polyethylene glycol 3350 17 Gram(s) Oral daily  senna 2 Tablet(s) Oral at bedtime  sevelamer carbonate 1600 milliGRAM(s) Oral <User Schedule>      HEALTH ISSUES - PROBLEM Dx:      #Asymptomatic bradycardia  #HTN  #troponinemia  -Possibly medication adverse effect. Hold AV sri blocking agents  -Troponins 0.07-0.08  - EKG sinus bradycardia w/ 1st degree AV block  - TSH wnl  - Echo LVEF 56% w/ Grade II diastolic dysfunction, Aortic valve thickening with mildly decreased leaflet opening. TALHA 2.1 cm^2  -C/w Imdur, dc amlodipine (previously on this but stopped d/t dizziness), dc clonidine, Start low dose hydralazine  -Consider EP consult if no improvement in am    #ESRD on HD (MWF)  #Hyperkalemia  - Renal consult for in-pt HD.   - Lokelma PRN    Hx of CAD s/p 3 stents, chronic Afib (Apixaban), HTN, and CBP. Restart home meds, except as stated above. DVT PPX. Inform PCP of pt's admission to hospital. My note supersedes the residents note.     Macrocytic anemia, above baseline. Send B12/Folate levels. Replace PRN.     Dispo: monitor blood pressure and HTN. prepare for dc in am pending am vitals

## 2023-07-10 NOTE — PROGRESS NOTE ADULT - SUBJECTIVE AND OBJECTIVE BOX
Nephrology progress note    THIS IS AN INCOMPLETE NOTE . FULL NOTE TO FOLLOW SHORTLY    Patient is seen and examined, events over the last 24 h noted .    Allergies:  No Known Allergies    Hospital Medications:   MEDICATIONS  (STANDING):  amLODIPine   Tablet 5 milliGRAM(s) Oral daily  apixaban 2.5 milliGRAM(s) Oral two times a day  aspirin  chewable 81 milliGRAM(s) Oral daily  atorvastatin 40 milliGRAM(s) Oral at bedtime  fluticasone propionate 50 MICROgram(s)/spray Nasal Spray 1 Spray(s) Both Nostrils two times a day  gabapentin 100 milliGRAM(s) Oral every 8 hours  isosorbide   mononitrate ER Tablet (IMDUR) 30 milliGRAM(s) Oral daily  pantoprazole    Tablet 40 milliGRAM(s) Oral before breakfast  polyethylene glycol 3350 17 Gram(s) Oral daily  senna 2 Tablet(s) Oral at bedtime  sevelamer carbonate 1600 milliGRAM(s) Oral <User Schedule>        VITALS:  T(F): 99.1 (07-10-23 @ 03:02), Max: 99.1 (07-09-23 @ 22:00)  HR: 62 (07-10-23 @ 05:03)  BP: 184/77 (07-10-23 @ 05:03)  RR: 18 (07-10-23 @ 03:02)  SpO2: 97% (07-10-23 @ 05:03)  Wt(kg): --    07-09 @ 07:01  -  07-10 @ 07:00  --------------------------------------------------------  IN: 60 mL / OUT: 100 mL / NET: -40 mL          PHYSICAL EXAM:  Constitutional: NAD  HEENT: anicteric sclera, oropharynx clear, MMM  Neck: No JVD  Respiratory: CTAB, no wheezes, rales or rhonchi  Cardiovascular: S1, S2, RRR  Gastrointestinal: BS+, soft, NT/ND  Extremities: No cyanosis or clubbing. No peripheral edema  :  No elder.   Skin: No rashes    LABS:  07-10    138  |  96<L>  |  72<HH>  ----------------------------<  109<H>  5.6<H>   |  20  |  8.4<HH>    Ca    8.5      10 Jul 2023 05:37  Phos  7.7     07-10  Mg     2.0     07-10    TPro  6.0  /  Alb  3.7  /  TBili  0.5  /  DBili      /  AST  10  /  ALT  6   /  AlkPhos  101  07-10                          12.1   11.32 )-----------( 202      ( 10 Jul 2023 05:37 )             36.8       Urine Studies:  Urinalysis Basic - ( 10 Jul 2023 05:37 )    Color:  / Appearance:  / SG:  / pH:   Gluc: 109 mg/dL / Ketone:   / Bili:  / Urobili:    Blood:  / Protein:  / Nitrite:    Leuk Esterase:  / RBC:  / WBC    Sq Epi:  / Non Sq Epi:  / Bacteria:           HbA1c 5.6      [09-18-19 @ 07:12]    HBsAb Nonreact      [09-21-19 @ 20:52]  HBsAg Nonreact      [09-21-19 @ 20:52]  HBcAb Reactive      [09-21-19 @ 20:52]  HCV 0.13, Nonreact      [09-21-19 @ 20:52]        RADIOLOGY & ADDITIONAL STUDIES:   Nephrology progress note    Patient is seen and examined, events over the last 24 h noted .  Lying in bed   no chest pain no SOB     Allergies:  No Known Allergies    Hospital Medications:   MEDICATIONS  (STANDING):  amLODIPine   Tablet 5 milliGRAM(s) Oral daily  apixaban 2.5 milliGRAM(s) Oral two times a day  aspirin  chewable 81 milliGRAM(s) Oral daily  atorvastatin 40 milliGRAM(s) Oral at bedtime  fluticasone propionate 50 MICROgram(s)/spray Nasal Spray 1 Spray(s) Both Nostrils two times a day  gabapentin 100 milliGRAM(s) Oral every 8 hours  isosorbide   mononitrate ER Tablet (IMDUR) 30 milliGRAM(s) Oral daily  pantoprazole    Tablet 40 milliGRAM(s) Oral before breakfast  polyethylene glycol 3350 17 Gram(s) Oral daily  senna 2 Tablet(s) Oral at bedtime  sevelamer carbonate 1600 milliGRAM(s) Oral <User Schedule>        VITALS:  T(F): 99.1 (07-10-23 @ 03:02), Max: 99.1 (07-09-23 @ 22:00)  HR: 62 (07-10-23 @ 05:03)  BP: 184/77 (07-10-23 @ 05:03)  RR: 18 (07-10-23 @ 03:02)  SpO2: 97% (07-10-23 @ 05:03)      07-09 @ 07:01  -  07-10 @ 07:00  --------------------------------------------------------  IN: 60 mL / OUT: 100 mL / NET: -40 mL          PHYSICAL EXAM:  Constitutional: NAD  HEENT: anicteric sclera, oropharynx clear, MMM  Neck: No JVD  Respiratory: CTAB, no wheezes, rales or rhonchi  Cardiovascular: S1, S2, RRR  Gastrointestinal: BS+, soft, NT/ND  Extremities: No cyanosis or clubbing. No peripheral edema  :  No elder.   Skin: No rashes    LABS:  07-10    138  |  96<L>  |  72<HH>  ----------------------------<  109<H>  5.6<H>   |  20  |  8.4<HH>    Ca    8.5      10 Jul 2023 05:37  Phos  7.7     07-10  Mg     2.0     07-10    TPro  6.0  /  Alb  3.7  /  TBili  0.5  /  DBili      /  AST  10  /  ALT  6   /  AlkPhos  101  07-10                          12.1   11.32 )-----------( 202      ( 10 Jul 2023 05:37 )             36.8       Urine Studies:  Urinalysis Basic - ( 10 Jul 2023 05:37 )    Color:  / Appearance:  / SG:  / pH:   Gluc: 109 mg/dL / Ketone:   / Bili:  / Urobili:    Blood:  / Protein:  / Nitrite:    Leuk Esterase:  / RBC:  / WBC    Sq Epi:  / Non Sq Epi:  / Bacteria:           HbA1c 5.6      [09-18-19 @ 07:12]    HBsAb Nonreact      [09-21-19 @ 20:52]  HBsAg Nonreact      [09-21-19 @ 20:52]  HBcAb Reactive      [09-21-19 @ 20:52]  HCV 0.13, Nonreact      [09-21-19 @ 20:52]        RADIOLOGY & ADDITIONAL STUDIES:  < from: TTE Echo Complete w/o Contrast w/ Doppler (07.09.23 @ 08:22) >  Left Ventricle: The left ventricular internal cavity size is normal. Left   ventricular wall thickness is normal. Global LV systolic function was   normal. Normal segmental left ventricular systolic function. Spectral   Doppler shows pseudonormal pattern of left ventricular myocardial filling   (Grade II diastolic dysfunction).  Right Ventricle: Normal right ventricular size and function.  Left Atrium: Mildly enlarged left atrium.  Right Atrium: Normal right atrial size.  Pericardium: There is no evidence of pericardial effusion.  Mitral Valve: Mild thickening of the anterior and posterior mitral valve   leaflets. Mild mitral valve regurgitation is seen.  Tricuspid Valve: The tricuspid valve is normal in structure. Mild   tricuspid regurgitation is visualized.  Aortic Valve: The aortic valve is trileaflet. Aortic valve thickening   with decreased leaflet opening. No evidence of aortic valve regurgitation   is seen.  Pulmonic Valve: Structurally normal pulmonic valve, with normal leaflet   excursion. No indication of pulmonic valve regurgitation.  Aorta: The aortic root and ascending aorta are structurally normal, with   no evidence of dilitation.  Pulmonary Artery: The pulmonary artery is of normal size and origin.        < end of copied text >

## 2023-07-11 ENCOUNTER — APPOINTMENT (OUTPATIENT)
Dept: CARDIOLOGY | Facility: CLINIC | Age: 73
End: 2023-07-11

## 2023-07-11 LAB
ANION GAP SERPL CALC-SCNC: 17 MMOL/L — HIGH (ref 7–14)
BASOPHILS # BLD AUTO: 0.04 K/UL — SIGNIFICANT CHANGE UP (ref 0–0.2)
BASOPHILS NFR BLD AUTO: 0.4 % — SIGNIFICANT CHANGE UP (ref 0–1)
BUN SERPL-MCNC: 47 MG/DL — HIGH (ref 10–20)
CALCIUM SERPL-MCNC: 8.7 MG/DL — SIGNIFICANT CHANGE UP (ref 8.4–10.4)
CHLORIDE SERPL-SCNC: 96 MMOL/L — LOW (ref 98–110)
CO2 SERPL-SCNC: 24 MMOL/L — SIGNIFICANT CHANGE UP (ref 17–32)
CREAT SERPL-MCNC: 6.1 MG/DL — CRITICAL HIGH (ref 0.7–1.5)
EGFR: 9 ML/MIN/1.73M2 — LOW
EOSINOPHIL # BLD AUTO: 0.17 K/UL — SIGNIFICANT CHANGE UP (ref 0–0.7)
EOSINOPHIL NFR BLD AUTO: 1.8 % — SIGNIFICANT CHANGE UP (ref 0–8)
GLUCOSE SERPL-MCNC: 82 MG/DL — SIGNIFICANT CHANGE UP (ref 70–99)
HCT VFR BLD CALC: 36.5 % — LOW (ref 42–52)
HGB BLD-MCNC: 11.9 G/DL — LOW (ref 14–18)
IMM GRANULOCYTES NFR BLD AUTO: 0.5 % — HIGH (ref 0.1–0.3)
LYMPHOCYTES # BLD AUTO: 0.68 K/UL — LOW (ref 1.2–3.4)
LYMPHOCYTES # BLD AUTO: 7.2 % — LOW (ref 20.5–51.1)
MAGNESIUM SERPL-MCNC: 1.8 MG/DL — SIGNIFICANT CHANGE UP (ref 1.8–2.4)
MCHC RBC-ENTMCNC: 31.4 PG — HIGH (ref 27–31)
MCHC RBC-ENTMCNC: 32.6 G/DL — SIGNIFICANT CHANGE UP (ref 32–37)
MCV RBC AUTO: 96.3 FL — HIGH (ref 80–94)
MONOCYTES # BLD AUTO: 1.3 K/UL — HIGH (ref 0.1–0.6)
MONOCYTES NFR BLD AUTO: 13.8 % — HIGH (ref 1.7–9.3)
NEUTROPHILS # BLD AUTO: 7.19 K/UL — HIGH (ref 1.4–6.5)
NEUTROPHILS NFR BLD AUTO: 76.3 % — HIGH (ref 42.2–75.2)
NRBC # BLD: 0 /100 WBCS — SIGNIFICANT CHANGE UP (ref 0–0)
PLATELET # BLD AUTO: 174 K/UL — SIGNIFICANT CHANGE UP (ref 130–400)
PMV BLD: 10.8 FL — HIGH (ref 7.4–10.4)
POTASSIUM SERPL-MCNC: 4.6 MMOL/L — SIGNIFICANT CHANGE UP (ref 3.5–5)
POTASSIUM SERPL-SCNC: 4.6 MMOL/L — SIGNIFICANT CHANGE UP (ref 3.5–5)
RBC # BLD: 3.79 M/UL — LOW (ref 4.7–6.1)
RBC # FLD: 16.8 % — HIGH (ref 11.5–14.5)
SODIUM SERPL-SCNC: 137 MMOL/L — SIGNIFICANT CHANGE UP (ref 135–146)
WBC # BLD: 9.43 K/UL — SIGNIFICANT CHANGE UP (ref 4.8–10.8)
WBC # FLD AUTO: 9.43 K/UL — SIGNIFICANT CHANGE UP (ref 4.8–10.8)

## 2023-07-11 PROCEDURE — 93010 ELECTROCARDIOGRAM REPORT: CPT

## 2023-07-11 PROCEDURE — 99233 SBSQ HOSP IP/OBS HIGH 50: CPT

## 2023-07-11 PROCEDURE — 71045 X-RAY EXAM CHEST 1 VIEW: CPT | Mod: 26

## 2023-07-11 RX ORDER — CEFEPIME 1 G/1
1000 INJECTION, POWDER, FOR SOLUTION INTRAMUSCULAR; INTRAVENOUS EVERY 12 HOURS
Refills: 0 | Status: DISCONTINUED | OUTPATIENT
Start: 2023-07-11 | End: 2023-07-12

## 2023-07-11 RX ORDER — AZITHROMYCIN 500 MG/1
500 TABLET, FILM COATED ORAL EVERY 24 HOURS
Refills: 0 | Status: DISCONTINUED | OUTPATIENT
Start: 2023-07-11 | End: 2023-07-15

## 2023-07-11 RX ORDER — CEFEPIME 1 G/1
INJECTION, POWDER, FOR SOLUTION INTRAMUSCULAR; INTRAVENOUS
Refills: 0 | Status: DISCONTINUED | OUTPATIENT
Start: 2023-07-11 | End: 2023-07-11

## 2023-07-11 RX ADMIN — Medication 81 MILLIGRAM(S): at 11:57

## 2023-07-11 RX ADMIN — PANTOPRAZOLE SODIUM 40 MILLIGRAM(S): 20 TABLET, DELAYED RELEASE ORAL at 05:24

## 2023-07-11 RX ADMIN — Medication 1 SPRAY(S): at 18:03

## 2023-07-11 RX ADMIN — GABAPENTIN 100 MILLIGRAM(S): 400 CAPSULE ORAL at 13:25

## 2023-07-11 RX ADMIN — GABAPENTIN 100 MILLIGRAM(S): 400 CAPSULE ORAL at 05:24

## 2023-07-11 RX ADMIN — APIXABAN 2.5 MILLIGRAM(S): 2.5 TABLET, FILM COATED ORAL at 18:03

## 2023-07-11 RX ADMIN — CEFEPIME 100 MILLIGRAM(S): 1 INJECTION, POWDER, FOR SOLUTION INTRAMUSCULAR; INTRAVENOUS at 18:03

## 2023-07-11 RX ADMIN — Medication 25 MILLIGRAM(S): at 05:24

## 2023-07-11 RX ADMIN — ATORVASTATIN CALCIUM 40 MILLIGRAM(S): 80 TABLET, FILM COATED ORAL at 21:05

## 2023-07-11 RX ADMIN — AZITHROMYCIN 255 MILLIGRAM(S): 500 TABLET, FILM COATED ORAL at 20:48

## 2023-07-11 RX ADMIN — Medication 1 SPRAY(S): at 05:25

## 2023-07-11 RX ADMIN — POLYETHYLENE GLYCOL 3350 17 GRAM(S): 17 POWDER, FOR SOLUTION ORAL at 11:57

## 2023-07-11 RX ADMIN — GABAPENTIN 100 MILLIGRAM(S): 400 CAPSULE ORAL at 21:05

## 2023-07-11 RX ADMIN — Medication 25 MILLIGRAM(S): at 20:48

## 2023-07-11 RX ADMIN — Medication 25 MILLIGRAM(S): at 13:25

## 2023-07-11 RX ADMIN — ISOSORBIDE MONONITRATE 30 MILLIGRAM(S): 60 TABLET, EXTENDED RELEASE ORAL at 11:56

## 2023-07-11 RX ADMIN — APIXABAN 2.5 MILLIGRAM(S): 2.5 TABLET, FILM COATED ORAL at 05:24

## 2023-07-11 NOTE — PROGRESS NOTE ADULT - SUBJECTIVE AND OBJECTIVE BOX
Nephrology progress note    Patient was seen and examined, events over the last 24 h noted .    HD yesterday    Allergies:  No Known Allergies    Hospital Medications:   MEDICATIONS  (STANDING):  apixaban 2.5 milliGRAM(s) Oral two times a day  aspirin  chewable 81 milliGRAM(s) Oral daily  atorvastatin 40 milliGRAM(s) Oral at bedtime  azithromycin  IVPB 500 milliGRAM(s) IV Intermittent every 24 hours  cefepime   IVPB 1000 milliGRAM(s) IV Intermittent every 12 hours  fluticasone propionate 50 MICROgram(s)/spray Nasal Spray 1 Spray(s) Both Nostrils two times a day  gabapentin 100 milliGRAM(s) Oral every 8 hours  hydrALAZINE 25 milliGRAM(s) Oral three times a day  isosorbide   mononitrate ER Tablet (IMDUR) 30 milliGRAM(s) Oral daily  pantoprazole    Tablet 40 milliGRAM(s) Oral before breakfast  polyethylene glycol 3350 17 Gram(s) Oral daily  senna 2 Tablet(s) Oral at bedtime  sevelamer carbonate 1600 milliGRAM(s) Oral <User Schedule>        VITALS:  T(F): 99.4 (07-11-23 @ 13:30), Max: 100.6 (07-10-23 @ 20:41)  HR: 96 (07-11-23 @ 13:30)  BP: 142/65 (07-11-23 @ 13:30)  RR: 18 (07-11-23 @ 13:30)  SpO2: --  Wt(kg): --    07-09 @ 07:01  -  07-10 @ 07:00  --------------------------------------------------------  IN: 60 mL / OUT: 100 mL / NET: -40 mL    07-10 @ 07:01  -  07-11 @ 07:00  --------------------------------------------------------  IN: 472 mL / OUT: 2100 mL / NET: -1628 mL    07-11 @ 07:01  -  07-11 @ 20:00  --------------------------------------------------------  IN: 561 mL / OUT: 200 mL / NET: 361 mL          PHYSICAL EXAM:  Constitutional: NAD  HEENT: anicteric sclera, oropharynx clear, MMM  Neck: No JVD  Respiratory: CTAB, no wheezes, rales or rhonchi  Cardiovascular: S1, S2, RRR  Gastrointestinal: BS+, soft, NT/ND  Extremities: No cyanosis or clubbing. No peripheral edema  :  No elder.   Skin: No rashes    LABS:  07-11    137  |  96<L>  |  47<H>  ----------------------------<  82  4.6   |  24  |  6.1<HH>    Ca    8.7      11 Jul 2023 05:41  Phos  7.7     07-10  Mg     1.8     07-11    TPro  6.0  /  Alb  3.7  /  TBili  0.5  /  DBili      /  AST  10  /  ALT  6   /  AlkPhos  101  07-10                          11.9   9.43  )-----------( 174      ( 11 Jul 2023 05:41 )             36.5       Urine Studies:  Urinalysis Basic - ( 11 Jul 2023 05:41 )    Color:  / Appearance:  / SG:  / pH:   Gluc: 82 mg/dL / Ketone:   / Bili:  / Urobili:    Blood:  / Protein:  / Nitrite:    Leuk Esterase:  / RBC:  / WBC    Sq Epi:  / Non Sq Epi:  / Bacteria:         RADIOLOGY & ADDITIONAL STUDIES:

## 2023-07-11 NOTE — PROGRESS NOTE ADULT - ASSESSMENT
71 y/o M w/ PMHx of ESRD on dialysis (MWF), CAD s/p 3 stents, A-fib on Eliquis, HTN, chronic back pain recently discharged from [facility] earlier today after work-up for chronic back pain who presented for asymptomatic bradycardia. Admitted to medicine for bradycardia 2/2 medication     #Asymptomatic bradycardia 2/2 medication, resolved  #1st degree AV block  - Found to be armando to 29 @ NH  - EKG in ED showed sinus armando with bigeminy, no prior hx of AV block  - Trop stable 0.08>>0.07>>0.08  - ECHO (7/9/23): LVEF 56%, GIIDD, mild MR, mild TR  - On Toprol XL and clonidine @ NH-held per cardio recs  - HR improved to ~ 60s since meds held    #HTN  - Uncontrolled; SBP ~ 180-200 over past 24 hours  - Holding home clonidine  - Holding home Toprol XL  - Avoid AVN blocking agents (such as BB and non-DHP CCB)  - Started on amlodipine 5 mg qd >> d/c'd 2/2 dizziness per pt on 7/10/23  - C/w imdur 30 mg daily  - Started on hydralazine 25 mg TID (started on 7/10/23)    #ESRD  - On HD M/W/F  - Nephro on board  - C/w sevelamer 1600 mg qd  - Added phoslo 667 mg qac per nephro recs      #Chronic AFib  - C/w home Eliquis 2.5mg PO BID  - Hold BB 2/2 bradycardia    #CAD s/p 3 stents  - C/w home ASA 81mg PO QD  - C/w home atorvastatin 40mg PO QHS  - Hold BB 2/2 bradycardia    #Chronic back pain  - 2/2 spinal stenosis seen on CT LS   - C/w home gabapentin 100 mg daily     #MISC  DVT ppx: Eliquis  GI ppx: Protonix  Diet: Renal  Full code  Dispo: Home pending BP control 71 y/o M w/ PMHx of ESRD on dialysis (MWF), CAD s/p 3 stents, A-fib on Eliquis, HTN, chronic back pain recently discharged from [facility] earlier today after work-up for chronic back pain who presented for asymptomatic bradycardia. Admitted to medicine for bradycardia 2/2 medication     #Asymptomatic bradycardia 2/2 medication, resolved  #1st degree AV block  - Found to be armando to 29 @ NH  - EKG in ED showed sinus armando with bigeminy, no prior hx of AV block  - Trop stable 0.08>>0.07>>0.08  - ECHO (7/9/23): LVEF 56%, GIIDD, mild MR, mild TR  - On Toprol XL and clonidine @ NH-held per cardio recs  - HR improved to ~ 60s since meds held    #HTN  - Uncontrolled; SBP ~ 180-200 on 7/9-7/10; significantly improved since starting hydralazine  - Holding home clonidine  - Holding home Toprol XL  - Avoid AVN blocking agents (such as BB and non-DHP CCB)  - Started on amlodipine 5 mg qd >> d/c'd 2/2 dizziness per pt on 7/10/23  - C/w imdur 30 mg daily  - C/w hydralazine 25 mg TID (started on 7/10/23)    #ESRD  - On HD M/W/F  - Nephro on board  - C/w sevelamer 1600 mg qd  - C/w phoslo 667 mg qac per nephro recs    #Chronic AFib  - C/w home Eliquis 2.5mg PO BID  - Hold BB 2/2 bradycardia    #CAD s/p 3 stents  - C/w home ASA 81mg PO QD  - C/w home atorvastatin 40mg PO QHS  - Hold BB 2/2 bradycardia    #Chronic back pain  - 2/2 spinal stenosis seen on CT LS   - C/w home gabapentin 100 mg daily     #MISC  DVT ppx: Eliquis  GI ppx: Protonix  Diet: Renal  Full code  Dispo: Home pending BP control 73 y/o M w/ PMHx of ESRD on dialysis (MWF), CAD s/p 3 stents, A-fib on Eliquis, HTN, chronic back pain recently discharged from [facility] earlier today after work-up for chronic back pain who presented for asymptomatic bradycardia. Admitted to medicine for bradycardia 2/2 medication     #Productive Cough  #?CAP v. HAP  - C/o productive cough w/ brownish sputum x 2 days; recent hospitalization  - Febrile to 100.6 overnight on 7/10/23  - Started on IV Cefepime and azithromycin for possible CAP  - CXR unremarkable  - F/u SCx  - F/u Procal  - F/u BCx  - F/u RVP  - F/u MRSA    #Asymptomatic bradycardia 2/2 medication, resolved  #1st degree AV block  - Found to be armando to 29 @ NH  - EKG in ED showed sinus armando with bigeminy, no prior hx of AV block  - Trop stable 0.08>>0.07>>0.08  - ECHO (7/9/23): LVEF 56%, GIIDD, mild MR, mild TR  - On Toprol XL and clonidine @ NH-held per cardio recs  - HR improved to ~ 60s since meds held    #HTN  - Uncontrolled; SBP ~ 180-200 on 7/9-7/10; significantly improved since starting hydralazine  - Holding home clonidine  - Holding home Toprol XL  - Avoid AVN blocking agents (such as BB and non-DHP CCB)  - Started on amlodipine 5 mg qd >> d/c'd 2/2 dizziness per pt on 7/10/23  - C/w imdur 30 mg daily  - C/w hydralazine 25 mg TID (started on 7/10/23)    #ESRD  - On HD M/W/F  - Nephro on board  - C/w sevelamer 1600 mg qd  - C/w phoslo 667 mg qac per nephro recs    #Chronic AFib  - C/w home Eliquis 2.5mg PO BID  - Hold BB 2/2 bradycardia    #CAD s/p 3 stents  - C/w home ASA 81mg PO QD  - C/w home atorvastatin 40mg PO QHS  - Hold BB 2/2 bradycardia    #Chronic back pain  - 2/2 spinal stenosis seen on CT LS   - C/w home gabapentin 100 mg daily     #MISC  DVT ppx: Eliquis  GI ppx: Protonix  Diet: Renal  Full code  Dispo: Home pending BP control

## 2023-07-11 NOTE — PROGRESS NOTE ADULT - ASSESSMENT
72-year-old male presents to the ED complaining of exacerbation of chronic back pain and Left sciatica.  Pt with past medical history of ESRD on HD (M/W/F), CAD s/p 3 stents, A-fib on Eliquis, HTN and chronic back pain presents  complaining of worsening  back pain/ bradycardia         ESRD HD MWF   HD yesterday  Phos elevated needs renal diet /cont sevelamer ad phoslo 1 tab TID w/ meals   fluid restrict 1 L   2 D echo repeat noted no pericardial effusion   Bradycardia off clonidine  / keep on tele / for HTN increase amlodipine to 10 mg q24h   Back pain and Left sciatica - CT LS showed severe spinal stenosis L4-5 worst [place] [facility]       will follow

## 2023-07-11 NOTE — PROGRESS NOTE ADULT - ATTENDING COMMENTS
Patient seen and examined sitting in recliner eating food. He states he does not feel dizzy but has a productive cough since yesterday. He also states he felt feverish yesterday evening as well.    GEN: Not in acute distress  NECK: no thyroid enlargement, no JVD  LUNGS: Clear to auscultation bilaterally   CARDIOVASCULAR: bradycardia, S1/S2 present, no murmurs or rubs, no carotid bruits  ABD: Soft, non-tender, non-distended, +BS  NEURO: patient is awake , alert and oriented  EXT: No CLEMENTE  SKIN: Intact    incomplete note Patient seen and examined sitting in recliner eating food. He states he does not feel dizzy but has a productive cough since yesterday. He also states he felt feverish yesterday evening as well.    GEN: Not in acute distress  NECK: no thyroid enlargement, no JVD  LUNGS: Coarse lung sounds  CARDIOVASCULAR: bradycardia, S1/S2 present, no murmurs or rubs, no carotid bruits  ABD: Soft, non-tender, non-distended, +BS  NEURO: patient is awake , alert and oriented  EXT: No CLEMENTE  SKIN: Intact    Assessment and Plan:    Rule out Community acquired PNA vs HCAP Pneumonia  -productive cough, low grade fever, tachycardia with recent hospitalization but did not receive any abx during that time  -CXR is unremarkable but may be delayed sequela  - RVP pending  - Blood/sputum cultures pending  - Procalcitonin pending  - start cefepime and azithromycin, de-escalate once labs above return    #Asymptomatic bradycardia  #HTN  #troponinemia  -Possibly 2/2 metoprolol XL medication adverse effect. Hold AV sri agents for now  -Troponins 0.07-0.08  - EKG sinus bradycardia w/ 1st degree AV block  - TSH wnl  - Echo LVEF 56% w/ Grade II diastolic dysfunction, Aortic valve thickening with mildly decreased leaflet opening. TALHA 2.1 cm^2  -C/w Imdur, started on hydralazine, dc amlodipine (previously on this but stopped d/t dizziness), dc clonidine  -Consider EP consult if no improvement    #ESRD on HD (MWF)  #Hyperkalemia  - Renal consult for in-pt HD.   - Lokelma PRN    Hx of CAD s/p 3 stents,   chronic Afib (Apixaban) and CBP  -Metoprolol XL on hold due to bradycardia    Macrocytic anemia, above baseline.   Send B12/Folate levels. Replace PRN.      DVT PPX    Dispo: Came in for bradycardia likely 2/2 Metoprolol XL, now improved after discontinuing. While here, patient developed low grade fever, cough. Treating for suspect PNA.

## 2023-07-12 LAB
ALDOST SERPL-MCNC: 6.7 NG/DL — SIGNIFICANT CHANGE UP
ALDOSTERONE / DIRECT RENIN RATIO RESULT: 0.3 RATIO — SIGNIFICANT CHANGE UP
ANION GAP SERPL CALC-SCNC: 20 MMOL/L — HIGH (ref 7–14)
BASOPHILS # BLD AUTO: 0.04 K/UL — SIGNIFICANT CHANGE UP (ref 0–0.2)
BASOPHILS NFR BLD AUTO: 0.4 % — SIGNIFICANT CHANGE UP (ref 0–1)
BUN SERPL-MCNC: 65 MG/DL — CRITICAL HIGH (ref 10–20)
CALCIUM SERPL-MCNC: 8.9 MG/DL — SIGNIFICANT CHANGE UP (ref 8.4–10.5)
CHLORIDE SERPL-SCNC: 95 MMOL/L — LOW (ref 98–110)
CO2 SERPL-SCNC: 21 MMOL/L — SIGNIFICANT CHANGE UP (ref 17–32)
CREAT SERPL-MCNC: 8.1 MG/DL — CRITICAL HIGH (ref 0.7–1.5)
EGFR: 7 ML/MIN/1.73M2 — LOW
EOSINOPHIL # BLD AUTO: 0.29 K/UL — SIGNIFICANT CHANGE UP (ref 0–0.7)
EOSINOPHIL NFR BLD AUTO: 2.9 % — SIGNIFICANT CHANGE UP (ref 0–8)
GLUCOSE SERPL-MCNC: 106 MG/DL — HIGH (ref 70–99)
GRAM STN FLD: SIGNIFICANT CHANGE UP
HCT VFR BLD CALC: 36.6 % — LOW (ref 42–52)
HGB BLD-MCNC: 12 G/DL — LOW (ref 14–18)
IMM GRANULOCYTES NFR BLD AUTO: 0.9 % — HIGH (ref 0.1–0.3)
LYMPHOCYTES # BLD AUTO: 0.72 K/UL — LOW (ref 1.2–3.4)
LYMPHOCYTES # BLD AUTO: 7.2 % — LOW (ref 20.5–51.1)
MAGNESIUM SERPL-MCNC: 2 MG/DL — SIGNIFICANT CHANGE UP (ref 1.8–2.4)
MCHC RBC-ENTMCNC: 31.6 PG — HIGH (ref 27–31)
MCHC RBC-ENTMCNC: 32.8 G/DL — SIGNIFICANT CHANGE UP (ref 32–37)
MCV RBC AUTO: 96.3 FL — HIGH (ref 80–94)
MONOCYTES # BLD AUTO: 1.59 K/UL — HIGH (ref 0.1–0.6)
MONOCYTES NFR BLD AUTO: 15.9 % — HIGH (ref 1.7–9.3)
NEUTROPHILS # BLD AUTO: 7.27 K/UL — HIGH (ref 1.4–6.5)
NEUTROPHILS NFR BLD AUTO: 72.7 % — SIGNIFICANT CHANGE UP (ref 42.2–75.2)
NRBC # BLD: 0 /100 WBCS — SIGNIFICANT CHANGE UP (ref 0–0)
PLATELET # BLD AUTO: 196 K/UL — SIGNIFICANT CHANGE UP (ref 130–400)
PMV BLD: 10.5 FL — HIGH (ref 7.4–10.4)
POTASSIUM SERPL-MCNC: 5.4 MMOL/L — HIGH (ref 3.5–5)
POTASSIUM SERPL-SCNC: 5.4 MMOL/L — HIGH (ref 3.5–5)
PROCALCITONIN SERPL-MCNC: 0.55 NG/ML — HIGH (ref 0.02–0.1)
RAPID RVP RESULT: SIGNIFICANT CHANGE UP
RBC # BLD: 3.8 M/UL — LOW (ref 4.7–6.1)
RBC # FLD: 16.6 % — HIGH (ref 11.5–14.5)
RENIN DIRECT, PLASMA: 21.2 PG/ML — SIGNIFICANT CHANGE UP
SARS-COV-2 RNA SPEC QL NAA+PROBE: SIGNIFICANT CHANGE UP
SODIUM SERPL-SCNC: 136 MMOL/L — SIGNIFICANT CHANGE UP (ref 135–146)
SPECIMEN SOURCE: SIGNIFICANT CHANGE UP
WBC # BLD: 10 K/UL — SIGNIFICANT CHANGE UP (ref 4.8–10.8)
WBC # FLD AUTO: 10 K/UL — SIGNIFICANT CHANGE UP (ref 4.8–10.8)

## 2023-07-12 PROCEDURE — 99232 SBSQ HOSP IP/OBS MODERATE 35: CPT

## 2023-07-12 RX ORDER — METOPROLOL TARTRATE 50 MG
12.5 TABLET ORAL
Refills: 0 | Status: DISCONTINUED | OUTPATIENT
Start: 2023-07-12 | End: 2023-07-15

## 2023-07-12 RX ORDER — CEFEPIME 1 G/1
1000 INJECTION, POWDER, FOR SOLUTION INTRAMUSCULAR; INTRAVENOUS EVERY 24 HOURS
Refills: 0 | Status: DISCONTINUED | OUTPATIENT
Start: 2023-07-13 | End: 2023-07-13

## 2023-07-12 RX ORDER — SODIUM CHLORIDE 9 MG/ML
100 INJECTION INTRAMUSCULAR; INTRAVENOUS; SUBCUTANEOUS
Refills: 0 | Status: DISCONTINUED | OUTPATIENT
Start: 2023-07-12 | End: 2023-07-15

## 2023-07-12 RX ORDER — IPRATROPIUM/ALBUTEROL SULFATE 18-103MCG
3 AEROSOL WITH ADAPTER (GRAM) INHALATION EVERY 4 HOURS
Refills: 0 | Status: DISCONTINUED | OUTPATIENT
Start: 2023-07-12 | End: 2023-07-15

## 2023-07-12 RX ORDER — BUDESONIDE AND FORMOTEROL FUMARATE DIHYDRATE 160; 4.5 UG/1; UG/1
2 AEROSOL RESPIRATORY (INHALATION)
Refills: 0 | Status: DISCONTINUED | OUTPATIENT
Start: 2023-07-12 | End: 2023-07-15

## 2023-07-12 RX ADMIN — ISOSORBIDE MONONITRATE 30 MILLIGRAM(S): 60 TABLET, EXTENDED RELEASE ORAL at 12:15

## 2023-07-12 RX ADMIN — Medication 1 SPRAY(S): at 06:14

## 2023-07-12 RX ADMIN — APIXABAN 2.5 MILLIGRAM(S): 2.5 TABLET, FILM COATED ORAL at 06:13

## 2023-07-12 RX ADMIN — SEVELAMER CARBONATE 1600 MILLIGRAM(S): 2400 POWDER, FOR SUSPENSION ORAL at 06:14

## 2023-07-12 RX ADMIN — Medication 3 MILLILITER(S): at 20:37

## 2023-07-12 RX ADMIN — Medication 81 MILLIGRAM(S): at 12:15

## 2023-07-12 RX ADMIN — Medication 50 MILLIGRAM(S): at 12:15

## 2023-07-12 RX ADMIN — PANTOPRAZOLE SODIUM 40 MILLIGRAM(S): 20 TABLET, DELAYED RELEASE ORAL at 06:18

## 2023-07-12 RX ADMIN — Medication 25 MILLIGRAM(S): at 06:14

## 2023-07-12 RX ADMIN — SENNA PLUS 2 TABLET(S): 8.6 TABLET ORAL at 21:01

## 2023-07-12 RX ADMIN — APIXABAN 2.5 MILLIGRAM(S): 2.5 TABLET, FILM COATED ORAL at 18:56

## 2023-07-12 RX ADMIN — ATORVASTATIN CALCIUM 40 MILLIGRAM(S): 80 TABLET, FILM COATED ORAL at 21:01

## 2023-07-12 RX ADMIN — GABAPENTIN 100 MILLIGRAM(S): 400 CAPSULE ORAL at 06:14

## 2023-07-12 RX ADMIN — CEFEPIME 100 MILLIGRAM(S): 1 INJECTION, POWDER, FOR SOLUTION INTRAMUSCULAR; INTRAVENOUS at 06:15

## 2023-07-12 RX ADMIN — Medication 25 MILLIGRAM(S): at 14:00

## 2023-07-12 RX ADMIN — AZITHROMYCIN 255 MILLIGRAM(S): 500 TABLET, FILM COATED ORAL at 20:29

## 2023-07-12 RX ADMIN — Medication 1 SPRAY(S): at 18:56

## 2023-07-12 RX ADMIN — GABAPENTIN 100 MILLIGRAM(S): 400 CAPSULE ORAL at 14:00

## 2023-07-12 RX ADMIN — Medication 25 MILLIGRAM(S): at 21:01

## 2023-07-12 RX ADMIN — GABAPENTIN 100 MILLIGRAM(S): 400 CAPSULE ORAL at 21:01

## 2023-07-12 RX ADMIN — Medication 12.5 MILLIGRAM(S): at 18:56

## 2023-07-12 NOTE — PROGRESS NOTE ADULT - ASSESSMENT
a/p:  #Asymptomatic bradycardia---resolved but now hr tachycardic after bblockers held  -restart low dose metoprolol 12.5 mg bid  -monitor hr  -tsh normal  - EKG sinus bradycardia w/ 1st degree AV block  - Echo LVEF 56% w/ Grade II diastolic dysfunction, Aortic valve thickening with mildly decreased leaflet opening. TALHA 2.1 cm^2  -C/w Imdur and hydralazine  -f/u with cardiology---may need EPS to evaluate if further episodes of bradycardia    #ESRD on HD  -nephrology following  -plan for HD today   -will continue HD m/w/f as outpatient    #Cough-CAD with acute on chronic COPD exac  -rvp and covid negative  -day #3 of abx (cefepime/azithro)  -repeat CXR in am  -sputum cx multiple organisms  -monitor wbc and fever curve  -start short course prednisone 50 mg daily x 5 days  -nebs q4hr and start symbicort  -monitor o2 sat and suppl o2 prn     DVT/GI ppx  guarded prognosis    FULL CODE    anticipate possible dc back to [facility] in next 24-48 hrs    Total time spent to complete patient's bedside assessment, review medical chart, discuss medical plan of care with covering medical team was more than 35 minutes  with >50% of time spendt face to face with patient, discussion with patient/family and/or coordination of care

## 2023-07-12 NOTE — PROGRESS NOTE ADULT - ASSESSMENT
72-year-old male presents to the ED complaining of exacerbation of chronic back pain and Left sciatica.  Pt with past medical history of ESRD on HD (M/W/F), CAD s/p 3 stents, A-fib on Eliquis, HTN and chronic back pain presents  complaining of worsening  back pain/ bradycardia         ESRD HD MWF   HD today 3h opti 160 UF 2l as tolerated   Phos elevated needs renal diet /cont sevelamer ad phoslo 1 tab TID w/ meals   fluid restrict 1 L   2 D echo repeat noted no pericardial effusion   Bradycardia off clonidine  / keep on tele / now tachycardic appreciate cardio / follow vitasl   Back pain and Left sciatica - CT LS showed severe spinal stenosis L4-5 worst [place] [facility]     will follow

## 2023-07-12 NOTE — PHARMACOTHERAPY INTERVENTION NOTE - COMMENTS
Recommended to adjust cefepime dose from 1g IV q12h to 1g IV q24h since the patient is on hemodialysis.    Stoney Flor, PharmD, [facility]  Clinical Pharmacy Specialist, Infectious Diseases  Tele-Antimicrobial Stewardship Program (Tele-ASP)  Tele-ASP Phone: [phone]

## 2023-07-12 NOTE — PROGRESS NOTE ADULT - SUBJECTIVE AND OBJECTIVE BOX
Patient is a 72y old  Male who presents with a chief complaint of bradycardia , cough and SOB (12 Jul 2023 10:37)    HPI:  72 year old male past medical history ESRD on dialysis (MWF), CAD s/p 3 stents, A-fib on Eliquis, hypertension, chronic back pain recently discharged from [facility] earlier today after work-up for chronic back pain.  Per EMS, patient blood pressure was elevated at time of discharge the patient received his clonidine early.  When patient arrived at [facility], patient noted to be bradycardic with heart rate 29 so patient was sent back to ED for further evaluation.  On ED arrival, patient heart rate initially read as 30s on the monitor however EKG revealing heart rate 57 sinus bradycardia with first-degree AV block.  Blood pressure stable at 160/60.  Patient denies chest pain, shortness of breath, dizziness, lightheadedness, dizziness, weakness.    In the ED, HR 57, /52. Labs showing K 5.4, phosphorus 5.9, trops 0.08, BNP ~29K. EKG showing sinus armando and bigeminy. (08 Jul 2023 23:21)    PAST MEDICAL & SURGICAL HISTORY:  ESRD on dialysis  T/ TH/ S  HTN (hypertension)  HLD (hyperlipidemia)  Heart failure  Anemia  Afib      patient seen and examined independently on morning rounds for the first time today, chart reviewed and discussed with the medicine resident and on interdisciplinary rounds:    hospital day #4  awaiting HD today for fluid overload-bradycardia resolved since stopping bblocker but now rate uncontrolled (AF with hr 110-125)  +cough productive    Vital Signs Last 24 Hrs  T(C): 37.2 (12 Jul 2023 12:22), Max: 37.3 (11 Jul 2023 20:14)  T(F): 98.9 (12 Jul 2023 12:22), Max: 99.1 (11 Jul 2023 20:14)  HR: 85 (12 Jul 2023 14:30) (85 - 121)  BP: 154/57 (12 Jul 2023 14:30) (120/64 - 174/79)  BP(mean): 101 (12 Jul 2023 10:36) (101 - 101)  RR: 17 (12 Jul 2023 14:30) (16 - 18)  SpO2: 96% (12 Jul 2023 10:36) (96% - 97%)    Parameters below as of 12 Jul 2023 10:36  Patient On (Oxygen Delivery Method): room air    PE:  GEN-NAD, AAOx3  PULM- fair air entry, decreased bs bilateral bases-   CVS- +s1/s2 RRR   GI- soft NT ND +bs  Ext- no edema               12.0   10.00 )-----------( 196      ( 12 Jul 2023 08:19 )             36.6     07-12    136  |  95<L>  |  65<HH>  ----------------------------<  106<H>  5.4<H>   |  21  |  8.1<HH>    Ca    8.9      12 Jul 2023 08:19  Mg     2.0     07-12      Urinalysis Basic - ( 12 Jul 2023 08:19 )    Color: x / Appearance: x / SG: x / pH: x  Gluc: 106 mg/dL / Ketone: x  / Bili: x / Urobili: x   Blood: x / Protein: x / Nitrite: x   Leuk Esterase: x / RBC: x / WBC x   Sq Epi: x / Non Sq Epi: x / Bacteria: x    Culture - Sputum (collected 11 Jul 2023 10:50)  Source: .Sputum Sputum  Gram Stain (12 Jul 2023 06:32):    Moderate polymorphonuclear leukocytes per low power field    Rare Squamous epithelial cells per low power field    Few Gram Positive Rods per oil power field    Moderate Gram Negative Coccobacilli per oil power field    Numerous Gram positive cocci in pairs per oil power field    Rare Gram Negative Rods per oil power field        MEDICATIONS  (STANDING):  albuterol/ipratropium for Nebulization 3 milliLiter(s) Nebulizer every 4 hours  apixaban 2.5 milliGRAM(s) Oral two times a day  aspirin  chewable 81 milliGRAM(s) Oral daily  atorvastatin 40 milliGRAM(s) Oral at bedtime  azithromycin  IVPB 500 milliGRAM(s) IV Intermittent every 24 hours  budesonide  80 MICROgram(s)/formoterol 4.5 MICROgram(s) Inhaler 2 Puff(s) Inhalation two times a day  cefepime   IVPB 1000 milliGRAM(s) IV Intermittent every 12 hours  fluticasone propionate 50 MICROgram(s)/spray Nasal Spray 1 Spray(s) Both Nostrils two times a day  gabapentin 100 milliGRAM(s) Oral every 8 hours  hydrALAZINE 25 milliGRAM(s) Oral three times a day  isosorbide   mononitrate ER Tablet (IMDUR) 30 milliGRAM(s) Oral daily  metoprolol tartrate 12.5 milliGRAM(s) Oral two times a day  pantoprazole    Tablet 40 milliGRAM(s) Oral before breakfast  polyethylene glycol 3350 17 Gram(s) Oral daily  predniSONE   Tablet 50 milliGRAM(s) Oral daily  senna 2 Tablet(s) Oral at bedtime  sevelamer carbonate 1600 milliGRAM(s) Oral <User Schedule>

## 2023-07-12 NOTE — PROGRESS NOTE ADULT - SUBJECTIVE AND OBJECTIVE BOX
SUBJECTIVE:  HPI:  72 year old male past medical history ESRD on dialysis (MWF), CAD s/p 3 stents, A-fib on Eliquis, hypertension, chronic back pain recently discharged from [facility] earlier today after work-up for chronic back pain.  Per EMS, patient blood pressure was elevated at time of discharge the patient received his clonidine early.  When patient arrived at [facility], patient noted to be bradycardic with heart rate 29 so patient was sent back to ED for further evaluation.  On ED arrival, patient heart rate initially read as 30s on the monitor however EKG revealing heart rate 57 sinus bradycardia with first-degree AV block.  Blood pressure stable at 160/60.  Patient denies chest pain, shortness of breath, dizziness, lightheadedness, dizziness, weakness.    In the ED, HR 57, /52. Labs showing K 5.4, phosphorus 5.9, trops 0.08, BNP ~29K. EKG showing sinus armando and bigeminy. (08 Jul 2023 23:21)      Patient is a 72y old Male who presents with a chief complaint of bradycardia (12 Jul 2023 09:32)    Currently admitted to medicine with the primary diagnosis of Bradycardia       Today is hospital day 4d.     PAST MEDICAL & SURGICAL HISTORY  ESRD on dialysis  T/ TH/ S    HTN (hypertension)    HLD (hyperlipidemia)    Heart failure    Anemia    Afib    H/O right coronary artery stent placement  stent x3    S/P arteriovenous (AV) fistula creation    S/P cataract surgery        ALLERGIES:  No Known Allergies    MEDICATIONS:  ACTIVE MEDICATIONS  albuterol/ipratropium for Nebulization 3 milliLiter(s) Nebulizer every 4 hours  apixaban 2.5 milliGRAM(s) Oral two times a day  aspirin  chewable 81 milliGRAM(s) Oral daily  atorvastatin 40 milliGRAM(s) Oral at bedtime  azithromycin  IVPB 500 milliGRAM(s) IV Intermittent every 24 hours  budesonide  80 MICROgram(s)/formoterol 4.5 MICROgram(s) Inhaler 2 Puff(s) Inhalation two times a day  cefepime   IVPB 1000 milliGRAM(s) IV Intermittent every 12 hours  fluticasone propionate 50 MICROgram(s)/spray Nasal Spray 1 Spray(s) Both Nostrils two times a day  gabapentin 100 milliGRAM(s) Oral every 8 hours  hydrALAZINE 25 milliGRAM(s) Oral three times a day  isosorbide   mononitrate ER Tablet (IMDUR) 30 milliGRAM(s) Oral daily  metoclopramide 5 milliGRAM(s) Oral four times a day PRN  metoprolol tartrate 12.5 milliGRAM(s) Oral two times a day  pantoprazole    Tablet 40 milliGRAM(s) Oral before breakfast  polyethylene glycol 3350 17 Gram(s) Oral daily  predniSONE   Tablet 50 milliGRAM(s) Oral daily  senna 2 Tablet(s) Oral at bedtime  sevelamer carbonate 1600 milliGRAM(s) Oral <User Schedule>      VITALS:   T(F): 98.8  HR: 102  BP: 149/70  RR: 18  SpO2: 96%    LABS:                        12.0   10.00 )-----------( 196      ( 12 Jul 2023 08:19 )             36.6     07-11    137  |  96<L>  |  47<H>  ----------------------------<  82  4.6   |  24  |  6.1<HH>    Ca    8.7      11 Jul 2023 05:41  Mg     1.8     07-11        Urinalysis Basic - ( 11 Jul 2023 05:41 )    Color: x / Appearance: x / SG: x / pH: x  Gluc: 82 mg/dL / Ketone: x  / Bili: x / Urobili: x   Blood: x / Protein: x / Nitrite: x   Leuk Esterase: x / RBC: x / WBC x   Sq Epi: x / Non Sq Epi: x / Bacteria: x            Culture - Sputum (collected 11 Jul 2023 10:50)  Source: .Sputum Sputum  Gram Stain (12 Jul 2023 06:32):    Moderate polymorphonuclear leukocytes per low power field    Rare Squamous epithelial cells per low power field    Few Gram Positive Rods per oil power field    Moderate Gram Negative Coccobacilli per oil power field    Numerous Gram positive cocci in pairs per oil power field    Rare Gram Negative Rods per oil power field              PHYSICAL EXAM:  GEN: No acute distress  LUNGS: BILATERAL expiratory wheezing with decreased air entry at basal lung fields.   HEART: S1/S2 present.    ABD: Soft, non-tender, non-distended.   EXT: No pedal edema  NEURO: AAOX3

## 2023-07-12 NOTE — PROGRESS NOTE ADULT - ASSESSMENT
ASSESSMENT :       71 y/o M w/ PMHx of ESRD on dialysis (MWF), CAD s/p 3 stents, A-fib on Eliquis, HTN, chronic back pain recently discharged from [facility] earlier today after work-up for chronic back pain who presented for asymptomatic bradycardia. Admitted to medicine for bradycardia 2/2 medication     #Productive Cough  #?CAP v. HAP vs COPD   - C/o productive cough w/ brownish sputum x 2 days; recent hospitalization  - Febrile to 100.6 last episode on 7/10/23  - Started on IV Cefepime and azithromycin for possible CAP  - CXR unremarkable  - sputum cx = mixed growth , prob non significant   - RVP -ve   - F/u Procal + F/u BCx  - wheezy on examination , history of smoking pack year >50 , with emphyesmatous lung changes , poss COPD ,   - Start duoneb , pred 50 mg Q24 for 5 days     #Asymptomatic bradycardia 2/2 medication, resolved and now tachycardic   #1st degree AV block  - Found to be armando to 29 @ NH  - EKG in ED showed sinus armando with bigeminy, no prior hx of AV block  - Trop stable 0.08>>0.07>>0.08  - ECHO (7/9/23): LVEF 56%, GIIDD, mild MR, mild TR  - was on Toprol XL and clonidine @ NH-held per cardio recs  - HR is now elevated around 110s' , so toprol 12.5 mg Q12 was started again.   - monitor HR     #HTN  - /77   - c/w hydralazine 25 mg Q8   - Re-start amlodipine 5 mg Q24   - c/w idmur 40 mg Q24   - Holding home clonidine  - Avoid AVN blocking agents (such as BB and non-DHP CCB) acc to cardiology     #ESRD  - On HD M/W/F  - Nephro on board  - C/w sevelamer 1600 mg qd  - C/w phoslo 667 mg qac per nephro recs    #Chronic AFib  - C/w home Eliquis 2.5mg PO BID  - resume BB as HR is elevated again , and monitor HR     #CAD s/p 3 stents  - C/w home ASA 81mg PO QD  - C/w home atorvastatin 40mg PO QHS      #Chronic back pain  - 2/2 spinal stenosis seen on CT LS   - C/w home gabapentin 100 mg daily     #MISC  DVT ppx: Eliquis  GI ppx: Protonix  Diet: Renal  Full code  Dispo: Home pending BP control and HR control    ASSESSMENT :       71 y/o M w/ PMHx of ESRD on dialysis (MWF), CAD s/p 3 stents, A-fib on Eliquis, HTN, chronic back pain recently discharged from [facility] earlier today after work-up for chronic back pain who presented for asymptomatic bradycardia. Admitted to medicine for bradycardia 2/2 medication     #Productive Cough  #?CAP v. HAP vs COPD   - C/o productive cough w/ brownish sputum x 2 days; recent hospitalization  - Febrile to 100.6 last episode on 7/10/23  - Started on IV Cefepime and azithromycin for possible CAP  - CXR unremarkable  - sputum cx = mixed growth , prob non significant   - RVP -ve   - F/u Procal + F/u BCx  - wheezy on examination , history of smoking pack year >50 , with emphyesmatous lung changes , poss COPD ,   - Start duoneb , pred 50 mg Q24 for 5 days     #Asymptomatic bradycardia 2/2 medication, resolved and now tachycardic   #1st degree AV block  - Found to be armando to 29 @ NH  - EKG in ED showed sinus armando with bigeminy, no prior hx of AV block  - Trop stable 0.08>>0.07>>0.08  - ECHO (7/9/23): LVEF 56%, GIIDD, mild MR, mild TR, pro bnp = 2340 ( a rise from 123 in march 2023 ).   - was on Toprol XL and clonidine @ NH-held per cardio recs  - HR is now elevated around 110s' , so toprol 12.5 mg Q12 was started again.   - monitor HR     #HTN  - /77   - c/w hydralazine 25 mg Q8   - Re-start amlodipine 5 mg Q24   - c/w idmur 40 mg Q24   - Holding home clonidine  - Avoid AVN blocking agents (such as BB and non-DHP CCB) acc to cardiology     #ESRD  - On HD M/W/F  - Nephro on board  - C/w sevelamer 1600 mg qd  - C/w phoslo 667 mg qac per nephro recs    #Chronic AFib  - C/w home Eliquis 2.5mg PO BID  - resume BB as HR is elevated again , and monitor HR     #CAD s/p 3 stents  - C/w home ASA 81mg PO QD  - C/w home atorvastatin 40mg PO QHS      #Chronic back pain  - 2/2 spinal stenosis seen on CT LS   - C/w home gabapentin 100 mg daily     #MISC  DVT ppx: Eliquis  GI ppx: Protonix  Diet: Renal  Full code  Dispo: Home pending BP control and HR control

## 2023-07-12 NOTE — PROGRESS NOTE ADULT - SUBJECTIVE AND OBJECTIVE BOX
Nephrology progress note    THIS IS AN INCOMPLETE NOTE . FULL NOTE TO FOLLOW SHORTLY    Patient is seen and examined, events over the last 24 h noted .    Allergies:  No Known Allergies    Hospital Medications:   MEDICATIONS  (STANDING):  albuterol/ipratropium for Nebulization 3 milliLiter(s) Nebulizer every 4 hours  apixaban 2.5 milliGRAM(s) Oral two times a day  aspirin  chewable 81 milliGRAM(s) Oral daily  atorvastatin 40 milliGRAM(s) Oral at bedtime  azithromycin  IVPB 500 milliGRAM(s) IV Intermittent every 24 hours  budesonide  80 MICROgram(s)/formoterol 4.5 MICROgram(s) Inhaler 2 Puff(s) Inhalation two times a day  cefepime   IVPB 1000 milliGRAM(s) IV Intermittent every 12 hours  fluticasone propionate 50 MICROgram(s)/spray Nasal Spray 1 Spray(s) Both Nostrils two times a day  gabapentin 100 milliGRAM(s) Oral every 8 hours  hydrALAZINE 25 milliGRAM(s) Oral three times a day  isosorbide   mononitrate ER Tablet (IMDUR) 30 milliGRAM(s) Oral daily  metoprolol tartrate 12.5 milliGRAM(s) Oral two times a day  pantoprazole    Tablet 40 milliGRAM(s) Oral before breakfast  polyethylene glycol 3350 17 Gram(s) Oral daily  predniSONE   Tablet 50 milliGRAM(s) Oral daily  senna 2 Tablet(s) Oral at bedtime  sevelamer carbonate 1600 milliGRAM(s) Oral <User Schedule>        VITALS:  T(F): 99.1 (07-12-23 @ 04:45), Max: 99.4 (07-11-23 @ 13:30)  HR: 119 (07-12-23 @ 04:45)  BP: 168/77 (07-12-23 @ 04:45)  RR: 18 (07-12-23 @ 04:45)  SpO2: 97% (07-12-23 @ 00:15)  Wt(kg): --    07-10 @ 07:01  -  07-11 @ 07:00  --------------------------------------------------------  IN: 472 mL / OUT: 2100 mL / NET: -1628 mL    07-11 @ 07:01  -  07-12 @ 07:00  --------------------------------------------------------  IN: 797 mL / OUT: 450 mL / NET: 347 mL          PHYSICAL EXAM:  Constitutional: NAD  HEENT: anicteric sclera, oropharynx clear, MMM  Neck: No JVD  Respiratory: CTAB, no wheezes, rales or rhonchi  Cardiovascular: S1, S2, RRR  Gastrointestinal: BS+, soft, NT/ND  Extremities: No cyanosis or clubbing. No peripheral edema  :  No elder.   Skin: No rashes    LABS:  07-11    137  |  96<L>  |  47<H>  ----------------------------<  82  4.6   |  24  |  6.1<HH>    Ca    8.7      11 Jul 2023 05:41  Mg     1.8     07-11                            11.9   9.43  )-----------( 174      ( 11 Jul 2023 05:41 )             36.5       Urine Studies:  Urinalysis Basic - ( 11 Jul 2023 05:41 )    Color:  / Appearance:  / SG:  / pH:   Gluc: 82 mg/dL / Ketone:   / Bili:  / Urobili:    Blood:  / Protein:  / Nitrite:    Leuk Esterase:  / RBC:  / WBC    Sq Epi:  / Non Sq Epi:  / Bacteria:           HbA1c 5.6      [09-18-19 @ 07:12]    HBsAb Nonreact      [09-21-19 @ 20:52]  HBsAg Nonreact      [09-21-19 @ 20:52]  HBcAb Reactive      [09-21-19 @ 20:52]  HCV 0.13, Nonreact      [09-21-19 @ 20:52]        RADIOLOGY & ADDITIONAL STUDIES:   Nephrology progress note    Patient is seen and examined, events over the last 24 h noted .  Lying in bed comfortable   had cough over night     Allergies:  No Known Allergies    Hospital Medications:   MEDICATIONS  (STANDING):  albuterol/ipratropium for Nebulization 3 milliLiter(s) Nebulizer every 4 hours  apixaban 2.5 milliGRAM(s) Oral two times a day  aspirin  chewable 81 milliGRAM(s) Oral daily  atorvastatin 40 milliGRAM(s) Oral at bedtime  azithromycin  IVPB 500 milliGRAM(s) IV Intermittent every 24 hours  budesonide  80 MICROgram(s)/formoterol 4.5 MICROgram(s) Inhaler 2 Puff(s) Inhalation two times a day  cefepime   IVPB 1000 milliGRAM(s) IV Intermittent every 12 hours  fluticasone propionate 50 MICROgram(s)/spray Nasal Spray 1 Spray(s) Both Nostrils two times a day  gabapentin 100 milliGRAM(s) Oral every 8 hours  hydrALAZINE 25 milliGRAM(s) Oral three times a day  isosorbide   mononitrate ER Tablet (IMDUR) 30 milliGRAM(s) Oral daily  metoprolol tartrate 12.5 milliGRAM(s) Oral two times a day  pantoprazole    Tablet 40 milliGRAM(s) Oral before breakfast  polyethylene glycol 3350 17 Gram(s) Oral daily  predniSONE   Tablet 50 milliGRAM(s) Oral daily  senna 2 Tablet(s) Oral at bedtime  sevelamer carbonate 1600 milliGRAM(s) Oral <User Schedule>        VITALS:  T(F): 99.1 (07-12-23 @ 04:45), Max: 99.4 (07-11-23 @ 13:30)  HR: 119 (07-12-23 @ 04:45)  BP: 168/77 (07-12-23 @ 04:45)  RR: 18 (07-12-23 @ 04:45)  SpO2: 97% (07-12-23 @ 00:15)  Wt(kg): --    07-10 @ 07:01  -  07-11 @ 07:00  --------------------------------------------------------  IN: 472 mL / OUT: 2100 mL / NET: -1628 mL    07-11 @ 07:01  -  07-12 @ 07:00  --------------------------------------------------------  IN: 797 mL / OUT: 450 mL / NET: 347 mL          PHYSICAL EXAM:  Constitutional: NAD  Respiratory: CTAB,  Cardiovascular: S1, S2, RRR  Gastrointestinal: BS+, soft, NT/ND  Extremities: No cyanosis or clubbing. No peripheral edema  :  No elder.   Skin: No rashes    LABS:  07-11    137  |  96<L>  |  47<H>  ----------------------------<  82  4.6   |  24  |  6.1<HH>    Ca    8.7      11 Jul 2023 05:41  Mg     1.8     07-11                            11.9   9.43  )-----------( 174      ( 11 Jul 2023 05:41 )             36.5       Urine Studies:  Urinalysis Basic - ( 11 Jul 2023 05:41 )    Color:  / Appearance:  / SG:  / pH:   Gluc: 82 mg/dL / Ketone:   / Bili:  / Urobili:    Blood:  / Protein:  / Nitrite:    Leuk Esterase:  / RBC:  / WBC    Sq Epi:  / Non Sq Epi:  / Bacteria:           HbA1c 5.6      [09-18-19 @ 07:12]    HBsAb Nonreact      [09-21-19 @ 20:52]  HBsAg Nonreact      [09-21-19 @ 20:52]  HBcAb Reactive      [09-21-19 @ 20:52]  HCV 0.13, Nonreact      [09-21-19 @ 20:52]        RADIOLOGY & ADDITIONAL STUDIES:

## 2023-07-13 DIAGNOSIS — Z79.01 LONG TERM (CURRENT) USE OF ANTICOAGULANTS: ICD-10-CM

## 2023-07-13 DIAGNOSIS — I13.2 HYPERTENSIVE HEART AND CHRONIC KIDNEY DISEASE WITH HEART FAILURE AND WITH STAGE 5 CHRONIC KIDNEY DISEASE, OR END STAGE RENAL DISEASE: ICD-10-CM

## 2023-07-13 DIAGNOSIS — E11.22 TYPE 2 DIABETES MELLITUS WITH DIABETIC CHRONIC KIDNEY DISEASE: ICD-10-CM

## 2023-07-13 DIAGNOSIS — I50.9 HEART FAILURE, UNSPECIFIED: ICD-10-CM

## 2023-07-13 DIAGNOSIS — M54.17 RADICULOPATHY, LUMBOSACRAL REGION: ICD-10-CM

## 2023-07-13 DIAGNOSIS — Z95.5 PRESENCE OF CORONARY ANGIOPLASTY IMPLANT AND GRAFT: ICD-10-CM

## 2023-07-13 DIAGNOSIS — Z79.82 LONG TERM (CURRENT) USE OF ASPIRIN: ICD-10-CM

## 2023-07-13 DIAGNOSIS — I25.10 ATHEROSCLEROTIC HEART DISEASE OF NATIVE CORONARY ARTERY WITHOUT ANGINA PECTORIS: ICD-10-CM

## 2023-07-13 DIAGNOSIS — I48.20 CHRONIC ATRIAL FIBRILLATION, UNSPECIFIED: ICD-10-CM

## 2023-07-13 DIAGNOSIS — Z79.899 OTHER LONG TERM (CURRENT) DRUG THERAPY: ICD-10-CM

## 2023-07-13 DIAGNOSIS — E78.5 HYPERLIPIDEMIA, UNSPECIFIED: ICD-10-CM

## 2023-07-13 DIAGNOSIS — N18.6 END STAGE RENAL DISEASE: ICD-10-CM

## 2023-07-13 DIAGNOSIS — D64.9 ANEMIA, UNSPECIFIED: ICD-10-CM

## 2023-07-13 DIAGNOSIS — M54.32 SCIATICA, LEFT SIDE: ICD-10-CM

## 2023-07-13 LAB
ALBUMIN SERPL ELPH-MCNC: 3.5 G/DL — SIGNIFICANT CHANGE UP (ref 3.5–5.2)
ALP SERPL-CCNC: 90 U/L — SIGNIFICANT CHANGE UP (ref 30–115)
ALT FLD-CCNC: 6 U/L — SIGNIFICANT CHANGE UP (ref 0–41)
ANION GAP SERPL CALC-SCNC: 17 MMOL/L — HIGH (ref 7–14)
APPEARANCE UR: CLEAR — SIGNIFICANT CHANGE UP
AST SERPL-CCNC: 8 U/L — SIGNIFICANT CHANGE UP (ref 0–41)
BASOPHILS # BLD AUTO: 0.01 K/UL — SIGNIFICANT CHANGE UP (ref 0–0.2)
BASOPHILS NFR BLD AUTO: 0.2 % — SIGNIFICANT CHANGE UP (ref 0–1)
BILIRUB SERPL-MCNC: 0.6 MG/DL — SIGNIFICANT CHANGE UP (ref 0.2–1.2)
BILIRUB UR-MCNC: NEGATIVE — SIGNIFICANT CHANGE UP
BUN SERPL-MCNC: 55 MG/DL — HIGH (ref 10–20)
CALCIUM SERPL-MCNC: 9.2 MG/DL — SIGNIFICANT CHANGE UP (ref 8.4–10.5)
CHLORIDE SERPL-SCNC: 93 MMOL/L — LOW (ref 98–110)
CO2 SERPL-SCNC: 23 MMOL/L — SIGNIFICANT CHANGE UP (ref 17–32)
COLOR SPEC: SIGNIFICANT CHANGE UP
CREAT SERPL-MCNC: 6.6 MG/DL — CRITICAL HIGH (ref 0.7–1.5)
CULTURE RESULTS: SIGNIFICANT CHANGE UP
DIFF PNL FLD: NEGATIVE — SIGNIFICANT CHANGE UP
EGFR: 8 ML/MIN/1.73M2 — LOW
EOSINOPHIL # BLD AUTO: 0 K/UL — SIGNIFICANT CHANGE UP (ref 0–0.7)
EOSINOPHIL NFR BLD AUTO: 0 % — SIGNIFICANT CHANGE UP (ref 0–8)
GLUCOSE SERPL-MCNC: 157 MG/DL — HIGH (ref 70–99)
GLUCOSE UR QL: ABNORMAL
HCT VFR BLD CALC: 36.5 % — LOW (ref 42–52)
HGB BLD-MCNC: 12.3 G/DL — LOW (ref 14–18)
IMM GRANULOCYTES NFR BLD AUTO: 0.6 % — HIGH (ref 0.1–0.3)
KETONES UR-MCNC: NEGATIVE — SIGNIFICANT CHANGE UP
LEUKOCYTE ESTERASE UR-ACNC: NEGATIVE — SIGNIFICANT CHANGE UP
LYMPHOCYTES # BLD AUTO: 0.29 K/UL — LOW (ref 1.2–3.4)
LYMPHOCYTES # BLD AUTO: 4.7 % — LOW (ref 20.5–51.1)
MAGNESIUM SERPL-MCNC: 2 MG/DL — SIGNIFICANT CHANGE UP (ref 1.8–2.4)
MCHC RBC-ENTMCNC: 32.1 PG — HIGH (ref 27–31)
MCHC RBC-ENTMCNC: 33.7 G/DL — SIGNIFICANT CHANGE UP (ref 32–37)
MCV RBC AUTO: 95.3 FL — HIGH (ref 80–94)
MONOCYTES # BLD AUTO: 0.24 K/UL — SIGNIFICANT CHANGE UP (ref 0.1–0.6)
MONOCYTES NFR BLD AUTO: 3.9 % — SIGNIFICANT CHANGE UP (ref 1.7–9.3)
NEUTROPHILS # BLD AUTO: 5.65 K/UL — SIGNIFICANT CHANGE UP (ref 1.4–6.5)
NEUTROPHILS NFR BLD AUTO: 90.6 % — HIGH (ref 42.2–75.2)
NITRITE UR-MCNC: NEGATIVE — SIGNIFICANT CHANGE UP
NRBC # BLD: 0 /100 WBCS — SIGNIFICANT CHANGE UP (ref 0–0)
PH UR: 8.5 — HIGH (ref 5–8)
PLATELET # BLD AUTO: 217 K/UL — SIGNIFICANT CHANGE UP (ref 130–400)
PMV BLD: 10.5 FL — HIGH (ref 7.4–10.4)
POTASSIUM SERPL-MCNC: 5.4 MMOL/L — HIGH (ref 3.5–5)
POTASSIUM SERPL-SCNC: 5.4 MMOL/L — HIGH (ref 3.5–5)
PROT SERPL-MCNC: 6.1 G/DL — SIGNIFICANT CHANGE UP (ref 6–8)
PROT UR-MCNC: ABNORMAL
RBC # BLD: 3.83 M/UL — LOW (ref 4.7–6.1)
RBC # FLD: 16.3 % — HIGH (ref 11.5–14.5)
SODIUM SERPL-SCNC: 133 MMOL/L — LOW (ref 135–146)
SP GR SPEC: 1.01 — SIGNIFICANT CHANGE UP (ref 1.01–1.03)
SPECIMEN SOURCE: SIGNIFICANT CHANGE UP
UROBILINOGEN FLD QL: SIGNIFICANT CHANGE UP
WBC # BLD: 6.23 K/UL — SIGNIFICANT CHANGE UP (ref 4.8–10.8)
WBC # FLD AUTO: 6.23 K/UL — SIGNIFICANT CHANGE UP (ref 4.8–10.8)

## 2023-07-13 PROCEDURE — 99232 SBSQ HOSP IP/OBS MODERATE 35: CPT

## 2023-07-13 RX ORDER — CEFEPIME 1 G/1
1000 INJECTION, POWDER, FOR SOLUTION INTRAMUSCULAR; INTRAVENOUS DAILY
Refills: 0 | Status: DISCONTINUED | OUTPATIENT
Start: 2023-07-14 | End: 2023-07-15

## 2023-07-13 RX ORDER — SODIUM ZIRCONIUM CYCLOSILICATE 10 G/10G
5 POWDER, FOR SUSPENSION ORAL EVERY 8 HOURS
Refills: 0 | Status: COMPLETED | OUTPATIENT
Start: 2023-07-13 | End: 2023-07-14

## 2023-07-13 RX ADMIN — Medication 3 MILLILITER(S): at 09:29

## 2023-07-13 RX ADMIN — Medication 81 MILLIGRAM(S): at 12:00

## 2023-07-13 RX ADMIN — Medication 12.5 MILLIGRAM(S): at 18:00

## 2023-07-13 RX ADMIN — Medication 12.5 MILLIGRAM(S): at 05:11

## 2023-07-13 RX ADMIN — Medication 1 SPRAY(S): at 05:12

## 2023-07-13 RX ADMIN — SENNA PLUS 2 TABLET(S): 8.6 TABLET ORAL at 21:13

## 2023-07-13 RX ADMIN — GABAPENTIN 100 MILLIGRAM(S): 400 CAPSULE ORAL at 05:11

## 2023-07-13 RX ADMIN — ATORVASTATIN CALCIUM 40 MILLIGRAM(S): 80 TABLET, FILM COATED ORAL at 21:17

## 2023-07-13 RX ADMIN — AZITHROMYCIN 255 MILLIGRAM(S): 500 TABLET, FILM COATED ORAL at 20:56

## 2023-07-13 RX ADMIN — SODIUM ZIRCONIUM CYCLOSILICATE 5 GRAM(S): 10 POWDER, FOR SUSPENSION ORAL at 21:18

## 2023-07-13 RX ADMIN — BUDESONIDE AND FORMOTEROL FUMARATE DIHYDRATE 2 PUFF(S): 160; 4.5 AEROSOL RESPIRATORY (INHALATION) at 08:00

## 2023-07-13 RX ADMIN — PANTOPRAZOLE SODIUM 40 MILLIGRAM(S): 20 TABLET, DELAYED RELEASE ORAL at 05:11

## 2023-07-13 RX ADMIN — Medication 25 MILLIGRAM(S): at 05:11

## 2023-07-13 RX ADMIN — ISOSORBIDE MONONITRATE 30 MILLIGRAM(S): 60 TABLET, EXTENDED RELEASE ORAL at 12:00

## 2023-07-13 RX ADMIN — GABAPENTIN 100 MILLIGRAM(S): 400 CAPSULE ORAL at 21:17

## 2023-07-13 RX ADMIN — BUDESONIDE AND FORMOTEROL FUMARATE DIHYDRATE 2 PUFF(S): 160; 4.5 AEROSOL RESPIRATORY (INHALATION) at 21:18

## 2023-07-13 RX ADMIN — APIXABAN 2.5 MILLIGRAM(S): 2.5 TABLET, FILM COATED ORAL at 05:11

## 2023-07-13 RX ADMIN — POLYETHYLENE GLYCOL 3350 17 GRAM(S): 17 POWDER, FOR SOLUTION ORAL at 12:00

## 2023-07-13 RX ADMIN — SEVELAMER CARBONATE 1600 MILLIGRAM(S): 2400 POWDER, FOR SUSPENSION ORAL at 05:11

## 2023-07-13 RX ADMIN — Medication 3 MILLILITER(S): at 21:05

## 2023-07-13 RX ADMIN — Medication 1 SPRAY(S): at 18:00

## 2023-07-13 RX ADMIN — Medication 25 MILLIGRAM(S): at 21:17

## 2023-07-13 RX ADMIN — Medication 25 MILLIGRAM(S): at 14:00

## 2023-07-13 RX ADMIN — APIXABAN 2.5 MILLIGRAM(S): 2.5 TABLET, FILM COATED ORAL at 18:00

## 2023-07-13 RX ADMIN — Medication 3 MILLILITER(S): at 14:20

## 2023-07-13 RX ADMIN — GABAPENTIN 100 MILLIGRAM(S): 400 CAPSULE ORAL at 14:00

## 2023-07-13 RX ADMIN — SODIUM ZIRCONIUM CYCLOSILICATE 5 GRAM(S): 10 POWDER, FOR SUSPENSION ORAL at 14:00

## 2023-07-13 RX ADMIN — Medication 50 MILLIGRAM(S): at 05:11

## 2023-07-13 NOTE — PROGRESS NOTE ADULT - ASSESSMENT
72-year-old male presents to the ED complaining of exacerbation of chronic back pain and Left sciatica.  Pt with past medical history of ESRD on HD (M/W/F), CAD s/p 3 stents, A-fib on Eliquis, HTN and chronic back pain presents  complaining of worsening  back pain/ bradycardia   ESRD HD MWF   HD in am s/p hd yesterday    last ph noted cont sevelamer 1 tab TID w/ meals   fluid restrict 1 L / low k diet   2 D echo repeat noted no pericardial effusion   Bradycardia off clonidine  /now tachycardic appreciate cardio / low dose metoprolol   h/h at goal/ no GAURANG   check UA   bp controlled   will follow

## 2023-07-13 NOTE — PROGRESS NOTE ADULT - ASSESSMENT
ASSESSMENT :   73 y/o M w/ PMHx of ESRD on dialysis (MWF), CAD s/p 3 stents, A-fib on Eliquis, HTN, chronic back pain recently discharged from [facility] earlier today after work-up for chronic back pain who presented for asymptomatic bradycardia. Admitted to medicine for bradycardia 2/2 medication     #Productive Cough  - No more wheezing on exam    #?CAP v. HAP vs COPD   - C/o productive cough w/ brownish sputum x 2 days; recent hospitalization  - Febrile to 100.6 last episode on 7/10/23  - Started on IV Cefepime and azithromycin for possible CAP  - CXR unremarkable  - sputum cx = mixed growth , prob non significant   - RVP - not detected 7/11  - Procalcitonin 0.55 7/11  - BCx - Normal respiratory sheyla  - wheezy on examination , history of smoking pack year >50 , with emphyesmatous lung changes , poss COPD ,   - Start duoneb , pred 50 mg Q24 for 5 days     #New onset dysuria  - Urine analysis & Urine culture sent - pending    #Asymptomatic bradycardia 2/2 medication, resolved and now tachycardic   - HR controlled, 77, with metoprolol 12.5 mg BID    #1st degree AV block  - Found to be armando to 29 @ NH  - EKG in ED showed sinus armando with bigeminy, no prior hx of AV block  - Trop stable 0.08>>0.07>>0.08  - ECHO (7/9/23): LVEF 56%, GIIDD, mild MR, mild TR, pro bnp = 2340 ( a rise from 123 in march 2023 ).   - was on Toprol XL and clonidine @ NH-held per cardio recs  - HR is now elevated around 110s' , so toprol 12.5 mg Q12 was started again.   - monitor HR     #HTN  - /77   - c/w hydralazine 25 mg Q8   - Re-start amlodipine 5 mg Q24   - c/w idmur 40 mg Q24   - Holding home clonidine  - Avoid AVN blocking agents (such as BB and non-DHP CCB) acc to cardiology   - High Blood pressure controlled by adding amlodipine (138/66)    #ESRD  - On HD M/W/F  - Nephro on board  - C/w sevelamer 1600 mg qd  - C/w phoslo 667 mg qac per nephro recs  - Hemodialysis yesterday 7/12/23    #Chronic AFib  - C/w home Eliquis 2.5mg PO BID  - resume BB as HR is elevated again , and monitor HR     #CAD s/p 3 stents  - C/w home ASA 81mg PO QD  - C/w home atorvastatin 40mg PO QHS      #Chronic back pain  - 2/2 spinal stenosis seen on CT LS   - C/w home gabapentin 100 mg daily     #MISC  DVT ppx: Eliquis  GI ppx: Protonix  Diet: Renal  Full code  Dispo: Home pending BP control and HR control ASSESSMENT :   71 y/o M w/ PMHx of ESRD on dialysis (MWF), CAD s/p 3 stents, A-fib on Eliquis, HTN, chronic back pain recently discharged from [facility] earlier today after work-up for chronic back pain who presented for asymptomatic bradycardia. Admitted to medicine for bradycardia 2/2 medication         #?CAP v. HAP vs COPD   - C/o productive cough w/ brownish sputum x 2 days; recent hospitalization  - Febrile to 100.6 last episode on 7/10/23  - Started on IV Cefepime and azithromycin for possible CAP  - CXR unremarkable  - sputum cx = mixed growth , prob non significant   - RVP - not detected 7/11  - Procalcitonin 0.55 7/11  - BCx - -ve no growth at 24 hrs   - wheezy on examination , history of smoking pack year >50 , with emphysematous lung changes , poss COPD , wheezing improved s/p prednisone 50 mg   - c/w duoneb , pred 50 mg Q24 for 5 days ( starting from July 12th )        #New onset dysuria  - Urine analysis & Urine culture sent - pending          #1st degree AV block , Asymptomatic bradycardia 2/2 medication, resolved   - Found to be armando to 29 @ NH  - EKG in ED showed sinus armando with bigeminy, no prior hx of AV block  - Trop stable 0.08>>0.07>>0.08  - ECHO (7/9/23): LVEF 56%, GIIDD, mild MR, mild TR, pro bnp = 2340 ( a rise from 123 in march 2023 ).   - was on Toprol XL and clonidine @ NH-held per cardio recs  - HR is now elevated around 110s' , so toprol 12.5 mg Q12 was started again.   - HR controlled, 77, with metoprolol 12.5 mg BID    #HTN  - /66  - c/w hydralazine 25 mg Q8   - c/w  amlodipine 5 mg Q24   - c/w idmur 40 mg Q24   - Holding home clonidine  - Avoid AVN blocking agents (such as BB and non-DHP CCB) acc to cardiology       #ESRD  - On HD M/W/F  - Nephro on board  - C/w sevelamer 1600 mg qd  - C/w phoslo 667 mg qac per nephro recs  - Hemodialysis done yesterday 7/12/23    #Chronic AFib  - C/w home Eliquis 2.5mg PO BID  - resume BB as HR is elevated again , and monitor HR     #CAD s/p 3 stents  - C/w home ASA 81mg PO QD  - C/w home atorvastatin 40mg PO QHS      #Chronic back pain  - 2/2 spinal stenosis seen on CT LS   - C/w home gabapentin 100 mg daily     #MISC  DVT/GI ppx  guarded prognosis    FULL CODE    anticipate possible dc back to [facility] in next 24 hrs , pending U/A and U cx    ASSESSMENT :   71 y/o M w/ PMHx of ESRD on dialysis (MWF), CAD s/p 3 stents, A-fib on Eliquis, HTN, chronic back pain recently discharged from [facility] earlier today after work-up for chronic back pain who presented for asymptomatic bradycardia. Admitted to medicine for bradycardia 2/2 medication         #?CAP v. HAP vs COPD   - C/o productive cough w/ brownish sputum x 2 days; recent hospitalization  - Febrile to 100.6 last episode on 7/10/23  - Started on IV Cefepime and azithromycin for possible CAP  - CXR unremarkable  - sputum cx = mixed growth , prob non significant   - RVP - not detected 7/11  - Procalcitonin 0.55 7/11  - BCx - -ve no growth at 24 hrs   - wheezy on examination , history of smoking pack year >50 , with emphysematous lung changes , poss COPD , wheezing improved s/p prednisone 50 mg   - c/w duoneb , pred 50 mg Q24 for 5 days ( starting from July 12th )        #New onset dysuria  - Urine analysis & Urine culture sent - pending          #1st degree AV block , Asymptomatic bradycardia 2/2 medication, resolved   - Found to be armando to 29 @ NH  - EKG in ED showed sinus armando with bigeminy, no prior hx of AV block  - Trop stable 0.08>>0.07>>0.08  - ECHO (7/9/23): LVEF 56%, GIIDD, mild MR, mild TR, pro bnp = 29,377 ( a rise from 123 in march 2023 ).   - was on Toprol XL and clonidine @ NH-held per cardio recs  - HR is now elevated around 110s' , so toprol 12.5 mg Q12 was started again.   - HR controlled, 77, with metoprolol 12.5 mg BID    #HTN  - /66  - c/w hydralazine 25 mg Q8   - c/w  amlodipine 5 mg Q24   - c/w idmur 40 mg Q24   - Holding home clonidine  - Avoid AVN blocking agents (such as BB and non-DHP CCB) acc to cardiology       #ESRD  - On HD M/W/F  - Nephro on board  - C/w sevelamer 1600 mg qd  - C/w phoslo 667 mg qac per nephro recs  - Hemodialysis done yesterday 7/12/23    #Chronic AFib  - C/w home Eliquis 2.5mg PO BID  - resume BB as HR is elevated again , and monitor HR     #CAD s/p 3 stents  - C/w home ASA 81mg PO QD  - C/w home atorvastatin 40mg PO QHS      #Chronic back pain  - 2/2 spinal stenosis seen on CT LS   - C/w home gabapentin 100 mg daily     #MISC  DVT/GI ppx  guarded prognosis    FULL CODE    anticipate possible dc back to [facility] in next 24 hrs , pending U/A and U cx    ASSESSMENT :   73 y/o M w/ PMHx of ESRD on dialysis (MWF), CAD s/p 3 stents, A-fib on Eliquis, HTN, chronic back pain recently discharged from [facility] earlier today after work-up for chronic back pain who presented for asymptomatic bradycardia. Admitted to medicine for bradycardia 2/2 medication         #?CAP v. HAP vs COPD   - C/o productive cough w/ brownish sputum x 2 days; recent hospitalization  - Febrile to 100.6 last episode on 7/10/23  - Started on IV Cefepime and azithromycin for possible CAP  - CXR unremarkable  - sputum cx = mixed growth , prob non significant   - RVP - not detected 7/11  - Procalcitonin 0.55 7/11  - BCx - -ve no growth at 24 hrs   - wheezy on examination , history of smoking pack year >50 , with emphysematous lung changes , poss COPD , wheezing improved s/p prednisone 50 mg   - c/w duoneb , pred 50 mg Q24 for 5 days ( starting from July 12th )        #New onset dysuria  - Urine analysis & Urine culture sent - pending          #1st degree AV block , Asymptomatic bradycardia 2/2 medication, resolved   - Found to be armando to 29 @ NH  - EKG in ED showed sinus armando with bigeminy, no prior hx of AV block  - Trop stable 0.08>>0.07>>0.08  - ECHO (7/9/23): LVEF 56%, GIIDD, mild MR, mild TR, pro bnp = [phone] ( a rise from 123 in march 2023 ).   - was on Toprol XL and clonidine @ NH-held per cardio recs  - HR is now elevated around 110s' , so toprol 12.5 mg Q12 was started again.   - HR controlled, 77, with metoprolol 12.5 mg BID    #HTN  - /66  - c/w hydralazine 25 mg Q8   - c/w  amlodipine 5 mg Q24   - c/w idmur 40 mg Q24   - Holding home clonidine  - Avoid AVN blocking agents (such as BB and non-DHP CCB) acc to cardiology       #ESRD  - On HD M/W/F  - Nephro on board  - C/w sevelamer 1600 mg qd  - C/w phoslo 667 mg qac per nephro recs  - Hemodialysis done yesterday 7/12/23  - K today = 5.4 , given lokelma     #Chronic AFib  - C/w home Eliquis 2.5mg PO BID  - resume BB as HR is elevated again , and monitor HR     #CAD s/p 3 stents  - C/w home ASA 81mg PO QD  - C/w home atorvastatin 40mg PO QHS      #Chronic back pain  - 2/2 spinal stenosis seen on CT LS   - C/w home gabapentin 100 mg daily     #MISC  DVT/GI ppx  guarded prognosis    FULL CODE    anticipate possible dc back to [facility] in next 24 hrs , pending U/A and U cx    ASSESSMENT :   73 y/o M w/ PMHx of ESRD on dialysis (MWF), CAD s/p 3 stents, A-fib on Eliquis, HTN, chronic back pain recently discharged from [facility] earlier today after work-up for chronic back pain who presented for asymptomatic bradycardia. Admitted to medicine for bradycardia 2/2 medication         #?CAP v. HAP vs COPD   - C/o productive cough w/ brownish sputum x 2 days; recent hospitalization  - Febrile to 100.6 last episode on 7/10/23  - Started on IV Cefepime and azithromycin for possible CAP  - CXR unremarkable  - sputum cx = mixed growth , prob non significant   - RVP - not detected 7/11  - Procalcitonin 0.55 7/11  - BCx - -ve no growth at 24 hrs   - wheezy on examination , history of smoking pack year >50 , with emphysematous lung changes , poss COPD , wheezing improved s/p prednisone 50 mg   - c/w duoneb , pred 50 mg Q24 for 5 days ( starting from July 12th )        #New onset dysuria  - Urine analysis - no significant findings  - Urine culture sent - pending          #1st degree AV block , Asymptomatic bradycardia 2/2 medication, resolved   - Found to be armando to 29 @ NH  - EKG in ED showed sinus armando with bigeminy, no prior hx of AV block  - Trop stable 0.08>>0.07>>0.08  - ECHO (7/9/23): LVEF 56%, GIIDD, mild MR, mild TR, pro bnp = [phone] ( a rise from 123 in march 2023 ).   - was on Toprol XL and clonidine @ NH-held per cardio recs  - HR is now elevated around 110s' , so toprol 12.5 mg Q12 was started again.   - HR controlled, 77, with metoprolol 12.5 mg BID    #HTN  - /66  - c/w hydralazine 25 mg Q8   - c/w  amlodipine 5 mg Q24   - c/w idmur 40 mg Q24   - Holding home clonidine  - Avoid AVN blocking agents (such as BB and non-DHP CCB) acc to cardiology       #ESRD  - On HD M/W/F  - Nephro on board  - C/w sevelamer 1600 mg qd  - C/w phoslo 667 mg qac per nephro recs  - Hemodialysis done yesterday 7/12/23  - K today = 5.4 , given lokelma     #Chronic AFib  - C/w home Eliquis 2.5mg PO BID  - resume BB as HR is elevated again , and monitor HR     #CAD s/p 3 stents  - C/w home ASA 81mg PO QD  - C/w home atorvastatin 40mg PO QHS    #Chronic back pain  - 2/2 spinal stenosis seen on CT LS   - C/w home gabapentin 100 mg daily     #MISC  DVT/GI ppx  guarded prognosis    FULL CODE    anticipate possible dc back to [facility] in next 24 hrs , pending U/A and U cx

## 2023-07-13 NOTE — PROGRESS NOTE ADULT - ASSESSMENT
a/p:  #Asymptomatic bradycardia---resolved   #HFpEF  -restarted on low dose metoprolol 12.5 mg bid yesterday for rate control of AF  -monitor hr  -tsh normal  - EKG sinus bradycardia w/ 1st degree AV block  - Echo LVEF 56% w/ Grade II diastolic dysfunction, Aortic valve thickening with mildly decreased leaflet opening. TALHA 2.1 cm^2  -C/w Imdur and hydralazine  -f/u with cardiology--- EPS to f/u as outpatient     #ESRD on HD  #new onset dysuria  -nephrology following  -will continue HD m/w/f as outpatient  -plan for HD tomorrow than possible dc home after  -check UA/UCx for new onset dysuria    #Cough-CAD with acute on chronic COPD/h/o past smoking  -rvp and covid negative  -day #3 of abx (cefepime/azithro)  -repeat CXR improving  -sputum cx multiple organisms  -monitor wbc and fever curve  -start short course prednisone 50 mg daily x 5 days (today day #2)  -nebs q4hr and symbicort  -outpatient pulmonary f/u  -monitor o2 sat and suppl o2 prn     DVT/GI ppx  guarded prognosis    FULL CODE    anticipate possible dc back to [facility] in next 24-48 hrs    Total time spent to complete patient's bedside assessment, review medical chart, discuss medical plan of care with covering medical team was more than 35 minutes  with >50% of time spendt face to face with patient, discussion with patient/family and/or coordination of care

## 2023-07-13 NOTE — PROGRESS NOTE ADULT - SUBJECTIVE AND OBJECTIVE BOX
Assessment/Plan:    No problem-specific Assessment & Plan notes found for this encounter  Diagnoses and all orders for this visit:    Environmental and seasonal allergies  -     ipratropium (ATROVENT) 0 06 % nasal spray; 2 sprays into each nostril 4 (four) times a day  -     desloratadine (CLARINEX) 5 MG tablet; Take 1 tablet (5 mg total) by mouth daily  I highly suspect allergies based on symptoms and exam findings  I am going to have patient start Atrovent nasal spray and have her replace Claritin with Clarinex 5 mg daily  Follow-up with PCP for a physical exam within six months or sooner if needed  Subjective:      Patient ID: Zahraa Belal is a 23 y o  female  Patient presents with one-week history of left-sided ear pain  Prior to that she did have a sore throat, nasal congestion and bilateral ear pain  She tells me she was seen at the urgent care, given amoxicillin 3 times daily for seven days and that helped her symptoms  Following cessation of the medication her left ear pain started as did some left-sided throat pain  No fevers  No chest pain  No shortness of breath  Does have some mild nasal congestion  Did start taking Claritin yesterday  The following portions of the patient's history were reviewed and updated as appropriate: She  has a past medical history of Diabetes mellitus ([facility] [address] ) and Migraine  She   Patient Active Problem List    Diagnosis Date Noted    Environmental and seasonal allergies 07/10/2019    Insulin pump in place 10/29/2018    Type 1 diabetes mellitus without complication ([facility] [address] ) 01/68/4797    Breakthrough bleeding 07/16/2014    Classic migraine with aura 09/10/2013    Migraine headache 09/10/2013    Tension type headache 09/10/2013     She  has a past surgical history that includes Mouth surgery (2012); Tooth extraction; and [place] tooth extraction    Her family history includes Arthritis in her other and other; Cancer in her maternal grandfather and maternal grandmother; Diabetes in her maternal grandfather, other, other, and paternal grandmother; Heart disease in her maternal grandfather and paternal grandmother; Hypertension in her maternal grandfather, maternal grandmother, mother, and paternal grandmother; No Known Problems in her father  She  reports that she has never smoked  She has never used smokeless tobacco  She reports that she does not drink alcohol or use drugs  Current Outpatient Medications   Medication Sig Dispense Refill    LEOPOLDO 0 35 MG tablet TAKE 1 TABLET BY MOUTH EVERY DAY 28 tablet 6    CONTOUR NEXT TEST test strip Test 8-10 times a day as instructed 300 each 3    glucagon (GLUCAGON EMERGENCY) 1 MG injection Inject 1 mg under the skin once as needed for low blood sugar for up to 1 dose 2 each 3    insulin lispro (HumaLOG) 100 units/mL injection pen Use in case of pump failure 5 pen 3    insulin lispro (HumaLOG) 100 units/mL injection Use up to 50 units per day via pump 50 mL 3    Insulin Syringes, Disposable, U-100 1 ML MISC Using syringe daily to fill insulin cartridge as directed 30 each 6    acetone, urine, test strip 100 strips by Does not apply route as needed for high blood sugar (Patient not taking: Reported on 7/10/2019) 100 each 3    desloratadine (CLARINEX) 5 MG tablet Take 1 tablet (5 mg total) by mouth daily 90 tablet 3    ipratropium (ATROVENT) 0 06 % nasal spray 2 sprays into each nostril 4 (four) times a day 15 mL 11     No current facility-administered medications for this visit        Current Outpatient Medications on File Prior to Visit   Medication Sig    LEOPOLDO 0 35 MG tablet TAKE 1 TABLET BY MOUTH EVERY DAY    CONTOUR NEXT TEST test strip Test 8-10 times a day as instructed    glucagon (GLUCAGON EMERGENCY) 1 MG injection Inject 1 mg under the skin once as needed for low blood sugar for up to 1 dose    insulin lispro (HumaLOG) 100 units/mL injection pen Use in case of pump failure    seen and examined  24 h events noted   no distress         PAST HISTORY  --------------------------------------------------------------------------------  No significant changes to PMH, PSH, FHx, SHx, unless otherwise noted    ALLERGIES & MEDICATIONS  --------------------------------------------------------------------------------  Allergies    No Known Allergies    Intolerances      Standing Inpatient Medications  albuterol/ipratropium for Nebulization 3 milliLiter(s) Nebulizer every 4 hours  apixaban 2.5 milliGRAM(s) Oral two times a day  aspirin  chewable 81 milliGRAM(s) Oral daily  atorvastatin 40 milliGRAM(s) Oral at bedtime  azithromycin  IVPB 500 milliGRAM(s) IV Intermittent every 24 hours  budesonide  80 MICROgram(s)/formoterol 4.5 MICROgram(s) Inhaler 2 Puff(s) Inhalation two times a day  fluticasone propionate 50 MICROgram(s)/spray Nasal Spray 1 Spray(s) Both Nostrils two times a day  gabapentin 100 milliGRAM(s) Oral every 8 hours  hydrALAZINE 25 milliGRAM(s) Oral three times a day  isosorbide   mononitrate ER Tablet (IMDUR) 30 milliGRAM(s) Oral daily  metoprolol tartrate 12.5 milliGRAM(s) Oral two times a day  pantoprazole    Tablet 40 milliGRAM(s) Oral before breakfast  polyethylene glycol 3350 17 Gram(s) Oral daily  predniSONE   Tablet 50 milliGRAM(s) Oral daily  senna 2 Tablet(s) Oral at bedtime  sevelamer carbonate 1600 milliGRAM(s) Oral <User Schedule>    PRN Inpatient Medications  metoclopramide 5 milliGRAM(s) Oral four times a day PRN  sodium chloride 0.9% Bolus. 100 milliLiter(s) IV Bolus every 5 minutes PRN          VITALS/PHYSICAL EXAM  --------------------------------------------------------------------------------  T(C): 36.3 (07-13-23 @ 06:11), Max: 37.2 (07-12-23 @ 12:22)  HR: 77 (07-13-23 @ 06:11) (77 - 108)  BP: 138/66 (07-13-23 @ 06:11) (121/60 - 154/57)  RR: 18 (07-13-23 @ 06:11) (16 - 20)  SpO2: 96% (07-12-23 @ 10:36) (96% - 96%)  Wt(kg): --        07-12-23 @ 07:01  -  07-13-23 @ 07:00  --------------------------------------------------------  IN: 0 mL / OUT: 2000 mL / NET: -2000 mL      Physical Exam:  	Gen: NAD,  	Pulm: CTA B/L  	CV:  S1S2; no rub  	Abd: soft, nontender/nondistended  	LE:  no edema  	Vascular access:    LABS/STUDIES  --------------------------------------------------------------------------------              12.0   10.00 >-----------<  196      [07-12-23 @ 08:19]              36.6     136  |  95  |  65  ----------------------------<  106      [07-12-23 @ 08:19]  5.4   |  21  |  8.1        Ca     8.9     [07-12-23 @ 08:19]      Mg     2.0     [07-12-23 @ 08:19]      Creatinine Trend:  SCr 8.1 [07-12 @ 08:19]  SCr 6.1 [07-11 @ 05:41]  SCr 8.4 [07-10 @ 05:37]  SCr 7.6 [07-09 @ 07:19]  SCr 7.0 [07-08 @ 18:50]    Urinalysis - [07-12-23 @ 08:19]      Color  / Appearance  / SG  / pH       Gluc 106 / Ketone   / Bili  / Urobili        Blood  / Protein  / Leuk Est  / Nitrite       RBC  / WBC  / Hyaline  / Gran  / Sq Epi  / Non Sq Epi  / Bacteria       HbA1c 5.6      [09-18-19 @ 07:12]       insulin lispro (HumaLOG) 100 units/mL injection Use up to 50 units per day via pump    Insulin Syringes, Disposable, U-100 1 ML MISC Using syringe daily to fill insulin cartridge as directed    acetone, urine, test strip 100 strips by Does not apply route as needed for high blood sugar (Patient not taking: Reported on 7/10/2019)    [DISCONTINUED] insulin glargine (LANTUS SOLOSTAR) 100 units/mL injection pen INJECT SUBCUTANEOUSLY IN  CASE OF PUMP FAILURE (Patient not taking: Reported on 7/10/2019)    [DISCONTINUED] norethindrone (MICRONOR) 0 35 MG tablet Take 1 tablet (0 35 mg total) by mouth daily     No current facility-administered medications on file prior to visit  She is allergic to no active allergies and no known allergies       Review of Systems   Constitutional: Negative for fever  Respiratory: Negative for shortness of breath  Cardiovascular: Negative for chest pain  Objective:      /80 (BP Location: Left arm, Patient Position: Sitting, Cuff Size: Adult)   Pulse 71   Temp 97 6 °F (36 4 °C)   Wt 45 8 kg (101 lb)   SpO2 98%   BMI 21 11 kg/m²          Physical Exam   Constitutional: Vital signs are normal  She appears well-developed and well-nourished  HENT:   Head: Normocephalic and atraumatic  Right Ear: External ear and ear canal normal  A middle ear effusion (Serous fluid of the middle ear ) is present  Left Ear: External ear and ear canal normal  A middle ear effusion (Serous fluid of the middle ear ) is present  Nose: Mucosal edema and rhinorrhea present  Mouth/Throat: Uvula is midline and mucous membranes are normal  No oropharyngeal exudate (Postnasal drainage is present however ) or posterior oropharyngeal erythema  Eyes: Pupils are equal, round, and reactive to light  Conjunctivae, EOM and lids are normal    B/L Allergic Shiners  Neck: Trachea normal    Cardiovascular: Normal rate, regular rhythm, normal heart sounds and normal pulses     No murmur heard   Pulmonary/Chest: Effort normal and breath sounds normal    Lymphadenopathy:        Right cervical: No superficial cervical and no posterior cervical adenopathy present  Left cervical: No superficial cervical and no posterior cervical adenopathy present

## 2023-07-13 NOTE — PROGRESS NOTE ADULT - SUBJECTIVE AND OBJECTIVE BOX
SUBJECTIVE:    Patient is a 72y old Male who presents with a chief complaint of bradycardia (13 Jul 2023 08:08)    Currently admitted to medicine with the primary diagnosis of Bradycardia       Today is hospital day 5d. This morning he is resting comfortably in bed and reports no new issues or overnight events. No more wheezing on examination.    PAST MEDICAL & SURGICAL HISTORY  ESRD on dialysis  T/ TH/ S    HTN (hypertension)    HLD (hyperlipidemia)    Heart failure    Anemia    Afib    H/O right coronary artery stent placement  stent x3    S/P arteriovenous (AV) fistula creation    S/P cataract surgery      SOCIAL HISTORY:  Negative for smoking/alcohol/drug use.     ALLERGIES:  No Known Allergies    MEDICATIONS:  STANDING MEDICATIONS  albuterol/ipratropium for Nebulization 3 milliLiter(s) Nebulizer every 4 hours  apixaban 2.5 milliGRAM(s) Oral two times a day  aspirin  chewable 81 milliGRAM(s) Oral daily  atorvastatin 40 milliGRAM(s) Oral at bedtime  azithromycin  IVPB 500 milliGRAM(s) IV Intermittent every 24 hours  budesonide  80 MICROgram(s)/formoterol 4.5 MICROgram(s) Inhaler 2 Puff(s) Inhalation two times a day  fluticasone propionate 50 MICROgram(s)/spray Nasal Spray 1 Spray(s) Both Nostrils two times a day  gabapentin 100 milliGRAM(s) Oral every 8 hours  hydrALAZINE 25 milliGRAM(s) Oral three times a day  isosorbide   mononitrate ER Tablet (IMDUR) 30 milliGRAM(s) Oral daily  metoprolol tartrate 12.5 milliGRAM(s) Oral two times a day  pantoprazole    Tablet 40 milliGRAM(s) Oral before breakfast  polyethylene glycol 3350 17 Gram(s) Oral daily  predniSONE   Tablet 50 milliGRAM(s) Oral daily  senna 2 Tablet(s) Oral at bedtime  sevelamer carbonate 1600 milliGRAM(s) Oral <User Schedule>    PRN MEDICATIONS  metoclopramide 5 milliGRAM(s) Oral four times a day PRN  sodium chloride 0.9% Bolus. 100 milliLiter(s) IV Bolus every 5 minutes PRN    VITALS:   T(F): 97.3  HR: 77  BP: 138/66  RR: 18  SpO2: 96%    LABS:                        12.0   10.00 )-----------( 196      ( 12 Jul 2023 08:19 )             36.6     07-12    136  |  95<L>  |  65<HH>  ----------------------------<  106<H>  5.4<H>   |  21  |  8.1<HH>    Ca    8.9      12 Jul 2023 08:19  Mg     2.0     07-12        Urinalysis Basic - ( 12 Jul 2023 08:19 )    Color: x / Appearance: x / SG: x / pH: x  Gluc: 106 mg/dL / Ketone: x  / Bili: x / Urobili: x   Blood: x / Protein: x / Nitrite: x   Leuk Esterase: x / RBC: x / WBC x   Sq Epi: x / Non Sq Epi: x / Bacteria: x            Culture - Blood (collected 11 Jul 2023 11:56)  Source: .Blood None  Preliminary Report (12 Jul 2023 23:02):    No growth at 24 hours    Culture - Blood (collected 11 Jul 2023 11:56)  Source: .Blood None  Preliminary Report (12 Jul 2023 23:02):    No growth at 24 hours    Culture - Sputum (collected 11 Jul 2023 10:50)  Source: .Sputum Sputum  Gram Stain (12 Jul 2023 06:32):    Moderate polymorphonuclear leukocytes per low power field    Rare Squamous epithelial cells per low power field    Few Gram Positive Rods per oil power field    Moderate Gram Negative Coccobacilli per oil power field    Numerous Gram positive cocci in pairs per oil power field    Rare Gram Negative Rods per oil power field  Preliminary Report (12 Jul 2023 18:21):    Normal Respiratory Beatriz present          RADIOLOGY:    PHYSICAL EXAM:  GEN: No acute distress  LUNGS: Clear to auscultation bilaterally   HEART: Regular  ABD: Soft, non-tender, non-distended.  EXT: NC/NC/NE/2+PP/ZIMMER/Skin Intact.   NEURO: AAOX3    Intravenous access:   NG tube:   Bailey Catheter:

## 2023-07-13 NOTE — PROGRESS NOTE ADULT - SUBJECTIVE AND OBJECTIVE BOX
Patient is a 72y old  Male who presents with a chief complaint of bradycardia , cough and SOB (12 Jul 2023 10:37)    HPI:  72 year old male past medical history ESRD on dialysis (MWF), CAD s/p 3 stents, A-fib on Eliquis, hypertension, chronic back pain recently discharged from [facility] earlier today after work-up for chronic back pain.  Per EMS, patient blood pressure was elevated at time of discharge the patient received his clonidine early.  When patient arrived at [facility], patient noted to be bradycardic with heart rate 29 so patient was sent back to ED for further evaluation.  On ED arrival, patient heart rate initially read as 30s on the monitor however EKG revealing heart rate 57 sinus bradycardia with first-degree AV block.  Blood pressure stable at 160/60.  Patient denies chest pain, shortness of breath, dizziness, lightheadedness, dizziness, weakness.    In the ED, HR 57, /52. Labs showing K 5.4, phosphorus 5.9, trops 0.08, BNP ~29K. EKG showing sinus armando and bigeminy. (08 Jul 2023 23:21)    PAST MEDICAL & SURGICAL HISTORY:  ESRD on dialysis  T/ TH/ S  HTN (hypertension)  HLD (hyperlipidemia)  Heart failure  Anemia  Afib      patient seen and examined independently on morning rounds, chart reviewed and discussed with the medicine resident and on interdisciplinary rounds:    hospital day #5   s/p HD yesterday- hr better controlled after restarting low dose bblocker        PE:  GEN-NAD, AAOx3  PULM- fair air entry, decreased bs bilateral bases-   CVS- +s1/s2 RRR   GI- soft NT ND +bs  Ext- no edema        Labs:                        12.3   6.23  )-----------( 217      ( 13 Jul 2023 08:19 )             36.5     CBC Full  -  ( 13 Jul 2023 08:19 )  WBC Count : 6.23 K/uL  RBC Count : 3.83 M/uL  Hemoglobin : 12.3 g/dL  Hematocrit : 36.5 %  Platelet Count - Automated : 217 K/uL  Mean Cell Volume : 95.3 fL  Mean Cell Hemoglobin : 32.1 pg  Mean Cell Hemoglobin Concentration : 33.7 g/dL  Auto Neutrophil # : 5.65 K/uL  Auto Lymphocyte # : 0.29 K/uL  Auto Monocyte # : 0.24 K/uL  Auto Eosinophil # : 0.00 K/uL  Auto Basophil # : 0.01 K/uL  Auto Neutrophil % : 90.6 %  Auto Lymphocyte % : 4.7 %  Auto Monocyte % : 3.9 %  Auto Eosinophil % : 0.0 %  Auto Basophil % : 0.2 %      07-13    133<L>  |  93<L>  |  55<H>  ----------------------------<  157<H>  5.4<H>   |  23  |  6.6<HH>    Ca    9.2      13 Jul 2023 08:19  Mg     2.0     07-13    TPro  6.1  /  Alb  3.5  /  TBili  0.6  /  DBili  x   /  AST  8   /  ALT  6   /  AlkPhos  90  07-13      Microbiology:    Culture - Blood (collected 11 Jul 2023 11:56)  Source: .Blood None  Preliminary Report (12 Jul 2023 23:02):    No growth at 24 hours    Culture - Blood (collected 11 Jul 2023 11:56)  Source: .Blood None  Preliminary Report (12 Jul 2023 23:02):    No growth at 24 hours    Culture - Sputum (collected 11 Jul 2023 10:50)  Source: .Sputum Sputum  Gram Stain (12 Jul 2023 06:32):    Moderate polymorphonuclear leukocytes per low power field    Rare Squamous epithelial cells per low power field    Few Gram Positive Rods per oil power field    Moderate Gram Negative Coccobacilli per oil power field    Numerous Gram positive cocci in pairs per oil power field    Rare Gram Negative Rods per oil power field  Preliminary Report (12 Jul 2023 18:21):    Normal Respiratory Beatriz present        Vital Signs Last 24 Hrs  T(C): 36.6 (13 Jul 2023 13:44), Max: 36.8 (12 Jul 2023 21:01)  T(F): 97.9 (13 Jul 2023 13:44), Max: 98.2 (12 Jul 2023 21:01)  HR: 77 (13 Jul 2023 13:44) (77 - 108)  BP: 121/70 (13 Jul 2023 13:44) (121/60 - 154/57)  BP(mean): --  RR: 18 (13 Jul 2023 13:44) (17 - 20)  SpO2: --        I&O's Summary    12 Jul 2023 07:01 - 13 Jul 2023 07:00  --------------------------------------------------------  IN: 0 mL / OUT: 2000 mL / NET: -2000 mL

## 2023-07-14 LAB
ALBUMIN SERPL ELPH-MCNC: 3.6 G/DL — SIGNIFICANT CHANGE UP (ref 3.5–5.2)
ALP SERPL-CCNC: 109 U/L — SIGNIFICANT CHANGE UP (ref 30–115)
ALT FLD-CCNC: 10 U/L — SIGNIFICANT CHANGE UP (ref 0–41)
ANION GAP SERPL CALC-SCNC: 21 MMOL/L — HIGH (ref 7–14)
AST SERPL-CCNC: 13 U/L — SIGNIFICANT CHANGE UP (ref 0–41)
BASOPHILS # BLD AUTO: 0.01 K/UL — SIGNIFICANT CHANGE UP (ref 0–0.2)
BASOPHILS NFR BLD AUTO: 0.1 % — SIGNIFICANT CHANGE UP (ref 0–1)
BILIRUB SERPL-MCNC: 0.4 MG/DL — SIGNIFICANT CHANGE UP (ref 0.2–1.2)
BUN SERPL-MCNC: 83 MG/DL — CRITICAL HIGH (ref 10–20)
CALCIUM SERPL-MCNC: 9.6 MG/DL — SIGNIFICANT CHANGE UP (ref 8.4–10.5)
CHLORIDE SERPL-SCNC: 93 MMOL/L — LOW (ref 98–110)
CO2 SERPL-SCNC: 22 MMOL/L — SIGNIFICANT CHANGE UP (ref 17–32)
CREAT SERPL-MCNC: 7.7 MG/DL — CRITICAL HIGH (ref 0.7–1.5)
EGFR: 7 ML/MIN/1.73M2 — LOW
EOSINOPHIL # BLD AUTO: 0.02 K/UL — SIGNIFICANT CHANGE UP (ref 0–0.7)
EOSINOPHIL NFR BLD AUTO: 0.2 % — SIGNIFICANT CHANGE UP (ref 0–8)
GLUCOSE SERPL-MCNC: 127 MG/DL — HIGH (ref 70–99)
HCT VFR BLD CALC: 37.3 % — LOW (ref 42–52)
HGB BLD-MCNC: 12.2 G/DL — LOW (ref 14–18)
IMM GRANULOCYTES NFR BLD AUTO: 0.5 % — HIGH (ref 0.1–0.3)
LYMPHOCYTES # BLD AUTO: 0.79 K/UL — LOW (ref 1.2–3.4)
LYMPHOCYTES # BLD AUTO: 9.3 % — LOW (ref 20.5–51.1)
MCHC RBC-ENTMCNC: 31.6 PG — HIGH (ref 27–31)
MCHC RBC-ENTMCNC: 32.7 G/DL — SIGNIFICANT CHANGE UP (ref 32–37)
MCV RBC AUTO: 96.6 FL — HIGH (ref 80–94)
MONOCYTES # BLD AUTO: 0.78 K/UL — HIGH (ref 0.1–0.6)
MONOCYTES NFR BLD AUTO: 9.2 % — SIGNIFICANT CHANGE UP (ref 1.7–9.3)
NEUTROPHILS # BLD AUTO: 6.81 K/UL — HIGH (ref 1.4–6.5)
NEUTROPHILS NFR BLD AUTO: 80.7 % — HIGH (ref 42.2–75.2)
NRBC # BLD: 0 /100 WBCS — SIGNIFICANT CHANGE UP (ref 0–0)
PLATELET # BLD AUTO: 239 K/UL — SIGNIFICANT CHANGE UP (ref 130–400)
PMV BLD: 10.1 FL — SIGNIFICANT CHANGE UP (ref 7.4–10.4)
POTASSIUM SERPL-MCNC: 4.8 MMOL/L — SIGNIFICANT CHANGE UP (ref 3.5–5)
POTASSIUM SERPL-SCNC: 4.8 MMOL/L — SIGNIFICANT CHANGE UP (ref 3.5–5)
PROT SERPL-MCNC: 6.1 G/DL — SIGNIFICANT CHANGE UP (ref 6–8)
RBC # BLD: 3.86 M/UL — LOW (ref 4.7–6.1)
RBC # FLD: 16.8 % — HIGH (ref 11.5–14.5)
SODIUM SERPL-SCNC: 136 MMOL/L — SIGNIFICANT CHANGE UP (ref 135–146)
WBC # BLD: 8.45 K/UL — SIGNIFICANT CHANGE UP (ref 4.8–10.8)
WBC # FLD AUTO: 8.45 K/UL — SIGNIFICANT CHANGE UP (ref 4.8–10.8)

## 2023-07-14 PROCEDURE — 93010 ELECTROCARDIOGRAM REPORT: CPT

## 2023-07-14 PROCEDURE — 99232 SBSQ HOSP IP/OBS MODERATE 35: CPT

## 2023-07-14 RX ADMIN — APIXABAN 2.5 MILLIGRAM(S): 2.5 TABLET, FILM COATED ORAL at 18:14

## 2023-07-14 RX ADMIN — SEVELAMER CARBONATE 1600 MILLIGRAM(S): 2400 POWDER, FOR SUSPENSION ORAL at 05:33

## 2023-07-14 RX ADMIN — APIXABAN 2.5 MILLIGRAM(S): 2.5 TABLET, FILM COATED ORAL at 05:34

## 2023-07-14 RX ADMIN — Medication 3 MILLILITER(S): at 20:13

## 2023-07-14 RX ADMIN — Medication 3 MILLILITER(S): at 15:00

## 2023-07-14 RX ADMIN — Medication 12.5 MILLIGRAM(S): at 05:34

## 2023-07-14 RX ADMIN — Medication 81 MILLIGRAM(S): at 14:07

## 2023-07-14 RX ADMIN — Medication 12.5 MILLIGRAM(S): at 18:15

## 2023-07-14 RX ADMIN — GABAPENTIN 100 MILLIGRAM(S): 400 CAPSULE ORAL at 14:08

## 2023-07-14 RX ADMIN — GABAPENTIN 100 MILLIGRAM(S): 400 CAPSULE ORAL at 05:34

## 2023-07-14 RX ADMIN — CEFEPIME 100 MILLIGRAM(S): 1 INJECTION, POWDER, FOR SOLUTION INTRAMUSCULAR; INTRAVENOUS at 11:38

## 2023-07-14 RX ADMIN — Medication 3 MILLILITER(S): at 06:37

## 2023-07-14 RX ADMIN — Medication 50 MILLIGRAM(S): at 05:34

## 2023-07-14 RX ADMIN — Medication 1 SPRAY(S): at 05:35

## 2023-07-14 RX ADMIN — Medication 1 SPRAY(S): at 18:15

## 2023-07-14 RX ADMIN — Medication 25 MILLIGRAM(S): at 05:34

## 2023-07-14 RX ADMIN — AZITHROMYCIN 255 MILLIGRAM(S): 500 TABLET, FILM COATED ORAL at 20:10

## 2023-07-14 RX ADMIN — ATORVASTATIN CALCIUM 40 MILLIGRAM(S): 80 TABLET, FILM COATED ORAL at 21:25

## 2023-07-14 RX ADMIN — SODIUM ZIRCONIUM CYCLOSILICATE 5 GRAM(S): 10 POWDER, FOR SUSPENSION ORAL at 05:34

## 2023-07-14 RX ADMIN — PANTOPRAZOLE SODIUM 40 MILLIGRAM(S): 20 TABLET, DELAYED RELEASE ORAL at 05:33

## 2023-07-14 RX ADMIN — GABAPENTIN 100 MILLIGRAM(S): 400 CAPSULE ORAL at 21:25

## 2023-07-14 RX ADMIN — SENNA PLUS 2 TABLET(S): 8.6 TABLET ORAL at 21:25

## 2023-07-14 RX ADMIN — ISOSORBIDE MONONITRATE 30 MILLIGRAM(S): 60 TABLET, EXTENDED RELEASE ORAL at 14:07

## 2023-07-14 NOTE — PROGRESS NOTE ADULT - SUBJECTIVE AND OBJECTIVE BOX
Nephrology progress note  Patient is seen and examined, events over the last 24 h noted .  Has cough with yellowish sputum production     Allergies:  No Known Allergies    Hospital Medications:   MEDICATIONS  (STANDING):  albuterol/ipratropium for Nebulization 3 milliLiter(s) Nebulizer every 4 hours  apixaban 2.5 milliGRAM(s) Oral two times a day  aspirin  chewable 81 milliGRAM(s) Oral daily  atorvastatin 40 milliGRAM(s) Oral at bedtime  azithromycin  IVPB 500 milliGRAM(s) IV Intermittent every 24 hours  budesonide  80 MICROgram(s)/formoterol 4.5 MICROgram(s) Inhaler 2 Puff(s) Inhalation two times a day  cefepime   IVPB 1000 milliGRAM(s) IV Intermittent daily  fluticasone propionate 50 MICROgram(s)/spray Nasal Spray 1 Spray(s) Both Nostrils two times a day  gabapentin 100 milliGRAM(s) Oral every 8 hours  hydrALAZINE 25 milliGRAM(s) Oral three times a day  isosorbide   mononitrate ER Tablet (IMDUR) 30 milliGRAM(s) Oral daily  metoprolol tartrate 12.5 milliGRAM(s) Oral two times a day  pantoprazole    Tablet 40 milliGRAM(s) Oral before breakfast  polyethylene glycol 3350 17 Gram(s) Oral daily  predniSONE   Tablet 50 milliGRAM(s) Oral daily  senna 2 Tablet(s) Oral at bedtime  sevelamer carbonate 1600 milliGRAM(s) Oral <User Schedule>        VITALS:  T(F): 96.2 (23 @ 05:20), Max: 98.6 (23 @ 20:58)  HR: 61 (23 @ 05:20)  BP: 98/50 (23 @ 05:20)  RR: 18 (23 @ 05:20)  SpO2: 99% (23 @ 05:20)       @ 07:  -   @ 07:00  --------------------------------------------------------  IN: 0 mL / OUT: 2000 mL / NET: -2000 mL          PHYSICAL EXAM:  Constitutional: NAD  Respiratory: CTAB,   Cardiovascular: S1, S2, RRR  Gastrointestinal: BS+, soft, NT/ND  Extremities: No cyanosis or clubbing. No peripheral edema  :  No elder.   Skin: No rashes    LABS:      133<L>  |  93<L>  |  55<H>  ----------------------------<  157<H>  5.4<H>   |  23  |  6.6<HH>    Ca    9.2      2023 08:19  Mg     2.0         TPro  6.1  /  Alb  3.5  /  TBili  0.6  /  DBili      /  AST  8   /  ALT  6   /  AlkPhos  90                            12.3   6.23  )-----------( 217      ( 2023 08:19 )             36.5       Urine Studies:  Urinalysis Basic - ( 2023 11:27 )    Color: Light Yellow / Appearance: Clear / S.011 / pH:   Gluc:  / Ketone: Negative  / Bili: Negative / Urobili: <2 mg/dL   Blood:  / Protein: 100 mg/dL / Nitrite: Negative   Leuk Esterase: Negative / RBC: 3 /HPF / WBC 2 /HPF   Sq Epi:  / Non Sq Epi:  / Bacteria: Negative          HbA1c 5.6      [19 @ 07:12]    HBsAb Nonreact      [19 @ 20:52]  HBsAg Nonreact      [19 @ 20:52]  HBcAb Reactive      [19 @ 20:52]  HCV 0.13, Nonreact      [19 @ 20:52]        RADIOLOGY & ADDITIONAL STUDIES:   Nephrology progress note  Patient is seen and examined, events over the last 24 h noted .  Has cough with yellowish sputum production     Allergies:  No Known Allergies    Hospital Medications:   MEDICATIONS  (STANDING):  albuterol/ipratropium for Nebulization 3 milliLiter(s) Nebulizer every 4 hours  apixaban 2.5 milliGRAM(s) Oral two times a day  aspirin  chewable 81 milliGRAM(s) Oral daily  atorvastatin 40 milliGRAM(s) Oral at bedtime  azithromycin  IVPB 500 milliGRAM(s) IV Intermittent every 24 hours  budesonide  80 MICROgram(s)/formoterol 4.5 MICROgram(s) Inhaler 2 Puff(s) Inhalation two times a day  cefepime   IVPB 1000 milliGRAM(s) IV Intermittent daily  fluticasone propionate 50 MICROgram(s)/spray Nasal Spray 1 Spray(s) Both Nostrils two times a day  gabapentin 100 milliGRAM(s) Oral every 8 hours  hydrALAZINE 25 milliGRAM(s) Oral three times a day  isosorbide   mononitrate ER Tablet (IMDUR) 30 milliGRAM(s) Oral daily  metoprolol tartrate 12.5 milliGRAM(s) Oral two times a day  pantoprazole    Tablet 40 milliGRAM(s) Oral before breakfast  polyethylene glycol 3350 17 Gram(s) Oral daily  predniSONE   Tablet 50 milliGRAM(s) Oral daily  senna 2 Tablet(s) Oral at bedtime  sevelamer carbonate 1600 milliGRAM(s) Oral <User Schedule>        VITALS:  T(F): 96.2 (23 @ 05:20), Max: 98.6 (23 @ 20:58)  HR: 61 (23 @ 05:20)  BP: 98/50 (23 @ 05:20)  RR: 18 (23 @ 05:20)  SpO2: 99% (23 @ 05:20)       @ 07:  -   @ 07:00  --------------------------------------------------------  IN: 0 mL / OUT: 2000 mL / NET: -2000 mL          PHYSICAL EXAM:  Constitutional: NAD  Respiratory: CTAB,   Cardiovascular: S1, S2, RRR  Gastrointestinal: BS+, soft, NT/ND  Extremities: No cyanosis or clubbing. No peripheral edema  :  No elder.   Skin: No rashes    LABS:      133<L>  |  93<L>  |  55<H>  ----------------------------<  157<H>  5.4<H>   |  23  |  6.6<HH>    Ca    9.2      2023 08:19  Mg     2.0         TPro  6.1  /  Alb  3.5  /  TBili  0.6  /  DBili      /  AST  8   /  ALT  6   /  AlkPhos  90                            12.3   6.23  )-----------( 217      ( 2023 08:19 )             36.5       Urine Studies:  Urinalysis Basic - ( 2023 11:27 )    Color: Light Yellow / Appearance: Clear / S.011 / pH:   Gluc:  / Ketone: Negative  / Bili: Negative / Urobili: <2 mg/dL   Blood:  / Protein: 100 mg/dL / Nitrite: Negative   Leuk Esterase: Negative / RBC: 3 /HPF / WBC 2 /HPF   Sq Epi:  / Non Sq Epi:  / Bacteria: Negative          HbA1c 5.6      [19 @ 07:12]    HBsAb Nonreact      [19 @ 20:52]  HBsAg Nonreact      [19 @ 20:52]  HBcAb Reactive      [19 @ 20:52]  HCV 0.13, Nonreact      [19 @ 20:52]        RADIOLOGY & ADDITIONAL STUDIES:  < from: Xray Chest 1 View- PORTABLE-Urgent (Xray Chest 1 View- PORTABLE-Urgent .) (23 @ 08:27) >    Impression:    No radiographic evidence of acute cardiopulmonary disease.    < end of copied text >

## 2023-07-14 NOTE — PROGRESS NOTE ADULT - SUBJECTIVE AND OBJECTIVE BOX
Patient is a 72y old  Male who presents with a chief complaint of bradycardia , cough and SOB (12 Jul 2023 10:37)    HPI:  72 year old male past medical history ESRD on dialysis (MWF), CAD s/p 3 stents, A-fib on Eliquis, hypertension, chronic back pain recently discharged from [facility] earlier today after work-up for chronic back pain.  Per EMS, patient blood pressure was elevated at time of discharge the patient received his clonidine early.  When patient arrived at [facility], patient noted to be bradycardic with heart rate 29 so patient was sent back to ED for further evaluation.  On ED arrival, patient heart rate initially read as 30s on the monitor however EKG revealing heart rate 57 sinus bradycardia with first-degree AV block.  Blood pressure stable at 160/60.  Patient denies chest pain, shortness of breath, dizziness, lightheadedness, dizziness, weakness.    In the ED, HR 57, /52. Labs showing K 5.4, phosphorus 5.9, trops 0.08, BNP ~29K. EKG showing sinus armando and bigeminy. (08 Jul 2023 23:21)    PAST MEDICAL & SURGICAL HISTORY:  ESRD on dialysis  T/ TH/ S  HTN (hypertension)  HLD (hyperlipidemia)  Heart failure  Anemia  Afib      patient seen and examined independently on morning rounds, chart reviewed and discussed with the medicine resident and on interdisciplinary rounds:    hospital day #6  getting HD today and then anticipate likely dc to [facility]  ([facility]) in next 24-48 hrs--  no chest pain an dsob improving- bp borderline low this morning prior to hd and patient feels too weak for dc home today after hd      PE:  GEN-NAD, AAOx3  PULM- fair air entry, decreased bs bilateral bases-   CVS- +s1/s2 RRR   GI- soft NT ND +bs  Ext- no edema        Labs:                        12.2   8.45  )-----------( 239      ( 14 Jul 2023 08:20 )             37.3     CBC Full  -  ( 14 Jul 2023 08:20 )  WBC Count : 8.45 K/uL  RBC Count : 3.86 M/uL  Hemoglobin : 12.2 g/dL  Hematocrit : 37.3 %  Platelet Count - Automated : 239 K/uL  Mean Cell Volume : 96.6 fL  Mean Cell Hemoglobin : 31.6 pg  Mean Cell Hemoglobin Concentration : 32.7 g/dL  Auto Neutrophil # : 6.81 K/uL  Auto Lymphocyte # : 0.79 K/uL  Auto Monocyte # : 0.78 K/uL  Auto Eosinophil # : 0.02 K/uL  Auto Basophil # : 0.01 K/uL  Auto Neutrophil % : 80.7 %  Auto Lymphocyte % : 9.3 %  Auto Monocyte % : 9.2 %  Auto Eosinophil % : 0.2 %  Auto Basophil % : 0.1 %      07-14    136  |  93<L>  |  83<HH>  ----------------------------<  127<H>  4.8   |  22  |  7.7<HH>    Ca    9.6      14 Jul 2023 08:20  Mg     2.0     07-13    TPro  6.1  /  Alb  3.6  /  TBili  0.4  /  DBili  x   /  AST  13  /  ALT  10  /  AlkPhos  109  07-14      Microbiology:      Vital Signs Last 24 Hrs  T(C): 35.7 (14 Jul 2023 05:20), Max: 37 (13 Jul 2023 20:58)  T(F): 96.2 (14 Jul 2023 05:20), Max: 98.6 (13 Jul 2023 20:58)  HR: 97 (14 Jul 2023 11:40) (61 - 97)  BP: 105/54 (14 Jul 2023 11:40) (96/53 - 121/70)  BP(mean): --  RR: 17 (14 Jul 2023 11:40) (17 - 18)  SpO2: 99% (14 Jul 2023 05:20) (96% - 99%)    Parameters below as of 14 Jul 2023 11:40  Patient On (Oxygen Delivery Method): room air        I&O's Summary    14 Jul 2023 07:01  -  14 Jul 2023 12:53  --------------------------------------------------------  IN: 0 mL / OUT: 2000 mL / NET: -2000 mL

## 2023-07-14 NOTE — PROGRESS NOTE ADULT - ASSESSMENT
a/p:  #Asymptomatic bradycardia---resolved   #HFpEF  -restarted on low dose metoprolol 12.5 mg bid --hr stable  -repeat EKG today (to confirm resolution of bradycardia)  -tsh normal  - EKG sinus bradycardia w/ 1st degree AV block  - Echo LVEF 56% w/ Grade II diastolic dysfunction, Aortic valve thickening with mildly decreased leaflet opening. TALHA 2.1 cm^2  -C/w Imdur and hydralazine  -f/u with cardiology as outpatient    #ESRD on HD  #dysuria--resolved  -nephrology following  -will continue HD m/w/f as outpatient--getting HD today (next HD monday)  -UA +protein and +glucose only    #Cough-CAD with acute on chronic COPD/h/o past smoking  -rvp and covid negative  -day #4 of abx (cefepime/azithro)---continue iv abx x 1 more day tomorrow to complete 5 day course then stop  -repeat CXR improving  -sputum cx multiple organisms  -monitor wbc and fever curve  - prednisone 50 mg daily x 5 days (today day #3)  -nebs q4hr and symbicort--transition to albuterol mdi for dc  -outpatient pulmonary f/u  -monitor o2 sat and suppl o2 prn     DVT/GI ppx  guarded prognosis    FULL CODE    anticipate possible dc back to [facility] in next 24-48 hrs    Total time spent to complete patient's bedside assessment, review medical chart, discuss medical plan of care with covering medical team was more than 35 minutes  with >50% of time spendt face to face with patient, discussion with patient/family and/or coordination of care

## 2023-07-14 NOTE — PROGRESS NOTE ADULT - ASSESSMENT
72-year-old male presents to the ED complaining of exacerbation of chronic back pain and Left sciatica.  Pt with past medical history of ESRD on HD (M/W/F), CAD s/p 3 stents, A-fib on Eliquis, HTN and chronic back pain presents  complaining of worsening  back pain/ bradycardia     ESRD HD MWF   HD today 3h opti 160 UF 2l AVF if tolerates     last ph noted cont sevelamer 1 tab TID w/ meals   fluid restrict 1 L / low k diet   2 D echo repeat noted no pericardial effusion   Bradycardia off clonidine  /now tachycardic appreciate cardio / low dose metoprolol   h/h at goal/ no GAURANG   check UA   bp controlled   on cefepime azithromycin   will follow

## 2023-07-14 NOTE — PROGRESS NOTE ADULT - SUBJECTIVE AND OBJECTIVE BOX
SUBJECTIVE:    Patient is a 72y old Male who presents with a chief complaint of bradycardia (2023 08:47)    Currently admitted to medicine with the primary diagnosis of Bradycardia       Today is hospital day 6d. This morning he is resting comfortably in bed and reports no new issues or overnight events. Patient is receiving dialysis today. Patient's l4-l5 pain is down to a 3 from a 9 coming in. Sternal pain is down a 2/mild discomfort. Patient is eating, sleep, walking normally. Last BM was 2 days ago and little to no urine is being passed. Still has a minor cough with minor throat pain upon coughing.    PAST MEDICAL & SURGICAL HISTORY  ESRD on dialysis  T/ / S    HTN (hypertension)    HLD (hyperlipidemia)    Heart failure    Anemia    Afib    H/O right coronary artery stent placement  stent x3    S/P arteriovenous (AV) fistula creation    S/P cataract surgery      SOCIAL HISTORY:  Negative for smoking/alcohol/drug use.     ALLERGIES:  No Known Allergies    MEDICATIONS:  STANDING MEDICATIONS  albuterol/ipratropium for Nebulization 3 milliLiter(s) Nebulizer every 4 hours  apixaban 2.5 milliGRAM(s) Oral two times a day  aspirin  chewable 81 milliGRAM(s) Oral daily  atorvastatin 40 milliGRAM(s) Oral at bedtime  azithromycin  IVPB 500 milliGRAM(s) IV Intermittent every 24 hours  budesonide  80 MICROgram(s)/formoterol 4.5 MICROgram(s) Inhaler 2 Puff(s) Inhalation two times a day  cefepime   IVPB 1000 milliGRAM(s) IV Intermittent daily  fluticasone propionate 50 MICROgram(s)/spray Nasal Spray 1 Spray(s) Both Nostrils two times a day  gabapentin 100 milliGRAM(s) Oral every 8 hours  hydrALAZINE 25 milliGRAM(s) Oral three times a day  isosorbide   mononitrate ER Tablet (IMDUR) 30 milliGRAM(s) Oral daily  metoprolol tartrate 12.5 milliGRAM(s) Oral two times a day  pantoprazole    Tablet 40 milliGRAM(s) Oral before breakfast  polyethylene glycol 3350 17 Gram(s) Oral daily  predniSONE   Tablet 50 milliGRAM(s) Oral daily  senna 2 Tablet(s) Oral at bedtime  sevelamer carbonate 1600 milliGRAM(s) Oral <User Schedule>    PRN MEDICATIONS  metoclopramide 5 milliGRAM(s) Oral four times a day PRN  sodium chloride 0.9% Bolus. 100 milliLiter(s) IV Bolus every 5 minutes PRN    VITALS:   T(F): 96.2  HR: 84  BP: 116/59  RR: 17  SpO2: 99%    LABS:                        12.3   6.23  )-----------( 217      ( 2023 08:19 )             36.5     07-13    133<L>  |  93<L>  |  55<H>  ----------------------------<  157<H>  5.4<H>   |  23  |  6.6<HH>    Ca    9.2      2023 08:19  Mg     2.0     07-13    TPro  6.1  /  Alb  3.5  /  TBili  0.6  /  DBili  x   /  AST  8   /  ALT  6   /  AlkPhos  90  07-13      Urinalysis Basic - ( 2023 11:27 )    Color: Light Yellow / Appearance: Clear / S.011 / pH: x  Gluc: x / Ketone: Negative  / Bili: Negative / Urobili: <2 mg/dL   Blood: x / Protein: 100 mg/dL / Nitrite: Negative   Leuk Esterase: Negative / RBC: 3 /HPF / WBC 2 /HPF   Sq Epi: x / Non Sq Epi: x / Bacteria: Negative            Culture - Blood (collected 2023 11:56)  Source: .Blood None  Preliminary Report (2023 23:01):    No growth at 48 Hours    Culture - Blood (collected 2023 11:56)  Source: .Blood None  Preliminary Report (2023 23:01):    No growth at 48 Hours    Culture - Sputum (collected 2023 10:50)  Source: .Sputum Sputum  Gram Stain (2023 06:32):    Moderate polymorphonuclear leukocytes per low power field    Rare Squamous epithelial cells per low power field    Few Gram Positive Rods per oil power field    Moderate Gram Negative Coccobacilli per oil power field    Numerous Gram positive cocci in pairs per oil power field    Rare Gram Negative Rods per oil power field  Final Report (2023 19:13):    Normal Respiratory Beatriz present          RADIOLOGY:    PHYSICAL EXAM:  GEN: No acute distress  LUNGS: Clear to auscultation bilaterally   HEART: Regular  ABD: Soft, non-tender, non-distended.  EXT: NC/NC/NE/2+PP/ZIMMER/Skin Intact.   NEURO: AAOX3    Intravenous access:   NG tube:   Bailey Catheter:        SUBJECTIVE:    Patient is a 72y old Male who presents with a chief complaint of bradycardia (2023 08:47)    Currently admitted to medicine with the primary diagnosis of Bradycardia       Today is hospital day 6d. This morning he is resting comfortably in bed and reports no new issues or overnight events. Patient is receiving dialysis today. Patient's l4-l5 pain is down to a 3 from a 9 coming in. Sternal pain is down a 2/mild discomfort. Patient is eating, sleep, walking normally. Passed bowels after dialysis with little to no urine being passed. Still has a minor cough with minor throat pain upon coughing. Complains of dizziness that is improving after dialysis. Pre-dialysis /59, post-dialysis /54.    PAST MEDICAL & SURGICAL HISTORY  ESRD on dialysis  T/ / S    HTN (hypertension)    HLD (hyperlipidemia)    Heart failure    Anemia    Afib    H/O right coronary artery stent placement  stent x3    S/P arteriovenous (AV) fistula creation    S/P cataract surgery      SOCIAL HISTORY:  Negative for smoking/alcohol/drug use.     ALLERGIES:  No Known Allergies    MEDICATIONS:  STANDING MEDICATIONS  albuterol/ipratropium for Nebulization 3 milliLiter(s) Nebulizer every 4 hours  apixaban 2.5 milliGRAM(s) Oral two times a day  aspirin  chewable 81 milliGRAM(s) Oral daily  atorvastatin 40 milliGRAM(s) Oral at bedtime  azithromycin  IVPB 500 milliGRAM(s) IV Intermittent every 24 hours  budesonide  80 MICROgram(s)/formoterol 4.5 MICROgram(s) Inhaler 2 Puff(s) Inhalation two times a day  cefepime   IVPB 1000 milliGRAM(s) IV Intermittent daily  fluticasone propionate 50 MICROgram(s)/spray Nasal Spray 1 Spray(s) Both Nostrils two times a day  gabapentin 100 milliGRAM(s) Oral every 8 hours  hydrALAZINE 25 milliGRAM(s) Oral three times a day  isosorbide   mononitrate ER Tablet (IMDUR) 30 milliGRAM(s) Oral daily  metoprolol tartrate 12.5 milliGRAM(s) Oral two times a day  pantoprazole    Tablet 40 milliGRAM(s) Oral before breakfast  polyethylene glycol 3350 17 Gram(s) Oral daily  predniSONE   Tablet 50 milliGRAM(s) Oral daily  senna 2 Tablet(s) Oral at bedtime  sevelamer carbonate 1600 milliGRAM(s) Oral <User Schedule>    PRN MEDICATIONS  metoclopramide 5 milliGRAM(s) Oral four times a day PRN  sodium chloride 0.9% Bolus. 100 milliLiter(s) IV Bolus every 5 minutes PRN    VITALS:   T(F): 96.2  HR: 84  BP: 116/59  RR: 17  SpO2: 99%    LABS:                        12.3   6.23  )-----------( 217      ( 2023 08:19 )             36.5     07-13    133<L>  |  93<L>  |  55<H>  ----------------------------<  157<H>  5.4<H>   |  23  |  6.6<HH>    Ca    9.2      2023 08:19  Mg     2.0     07-    TPro  6.1  /  Alb  3.5  /  TBili  0.6  /  DBili  x   /  AST  8   /  ALT  6   /  AlkPhos  90  07-13      Urinalysis Basic - ( 2023 11:27 )    Color: Light Yellow / Appearance: Clear / S.011 / pH: x  Gluc: x / Ketone: Negative  / Bili: Negative / Urobili: <2 mg/dL   Blood: x / Protein: 100 mg/dL / Nitrite: Negative   Leuk Esterase: Negative / RBC: 3 /HPF / WBC 2 /HPF   Sq Epi: x / Non Sq Epi: x / Bacteria: Negative            Culture - Blood (collected 2023 11:56)  Source: .Blood None  Preliminary Report (2023 23:01):    No growth at 48 Hours    Culture - Blood (collected 2023 11:56)  Source: .Blood None  Preliminary Report (2023 23:01):    No growth at 48 Hours    Culture - Sputum (collected 2023 10:50)  Source: .Sputum Sputum  Gram Stain (2023 06:32):    Moderate polymorphonuclear leukocytes per low power field    Rare Squamous epithelial cells per low power field    Few Gram Positive Rods per oil power field    Moderate Gram Negative Coccobacilli per oil power field    Numerous Gram positive cocci in pairs per oil power field    Rare Gram Negative Rods per oil power field  Final Report (2023 19:13):    Normal Respiratory Beatriz present          RADIOLOGY:    PHYSICAL EXAM:  GEN: No acute distress  LUNGS: Congested, not fully clear.  HEART: Regular  ABD: Soft, non-tender, non-distended.  EXT: NC/NC/NE/2+PP/ZIMMER/Skin Intact.   NEURO: AAOX3    Intravenous access:   NG tube:   Bailey Catheter:

## 2023-07-14 NOTE — PROGRESS NOTE ADULT - ASSESSMENT
ASSESSMENT :   71 y/o M w/ PMHx of ESRD on dialysis (MWF), CAD s/p 3 stents, A-fib on Eliquis, HTN, chronic back pain recently discharged from [facility] earlier today after work-up for chronic back pain who presented for asymptomatic bradycardia. Admitted to medicine for bradycardia 2/2 medication         #?CAP v. HAP vs COPD   - C/o productive cough w/ brownish sputum x 2 days; recent hospitalization  - Febrile to 100.6 last episode on 7/10/23  - Started on IV Cefepime and azithromycin for possible CAP  - CXR unremarkable  - sputum cx = mixed growth , prob non significant   - RVP - not detected 7/11  - Procalcitonin 0.55 7/11  - BCx - -ve no growth at 24 hrs   - wheezy on examination , history of smoking pack year >50 , with emphysematous lung changes , poss COPD , wheezing improved s/p prednisone 50 mg   - c/w duoneb , pred 50 mg Q24 for 5 days ( starting from July 12th )        #New onset dysuria  - Urine analysis - no significant findings  - Urine culture sent - pending          #1st degree AV block , Asymptomatic bradycardia 2/2 medication, resolved   - Found to be armando to 29 @ NH  - EKG in ED showed sinus armando with bigeminy, no prior hx of AV block  - Trop stable 0.08>>0.07>>0.08  - ECHO (7/9/23): LVEF 56%, GIIDD, mild MR, mild TR, pro bnp = [phone] ( a rise from 123 in march 2023 ).   - was on Toprol XL and clonidine @ NH-held per cardio recs  - HR is now elevated around 110s' , so toprol 12.5 mg Q12 was started again.   - HR controlled, 77, with metoprolol 12.5 mg BID    #HTN  - /66  - c/w hydralazine 25 mg Q8   - c/w  amlodipine 5 mg Q24   - c/w idmur 40 mg Q24   - Holding home clonidine  - Avoid AVN blocking agents (such as BB and non-DHP CCB) acc to cardiology       #ESRD  - On HD M/W/F  - Nephro on board  - C/w sevelamer 1600 mg qd  - C/w phoslo 667 mg qac per nephro recs  - Hemodialysis done yesterday 7/12/23  - K today = 5.4 , given lokelma     #Chronic AFib  - C/w home Eliquis 2.5mg PO BID  - resume BB as HR is elevated again , and monitor HR     #CAD s/p 3 stents  - C/w home ASA 81mg PO QD  - C/w home atorvastatin 40mg PO QHS    #Chronic back pain  - 2/2 spinal stenosis seen on CT LS   - C/w home gabapentin 100 mg daily     #MISC  DVT/GI ppx  guarded prognosis    FULL CODE    anticipate possible dc back to [facility] in next 24 hrs , pending U cx ASSESSMENT :   73 y/o M w/ PMHx of ESRD on dialysis (MWF), CAD s/p 3 stents, A-fib on Eliquis, HTN, chronic back pain recently discharged from [facility] earlier today after work-up for chronic back pain who presented for asymptomatic bradycardia. Admitted to medicine for bradycardia 2/2 medication         #?CAP v. HAP vs COPD   - C/o productive cough w/ brownish sputum x 2 days; recent hospitalization  - Febrile to 100.6 last episode on 7/10/23  - Started on IV Cefepime and azithromycin for possible CAP  - f/u CXR showed improvement   - sputum cx = mixed growth , non significant   - RVP - not detected 7/11  - Procalcitonin 0.55 7/11  - BCx -ve no growth at 24 hrs   - wheezy on examination , history of smoking pack year >50 , with emphysematous lung changes , poss COPD , wheezing improved s/p prednisone 50 mg ( today is day 3 )   - c/w duoneb , symbicort  , pred 50 mg Q24 for 5 days ( starting from July 12th )        #New onset dysuria  - Urine analysis - no significant findings  - Urine culture sent - pending  - patient has minimal u/o and is ESRD on hd           #1st degree AV block , Asymptomatic bradycardia 2/2 medication, resolved   - Found to be armando to 29 @ NH  - EKG in ED showed sinus armando with bigeminy, no prior hx of AV block  - Trop stable 0.08>>0.07>>0.08  - ECHO (7/9/23): LVEF 56%, GIIDD, mild MR, mild TR, pro bnp = [phone] ( a rise from 123 in march 2023 ).   - was on Toprol XL and clonidine @ NH-held per cardio recs  - HR was elevated around 110s' , so toprol 12.5 mg Q12 was re-started again on July 12th   - HR controlled, 61 , C/W metoprolol 12.5 mg BID    #HTN  - BP readings were on lower end today around 90's/50's but he was hypertensive before   - c/w hydralazine 25 mg Q8 , amlodipine 5 mg Q24,  idmur 40 mg Q24 and monitor BP   - Hold home clonidine        #ESRD  - On HD M/W/F  - Nephro on board  - C/w sevelamer 1600 mg qd  - C/w phoslo 667 mg qac per nephro recs  - Hemodialysis done today 7/13/23      #Chronic AFib  - C/w home Eliquis 2.5mg PO BID  - resume BB as HR is elevated again , and monitor HR     #CAD s/p 3 stents  - C/w home ASA 81mg PO QD  - C/w home atorvastatin 40mg PO QHS    #Chronic back pain  - 2/2 spinal stenosis seen on CT LS   - C/w home gabapentin 100 mg daily     #MISC  DVT/GI ppx  guarded prognosis    FULL CODE    anticipate possible dc back to [facility] in next 24 hrs  ASSESSMENT :   73 y/o M w/ PMHx of ESRD on dialysis (MWF), CAD s/p 3 stents, A-fib on Eliquis, HTN, chronic back pain recently discharged from [facility] earlier today after work-up for chronic back pain who presented for asymptomatic bradycardia. Admitted to medicine for bradycardia 2/2 medication         #?CAP v. HAP vs COPD   - C/o productive cough w/ brownish sputum x 2 days; recent hospitalization  - Febrile to 100.6 last episode on 7/10/23  - Started on IV Cefepime and azithromycin for possible CAP  - f/u CXR showed improvement   - sputum cx = mixed growth , non significant   - RVP - not detected 7/11  - Procalcitonin 0.55 7/11  - BCx -ve no growth at 24 hrs   - wheezy on examination , history of smoking pack year >50 , with emphysematous lung changes , poss COPD , wheezing improved s/p prednisone 50 mg ( today is day 3 )   - c/w duoneb , symbicort  , pred 50 mg Q24 for 5 days ( starting from July 12th )  , give albuetrol mdi on d/c with Pulm OP follow up        #New onset dysuria  - Urine analysis - no significant findings  - Urine culture sent - pending  - patient has minimal u/o and is ESRD on hd           #1st degree AV block , Asymptomatic bradycardia 2/2 medication, resolved   - Found to be armando to 29 @ NH  - EKG in ED showed sinus armando with bigeminy, no prior hx of AV block  - Trop stable 0.08>>0.07>>0.08  - ECHO (7/9/23): LVEF 56%, GIIDD, mild MR, mild TR, pro bnp = [phone] ( a rise from 123 in march 2023 ).   - was on Toprol XL and clonidine @ NH-held per cardio recs  - HR was elevated around 110s' , so toprol 12.5 mg Q12 was re-started again on July 12th   - HR controlled, 61 , C/W metoprolol 12.5 mg BID  -cardiology f/u as outpatient     #HTN  - BP readings were on lower end today around 90's/50's but he was hypertensive before   - c/w hydralazine 25 mg Q8 , amlodipine 5 mg Q24,  idmur 40 mg Q24 and monitor BP   - Hold home clonidine        #ESRD  - On HD M/W/F  - Nephro on board  - C/w sevelamer 1600 mg qd  - C/w phoslo 667 mg qac per nephro recs  - Hemodialysis done today 7/13/23      #Chronic AFib  - C/w home Eliquis 2.5mg PO BID  - resume BB as HR is elevated again , and monitor HR     #CAD s/p 3 stents  - C/w home ASA 81mg PO QD  - C/w home atorvastatin 40mg PO QHS    #Chronic back pain  - 2/2 spinal stenosis seen on CT LS   - C/w home gabapentin 100 mg daily     #MISC  DVT/GI ppx  guarded prognosis    FULL CODE    anticipate possible dc back to [facility] in next 24 hrs  ASSESSMENT :   71 y/o M w/ PMHx of ESRD on dialysis (MWF), CAD s/p 3 stents, A-fib on Eliquis, HTN, chronic back pain recently discharged from [facility] earlier today after work-up for chronic back pain who presented for asymptomatic bradycardia. Admitted to medicine for bradycardia 2/2 medication         #?CAP v. HAP vs COPD   - C/o productive cough w/ brownish sputum x 2 days; recent hospitalization  - Febrile to 100.6 last episode on 7/10/23  - Started on IV Cefepime and azithromycin for possible CAP  - f/u CXR showed improvement   - sputum cx = mixed growth , non significant   - RVP - not detected 7/11  - Procalcitonin 0.55 7/11  - BCx -ve no growth at 24 hrs   - wheezy on examination , history of smoking pack year >50 , with emphysematous lung changes , poss COPD , wheezing improved s/p prednisone 50 mg ( today is day 3 )   - c/w duoneb , symbicort  , pred 50 mg Q24 for 5 days ( starting from July 12th )  , give albuterol mdi on d/c with Pulm OP follow up        #New onset dysuria  - Urine analysis - no significant findings  - Urine culture sent - pending  - patient has minimal u/o and is ESRD on hd           #1st degree AV block , Asymptomatic bradycardia 2/2 medication, resolved   - Found to be armando to 29 @ NH  - EKG in ED showed sinus armando with bigeminy, no prior hx of AV block  - Trop stable 0.08>>0.07>>0.08  - ECHO (7/9/23): LVEF 56%, GIIDD, mild MR, mild TR, pro bnp = [phone] ( a rise from 123 in march 2023 ).   - was on Toprol XL and clonidine @ NH-held per cardio recs  - HR was elevated around 110s' , so toprol 12.5 mg Q12 was re-started again on July 12th   - HR controlled, 61 , C/W metoprolol 12.5 mg BID  -cardiology f/u as outpatient     #HTN  - BP readings were on lower end today around 90's/50's but he was hypertensive before   - c/w hydralazine 25 mg Q8 , amlodipine 5 mg Q24,  idmur 40 mg Q24 and monitor BP   - Hold home clonidine        #ESRD  - On HD M/W/F  - Nephro on board  - C/w sevelamer 1600 mg qd  - C/w phoslo 667 mg qac per nephro recs  - Hemodialysis done today 7/13/23      #Chronic AFib  - C/w home Eliquis 2.5mg PO BID  - resume BB as HR is elevated again , and monitor HR     #CAD s/p 3 stents  - C/w home ASA 81mg PO QD  - C/w home atorvastatin 40mg PO QHS    #Chronic back pain  - 2/2 spinal stenosis seen on CT LS   - C/w home gabapentin 100 mg daily     #MISC  DVT/GI ppx  guarded prognosis    FULL CODE    anticipate possible dc back to [facility] in next 24 hrs  ASSESSMENT :   73 y/o M w/ PMHx of ESRD on dialysis (MWF), CAD s/p 3 stents, A-fib on Eliquis, HTN, chronic back pain recently discharged from [facility] earlier today after work-up for chronic back pain who presented for asymptomatic bradycardia. Admitted to medicine for bradycardia 2/2 medication         #?CAP v. HAP vs COPD   - C/o productive cough w/ brownish sputum x 2 days; recent hospitalization  - Febrile to 100.6 last episode on 7/10/23  - Started on IV Cefepime and azithromycin for possible CAP  - f/u CXR showed improvement   - sputum cx = mixed growth , non significant   - RVP - not detected 7/11  - Procalcitonin 0.55 7/11  - BCx -ve no growth at 24 hrs   - wheezy on examination , history of smoking pack year >50 , with emphysematous lung changes , poss COPD , wheezing improved s/p prednisone 50 mg ( today is day 3 )   - c/w duoneb , symbicort  , pred 50 mg Q24 for 5 days ( starting from July 12th )  , give albuterol mdi on d/c with Pulm OP follow up        #New onset dysuria  - Urine analysis - no significant findings  - Urine culture sent - pending  - patient has minimal u/o and is ESRD on hd           #1st degree AV block , Asymptomatic bradycardia 2/2 medication, resolved   - Found to be armando to 29 @ NH  - EKG in ED showed sinus armando with bigeminy, no prior hx of AV block  - Trop stable 0.08>>0.07>>0.08  - ECHO (7/9/23): LVEF 56%, GIIDD, mild MR, mild TR, pro bnp = [phone],377 ( a rise from 123 in march 2023 ).   - was on Toprol XL and clonidine @ NH-held per cardio recs  - HR was elevated around 110s' , so toprol 12.5 mg Q12 was re-started again on July 12th   - HR controlled, 61 , C/W metoprolol 12.5 mg BID  -cardiology f/u as outpatient     #HTN  - BP readings were on lower end today around 100's / 50's but he was hypertensive before   - hold hydralazine 25 mg Q8 , and hold clonidine  - c/w amlodipine 5 mg Q24,  idmur 40 mg Q24 and monitor BP         #ESRD  - On HD M/W/F  - Nephro on board  - C/w sevelamer 1600 mg qd  - C/w phoslo 667 mg qac per nephro recs  - Hemodialysis done today 7/13/23      #Chronic AFib  - C/w home Eliquis 2.5mg PO BID  - resume BB as HR is elevated again , and monitor HR     #CAD s/p 3 stents  - C/w home ASA 81mg PO QD  - C/w home atorvastatin 40mg PO QHS    #Chronic back pain  - 2/2 spinal stenosis seen on CT LS   - C/w home gabapentin 100 mg daily     #MISC  DVT/GI ppx  guarded prognosis    FULL CODE    anticipate possible dc back to [facility] in next 24 hrs  ASSESSMENT :   73 y/o M w/ PMHx of ESRD on dialysis (MWF), CAD s/p 3 stents, A-fib on Eliquis, HTN, chronic back pain recently discharged from [facility] earlier today after work-up for chronic back pain who presented for asymptomatic bradycardia. Admitted to medicine for bradycardia 2/2 medication     #?CAP v. HAP vs COPD   - C/o productive cough w/ brownish sputum x 2 days; recent hospitalization  - Febrile to 100.6 last episode on 7/10/23  - Started on IV Cefepime and azithromycin for possible CAP  - f/u CXR showed improvement   - sputum cx = mixed growth , non significant   - RVP - not detected 7/11  - Procalcitonin 0.55 7/11  - BCx -ve no growth at 24 hrs   - wheezy on examination , history of smoking pack year >50 , with emphysematous lung changes , poss COPD , wheezing improved s/p prednisone 50 mg ( today is day 3 )   - c/w duoneb , symbicort  , pred 50 mg Q24 for 5 days ( starting from July 12th ) , give albuterol mdi on d/c with Pulm OP follow up      #New onset dysuria  - Urine analysis - no significant findings  - Urine culture sent - pending  - patient has minimal u/o and is ESRD on hd     #1st degree AV block , Asymptomatic bradycardia 2/2 medication, resolved   - Found to be armando to 29 @ NH  - EKG in ED showed sinus armando with bigeminy, no prior hx of AV block  - Trop stable 0.08>>0.07>>0.08  - ECHO (7/9/23): LVEF 56%, GIIDD, mild MR, mild TR, pro bnp = 29,377 ( a rise from 123 in march 2023 ).   - was on Toprol XL and clonidine @ NH-held per cardio recs  - HR was elevated around 110s' , so toprol 12.5 mg Q12 was re-started again on July 12th   - HR controlled, 61 , C/W metoprolol 12.5 mg BID  -cardiology f/u as outpatient     #HTN  - BP readings were on lower end today around 100's / 50's but he was hypertensive before   - pre-dialysis /59, post-dialysis /54 (some dizziness-improving)  - hold hydralazine 25 mg Q8 , hold clonidine, hold amlodipine 5 mg Q24,    - c/w idmur 30 mg Q24 and monitor BP     #ESRD  - On HD M/W/F  - Nephro on board  - C/w sevelamer 1600 mg qd  - C/w phoslo 667 mg qac per nephro recs  - Hemodialysis done today 7/13/23    #Chronic AFib  - C/w home Eliquis 2.5mg PO BID  - resume BB as HR is elevated again , and monitor HR     #CAD s/p 3 stents  - C/w home ASA 81mg PO QD  - C/w home atorvastatin 40mg PO QHS    #Chronic back pain  - 2/2 spinal stenosis seen on CT LS   - C/w home gabapentin 100 mg daily     #MISC  DVT/GI ppx  guarded prognosis    FULL CODE    anticipate possible dc back to [facility] in next 24 hrs

## 2023-07-15 ENCOUNTER — TRANSCRIPTION ENCOUNTER (OUTPATIENT)
Age: 73
End: 2023-07-15

## 2023-07-15 VITALS
HEART RATE: 84 BPM | OXYGEN SATURATION: 98 % | SYSTOLIC BLOOD PRESSURE: 111 MMHG | TEMPERATURE: 96 F | DIASTOLIC BLOOD PRESSURE: 56 MMHG | RESPIRATION RATE: 18 BRPM

## 2023-07-15 LAB
ALBUMIN SERPL ELPH-MCNC: 3.4 G/DL — LOW (ref 3.5–5.2)
ALP SERPL-CCNC: 109 U/L — SIGNIFICANT CHANGE UP (ref 30–115)
ALT FLD-CCNC: 21 U/L — SIGNIFICANT CHANGE UP (ref 0–41)
ANION GAP SERPL CALC-SCNC: 15 MMOL/L — HIGH (ref 7–14)
AST SERPL-CCNC: 34 U/L — SIGNIFICANT CHANGE UP (ref 0–41)
BASOPHILS # BLD AUTO: 0.02 K/UL — SIGNIFICANT CHANGE UP (ref 0–0.2)
BASOPHILS NFR BLD AUTO: 0.3 % — SIGNIFICANT CHANGE UP (ref 0–1)
BILIRUB SERPL-MCNC: 0.4 MG/DL — SIGNIFICANT CHANGE UP (ref 0.2–1.2)
BUN SERPL-MCNC: 63 MG/DL — CRITICAL HIGH (ref 10–20)
CALCIUM SERPL-MCNC: 9.1 MG/DL — SIGNIFICANT CHANGE UP (ref 8.4–10.5)
CHLORIDE SERPL-SCNC: 94 MMOL/L — LOW (ref 98–110)
CO2 SERPL-SCNC: 23 MMOL/L — SIGNIFICANT CHANGE UP (ref 17–32)
CREAT SERPL-MCNC: 5.9 MG/DL — CRITICAL HIGH (ref 0.7–1.5)
EGFR: 10 ML/MIN/1.73M2 — LOW
EOSINOPHIL # BLD AUTO: 0.02 K/UL — SIGNIFICANT CHANGE UP (ref 0–0.7)
EOSINOPHIL NFR BLD AUTO: 0.3 % — SIGNIFICANT CHANGE UP (ref 0–8)
GLUCOSE SERPL-MCNC: 107 MG/DL — HIGH (ref 70–99)
HCT VFR BLD CALC: 34.3 % — LOW (ref 42–52)
HGB BLD-MCNC: 11.1 G/DL — LOW (ref 14–18)
IMM GRANULOCYTES NFR BLD AUTO: 0.7 % — HIGH (ref 0.1–0.3)
LYMPHOCYTES # BLD AUTO: 0.86 K/UL — LOW (ref 1.2–3.4)
LYMPHOCYTES # BLD AUTO: 11.5 % — LOW (ref 20.5–51.1)
MAGNESIUM SERPL-MCNC: 2.1 MG/DL — SIGNIFICANT CHANGE UP (ref 1.8–2.4)
MCHC RBC-ENTMCNC: 31.1 PG — HIGH (ref 27–31)
MCHC RBC-ENTMCNC: 32.4 G/DL — SIGNIFICANT CHANGE UP (ref 32–37)
MCV RBC AUTO: 96.1 FL — HIGH (ref 80–94)
MONOCYTES # BLD AUTO: 0.75 K/UL — HIGH (ref 0.1–0.6)
MONOCYTES NFR BLD AUTO: 10 % — HIGH (ref 1.7–9.3)
NEUTROPHILS # BLD AUTO: 5.8 K/UL — SIGNIFICANT CHANGE UP (ref 1.4–6.5)
NEUTROPHILS NFR BLD AUTO: 77.2 % — HIGH (ref 42.2–75.2)
NRBC # BLD: 0 /100 WBCS — SIGNIFICANT CHANGE UP (ref 0–0)
PLATELET # BLD AUTO: 214 K/UL — SIGNIFICANT CHANGE UP (ref 130–400)
PMV BLD: 10.6 FL — HIGH (ref 7.4–10.4)
POTASSIUM SERPL-MCNC: 5.5 MMOL/L — HIGH (ref 3.5–5)
POTASSIUM SERPL-SCNC: 5.5 MMOL/L — HIGH (ref 3.5–5)
PROT SERPL-MCNC: 5.8 G/DL — LOW (ref 6–8)
RBC # BLD: 3.57 M/UL — LOW (ref 4.7–6.1)
RBC # FLD: 16.7 % — HIGH (ref 11.5–14.5)
SODIUM SERPL-SCNC: 132 MMOL/L — LOW (ref 135–146)
WBC # BLD: 7.5 K/UL — SIGNIFICANT CHANGE UP (ref 4.8–10.8)
WBC # FLD AUTO: 7.5 K/UL — SIGNIFICANT CHANGE UP (ref 4.8–10.8)

## 2023-07-15 PROCEDURE — 99239 HOSP IP/OBS DSCHRG MGMT >30: CPT

## 2023-07-15 RX ORDER — METOPROLOL TARTRATE 50 MG
1 TABLET ORAL
Qty: 0 | Refills: 0 | DISCHARGE

## 2023-07-15 RX ORDER — ISOSORBIDE MONONITRATE 60 MG/1
1 TABLET, EXTENDED RELEASE ORAL
Qty: 0 | Refills: 0 | DISCHARGE
Start: 2023-07-15

## 2023-07-15 RX ORDER — SEVELAMER CARBONATE 2400 MG/1
1 POWDER, FOR SUSPENSION ORAL
Qty: 0 | Refills: 0 | DISCHARGE

## 2023-07-15 RX ORDER — METOCLOPRAMIDE HCL 10 MG
1 TABLET ORAL
Qty: 0 | Refills: 0 | DISCHARGE
Start: 2023-07-15

## 2023-07-15 RX ORDER — METOCLOPRAMIDE HCL 10 MG
1 TABLET ORAL
Refills: 0 | DISCHARGE

## 2023-07-15 RX ORDER — METOPROLOL TARTRATE 50 MG
0.5 TABLET ORAL
Qty: 0 | Refills: 0 | DISCHARGE
Start: 2023-07-15

## 2023-07-15 RX ORDER — BUDESONIDE AND FORMOTEROL FUMARATE DIHYDRATE 160; 4.5 UG/1; UG/1
2 AEROSOL RESPIRATORY (INHALATION)
Qty: 0 | Refills: 0 | DISCHARGE
Start: 2023-07-15

## 2023-07-15 RX ORDER — IPRATROPIUM/ALBUTEROL SULFATE 18-103MCG
3 AEROSOL WITH ADAPTER (GRAM) INHALATION
Qty: 0 | Refills: 0 | DISCHARGE
Start: 2023-07-15

## 2023-07-15 RX ADMIN — Medication 3 MILLILITER(S): at 09:14

## 2023-07-15 RX ADMIN — APIXABAN 2.5 MILLIGRAM(S): 2.5 TABLET, FILM COATED ORAL at 05:40

## 2023-07-15 RX ADMIN — CEFEPIME 100 MILLIGRAM(S): 1 INJECTION, POWDER, FOR SOLUTION INTRAMUSCULAR; INTRAVENOUS at 12:39

## 2023-07-15 RX ADMIN — ISOSORBIDE MONONITRATE 30 MILLIGRAM(S): 60 TABLET, EXTENDED RELEASE ORAL at 12:39

## 2023-07-15 RX ADMIN — Medication 50 MILLIGRAM(S): at 05:40

## 2023-07-15 RX ADMIN — Medication 12.5 MILLIGRAM(S): at 05:40

## 2023-07-15 RX ADMIN — PANTOPRAZOLE SODIUM 40 MILLIGRAM(S): 20 TABLET, DELAYED RELEASE ORAL at 05:40

## 2023-07-15 RX ADMIN — BUDESONIDE AND FORMOTEROL FUMARATE DIHYDRATE 2 PUFF(S): 160; 4.5 AEROSOL RESPIRATORY (INHALATION) at 05:41

## 2023-07-15 RX ADMIN — GABAPENTIN 100 MILLIGRAM(S): 400 CAPSULE ORAL at 05:40

## 2023-07-15 RX ADMIN — BUDESONIDE AND FORMOTEROL FUMARATE DIHYDRATE 2 PUFF(S): 160; 4.5 AEROSOL RESPIRATORY (INHALATION) at 08:10

## 2023-07-15 RX ADMIN — POLYETHYLENE GLYCOL 3350 17 GRAM(S): 17 POWDER, FOR SOLUTION ORAL at 12:34

## 2023-07-15 RX ADMIN — SEVELAMER CARBONATE 1600 MILLIGRAM(S): 2400 POWDER, FOR SUSPENSION ORAL at 05:40

## 2023-07-15 RX ADMIN — Medication 81 MILLIGRAM(S): at 12:34

## 2023-07-15 RX ADMIN — Medication 1 SPRAY(S): at 05:41

## 2023-07-15 NOTE — PROGRESS NOTE ADULT - PROVIDER SPECIALTY LIST ADULT
Hospitalist
Nephrology
Internal Medicine
Internal Medicine
Nephrology
Hospitalist
Hospitalist
Internal Medicine
Internal Medicine
Nephrology
Hospitalist
Internal Medicine
Nephrology
Nephrology

## 2023-07-15 NOTE — DISCHARGE NOTE PROVIDER - NSDCCPCAREPLAN_GEN_ALL_CORE_FT
PRINCIPAL DISCHARGE DIAGNOSIS  Diagnosis: Bradycardia  Assessment and Plan of Treatment: You found to have bradycardia which is slowing of your heart rate below 60 beats per minute. This can be dangerous and most likely happened due to medications that we stopped. Please follow up with your cardiologist for further medications adjustment.      SECONDARY DISCHARGE DIAGNOSES  Diagnosis: ESRD on dialysis  Assessment and Plan of Treatment:     Diagnosis: HTN (hypertension)  Assessment and Plan of Treatment:     Diagnosis: COPD with pneumonia  Assessment and Plan of Treatment:

## 2023-07-15 NOTE — PROGRESS NOTE ADULT - SUBJECTIVE AND OBJECTIVE BOX
seen and examined  24 h events noted   no distress         PAST HISTORY  --------------------------------------------------------------------------------  No significant changes to PMH, PSH, FHx, SHx, unless otherwise noted    ALLERGIES & MEDICATIONS  --------------------------------------------------------------------------------  Allergies    No Known Allergies    Intolerances      Standing Inpatient Medications  albuterol/ipratropium for Nebulization 3 milliLiter(s) Nebulizer every 4 hours  apixaban 2.5 milliGRAM(s) Oral two times a day  aspirin  chewable 81 milliGRAM(s) Oral daily  atorvastatin 40 milliGRAM(s) Oral at bedtime  azithromycin  IVPB 500 milliGRAM(s) IV Intermittent every 24 hours  budesonide  80 MICROgram(s)/formoterol 4.5 MICROgram(s) Inhaler 2 Puff(s) Inhalation two times a day  cefepime   IVPB 1000 milliGRAM(s) IV Intermittent daily  fluticasone propionate 50 MICROgram(s)/spray Nasal Spray 1 Spray(s) Both Nostrils two times a day  gabapentin 100 milliGRAM(s) Oral every 8 hours  isosorbide   mononitrate ER Tablet (IMDUR) 30 milliGRAM(s) Oral daily  metoprolol tartrate 12.5 milliGRAM(s) Oral two times a day  pantoprazole    Tablet 40 milliGRAM(s) Oral before breakfast  polyethylene glycol 3350 17 Gram(s) Oral daily  predniSONE   Tablet 50 milliGRAM(s) Oral daily  senna 2 Tablet(s) Oral at bedtime  sevelamer carbonate 1600 milliGRAM(s) Oral <User Schedule>    PRN Inpatient Medications  metoclopramide 5 milliGRAM(s) Oral four times a day PRN  sodium chloride 0.9% Bolus. 100 milliLiter(s) IV Bolus every 5 minutes PRN          VITALS/PHYSICAL EXAM  --------------------------------------------------------------------------------  T(C): 35.8 (07-15-23 @ 05:29), Max: 36.8 (07-14-23 @ 19:57)  HR: 84 (07-15-23 @ 05:29) (84 - 101)  BP: 111/56 (07-15-23 @ 05:29) (97/50 - 116/59)  RR: 18 (07-15-23 @ 05:29) (17 - 18)  SpO2: 98% (07-15-23 @ 05:29) (98% - 98%)  Wt(kg): --        07-14-23 @ 07:01  -  07-15-23 @ 07:00  --------------------------------------------------------  IN: 0 mL / OUT: 2000 mL / NET: -2000 mL      Physical Exam:  	Gen: NAD  	Pulm: CTA B/L  	CV:  S1S2; no rub  	Abd:  soft, nontender/nondistended  	LE:  no edema  	Vascular access:    LABS/STUDIES  --------------------------------------------------------------------------------              12.2   8.45  >-----------<  239      [07-14-23 @ 08:20]              37.3     136  |  93  |  83  ----------------------------<  127      [07-14-23 @ 08:20]  4.8   |  22  |  7.7        Ca     9.6     [07-14-23 @ 08:20]      Mg     2.0     [07-13-23 @ 08:19]    TPro  6.1  /  Alb  3.6  /  TBili  0.4  /  DBili  x   /  AST  13  /  ALT  10  /  AlkPhos  109  [07-14-23 @ 08:20]      Creatinine Trend:  SCr 7.7 [07-14 @ 08:20]  SCr 6.6 [07-13 @ 08:19]  SCr 8.1 [07-12 @ 08:19]  SCr 6.1 [07-11 @ 05:41]  SCr 8.4 [07-10 @ 05:37]    Urinalysis - [07-14-23 @ 08:20]      Color  / Appearance  / SG  / pH       Gluc 127 / Ketone   / Bili  / Urobili        Blood  / Protein  / Leuk Est  / Nitrite       RBC  / WBC  / Hyaline  / Gran  / Sq Epi  / Non Sq Epi  / Bacteria       HbA1c 5.6      [09-18-19 @ 07:12]

## 2023-07-15 NOTE — PROGRESS NOTE ADULT - REASON FOR ADMISSION
bradycardia
bradycardia , cough and SOB
bradycardia

## 2023-07-15 NOTE — DISCHARGE NOTE NURSING/CASE MANAGEMENT/SOCIAL WORK - NSDCPEFALRISK_GEN_ALL_CORE
For information on Fall & Injury Prevention, visit: [email or web address] OR  [email or web address] OR  https://www.cdc.gov/steadi/patient.html

## 2023-07-15 NOTE — PROGRESS NOTE ADULT - ASSESSMENT
72-year-old male presents to the ED complaining of exacerbation of chronic back pain and Left sciatica.  Pt with past medical history of ESRD on HD (M/W/F), CAD s/p 3 stents, A-fib on Eliquis, HTN and chronic back pain presents  complaining of worsening  back pain/ bradycardia     ESRD HD MWF   HD on monday    last ph noted cont sevelamer 1 tab TID w/ meals   fluid restrict 1 L / low k diet   2 D echo repeat noted no pericardial effusion   Bradycardia off clonidine  /now tachycardic appreciate cardio / low dose metoprolol   h/h at goal/ no GAURANG   bp controlled   on cefepime azithromycin   will follow

## 2023-07-15 NOTE — DISCHARGE NOTE NURSING/CASE MANAGEMENT/SOCIAL WORK - NSDCPETBCESMAN_GEN_ALL_CORE
New patient referred by Dr Danielle Bolden  with traumatic plantar fasciitis of the left foot. Please enter xray order if needed.     Future Appointments   Date Time Provider Anupam Lopez   7/16/2021 10:15 AM Ford Escalante DPM [facility] If you are a smoker, it is important for your health to stop smoking. Please be aware that second hand smoke is also harmful.

## 2023-07-15 NOTE — DISCHARGE NOTE PROVIDER - HOSPITAL COURSE
72 year old male past medical history ESRD on dialysis (MWF), CAD s/p 3 stents, A-fib on Eliquis, hypertension, chronic back pain recently discharged from [facility] earlier today after work-up for chronic back pain.  Per EMS, patient blood pressure was elevated at time of discharge the patient received his clonidine early.  When patient arrived at [facility], patient noted to be bradycardic with heart rate 29 so patient was sent back to ED for further evaluation.  On ED arrival, patient heart rate initially read as 30s on the monitor however EKG revealing heart rate 57 sinus bradycardia with first-degree AV block.  Blood pressure stable at 160/60.  Patient denies chest pain, shortness of breath, dizziness, lightheadedness, dizziness, weakness.    In the ED, HR 57, /52. Labs showing K 5.4, phosphorus 5.9, trops 0.08, BNP ~29K. EKG showing sinus armando and bigeminy.    He was admitted to telemetry, impression is medication induced so Toprol and clonidine were held. His bradycardia improved. After that he started to become more tachycardic so he was started on metoprolol 12.5X2 and he was stable on that dose for couple of days. He needs to follow up as outpatient with his cardiologist.     He was also treated for pneumonia with COPD exacerbation with cefepime and azithromycin for 5 days, will be discharged on inhalers and need outpatient pulmonary for further evaluation and treatment.     His blood pressure remained controlled off amlodipine and hydralazine. Will keep off and to follow outpatient with nephrology.

## 2023-07-15 NOTE — DISCHARGE NOTE NURSING/CASE MANAGEMENT/SOCIAL WORK - PATIENT PORTAL LINK FT
You can access the FollowMyHealth Patient Portal offered by [facility] by registering at the following website: [email or web address]. By joining [facility]’s FollowMyHealth portal, you will also be able to view your health information using other applications (apps) compatible with our system.

## 2023-07-15 NOTE — DISCHARGE NOTE PROVIDER - NSDCMRMEDTOKEN_GEN_ALL_CORE_FT
apixaban 5 mg oral tablet: 0.5 tab(s) orally 2 times a day  aspirin 81 mg oral tablet, chewable: 1 tab(s) orally once a day  atorvastatin 40 mg oral tablet: 1 orally once a day (at bedtime)  cloNIDine 0.1 mg oral tablet: 1 tablet orally once a day At 5PM, Hold for  or less  cloNIDine 0.2 mg oral tablet: 1 tab(s) orally once a day Morning  gabapentin 100 mg oral capsule: 1 cap(s) orally every 8 hours  ocular lubricant ophthalmic solution: 1 drop(s) to each affected eye 2 times a day, As needed, Dry Eyes  pantoprazole 40 mg oral delayed release tablet: 1 tab(s) orally once a day (at bedtime)   polyethylene glycol 3350 oral powder for reconstitution: 17 gram(s) orally once a day  Reglan 5 mg oral tablet: 1 orally 4 times a day  Renagel 800 mg oral tablet: 2 tab(s) orally once a day  senna oral tablet: 2 tab(s) orally once a day (at bedtime)  Toprol-XL 25 mg oral tablet, extended release: 1 tablet, extended release orally once a day (at bedtime) HOLD FOR HR LESS THAN 60   apixaban 5 mg oral tablet: 0.5 tab(s) orally 2 times a day  aspirin 81 mg oral tablet, chewable: 1 tab(s) orally once a day  atorvastatin 40 mg oral tablet: 1 orally once a day (at bedtime)  budesonide-formoterol 80 mcg-4.5 mcg/inh inhalation aerosol: 2 puff(s) inhaled 2 times a day  gabapentin 100 mg oral capsule: 1 cap(s) orally every 8 hours  ipratropium-albuterol 0.5 mg-2.5 mg/3 mL inhalation solution: 3 milliliter(s) inhaled every 4 hours as needed for  bronchospasm  isosorbide mononitrate 30 mg oral tablet, extended release: 1 tab(s) orally once a day  metoclopramide 5 mg oral tablet: 1 tab(s) orally 4 times a day As needed nausea/vomiting  metoprolol tartrate 25 mg oral tablet: 0.5 tab(s) orally 2 times a day  ocular lubricant ophthalmic solution: 1 drop(s) to each affected eye 2 times a day, As needed, Dry Eyes  pantoprazole 40 mg oral delayed release tablet: 1 tab(s) orally once a day (at bedtime)   polyethylene glycol 3350 oral powder for reconstitution: 17 gram(s) orally once a day  predniSONE 20 mg oral tablet: 2 tab(s) orally once a day  Renagel 800 mg oral tablet: 1 tab(s) orally 3 times a day (with meals)  senna oral tablet: 2 tab(s) orally once a day (at bedtime)

## 2023-07-15 NOTE — DISCHARGE NOTE PROVIDER - NSDCFUSCHEDAPPT_GEN_ALL_CORE_FT
Smooth Gibbs  [facility] Pre[facility]mits  Scheduled Appointment: 08/15/2023    [facility]  PODIATRY  [address]  Scheduled Appointment: 08/15/2023    Donavon Ann  [facility]  Scheduled Appointment: 08/23/2023    Donavon Ann  [facility]  INTMED  [address]  Scheduled Appointment: 08/23/2023

## 2023-07-15 NOTE — DISCHARGE NOTE PROVIDER - PROVIDER TOKENS
PROVIDER:[TOKEN:[[id number]],FOLLOWUP:[2 weeks]],PROVIDER:[TOKEN:[[id number]],FOLLOWUP:[2 weeks]],PROVIDER:[TOKEN:[[id number]],FOLLOWUP:[2 weeks]]

## 2023-07-15 NOTE — DISCHARGE NOTE PROVIDER - ATTENDING DISCHARGE PHYSICAL EXAMINATION:
patient seen and examined independently on morning rounds, chart reviewed and discussed with medicine resident and agree with the above discharge note with the following addendum:      time spendt on discharge >30 minutes including coordination of discharge care    discharge back to [facility] today and continue current management with continued HD (M/W/F)

## 2023-07-15 NOTE — DISCHARGE NOTE PROVIDER - CARE PROVIDER_API CALL
Reid Martin  Interventional Cardiology  [address] [place] [address]  Phone: [phone]  Fax: [phone]  Follow Up Time: 2 weeks    Yemi Frazier  Nephrology  [address] [place] [id number]  Phone: [phone]  Fax: [phone]  Follow Up Time: 2 weeks    Rogers Castle  Pulmonary Disease  [address] [place] [id number]  Phone: [phone]  Fax: [phone]  Follow Up Time: 2 weeks

## 2023-07-17 LAB

## 2023-07-24 NOTE — ED ADULT NURSE NOTE - PSH
H/O right coronary artery stent placement  stent x3 Detail Level: Zone Detail Level: Generalized Detail Level: Detailed

## 2023-07-24 NOTE — PROGRESS NOTE ADULT - SUBJECTIVE AND OBJECTIVE BOX
"Pt called into office stating he thinks he is having a reaction from the Cefdinir. Pt started this on 7/8/23 and Dr. Emery extended it for 28 days.   [place] new med is sent, [facility] [place].    He declines a \"rash\". States the itching started yesterday 7/23/23 on his arms, feet, scrotum, face.   Pt's last dose of Cefdinir was 7/23/23. Pt was advised to hold this AM dose until writer speaks with Dr. Emery    Additional Information    Negative: Life-threatening reaction (anaphylaxis) in the past to similar substance (e.g., food, insect bite/sting, chemical, etc.) and < 2 hours since exposure    Negative: Difficulty breathing or wheezing    Negative: Difficulty swallowing or slurred speech and sudden onset    Negative: Sounds like a life-threatening emergency to the triager    Negative: Insect bites suspected    Negative: Hives suspected (urticaria, itchy pink bumps with pale centers that come and go over minutes to hours)    Negative: Widespread rash also present    Negative: Itching in just one area or spot    Negative: Patient sounds very sick or weak to the triager    Negative: MODERATE-SEVERE widespread itching (i.e., interferes with sleep, normal activities or school) and not improved after 24 hours of itching Care Advice    Negative: MODERATE-SEVERE widespread itching (i.e., interferes with sleep, normal activities or school) AND pregnant    Negative: Patient wants to be seen    Negative: Itching is a chronic symptom (recurrent or ongoing AND present > 4 weeks)    Negative: MILD widespread itching AND pregnant    Negative: Hand or foot itching AND pregnant    Negative: Taking prescription medication that could cause itching (e.g., codeine/morphine/other opiates, aspirin)    Negative: Widespread itching and cause unknown and present > 48 hours    Negative: Itching from pollen exposure (e.g., after rolling in grass)    Negative: Itching from low humidity (dry air, " [facility] FOLLOW UP NOTE  --------------------------------------------------------------------------------  Chief Complaint:    24 hour events/subjective:        PAST HISTORY  --------------------------------------------------------------------------------  No significant changes to PMH, PSH, FHx, SHx, unless otherwise noted    ALLERGIES & MEDICATIONS  --------------------------------------------------------------------------------  Allergies    No Known Allergies    Intolerances      Standing Inpatient Medications  amLODIPine   Tablet 10 milliGRAM(s) Oral daily  iohexol 300 mG (iodine)/mL Oral Solution 30 milliLiter(s) Oral once  isosorbide   mononitrate ER Tablet (IMDUR) 30 milliGRAM(s) Oral daily  losartan 25 milliGRAM(s) Oral daily  metoprolol tartrate 25 milliGRAM(s) Oral two times a day  pantoprazole  Injectable 40 milliGRAM(s) IV Push two times a day    PRN Inpatient Medications  artificial tears (preservative free) Ophthalmic Solution 1 Drop(s) Both EYES two times a day PRN      REVIEW OF SYSTEMS  --------------------------------------------------------------------------------  Gen: No weight changes, fatigue, fevers/chills, weakness  Skin: No rashes  Head/Eyes/Ears/Mouth: No headache; Normal hearing; Normal vision w/o blurriness; No sinus pain/discomfort, sore throat  Respiratory: No dyspnea, cough, wheezing, hemoptysis  CV: No chest pain, PND, orthopnea  GI: No abdominal pain, diarrhea, constipation, nausea, vomiting, melena, hematochezia  : No increased frequency, dysuria, hematuria, nocturia  MSK: No joint pain/swelling; no back pain; no edema  Neuro: No dizziness/lightheadedness, weakness, seizures, numbness, tingling  Heme: No easy bruising or bleeding  Endo: No heat/cold intolerance  Psych: No significant nervousness, anxiety, stress, depression    All other systems were reviewed and are negative, except as noted.    VITALS/PHYSICAL EXAM  --------------------------------------------------------------------------------  T(C): 36.5 (10-27-19 @ 05:00), Max: 36.8 (10-26-19 @ 12:48)  HR: 86 (10-27-19 @ 05:00) (75 - 86)  BP: 147/71 (10-27-19 @ 05:00) (132/62 - 147/71)  RR: 18 (10-27-19 @ 05:00) (18 - 18)  SpO2: 98% (10-27-19 @ 08:04) (98% - 98%)  Wt(kg): --  Height (cm): 167.64 (10-25-19 @ 11:59)  Weight (kg): 79.6 (10-26-19 @ 06:04)  BMI (kg/m2): 28.3 (10-26-19 @ 06:04)  BSA (m2): 1.89 (10-26-19 @ 06:04)      10-26-19 @ 07:01  -  10-27-19 @ 07:00  --------------------------------------------------------  IN: 0 mL / OUT: 2500 mL / NET: -2500 mL      Physical Exam:  	Gen: NAD, well-appearing  	HEENT: PERRL, supple neck, clear oropharynx  	Pulm: CTA B/L  	CV: RRR, S1S2; no rub  	Back: No spinal or CVA tenderness; no sacral edema  	Abd: +BS, soft, nontender/nondistended  	: No suprapubic tenderness  	UE: Warm, FROM, no clubbing, intact strength; no edema; no asterixis  	LE: Warm, FROM, no clubbing, intact strength; no edema  	Neuro: No focal deficits, intact gait  	Psych: Normal affect and mood  	Skin: Warm, without rashes  	Vascular access:    LABS/STUDIES  --------------------------------------------------------------------------------              8.4    5.73  >-----------<  227      [10-27-19 @ 06:39]              26.0     143  |  102  |  30  ----------------------------<  108      [10-27-19 @ 06:39]  4.0   |  27  |  5.9        Ca     8.8     [10-27-19 @ 06:39]      Mg     1.9     [10-26-19 @ 08:04]    TPro  5.7  /  Alb  3.4  /  TBili  1.1  /  DBili  x   /  AST  25  /  ALT  10  /  AlkPhos  110  [10-26-19 @ 08:04]    PT/INR: PT 11.30, INR 0.98       [10-26-19 @ 08:04]  PTT: 31.5       [10-26-19 @ 08:04]      Creatinine Trend:  SCr 5.9 [10-27 @ 06:39]  SCr 7.1 [10-26 @ 08:04]  SCr 5.6 [10-25 @ 09:01]  SCr 4.3 [10-24 @ 18:48]  SCr 7.8 [10-19 @ 11:31]        Iron 95, TIBC 134, %sat 71      [07-16-19 @ 06:20]  Ferritin 2507      [07-16-19 @ 06:20]  PTH -- (Ca 7.3)      [07-16-19 @ 05:40]   430  HbA1c 5.6      [09-18-19 @ 07:12]  Lipid: chol 116, , HDL 26, LDL 73      [10-27-19 @ 06:39] especially in wintertime)    Negative: Itching from bubble baths or other soaps    Negative: Itching from chlorine in swimming pool    Negative: Itching of unknown cause and present < 48 hours    Protocols used: ITCHING - WIDESPREAD-A-OH    Routing to MD. Milady HORN RN Urology 7/24/2023 8:12 AM

## 2023-07-27 DIAGNOSIS — R30.0 DYSURIA: ICD-10-CM

## 2023-07-27 DIAGNOSIS — Z95.5 PRESENCE OF CORONARY ANGIOPLASTY IMPLANT AND GRAFT: ICD-10-CM

## 2023-07-27 DIAGNOSIS — I44.0 ATRIOVENTRICULAR BLOCK, FIRST DEGREE: ICD-10-CM

## 2023-07-27 DIAGNOSIS — J18.9 PNEUMONIA, UNSPECIFIED ORGANISM: ICD-10-CM

## 2023-07-27 DIAGNOSIS — I50.32 CHRONIC DIASTOLIC (CONGESTIVE) HEART FAILURE: ICD-10-CM

## 2023-07-27 DIAGNOSIS — Z99.2 DEPENDENCE ON RENAL DIALYSIS: ICD-10-CM

## 2023-07-27 DIAGNOSIS — D53.9 NUTRITIONAL ANEMIA, UNSPECIFIED: ICD-10-CM

## 2023-07-27 DIAGNOSIS — I48.20 CHRONIC ATRIAL FIBRILLATION, UNSPECIFIED: ICD-10-CM

## 2023-07-27 DIAGNOSIS — T44.7X5A ADVERSE EFFECT OF BETA-ADRENORECEPTOR ANTAGONISTS, INITIAL ENCOUNTER: ICD-10-CM

## 2023-07-27 DIAGNOSIS — N18.6 END STAGE RENAL DISEASE: ICD-10-CM

## 2023-07-27 DIAGNOSIS — I13.2 HYPERTENSIVE HEART AND CHRONIC KIDNEY DISEASE WITH HEART FAILURE AND WITH STAGE 5 CHRONIC KIDNEY DISEASE, OR END STAGE RENAL DISEASE: ICD-10-CM

## 2023-07-27 DIAGNOSIS — J44.0 CHRONIC OBSTRUCTIVE PULMONARY DISEASE WITH (ACUTE) LOWER RESPIRATORY INFECTION: ICD-10-CM

## 2023-07-27 DIAGNOSIS — Z79.82 LONG TERM (CURRENT) USE OF ASPIRIN: ICD-10-CM

## 2023-07-27 DIAGNOSIS — M54.32 SCIATICA, LEFT SIDE: ICD-10-CM

## 2023-07-27 DIAGNOSIS — R00.1 BRADYCARDIA, UNSPECIFIED: ICD-10-CM

## 2023-07-27 DIAGNOSIS — E87.5 HYPERKALEMIA: ICD-10-CM

## 2023-07-27 DIAGNOSIS — I25.10 ATHEROSCLEROTIC HEART DISEASE OF NATIVE CORONARY ARTERY WITHOUT ANGINA PECTORIS: ICD-10-CM

## 2023-08-15 ENCOUNTER — APPOINTMENT (OUTPATIENT)
Dept: PODIATRY | Facility: CLINIC | Age: 73
End: 2023-08-15

## 2023-08-23 ENCOUNTER — APPOINTMENT (OUTPATIENT)
Dept: INTERNAL MEDICINE | Facility: CLINIC | Age: 73
End: 2023-08-23

## 2023-11-02 ENCOUNTER — APPOINTMENT (OUTPATIENT)
Dept: DERMATOLOGY | Facility: CLINIC | Age: 73
End: 2023-11-02

## 2024-01-18 NOTE — ED PROVIDER NOTE - DISPOSITION TYPE
Patient admitted before midnight.     60 y/o male with PMH of HTN, DM-2, active tobacco use was sent to the ED for abnormal outpatient CT chest. Patient reported pain on his left side x 4-5days associated with dry cough and shortness of breath. Sent for CT chest which showed pleural effusion and right sided lung nodules. Patient reported decrease appetite and weight loss (unintentional). He has no fever, night sweat, nausea, vomiting, abdominal pain, fall, trauma to the side, change in bowel/urinary habit, recent travel, sick contact.      ADMIT

## 2024-04-02 NOTE — PROGRESS NOTE ADULT - SUBJECTIVE AND OBJECTIVE BOX
MAMTA FERNANDO 72y Male  MRN#: [id number]        Hospital Day: 3d  HPI:H&P Adult [KENDALL Baca] (07-08-23 @ 23:21)  H&P Adult [REN Joy] (10-24-19 @ 22:39)  H&P Adult [NETTE Singh] (10-15-19 @ 20:46)  H&P Adult [KALEB Wellington R. Sharma] (07-14-19 @ 00:04)      Overnight events:    Subjective complaints:                                            ----------------------------------------------------------    PAST MEDICAL & SURGICAL HISTORY  ESRD on dialysis  T/ TH/ S    HTN (hypertension)    HLD (hyperlipidemia)    Heart failure    Anemia    Afib    H/O right coronary artery stent placement  stent x3    S/P arteriovenous (AV) fistula creation    S/P cataract surgery                                              -----------------------------------------------------------  ALLERGIES:  No Known Allergies                                            ------------------------------------------------------------    HOME MEDICATIONS  Home Medications:  apixaban 5 mg oral tablet: 0.5 tab(s) orally 2 times a day (05 Jul 2023 12:55)  aspirin 81 mg oral tablet, chewable: 1 tab(s) orally once a day (05 Jul 2023 12:59)  atorvastatin 40 mg oral tablet: 1 orally once a day (at bedtime) (05 Jul 2023 12:53)  cloNIDine 0.1 mg oral tablet: 1 tablet orally once a day At 5PM, Hold for  or less (08 Jul 2023 13:17)  gabapentin 100 mg oral capsule: 1 cap(s) orally every 8 hours (08 Jul 2023 13:02)  Reglan 5 mg oral tablet: 1 orally 4 times a day (05 Jul 2023 13:13)  Renagel 800 mg oral tablet: 2 tab(s) orally once a day (08 Jul 2023 13:17)  Toprol-XL 25 mg oral tablet, extended release: 1 tablet, extended release orally once a day (at bedtime) HOLD FOR HR LESS THAN 60 (08 Jul 2023 13:32)                           MEDICATIONS:  STANDING MEDICATIONS  apixaban 2.5 milliGRAM(s) Oral two times a day  aspirin  chewable 81 milliGRAM(s) Oral daily  atorvastatin 40 milliGRAM(s) Oral at bedtime  fluticasone propionate 50 MICROgram(s)/spray Nasal Spray 1 Spray(s) Both Nostrils two times a day  gabapentin 100 milliGRAM(s) Oral every 8 hours  hydrALAZINE 25 milliGRAM(s) Oral three times a day  isosorbide   mononitrate ER Tablet (IMDUR) 30 milliGRAM(s) Oral daily  pantoprazole    Tablet 40 milliGRAM(s) Oral before breakfast  polyethylene glycol 3350 17 Gram(s) Oral daily  senna 2 Tablet(s) Oral at bedtime  sevelamer carbonate 1600 milliGRAM(s) Oral <User Schedule>    PRN MEDICATIONS  metoclopramide 5 milliGRAM(s) Oral four times a day PRN                                            ------------------------------------------------------------  VITAL SIGNS: Last 24 Hours  T(C): 36.6 (11 Jul 2023 04:47), Max: 38.1 (10 Jul 2023 20:41)  T(F): 97.9 (11 Jul 2023 04:47), Max: 100.6 (10 Jul 2023 20:41)  HR: 109 (11 Jul 2023 04:47) (69 - 109)  BP: 124/60 (11 Jul 2023 04:47) (124/60 - 152/67)  BP(mean): 96 (10 Jul 2023 09:37) (96 - 96)  RR: 18 (11 Jul 2023 04:47) (17 - 18)  SpO2: --      07-10-23 @ 07:01  -  07-11-23 @ 07:00  --------------------------------------------------------  IN: 472 mL / OUT: 2100 mL / NET: -1628 mL                                             --------------------------------------------------------------  LABS:                        12.1   11.32 )-----------( 202      ( 10 Jul 2023 05:37 )             36.8     07-10    138  |  96<L>  |  72<HH>  ----------------------------<  109<H>  5.6<H>   |  20  |  8.4<HH>    Ca    8.5      10 Jul 2023 05:37  Phos  7.7     07-10  Mg     2.0     07-10    TPro  6.0  /  Alb  3.7  /  TBili  0.5  /  DBili  x   /  AST  10  /  ALT  6   /  AlkPhos  101  07-10      Urinalysis Basic - ( 10 Jul 2023 05:37 )    Color: x / Appearance: x / SG: x / pH: x  Gluc: 109 mg/dL / Ketone: x  / Bili: x / Urobili: x   Blood: x / Protein: x / Nitrite: x   Leuk Esterase: x / RBC: x / WBC x   Sq Epi: x / Non Sq Epi: x / Bacteria: x                CARDIAC MARKERS ( 09 Jul 2023 07:19 )  x     / 0.08 ng/mL / x     / x     / x                                                    --------------------------------------------------------------    PHYSICAL EXAM:  General: well-appearing in NAD.   HEENT: NCAT  LUNGS: CTAB  HEART: RRR.   ABDOMEN: Nontender, nondistended.   EXT: Nonedematous.             EMT/paramedic MAMTA FERNANDO 72y Male  MRN#: [id number]        Hospital Day: 3d  HPI:  72 year old male past medical history ESRD on dialysis (MWF), CAD s/p 3 stents, A-fib on Eliquis, hypertension, chronic back pain recently discharged from [facility] earlier today after work-up for chronic back pain.  Per EMS, patient blood pressure was elevated at time of discharge the patient received his clonidine early.  When patient arrived at [facility], patient noted to be bradycardic with heart rate 29 so patient was sent back to ED for further evaluation.  On ED arrival, patient heart rate initially read as 30s on the monitor however EKG revealing heart rate 57 sinus bradycardia with first-degree AV block.  Blood pressure stable at 160/60.  Patient denies chest pain, shortness of breath, dizziness, lightheadedness, dizziness, weakness.    In the ED, HR 57, /52. Labs showing K 5.4, phosphorus 5.9, trops 0.08, BNP ~29K. EKG showing sinus armando and bigeminy.    Overnight events:  Given PO hydralazine x 1 for elevated BP, asx.     Subjective complaints:  Pt was seen and examined at [facility]. C/o chronic L-sided back pain.                                                                             ----------------------------------------------------------    PAST MEDICAL & SURGICAL HISTORY  ESRD on dialysis  T/ TH/ S    HTN (hypertension)    HLD (hyperlipidemia)    Heart failure    Anemia    Afib    H/O right coronary artery stent placement  stent x3    S/P arteriovenous (AV) fistula creation    S/P cataract surgery                                              -----------------------------------------------------------  ALLERGIES:  No Known Allergies                                            ------------------------------------------------------------    HOME MEDICATIONS  Home Medications:  apixaban 5 mg oral tablet: 0.5 tab(s) orally 2 times a day (05 Jul 2023 12:55)  aspirin 81 mg oral tablet, chewable: 1 tab(s) orally once a day (05 Jul 2023 12:59)  atorvastatin 40 mg oral tablet: 1 orally once a day (at bedtime) (05 Jul 2023 12:53)  cloNIDine 0.1 mg oral tablet: 1 tablet orally once a day At 5PM, Hold for  or less (08 Jul 2023 13:17)  gabapentin 100 mg oral capsule: 1 cap(s) orally every 8 hours (08 Jul 2023 13:02)  Reglan 5 mg oral tablet: 1 orally 4 times a day (05 Jul 2023 13:13)  Renagel 800 mg oral tablet: 2 tab(s) orally once a day (08 Jul 2023 13:17)  Toprol-XL 25 mg oral tablet, extended release: 1 tablet, extended release orally once a day (at bedtime) HOLD FOR HR LESS THAN 60 (08 Jul 2023 13:32)                           MEDICATIONS:  STANDING MEDICATIONS  apixaban 2.5 milliGRAM(s) Oral two times a day  aspirin  chewable 81 milliGRAM(s) Oral daily  atorvastatin 40 milliGRAM(s) Oral at bedtime  fluticasone propionate 50 MICROgram(s)/spray Nasal Spray 1 Spray(s) Both Nostrils two times a day  gabapentin 100 milliGRAM(s) Oral every 8 hours  hydrALAZINE 25 milliGRAM(s) Oral three times a day  isosorbide   mononitrate ER Tablet (IMDUR) 30 milliGRAM(s) Oral daily  pantoprazole    Tablet 40 milliGRAM(s) Oral before breakfast  polyethylene glycol 3350 17 Gram(s) Oral daily  senna 2 Tablet(s) Oral at bedtime  sevelamer carbonate 1600 milliGRAM(s) Oral <User Schedule>    PRN MEDICATIONS  metoclopramide 5 milliGRAM(s) Oral four times a day PRN                                            ------------------------------------------------------------  VITAL SIGNS: Last 24 Hours  T(C): 36.6 (11 Jul 2023 04:47), Max: 38.1 (10 Jul 2023 20:41)  T(F): 97.9 (11 Jul 2023 04:47), Max: 100.6 (10 Jul 2023 20:41)  HR: 109 (11 Jul 2023 04:47) (69 - 109)  BP: 124/60 (11 Jul 2023 04:47) (124/60 - 152/67)  BP(mean): 96 (10 Jul 2023 09:37) (96 - 96)  RR: 18 (11 Jul 2023 04:47) (17 - 18)  SpO2: --      07-10-23 @ 07:01  -  07-11-23 @ 07:00  --------------------------------------------------------  IN: 472 mL / OUT: 2100 mL / NET: -1628 mL                                             --------------------------------------------------------------  LABS:                        12.1   11.32 )-----------( 202      ( 10 Jul 2023 05:37 )             36.8     07-10    138  |  96<L>  |  72<HH>  ----------------------------<  109<H>  5.6<H>   |  20  |  8.4<HH>    Ca    8.5      10 Jul 2023 05:37  Phos  7.7     07-10  Mg     2.0     07-10    TPro  6.0  /  Alb  3.7  /  TBili  0.5  /  DBili  x   /  AST  10  /  ALT  6   /  AlkPhos  101  07-10      Urinalysis Basic - ( 10 Jul 2023 05:37 )    Color: x / Appearance: x / SG: x / pH: x  Gluc: 109 mg/dL / Ketone: x  / Bili: x / Urobili: x   Blood: x / Protein: x / Nitrite: x   Leuk Esterase: x / RBC: x / WBC x   Sq Epi: x / Non Sq Epi: x / Bacteria: x      CARDIAC MARKERS ( 09 Jul 2023 07:19 )  x     / 0.08 ng/mL / x     / x     / x                                                    --------------------------------------------------------------    PHYSICAL EXAM:  General: well-appearing in NAD.   HEENT: NCAT  LUNGS: Coarse breath sounds  HEART: RRR.   ABDOMEN: Nontender, nondistended.   EXT: Nonedematous.

## 2024-05-24 NOTE — PATIENT PROFILE ADULT - HAVE YOU HAD COVID IN THE LAST 60 DAYS?
Caller: Chyna Gillespie    Relationship to patient: Self    Best call back number: [phone] (home)        Type of visit: FOLLOW UP     Requested date: AS ADVISED          Additional notes:PATIENT HASN'T BEEN SEEN FOR OVER THREE YEARS BUT DID HAVE PROCEDURE BY  2 YEARS AGO. PLEASE CALL PATIENT TO ADVISE IF REFERRAL IS NEEDED.          
No

## 2024-09-23 NOTE — H&P ADULT - HISTORY OF PRESENT ILLNESS
67 yo M with PMH of DM, ESRD on HD (T,Th, S), HTN, DLD, CAD s/p PCI  comes to the ED with the c/o acute onset of RUQ/ epigastric abdominal pain for one day associated with nausea and vomiting. PT states that the pain started today afternoon after the dialysis, colicky in nature, non radiating,  and constant. He had 3-4 episode of  vomiting.     He denies black stool, diarrhea, constipations, back pain, chest pain, palpitations, chest pain or shortness of breath. He has h/o GERD and takes protonix BID.     In the ED patient had chills and Tmax 100.8. Hemodynamically stable. CT scan showed distended gall bladder, US negative for the whitley's sign. CBD was dilated. His lactate was 7 on admission. [Diarrhea: Grade 0] : Diarrhea: Grade 0 [Joint Pain] : joint pain [Negative] : Psychiatric [Cough] : cough [Dysphagia] : no dysphagia [Nosebleeds] : no nosebleeds [Hoarseness] : no hoarseness [Odynophagia] : no odynophagia [Mucosal Pain] : no mucosal pain [Shortness Of Breath] : no shortness of breath [Wheezing] : no wheezing [SOB on Exertion] : no shortness of breath during exertion [Abdominal Pain] : no abdominal pain [Vomiting] : no vomiting [Constipation] : no constipation [FreeTextEntry4] : post nasal drip [FreeTextEntry9] : arthritic pain to toes, knees and hips B/L (chronic) [de-identified] : 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

## 2024-10-09 NOTE — ED PROVIDER NOTE - MDM ORDERS SUBMITTED SELECTION
Addended by: CHENG HERNANDEZ on: 10/8/2024 10:48 PM     Modules accepted: Level of Service     Imaging Studies/Medications/EKG/Labs
